# Patient Record
Sex: MALE | Race: WHITE | Employment: OTHER | URBAN - METROPOLITAN AREA
[De-identification: names, ages, dates, MRNs, and addresses within clinical notes are randomized per-mention and may not be internally consistent; named-entity substitution may affect disease eponyms.]

---

## 2017-01-09 DIAGNOSIS — Z98.890 S/P LUMBAR LAMINECTOMY: Primary | ICD-10-CM

## 2017-01-09 DIAGNOSIS — E78.5 HYPERLIPIDEMIA LDL GOAL <70: ICD-10-CM

## 2017-01-09 DIAGNOSIS — R13.10 DYSPHAGIA, UNSPECIFIED TYPE: ICD-10-CM

## 2017-01-09 DIAGNOSIS — E78.6 LOW HDL (UNDER 40): ICD-10-CM

## 2017-01-09 NOTE — TELEPHONE ENCOUNTER
Change in pharmacy - now mail order, new Rx's needed    Pending Prescriptions:                       Disp   Refills    tamsulosin (FLOMAX) 0.4 MG capsule        90 cap*3            Sig: TAKE 1 CAPSULE (0.4 MG) BY MOUTH DAILY AS NEEDED    pravastatin (PRAVACHOL) 20 MG tablet      90 tab*3            Sig: Take 1 tablet (20 mg) by mouth daily    omeprazole (PRILOSEC) 20 MG CR capsule    90 cap*3            Sig: TAKE 1 TABLET (20 MG) BY MOUTH AS NEEDED TAKE           30-60 MINUTES BEFORE A MEAL.    gemfibrozil (LOPID) 600 MG tablet         180 ta*3            Sig: Take 1 tablet (600 mg) by mouth 2 times daily         Last Written Prescription Date: 8/15/16, 12/5/16, 7/12/16  Last Fill Quantity: 90 day, # refills: 3  Last Office Visit with [facility], [facility] or [facility] prescribing provider: 12-5-16 Ayesha   Next 5 appointments (look out 90 days)     Feb 27, 2017 11:00 AM   [facility]    [address] [facility] [facility] [address]   [phone]                   CHOL      119   11/16/2016  CHOL      99@   4/9/2007  HDL       37   11/16/2016  LDL       61   11/16/2016  LDL      42@   1/22/2010  TRIG      106   11/16/2016  CHOLHDLRATIO      3.6   11/12/2015        BP Readings from Last 3 Encounters:   12/05/16 94/58   11/16/16 112/70   11/07/16 141/73     RT Parrish (R)

## 2017-01-11 RX ORDER — GEMFIBROZIL 600 MG/1
600 TABLET, FILM COATED ORAL 2 TIMES DAILY
Qty: 180 TABLET | Refills: 3 | Status: SHIPPED | OUTPATIENT
Start: 2017-01-11 | End: 2018-02-05

## 2017-01-11 RX ORDER — PRAVASTATIN SODIUM 20 MG
20 TABLET ORAL DAILY
Qty: 90 TABLET | Refills: 2 | Status: SHIPPED | OUTPATIENT
Start: 2017-01-11 | End: 2017-09-26

## 2017-01-11 RX ORDER — TAMSULOSIN HYDROCHLORIDE 0.4 MG/1
CAPSULE ORAL
Qty: 90 CAPSULE | Refills: 2 | Status: SHIPPED | OUTPATIENT
Start: 2017-01-11 | End: 2017-09-26

## 2017-01-11 NOTE — TELEPHONE ENCOUNTER
Routing refill request to provider for review/approval because:  Drug interaction warning - High  Kym Juarez RN

## 2017-02-17 ENCOUNTER — DOCUMENTATION ONLY (OUTPATIENT)
Dept: LAB | Facility: CLINIC | Age: 82
End: 2017-02-17

## 2017-02-17 DIAGNOSIS — I51.9 LEFT VENTRICULAR DIASTOLIC DYSFUNCTION: ICD-10-CM

## 2017-02-17 DIAGNOSIS — E66.9 NON MORBID OBESITY, UNSPECIFIED OBESITY TYPE: Primary | ICD-10-CM

## 2017-02-17 DIAGNOSIS — E11.59 TYPE 2 DIABETES MELLITUS WITH VASCULAR DISEASE (H): ICD-10-CM

## 2017-02-17 NOTE — PROGRESS NOTES
Patient will be coming to lab on 2/24/17. He is requesting for fasting lab orders. Please review and place future order if needed.     Thanks

## 2017-02-23 DIAGNOSIS — E66.9 NON MORBID OBESITY, UNSPECIFIED OBESITY TYPE: ICD-10-CM

## 2017-02-23 DIAGNOSIS — E11.59 TYPE 2 DIABETES MELLITUS WITH VASCULAR DISEASE (H): ICD-10-CM

## 2017-02-23 LAB
ANION GAP SERPL CALCULATED.3IONS-SCNC: 9 MMOL/L (ref 3–14)
BUN SERPL-MCNC: 17 MG/DL (ref 7–30)
CALCIUM SERPL-MCNC: 9 MG/DL (ref 8.5–10.1)
CHLORIDE SERPL-SCNC: 108 MMOL/L (ref 94–109)
CHOLEST SERPL-MCNC: 102 MG/DL
CO2 SERPL-SCNC: 26 MMOL/L (ref 20–32)
CREAT SERPL-MCNC: 0.71 MG/DL (ref 0.66–1.25)
CREAT UR-MCNC: 110 MG/DL
GFR SERPL CREATININE-BSD FRML MDRD: ABNORMAL ML/MIN/1.7M2
GLUCOSE SERPL-MCNC: 147 MG/DL (ref 70–99)
HBA1C MFR BLD: 6.8 % (ref 4.3–6)
HDLC SERPL-MCNC: 27 MG/DL
LDLC SERPL CALC-MCNC: 49 MG/DL
MICROALBUMIN UR-MCNC: 7 MG/L
MICROALBUMIN/CREAT UR: 6.01 MG/G CR (ref 0–17)
NONHDLC SERPL-MCNC: 75 MG/DL
POTASSIUM SERPL-SCNC: 3.9 MMOL/L (ref 3.4–5.3)
SODIUM SERPL-SCNC: 143 MMOL/L (ref 133–144)
TRIGL SERPL-MCNC: 128 MG/DL

## 2017-02-23 PROCEDURE — 83036 HEMOGLOBIN GLYCOSYLATED A1C: CPT | Performed by: INTERNAL MEDICINE

## 2017-02-23 PROCEDURE — 82043 UR ALBUMIN QUANTITATIVE: CPT | Performed by: INTERNAL MEDICINE

## 2017-02-23 PROCEDURE — 80061 LIPID PANEL: CPT | Performed by: INTERNAL MEDICINE

## 2017-02-23 PROCEDURE — 80048 BASIC METABOLIC PNL TOTAL CA: CPT | Performed by: INTERNAL MEDICINE

## 2017-02-23 PROCEDURE — 36415 COLL VENOUS BLD VENIPUNCTURE: CPT | Performed by: INTERNAL MEDICINE

## 2017-02-27 ENCOUNTER — OFFICE VISIT (OUTPATIENT)
Dept: FAMILY MEDICINE | Facility: CLINIC | Age: 82
End: 2017-02-27
Payer: COMMERCIAL

## 2017-02-27 VITALS
BODY MASS INDEX: 35.68 KG/M2 | WEIGHT: 287 LBS | OXYGEN SATURATION: 95 % | TEMPERATURE: 97.6 F | HEART RATE: 56 BPM | HEIGHT: 75 IN | DIASTOLIC BLOOD PRESSURE: 66 MMHG | SYSTOLIC BLOOD PRESSURE: 103 MMHG

## 2017-02-27 DIAGNOSIS — M54.2 CERVICALGIA: ICD-10-CM

## 2017-02-27 DIAGNOSIS — Z00.01 ENCOUNTER FOR GENERAL ADULT MEDICAL EXAMINATION WITH ABNORMAL FINDINGS: Primary | ICD-10-CM

## 2017-02-27 DIAGNOSIS — R39.198 SLOWING OF URINARY STREAM: ICD-10-CM

## 2017-02-27 DIAGNOSIS — E11.59 TYPE 2 DIABETES MELLITUS WITH VASCULAR DISEASE (H): ICD-10-CM

## 2017-02-27 DIAGNOSIS — E78.6 LOW HDL (UNDER 40): ICD-10-CM

## 2017-02-27 DIAGNOSIS — M54.41 CHRONIC LOW BACK PAIN WITH BILATERAL SCIATICA, UNSPECIFIED BACK PAIN LATERALITY: ICD-10-CM

## 2017-02-27 DIAGNOSIS — I77.810 AORTIC ROOT DILATATION (H): ICD-10-CM

## 2017-02-27 DIAGNOSIS — K63.5 COLON POLYPS: ICD-10-CM

## 2017-02-27 DIAGNOSIS — I10 ESSENTIAL HYPERTENSION, BENIGN: ICD-10-CM

## 2017-02-27 DIAGNOSIS — M54.42 CHRONIC LOW BACK PAIN WITH BILATERAL SCIATICA, UNSPECIFIED BACK PAIN LATERALITY: ICD-10-CM

## 2017-02-27 DIAGNOSIS — G47.33 OSA (OBSTRUCTIVE SLEEP APNEA): ICD-10-CM

## 2017-02-27 DIAGNOSIS — G89.29 CHRONIC LOW BACK PAIN WITH BILATERAL SCIATICA, UNSPECIFIED BACK PAIN LATERALITY: ICD-10-CM

## 2017-02-27 PROCEDURE — 99207 C FOOT EXAM  NO CHARGE: CPT | Mod: 25 | Performed by: INTERNAL MEDICINE

## 2017-02-27 PROCEDURE — 99397 PER PM REEVAL EST PAT 65+ YR: CPT | Performed by: INTERNAL MEDICINE

## 2017-02-27 RX ORDER — AMLODIPINE BESYLATE 2.5 MG/1
2.5 TABLET ORAL 2 TIMES DAILY
Qty: 180 TABLET | Refills: 3 | Status: SHIPPED | OUTPATIENT
Start: 2017-02-27 | End: 2017-09-26

## 2017-02-27 RX ORDER — LANCETS
EACH MISCELLANEOUS
Qty: 3 BOX | Refills: 3 | Status: CANCELLED | OUTPATIENT
Start: 2017-02-27

## 2017-02-27 RX ORDER — METFORMIN HCL 500 MG
2000 TABLET, EXTENDED RELEASE 24 HR ORAL
Qty: 120 TABLET | Refills: 3 | Status: SHIPPED | OUTPATIENT
Start: 2017-02-27 | End: 2017-08-04

## 2017-02-27 NOTE — NURSING NOTE
"Chief Complaint   Patient presents with     Wellness Visit     not fasting       Initial /66 (BP Location: Left arm, Patient Position: Chair, Cuff Size: Adult Large)  Pulse 56  Temp 97.6  F (36.4  C) (Oral)  Ht 6' 3\" (1.905 m)  Wt 287 lb (130.2 kg)  SpO2 95%  BMI 35.87 kg/m2 Estimated body mass index is 35.87 kg/(m^2) as calculated from the following:    Height as of this encounter: 6' 3\" (1.905 m).    Weight as of this encounter: 287 lb (130.2 kg).  Medication Reconciliation: complete.    Brenda Jaeger CMA      "

## 2017-02-27 NOTE — MR AVS SNAPSHOT
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   o   Discuss preventive care.    o   Review your medicines if your doctor has prescribed any.    Talk with your health care provider about whether you should have a test to screen for prostate cancer (PSA).    Every 3 years, have a diabetes test (fasting glucose). If you are at risk for diabetes, you should have this test more often.    Every 5 years, have a cholesterol test. Have this test more often if you are at risk for high cholesterol or heart disease.     Every 10 years, have a colonoscopy. Or, have a yearly FIT test (stool test). These exams will check for colon cancer.    Talk to with your health care provider about screening for Abdominal Aortic Aneurysm if you have a family history of AAA or have a history of smoking.  Shots:     Get a flu shot each year.     Get a tetanus shot every 10 years.     Talk to your doctor about your pneumonia vaccines. There are now two you should receive - Pneumovax (PPSV 23) and Prevnar (PCV 13).    Talk to your doctor about a shingles vaccine.     Talk to your  doctor about the hepatitis B vaccine.  Nutrition:     Eat at least 5 servings of fruits and vegetables each day.     Eat whole-grain bread, whole-wheat pasta and brown rice instead of white grains and rice.     Talk to your doctor about Calcium and Vitamin D.   Lifestyle    Exercise for at least 150 minutes a week (30 minutes a day, 5 days a week). This will help you control your weight and prevent disease.     Limit alcohol to one drink per day.     No smoking.     Wear sunscreen to prevent skin cancer.     See your dentist every six months for an exam and cleaning.     See your eye doctor every 1 to 2 years to screen for conditions such as glaucoma, macular degeneration and cataracts.        Follow-ups after your visit        Additional Services     GASTROENTEROLOGY ADULT REF PROCEDURE ONLY       Last Lab Result: Creatinine (mg/dL)       Date                     Value                 02/23/2017               0.71             ----------  Body mass index is 35.87 kg/(m^2).      Patient will be contacted to schedule procedure.     Please be aware that coverage of these services is subject to the terms and limitations of your health insurance plan.  Call member services at your health plan with any benefit or coverage questions.  Any procedures must be performed at a [facility] facility OR coordinated by your clinic's referral office.    Please bring the following with you to your appointment:    (1) Any X-Rays, CTs or MRIs which have been performed.  Contact the facility where they were done to arrange for  prior to your scheduled appointment.    (2) List of current medications   (3) This referral request   (4) Any documents/labs given to you for this referral            [facility] PT, HAND, AND CHIROPRACTIC REFERRAL       **This order will print in the [facility] Scheduling Office**    Physical Therapy, Hand Therapy and Chiropractic Care are available through:    *[facility]  *[facility]  *[facility]    Call one number to schedule at any of the above locations: [phone].    Your provider has referred you to: Physical Therapy at [facility] or [facility]    Indication/Reason for Referral: Neck Pain  Onset of Illness:   Therapy Orders: Evaluate and Treat  Special Programs: None  Special Request: None    Zach Serrano      Additional Comments for the Therapist or Chiropractor:     Please be aware that coverage of these services is subject to the terms and limitations of your health insurance plan.  Call member services at your health plan with any benefit or coverage questions.      Please bring the following to your appointment:    *Your personal calendar for scheduling future appointments  *Comfortable clothing                  Follow-up notes from your care team     Return in about 6 months (around 8/27/2017).      Who to contact     If you have questions or need follow up information about today's clinic visit or your schedule please contact [facility] directly at [phone].  Normal or non-critical lab and imaging results will be communicated to you by MyChart, letter or phone within 4 business days after the clinic has received the results. If you do not hear from us within 7 days, please contact the clinic through "[facility]hart or phone. If you have a critical or abnormal lab result, we will notify you by phone as soon as possible.  Submit refill requests through [employer] or call your pharmacy and they will forward the refill request to us. Please allow 3 business days for your refill to be completed.          Additional Information About Your Visit        [employer] Information     [employer] gives you secure access to your electronic health record. If you see a primary care provider, you can also send messages to your care team and make appointments. If you have questions, please call your primary care clinic.  If you do not have a primary care provider, please call  "[phone] and they will assist you.        Care EveryWhere ID     This is your Care EveryWhere ID. This could be used by other organizations to access your [place] medical records  [id number]        Your Vitals Were     Pulse Temperature Height Pulse Oximetry BMI (Body Mass Index)       56 97.6  F (36.4  C) (Oral) 6' 3\" (1.905 m) 95% 35.87 kg/m2        Blood Pressure from Last 3 Encounters:   02/27/17 103/66   12/05/16 94/58   11/16/16 112/70    Weight from Last 3 Encounters:   02/27/17 287 lb (130.2 kg)   12/05/16 288 lb (130.6 kg)   11/16/16 286 lb (129.7 kg)              We Performed the Following     FOOT EXAM     GASTROENTEROLOGY ADULT REF PROCEDURE ONLY     [facility] PT, HAND, AND CHIROPRACTIC REFERRAL          Today's Medication Changes          These changes are accurate as of: 2/27/17 11:21 AM.  If you have any questions, ask your nurse or doctor.               These medicines have changed or have updated prescriptions.        Dose/Directions    METAMUCIL 1.7 GM Wafr   This may have changed:  See the new instructions.   Generic drug:  Psyllium        TAKE AS DIRECTED   Refills:  0       triamcinolone 0.5 % cream   Commonly known as:  KENALOG   This may have changed:  additional instructions   Used for:  Rash and other nonspecific skin eruption        Apply  topically 2 times daily. to affected area as directed.   Quantity:  15 g   Refills:  0            Where to get your medicines      These medications were sent to [facility] - [address], [facility] [address]     Phone:  [phone]     amLODIPine 2.5 MG tablet    metFORMIN 500 MG 24 hr tablet                Primary Care Provider Office Phone # Fax #    Hamzah Hodge [phone]       [facility] [address]  [facility] [address]        Thank you!     Thank you for choosing [facility]  for your care. Our goal is always to provide you " with excellent care. Hearing back from our patients is one way we can continue to improve our services. Please take a few minutes to complete the written survey that you may receive in the mail after your visit with us. Thank you!             Your Updated Medication List - Protect others around you: Learn how to safely use, store and throw away your medicines at www.disposemymeds.org.          This list is accurate as of: 2/27/17 11:21 AM.  Always use your most recent med list.                   Brand Name Dispense Instructions for use    ACETAMIN 500 MG tablet   Generic drug:  acetaminophen      Take 2 tablets by mouth 3 times daily as needed.       amLODIPine 2.5 MG tablet    NORVASC    180 tablet    Take 1 tablet (2.5 mg) by mouth 2 times daily       aspirin 81 MG tablet     30 tablet    Take 1 tablet (81 mg) by mouth daily       blood glucose monitoring lancets     3 Box    Use to test blood sugar 2 times daily or as directed.       blood glucose monitoring test strip    ONE TOUCH ULTRA    200 each    Use to test blood sugars 2 times daily or as directed.       finasteride 5 MG tablet    PROSCAR    90 tablet    Take 1 tablet (5 mg) by mouth daily       gabapentin 300 MG capsule    NEURONTIN    810 capsule    Take 900 mg three times daily       gemfibrozil 600 MG tablet    LOPID    180 tablet    Take 1 tablet (600 mg) by mouth 2 times daily       lisinopril 20 MG tablet    PRINIVIL/ZESTRIL    90 tablet    Take 1 tablet (20 mg) by mouth daily       METAMUCIL 1.7 GM Wafr   Generic drug:  Psyllium      TAKE AS DIRECTED       metFORMIN 500 MG 24 hr tablet    GLUCOPHAGE-XR    120 tablet    Take 4 tablets (2,000 mg) by mouth daily (with breakfast)       metoprolol 50 MG 24 hr tablet    TOPROL-XL    180 tablet    Take 1 tablet (50 mg) by mouth 2 times daily       omeprazole 20 MG CR capsule    priLOSEC    90 capsule    TAKE 1 TABLET (20 MG) BY MOUTH AS NEEDED TAKE 30-60 MINUTES BEFORE A MEAL.       order for DME       Uses Cpap machine for sleep apnea       pravastatin 20 MG tablet    PRAVACHOL    90 tablet    Take 1 tablet (20 mg) by mouth daily       tamsulosin 0.4 MG capsule    FLOMAX    90 capsule    TAKE 1 CAPSULE (0.4 MG) BY MOUTH DAILY AS NEEDED       triamcinolone 0.5 % cream    KENALOG    15 g    Apply  topically 2 times daily. to affected area as directed.       vitamin D 2000 UNITS tablet     90 tablet    Take 2,000 Units by mouth daily.

## 2017-02-27 NOTE — PROGRESS NOTES
SUBJECTIVE:                                                            Austen Fowler is a 81 year old male who presents for Preventive Visit.      Are you in the first 12 months of your Medicare coverage?  No    Physical   Annual:     Getting at least 3 servings of Calcium per day::  Yes    Bi-annual eye exam::  Yes    Dental care twice a year::  Yes    Sleep apnea or symptoms of sleep apnea::  Sleep apnea    Diet::  Regular (no restrictions) and Low fat/cholesterol    Frequency of exercise::  2-3 days/week    Duration of exercise::  30-45 minutes    Taking medications regularly::  Yes    Medication side effects::  None    Additional concerns today::  YES      COGNITIVE SCREEN  1) Repeat 3 items (Banana, Sunrise, Chair)    2) Clock draw: NORMAL  3) 3 item recall: Recalls 2 objects   Results: NORMAL clock, 1-2 items recalled: COGNITIVE IMPAIRMENT LESS LIKELY    Mini-CogTM Copyright S [employer]. Licensed by the author for use in [facility] [employer]; reprinted with permission (jesus@[email or web address]). All rights reserved.        All Histories reviewed and updated in EPIC as appropriate.  Social History   Substance Use Topics     Smoking status: Former Smoker     Quit date: 3/1/1992     Smokeless tobacco: Never Used      Comment: 80 pack year history     Alcohol use 0.0 oz/week     0 Standard drinks or equivalent per week      Comment: once in a while alcohol usage        The patient does not drink >3 drinks per day nor >7 drinks per week.        Diabetes Follow-up    Patient is checking blood sugars: once daily.  Results are as follows:         am - 140    Diabetic concerns: None     Symptoms of hypoglycemia (low blood sugar): none     Paresthesias (numbness or burning in feet) or sores: No     Date of last diabetic eye exam: *4/2016     Hyperlipidemia Follow-Up      Rate your low fat/cholesterol diet?: good    Taking statin?  Yes, no muscle aches from statin    Other lipid medications/supplements?:  none     Hypertension  Follow-up      Outpatient blood pressures are not being checked.    Low Salt Diet: no added salt       Today's PHQ-2 Score:   PHQ-2 ( 1999 Pfizer) 2/24/2017   Little interest or pleasure in doing things Several days   Feeling down, depressed or hopeless Not at all   PHQ-2 Score 1       Do you feel safe in your environment - Yes    Do you have a Health Care Directive?: Yes: Patient states has Advance Directive and will bring in a copy to clinic.    Current providers sharing in care for this patient include:   Patient Care Team:  Hamzah Hodge MD as PCP - General      Hearing impairment: Yes, Difficulty following a conversation in a noisy restaurant or crowded room.    Feel that people are mumbling or not speaking clearly.    Need to ask people to speak up or repeat themselves.    Difficulty understanding speech on the telephone.    Ability to successfully perform activities of daily living: Yes, no assistance needed     Fall risk:  Fallen 2 or more times in the past year?: No  Any fall with injury in the past year?: No    Home safety:  lack of grab bars in the bathroom      The following health maintenance items are reviewed in Epic and correct as of today:  Health Maintenance   Topic Date Due     MEDICARE ANNUAL WELLNESS VISIT  03/12/2016     FOOT EXAM Q1 YEAR( NO INBASKET)  07/17/2016     EYE EXAM Q1 YEAR( NO INBASKET)  04/01/2017     TSH W/ FREE T4 REFLEX Q2 YEAR (NO INBASKET)  04/27/2017     ADVANCE DIRECTIVE PLANNING Q5 YRS (NO INBASKET)  05/22/2017     COLONOSCOPY Q5 YR INBASKET MESSAGE  07/19/2017     A1C Q6 MO( NO INBASKET)  08/23/2017     FALL RISK ASSESSMENT  12/05/2017     MARLA QUESTIONNAIRE 1 YEAR  12/05/2017     PHQ-9 Q1YR (NO INBASKET)  12/05/2017     CREATININE Q1 YEAR (NO INBASKET)  02/23/2018     LIPID MONITORING Q1 YEAR( NO INBASKET)  02/23/2018     MICROALBUMIN Q1 YEAR( NO INBASKET)  02/23/2018     PSA Q3 YR  03/12/2018     TETANUS Q10 YR  03/11/2023     PNEUMO 5YR BOOST DUE AFTER AGE 65 (NO IB  "MSG)  Addressed     INFLUENZA VACCINE (SYSTEM ASSIGNED)  Completed     PNEUMOCOCCAL  Completed              ROS:  Constitutional, HEENT, cardiovascular, pulmonary, gi and gu systems are negative, except as otherwise noted.    Problem list, Medication list, Allergies, and Medical/Social/Surgical histories reviewed in [facility] and updated as appropriate.  OBJECTIVE:                                                            /66 (BP Location: Left arm, Patient Position: Chair, Cuff Size: Adult Large)  Pulse 56  Temp 97.6  F (36.4  C) (Oral)  Ht 6' 3\" (1.905 m)  Wt 287 lb (130.2 kg)  SpO2 95%  BMI 35.87 kg/m2 Estimated body mass index is 35.87 kg/(m^2) as calculated from the following:    Height as of this encounter: 6' 3\" (1.905 m).    Weight as of this encounter: 287 lb (130.2 kg).  EXAM:   GENERAL: healthy, alert and no distress  EYES: Eyes grossly normal to inspection, PERRL and conjunctivae and sclerae normal  HENT: ear canals and TM's normal, nose and mouth without ulcers or lesions  NECK: no adenopathy, no asymmetry, masses, or scars and thyroid normal to palpation  RESP: lungs clear to auscultation - no rales, rhonchi or wheezes  CV: regular rate and rhythm, normal S1 S2, no S3 or S4, no murmur, click or rub, no peripheral edema and peripheral pulses strong  ABDOMEN: soft, nontender, no hepatosplenomegaly, no masses and bowel sounds normal   (male): testicles normal without atrophy or masses, no hernias and penis normal without urethral discharge  RECTAL: normal sphincter tone, no rectal masses, prostate normal size, smooth, nontender without nodules or masses  MS: no gross musculoskeletal defects noted, no edema  SKIN: no suspicious lesions or rashes  Multiple benign skin lesions     NEURO: Normal strength and tone, mentation intact and speech normal  PSYCH: mentation appears normal, affect normal/bright    ASSESSMENT / PLAN:                                                            Patient is status post " "CABG  He is on maximum medical management and is also on a statin drug with pravastatin  He takes baby aspirin daily  He is advised to try losing weight  His back surgical scar is healthy  And his back pain is manageable with the gabapentin and local injections  He's  Needs to be managed with physical therapy    He is due for colonoscopy    He is up-to-date on immunizations    He checks his blood sugars once a day and has enough supplies at home  He also is on metformin for that    He will see me 6 monthly    ICD-10-CM    1. Encounter for general adult medical examination with abnormal findings Z00.01    2. 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 Essential hypertension, benign I10 amLODIPine (NORVASC) 2.5 MG tablet   4. Aortic root dilatation (H) I77.810    5. Slowing of urinary stream R39.198    6. PENELOPE (obstructive sleep apnea) G47.33    7. Low HDL (under 40) E78.6    8. Colon polyps K63.5 GASTROENTEROLOGY ADULT REF PROCEDURE ONLY   9. Chronic low back pain with bilateral sciatica, unspecified back pain laterality M54.41     G89.29     M54.42    10. Cervicalgia M54.2 [facility] PT, HAND, AND CHIROPRACTIC REFERRAL       End of Life Planning:  Patient currently has an advanced directive: Yes.  Practitioner is supportive of decision.    COUNSELING:  Reviewed preventive health counseling, as reflected in patient instructions       Regular exercise       Healthy diet/nutrition        Estimated body mass index is 35.87 kg/(m^2) as calculated from the following:    Height as of this encounter: 6' 3\" (1.905 m).    Weight as of this encounter: 287 lb (130.2 kg).  Weight management plan: Discussed healthy diet and exercise guidelines and patient will follow up in 6 months in clinic to re-evaluate.   reports that he quit smoking about 25 years ago. He has never used smokeless tobacco.      Appropriate preventive services were discussed with this " patient, including applicable screening as appropriate for cardiovascular disease, diabetes, osteopenia/osteoporosis, and glaucoma.  As appropriate for age/gender, discussed screening for colorectal cancer, prostate cancer, Checklist reviewing preventive services available has been given to the patient.    Reviewed patients plan of care and provided an AVS. The Basic Care Plan (routine screening as documented in Health Maintenance) for Austen meets the Care Plan requirement. This Care Plan has been established and reviewed with the Patient.    Counseling Resources:  ATP IV Guidelines  Pooled Cohorts Equation Calculator  Breast Cancer Risk Calculator  FRAX Risk Assessment  ICSI Preventive Guidelines  Dietary Guidelines for Americans, 2010  USDA's MyPlate  ASA Prophylaxis  Lung CA Screening    Hamzah Hodge MD  [facility]  Answers for HPI/ROS submitted by the patient on 2/24/2017   PHQ-2 Depressed: Several days, Not at all  PHQ-2 Score: 1

## 2017-03-01 ENCOUNTER — THERAPY VISIT (OUTPATIENT)
Dept: PHYSICAL THERAPY | Facility: CLINIC | Age: 82
End: 2017-03-01
Payer: COMMERCIAL

## 2017-03-01 DIAGNOSIS — M54.2 NECK PAIN: Primary | ICD-10-CM

## 2017-03-01 PROCEDURE — 97110 THERAPEUTIC EXERCISES: CPT | Mod: GP | Performed by: PHYSICAL THERAPIST

## 2017-03-01 PROCEDURE — 97161 PT EVAL LOW COMPLEX 20 MIN: CPT | Mod: GP | Performed by: PHYSICAL THERAPIST

## 2017-03-01 PROCEDURE — G8982 BODY POS GOAL STATUS: HCPCS | Mod: GP | Performed by: PHYSICAL THERAPIST

## 2017-03-01 PROCEDURE — G8981 BODY POS CURRENT STATUS: HCPCS | Mod: GP | Performed by: PHYSICAL THERAPIST

## 2017-03-01 NOTE — PROGRESS NOTES
Subjective:                                       Pertinent medical history includes:  Cancer, diabetes, overweight, high blood pressure, heart problems and sleep disorder/apnea.    Other surgeries include:  Heart surgery and other.  Current medications:  Cardiac medication, anti-inflammatory, pain medication and high blood pressure medication.  Current occupation is Retired .  Patient is working in normal job without restrictions.      Barriers include:  None as reported by patient.    Red flags:  Pain at rest/night.                      Objective:    System    Physical Exam    General     ROS    Assessment/Plan:

## 2017-03-01 NOTE — PROGRESS NOTES
Physical Therapy Initial Evaluation  March 1, 2017     Precautions/Restrictions/MD instructions: PT eval and treat.     Subjective:   Date of Onset: September 1 2016, (MD orders dated 2/27/17)  Primary Symptoms/Location of Pain: Neck pain, actually starts in L shoulder blade, runs up to L side of neck and occasionally tingling into L occipital region. Quality of pain is dull and aching. Denies having related symptoms spreading to the upper extremities or R scapula. Pains are described as intermittent and variable in intensity in nature. Pain is worse: morning. Pain is rated 3/10 currently, 1/10 at best, 7/10 at worst.   History of symptoms: Pains began gradually as the result of insidious onset. Since onset, symptoms are worse than before.  Worsened by: Leaning on L shoulder/elbow, driving for longer periods of time (puts hand on handrest above window and decreases pains). Worse in AM.  Alleviated by: Hand on window handle of car door, ice/heat/topical cream (temporary relief), sometimes relieved by letting arm hang loosely by his side. Most comfortable position is in recliner.   General health as reported by patient: good  Pertinent medical/surgical history: hx bypass (25 years ago), lumbar surgery (2 years ago). Imaging: none. Current occupational status: Retired. Patient's goals are: decrease pain. Return to MD:  PRN.     Therapist Impression:   Austen Fowler is a 81 year old RHD male with chronic history of neck pain. He presents with signs and symptoms consistent with mechanical neck pain. These impairments limit his ability to drive, sit and lean on L shoulder. Skilled PT services are necessary in order to reduce impairments and improve independent function.    Objective:  CERVICAL EXAMINATION    Posture: Forward head on neck, Increased thoracic kyphosis and Rounded shoulders (all mild only)  Correction of Posture: NE    Movement Loss Félix Mod Min Nil Pain   Protrusion  X X  -   Flexion    X -   Retraction    X  -   Extension  X (29deg)   -   Left Rotation  X (50deg)   +   Right Rotation    X (78deg) -   Left Side Bending   X  +/-   Right Side bending   X  -     Repeated movement testing:   (During: produces, abolishes, increases, decreases, no effect, centralizing, peripheralizing; After: better, worse, no better, no worse, no effect, centralized, peripheralized)  Pre-test Symptoms Sitting: L upper trap pain 3/10   Symptoms During Symptoms After ROM increased ROM decreased No Effect   PRO NE NE      Rep PRO        RET Prod neck NW      Rep RET Prod neck, NE upper trap NW Incr L rotation to 53deg     RET EXT        Rep RET EXT        LF - R        Rep LF - R        LF - L        Rep LF - L        ROT - R        Rep ROT - R        ROT - L        Rep ROT - L        FLEX        Rep FLEX          Provisional Classification: derangement vs. other  Principle of Management: trial retraction    Neurological testing (myotomes, sensation, reflexes, nerve tension) not indicated at this time.    Other:  ULTT: na  Spurlings: na  DNF endurance: na  Cervical rotation/lateral flexion (CRLF test): na    Assessment/Plan:    The patient is a 81 year old male with chief complaint of neck pain.    The patient has the following significant findings with corresponding treatment plan.  Diagnosis 1:  Mechanical neck pain    Pain -  hot/cold therapy, US, electric stimulation, mechanical traction, manual therapy, splint/taping/bracing/orthotics, self management, education, directional preference exercise and home program  Decreased ROM/flexibility - manual therapy, therapeutic exercise and home program  Decreased joint mobility - manual therapy and therapeutic exercise  Impaired muscle performance - neuro re-education  Decreased function - therapeutic activities  Impaired posture - neuro re-education    Therapy Evaluation Codes:   1) History comprised of:   Personal factors that impact the plan of care:      Age and Time since onset of symptoms.     Comorbidity factors that impact the plan of care are:      None.     Medications impacting care: None.  2) Examination of Body Systems comprised of:   Body structures and functions that impact the plan of care:      Cervical spine.   Activity limitations that impact the plan of care are:      Driving and Sitting.   Clinical presentation characteristics are:    Stable/Uncomplicated.  3) Presentation comprised of:   Presentation scored as Low complexity with uncomplicated characteristics..  4) Decision-Making    Low complexity using standardized patient assessment instrument and/or measureable assessment of functional outcome.  Cumulative Therapy Evaluation is: Low complexity.    Previous and current functional limitations:  (See Goal Flow Sheet for this information)    Short term and Long term goals: (See Goal Flow Sheet for this information)     Communication ability:  Patient appears to be able to clearly communicate and understand verbal and written communication and follow directions correctly.  Treatment Explanation - The following has been discussed with the patient: RX ordered/plan of care, anticipated outcomes, and possible risks and side effects.  This patient would benefit from PT intervention to resume normal activities.   Rehab potential is good.    Frequency:  1 X week, once daily  Duration:  for 6 weeks  Discharge Plan: Achieve all LTGs, be independent in home treatment program, and reach maximal therapeutic benefit.    Please refer to the daily flowsheet for treatment today, total treatment time and time spent performing 1:1 timed codes.

## 2017-03-08 ENCOUNTER — THERAPY VISIT (OUTPATIENT)
Dept: PHYSICAL THERAPY | Facility: CLINIC | Age: 82
End: 2017-03-08
Payer: COMMERCIAL

## 2017-03-08 DIAGNOSIS — M54.2 NECK PAIN: ICD-10-CM

## 2017-03-08 PROCEDURE — 97110 THERAPEUTIC EXERCISES: CPT | Mod: GP | Performed by: PHYSICAL THERAPIST

## 2017-03-08 PROCEDURE — 97140 MANUAL THERAPY 1/> REGIONS: CPT | Mod: GP | Performed by: PHYSICAL THERAPIST

## 2017-03-15 ENCOUNTER — THERAPY VISIT (OUTPATIENT)
Dept: PHYSICAL THERAPY | Facility: CLINIC | Age: 82
End: 2017-03-15
Payer: COMMERCIAL

## 2017-03-15 DIAGNOSIS — M54.2 NECK PAIN: ICD-10-CM

## 2017-03-15 PROCEDURE — 97110 THERAPEUTIC EXERCISES: CPT | Mod: GP | Performed by: PHYSICAL THERAPIST

## 2017-03-15 PROCEDURE — 97140 MANUAL THERAPY 1/> REGIONS: CPT | Mod: GP | Performed by: PHYSICAL THERAPIST

## 2017-03-22 ENCOUNTER — THERAPY VISIT (OUTPATIENT)
Dept: PHYSICAL THERAPY | Facility: CLINIC | Age: 82
End: 2017-03-22
Payer: COMMERCIAL

## 2017-03-22 DIAGNOSIS — M54.2 NECK PAIN: ICD-10-CM

## 2017-03-22 PROCEDURE — 97140 MANUAL THERAPY 1/> REGIONS: CPT | Mod: GP | Performed by: PHYSICAL THERAPIST

## 2017-03-22 PROCEDURE — 97110 THERAPEUTIC EXERCISES: CPT | Mod: GP | Performed by: PHYSICAL THERAPIST

## 2017-03-29 ENCOUNTER — THERAPY VISIT (OUTPATIENT)
Dept: PHYSICAL THERAPY | Facility: CLINIC | Age: 82
End: 2017-03-29
Payer: COMMERCIAL

## 2017-03-29 DIAGNOSIS — M54.2 NECK PAIN: ICD-10-CM

## 2017-03-29 PROCEDURE — 97140 MANUAL THERAPY 1/> REGIONS: CPT | Mod: GP | Performed by: PHYSICAL THERAPIST

## 2017-03-29 PROCEDURE — 97110 THERAPEUTIC EXERCISES: CPT | Mod: GP | Performed by: PHYSICAL THERAPIST

## 2017-04-08 ENCOUNTER — MYC REFILL (OUTPATIENT)
Dept: FAMILY MEDICINE | Facility: CLINIC | Age: 82
End: 2017-04-08

## 2017-04-08 DIAGNOSIS — I10 ESSENTIAL HYPERTENSION, BENIGN: ICD-10-CM

## 2017-04-10 RX ORDER — LISINOPRIL 20 MG/1
20 TABLET ORAL DAILY
Qty: 90 TABLET | Refills: 3 | Status: SHIPPED | OUTPATIENT
Start: 2017-04-10 | End: 2018-03-18

## 2017-04-10 RX ORDER — LIDOCAINE 40 MG/G
CREAM TOPICAL
Status: CANCELLED | OUTPATIENT
Start: 2017-04-10

## 2017-04-10 RX ORDER — ONDANSETRON 2 MG/ML
4 INJECTION INTRAMUSCULAR; INTRAVENOUS
Status: CANCELLED | OUTPATIENT
Start: 2017-04-10

## 2017-04-10 NOTE — TELEPHONE ENCOUNTER
New pharmacy this year.  Refilled per [facility] protocol.  Dianne Elizabeth RN

## 2017-04-10 NOTE — TELEPHONE ENCOUNTER
Message from [employer]:  Original authorizing provider: MD Austen Andrade would like a refill of the following medications:  lisinopril (PRINIVIL/ZESTRIL) 20 MG tablet [Hamzah Hodge MD]    Preferred pharmacy: [facility] [address] [place] [address]    Comment:

## 2017-04-10 NOTE — TELEPHONE ENCOUNTER
lisinopril (PRINIVIL/ZESTRIL) 20 MG tablet        Last Written Prescription Date: 12/5/2016  Last Fill Quantity: 90, # refills: 3  Last Office Visit with [email or web address] or [facility] prescribing provider: 2/27/2017  Next 5 appointments (look out 90 days)     Jun 09, 2017  7:45 AM CDT   Return Visit with John Paul Moreno MD   [facility] ([email or web address])    [address] [place] [id number] [phone]                   Potassium   Date Value Ref Range Status   02/23/2017 3.9 3.4 - 5.3 mmol/L Final     Creatinine   Date Value Ref Range Status   02/23/2017 0.71 0.66 - 1.25 mg/dL Final     BP Readings from Last 3 Encounters:   02/27/17 103/66   12/05/16 94/58   11/16/16 112/70

## 2017-04-11 ENCOUNTER — HOSPITAL ENCOUNTER (OUTPATIENT)
Facility: CLINIC | Age: 82
Discharge: HOME | End: 2017-04-11
Attending: SPECIALIST | Admitting: SPECIALIST
Payer: COMMERCIAL

## 2017-04-11 ENCOUNTER — SURGERY (OUTPATIENT)
Age: 82
End: 2017-04-11

## 2017-04-11 VITALS
DIASTOLIC BLOOD PRESSURE: 77 MMHG | HEIGHT: 75 IN | SYSTOLIC BLOOD PRESSURE: 129 MMHG | BODY MASS INDEX: 34.82 KG/M2 | OXYGEN SATURATION: 94 % | RESPIRATION RATE: 16 BRPM | WEIGHT: 280 LBS

## 2017-04-11 LAB — COLONOSCOPY: NORMAL

## 2017-04-11 PROCEDURE — 25000125 ZZHC RX 250: Performed by: SPECIALIST

## 2017-04-11 PROCEDURE — 25000128 H RX IP 250 OP 636: Performed by: SPECIALIST

## 2017-04-11 PROCEDURE — 88305 TISSUE EXAM BY PATHOLOGIST: CPT | Mod: 26 | Performed by: SPECIALIST

## 2017-04-11 PROCEDURE — 45380 COLONOSCOPY AND BIOPSY: CPT | Performed by: SPECIALIST

## 2017-04-11 PROCEDURE — G0500 MOD SEDAT ENDO SERVICE >5YRS: HCPCS | Performed by: SPECIALIST

## 2017-04-11 PROCEDURE — 88305 TISSUE EXAM BY PATHOLOGIST: CPT | Performed by: SPECIALIST

## 2017-04-11 RX ORDER — FENTANYL CITRATE 50 UG/ML
INJECTION, SOLUTION INTRAMUSCULAR; INTRAVENOUS PRN
Status: DISCONTINUED | OUTPATIENT
Start: 2017-04-11 | End: 2017-04-11 | Stop reason: HOSPADM

## 2017-04-11 RX ADMIN — FENTANYL CITRATE 25 MCG: 50 INJECTION, SOLUTION INTRAMUSCULAR; INTRAVENOUS at 08:38

## 2017-04-11 RX ADMIN — MIDAZOLAM HYDROCHLORIDE 0.5 MG: 1 INJECTION, SOLUTION INTRAMUSCULAR; INTRAVENOUS at 08:58

## 2017-04-11 RX ADMIN — MIDAZOLAM HYDROCHLORIDE 0.5 MG: 1 INJECTION, SOLUTION INTRAMUSCULAR; INTRAVENOUS at 08:48

## 2017-04-11 RX ADMIN — FENTANYL CITRATE 25 MCG: 50 INJECTION, SOLUTION INTRAMUSCULAR; INTRAVENOUS at 08:58

## 2017-04-11 RX ADMIN — MIDAZOLAM HYDROCHLORIDE 0.5 MG: 1 INJECTION, SOLUTION INTRAMUSCULAR; INTRAVENOUS at 08:44

## 2017-04-11 RX ADMIN — FENTANYL CITRATE 25 MCG: 50 INJECTION, SOLUTION INTRAMUSCULAR; INTRAVENOUS at 08:48

## 2017-04-11 RX ADMIN — MIDAZOLAM HYDROCHLORIDE 0.5 MG: 1 INJECTION, SOLUTION INTRAMUSCULAR; INTRAVENOUS at 08:38

## 2017-04-11 RX ADMIN — FENTANYL CITRATE 25 MCG: 50 INJECTION, SOLUTION INTRAMUSCULAR; INTRAVENOUS at 08:44

## 2017-04-11 NOTE — H&P
Pre-Endoscopy History and Physical     Austen Fowler MRN# [id number]   YOB: 1935 Age: 82 year old     Date of Procedure: 4/11/2017  Primary care provider: Hamzah Hodge  Type of Endoscopy: Colonoscopy with possible biopsy, possible polypectomy  Reason for Procedure: surveillance  Type of Anesthesia Anticipated: Conscious Sedation    HPI:    Austen is a 82 year old male who will be undergoing the above procedure.      A history and physical has been performed. The patient's medications and allergies have been reviewed. The risks and benefits of the procedure and the sedation options and risks were discussed with the patient.  All questions were answered and informed consent was obtained.      He denies a personal or family history of anesthesia complications or bleeding disorders.     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)     S/P lumbar laminectomy     Health Care Home     Coronary artery disease involving native coronary artery of native heart without angina pectoris     Left ventricular diastolic dysfunction     Ascending aorta dilatation (H)     Aortic root dilatation (H)     Non morbid obesity, unspecified obesity type     Neck pain        Past Medical History:   Diagnosis Date     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     Bunion      CAD (coronary artery disease)     CABG 1992: LIMA to LAD, SANDI to RCA, SVG to OM1 and OM2     Contact dermatitis and other eczema, due to unspecified cause     eczema - has light treatments with Dr. Rivera     Disorders of bursae and tendons in shoulder region, unspecified      Esophageal reflux      Essential hypertension, benign      Gallbladder disease      GERD (gastroesophageal reflux disease)      Heart attack (H)      Hyperlipidemia LDL goal <70      Left ventricular diastolic dysfunction      Low HDL (under 40) 3/28/2014     Obesity      Osteoarthrosis, unspecified whether generalized or localized, shoulder region      Other diseases of nasal cavity and sinuses     s/p surgery in 1995     Other hammer toe (acquired)      Sleep apnea     USES CPAP     Spinal stenosis, unspecified region other than cervical     MRI done 2006     Type 2 diabetes, HbA1c goal < 7% (H) 3/12/2015     Urge incontinence         Past Surgical History:   Procedure Laterality Date     C NONSPECIFIC PROCEDURE  11-92    CABG - LIMA to left anterior descending and saphenous vein bypass graft to the first and second obtuse marginal branch of the circumflex and the right mammary to the right coronary artery     C NONSPECIFIC PROCEDURE  6-94    Gail lap     C NONSPECIFIC PROCEDURE  5-92, 6-92    Angioplasty     C NONSPECIFIC PROCEDURE  1995    sinus surgery     C NONSPECIFIC PROCEDURE      bilateral cataract surgery     ESOPHAGOSCOPY, GASTROSCOPY, DUODENOSCOPY (EGD), DILATATION, COMBINED  7/17/2014    Procedure: COMBINED ESOPHAGOSCOPY, GASTROSCOPY, DUODENOSCOPY (EGD), DILATATION;  Surgeon: Bladimir Garner MD;  Location:  GI     HC COLONOSCOPY THRU STOMA W BIOPSY/CAUTERY TUMOR/POLYP/LESION  5/2007     INJECT EPIDURAL LUMBAR       LAMINECTOMY LUMBAR TWO LEVELS N/A 4/29/2015    Procedure: LAMINECTOMY LUMBAR TWO  LEVELS;  Surgeon: Bladimir Keenan MD;  Location:  OR       Social History   Substance Use Topics     Smoking status: Former Smoker     Quit date: 3/1/1992     Smokeless tobacco: Never Used      Comment: 80 pack year history     Alcohol use 0.0 oz/week     0 Standard drinks or equivalent per week      Comment: once in a while alcohol usage        Family History   Problem Relation Age of Onset     CANCER Brother      MELANOMA     DIABETES Mother      AODM     DIABETES Father      AODM     Myocardial Infarction Father      CEREBROVASCULAR DISEASE Brother      Family History Negative Brother      Family History Negative Sister      Family History Negative Son      Family History Negative Son      Family History Negative Son      Family History Negative Daughter        Prior to Admission medications    Medication Sig Start Date End Date Taking? Authorizing Provider   lisinopril (PRINIVIL/ZESTRIL) 20 MG tablet Take 1 tablet (20 mg) by mouth daily 4/10/17  Yes Hamzah Hodge MD   metFORMIN (GLUCOPHAGE-XR) 500 MG 24 hr tablet Take 4 tablets (2,000 mg) by mouth daily (with breakfast) 2/27/17  Yes Hamzah Hodge MD   amLODIPine (NORVASC) 2.5 MG tablet Take 1 tablet (2.5 mg) by mouth 2 times daily 2/27/17  Yes Hamzah Hodge MD   tamsulosin (FLOMAX) 0.4 MG capsule TAKE 1 CAPSULE (0.4 MG) BY MOUTH DAILY AS NEEDED 1/11/17  Yes Hamzah Hodge MD   pravastatin (PRAVACHOL) 20 MG tablet Take 1 tablet (20 mg) by mouth daily 1/11/17  Yes Hamzah Hodge MD   omeprazole (PRILOSEC) 20 MG CR capsule TAKE 1 TABLET (20 MG) BY MOUTH AS NEEDED TAKE 30-60 MINUTES BEFORE A MEAL. 1/11/17  Yes Hamzah Hodge MD   gemfibrozil (LOPID) 600 MG tablet Take 1 tablet (600 mg) by mouth 2 times daily 1/11/17  Yes Hamzah Hodge MD   finasteride (PROSCAR) 5 MG tablet Take 1 tablet (5 mg) by mouth daily 12/27/16  Yes Hamzah Hodge MD   gabapentin (NEURONTIN) 300 MG capsule Take 900 mg three times daily 12/27/16  Yes Hamzah Hodge MD   metoprolol  "(TOPROL-XL) 50 MG 24 hr tablet Take 1 tablet (50 mg) by mouth 2 times daily 12/5/16  Yes Hamzah Hodge MD   aspirin 81 MG tablet Take 1 tablet (81 mg) by mouth daily 4/27/15  Yes Hamzah Hodge MD   acetaminophen (ACETAMIN) 500 MG tablet Take 2 tablets by mouth 3 times daily as needed.    Yes Reported, Patient   Cholecalciferol (VITAMIN D) 2000 UNIT tablet Take 2,000 Units by mouth daily. 3/12/12  Yes Hamzah Hodge MD   METAMUCIL 1.7 GM OR WAFR TAKE AS DIRECTED  Patient taking differently: TAKE AS Needed   Yes    blood glucose monitoring (ONE TOUCH ULTRASOFT) lancets Use to test blood sugar 2 times daily or as directed. 4/4/16   Hamzah Hodge MD   blood glucose monitoring (ONE TOUCH ULTRA) test strip Use to test blood sugars 2 times daily or as directed. 4/4/16   Hamzah Hodge MD   triamcinolone (KENALOG) 0.5 % cream Apply  topically 2 times daily. to affected area as directed.  Patient taking differently: Apply  topically 2 times daily. to affected area as directed PRN 4/20/15   Hamzah Hodge MD   ORDER FOR DME Uses Cpap machine for sleep apnea 5/22/12   Dominga Aguirre PA-C       Allergies   Allergen Reactions     No Known Drug Allergies         REVIEW OF SYSTEMS:   5 point ROS negative except as noted above in HPI, including Gen., Resp., CV, GI &  system review.    PHYSICAL EXAM:   /81  Resp 16  Ht 1.905 m (6' 3\")  Wt 127 kg (280 lb)  BMI 35 kg/m2 Estimated body mass index is 35 kg/(m^2) as calculated from the following:    Height as of this encounter: 1.905 m (6' 3\").    Weight as of this encounter: 127 kg (280 lb).   GENERAL APPEARANCE: alert, and oriented  MENTAL STATUS: alert  AIRWAY EXAM: Mallampatti Class II (visualization of the soft palate, fauces, and uvula)  RESP: lungs clear to auscultation - no rales, rhonchi or wheezes  CV: regular rates and rhythm  DIAGNOSTICS:    Not indicated    IMPRESSION   ASA Class 2 - Mild systemic disease    PLAN:   Plan for Colonoscopy with possible " biopsy, possible polypectomy. We discussed the risks, benefits and alternatives and the patient wished to proceed.    The above has been forwarded to the consulting provider.      Signed Electronically by: Bladimir Garner  April 11, 2017

## 2017-04-12 LAB — COPATH REPORT: NORMAL

## 2017-04-24 ENCOUNTER — MYC MEDICAL ADVICE (OUTPATIENT)
Dept: CARDIOLOGY | Facility: CLINIC | Age: 82
End: 2017-04-24

## 2017-04-24 ENCOUNTER — MYC MEDICAL ADVICE (OUTPATIENT)
Dept: FAMILY MEDICINE | Facility: CLINIC | Age: 82
End: 2017-04-24

## 2017-04-24 ENCOUNTER — MYC MEDICAL ADVICE (OUTPATIENT)
Dept: PALLIATIVE MEDICINE | Facility: CLINIC | Age: 82
End: 2017-04-24

## 2017-04-24 DIAGNOSIS — G89.29 CHRONIC LEFT SI JOINT PAIN: Primary | ICD-10-CM

## 2017-04-24 DIAGNOSIS — E78.2 MIXED HYPERLIPIDEMIA: Primary | ICD-10-CM

## 2017-04-24 DIAGNOSIS — M53.3 CHRONIC LEFT SI JOINT PAIN: Primary | ICD-10-CM

## 2017-04-24 DIAGNOSIS — I25.10 CORONARY ARTERY DISEASE INVOLVING NATIVE CORONARY ARTERY OF NATIVE HEART WITHOUT ANGINA PECTORIS: ICD-10-CM

## 2017-04-25 ENCOUNTER — TELEPHONE (OUTPATIENT)
Dept: PALLIATIVE MEDICINE | Facility: CLINIC | Age: 82
End: 2017-04-25

## 2017-04-25 NOTE — TELEPHONE ENCOUNTER
Pre-screening Questions for Radiology Injections:    Injection to be done at which interventional clinic site? [facility]    Procedure ordered by Pauline Saul    Procedure ordered?  SI Joint Injection    What insurance would patient like us to bill for this procedure? [facility]      Worker's comp-Any injection DO NOT SCHEDULE and route to Ayla Villaseñor.      [employer] insurance - For SI joint injections, DO NOT SCHEDULE and route Ayla Villaseñor.      [employer]- MBB's must be scheduled at LEAST two weeks apart      [employer] - Any injection besides hip/shoulder/knee joint DO NOT SCHEDULE and route to Ayla Villaseñor. She will obtain PA and call pt back to schedule procedure or notify pt of denial.     HP [employer]-PA REQUIRED FOR NON-ANSELMO OR Joint injections    Any chance of pregnancy? Not Applicable   If YES, do NOT schedule and route to RN pool    Is an  needed? No     Patient has a drive home? (mandatory) YES:     Is patient taking any blood thinners (plavix, coumadin, jantoven, warfarin, heparin, pradaxa or dabigatran )? No   If hold needed, do NOT schedule, route to RN pool     Is patient taking any aspirin products? Yes - Pt takes 81mg daily; instructed to hold 0 day(s) prior to procedure.      If more than 325mg/day do NOT schedule; route to RN pool     For CERVICAL procedures, hold all aspirin products for 6 days.      Does the patient have a bleeding or clotting disorder? No     If yes, okay to schedule AND route to RN nurse pool    **For any patients with platelet count <100, must be forwarded to provider**    Is patient diabetic?  Yes  If YES, have them bring their glucometer.    Does patient have an active infection or treated for one within the past week? No     Is patient currently taking any antibiotics?  No     For patients on chronic, preventative, or prophylactic antibiotics, procedures may be scheduled.     For patients on antibiotics for active or recent infection:    Romi Gallegos  Marlo Blanco [place], [place]-antibiotic course must have been completed for 4 days    Romi Omalley-antibiotic course must have been completed for 7 days    Is patient currently taking any steroid medications? (i.e. Prednisone, Medrol)  No     For patients on steroid medications:    Marlo Casarez [place], [place]-steroid course must have been completed for 4 days    Romi Omalley-steroid course must have been completed for 7 days    Reviewed with patient:  If you are started on any steroids or antibiotics between now and your appointment, you must contact us because it may affect our ability to perform your procedure.  Yes    Is patient actively being treated for cancer or immunocompromised, including the spleen having been removed? No    If YES, do NOT schedule and route to RN pool     **For Dr. Baugh patients without spleens should have the chart sent to her**    Are you able to get on and off an exam table with minimal or no assistance? Yes  If NO, do NOT schedule and route to RN pool    Are you able to roll over and lay on your stomach with minimal or no assistance? Yes  If NO, do NOT schedule and route to RN pool     Any allergies to contrast dye, iodine, shellfish, or numbing and steroid medications? No  If YES, route to RN pool AND add allergy information to appointment notes    Allergies: No known drug allergies        Has the patient had a flu shot or any other vaccinations within 7 days before or after the procedure.  No       Does patient have an MRI/CT?  Not Applicable  (SI joint, hip injections, lumbar sympathetic blocks, and stellate ganglion blocks do not require an MRI)    Was the MRI done w/in the last 3 years?  NA    Was MRI done at [place]? No      If not, where was it done? N/A       If MRI was not done at [place], [facility] or [facility] do NOT schedule and route to nursing.  If pt has an imaging disc, the injection may be scheduled but pt has to bring disc to appt. If  they show up w/out disc the injection cannot be done    Reminders (please tell patient if applicable):       Instructed pt to arrive 30 minutes early for IV start if this is for a cervical procedure, ALL sympathetic (stellate ganglion, hypogastric, or lumbar sympathetic block) and all sedation procedures (RFA, spinal cord stimulation trials).  Not Applicable    -IVs are not routinely placed for [place] and [place] cervical case       If NPO for sedation, informed patient that it is okay to take medications with sips of water (except if they are to hold blood thinners).  Not Applicable   *DO take blood pressure medication if it is prescribed*      If this is for a cervical ANSELMO, informed patient that aspirin needs to be held for 6 days.   Not Applicable      For all patients not having spinal cord stimulator (SCS) trials or radiofrequency ablations (RFAs), informed patient:  IV sedation is not provided for this procedure.  If you feel that an oral anti-anxiety medication is needed, you can discuss this further with your referring provider or primary care provider.  The Pain Clinic provider will discuss specifics of what the procedure includes at your appointment.  Most procedures last 10-20 minutes.  We use numbing medications to help with any discomfort during the procedure.  Not Applicable      Do not schedule procedures requiring IV placement in the first appointment of the day or first appointment after lunch.       For patients 85 or older we recommend having an adult stay w/ them for the remainder of the day.       Does the patient have any questions?  NO  Nikole Gutierrez  [facility]

## 2017-04-25 NOTE — TELEPHONE ENCOUNTER
I do not believe I have seen Austen for the shoulder. That should be worked up by PCP first. I've place an order for SI joint injections. I'd like to see him back two weeks after the injections to check in an dsee how he is feeling.

## 2017-04-25 NOTE — TELEPHONE ENCOUNTER
Briana from patient Created: 4/24/2017 8:03 PM    Pauline, I would like an appt to visit with you concerning pain in left shoulder area and to set up an appt at [facility] for a steroid injection in my left hip/sacro area like before. I put it off too long and get twinges in that area on occasion.  Thanks.  Austen

## 2017-04-25 NOTE — TELEPHONE ENCOUNTER
I spoke with Austen on the phone and asked that he schedule his appointment for the SI joint injection as well as a follow up visit with Pauline Saul 2 weeks after that.    HERLINDA SevillaN, RN  Care Coordinator  [facility] [place]

## 2017-04-25 NOTE — TELEPHONE ENCOUNTER
[employer] message to patient 4/25/17    Moni Boyce,    Please call our scheduling number to make an appointment with Pauline to discuss the pain and injections. The scheduling number is [phone].     Thank You,  HERLINDA SevillaN, RN  Care Coordinator  [facility] [place]

## 2017-05-01 ENCOUNTER — RADIANT APPOINTMENT (OUTPATIENT)
Dept: GENERAL RADIOLOGY | Facility: CLINIC | Age: 82
End: 2017-05-01
Attending: PAIN MEDICINE

## 2017-05-01 ENCOUNTER — RADIOLOGY INJECTION OFFICE VISIT (OUTPATIENT)
Dept: PALLIATIVE MEDICINE | Facility: CLINIC | Age: 82
End: 2017-05-01
Payer: COMMERCIAL

## 2017-05-01 VITALS — DIASTOLIC BLOOD PRESSURE: 67 MMHG | HEART RATE: 54 BPM | OXYGEN SATURATION: 97 % | SYSTOLIC BLOOD PRESSURE: 133 MMHG

## 2017-05-01 DIAGNOSIS — G89.29 CHRONIC LEFT SI JOINT PAIN: ICD-10-CM

## 2017-05-01 DIAGNOSIS — M53.3 CHRONIC LEFT SI JOINT PAIN: ICD-10-CM

## 2017-05-01 DIAGNOSIS — M47.818 ARTHROPATHY OF LEFT SACROILIAC JOINT: Primary | ICD-10-CM

## 2017-05-01 PROCEDURE — 27096 INJECT SACROILIAC JOINT: CPT | Mod: LT | Performed by: PAIN MEDICINE

## 2017-05-01 ASSESSMENT — PAIN SCALES - GENERAL: PAINLEVEL: MODERATE PAIN (4)

## 2017-05-01 NOTE — NURSING NOTE
Injection intake:     If this procedure is requiring IV sedation has patient been NPO for 6  Hours? NA    Is patient on coumadin, plavix or other prescribed blood thinner?   No    If patient is on coumadin was it held for 5 days?   NA    If patient is on plavix was it held for 7 days?    NA     Does patient take aspirin?  Yes -   ASA    If this is for a cervical procedure and patient is on aspirin has it been held for 6 days?   NA    Any allergies to contrast dye, iodine, steroid and/or numbing medications?  NO    Is patient currently taking antibiotics or have an active infection?  NO    Does patient have a ? Yes       Is patient pregnant or breastfeeding?  Not Applicable    Are the vital signs normal?  Yes      Erin Abad [facility] [place]

## 2017-05-01 NOTE — MR AVS SNAPSHOT
After Visit Summary   5/1/2017    Austen Fowler    MRN: [id number]           Patient Information     Date Of Birth          1935        Visit Information        Provider Department      5/1/2017 2:15 PM Maria Guadalupe Baugh MD [facility]        Care Instructions    [facility] Procedure Discharge Instructions    Today you saw:  Dr. Maria Guadalupe Baugh          Your procedure:  Left sacro-iliac joint injection       Medications used:  Lidocaine (anesthetic)  Bupivacaine (anesthetic)    Kenalog (steroid)              Be cautious when walking as numbness and/or weakness in the leg may occur up to 6-8 hours after the procedure due to effect of the local anesthetic    Do not drive for 6 hours. The effect of the local anesthetic could slow your reflexes.     Avoid strenuous activity for the first 24 hours. You may resume your regular activities after that.     You may shower, however avoid swimming, tub baths or hot tubs for 24 hours following your procedure    You may have a mild to moderate increase in pain for several days following the injection.      You may use ice packs for 10-15 minutes, 3 to 4 times a day at the injection site for comfort    Do not use heat to painful areas for 6 to 8 hours. This will give the local anesthetic time to wear off and prevent you from accidentally burning your skin.    You may use anti-inflammatory medications (such as Ibuprofen/Advil or Aleve) or Tylenol for pain control if necessary    With diabetes, check your blood sugar more frequently than usual as your blood sugar may be higher than normal for 10-14 days following a steroid injection. Contact your doctor who manages your diabetes if your blood sugar is higher than usual    It may take up to 14 days for the steroid medication to start working although you may feel the effect as early as a few days after the procedure.       If you experience any of the following, call the pain center line  during work hours at [phone] or on-call physician after hours at [phone]:  -Fever over 100 degree F  -Swelling, bleeding, redness, drainage, warmth at the injection site  -Progressive weakness or numbness in your leg  -Loss of bowel or bladder function  -Unusual new onset of pain that is not improving    Phone #s:  Nurse triage line for general questions: [phone]            Follow-ups after your visit        Your next 10 appointments already scheduled     May 01, 2017  2:15 PM CDT   Radiology Injections with Maria Guadalupe Baugh MD   [facility] ([facility])    [address]  [address]  [facility] [id number] [phone]            May 01, 2017  2:15 PM CDT   XR SACROILIAC THERAPEUTIC INJECTION LEFT with [email or web address]   [facility] Xray ([facility])    [address]  [address]  [facility] [address]   [phone]           Stop drinking 1 hour before the exam.  You may take your medicines as usual, except for blood thinners (Coumadin, Plavix, Ticlid, Persantine, Aggrenox, Pletal, Effient, Brilliant). Talk to your doctor if you take these.  Tell your doctor if:   You have ever had an allergic reaction to X-ray dye (contrast fluid).   There is a chance you may be pregnant.  Please bring a list of your current medicines to your exam. Include vitamins, minerals and over-the-counter medicines.  Please call the Imaging Department at your exam site with any questions.            Jun 06, 2017 10:00 AM CDT   LAB with [facility]   [facility]YS[facility] [place] ([facility])    [address] [place] [address] [address]  [facility] [address] [phone]           Patient must bring picture ID.  Patient should be prepared to give a urine specimen  Please do not eat 10-12 hours before your appointment if you are coming in fasting for labs on lipids, cholesterol, or glucose (sugar).  Pregnant women should  follow their Care Team instructions. Water with medications is okay. Do not drink coffee or other fluids.   If you have concerns about taking  your medications, please ask at office or if scheduling via ADVIZE, send a message by clicking on Secure Messaging, Message Your Care Team.            Jun 06, 2017 10:30 AM CDT   Ech Complete with SHCVECHR2   [facility] CV Echocardiography (Cardiovascular Imaging at [facility])    [address]  [facility] [address] [phone]           1.  Please bring or wear a comfortable two-piece outfit. 2.  You may eat, drink and take your normal medicines. 3.  For any questions that cannot be answered, please contact the ordering physician            Jun 09, 2017  7:45 AM CDT   Return Visit with John Paul Moreno MD   [facility] PHYSICIANS HEART AT [place] ([facility])    [address]  [facility] [address] [phone]              Who to contact     If you have questions or need follow up information about today's clinic visit or your schedule please contact [facility] directly at [phone].  Normal or non-critical lab and imaging results will be communicated to you by PrintLess Planshart, letter or phone within 4 business days after the clinic has received the results. If you do not hear from us within 7 days, please contact the clinic through Bsmarkt or phone. If you have a critical or abnormal lab result, we will notify you by phone as soon as possible.  Submit refill requests through ADVIZE or call your pharmacy and they will forward the refill request to us. Please allow 3 business days for your refill to be completed.          Additional Information About Your Visit        PrintLess Planshart Information     ADVIZE gives you secure access to your electronic health record. If you see a primary care provider, you can also send messages to your care team and make appointments. If you have  questions, please call your primary care clinic.  If you do not have a primary care provider, please call [phone] and they will assist you.        Care EveryWhere ID     This is your Care EveryWhere ID. This could be used by other organizations to access your [facility] medical records  [id number]        Your Vitals Were     Pulse Pulse Oximetry                54 98%           Blood Pressure from Last 3 Encounters:   05/01/17 130/61   04/11/17 129/77   02/27/17 103/66    Weight from Last 3 Encounters:   04/11/17 127 kg (280 lb)   02/27/17 130.2 kg (287 lb)   12/05/16 130.6 kg (288 lb)              Today, you had the following     No orders found for display         Today's Medication Changes          These changes are accurate as of: 5/1/17  2:14 PM.  If you have any questions, ask your nurse or doctor.               These medicines have changed or have updated prescriptions.        Dose/Directions    METAMUCIL 1.7 GM Wafr   This may have changed:  See the new instructions.   Generic drug:  Psyllium        TAKE AS DIRECTED   Refills:  0       triamcinolone 0.5 % cream   Commonly known as:  KENALOG   This may have changed:  additional instructions   Used for:  Rash and other nonspecific skin eruption        Apply  topically 2 times daily. to affected area as directed.   Quantity:  15 g   Refills:  0                Primary Care Provider Office Phone # Fax #    Bhavperfecto Hodge [phone]       [facility] [address] [place] [address] [facility] [address]  [facility] [address]        Thank you!     Thank you for choosing [facility]  for your care. Our goal is always to provide you with excellent care. Hearing back from our patients is one way we can continue to improve our services. Please take a few minutes to complete the written survey that you may receive in the mail after your visit with us. Thank you!             Your Updated Medication List - Protect others around you: Learn how to safely  use, store and throw away your medicines at www.disposemymeds.org.          This list is accurate as of: 5/1/17  2:14 PM.  Always use your most recent med list.                   Brand Name Dispense Instructions for use    ACETAMIN 500 MG tablet   Generic drug:  acetaminophen      Take 2 tablets by mouth 3 times daily as needed.       amLODIPine 2.5 MG tablet    NORVASC    180 tablet    Take 1 tablet (2.5 mg) by mouth 2 times daily       aspirin 81 MG tablet     30 tablet    Take 1 tablet (81 mg) by mouth daily       blood glucose monitoring lancets     3 Box    Use to test blood sugar 2 times daily or as directed.       blood glucose monitoring test strip    ONE TOUCH ULTRA    200 each    Use to test blood sugars 2 times daily or as directed.       finasteride 5 MG tablet    PROSCAR    90 tablet    Take 1 tablet (5 mg) by mouth daily       gabapentin 300 MG capsule    NEURONTIN    810 capsule    Take 900 mg three times daily       gemfibrozil 600 MG tablet    LOPID    180 tablet    Take 1 tablet (600 mg) by mouth 2 times daily       lisinopril 20 MG tablet    PRINIVIL/ZESTRIL    90 tablet    Take 1 tablet (20 mg) by mouth daily       METAMUCIL 1.7 GM Wafr   Generic drug:  Psyllium      TAKE AS DIRECTED       metFORMIN 500 MG 24 hr tablet    GLUCOPHAGE-XR    120 tablet    Take 4 tablets (2,000 mg) by mouth daily (with breakfast)       metoprolol 50 MG 24 hr tablet    TOPROL-XL    180 tablet    Take 1 tablet (50 mg) by mouth 2 times daily       omeprazole 20 MG CR capsule    priLOSEC    90 capsule    TAKE 1 TABLET (20 MG) BY MOUTH AS NEEDED TAKE 30-60 MINUTES BEFORE A MEAL.       order for DME      Uses Cpap machine for sleep apnea       pravastatin 20 MG tablet    PRAVACHOL    90 tablet    Take 1 tablet (20 mg) by mouth daily       tamsulosin 0.4 MG capsule    FLOMAX    90 capsule    TAKE 1 CAPSULE (0.4 MG) BY MOUTH DAILY AS NEEDED       triamcinolone 0.5 % cream    KENALOG    15 g    Apply  topically 2 times daily.  to affected area as directed.       vitamin D 2000 UNITS tablet     90 tablet    Take 2,000 Units by mouth daily.

## 2017-05-01 NOTE — NURSING NOTE
Discharge Information    IV Discontiued Time:  NA    Amount of Fluid Infused:  NA    Discharge Criteria = When patient returns to baseline or as per MD order    Consciousness:  Pt is fully awake    Circulation:  BP +/- 20% of pre-procedure level    Respiration:  Patient is able to breathe deeply    O2 Sat:  Patient is able to maintain O2 Sat >92% on room air    Activity:  Moves 4 extremities on command    Ambulation:  Patient is able to stand and walk     Dressing:  Clean/dry or No Dressing    Notes:   Discharge instructions and AVS given to patient    Patient meets criteria for discharge?  YES    Admitted to PCU?  No    Responsible adult present to accompany patient home?  Yes    Signature/Title:    Erin Keyes RN Care Coordinator  [facility]

## 2017-05-01 NOTE — PATIENT INSTRUCTIONS
[facility] Procedure Discharge Instructions    Today you saw:  Dr. Maria Guadalupe Baugh          Your procedure:  Left sacro-iliac joint injection       Medications used:  Lidocaine (anesthetic)  Bupivacaine (anesthetic)    Kenalog (steroid)              Be cautious when walking as numbness and/or weakness in the leg may occur up to 6-8 hours after the procedure due to effect of the local anesthetic    Do not drive for 6 hours. The effect of the local anesthetic could slow your reflexes.     Avoid strenuous activity for the first 24 hours. You may resume your regular activities after that.     You may shower, however avoid swimming, tub baths or hot tubs for 24 hours following your procedure    You may have a mild to moderate increase in pain for several days following the injection.      You may use ice packs for 10-15 minutes, 3 to 4 times a day at the injection site for comfort    Do not use heat to painful areas for 6 to 8 hours. This will give the local anesthetic time to wear off and prevent you from accidentally burning your skin.    You may use anti-inflammatory medications (such as Ibuprofen/Advil or Aleve) or Tylenol for pain control if necessary    With diabetes, check your blood sugar more frequently than usual as your blood sugar may be higher than normal for 10-14 days following a steroid injection. Contact your doctor who manages your diabetes if your blood sugar is higher than usual    It may take up to 14 days for the steroid medication to start working although you may feel the effect as early as a few days after the procedure.       If you experience any of the following, call the pain center line during work hours at [phone] or on-call physician after hours at [phone]:  -Fever over 100 degree F  -Swelling, bleeding, redness, drainage, warmth at the injection site  -Progressive weakness or numbness in your leg  -Loss of bowel or bladder function  -Unusual new onset of pain that is  not improving    Phone #s:  Nurse triage line for general questions: [phone]

## 2017-05-07 NOTE — PROGRESS NOTES
"[facility] - Procedure Note    Date of Visit: 2017    Indications: Austen Fowler is a pleasant, 82-year-old male with history of lumbar spine surgery (performed at the L4-5 level in 2015; he reports prior surgery at the L3-4 level), who is seen at the request of CRISTIANO Cespedes CNP for repeat left SI joint injection. He was last seen in clinic on 16 for left SI joint injection. He reports that this injection went \"amazingly well\", with pain relief lasting until just recently. He notes that he may experience a \"twinge\" of left gluteal pain \"every once and a while\". If he is on his feet walking, he may experience symptoms down his legs, typically to the level of the knee; he notes that the nerve \"tightens up\". He has experienced more bilateral symptoms over the last six weeks. He otherwise denies new weakness or changes in bowel/bladder function. Other pain complaints include discomfort of the left neck and shoulder.    Electronic Chart Review: Patient history, pertinent diagnostic studies, vitals, allergies, and medications were reviewed.    Review of Systems: He stopped taking his Aspirin 81 mg daily, 4-5 days prior. The patient denies recent fever, chills, illness, use of antibiotics or anticoagulants. No allergy to local anesthetic or steroid.     Physical Examination:  /67  Pulse 54  SpO2 97%   GEN: Alert. Well developed, well nourished. No acute distress.   CV/Resp: Symmetric chest wall excursion. Non-labored breathing. No audible wheezing.  Skin: No rashes/lesions of posterior torso.   Extremities: Distal extremities warm, well perfused. Dependent edema of distal lower extremities.   Neuro/MSK: Power 5/5 throughout bilateral lower extremities, although there is noted to be very slight give-way weakness at left hip flexors and left ADF.     Imagin/17/15 MRI lumbar spine: Multilevel degenerative disc disease and central stenosis. Mild to moderate central " "stenosis at L2-L3 has increased since the prior exam. Borderline mild central stenosis at L3-L4 and severe central stenosis at L4-L5 related to multiple factors have remain unchanged. Multilevel foraminal stenosis bilaterally without change, most prominent at L4-L5 on the left.    Procedure Description:    Pre-procedure Diagnosis: Sacroiliac joint arthropathy, left  Post-procedure Diagnosis: Sacroiliac joint arthropathy, left  Procedure performed: Left sacroiliac joint injection  : Maria Guadalupe Baugh MD    Local anesthesia: 3 mL of 1% lidocaine (preservative free)  Medication solution (injectate): 1 mL of 40 mg/mL triamcinolone + 1 mL of 1% lidocaine + 1 mL of 0.5% bupivacaine  Sterile prep/cleansing solution: ChloraPrep    The procedure and risks were explained, and informed written consent was obtained from the patient. Risks include but are not limited to: increased pain, infection, bleeding, damage to soft tissue, nerve, muscle, and vasculature structures. Risks of steroid include but are not limited to: medication reaction, mood/sleep disturbance, increased blood pressure, increased blood glucose, effects on eye conditions (glaucoma/cataracts), effects on bone/tissue structure/health. The procedure site was marked with a disposable pen. Immediately prior to the start of the procedure, a \"time out\" safety check was conducted to confirm correct patient, procedure, and laterality. Monitoring of pulse oximetry and heart rate was performed throughout the procedure.     The patient was placed in a prone position on the fluoroscopy table. The left sacroiliac joint was visualized with fluoroscopy in contralateral oblique view, with alignment of the inferior aspect of the posterior and anterior joint margins. The skin was prepped and draped in sterile fashion. With a 25-gauge, 1.5 inch needle, local anesthetic was injected subcutaneously over the entry site. A 22-gauge, 5 inch spinal needle was advanced into the " posteroinferior aspect of the sacroiliac joint under direct fluoroscopic visualization. There was no evidence of paresthesias throughout needle placement. After negative aspiration for heme and cerebrospinal fluid, 3 mL of the medication solution was injected in increments. The stylet was reinserted and the needle was withdrawn.    The patient tolerated the procedure well, and there was no evidence of procedural complications. The patient was stable post-procedure and was able to ambulate on discharge home. Post-procedure instructions were provided.     Pre-procedure pain score: 4/10 back, 2/10 leg/buttock  Post-procedure pain score: 4/10 back, 2/10 leg/buttock    Assessment/Plan: Austen Fowler is a pleasant, 82-year-old male s/p left sacroiliac joint injection today.    1. Following today's procedure, the patient was advised to contact the [facility] for any of the following:   Fever, chills, or night sweats   New onset of pain, numbness, or weakness   Any questions regarding the procedure      If unable to contact the Pain Center, the patient was instructed to go to a local Emergency Room for any complications.  2. If only partial relief, consideration may be given towards lumbar epidural steroid injection in the future.   3. He will follow up with CRISTIANO Cespedes CNP as directed.    Maria Guadalupe Baugh MD  [facility]

## 2017-05-08 ENCOUNTER — TELEPHONE (OUTPATIENT)
Dept: PALLIATIVE MEDICINE | Facility: CLINIC | Age: 82
End: 2017-05-08

## 2017-05-08 NOTE — TELEPHONE ENCOUNTER
Patient had a left SI joint injection on 05/01/17.  Called patient for an update.      Pt reported the following details:  Patient states that he is not feeling any pain in the area.   Told patient that the information will be forwarded to her provider.  Also explained that, if a steroid medication was used, it could take up to 14 days to feel the full effect and if pt has any further questions or concerns pt should call the nurse line at [phone].    Char LAZO)

## 2017-05-08 NOTE — TELEPHONE ENCOUNTER
Information noted. Thanks for the update.    Maria Guadalupe Baugh MD  [facility]

## 2017-05-15 ENCOUNTER — MYC REFILL (OUTPATIENT)
Dept: FAMILY MEDICINE | Facility: CLINIC | Age: 82
End: 2017-05-15

## 2017-05-15 DIAGNOSIS — I10 ESSENTIAL HYPERTENSION, BENIGN: ICD-10-CM

## 2017-05-15 RX ORDER — AMLODIPINE BESYLATE 2.5 MG/1
2.5 TABLET ORAL 2 TIMES DAILY
Qty: 180 TABLET | Refills: 3 | Status: CANCELLED | OUTPATIENT
Start: 2017-05-15

## 2017-05-16 ENCOUNTER — PRE VISIT (OUTPATIENT)
Dept: CARDIOLOGY | Facility: CLINIC | Age: 82
End: 2017-05-16

## 2017-05-16 RX ORDER — METOPROLOL SUCCINATE 50 MG/1
50 TABLET, EXTENDED RELEASE ORAL 2 TIMES DAILY
Qty: 180 TABLET | Refills: 1 | Status: SHIPPED | OUTPATIENT
Start: 2017-05-16 | End: 2017-12-02

## 2017-05-16 NOTE — TELEPHONE ENCOUNTER
Message from MyChart:  Original authorizing provider: MD Austen Andrade would like a refill of the following medications:  metoprolol (TOPROL-XL) 50 MG 24 hr tablet [Hamzah Hodge MD]  amLODIPine (NORVASC) 2.5 MG tablet [Hamzah Hodge MD]    Preferred pharmacy: [facility] - [address]    Comment:

## 2017-05-16 NOTE — TELEPHONE ENCOUNTER
amLODIPine (NORVASC) 2.5 MG tablet-REFILLS AT PHARMACY    metoprolol (TOPROL-XL) 50 MG 24 hr tablet   -PROFILE ONLY   Last Written Prescription Date: 12/5/2016  Last Fill Quantity: 180, # refills: 3    Last Office Visit with ADITI, CARMELO or [facility] prescribing provider:  2/27/2017   Future Office Visit:    Next 5 appointments (look out 90 days)     Jun 09, 2017  7:45 AM CDT   Return Visit with John Paul Moreno MD   [facility] AT [place] ([facility])    [address] [place] [id number] [phone]                    BP Readings from Last 3 Encounters:   05/01/17 133/67   04/11/17 129/77   02/27/17 103/66

## 2017-05-16 NOTE — TELEPHONE ENCOUNTER
Prescription approved per [facility] Refill Protocol.    Sandi Meyer RN

## 2017-05-17 ENCOUNTER — TELEPHONE (OUTPATIENT)
Dept: FAMILY MEDICINE | Facility: CLINIC | Age: 82
End: 2017-05-17

## 2017-05-17 NOTE — TELEPHONE ENCOUNTER
Returned call to pharmacist at [facility].   Message to pharmacy on the RX Metoprolol---for Norvasc refill--in error.     Pharmacist confirmed patient recently filled the RX Norvasc and has refills available.     No further action needed.     Sarah Hitchcock RN, BSN

## 2017-05-17 NOTE — TELEPHONE ENCOUNTER
"Reason for Call:  Other prescription      Detailed comments: Malia from [facility] calling.  They received an rx yesterday for metoprolol.  In the notes it says \"Patient is also requesting his Norvasc refilled. Thank you\" . Norvasc was refilled in Feb for 90 days with 3 refills so that should be okay, she is asking if there is a problem with that medication.     Phone Number Patient can be reached at: [phone]  option 1, then option 2 to reach pharmacist,  order # [id number]    Best Time: any    Can we leave a detailed message on this number? Not Applicable    Call taken on 5/17/2017 at 9:37 AM by Emilia Marcus      "

## 2017-05-24 ENCOUNTER — MYC MEDICAL ADVICE (OUTPATIENT)
Dept: FAMILY MEDICINE | Facility: CLINIC | Age: 82
End: 2017-05-24

## 2017-05-24 NOTE — TELEPHONE ENCOUNTER
Routing to MAs:  Patient wanting information updated in chart so that he is not being flagged on MyChart to have completed.     Thank you!    Eneida Camacho RN

## 2017-06-05 DIAGNOSIS — R21 RASH AND OTHER NONSPECIFIC SKIN ERUPTION: ICD-10-CM

## 2017-06-05 RX ORDER — TRIAMCINOLONE ACETONIDE 5 MG/G
CREAM TOPICAL
Qty: 15 G | Refills: 0 | Status: SHIPPED | OUTPATIENT
Start: 2017-06-05 | End: 2020-01-11

## 2017-06-05 NOTE — TELEPHONE ENCOUNTER
Patient last has the triamcinolone cream Rx'd in April 2015.  Uses PRN.  Needs OV or ok to fill?    Pending Prescriptions:                       Disp   Refills    triamcinolone (KENALOG) 0.5 % cream       15 g   0            Sig: Apply  topically 2 times daily. to affected area           as directed PRN          Last Written Prescription Date: 4-20-15  Last Fill Quantity: 15g,  # refills: 0   Last Office Visit with [facility], [facility] or [facility] prescribing provider: 2-27-17 Ayesha                                           Next 5 appointments (look out 90 days)     Jun 09, 2017  7:45 AM CDT   Return Visit with John Paul Moreno MD   [facility] AT [place] ([facility])    [address]  [facility] [id number] [phone]            Jun 19, 2017  8:00 AM CDT   Office Visit with Nadia Joel HERLINDA   [facility]    [address]  [address]  [facility] [id number] [phone]                    Grace Nicole RT (R)

## 2017-06-06 ENCOUNTER — HOSPITAL ENCOUNTER (OUTPATIENT)
Dept: CARDIOLOGY | Facility: CLINIC | Age: 82
Discharge: HOME | End: 2017-06-06
Attending: INTERNAL MEDICINE | Admitting: INTERNAL MEDICINE
Payer: COMMERCIAL

## 2017-06-06 DIAGNOSIS — I25.10 CORONARY ARTERY DISEASE INVOLVING NATIVE CORONARY ARTERY OF NATIVE HEART WITHOUT ANGINA PECTORIS: ICD-10-CM

## 2017-06-06 DIAGNOSIS — E78.6 LOW HDL (UNDER 40): ICD-10-CM

## 2017-06-06 DIAGNOSIS — E78.5 HYPERLIPIDEMIA LDL GOAL <70: ICD-10-CM

## 2017-06-06 DIAGNOSIS — E78.2 MIXED HYPERLIPIDEMIA: ICD-10-CM

## 2017-06-06 DIAGNOSIS — Z95.1 S/P CABG (CORONARY ARTERY BYPASS GRAFT): ICD-10-CM

## 2017-06-06 DIAGNOSIS — I10 ESSENTIAL HYPERTENSION, BENIGN: ICD-10-CM

## 2017-06-06 LAB
ALT SERPL W P-5'-P-CCNC: <5 U/L (ref 5–30)
ANION GAP SERPL CALCULATED.3IONS-SCNC: 12.1 MMOL/L (ref 6–17)
BUN SERPL-MCNC: 16 MG/DL (ref 7–30)
CALCIUM SERPL-MCNC: 9.6 MG/DL (ref 8.5–10.5)
CHLORIDE SERPL-SCNC: 104 MMOL/L (ref 98–107)
CHOLEST SERPL-MCNC: 108 MG/DL
CO2 SERPL-SCNC: 27 MMOL/L (ref 23–29)
CREAT SERPL-MCNC: 0.78 MG/DL (ref 0.7–1.3)
GFR SERPL CREATININE-BSD FRML MDRD: >90 ML/MIN/1.7M2
GLUCOSE SERPL-MCNC: 142 MG/DL (ref 70–105)
HDLC SERPL-MCNC: 31 MG/DL
LDLC SERPL CALC-MCNC: 54 MG/DL
NONHDLC SERPL-MCNC: 77 MG/DL
POTASSIUM SERPL-SCNC: 4.1 MMOL/L (ref 3.5–5.1)
SODIUM SERPL-SCNC: 139 MMOL/L (ref 136–145)
TRIGL SERPL-MCNC: 116 MG/DL

## 2017-06-06 PROCEDURE — 36415 COLL VENOUS BLD VENIPUNCTURE: CPT | Performed by: INTERNAL MEDICINE

## 2017-06-06 PROCEDURE — 25500064 ZZH RX 255 OP 636: Performed by: INTERNAL MEDICINE

## 2017-06-06 PROCEDURE — 80048 BASIC METABOLIC PNL TOTAL CA: CPT | Performed by: INTERNAL MEDICINE

## 2017-06-06 PROCEDURE — 80061 LIPID PANEL: CPT | Performed by: INTERNAL MEDICINE

## 2017-06-06 PROCEDURE — 93306 TTE W/DOPPLER COMPLETE: CPT | Mod: 26 | Performed by: INTERNAL MEDICINE

## 2017-06-06 PROCEDURE — 40000264 ECHO COMPLETE WITH LUMASON

## 2017-06-06 PROCEDURE — 84460 ALANINE AMINO (ALT) (SGPT): CPT | Performed by: INTERNAL MEDICINE

## 2017-06-06 RX ADMIN — SULFUR HEXAFLUORIDE 5 ML: KIT at 11:28

## 2017-06-09 ENCOUNTER — OFFICE VISIT (OUTPATIENT)
Dept: CARDIOLOGY | Facility: CLINIC | Age: 82
End: 2017-06-09
Attending: INTERNAL MEDICINE
Payer: COMMERCIAL

## 2017-06-09 VITALS
HEIGHT: 75 IN | BODY MASS INDEX: 35.06 KG/M2 | HEART RATE: 60 BPM | SYSTOLIC BLOOD PRESSURE: 122 MMHG | WEIGHT: 282 LBS | DIASTOLIC BLOOD PRESSURE: 70 MMHG

## 2017-06-09 DIAGNOSIS — I25.10 CORONARY ARTERY DISEASE INVOLVING NATIVE CORONARY ARTERY OF NATIVE HEART WITHOUT ANGINA PECTORIS: ICD-10-CM

## 2017-06-09 DIAGNOSIS — E78.5 HYPERLIPIDEMIA LDL GOAL <70: ICD-10-CM

## 2017-06-09 DIAGNOSIS — I10 ESSENTIAL HYPERTENSION, BENIGN: ICD-10-CM

## 2017-06-09 DIAGNOSIS — E78.6 LOW HDL (UNDER 40): ICD-10-CM

## 2017-06-09 DIAGNOSIS — Z95.1 S/P CABG (CORONARY ARTERY BYPASS GRAFT): ICD-10-CM

## 2017-06-09 PROCEDURE — 99214 OFFICE O/P EST MOD 30 MIN: CPT | Performed by: INTERNAL MEDICINE

## 2017-06-09 NOTE — LETTER
6/9/2017    Hamzah Hodge MD  [facility]   [address]  [facility] [address]    RE: Austen Fowler       Dear Colleague,    I had the pleasure of seeing Austen Fowler in the [facility].    The patient is an 82-year-old overweight white male with a history of ischemic heart disease dating back to 1991 when he underwent cardiac catheterization and coronary angiography which showed significant 3-vessel disease.  He had coronary bypass surgery with left internal mammary to the left anterior descending and saphenous vein bypass graft to the first and second obtuse marginal branch of the circumflex and a right mammary to the right coronary artery.        Since the last clinic visit, he has had no significant symptoms of chest discomfort, shortness of breath at rest, dizziness, palpitations, nausea, vomiting, diaphoresis or syncope.  He denies PND, orthopnea, fevers, chills or sweats.  He has some easy fatigability and general malaise and mild dyspnea on exertion with some symptoms of sciatica.  He is mostly limited by his osteoarthritis, degenerative joint disease and inability to lose weight.  He has had significant low back pain due to degenerative joint disease as well as hip pain previously.  He has had previous surgery for spinal stenosis with continued low back pain despite the surgery.        His nuclear stress test in 2015 showed no significant evidence of ischemia or infarct.  Echocardiography demonstrated slight dilatation of the left ventricle, mild concentric left ventricular hypertrophy, intact systolic function with ejection fraction 55%-60%.  The right ventricle was normal in size and function.  There was mild biatrial enlargement.  There was trace mitral and tricuspid insufficiency with mild aortic root dilatation and no significant change from 2016.      MEDICATIONS:   1.  Metoprolol XL 50 mg twice a day.   2.  Lisinopril 20 mg a day.   3.   Metformin 2000 mg daily with breakfast.   4.  Amlodipine 2.5 mg twice a day.   5.  Flomax 0.4 mg a day as needed.   6.  Pravastatin 20 mg a day.   7.  Omeprazole 20 mg a day.   8.  Gemfibrozil 600 mg twice a day.   9.  Proscar 5 mg a day.   10.  Gabapentin 900 mg 3 times a day.   11.  Aspirin 81 mg a day.   12.  Acetaminophen 1000 mg 2 times a day.   13.  Vitamin D 2000 units a day.   14.  Metamucil as needed.      LABORATORY DATA:  Demonstrated a cholesterol 108, HDL 31, LDL 54, triglyceride 116.  Sodium 139, potassium 4.1, BUN 16, creatinine 0.78.  Hemoglobin A1c earlier this year was 6.8.  ALT less than 5.      The patient presents to Cardiology Clinic for followup of ischemic heart disease.  He again denies brian chest discomfort, shortness of breath, dizziness, palpitations, nausea, vomiting, diaphoresis or syncope.  He denies PND, orthopnea, fevers, chills or sweats.  He appears to be doing well with limited activity only by his osteoarthritis.      PHYSICAL EXAMINATION:   VITAL SIGNS:  Blood pressure 122/70 with a heart rate of 60 and regular.  Weight was 282 pounds, which is down 5 pounds from previous clinic visit.   NECK:  Somewhat difficult to assess but without significant jugular venous distention, carotid bruit or palpable thyroid.   CHEST:  Essentially clear to percussion and auscultation, decreased breath sounds at the bases, prolonged expiratory phase.   CARDIAC:  Regular rhythm, distant heart tones, soft S4, 1-2/6 systolic murmur at the left sternal border radiating towards the apex.  No diastolic murmur, rub or S3.   EXTREMITIES:  Showed trace to 1+ edema without cyanosis or erythema.      CLINICAL IMPRESSION:   1.  Stable cardiac condition.   2.  History of ischemic heart disease, status post coronary bypass surgery times multiple vessels in 1991.   3.  Hypertension with good control.   4.  Hyperlipidemia with HDL deficiency, but at goal for LDL, suggests a metabolic syndrome.   5.  History of  osteoarthritis and degenerative joint disease with occasional sciatica.   6.  Obstructive sleep apnea on CPAP.   7.  Hyperglycemia with elevated hemoglobin A1c consistent with type 2 diabetes mellitus, non-insulin dependent.      DISCUSSION:  The patient has done well from a cardiac viewpoint.  He has had no significant symptoms of angina pectoris or congestive heart failure.  He has intact left ventricular function by echocardiography.  He will need close followup of his serum lipids, basic metabolic panel and blood pressure.  Otherwise, he appears to be doing well and is satisfied with his lifestyle.      RECOMMENDATIONS:   1.  Continue present medications.   2.  Diet and exercise program to attempt weight loss.   3.  Close followup of serum lipids, basic metabolic panel and blood pressure.   4.  Diabetic management.   5.  Routine medical followup.   6.  Cardiology followup in 6 months.   Again, thank you for allowing me to participate in the care of your patient.      Sincerely,    John Paul Moreno MD     [facility]

## 2017-06-09 NOTE — PROGRESS NOTES
HISTORY OF PRESENT ILLNESS:  The patient is an 82-year-old overweight white male with a history of ischemic heart disease dating back to 1991 when he underwent cardiac catheterization and coronary angiography which showed significant 3-vessel disease.  He had coronary bypass surgery with left internal mammary to the left anterior descending and saphenous vein bypass graft to the first and second obtuse marginal branch of the circumflex and a right mammary to the right coronary artery.        Since the last clinic visit, he has had no significant symptoms of chest discomfort, shortness of breath at rest, dizziness, palpitations, nausea, vomiting, diaphoresis or syncope.  He denies PND, orthopnea, fevers, chills or sweats.  He has some easy fatigability and general malaise and mild dyspnea on exertion with some symptoms of sciatica.  He is mostly limited by his osteoarthritis, degenerative joint disease and inability to lose weight.  He has had significant low back pain due to degenerative joint disease as well as hip pain previously.  He has had previous surgery for spinal stenosis with continued low back pain despite the surgery.        His nuclear stress test in 2015 showed no significant evidence of ischemia or infarct.  Echocardiography demonstrated slight dilatation of the left ventricle, mild concentric left ventricular hypertrophy, intact systolic function with ejection fraction 55%-60%.  The right ventricle was normal in size and function.  There was mild biatrial enlargement.  There was trace mitral and tricuspid insufficiency with mild aortic root dilatation and no significant change from 2016.      MEDICATIONS:   1.  Metoprolol XL 50 mg twice a day.   2.  Lisinopril 20 mg a day.   3.  Metformin 2000 mg daily with breakfast.   4.  Amlodipine 2.5 mg twice a day.   5.  Flomax 0.4 mg a day as needed.   6.  Pravastatin 20 mg a day.   7.  Omeprazole 20 mg a day.   8.  Gemfibrozil 600 mg twice a day.   9.   Proscar 5 mg a day.   10.  Gabapentin 900 mg 3 times a day.   11.  Aspirin 81 mg a day.   12.  Acetaminophen 1000 mg 2 times a day.   13.  Vitamin D 2000 units a day.   14.  Metamucil as needed.      LABORATORY DATA:  Demonstrated a cholesterol 108, HDL 31, LDL 54, triglyceride 116.  Sodium 139, potassium 4.1, BUN 16, creatinine 0.78.  Hemoglobin A1c earlier this year was 6.8.  ALT less than 5.      The patient presents to Cardiology Clinic for followup of ischemic heart disease.  He again denies brian chest discomfort, shortness of breath, dizziness, palpitations, nausea, vomiting, diaphoresis or syncope.  He denies PND, orthopnea, fevers, chills or sweats.  He appears to be doing well with limited activity only by his osteoarthritis.      PHYSICAL EXAMINATION:   VITAL SIGNS:  Blood pressure 122/70 with a heart rate of 60 and regular.  Weight was 282 pounds, which is down 5 pounds from previous clinic visit.   NECK:  Somewhat difficult to assess but without significant jugular venous distention, carotid bruit or palpable thyroid.   CHEST:  Essentially clear to percussion and auscultation, decreased breath sounds at the bases, prolonged expiratory phase.   CARDIAC:  Regular rhythm, distant heart tones, soft S4, 1-2/6 systolic murmur at the left sternal border radiating towards the apex.  No diastolic murmur, rub or S3.   EXTREMITIES:  Showed trace to 1+ edema without cyanosis or erythema.      CLINICAL IMPRESSION:   1.  Stable cardiac condition.   2.  History of ischemic heart disease, status post coronary bypass surgery times multiple vessels in 1991.   3.  Hypertension with good control.   4.  Hyperlipidemia with HDL deficiency, but at goal for LDL, suggests a metabolic syndrome.   5.  History of osteoarthritis and degenerative joint disease with occasional sciatica.   6.  Obstructive sleep apnea on CPAP.   7.  Hyperglycemia with elevated hemoglobin A1c consistent with type 2 diabetes mellitus, non-insulin  dependent.      DISCUSSION:  The patient has done well from a cardiac viewpoint.  He has had no significant symptoms of angina pectoris or congestive heart failure.  He has intact left ventricular function by echocardiography.  He will need close followup of his serum lipids, basic metabolic panel and blood pressure.  Otherwise, he appears to be doing well and is satisfied with his lifestyle.      RECOMMENDATIONS:   1.  Continue present medications.   2.  Diet and exercise program to attempt weight loss.   3.  Close followup of serum lipids, basic metabolic panel and blood pressure.   4.  Diabetic management.   5.  Routine medical followup.   6.  Cardiology followup in 6 months.      cc:      Hamzah Hodge MD    [facility]   [address]   [place] [address]         CLIFTON STAHL MD, [facility]             D: 2017 09:14   T: 2017 16:10   MT: NBA      Name:     NAYELI RAI   MRN:      [id number]        Account:      [id number]   :      1935           Service Date: 2017      Document: [id number]

## 2017-06-09 NOTE — MR AVS SNAPSHOT
After Visit Summary   6/9/2017    Austen Fowler    MRN: [id number]           Patient Information     Date Of Birth          1935        Visit Information        Provider Department      6/9/2017 7:45 AM John Paul Moreno MD [facility]        Today's Diagnoses     Coronary artery disease involving native coronary artery of native heart without angina pectoris        Hyperlipidemia LDL goal <70        Low HDL (under 40)        BENIGN HYPERTENSION        S/P CABG (coronary artery bypass graft)           Follow-ups after your visit        Additional Services     Follow-Up with Cardiologist                 Your next 10 appointments already scheduled     Jun 19, 2017  8:00 AM CDT   Office Visit with Nadia Joel [facility] ([facility])    [address] [place] [id number] [phone]           Bring a current list of meds and any records pertaining to this visit.  For Physicals, please bring immunization records and any forms needing to be filled out.  Please arrive 10 minutes early to complete paperwork.              Future tests that were ordered for you today     Open Future Orders        Priority Expected Expires Ordered    Basic metabolic panel Routine 12/6/2017 6/9/2018 6/9/2017    Lipid Profile Routine 12/6/2017 6/9/2018 6/9/2017    ALT Routine 12/6/2017 6/9/2018 6/9/2017    Follow-Up with Cardiologist Routine 12/6/2017 6/9/2018 6/9/2017            Who to contact     If you have questions or need follow up information about today's clinic visit or your schedule please contact [facility] directly at [phone].  Normal or non-critical lab and imaging results will be communicated to you by MyChart, letter or phone within 4 business days after the clinic has received the results. If you do not hear from us within 7 days, please contact  "the clinic through [employer] or phone. If you have a critical or abnormal lab result, we will notify you by phone as soon as possible.  Submit refill requests through [employer] or call your pharmacy and they will forward the refill request to us. Please allow 3 business days for your refill to be completed.          Additional Information About Your Visit        [employer] Information     [employer] gives you secure access to your electronic health record. If you see a primary care provider, you can also send messages to your care team and make appointments. If you have questions, please call your primary care clinic.  If you do not have a primary care provider, please call [phone] and they will assist you.        Care EveryWhere ID     This is your Care EveryWhere ID. This could be used by other organizations to access your [place] medical records  [id number]        Your Vitals Were     Pulse Height BMI (Body Mass Index)             60 1.905 m (6' 3\") 35.25 kg/m2          Blood Pressure from Last 3 Encounters:   06/09/17 122/70   05/01/17 133/67   04/11/17 129/77    Weight from Last 3 Encounters:   06/09/17 127.9 kg (282 lb)   04/11/17 127 kg (280 lb)   02/27/17 130.2 kg (287 lb)              We Performed the Following     Follow-Up with Cardiologist          Today's Medication Changes          These changes are accurate as of: 6/9/17  5:00 PM.  If you have any questions, ask your nurse or doctor.               These medicines have changed or have updated prescriptions.        Dose/Directions    METAMUCIL 1.7 GM Wafr   This may have changed:  See the new instructions.   Generic drug:  Psyllium        TAKE AS DIRECTED   Refills:  0                Primary Care Provider Office Phone # Fax #    Bhavperfecto Hodge [phone]       [facility] [address] [facility] [address]  [facility] [address]        Thank you!     Thank you for choosing [facility] [place]  for your care. " Our goal is always to provide you with excellent care. Hearing back from our patients is one way we can continue to improve our services. Please take a few minutes to complete the written survey that you may receive in the mail after your visit with us. Thank you!             Your Updated Medication List - Protect others around you: Learn how to safely use, store and throw away your medicines at www.disposemymeds.org.          This list is accurate as of: 6/9/17  5:00 PM.  Always use your most recent med list.                   Brand Name Dispense Instructions for use    ACETAMIN 500 MG tablet   Generic drug:  acetaminophen      Take 2 tablets by mouth 3 times daily as needed.       amLODIPine 2.5 MG tablet    NORVASC    180 tablet    Take 1 tablet (2.5 mg) by mouth 2 times daily       aspirin 81 MG tablet     30 tablet    Take 1 tablet (81 mg) by mouth daily       blood glucose monitoring lancets     3 Box    Use to test blood sugar 2 times daily or as directed.       blood glucose monitoring test strip    ONE TOUCH ULTRA    200 each    Use to test blood sugars 2 times daily or as directed.       finasteride 5 MG tablet    PROSCAR    90 tablet    Take 1 tablet (5 mg) by mouth daily       gabapentin 300 MG capsule    NEURONTIN    810 capsule    Take 900 mg three times daily       gemfibrozil 600 MG tablet    LOPID    180 tablet    Take 1 tablet (600 mg) by mouth 2 times daily       lisinopril 20 MG tablet    PRINIVIL/ZESTRIL    90 tablet    Take 1 tablet (20 mg) by mouth daily       METAMUCIL 1.7 GM Wafr   Generic drug:  Psyllium      TAKE AS DIRECTED       metFORMIN 500 MG 24 hr tablet    GLUCOPHAGE-XR    120 tablet    Take 4 tablets (2,000 mg) by mouth daily (with breakfast)       metoprolol 50 MG 24 hr tablet    TOPROL-XL    180 tablet    Take 1 tablet (50 mg) by mouth 2 times daily       omeprazole 20 MG CR capsule    priLOSEC    90 capsule    TAKE 1 TABLET (20 MG) BY MOUTH AS NEEDED TAKE 30-60 MINUTES BEFORE A  MEAL.       order for DME      Uses Cpap machine for sleep apnea       pravastatin 20 MG tablet    PRAVACHOL    90 tablet    Take 1 tablet (20 mg) by mouth daily       tamsulosin 0.4 MG capsule    FLOMAX    90 capsule    TAKE 1 CAPSULE (0.4 MG) BY MOUTH DAILY AS NEEDED       triamcinolone 0.5 % cream    KENALOG    15 g    Apply  topically 2 times daily. to affected area as directed PRN       vitamin D 2000 UNITS tablet     90 tablet    Take 2,000 Units by mouth daily.

## 2017-06-19 ENCOUNTER — OFFICE VISIT (OUTPATIENT)
Dept: PHARMACY | Facility: CLINIC | Age: 82
End: 2017-06-19

## 2017-06-19 VITALS
BODY MASS INDEX: 35.27 KG/M2 | SYSTOLIC BLOOD PRESSURE: 112 MMHG | HEART RATE: 53 BPM | DIASTOLIC BLOOD PRESSURE: 68 MMHG | WEIGHT: 282.2 LBS

## 2017-06-19 DIAGNOSIS — E63.9 NUTRITIONAL DISORDER: ICD-10-CM

## 2017-06-19 DIAGNOSIS — R39.198 SLOWING OF URINARY STREAM: ICD-10-CM

## 2017-06-19 DIAGNOSIS — I10 ESSENTIAL HYPERTENSION, BENIGN: ICD-10-CM

## 2017-06-19 DIAGNOSIS — K21.9 GASTROESOPHAGEAL REFLUX DISEASE, ESOPHAGITIS PRESENCE NOT SPECIFIED: ICD-10-CM

## 2017-06-19 DIAGNOSIS — E11.59 TYPE 2 DIABETES MELLITUS WITH VASCULAR DISEASE (H): Primary | ICD-10-CM

## 2017-06-19 DIAGNOSIS — M54.42 CHRONIC LOW BACK PAIN WITH BILATERAL SCIATICA, UNSPECIFIED BACK PAIN LATERALITY: ICD-10-CM

## 2017-06-19 DIAGNOSIS — E78.5 HYPERLIPIDEMIA LDL GOAL <70: ICD-10-CM

## 2017-06-19 DIAGNOSIS — M54.41 CHRONIC LOW BACK PAIN WITH BILATERAL SCIATICA, UNSPECIFIED BACK PAIN LATERALITY: ICD-10-CM

## 2017-06-19 DIAGNOSIS — I25.10 CORONARY ARTERY DISEASE INVOLVING NATIVE CORONARY ARTERY OF NATIVE HEART WITHOUT ANGINA PECTORIS: ICD-10-CM

## 2017-06-19 DIAGNOSIS — G89.29 CHRONIC LOW BACK PAIN WITH BILATERAL SCIATICA, UNSPECIFIED BACK PAIN LATERALITY: ICD-10-CM

## 2017-06-19 DIAGNOSIS — R21 RASH AND OTHER NONSPECIFIC SKIN ERUPTION: ICD-10-CM

## 2017-06-19 PROCEDURE — 99607 MTMS BY PHARM ADDL 15 MIN: CPT | Performed by: PHARMACIST

## 2017-06-19 PROCEDURE — 99605 MTMS BY PHARM NP 15 MIN: CPT | Performed by: PHARMACIST

## 2017-06-19 NOTE — MR AVS SNAPSHOT
After Visit Summary   6/19/2017    Austen Fowler    MRN: [id number]           Patient Information     Date Of Birth          1935        Visit Information        Provider Department      6/19/2017 8:00 AM Nadia Joel, [facility] MTM        Today's Diagnoses     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)    -  1    Chronic low back pain with bilateral sciatica, unspecified back pain laterality        Essential hypertension, benign        Coronary artery disease involving native coronary artery of native heart without angina pectoris        Hyperlipidemia LDL goal <70        Nutritional disorder        Gastroesophageal reflux disease, esophagitis presence not specified        Rash and other nonspecific skin eruption        Slowing of urinary stream          Care Instructions    Recommendations from today's MTM visit:                                                      1. Consider reducing the frequency of omeprazole (Prilosec) use to every third day or so.      2. Let me know how much acetaminophen you are using at home via MyChart.      3. Monitor your occasional light headedness at home to ensure it doesn't worsen.  Your heart rate and blood pressure are often quite low.      4. Continue monitoring your blood sugar at home in the morning.  If they start to creep up into the 170's in the morning, let us know.     Next MTM visit:  Next A1c is due at the end of August (after the 23rd).  Please make the lab appointment at your convenience.     To schedule another MTM appointment, please call the clinic directly or you may call the [facility] scheduling line at [phone] or toll-free at [phone].     My Clinical Pharmacist's contact information:                                                      It was a pleasure seeing you today!  Please feel free to contact me with any questions or concerns you have.      Nadia Joel, Pharm.D.  Medication Therapy  Management Pharmacist  Page/VM:  [phone]    You may receive a survey about the [facility] services you received.  I would appreciate your feedback to help me serve you better in the future. Please fill it out and return it when you can. Your comments will be anonymous.                    Follow-ups after your visit        Future tests that were ordered for you today     Open Future Orders        Priority Expected Expires Ordered    Hemoglobin A1c Routine  6/19/2018 6/19/2017            Who to contact     If you have questions or need follow up information about today's clinic visit or your schedule please contact [facility] directly at [phone].  Normal or non-critical lab and imaging results will be communicated to you by MyChart, letter or phone within 4 business days after the clinic has received the results. If you do not hear from us within 7 days, please contact the clinic through [employer] or phone. If you have a critical or abnormal lab result, we will notify you by phone as soon as possible.  Submit refill requests through [email or web address] or call your pharmacy and they will forward the refill request to us. Please allow 3 business days for your refill to be completed.          Additional Information About Your Visit        MyChart Information     [email or web address] gives you secure access to your electronic health record. If you see a primary care provider, you can also send messages to your care team and make appointments. If you have questions, please call your primary care clinic.  If you do not have a primary care provider, please call [phone] and they will assist you.        Care EveryWhere ID     This is your Care EveryWhere ID. This could be used by other organizations to access your [place] medical records  [id number]        Your Vitals Were     Pulse BMI (Body Mass Index)                53 35.27 kg/m2           Blood Pressure from Last 3 Encounters:   06/19/17 112/68   06/09/17 122/70    05/01/17 133/67    Weight from Last 3 Encounters:   06/19/17 282 lb 3.2 oz (128 kg)   06/09/17 282 lb (127.9 kg)   04/11/17 280 lb (127 kg)                 Today's Medication Changes          These changes are accurate as of: 6/19/17  2:54 PM.  If you have any questions, ask your nurse or doctor.               These medicines have changed or have updated prescriptions.        Dose/Directions    gabapentin 300 MG capsule   Commonly known as:  NEURONTIN   This may have changed:    - how much to take  - when to take this  - additional instructions   Used for:  Chronic pain syndrome, Sacroiliac joint pain        Take 900 mg three times daily   Quantity:  810 capsule   Refills:  2       omeprazole 20 MG CR capsule   Commonly known as:  priLOSEC   This may have changed:  additional instructions   Used for:  Dysphagia, unspecified type        TAKE 1 TABLET (20 MG) BY MOUTH AS NEEDED TAKE 30-60 MINUTES BEFORE A MEAL.   Quantity:  90 capsule   Refills:  3                Primary Care Provider Office Phone # Fax #    Bhandrés Hodge [phone]       [facility] [address]  [facility] [address]        Thank you!     Thank you for choosing [facility]  for your care. Our goal is always to provide you with excellent care. Hearing back from our patients is one way we can continue to improve our services. Please take a few minutes to complete the written survey that you may receive in the mail after your visit with us. Thank you!             Your Updated Medication List - Protect others around you: Learn how to safely use, store and throw away your medicines at www.disposemymeds.org.          This list is accurate as of: 6/19/17  2:54 PM.  Always use your most recent med list.                   Brand Name Dispense Instructions for use    ACETAMIN 500 MG tablet   Generic drug:  acetaminophen      Take 2 tablets by mouth 3 times daily as needed.       amLODIPine 2.5 MG tablet     NORVASC    180 tablet    Take 1 tablet (2.5 mg) by mouth 2 times daily       aspirin 81 MG tablet     30 tablet    Take 1 tablet (81 mg) by mouth daily       blood glucose monitoring lancets     3 Box    Use to test blood sugar 2 times daily or as directed.       blood glucose monitoring test strip    ONE TOUCH ULTRA    200 each    Use to test blood sugars 2 times daily or as directed.       finasteride 5 MG tablet    PROSCAR    90 tablet    Take 1 tablet (5 mg) by mouth daily       gabapentin 300 MG capsule    NEURONTIN    810 capsule    Take 900 mg three times daily       gemfibrozil 600 MG tablet    LOPID    180 tablet    Take 1 tablet (600 mg) by mouth 2 times daily       lisinopril 20 MG tablet    PRINIVIL/ZESTRIL    90 tablet    Take 1 tablet (20 mg) by mouth daily       METAMUCIL 1.7 GM Wafr   Generic drug:  Psyllium      TAKE AS DIRECTED       metFORMIN 500 MG 24 hr tablet    GLUCOPHAGE-XR    120 tablet    Take 4 tablets (2,000 mg) by mouth daily (with breakfast)       metoprolol 50 MG 24 hr tablet    TOPROL-XL    180 tablet    Take 1 tablet (50 mg) by mouth 2 times daily       omeprazole 20 MG CR capsule    priLOSEC    90 capsule    TAKE 1 TABLET (20 MG) BY MOUTH AS NEEDED TAKE 30-60 MINUTES BEFORE A MEAL.       order for DME      Uses Cpap machine for sleep apnea       pravastatin 20 MG tablet    PRAVACHOL    90 tablet    Take 1 tablet (20 mg) by mouth daily       tamsulosin 0.4 MG capsule    FLOMAX    90 capsule    TAKE 1 CAPSULE (0.4 MG) BY MOUTH DAILY AS NEEDED       triamcinolone 0.5 % cream    KENALOG    15 g    Apply  topically 2 times daily. to affected area as directed PRN       vitamin D 2000 UNITS tablet     90 tablet    Take 2,000 Units by mouth daily.

## 2017-06-19 NOTE — PATIENT INSTRUCTIONS
Recommendations from today's MTM visit:                                                      1. Consider reducing the frequency of omeprazole (Prilosec) use to every third day or so.      2. Let me know how much acetaminophen you are using at home via MyChart.      3. Monitor your occasional light headedness at home to ensure it doesn't worsen.  Your heart rate and blood pressure are often quite low.      4. Continue monitoring your blood sugar at home in the morning.  If they start to creep up into the 170's in the morning, let us know.     Next MTM visit:  Next A1c is due at the end of August (after the 23rd).  Please make the lab appointment at your convenience.     To schedule another MTM appointment, please call the clinic directly or you may call the MTM scheduling line at [phone] or toll-free at [phone].     My Clinical Pharmacist's contact information:                                                      It was a pleasure seeing you today!  Please feel free to contact me with any questions or concerns you have.      Nadia Joel, Pharm.D.  Medication Therapy Management Pharmacist  Page/VM:  [phone]    You may receive a survey about the MTM services you received.  I would appreciate your feedback to help me serve you better in the future. Please fill it out and return it when you can. Your comments will be anonymous.

## 2017-06-19 NOTE — PROGRESS NOTES
"SUBJECTIVE/OBJECTIVE:                                                    Austen Fowler is a 82 year old male coming in for a follow-up visit for Medication Therapy Management.  He was referred to me from Dr. Hodge. This will serve as our initial visit for 2017.    Chief Complaint: Follow up from our visit on 11/14/16.      Tobacco: History of tobacco dependence - quit 1992   Alcohol: Less than 1 beverages / week    Medication Adherence: no issues reported by patient    Diabetes:  Pt currently taking metformin XR 2000 mg daily for diabetes management.    SMBG: Once daily (primarily AM fasting).   Ranges (patient reported): 150-160's in the morning, sometimes drops down to around 130 in the morning - patient is generally feeling very well and is pleased with the consistency in his fasting blood sugars.      Symptoms of low blood sugar? none. Frequency of hypoglycemia? never.  Recent symptoms of high blood sugar? none  Eye exam: up to date  Foot exam: up to date  Microalbumin is < 30 mg/g. Pt is taking an ACEi/ARB.  Aspirin: Taking 81mg daily and denies side effects  Diet/Exercise:  Meals 2-3 times daily - english muffin and glass of milk for lunch, sandwich for lunch and largest meal in the evening.  Is going to the [employer] three days a week, as he always has, to continue working on similar exercises that he learned in cardiac rehab.  Does a combination of strength and cardiovascular exercises.     Chronic Pain/Neuropathy:  Current medications include gabapentin 300 mg 3 capsules three times daily - currently taking 3 capsules twice daily. He also receives steroid injections in his spine every 3 months.  Still gets \"twinges\" and his wife notices that he doesn't wince when he stands up from a chair.  Side effects denied.  Takes 2 APAP twice daily.      Hyperlipidemia/CAD: Current therapy includes pravastatin 20 mg once daily, Gemfibrozil 600 mg twice daily and metoprolol XL 50 mg twice daily.  Pt reports no significant " myalgias or other side effects.  Chest pain denied, last echo looked great (55-60%).       Hypertension: Current medications include lisinopril 20 mg daily, amlodipine 2.5 mg twice daily.  Patient does not self-monitor BP.  Patient reports the following medication side effects: some dizziness and light headedness occasionally at home.      BPH:   Current medications include tamsulosin 0.4 mg daily and finasteride 5 mg daily.  He reports that the tamsulosin helps and describes more regular and complete evacuation when urinating.  He has been on the finasteride for 8 years (Since 3/9/09).      GERD: Current medications include: Prilosec (omeprazole) 20 mg once daily. Patient feels that current regimen is effective but has not tried stopping therapy.  Has been taking it daily for entirety of therapy because he always forgets to take it if it is PRN.      Vitamins:  Current medications include cholecalciferol 2000 units daily and metamucil wafers.  He is not taking the Metamucil right now - his bowel movements are still inconsistent but occur every 2-3 days.     Dermatitis:  Current medications include triamcinoline cream.  He finds it is effective for periodic rashes and bug bites and denies side effects.     Current labs include:  BP Readings from Last 3 Encounters:   06/19/17 112/68   06/09/17 122/70   05/01/17 133/67     Lab Results   Component Value Date    A1C 6.8 02/23/2017    A1C 7.9 11/16/2016    A1C 7.2 08/05/2016    A1C 6.6 02/04/2016    A1C 6.4 07/17/2015     Recent Labs   Lab Test  06/06/17   0950  02/23/17   0838   11/12/15   0807  07/28/15   0855   CHOL  108  102   < >  102  108   HDL  31*  27*   < >  28*  31*   LDL  54  49   < >  45*  51*   TRIG  116  128   < >  144  132   CHOLHDLRATIO   --    --    --   3.6  3.5    < > = values in this interval not displayed.     Liver Function Studies -   Recent Labs   Lab Test  06/06/17   0950   09/20/16   1658   PROTTOTAL   --    --   7.4   ALBUMIN   --    --   3.7    BILITOTAL   --    --   0.4   ALKPHOS   --    --   82   AST   --    --   24   ALT  <5*   < >  22    < > = values in this interval not displayed.     Lab Results   Component Value Date    MICROL 7 02/23/2017     Last Basic Metabolic Panel:  Lab Results   Component Value Date     06/06/2017      Lab Results   Component Value Date    POTASSIUM 4.1 06/06/2017     Lab Results   Component Value Date    CHLORIDE 104 06/06/2017     Lab Results   Component Value Date    BRANDON 9.6 06/06/2017     Lab Results   Component Value Date    CO2 27 06/06/2017     Lab Results   Component Value Date    BUN 16 06/06/2017     Lab Results   Component Value Date    CR 0.78 06/06/2017     Lab Results   Component Value Date     06/06/2017     GFR Estimate   Date Value Ref Range Status   06/06/2017 >90 >60 mL/min/1.7m2 Final   02/23/2017 >90  Non  GFR Calc   >60 mL/min/1.7m2 Final   11/16/2016 83 >60 mL/min/1.7m2 Final     TSH   Date Value Ref Range Status   04/27/2015 0.83 0.40 - 4.00 mU/L Final     Most Recent Immunizations   Administered Date(s) Administered     Influenza (High Dose) 3 valent vaccine 10/14/2016     Influenza (IIV3) 10/02/2013     Influenza Vaccine IM 3yrs+ 4 Valent IIV4 10/11/2014     Pneumococcal (PCV 13) 11/25/2014     Pneumococcal 23 valent 02/05/2007     TD (ADULT, 7+) 11/21/2000     TDAP Vaccine (Adacel) 03/11/2013     Zoster vaccine, live 03/10/2008     ASSESSMENT:                                                    Current medications were reviewed with him today.      Medication Adherence: no issues identified    Diabetes: Stable. Patient is meeting A1c goal of < 8%. Self monitoring of blood glucose is at goal of fasting  mg/dL and post prandial < 180 mg/dL most of the time per his report.  Continue SMBG and regular monitoring of A1c. Next A1c is due in late August.     Pain/Neuropathy: Stable.     Hyperlipidemia/CAD: Stable. Pt is not on moderate intensity statin which is indicated  based on 2013 ACC/AHA guidelines for lipid management.  However, his LDL appears to have achieved a >50% reduction from baseline since his last cardiovascular event.  Cardiology manages this for him.  Pulse and BP are often quite low.  His reported dizzy spells are likely related to his potential bradycardia and low blood pressure.  Monitor blood pressure, pulse and dizziness closely.     Hypertension: Stable. Patient is meeting BP goal of < 140/90mmHg.     BPH:  Stable.     GERD: Stable.  Current treatment is effective. Pt would benefit from the following changes: taper off proton pump inhibitor.  Consider use only a few times a week to find lowest effective dose.  If daily treatment is required, consider H2 blocker.     Vitamins:  Stable.  Continue to monitor BM frequency.    Dermatitis:  Stable.     PLAN:                                                      1) Continue SMBG at home.    2) Monitor dizziness closely at home.  If it worsens or becomes longer in duration, please inform us.   3) Consider reducing use of omeprazole at home.  Try taking it every third day to find the lowest effective dose.     I spent 60 minutes with this patient today.  All changes were made via collaborative practice agreement with Hamzah Hodge. A copy of the visit note was provided to the patient's primary care provider.     Will follow-up in 2 months when A1c returns.     The patient was given a summary of these recommendations via [employer] as an after visit summary.    Nadia Joel, Pharm.D.  Medication Therapy Management Pharmacist  Page/VM:  [phone]

## 2017-07-19 DIAGNOSIS — R73.09 OTHER ABNORMAL GLUCOSE: ICD-10-CM

## 2017-07-20 RX ORDER — LANCETS
EACH MISCELLANEOUS
Qty: 200 EACH | Refills: 1 | Status: SHIPPED | OUTPATIENT
Start: 2017-07-20 | End: 2018-06-14

## 2017-07-20 NOTE — TELEPHONE ENCOUNTER
Pending Prescriptions:                       Disp   Refills    blood glucose monitoring (ONE TOUCH ULTRA*                    Sig: Use to test blood sugar two times daily or as           directed    ONE TOUCH ULTRA test strip [Pharmacy Med *[address]*             Sig: Use to test blood sugars    two times a day or as           directed    Lancets      Last Written Prescription Date: 4/4/16  Last Fill Quantity: 3box,  # refills: 3   Last Office Visit with [facility], [facility] or [facility] prescribing provider: 2/27/17 Ayesha                                             Test Strips      Last Written Prescription Date: 4/4/17  Last Fill Quantity: 200each,  # refills: 2   Last Office Visit with [facility], [facility] or [facility] prescribing provider: 2/27/17 Ayesha Edmonds, RT(R)

## 2017-08-04 DIAGNOSIS — E11.59 TYPE 2 DIABETES MELLITUS WITH VASCULAR DISEASE (H): ICD-10-CM

## 2017-08-04 RX ORDER — METFORMIN HCL 500 MG
TABLET, EXTENDED RELEASE 24 HR ORAL
Qty: 120 TABLET | Refills: 0 | Status: SHIPPED | OUTPATIENT
Start: 2017-08-04 | End: 2017-09-05

## 2017-08-04 NOTE — TELEPHONE ENCOUNTER
Pending Prescriptions:                       Disp   Refills    metFORMIN (GLUCOPHAGE-XR) 500 MG 24 hr ta*120 ta*             Sig: Take 4 tablets by mouth  daily with breakfast             Last Written Prescription Date: 2/27/17  Last Fill Quantity: 120, # refills: 3  Last Office Visit with [facility] or [facility] prescribing provider:  2/27/17        BP Readings from Last 3 Encounters:   06/19/17 112/68   06/09/17 122/70   05/01/17 133/67     Lab Results   Component Value Date    MICROL 7 02/23/2017     Lab Results   Component Value Date    UMALCR 6.01 02/23/2017     Creatinine   Date Value Ref Range Status   06/06/2017 0.78 0.70 - 1.30 mg/dL Final   ]  GFR Estimate   Date Value Ref Range Status   06/06/2017 >90 >60 mL/min/1.7m2 Final   02/23/2017 >90  Non  GFR Calc   >60 mL/min/1.7m2 Final   11/16/2016 83 >60 mL/min/1.7m2 Final     GFR Estimate If Black   Date Value Ref Range Status   06/06/2017 >90 >60 mL/min/1.7m2 Final   02/23/2017 >90   GFR Calc   >60 mL/min/1.7m2 Final   11/16/2016 >90 >60 mL/min/1.7m2 Final     Lab Results   Component Value Date    CHOL 108 06/06/2017     Lab Results   Component Value Date    HDL 31 06/06/2017     Lab Results   Component Value Date    LDL 54 06/06/2017     Lab Results   Component Value Date    TRIG 116 06/06/2017     Lab Results   Component Value Date    CHOLHDLRATIO 3.6 11/12/2015     Lab Results   Component Value Date    AST 24 09/20/2016     Lab Results   Component Value Date    ALT <5 06/06/2017     Lab Results   Component Value Date    A1C 6.8 02/23/2017    A1C 7.9 11/16/2016    A1C 7.2 08/05/2016    A1C 6.6 02/04/2016    A1C 6.4 07/17/2015     Potassium   Date Value Ref Range Status   06/06/2017 4.1 3.5 - 5.1 mmol/L Final

## 2017-08-17 ENCOUNTER — MYC MEDICAL ADVICE (OUTPATIENT)
Dept: PALLIATIVE MEDICINE | Facility: CLINIC | Age: 82
End: 2017-08-17

## 2017-08-17 NOTE — TELEPHONE ENCOUNTER
Briana from patient     I understand that Dr. Baugh is no longer with your clinic. Would you transition me to another provider at the [facility] so I can receive another injection in my left hip area? When I called for an appt it was indicated that I work with you to make the change. It has been about 4 months since the last one and I can tell that it is about time for another in the series.  Your help will be much appreciated.  Thank you.  Austen Fowler  [phone]

## 2017-08-23 ENCOUNTER — OFFICE VISIT (OUTPATIENT)
Dept: FAMILY MEDICINE | Facility: CLINIC | Age: 82
End: 2017-08-23
Payer: COMMERCIAL

## 2017-08-23 ENCOUNTER — TELEPHONE (OUTPATIENT)
Dept: PALLIATIVE MEDICINE | Facility: CLINIC | Age: 82
End: 2017-08-23

## 2017-08-23 VITALS
WEIGHT: 281 LBS | SYSTOLIC BLOOD PRESSURE: 134 MMHG | TEMPERATURE: 98 F | HEART RATE: 56 BPM | DIASTOLIC BLOOD PRESSURE: 66 MMHG | HEIGHT: 75 IN | OXYGEN SATURATION: 95 % | BODY MASS INDEX: 34.94 KG/M2

## 2017-08-23 DIAGNOSIS — E11.59 TYPE 2 DIABETES MELLITUS WITH VASCULAR DISEASE (H): Primary | ICD-10-CM

## 2017-08-23 DIAGNOSIS — E11.59 TYPE 2 DIABETES MELLITUS WITH VASCULAR DISEASE (H): ICD-10-CM

## 2017-08-23 DIAGNOSIS — M53.3 SI (SACROILIAC) JOINT DYSFUNCTION: ICD-10-CM

## 2017-08-23 LAB
CREAT UR-MCNC: 120 MG/DL
HBA1C MFR BLD: 6.2 % (ref 4.3–6)
MICROALBUMIN UR-MCNC: 6 MG/L
MICROALBUMIN/CREAT UR: 4.82 MG/G CR (ref 0–17)

## 2017-08-23 PROCEDURE — 36415 COLL VENOUS BLD VENIPUNCTURE: CPT | Performed by: PHARMACIST

## 2017-08-23 PROCEDURE — 99213 OFFICE O/P EST LOW 20 MIN: CPT | Performed by: NURSE PRACTITIONER

## 2017-08-23 PROCEDURE — 82043 UR ALBUMIN QUANTITATIVE: CPT | Performed by: INTERNAL MEDICINE

## 2017-08-23 PROCEDURE — 83036 HEMOGLOBIN GLYCOSYLATED A1C: CPT | Performed by: PHARMACIST

## 2017-08-23 ASSESSMENT — ANXIETY QUESTIONNAIRES
2. NOT BEING ABLE TO STOP OR CONTROL WORRYING: NOT AT ALL
6. BECOMING EASILY ANNOYED OR IRRITABLE: NOT AT ALL
GAD7 TOTAL SCORE: 0
IF YOU CHECKED OFF ANY PROBLEMS ON THIS QUESTIONNAIRE, HOW DIFFICULT HAVE THESE PROBLEMS MADE IT FOR YOU TO DO YOUR WORK, TAKE CARE OF THINGS AT HOME, OR GET ALONG WITH OTHER PEOPLE: NOT DIFFICULT AT ALL
5. BEING SO RESTLESS THAT IT IS HARD TO SIT STILL: NOT AT ALL
3. WORRYING TOO MUCH ABOUT DIFFERENT THINGS: NOT AT ALL
1. FEELING NERVOUS, ANXIOUS, OR ON EDGE: NOT AT ALL
7. FEELING AFRAID AS IF SOMETHING AWFUL MIGHT HAPPEN: NOT AT ALL

## 2017-08-23 ASSESSMENT — PATIENT HEALTH QUESTIONNAIRE - PHQ9: 5. POOR APPETITE OR OVEREATING: NOT AT ALL

## 2017-08-23 NOTE — NURSING NOTE
"Chief Complaint   Patient presents with     Diabetes     recheck A1C, microalbumin, and blood pressure       Initial /66 (BP Location: Right arm, Patient Position: Sitting, Cuff Size: Adult Large)  Pulse 56  Temp 98  F (36.7  C) (Oral)  Ht 6' 3\" (1.905 m)  Wt 281 lb (127.5 kg)  SpO2 95%  BMI 35.12 kg/m2 Estimated body mass index is 35.12 kg/(m^2) as calculated from the following:    Height as of this encounter: 6' 3\" (1.905 m).    Weight as of this encounter: 281 lb (127.5 kg).  Medication Reconciliation: complete     Kong Carroll, QUIN     "

## 2017-08-23 NOTE — TELEPHONE ENCOUNTER
Pre-screening Questions for Radiology Injections:    Injection to be done at which interventional clinic site? [facility]    Procedure ordered by Jesus Montgomery APRN CNP    Procedure ordered?  SI Joint Injection    What insurance would patient like us to bill for this procedure? [facility]      Worker's comp-Any injection DO NOT SCHEDULE and route to Nikole Gutierrez.      [employer] insurance - For SI joint injections, DO NOT SCHEDULE and route Ayla Villaseñor.      [employer]- MBB's must be scheduled at LEAST two weeks apart      [employer] - Any injection besides hip/shoulder/knee joint DO NOT SCHEDULE and route to Ayla Kasi. She will obtain PA and call pt back to schedule procedure or notify pt of denial.     HP [employer]-PA REQUIRED FOR NON-ANSELMO OR Joint injections    Any chance of pregnancy? Not Applicable   If YES, do NOT schedule and route to RN pool    Is an  needed? No     Patient has a drive home? (mandatory) YES:     Is patient taking any blood thinners (plavix, coumadin, jantoven, warfarin, heparin, pradaxa or dabigatran )? No   If hold needed, do NOT schedule, route to RN pool     Is patient taking any aspirin products? Yes, 81 MG    If more than 325mg/day do NOT schedule; route to RN pool     For CERVICAL procedures, hold all aspirin products for 6 days.      Does the patient have a bleeding or clotting disorder? No     If yes, okay to schedule AND route to RN nurse pool    **For any patients with platelet count <100, must be forwarded to provider**    Is patient diabetic?  Yes  If YES, have them bring their glucometer.    Does patient have an active infection or treated for one within the past week? No     Is patient currently taking any antibiotics?  No     For patients on chronic, preventative, or prophylactic antibiotics, procedures may be scheduled.     For patients on antibiotics for active or recent infection:    Marlo Casarez, Lex Hawkins-antibiotic course must  have been completed for 4 days    Romi Omalley-antibiotic course must have been completed for 7 days    Is patient currently taking any steroid medications? (i.e. Prednisone, Medrol)  No     For patients on steroid medications:    Marlo Casarez [place], [place]-steroid course must have been completed for 4 days    Romi Omalley-steroid course must have been completed for 7 days    Reviewed with patient:  If you are started on any steroids or antibiotics between now and your appointment, you must contact us because it may affect our ability to perform your procedure.  Yes    Is patient actively being treated for cancer or immunocompromised, including the spleen having been removed? No    If YES, do NOT schedule and route to RN pool     **For Dr. Baugh patients without spleens should have the chart sent to her**    Are you able to get on and off an exam table with minimal or no assistance? Yes  If NO, do NOT schedule and route to RN pool    Are you able to roll over and lay on your stomach with minimal or no assistance? Yes  If NO, do NOT schedule and route to RN pool     Any allergies to contrast dye, iodine, shellfish, or numbing and steroid medications? No  If YES, route to RN pool AND add allergy information to appointment notes    Allergies: No known drug allergies        Has the patient had a flu shot or any other vaccinations within 7 days before or after the procedure.  No       Does patient have an MRI/CT?  Not Applicable  (SI joint, hip injections, lumbar sympathetic blocks, and stellate ganglion blocks do not require an MRI)    Was the MRI done w/in the last 3 years?  NA    Was MRI done at [place]? No      If not, where was it done? N/A       If MRI was not done at [place], [facility] or [facility] do NOT schedule and route to nursing.  If pt has an imaging disc, the injection may be scheduled but pt has to bring disc to appt. If they show up w/out disc the injection cannot be  done    Reminders (please tell patient if applicable):       Instructed pt to arrive 30 minutes early for IV start if this is for a cervical procedure, ALL sympathetic (stellate ganglion, hypogastric, or lumbar sympathetic block) and all sedation procedures (RFA, spinal cord stimulation trials).  Not Applicable    -IVs are not routinely placed for Sellers and [place] cervical case       If NPO for sedation, informed patient that it is okay to take medications with sips of water (except if they are to hold blood thinners).  Not Applicable   *DO take blood pressure medication if it is prescribed*      If this is for a cervical ANSELMO, informed patient that aspirin needs to be held for 6 days.   Not Applicable      For all patients not having spinal cord stimulator (SCS) trials or radiofrequency ablations (RFAs), informed patient:  IV sedation is not provided for this procedure.  If you feel that an oral anti-anxiety medication is needed, you can discuss this further with your referring provider or primary care provider.  The Pain Clinic provider will discuss specifics of what the procedure includes at your appointment.  Most procedures last 10-20 minutes.  We use numbing medications to help with any discomfort during the procedure.  Not Applicable      Do not schedule procedures requiring IV placement in the first appointment of the day or first appointment after lunch.       For patients 85 or older we recommend having an adult stay w/ them for the remainder of the day.       Does the patient have any questions?    Ayla Villaseñor  [facility]

## 2017-08-23 NOTE — MR AVS SNAPSHOT
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  Normal or non-critical lab and imaging results will be communicated to you by MyChart, letter or phone within 4 business days after the clinic has received the results. If you do not hear from us within 7 days, please contact the clinic through [employer] or phone. If you have a critical or abnormal lab result, we will notify you by phone as soon as possible.  Submit refill requests through [employer] or call your pharmacy and they will forward the refill request to us. Please allow 3 business days for your refill to be completed.          Additional Information About Your Visit        MyChart Information     [employer] gives you secure access to your electronic health record. If you see a primary care provider, you can also send messages to your care team and make appointments. If you have questions, please call your primary care clinic.  If you do not have a primary care provider, please call [phone] and they will assist you.        Care EveryWhere ID     This is your Care EveryWhere ID. This could be used  "by other organizations to access your [facility] medical records  [id number]        Your Vitals Were     Pulse Temperature Height Pulse Oximetry BMI (Body Mass Index)       56 98  F (36.7  C) (Oral) 6' 3\" (1.905 m) 95% 35.12 kg/m2        Blood Pressure from Last 3 Encounters:   08/23/17 134/66   06/19/17 112/68   06/09/17 122/70    Weight from Last 3 Encounters:   08/23/17 281 lb (127.5 kg)   06/19/17 282 lb 3.2 oz (128 kg)   06/09/17 282 lb (127.9 kg)                 Today's Medication Changes          These changes are accurate as of: 8/23/17  8:53 AM.  If you have any questions, ask your nurse or doctor.               These medicines have changed or have updated prescriptions.        Dose/Directions    gabapentin 300 MG capsule   Commonly known as:  NEURONTIN   This may have changed:    - how much to take  - when to take this  - additional instructions   Used for:  Chronic pain syndrome, Sacroiliac joint pain        Take 900 mg three times daily   Quantity:  810 capsule   Refills:  2       omeprazole 20 MG CR capsule   Commonly known as:  priLOSEC   This may have changed:  additional instructions   Used for:  Dysphagia, unspecified type        TAKE 1 TABLET (20 MG) BY MOUTH AS NEEDED TAKE 30-60 MINUTES BEFORE A MEAL.   Quantity:  90 capsule   Refills:  3                Primary Care Provider Office Phone # Fax #    Bhavkylaht MD Ayesha [phone] [address]  [facility] [address]        Equal Access to Services     [facility] AH: Hadii evangelista jordano Sobaldo, waaxda luqadaha, qaybta kaalmada adeegyada, waxadina celi fields . So [facility] [phone].    ATENCIÓN: Si habla español, tiene a hagen disposición servicios gratuitos de asistencia lingüística. Llame al [phone].    We comply with applicable federal civil rights laws and [place] laws. We do not discriminate on the basis of race, color, national origin, age, disability sex, sexual orientation or gender " identity.            Thank you!     Thank you for choosing [facility]  for your care. Our goal is always to provide you with excellent care. Hearing back from our patients is one way we can continue to improve our services. Please take a few minutes to complete the written survey that you may receive in the mail after your visit with us. Thank you!             Your Updated Medication List - Protect others around you: Learn how to safely use, store and throw away your medicines at www.disposemymeds.org.          This list is accurate as of: 8/23/17  8:53 AM.  Always use your most recent med list.                   Brand Name Dispense Instructions for use Diagnosis    ACETAMIN 500 MG tablet   Generic drug:  acetaminophen      Take 2 tablets by mouth 3 times daily as needed.        amLODIPine 2.5 MG tablet    NORVASC    180 tablet    Take 1 tablet (2.5 mg) by mouth 2 times daily    Essential hypertension, benign       aspirin 81 MG tablet     30 tablet    Take 1 tablet (81 mg) by mouth daily    CAD (coronary artery disease)       blood glucose monitoring lancets     200 each    Use to test blood sugar two times daily or as directed    Other abnormal glucose       finasteride 5 MG tablet    PROSCAR    90 tablet    Take 1 tablet (5 mg) by mouth daily    Slowing of urinary stream       gabapentin 300 MG capsule    NEURONTIN    810 capsule    Take 900 mg three times daily    Chronic pain syndrome, Sacroiliac joint pain       gemfibrozil 600 MG tablet    LOPID    180 tablet    Take 1 tablet (600 mg) by mouth 2 times daily    Hyperlipidemia LDL goal <70, Low HDL (under 40)       lisinopril 20 MG tablet    PRINIVIL/ZESTRIL    90 tablet    Take 1 tablet (20 mg) by mouth daily    Essential hypertension, benign       METAMUCIL 1.7 GM Wafr   Generic drug:  Psyllium      TAKE AS DIRECTED        metFORMIN 500 MG 24 hr tablet    GLUCOPHAGE-XR    120 tablet    Take 4 tablets by mouth  daily with breakfast    Type 2  diabetes mellitus with vascular disease (H)       metoprolol 50 MG 24 hr tablet    TOPROL-XL    180 tablet    Take 1 tablet (50 mg) by mouth 2 times daily    Essential hypertension, benign       omeprazole 20 MG CR capsule    priLOSEC    90 capsule    TAKE 1 TABLET (20 MG) BY MOUTH AS NEEDED TAKE 30-60 MINUTES BEFORE A MEAL.    Dysphagia, unspecified type       ONE TOUCH ULTRA test strip   Generic drug:  blood glucose monitoring     200 strip    Use to test blood sugars    two times a day or as  directed    Other abnormal glucose       order for DME      Uses Cpap machine for sleep apnea        pravastatin 20 MG tablet    PRAVACHOL    90 tablet    Take 1 tablet (20 mg) by mouth daily    Hyperlipidemia LDL goal <70       tamsulosin 0.4 MG capsule    FLOMAX    90 capsule    TAKE 1 CAPSULE (0.4 MG) BY MOUTH DAILY AS NEEDED    S/P lumbar laminectomy       triamcinolone 0.5 % cream    KENALOG    15 g    Apply  topically 2 times daily. to affected area as directed PRN    Rash and other nonspecific skin eruption       vitamin D 2000 UNITS tablet     90 tablet    Take 2,000 Units by mouth daily.

## 2017-08-23 NOTE — PROGRESS NOTES
HPI      SUBJECTIVE:   Austen Fowler is a 82 year old male who presents to clinic today for the following health issues:      Diabetes Follow-up    Patient is checking blood sugars: once daily.  Results are as follows:       am - 156    Diabetic concerns: None     Symptoms of hypoglycemia (low blood sugar): none     Paresthesias (numbness or burning in feet) or sores: No    Hyperlipidemia Follow-Up      Rate your low fat/cholesterol diet?: fair    Taking statin?  Yes, no muscle aches from statin    Other lipid medications/supplements?:  Gemfibrozil, without side effects    Hypertension Follow-up      Outpatient blood pressures are not being checked.  Low Salt Diet: no added salt      Amount of exercise or physical activity: 2-3 days/week for an average of 45-60 minutes    Problems taking medications regularly: No    Medication side effects: none  Diet: low fat/cholesterol and diabetic      The last few weeks the morning glucose has been in the 150s where prior it was in the 130s   Has had any diet changes. Doesn't necessarily watch his diet.   He also has been trying to figure out how to get another SI joint injection. He has been working with the pain clinic and his doctor is no longer there. They will not place any orders for him and requested his PCP place an order for the injection.       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     Bunion      CAD (coronary artery disease)     CABG 1992: LIMA to LAD, SANDI to RCA, SVG to OM1 and OM2     Contact dermatitis and other eczema, due to unspecified cause     eczema - has light treatments with Dr. Rivera     Disorders of bursae and tendons in shoulder region, unspecified      Esophageal reflux      Essential hypertension, benign       Gallbladder disease      GERD (gastroesophageal reflux disease)      Heart attack (H)      Hyperlipidemia LDL goal <70      Left ventricular diastolic dysfunction      Low HDL (under 40) 3/28/2014     Nasal/sinus dis NEC     s/p surgery in 1995     Obesity      Osteoarthrosis, unspecified whether generalized or localized, shoulder region      Other hammer toe (acquired)      Sleep apnea     USES CPAP     Spinal stenosis, unspecified region other than cervical     MRI done 2006     Type 2 diabetes, HbA1c goal < 7% (H) 3/12/2015     Urge incontinence      Past Surgical History:   Procedure Laterality Date     C NONSPECIFIC PROCEDURE  11-92    CABG - LIMA to left anterior descending and saphenous vein bypass graft to the first and second obtuse marginal branch of the circumflex and the right mammary to the right coronary artery     C NONSPECIFIC PROCEDURE  6-94    Gail lap     C NONSPECIFIC PROCEDURE  5-92, 6-92    Angioplasty     C NONSPECIFIC PROCEDURE  1995    sinus surgery     C NONSPECIFIC PROCEDURE      bilateral cataract surgery     COLONOSCOPY N/A 4/11/2017    Procedure: COMBINED COLONOSCOPY, SINGLE OR MULTIPLE BIOPSY/POLYPECTOMY BY BIOPSY;  Surgeon: Bladimir Garner MD;  Location:  GI     ESOPHAGOSCOPY, GASTROSCOPY, DUODENOSCOPY (EGD), DILATATION, COMBINED  7/17/2014    Procedure: COMBINED ESOPHAGOSCOPY, GASTROSCOPY, DUODENOSCOPY (EGD), DILATATION;  Surgeon: Bladimir Garner MD;  Location:  GI     HC COLONOSCOPY THRU STOMA W BIOPSY/CAUTERY TUMOR/POLYP/LESION  5/2007     INJECT EPIDURAL LUMBAR       LAMINECTOMY LUMBAR TWO LEVELS N/A 4/29/2015    Procedure: LAMINECTOMY LUMBAR TWO LEVELS;  Surgeon: Bladimir Keenan MD;  Location:  OR     Social History   Substance Use Topics     Smoking status: Former Smoker     Quit date: 3/1/1992     Smokeless tobacco: Never Used      Comment: 80 pack year history     Alcohol use 0.0 oz/week     0 Standard drinks or equivalent per week      Comment: once in a  while alcohol usage      Current Outpatient Prescriptions   Medication Sig Dispense Refill     metFORMIN (GLUCOPHAGE-XR) 500 MG 24 hr tablet Take 4 tablets by mouth  daily with breakfast 120 tablet 0     blood glucose monitoring (ONE TOUCH ULTRASOFT) lancets Use to test blood sugar two times daily or as directed 200 each 1     ONE TOUCH ULTRA test strip Use to test blood sugars    two times a day or as  directed 200 strip 1     triamcinolone (KENALOG) 0.5 % cream Apply  topically 2 times daily. to affected area as directed PRN 15 g 0     metoprolol (TOPROL-XL) 50 MG 24 hr tablet Take 1 tablet (50 mg) by mouth 2 times daily 180 tablet 1     lisinopril (PRINIVIL/ZESTRIL) 20 MG tablet Take 1 tablet (20 mg) by mouth daily 90 tablet 3     amLODIPine (NORVASC) 2.5 MG tablet Take 1 tablet (2.5 mg) by mouth 2 times daily 180 tablet 3     tamsulosin (FLOMAX) 0.4 MG capsule TAKE 1 CAPSULE (0.4 MG) BY MOUTH DAILY AS NEEDED 90 capsule 2     pravastatin (PRAVACHOL) 20 MG tablet Take 1 tablet (20 mg) by mouth daily 90 tablet 2     omeprazole (PRILOSEC) 20 MG CR capsule TAKE 1 TABLET (20 MG) BY MOUTH AS NEEDED TAKE 30-60 MINUTES BEFORE A MEAL. (Patient taking differently: TAKE 1 TABLET (20 MG) BY MOUTH. TAKE 30-60 MINUTES BEFORE A MEAL.) 90 capsule 3     gemfibrozil (LOPID) 600 MG tablet Take 1 tablet (600 mg) by mouth 2 times daily 180 tablet 3     finasteride (PROSCAR) 5 MG tablet Take 1 tablet (5 mg) by mouth daily 90 tablet 2     gabapentin (NEURONTIN) 300 MG capsule Take 900 mg three times daily (Patient taking differently: 900 mg 2 times daily Take 900 mg three times daily) 810 capsule 2     aspirin 81 MG tablet Take 1 tablet (81 mg) by mouth daily 30 tablet 3     acetaminophen (ACETAMIN) 500 MG tablet Take 2 tablets by mouth 3 times daily as needed.        ORDER FOR DME Uses Cpap machine for sleep apnea       Cholecalciferol (VITAMIN D) 2000 UNIT tablet Take 2,000 Units by mouth daily. 90 tablet 3     METAMUCIL 1.7 GM OR  "WAFR TAKE AS DIRECTED       Allergies   Allergen Reactions     No Known Drug Allergies        Reviewed and updated as needed this visit by clinical staff and provider      ROS  Detailed as above       /66 (BP Location: Right arm, Patient Position: Sitting, Cuff Size: Adult Large)  Pulse 56  Temp 98  F (36.7  C) (Oral)  Ht 6' 3\" (1.905 m)  Wt 281 lb (127.5 kg)  SpO2 95%  BMI 35.12 kg/m2    Physical Exam   Constitutional: He is well-developed, well-nourished, and in no distress.   HENT:   Head: Normocephalic.   Eyes: Conjunctivae are normal.   Pulmonary/Chest: Effort normal.   Neurological: He is alert.   Psychiatric: Mood and affect normal.   Vitals reviewed.      Assessment and Plan:       ICD-10-CM    1. 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 SI (sacroiliac) joint dysfunction M53.3 XR Sacroiliac Therapeutic Injection Left       Improved A1C. Will continue current plan as he has been tolerating it well. Reminded of watching diet   Also requested order for SI joint injection. He had been working with pain clinic, but his provider is no longer there. Order placed. He can call his pain clinic to get future appointments set up   He will return in FEb for his annual       Jesus Montgomery, APRN, CNP  [facility]    "

## 2017-08-24 ASSESSMENT — ANXIETY QUESTIONNAIRES: GAD7 TOTAL SCORE: 0

## 2017-09-05 ENCOUNTER — MYC REFILL (OUTPATIENT)
Dept: FAMILY MEDICINE | Facility: CLINIC | Age: 82
End: 2017-09-05

## 2017-09-05 DIAGNOSIS — E11.59 TYPE 2 DIABETES MELLITUS WITH VASCULAR DISEASE (H): ICD-10-CM

## 2017-09-06 RX ORDER — METFORMIN HCL 500 MG
2000 TABLET, EXTENDED RELEASE 24 HR ORAL
Qty: 360 TABLET | Refills: 1 | Status: SHIPPED | OUTPATIENT
Start: 2017-09-06 | End: 2018-03-18

## 2017-09-06 NOTE — TELEPHONE ENCOUNTER
metFORMIN (GLUCOPHAGE-XR) 500 MG 24 hr tablet           Last Written Prescription Date: 8/4/2017  Last Fill Quantity: 120, # refills: 0  Last Office Visit with G, P or [facility] prescribing provider:  2/27/2017        BP Readings from Last 3 Encounters:   08/23/17 134/66   06/19/17 112/68   06/09/17 122/70     Lab Results   Component Value Date    MICROL 6 08/23/2017     Lab Results   Component Value Date    UMALCR 4.82 08/23/2017     Creatinine   Date Value Ref Range Status   06/06/2017 0.78 0.70 - 1.30 mg/dL Final   ]  GFR Estimate   Date Value Ref Range Status   06/06/2017 >90 >60 mL/min/1.7m2 Final   02/23/2017 >90  Non  GFR Calc   >60 mL/min/1.7m2 Final   11/16/2016 83 >60 mL/min/1.7m2 Final     GFR Estimate If Black   Date Value Ref Range Status   06/06/2017 >90 >60 mL/min/1.7m2 Final   02/23/2017 >90   GFR Calc   >60 mL/min/1.7m2 Final   11/16/2016 >90 >60 mL/min/1.7m2 Final     Lab Results   Component Value Date    CHOL 108 06/06/2017     Lab Results   Component Value Date    HDL 31 06/06/2017     Lab Results   Component Value Date    LDL 54 06/06/2017     Lab Results   Component Value Date    TRIG 116 06/06/2017     Lab Results   Component Value Date    CHOLHDLRATIO 3.6 11/12/2015     Lab Results   Component Value Date    AST 24 09/20/2016     Lab Results   Component Value Date    ALT <5 06/06/2017     Lab Results   Component Value Date    A1C 6.2 08/23/2017    A1C 6.8 02/23/2017    A1C 7.9 11/16/2016    A1C 7.2 08/05/2016    A1C 6.6 02/04/2016     Potassium   Date Value Ref Range Status   06/06/2017 4.1 3.5 - 5.1 mmol/L Final

## 2017-09-06 NOTE — TELEPHONE ENCOUNTER
Prescription approved per [facility] Refill Protocol. [employer] message sent to inform pt.   Sierra ARGUELLO RN

## 2017-09-19 NOTE — PROGRESS NOTES
[facility] - Procedure Note    Date of Service: 9/20/2017  Procedure performed: Left sacroiliac joint injection  Diagnosis: Sacroiliac joint pain  : Irene Burnett MD & Axel Pollock DO (pain fellow)   Anesthesia: none    Indications: Austen Fowler is a 82 year old male who is seen at the request of Pauline Saul CNP for a sacroiliac joint injection. The patient describes left sided low back pain. Exam shows full strength and sensation in both lower extremities, tenderness of the sacroiliac (SI) joint, and positive FABERE on the Left. The patient reports minimal improvement with conservative treatment, including PT and medications.    This is a repeat injection.  He has had many SI joint injections last of which was on 5/1/2017 by Dr. Baugh which provided good pain relief for 3 months.     Imaging:  MRI completed on 3/17/2015 showed:  FINDINGS: Plain films dated 3/18/2010 show probably six functional  lumbar vertebral segments. However, the prior exam report assumed five  lumbar segments. For purposes of comparison, five functional lumbar  vertebral segments are again assumed. The caudal thecal sac contents  appear intrinsically normal. Very subtle degenerative anterolisthesis  L4 on L5 without change in alignment in the sagittal plane is  otherwise normal. Vertebral bone marrow signal is unremarkable.     T12-L1: Normal.     L1-L2: Signal loss without disc space narrowing and very minimal disc  bulging. No disc protrusion or central or foraminal stenosis and no  change since prior exam. Posterior facets are normal.     L2-L3: Signal loss without disc space narrowing. Mild diffuse disc  bulging superimposed on a congenitally small bony canal, thickening of  the ligamentum flavum and dorsal epidural fat with mild posterior  facet degenerative changes. This combination currently yields mild to  moderate central stenosis, increased since the prior exam.     L3-L4: Signal loss without  disc space narrowing. Mild diffuse disc  bulging and no disc protrusion. Borderline mild central stenosis  related to multiple factors. Mild right foraminal narrowing and the  left foramen is normal. Minimal posterior facet degenerative changes.  Overall, no change since prior exam.     L4-L5: Signal loss without disc space narrowing. Diffuse disc bulging  superimposed on a congenitally small bony canal with marked thickening  of the ligamentum flavum, marked posterior facet degenerative changes  and minimal anterolisthesis. This combination again yields severe  central stenosis without change. Severe left and mild/moderate  foraminal stenosis also appears unchanged.     L5-S1: Signal loss without disc space narrowing. Mild posterior disc  bulging associated with annular fissuring. No acute disc protrusion or  central stenosis. Minimal bilateral foraminal narrowing. Mild  posterior facet degenerative changes. Overall, no change at this level  since the prior exam.     IMPRESSION:   1. 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 Mild to  moderate central stenosis at L2-L3 has increased since the prior exam.  Borderline mild central stenosis at L3-L4 and severe central stenosis  at L4-L5 related to multiple factors have remain unchanged.  2. Multilevel foraminal stenosis bilaterally without change, most  prominent at L4-L5 on the left.       Allergies:      Allergies   Allergen Reactions     No Known Drug Allergies         Vitals:  There were no vitals taken for this visit.    Options/alternatives, benefits and risks were discussed with the patient including bleeding, infection, tissue trauma, exposure to radiation, reaction to medications including seizure, nerve injury, weakness, and numbness.  Questions were answered to his satisfaction and he agrees to proceed. Voluntary informed consent was obtained and signed.     Procedure:  After getting informed consent, patient was brought into the procedure  suite and was placed in a prone position on the procedure table.   A Pause for the Cause was performed.  Patient was prepped and draped in sterile fashion.     After identifying the left SI joint, the C-arm was rotated to a obliquely to obtain the best view of the inferior angle of the joint.  A total of 2 ml of Lidocaine 1%  was used to anesthetize the skin at a skin entry site coaxial with the fluoroscopy beam at this location.  A 22 gauge 3.5 inch needle was advanced under intermittent fluoroscopy until it was felt to enter the SI joint.    A total of 1 ml of Omnipaque-300 was injected, confirming appropriate position, with spread into the intraarticular space, with no intravascular uptake noted.  9 ml of Omnipaque-300 was wasted. Location was verified in lateral view.    1 ml of 1% lidocaine, 1 ml of 0.5% bupivacaine with 40 mg of kenalog was injected.  The needle was removed. Hemostasis was achieved, the area was cleaned, and bandaids were placed when appropriate.  The patient tolerated the procedure well, and was taken to the recovery room.    Images were saved to PACS.    Pre-procedure pain score: 3/10  Post-procedure pain score: 0/10  Following today's procedure, the patient was advised to contact the [facility] for any of the following:   Fever, chills, or night sweats   New onset of pain, numbness, or weakness   Any questions/concerns regarding the procedure    Follow-up includes:   -f/u phone call in one week  -f/u with [facility]

## 2017-09-20 ENCOUNTER — RADIANT APPOINTMENT (OUTPATIENT)
Dept: GENERAL RADIOLOGY | Facility: CLINIC | Age: 82
End: 2017-09-20
Attending: ANESTHESIOLOGY

## 2017-09-20 ENCOUNTER — RADIOLOGY INJECTION OFFICE VISIT (OUTPATIENT)
Dept: PALLIATIVE MEDICINE | Facility: CLINIC | Age: 82
End: 2017-09-20
Payer: COMMERCIAL

## 2017-09-20 VITALS — DIASTOLIC BLOOD PRESSURE: 61 MMHG | HEART RATE: 60 BPM | SYSTOLIC BLOOD PRESSURE: 100 MMHG | OXYGEN SATURATION: 98 %

## 2017-09-20 DIAGNOSIS — M47.818 ARTHROPATHY OF LEFT SACROILIAC JOINT: ICD-10-CM

## 2017-09-20 DIAGNOSIS — M46.1 SACROILIITIS (H): Primary | ICD-10-CM

## 2017-09-20 PROCEDURE — 27096 INJECT SACROILIAC JOINT: CPT | Mod: LT | Performed by: ANESTHESIOLOGY

## 2017-09-20 ASSESSMENT — PAIN SCALES - GENERAL: PAINLEVEL: MODERATE PAIN (4)

## 2017-09-20 NOTE — PATIENT INSTRUCTIONS
[facility] Procedure Discharge Instructions    Today you saw:   Dr. Irene Burnett    Your procedure:  Sacro-iliac joint injection       Medications used:  Lidocaine (anesthetic)   Bupivacaine (anesthetic)   Kenalog (steroid)   Omnipaque (contrast)               Be cautious when walking as numbness and/or weakness in the legs may occur up to 6-8 hours after the procedure due to effect of the local anesthetic    Do not drive for 6 hours. The effect of the local anesthetic could slow your reflexes.     Avoid strenuous activity for the first 24 hours. You may resume your regular activities after that.     You may shower, however avoid swimming, tub baths or hot tubs for 24 hours following your procedure    You may have a mild to moderate increase in pain for several days following the injection.      You may use ice packs for 10-15 minutes, 3 to 4 times a day at the injection site for comfort    Do not use heat to painful areas for 6 to 8 hours. This will give the local anesthetic time to wear off and prevent you from accidentally burning your skin.    You may use anti-inflammatory medications (such as Ibuprofen/Advil or Aleve) or Tylenol for pain control if necessary    With diabetes, check your blood sugar more frequently than usual as your blood sugar may be higher than normal for 10-14 days following a steroid injection. Contact your doctor who manages your diabetes if your blood sugar is higher than usual    It may take up to 14 days for the steroid medication to start working although you may feel the effect as early as a few days after the procedure.       If you experience any of the following, call the pain center line during work hours at [phone] or on-call physician after hours at [phone]:  -Fever over 100 degree F  -Swelling, bleeding, redness, drainage, warmth at the injection site  -Progressive weakness or numbness in your legs   -Unusual new onset of pain that is not improving    Phone  #s:  Nurse triage line for general questions: [phone]

## 2017-09-20 NOTE — MR AVS SNAPSHOT
After Visit Summary   9/20/2017    Austen Fowler    MRN: [id number]           Patient Information     Date Of Birth          1935        Visit Information        Provider Department      9/20/2017 1:15 PM Irene Burnett MD [facility]        Care Instructions    [facility] Procedure Discharge Instructions    Today you saw:   Dr. Irene Burnett    Your procedure:  Sacro-iliac joint injection       Medications used:  Lidocaine (anesthetic)   Bupivacaine (anesthetic)   Kenalog (steroid)   Omnipaque (contrast)               Be cautious when walking as numbness and/or weakness in the legs may occur up to 6-8 hours after the procedure due to effect of the local anesthetic    Do not drive for 6 hours. The effect of the local anesthetic could slow your reflexes.     Avoid strenuous activity for the first 24 hours. You may resume your regular activities after that.     You may shower, however avoid swimming, tub baths or hot tubs for 24 hours following your procedure    You may have a mild to moderate increase in pain for several days following the injection.      You may use ice packs for 10-15 minutes, 3 to 4 times a day at the injection site for comfort    Do not use heat to painful areas for 6 to 8 hours. This will give the local anesthetic time to wear off and prevent you from accidentally burning your skin.    You may use anti-inflammatory medications (such as Ibuprofen/Advil or Aleve) or Tylenol for pain control if necessary    With diabetes, check your blood sugar more frequently than usual as your blood sugar may be higher than normal for 10-14 days following a steroid injection. Contact your doctor who manages your diabetes if your blood sugar is higher than usual    It may take up to 14 days for the steroid medication to start working although you may feel the effect as early as a few days after the procedure.       If you experience any of the following, call the  pain center line during work hours at [phone] or on-call physician after hours at [phone]:  -Fever over 100 degree F  -Swelling, bleeding, redness, drainage, warmth at the injection site  -Progressive weakness or numbness in your legs   -Unusual new onset of pain that is not improving    Phone #s:  Nurse triage line for general questions: [phone]              Follow-ups after your visit        Your next 10 appointments already scheduled     Sep 20, 2017  1:15 PM CDT   Radiology Injections with Irene Burnett MD   [facility] ([facility])    [address]  [address]  [facility] [address]   [phone]            Sep 20, 2017  1:15 PM CDT   XR SACROILIAC THERAPEUTIC INJECTION LEFT with BUPAINCARM1   [facility] ([facility])    [address]  [address]  [facility] [address]   [phone]           Stop drinking 1 hour before the exam.  You may take your medicines as usual, except for blood thinners (Coumadin, Plavix, Ticlid, Persantine, Aggrenox, Pletal, Effient, Brilliant). Talk to your doctor if you take these.  Tell your doctor if:   You have ever had an allergic reaction to X-ray dye (contrast fluid).   There is a chance you may be pregnant.  Please bring a list of your current medicines to your exam. Include vitamins, minerals and over-the-counter medicines.  Please call the Imaging Department at your exam site with any questions.              Who to contact     If you have questions or need follow up information about today's clinic visit or your schedule please contact [facility] directly at [phone].  Normal or non-critical lab and imaging results will be communicated to you by MyChart, letter or phone within 4 business days after the clinic has received the results. If you do not hear from us within 7 days, please contact the clinic through MyChart or phone. If you have a  critical or abnormal lab result, we will notify you by phone as soon as possible.  Submit refill requests through [employer] or call your pharmacy and they will forward the refill request to us. Please allow 3 business days for your refill to be completed.          Additional Information About Your Visit        [employer] Information     [employer] gives you secure access to your electronic health record. If you see a primary care provider, you can also send messages to your care team and make appointments. If you have questions, please call your primary care clinic.  If you do not have a primary care provider, please call [phone] and they will assist you.        Care EveryWhere ID     This is your Care EveryWhere ID. This could be used by other organizations to access your [place] medical records  [id number]         Blood Pressure from Last 3 Encounters:   08/23/17 134/66   06/19/17 112/68   06/09/17 122/70    Weight from Last 3 Encounters:   08/23/17 127.5 kg (281 lb)   06/19/17 128 kg (282 lb 3.2 oz)   06/09/17 127.9 kg (282 lb)              Today, you had the following     No orders found for display         Today's Medication Changes          These changes are accurate as of: 9/20/17  1:04 PM.  If you have any questions, ask your nurse or doctor.               These medicines have changed or have updated prescriptions.        Dose/Directions    gabapentin 300 MG capsule   Commonly known as:  NEURONTIN   This may have changed:    - how much to take  - when to take this  - additional instructions   Used for:  Chronic pain syndrome, Sacroiliac joint pain        Take 900 mg three times daily   Quantity:  810 capsule   Refills:  2       omeprazole 20 MG CR capsule   Commonly known as:  priLOSEC   This may have changed:  additional instructions   Used for:  Dysphagia, unspecified type        TAKE 1 TABLET (20 MG) BY MOUTH AS NEEDED TAKE 30-60 MINUTES BEFORE A MEAL.   Quantity:  90 capsule   Refills:  3                 Primary Care Provider Office Phone # Fax #    Bhavperfecto Hodge [phone] [address] [facility] [address]  [facility] [address]        Equal Access to Services     CELESTE FRANCIS : Hadamirah evangelista ku nikkio Sojuanaali, waaxda luqadaha, qaybta kaalmada adejacquelineda, janice augustrebecca davila. So [facility] [phone].    ATENCIÓN: Si habla español, tiene a hagen disposición servicios gratuitos de asistencia lingüística. Llame al [phone].    We comply with applicable federal civil rights laws and [place] laws. We do not discriminate on the basis of race, color, national origin, age, disability sex, sexual orientation or gender identity.            Thank you!     Thank you for choosing [facility]  for your care. Our goal is always to provide you with excellent care. Hearing back from our patients is one way we can continue to improve our services. Please take a few minutes to complete the written survey that you may receive in the mail after your visit with us. Thank you!             Your Updated Medication List - Protect others around you: Learn how to safely use, store and throw away your medicines at www.disposemymeds.org.          This list is accurate as of: 9/20/17  1:04 PM.  Always use your most recent med list.                   Brand Name Dispense Instructions for use Diagnosis    ACETAMIN 500 MG tablet   Generic drug:  acetaminophen      Take 2 tablets by mouth 3 times daily as needed.        amLODIPine 2.5 MG tablet    NORVASC    180 tablet    Take 1 tablet (2.5 mg) by mouth 2 times daily    Essential hypertension, benign       aspirin 81 MG tablet     30 tablet    Take 1 tablet (81 mg) by mouth daily    CAD (coronary artery disease)       blood glucose monitoring lancets     200 each    Use to test blood sugar two times daily or as directed    Other abnormal glucose       finasteride 5 MG tablet    PROSCAR    90 tablet    Take 1 tablet (5 mg) by mouth daily    Slowing of urinary  stream       gabapentin 300 MG capsule    NEURONTIN    810 capsule    Take 900 mg three times daily    Chronic pain syndrome, Sacroiliac joint pain       gemfibrozil 600 MG tablet    LOPID    180 tablet    Take 1 tablet (600 mg) by mouth 2 times daily    Hyperlipidemia LDL goal <70, Low HDL (under 40)       lisinopril 20 MG tablet    PRINIVIL/ZESTRIL    90 tablet    Take 1 tablet (20 mg) by mouth daily    Essential hypertension, benign       METAMUCIL 1.7 GM Wafr   Generic drug:  Psyllium      TAKE AS DIRECTED        metFORMIN 500 MG 24 hr tablet    GLUCOPHAGE-XR    360 tablet    Take 4 tablets (2,000 mg) by mouth daily (with breakfast)    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    Dysphagia, unspecified type       ONE TOUCH ULTRA test strip   Generic drug:  blood glucose monitoring     200 strip    Use to test blood sugars    two times a day or as  directed    Other abnormal glucose       order for DME      Uses Cpap machine for sleep apnea        pravastatin 20 MG tablet    PRAVACHOL    90 tablet    Take 1 tablet (20 mg) by mouth daily    Hyperlipidemia LDL goal <70       tamsulosin 0.4 MG capsule    FLOMAX    90 capsule    TAKE 1 CAPSULE (0.4 MG) BY MOUTH DAILY AS NEEDED    S/P lumbar laminectomy       triamcinolone 0.5 % cream    KENALOG    15 g    Apply  topically 2 times daily. to affected area as directed PRN    Rash and other nonspecific skin eruption       vitamin D 2000 UNITS tablet     90 tablet    Take 2,000 Units by mouth daily.

## 2017-09-20 NOTE — NURSING NOTE
Discharge Information    IV Discontiued Time:  NA    Amount of Fluid Infused:  NA    Discharge Criteria = When patient returns to baseline or as per MD order    Consciousness:  Pt is fully awake    Circulation:  BP +/- 20% of pre-procedure level    Respiration:  Patient is able to breathe deeply    O2 Sat:  Patient is able to maintain O2 Sat >92% on room air    Activity:  Moves 4 extremities on command    Ambulation:  Patient is able to stand and walk    Dressing:  Clean/dry or No Dressing    Notes:   Discharge instructions and AVS given to patient    Patient meets criteria for discharge?  YES    Admitted to PCU?  No    Responsible adult present to accompany patient home?  Yes    Signature/Title:    Erin Keyes RN Care Coordinator  [facility]

## 2017-09-20 NOTE — NURSING NOTE
Injection intake:    If this procedure is requiring IV sedation has patient been NPO for 6  Hours? NA    Is patient on coumadin, plavix or other prescribed blood thinner?   No    If patient is on coumadin was it held for 5 days?   NA    If patient is on plavix was it held for 7 days?    NA     Does patient take aspirin?  Yes -   ASA    If this is for a cervical procedure and patient is on aspirin has it been held for 6 days?   NA    Any allergies to contrast dye, iodine, steroid and/or numbing medications?  NO    Is patient currently taking antibiotics or have an active infection?  NO    Does patient have a ? Yes       Is patient pregnant or breastfeeding?  Not Applicable    Are the vital signs normal?  Yes

## 2017-09-26 ENCOUNTER — MYC REFILL (OUTPATIENT)
Dept: FAMILY MEDICINE | Facility: CLINIC | Age: 82
End: 2017-09-26

## 2017-09-26 DIAGNOSIS — Z98.890 S/P LUMBAR LAMINECTOMY: ICD-10-CM

## 2017-09-26 DIAGNOSIS — M53.3 SACROILIAC JOINT PAIN: ICD-10-CM

## 2017-09-26 DIAGNOSIS — R39.198 SLOWING OF URINARY STREAM: ICD-10-CM

## 2017-09-26 DIAGNOSIS — E78.5 HYPERLIPIDEMIA LDL GOAL <70: ICD-10-CM

## 2017-09-26 DIAGNOSIS — R13.10 DYSPHAGIA, UNSPECIFIED TYPE: ICD-10-CM

## 2017-09-26 DIAGNOSIS — G89.4 CHRONIC PAIN SYNDROME: ICD-10-CM

## 2017-09-26 DIAGNOSIS — I10 ESSENTIAL HYPERTENSION, BENIGN: ICD-10-CM

## 2017-09-26 RX ORDER — FINASTERIDE 5 MG/1
5 TABLET, FILM COATED ORAL DAILY
Qty: 90 TABLET | Refills: 2 | Status: SHIPPED | OUTPATIENT
Start: 2017-09-26 | End: 2018-07-21

## 2017-09-26 RX ORDER — GABAPENTIN 300 MG/1
CAPSULE ORAL
Qty: 810 CAPSULE | Refills: 2 | Status: SHIPPED | OUTPATIENT
Start: 2017-09-26 | End: 2018-11-15

## 2017-09-26 RX ORDER — AMLODIPINE BESYLATE 2.5 MG/1
2.5 TABLET ORAL 2 TIMES DAILY
Qty: 180 TABLET | Refills: 3 | Status: SHIPPED | OUTPATIENT
Start: 2017-09-26 | End: 2018-09-21

## 2017-09-26 RX ORDER — PRAVASTATIN SODIUM 20 MG
20 TABLET ORAL DAILY
Qty: 90 TABLET | Refills: 2 | Status: SHIPPED | OUTPATIENT
Start: 2017-09-26 | End: 2018-07-21

## 2017-09-26 RX ORDER — TAMSULOSIN HYDROCHLORIDE 0.4 MG/1
CAPSULE ORAL
Qty: 90 CAPSULE | Refills: 2 | Status: SHIPPED | OUTPATIENT
Start: 2017-09-26 | End: 2018-03-07

## 2017-09-26 NOTE — TELEPHONE ENCOUNTER
To PCP:  Please review refills.  LOV: 8/23/2017    Lab Results   Component Value Date    A1C 6.2 08/23/2017    A1C 6.8 02/23/2017    A1C 7.9 11/16/2016    A1C 7.2 08/05/2016    A1C 6.6 02/04/2016      BP Readings from Last 6 Encounters:   09/20/17 100/61   08/23/17 134/66   06/19/17 112/68   06/09/17 122/70   05/01/17 133/67   04/11/17 129/77       Deb Perez RN

## 2017-09-26 NOTE — TELEPHONE ENCOUNTER
Message from [facility]:  Original authorizing provider: MD Austen Andrade would like a refill of the following medications:  finasteride (PROSCAR) 5 MG tablet [Hamzah Hodge MD]  gabapentin (NEURONTIN) 300 MG capsule [Hamzah Hodeg MD]  tamsulosin (FLOMAX) 0.4 MG capsule [Hamazh Hodge MD]  pravastatin (PRAVACHOL) 20 MG tablet [Hamzah Hodge MD]  omeprazole (PRILOSEC) 20 MG CR capsule [Hamzah Hodge MD]  amLODIPine (NORVASC) 2.5 MG tablet [Hamzah Hodge MD]    Preferred pharmacy: [facility] [address]    Comment:  Please send a nasty gram to [facility] to refill these prescriptions. I can't get to the website to initiate them. Finisteride needs a new prescription as the current one has run out. Thank you for assistance. Griffin muniz

## 2017-10-02 ENCOUNTER — TELEPHONE (OUTPATIENT)
Dept: FAMILY MEDICINE | Facility: CLINIC | Age: 82
End: 2017-10-02

## 2017-10-02 DIAGNOSIS — R13.10 DYSPHAGIA, UNSPECIFIED TYPE: ICD-10-CM

## 2017-10-02 NOTE — TELEPHONE ENCOUNTER
Fax from [facility] needing clarification from 9/26/17 Rx on how often Omprazole should be dosed PRN?  Daily?  BID?    Assume from quantity of #90 with 3 refills that it is DAILY.  Updated SIG.    If it is BID, please update SIG and quantity.    Then please sign to send to pharmacy.    RT Parrish (R)

## 2017-10-04 ENCOUNTER — MYC MEDICAL ADVICE (OUTPATIENT)
Dept: FAMILY MEDICINE | Facility: CLINIC | Age: 82
End: 2017-10-04

## 2017-10-04 NOTE — TELEPHONE ENCOUNTER
Left message for patient to return a call to the clinic.  Need to clarify dosing for Omprazole.  How often is he taking?  1 tab daily or 1 tab BID?    Deb Perez RN

## 2017-10-04 NOTE — TELEPHONE ENCOUNTER
[facility]  Called to get the clarification on the SIG  Please call them back at # [phone]    Ref # [id number]    Thank You

## 2017-10-04 NOTE — TELEPHONE ENCOUNTER
Patient states he takes 1 cap daily.    New script sent to pharmacy per [facility] Protocol.  Deb Perez RN

## 2017-10-04 NOTE — TELEPHONE ENCOUNTER
Phones in clinic not working.  Will call patient to clarify his current Omeprazole dose---then follow up with [facility].   Will need to update med list sig.     Sarah ARAUJO RN,BSN

## 2017-10-05 NOTE — TELEPHONE ENCOUNTER
See My Chart message from patient.  Informed him that this Rx has been authorized.  Annmarie Andrade RN

## 2017-10-26 ENCOUNTER — MYC MEDICAL ADVICE (OUTPATIENT)
Dept: FAMILY MEDICINE | Facility: CLINIC | Age: 82
End: 2017-10-26

## 2017-11-05 ENCOUNTER — MYC MEDICAL ADVICE (OUTPATIENT)
Dept: FAMILY MEDICINE | Facility: CLINIC | Age: 82
End: 2017-11-05

## 2017-11-08 ENCOUNTER — OFFICE VISIT (OUTPATIENT)
Dept: FAMILY MEDICINE | Facility: CLINIC | Age: 82
End: 2017-11-08
Payer: COMMERCIAL

## 2017-11-08 VITALS
SYSTOLIC BLOOD PRESSURE: 113 MMHG | WEIGHT: 276 LBS | OXYGEN SATURATION: 96 % | HEIGHT: 75 IN | TEMPERATURE: 97.3 F | RESPIRATION RATE: 18 BRPM | HEART RATE: 58 BPM | DIASTOLIC BLOOD PRESSURE: 67 MMHG | BODY MASS INDEX: 34.32 KG/M2

## 2017-11-08 DIAGNOSIS — M25.512 LEFT SHOULDER PAIN, UNSPECIFIED CHRONICITY: ICD-10-CM

## 2017-11-08 DIAGNOSIS — M54.2 NECK PAIN ON LEFT SIDE: ICD-10-CM

## 2017-11-08 DIAGNOSIS — M54.9 UPPER BACK PAIN ON LEFT SIDE: Primary | ICD-10-CM

## 2017-11-08 PROCEDURE — 99213 OFFICE O/P EST LOW 20 MIN: CPT | Performed by: NURSE PRACTITIONER

## 2017-11-08 ASSESSMENT — PATIENT HEALTH QUESTIONNAIRE - PHQ9: SUM OF ALL RESPONSES TO PHQ QUESTIONS 1-9: 5

## 2017-11-08 NOTE — MR AVS SNAPSHOT
After Visit Summary   11/8/2017    Austen Fowler    MRN: [id number]           Patient Information     Date Of Birth          1935        Visit Information        Provider Department      11/8/2017 10:30 AM Jesus Montgomery APRN CNP [facility]        Today's Diagnoses     Upper back pain on left side    -  1    Neck pain on left side          Care Instructions    Continue taking tylenol   Please follow up with physical therapy             Follow-ups after your visit        Additional Services     [facility] PT, HAND, AND CHIROPRACTIC REFERRAL       **This order will print in the [facility] Scheduling Office**    Physical Therapy, Hand Therapy and Chiropractic Care are available through:    *[facility]  *[facility]  *[facility]    Call one number to schedule at any of the above locations: [phone].    Your provider has referred you to: Physical Therapy at [facility] or [facility]    Indication/Reason for Referral: Neck Pain, Shoulder Pain and Thoracic Pain  Onset of Illness: 1 month  Therapy Orders: Evaluate and Treat  Special Programs: None  Special Request: None    Zach Serrano      Additional Comments for the Therapist or Chiropractor: saw chiro about 6 times without improvement     Please be aware that coverage of these services is subject to the terms and limitations of your health insurance plan.  Call member services at your health plan with any benefit or coverage questions.      Please bring the following to your appointment:    *Your personal calendar for scheduling future appointments  *Comfortable clothing                  Your next 10 appointments already scheduled     Dec 07, 2017  9:30 AM CST   LAB with [facility]   [facility] ([facility])    [address] [place] [address]  [place]  [place] [address] [phone]           Please do not eat 10-12 hours before your appointment if you  "are coming in fasting for labs on lipids, cholesterol, or glucose (sugar). This does not apply to pregnant women. Water, hot tea and black coffee (with nothing added) are okay. Do not drink other fluids, diet soda or chew gum.            Dec 07, 2017 10:30 AM CST   Return Visit with CRISTIANO Sánchez CNP   [facility] ([facility])    [address] [place], [place] [id number]  [facility] [address] [phone]              Who to contact     If you have questions or need follow up information about today's clinic visit or your schedule please contact [facility] directly at [phone].  Normal or non-critical lab and imaging results will be communicated to you by "[employer]hart, letter or phone within 4 business days after the clinic has received the results. If you do not hear from us within 7 days, please contact the clinic through [employer] or phone. If you have a critical or abnormal lab result, we will notify you by phone as soon as possible.  Submit refill requests through [employer] or call your pharmacy and they will forward the refill request to us. Please allow 3 business days for your refill to be completed.          Additional Information About Your Visit        [employer] Information     [employer] gives you secure access to your electronic health record. If you see a primary care provider, you can also send messages to your care team and make appointments. If you have questions, please call your primary care clinic.  If you do not have a primary care provider, please call [phone] and they will assist you.        Care EveryWhere ID     This is your Care EveryWhere ID. This could be used by other organizations to access your [facility] medical records  [id number]        Your Vitals Were     Pulse Temperature Respirations Height Pulse Oximetry BMI (Body Mass Index)    58 97.3  F (36.3  C) (Oral) 18 6' 3\" (1.905 m) 96% 34.5 kg/m2       Blood Pressure from " Last 3 Encounters:   11/08/17 113/67   09/20/17 100/61   08/23/17 134/66    Weight from Last 3 Encounters:   11/08/17 276 lb (125.2 kg)   08/23/17 281 lb (127.5 kg)   06/19/17 282 lb 3.2 oz (128 kg)              We Performed the Following     [facility] PT, HAND, AND CHIROPRACTIC REFERRAL        Primary Care Provider Office Phone # Fax #    Bhandrés MD Ayesha [phone] [address] [facility] [address]  [facility] [address]        Equal Access to Services     [facility]: Hadii aad ku hadchris Sobaldo, waaxda michelle, qaybta kaalmada jakob, janice fields . [facility] [phone].    ATENCIÓN: Si habla español, tiene a hagen disposición servicios gratuitos de asistencia lingüística. [facility] [phone].    We comply with applicable federal civil rights laws and [place] laws. We do not discriminate on the basis of race, color, national origin, age, disability, sex, sexual orientation, or gender identity.            Thank you!     Thank you for choosing [facility]  for your care. Our goal is always to provide you with excellent care. Hearing back from our patients is one way we can continue to improve our services. Please take a few minutes to complete the written survey that you may receive in the mail after your visit with us. Thank you!             Your Updated Medication List - Protect others around you: Learn how to safely use, store and throw away your medicines at www.disposemymeds.org.          This list is accurate as of: 11/8/17 10:49 AM.  Always use your most recent med list.                   Brand Name Dispense Instructions for use Diagnosis    ACETAMIN 500 MG tablet   Generic drug:  acetaminophen      Take 2 tablets by mouth 3 times daily as needed.        amLODIPine 2.5 MG tablet    NORVASC    180 tablet    Take 1 tablet (2.5 mg) by mouth 2 times daily    Essential hypertension, benign       aspirin 81 MG tablet     30 tablet    Take 1 tablet (81 mg) by mouth  daily    CAD (coronary artery disease)       blood glucose monitoring lancets     200 each    Use to test blood sugar two times daily or as directed    Other abnormal glucose       finasteride 5 MG tablet    PROSCAR    90 tablet    Take 1 tablet (5 mg) by mouth daily    Slowing of urinary stream       gabapentin 300 MG capsule    NEURONTIN    810 capsule    Take 900 mg three times daily    Chronic pain syndrome, Sacroiliac joint pain       gemfibrozil 600 MG tablet    LOPID    180 tablet    Take 1 tablet (600 mg) by mouth 2 times daily    Hyperlipidemia LDL goal <70, Low HDL (under 40)       lisinopril 20 MG tablet    PRINIVIL/ZESTRIL    90 tablet    Take 1 tablet (20 mg) by mouth daily    Essential hypertension, benign       METAMUCIL 1.7 GM Wafr   Generic drug:  Psyllium      TAKE AS DIRECTED        metFORMIN 500 MG 24 hr tablet    GLUCOPHAGE-XR    360 tablet    Take 4 tablets (2,000 mg) by mouth daily (with breakfast)    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)       metoprolol 50 MG 24 hr tablet    TOPROL-XL    180 tablet    Take 1 tablet (50 mg) by mouth 2 times daily    Essential hypertension, benign       omeprazole 20 MG CR capsule    priLOSEC    180 capsule    Take 1 capsule (20 mg) by mouth daily    Dysphagia, unspecified type       ONETOUCH ULTRA test strip   Generic drug:  blood glucose monitoring     200 strip    Use to test blood sugars    two times a day or as  directed    Other abnormal glucose       order for DME      Uses Cpap machine for sleep apnea        pravastatin 20 MG tablet    PRAVACHOL    90 tablet    Take 1 tablet (20 mg) by mouth daily    Hyperlipidemia LDL goal <70       tamsulosin 0.4 MG capsule    FLOMAX    90 capsule    TAKE 1 CAPSULE (0.4 MG) BY MOUTH DAILY AS NEEDED    S/P lumbar laminectomy       triamcinolone 0.5 % cream    KENALOG    15 g    Apply  topically 2 times daily. to affected area as directed PRN    Rash and other nonspecific skin eruption       vitamin D 2000  UNITS tablet     90 tablet    Take 2,000 Units by mouth daily.

## 2017-11-08 NOTE — NURSING NOTE
"Chief Complaint   Patient presents with     Musculoskeletal Problem       Initial /67 (BP Location: Right arm, Patient Position: Chair, Cuff Size: Adult Large)  Pulse 58  Temp 97.3  F (36.3  C) (Oral)  Resp 18  Ht 6' 3\" (1.905 m)  Wt 276 lb (125.2 kg)  SpO2 96%  BMI 34.5 kg/m2 Estimated body mass index is 34.5 kg/(m^2) as calculated from the following:    Height as of this encounter: 6' 3\" (1.905 m).    Weight as of this encounter: 276 lb (125.2 kg).  Medication Reconciliation: complete   Isabel Yap CMA (AAMA)      "

## 2017-11-08 NOTE — PROGRESS NOTES
HPI      SUBJECTIVE:   Austen Fowler is a 82 year old male who presents to clinic today for the following health issues:      Musculoskeletal problem/pain      Duration: 1 month    Description  Location: Lt Shoulder blade; Lt Shoulder; Lt Neck    Intensity:  mild    Accompanying signs and symptoms: Dull Headache; No Chest pain; No SOB; No visual changes    History  Previous similar problem: no   Previous evaluation:  none    Precipitating or alleviating factors:  Trauma or overuse: no     Aggravating factors include: Turning head; No limited ROM    Therapies tried and outcome: nothing and Ibuprofen      About 1 month ago started with left soulder blade pain that radiates up the neck to behind the ear. With the pain he can't turn his head side to side because it is very painful   Had the same about 1 year ago and saw a chiro with improvement. 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     Bunion      CAD (coronary artery disease)     CABG 1992: LIMA to LAD, SANDI to RCA, SVG to OM1 and OM2     Contact dermatitis and other eczema, due to unspecified cause     eczema - has light treatments with Dr. Rivera     Disorders of bursae and tendons in shoulder region, unspecified      Esophageal reflux      Essential hypertension, benign      Gallbladder disease      GERD (gastroesophageal  reflux disease)      Heart attack      Hyperlipidemia LDL goal <70      Left ventricular diastolic dysfunction      Low HDL (under 40) 3/28/2014     Nasal/sinus dis NEC     s/p surgery in 1995     Obesity      Osteoarthrosis, unspecified whether generalized or localized, shoulder region      Other hammer toe (acquired)      Sleep apnea     USES CPAP     Spinal stenosis, unspecified region other than cervical     MRI done 2006     Type 2 diabetes, HbA1c goal < 7% (H) 3/12/2015     Urge incontinence      Past Surgical History:   Procedure Laterality Date     C NONSPECIFIC PROCEDURE  11-92    CABG - LIMA to left anterior descending and saphenous vein bypass graft to the first and second obtuse marginal branch of the circumflex and the right mammary to the right coronary artery     C NONSPECIFIC PROCEDURE  6-94    Gail lap     C NONSPECIFIC PROCEDURE  5-92, 6-92    Angioplasty     C NONSPECIFIC PROCEDURE  1995    sinus surgery     C NONSPECIFIC PROCEDURE      bilateral cataract surgery     COLONOSCOPY N/A 4/11/2017    Procedure: COMBINED COLONOSCOPY, SINGLE OR MULTIPLE BIOPSY/POLYPECTOMY BY BIOPSY;  Surgeon: Bladimir Garner MD;  Location:  GI     ESOPHAGOSCOPY, GASTROSCOPY, DUODENOSCOPY (EGD), DILATATION, COMBINED  7/17/2014    Procedure: COMBINED ESOPHAGOSCOPY, GASTROSCOPY, DUODENOSCOPY (EGD), DILATATION;  Surgeon: Bladimir Garner MD;  Location:  GI     HC COLONOSCOPY THRU STOMA W BIOPSY/CAUTERY TUMOR/POLYP/LESION  5/2007     INJECT EPIDURAL LUMBAR       LAMINECTOMY LUMBAR TWO LEVELS N/A 4/29/2015    Procedure: LAMINECTOMY LUMBAR TWO LEVELS;  Surgeon: Bladimir Keenan MD;  Location:  OR     Social History   Substance Use Topics     Smoking status: Former Smoker     Quit date: 3/1/1992     Smokeless tobacco: Never Used      Comment: 80 pack year history     Alcohol use 0.0 oz/week     0 Standard drinks or equivalent per week      Comment: once in a while alcohol usage      Current Outpatient Prescriptions    Medication Sig Dispense Refill     omeprazole (PRILOSEC) 20 MG CR capsule Take 1 capsule (20 mg) by mouth daily 180 capsule 3     finasteride (PROSCAR) 5 MG tablet Take 1 tablet (5 mg) by mouth daily 90 tablet 2     gabapentin (NEURONTIN) 300 MG capsule Take 900 mg three times daily 810 capsule 2     tamsulosin (FLOMAX) 0.4 MG capsule TAKE 1 CAPSULE (0.4 MG) BY MOUTH DAILY AS NEEDED 90 capsule 2     pravastatin (PRAVACHOL) 20 MG tablet Take 1 tablet (20 mg) by mouth daily 90 tablet 2     amLODIPine (NORVASC) 2.5 MG tablet Take 1 tablet (2.5 mg) by mouth 2 times daily 180 tablet 3     metFORMIN (GLUCOPHAGE-XR) 500 MG 24 hr tablet Take 4 tablets (2,000 mg) by mouth daily (with breakfast) 360 tablet 1     blood glucose monitoring (ONE TOUCH ULTRASOFT) lancets Use to test blood sugar two times daily or as directed 200 each 1     ONE TOUCH ULTRA test strip Use to test blood sugars    two times a day or as  directed 200 strip 1     triamcinolone (KENALOG) 0.5 % cream Apply  topically 2 times daily. to affected area as directed PRN 15 g 0     metoprolol (TOPROL-XL) 50 MG 24 hr tablet Take 1 tablet (50 mg) by mouth 2 times daily 180 tablet 1     lisinopril (PRINIVIL/ZESTRIL) 20 MG tablet Take 1 tablet (20 mg) by mouth daily 90 tablet 3     gemfibrozil (LOPID) 600 MG tablet Take 1 tablet (600 mg) by mouth 2 times daily 180 tablet 3     aspirin 81 MG tablet Take 1 tablet (81 mg) by mouth daily 30 tablet 3     acetaminophen (ACETAMIN) 500 MG tablet Take 2 tablets by mouth 3 times daily as needed.        ORDER FOR DME Uses Cpap machine for sleep apnea       Cholecalciferol (VITAMIN D) 2000 UNIT tablet Take 2,000 Units by mouth daily. 90 tablet 3     METAMUCIL 1.7 GM OR WAFR TAKE AS DIRECTED       No Known Allergies    Reviewed and updated as needed this visit by clinical staff and provider      ROS  Detailed as above       /67 (BP Location: Right arm, Patient Position: Chair, Cuff Size: Adult Large)  Pulse 58  Temp  "97.3  F (36.3  C) (Oral)  Resp 18  Ht 6' 3\" (1.905 m)  Wt 276 lb (125.2 kg)  SpO2 96%  BMI 34.5 kg/m2    Physical Exam   Constitutional: He is well-developed, well-nourished, and in no distress.   HENT:   Head: Normocephalic.   Eyes: Conjunctivae are normal.   Neck: Normal range of motion. Neck supple.   Fairly normal ROM of neck but with slight accessory muscle usage    Pulmonary/Chest: Effort normal.   Musculoskeletal: Normal range of motion.   Significant muscle spasm of left suprascapular region and slight tenderness   Neurological: He is alert.   Skin: Skin is warm and dry. No rash noted. No erythema.   Psychiatric: Mood and affect normal.   Vitals reviewed.      Assessment and Plan:       ICD-10-CM    1. Upper back pain on left side M54.9 [facility], HAND, AND CHIROPRACTIC REFERRAL   2. Neck pain on left side M54.2 [facility] PT, HAND, AND CHIROPRACTIC REFERRAL   3. Left shoulder pain, unspecified chronicity M25.512        Chronic left shoulder pain with new sharper pain of left upper back that radiates up the neck. This pain limits neck ROM at times. He has a significant muscle spasm of left suprascapular region so I suspect this is muscular.   Not c/w with cardiac etiology   Recommend f/u with PT   Continue tylenol prn.   Call or rtc prn       CRISTIANO Padilla, CNP  [facility]    "

## 2017-11-13 ENCOUNTER — THERAPY VISIT (OUTPATIENT)
Dept: PHYSICAL THERAPY | Facility: CLINIC | Age: 82
End: 2017-11-13
Payer: COMMERCIAL

## 2017-11-13 DIAGNOSIS — M54.2 NECK PAIN: Primary | ICD-10-CM

## 2017-11-13 PROCEDURE — 97140 MANUAL THERAPY 1/> REGIONS: CPT | Mod: GP | Performed by: PHYSICAL THERAPIST

## 2017-11-13 PROCEDURE — 97161 PT EVAL LOW COMPLEX 20 MIN: CPT | Mod: GP | Performed by: PHYSICAL THERAPIST

## 2017-11-13 PROCEDURE — G8981 BODY POS CURRENT STATUS: HCPCS | Mod: GP | Performed by: PHYSICAL THERAPIST

## 2017-11-13 PROCEDURE — G8982 BODY POS GOAL STATUS: HCPCS | Mod: GP | Performed by: PHYSICAL THERAPIST

## 2017-11-13 PROCEDURE — 97110 THERAPEUTIC EXERCISES: CPT | Mod: GP | Performed by: PHYSICAL THERAPIST

## 2017-11-13 NOTE — PROGRESS NOTES
Subjective:    Patient is a 82 year old male presenting with rehab left ankle/foot hpi.                                      Pertinent medical history includes:  Cancer, diabetes, high blood pressure, heart problems, depression and sleep disorder/apnea.  Medical allergies: no.  Other surgeries include:  Cancer surgery and heart surgery.  Current medications:  Cardiac medication, pain medication and anti-inflammatory.  Current occupation is Retired .        Barriers include:  None as reported by patient.    Red flags:  None as reported by patient.                        Objective:    System    Physical Exam    General     ROS    Assessment/Plan:

## 2017-11-13 NOTE — PROGRESS NOTES
Physical Therapy Initial Evaluation  November 13, 2017     Precautions/Restrictions/MD instructions: PT eval and treat.     Subjective:   Date of Onset: September 1, 2017  C/C: L shoulder blade pain radiating up into L-side of neck and behind L ear. Also has chronic L shoulder pain that may or may not be related. Denies radiation into UEs.  Quality of pain is dull and aching. Pains are described as intermittent in nature. Pain is worse: morning. Pain is rated 8/10 at worst, 2/10 currently, 0/10 at best.   History of symptoms: Pains began gradually as the result of insidious onset. Since onset, symptoms are same.  Worsened by: Trying to turn head to L, especially quickly. Sitting/reading for prolonged periods of time. .    Alleviated by: Rest, Heat and PT. Reports little to no improvement with chiro treatments. Reports that what we worked on back in March had him feeling good.   General health as reported by patient: good  Pertinent medical/surgical history: hx CA, DM, overweight, HBP, heart problems. Imaging: none. Current occupational status: Retired. Patient's goals are: decrease pain. Return to MD:  PRN.     Therapist Impression:   Austen Fowler is a 82 year old male with complaints of L shoulder blade/neck and shoulder pain. He presents with signs and symptoms consistent with mechanical neck pain. These impairments limit his ability to drive, sit and read for prolonged periods of time. Skilled PT services are necessary in order to reduce impairments and improve independent function.    Objective:  CERVICAL EXAMINATION    Posture: Forward head on neck, Increased thoracic kyphosis and Rounded shoulders    Active ROM Limitation   Flexion Min/nil loss, pulls on L, no pain   Extension Min loss, NE   Protraction Nil loss, NE   Retraction Min loss, NE    L R   Rotation Min/mod loss, pain on L Min loss, NE   Sidebend Mod loss, pain on L Mod loss, NE     Resisted Tests Pain?   Extension -    L R   Rotation - -    Sidebend - -     Neurological testing (myotomes, sensation, reflexes, nerve tension) not indicated at this time.    Other:  Spurlings: negative bilaterally.  DNF endurance: n/a  Cervical rotation/lateral flexion (CRLF test): n/a  ULTT: n/a  Chin tuck w/extension: n/a    Assessment/Plan:    The patient is a 82 year old male with chief complaint of L shoulder blade/neck/shoulder pain.    The patient has the following significant findings with corresponding treatment plan.  Diagnosis 1:  Mechanical neck pain    Pain -  hot/cold therapy, US, electric stimulation, mechanical traction, manual therapy, splint/taping/bracing/orthotics, self management, education, directional preference exercise and home program  Decreased ROM/flexibility - manual therapy, therapeutic exercise and home program  Decreased joint mobility - manual therapy and therapeutic exercise  Decreased strength - therapeutic exercise, therapeutic activities and home program  Impaired muscle performance - neuro re-education  Decreased function - therapeutic activities  Impaired posture - neuro re-education    Therapy Evaluation Codes:   1) History comprised of:   Personal factors that impact the plan of care:      Age and Time since onset of symptoms.    Comorbidity factors that impact the plan of care are:      Diabetes.     Medications impacting care: None.  2) Examination of Body Systems comprised of:   Body structures and functions that impact the plan of care:      Cervical spine.   Activity limitations that impact the plan of care are:      Driving and Sitting.   Clinical presentation characteristics are:    Stable/Uncomplicated.  3) Presentation comprised of:   Presentation scored as Low complexity with uncomplicated characteristics..  4) Decision-Making    Low complexity using standardized patient assessment instrument and/or measureable assessment of functional outcome.  Cumulative Therapy Evaluation is: Low complexity.    Previous and current  functional limitations:  (See Goal Flow Sheet for this information)    Short term and Long term goals: (See Goal Flow Sheet for this information)     Communication ability:  Patient appears to be able to clearly communicate and understand verbal and written communication and follow directions correctly.  Treatment Explanation - The following has been discussed with the patient: RX ordered/plan of care, anticipated outcomes, and possible risks and side effects.  This patient would benefit from PT intervention to resume normal activities.   Rehab potential is good.    Frequency:  1 X week, once daily  Duration:  for 5 weeks  Discharge Plan: Achieve all LTGs, be independent in home treatment program, and reach maximal therapeutic benefit.    Please refer to the daily flowsheet for treatment today, total treatment time and time spent performing 1:1 timed codes.

## 2017-11-15 ENCOUNTER — THERAPY VISIT (OUTPATIENT)
Dept: PHYSICAL THERAPY | Facility: CLINIC | Age: 82
End: 2017-11-15
Payer: COMMERCIAL

## 2017-11-15 DIAGNOSIS — M54.2 NECK PAIN: ICD-10-CM

## 2017-11-15 PROCEDURE — 97140 MANUAL THERAPY 1/> REGIONS: CPT | Mod: GP | Performed by: PHYSICAL THERAPIST

## 2017-11-15 PROCEDURE — 97110 THERAPEUTIC EXERCISES: CPT | Mod: GP | Performed by: PHYSICAL THERAPIST

## 2017-11-20 ENCOUNTER — THERAPY VISIT (OUTPATIENT)
Dept: PHYSICAL THERAPY | Facility: CLINIC | Age: 82
End: 2017-11-20
Payer: COMMERCIAL

## 2017-11-20 DIAGNOSIS — M54.2 NECK PAIN: ICD-10-CM

## 2017-11-20 PROCEDURE — 97110 THERAPEUTIC EXERCISES: CPT | Mod: GP | Performed by: PHYSICAL THERAPIST

## 2017-11-20 PROCEDURE — 97140 MANUAL THERAPY 1/> REGIONS: CPT | Mod: GP | Performed by: PHYSICAL THERAPIST

## 2017-11-20 PROCEDURE — 97112 NEUROMUSCULAR REEDUCATION: CPT | Mod: GP | Performed by: PHYSICAL THERAPIST

## 2017-11-22 ENCOUNTER — THERAPY VISIT (OUTPATIENT)
Dept: PHYSICAL THERAPY | Facility: CLINIC | Age: 82
End: 2017-11-22
Payer: COMMERCIAL

## 2017-11-22 DIAGNOSIS — M54.2 NECK PAIN: ICD-10-CM

## 2017-11-22 PROCEDURE — 97110 THERAPEUTIC EXERCISES: CPT | Mod: GP | Performed by: PHYSICAL THERAPIST

## 2017-11-22 PROCEDURE — 97140 MANUAL THERAPY 1/> REGIONS: CPT | Mod: GP | Performed by: PHYSICAL THERAPIST

## 2017-11-22 PROCEDURE — 97112 NEUROMUSCULAR REEDUCATION: CPT | Mod: GP | Performed by: PHYSICAL THERAPIST

## 2017-11-27 ENCOUNTER — THERAPY VISIT (OUTPATIENT)
Dept: PHYSICAL THERAPY | Facility: CLINIC | Age: 82
End: 2017-11-27
Payer: COMMERCIAL

## 2017-11-27 DIAGNOSIS — M54.2 NECK PAIN: ICD-10-CM

## 2017-11-27 PROCEDURE — 97140 MANUAL THERAPY 1/> REGIONS: CPT | Mod: GP | Performed by: PHYSICAL THERAPIST

## 2017-11-27 PROCEDURE — 97112 NEUROMUSCULAR REEDUCATION: CPT | Mod: GP | Performed by: PHYSICAL THERAPIST

## 2017-11-27 PROCEDURE — 97110 THERAPEUTIC EXERCISES: CPT | Mod: GP | Performed by: PHYSICAL THERAPIST

## 2017-11-29 ENCOUNTER — THERAPY VISIT (OUTPATIENT)
Dept: PHYSICAL THERAPY | Facility: CLINIC | Age: 82
End: 2017-11-29
Payer: COMMERCIAL

## 2017-11-29 DIAGNOSIS — M54.2 NECK PAIN: ICD-10-CM

## 2017-11-29 PROCEDURE — 97140 MANUAL THERAPY 1/> REGIONS: CPT | Mod: GP | Performed by: PHYSICAL THERAPIST

## 2017-11-29 PROCEDURE — 97110 THERAPEUTIC EXERCISES: CPT | Mod: GP | Performed by: PHYSICAL THERAPIST

## 2017-11-29 PROCEDURE — 97112 NEUROMUSCULAR REEDUCATION: CPT | Mod: GP | Performed by: PHYSICAL THERAPIST

## 2017-12-02 DIAGNOSIS — I10 ESSENTIAL HYPERTENSION, BENIGN: ICD-10-CM

## 2017-12-05 RX ORDER — METOPROLOL SUCCINATE 50 MG/1
TABLET, EXTENDED RELEASE ORAL
Qty: 180 TABLET | Refills: 1 | Status: SHIPPED | OUTPATIENT
Start: 2017-12-05 | End: 2018-04-13

## 2017-12-06 ENCOUNTER — THERAPY VISIT (OUTPATIENT)
Dept: PHYSICAL THERAPY | Facility: CLINIC | Age: 82
End: 2017-12-06
Payer: COMMERCIAL

## 2017-12-06 DIAGNOSIS — M54.2 NECK PAIN: ICD-10-CM

## 2017-12-06 PROCEDURE — 97140 MANUAL THERAPY 1/> REGIONS: CPT | Mod: GP | Performed by: PHYSICAL THERAPIST

## 2017-12-06 PROCEDURE — 97110 THERAPEUTIC EXERCISES: CPT | Mod: GP | Performed by: PHYSICAL THERAPIST

## 2017-12-07 ENCOUNTER — OFFICE VISIT (OUTPATIENT)
Dept: CARDIOLOGY | Facility: CLINIC | Age: 82
End: 2017-12-07
Attending: INTERNAL MEDICINE
Payer: COMMERCIAL

## 2017-12-07 VITALS
DIASTOLIC BLOOD PRESSURE: 64 MMHG | SYSTOLIC BLOOD PRESSURE: 128 MMHG | WEIGHT: 275 LBS | HEART RATE: 60 BPM | BODY MASS INDEX: 34.19 KG/M2 | HEIGHT: 75 IN

## 2017-12-07 DIAGNOSIS — I25.10 CORONARY ARTERY DISEASE INVOLVING NATIVE CORONARY ARTERY OF NATIVE HEART WITHOUT ANGINA PECTORIS: ICD-10-CM

## 2017-12-07 DIAGNOSIS — E78.5 HYPERLIPIDEMIA LDL GOAL <70: ICD-10-CM

## 2017-12-07 DIAGNOSIS — I10 ESSENTIAL HYPERTENSION, BENIGN: ICD-10-CM

## 2017-12-07 DIAGNOSIS — E78.6 LOW HDL (UNDER 40): ICD-10-CM

## 2017-12-07 DIAGNOSIS — Z95.1 S/P CABG (CORONARY ARTERY BYPASS GRAFT): ICD-10-CM

## 2017-12-07 DIAGNOSIS — I10 BENIGN ESSENTIAL HYPERTENSION: ICD-10-CM

## 2017-12-07 DIAGNOSIS — I25.10 CORONARY ARTERY DISEASE INVOLVING NATIVE CORONARY ARTERY OF NATIVE HEART WITHOUT ANGINA PECTORIS: Primary | ICD-10-CM

## 2017-12-07 LAB
ALT SERPL W P-5'-P-CCNC: <5 U/L (ref 5–30)
ANION GAP SERPL CALCULATED.3IONS-SCNC: 14.1 MMOL/L (ref 6–17)
BUN SERPL-MCNC: 19 MG/DL (ref 7–30)
CALCIUM SERPL-MCNC: 9.9 MG/DL (ref 8.5–10.5)
CHLORIDE SERPL-SCNC: 105 MMOL/L (ref 98–107)
CHOLEST SERPL-MCNC: 101 MG/DL
CO2 SERPL-SCNC: 27 MMOL/L (ref 23–29)
CREAT SERPL-MCNC: 0.74 MG/DL (ref 0.7–1.3)
GFR SERPL CREATININE-BSD FRML MDRD: >90 ML/MIN/1.7M2
GLUCOSE SERPL-MCNC: 144 MG/DL (ref 70–105)
HDLC SERPL-MCNC: 29 MG/DL
LDLC SERPL CALC-MCNC: 48 MG/DL
NONHDLC SERPL-MCNC: 72 MG/DL
POTASSIUM SERPL-SCNC: 4.1 MMOL/L (ref 3.5–5.1)
SODIUM SERPL-SCNC: 142 MMOL/L (ref 136–145)
TRIGL SERPL-MCNC: 118 MG/DL

## 2017-12-07 PROCEDURE — 99214 OFFICE O/P EST MOD 30 MIN: CPT | Performed by: NURSE PRACTITIONER

## 2017-12-07 PROCEDURE — 80061 LIPID PANEL: CPT | Performed by: INTERNAL MEDICINE

## 2017-12-07 PROCEDURE — 80048 BASIC METABOLIC PNL TOTAL CA: CPT | Performed by: INTERNAL MEDICINE

## 2017-12-07 PROCEDURE — 36415 COLL VENOUS BLD VENIPUNCTURE: CPT | Performed by: INTERNAL MEDICINE

## 2017-12-07 PROCEDURE — 84460 ALANINE AMINO (ALT) (SGPT): CPT | Performed by: INTERNAL MEDICINE

## 2017-12-07 NOTE — PROGRESS NOTES
HPI and Plan:   See dictation    Orders Placed This Encounter   Procedures     Basic metabolic panel     Lipid Profile     ALT     Follow-Up with Cardiologist     Echocardiogram Complete       No orders of the defined types were placed in this encounter.      There are no discontinued medications.      Encounter Diagnoses   Name Primary?     Coronary artery disease involving native coronary artery of native heart without angina pectoris Yes     Hyperlipidemia LDL goal <70      Benign essential hypertension        CURRENT MEDICATIONS:  Current Outpatient Prescriptions   Medication Sig Dispense Refill     metoprolol (TOPROL-XL) 50 MG 24 hr tablet TAKE 1 TABLET BY MOUTH TWO  TIMES DAILY 180 tablet 1     omeprazole (PRILOSEC) 20 MG CR capsule Take 1 capsule (20 mg) by mouth daily 180 capsule 3     finasteride (PROSCAR) 5 MG tablet Take 1 tablet (5 mg) by mouth daily 90 tablet 2     gabapentin (NEURONTIN) 300 MG capsule Take 900 mg three times daily 810 capsule 2     tamsulosin (FLOMAX) 0.4 MG capsule TAKE 1 CAPSULE (0.4 MG) BY MOUTH DAILY AS NEEDED 90 capsule 2     pravastatin (PRAVACHOL) 20 MG tablet Take 1 tablet (20 mg) by mouth daily 90 tablet 2     amLODIPine (NORVASC) 2.5 MG tablet Take 1 tablet (2.5 mg) by mouth 2 times daily 180 tablet 3     metFORMIN (GLUCOPHAGE-XR) 500 MG 24 hr tablet Take 4 tablets (2,000 mg) by mouth daily (with breakfast) 360 tablet 1     blood glucose monitoring (ONE TOUCH ULTRASOFT) lancets Use to test blood sugar two times daily or as directed 200 each 1     ONE TOUCH ULTRA test strip Use to test blood sugars    two times a day or as  directed 200 strip 1     triamcinolone (KENALOG) 0.5 % cream Apply  topically 2 times daily. to affected area as directed PRN 15 g 0     lisinopril (PRINIVIL/ZESTRIL) 20 MG tablet Take 1 tablet (20 mg) by mouth daily 90 tablet 3     gemfibrozil (LOPID) 600 MG tablet Take 1 tablet (600 mg) by mouth 2 times daily 180 tablet 3     aspirin 81 MG tablet Take 1  tablet (81 mg) by mouth daily 30 tablet 3     acetaminophen (ACETAMIN) 500 MG tablet Take 2 tablets by mouth 3 times daily as needed.        ORDER FOR DME Uses Cpap machine for sleep apnea       Cholecalciferol (VITAMIN D) 2000 UNIT tablet Take 2,000 Units by mouth daily. 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     Bunion      CAD (coronary artery disease)     CABG 1992: LIMA to LAD, SANDI to RCA, SVG to OM1 and OM2     Contact dermatitis and other eczema, due to unspecified cause     eczema - has light treatments with Dr. Rivera     Disorders of bursae and tendons in shoulder region, unspecified      Esophageal reflux      Essential hypertension, benign      Gallbladder disease      GERD (gastroesophageal reflux disease)      Heart attack      Hyperlipidemia LDL goal <70      Left ventricular diastolic dysfunction      Low HDL (under 40) 3/28/2014     Nasal/sinus dis NEC     s/p surgery in 1995     Obesity      Osteoarthrosis, unspecified whether generalized or localized, shoulder region      Other hammer toe (acquired)      Sleep apnea     USES CPAP     Spinal stenosis, unspecified region other than cervical     MRI done 2006     Type 2 diabetes, HbA1c goal < 7% (H) 3/12/2015     Urge incontinence        PAST SURGICAL HISTORY:  Past Surgical History:   Procedure Laterality Date     C NONSPECIFIC PROCEDURE  11-92    CABG - LIMA to left anterior descending and saphenous vein bypass graft to the first and second obtuse marginal branch of the circumflex and the right mammary to the right coronary artery     C NONSPECIFIC PROCEDURE  6-94    Gail GILL  NONSPECIFIC PROCEDURE  5-92, 6-92    Angioplasty     C NONSPECIFIC PROCEDURE  1995    sinus surgery     C NONSPECIFIC PROCEDURE      bilateral cataract surgery     COLONOSCOPY N/A 4/11/2017    Procedure: COMBINED COLONOSCOPY, SINGLE OR MULTIPLE BIOPSY/POLYPECTOMY BY BIOPSY;  Surgeon: Bladimir Garner MD;  Location:  GI     ESOPHAGOSCOPY, GASTROSCOPY, DUODENOSCOPY (EGD), DILATATION, COMBINED  7/17/2014    Procedure: COMBINED ESOPHAGOSCOPY, GASTROSCOPY, DUODENOSCOPY (EGD), DILATATION;  Surgeon: Bladimir Garner MD;  Location:  GI     HC COLONOSCOPY THRU STOMA W BIOPSY/CAUTERY TUMOR/POLYP/LESION  5/2007     INJECT EPIDURAL LUMBAR       LAMINECTOMY LUMBAR TWO LEVELS N/A 4/29/2015    Procedure: LAMINECTOMY LUMBAR TWO LEVELS;  Surgeon: Bladimir Keenan MD;  Location:  OR       FAMILY HISTORY:  Family History   Problem Relation Age of Onset     CANCER Brother      MELANOMA     DIABETES Mother      AODM     DIABETES Father      AODM     Myocardial Infarction Father      CEREBROVASCULAR DISEASE Brother      Family History Negative Brother      Family History Negative Sister      Family History Negative Son      Family History Negative Son      Family History Negative Son      Family History Negative Daughter        SOCIAL HISTORY:  Social History     Social History     Marital status:      Spouse name: Anastasia Caballero     Number of children: 4     Years of education: 14     Occupational History     retired           Social History Main Topics     Smoking status: Former Smoker     Quit date: 3/1/1992     Smokeless tobacco: Never Used      Comment: 80 pack year history     Alcohol use 0.0 oz/week     0 Standard drinks or equivalent per week      Comment: once in a while alcohol usage      Drug use: No     Sexual activity: Not Currently     Partners: Female     Other Topics Concern      Service Yes     [employer], served in [employer]     Blood Transfusions Yes     Unsure if he had one with heart  "surgery     Caffeine Concern Yes     occ cofee     Occupational Exposure No     Hobby Hazards No     Sleep Concern Yes     CPAP     Stress Concern No     Special Diet No     Exercise Yes     [employer] 3 times a week, biking walking.      Bike Helmet Yes     Seat Belt Yes     Self-Exams Yes     does [employer] about once a month.     Social History Narrative        4 kids       Review of Systems:  Skin:  Negative       Eyes:  Positive for glasses    ENT:  Negative      Respiratory:  Positive for CPAP;sleep apnea;dyspnea on exertion     Cardiovascular:  Negative      Gastroenterology:        Genitourinary:  Negative      Musculoskeletal:  Positive for back pain;joint pain    Neurologic:  Negative      Psychiatric:  Negative      Heme/Lymph/Imm:  Negative      Endocrine:  Negative        Physical Exam:  Vitals: /64  Pulse 60  Ht 1.905 m (6' 3\")  Wt 124.7 kg (275 lb)  BMI 34.37 kg/m2    Constitutional:  cooperative, alert and oriented, well developed, well nourished, in no acute distress overweight      Skin:  warm and dry to the touch          Head:  normocephalic        Eyes:  sclera white        Lymph:      ENT:  no pallor or cyanosis        Neck:  carotid pulses are full and equal bilaterally        Respiratory:  normal breath sounds, clear to auscultation, normal A-P diameter, normal symmetry, normal respiratory excursion, no use of accessory muscles         Cardiac: regular rhythm;normal S1 and S2   S4   systolic murmur;LUSB;grade 1        pulses full and equal                                        GI:           Extremities and Muscular Skeletal:  no edema              Neurological:  affect appropriate        Psych:  Alert and Oriented x 3        CC  John Paul Moreno MD  [address]  [place] [address]              "

## 2017-12-07 NOTE — PATIENT INSTRUCTIONS
Thanks for coming into [facility] today.    We discussed:   Call the clinic if your blood pressure is consistently greater than 140/90.     Medication changes:    NO changes    Follow up:   Dr. Moreno in 6 months with lab work and echocardiogram prior       Please call my nurse Hollie at [phone] with any questions or concerns.    Scheduling phone number: [phone]  Reminder: Please bring in all current medications, over the counter supplements and vitamin bottles to your next appointment.

## 2017-12-07 NOTE — LETTER
12/7/2017    Hamzah Hodge MD  [address]  [facility] [address]    RE: Austen Fowler       Dear Colleague,    I had the pleasure of seeing Austen Fowler in the [facility].    Cardiology Clinic Progress Note  Austen Fowler MRN# [id number]   YOB: 1935 Age: 82 year old     Reason For Visit:  six-month follow-up    Primary Cardiologist:   Dr. Moreno          History of Presenting Illness:    Austen Fowler is a pleasant 82 year old patient with a past cardiac history significant for CAD status post CABG 1991 (LIMA to LAD, SVG to OM1 and OM2, right mammary to RCA), hypertension, hyperlipidemia.  Past medical history significant for arthritis, diabetes, and obstructive sleep apnea on CPAP therapy.     Pt was last seen by Dr. Moerno 6/9/2017.  He had been doing well from a cardiac standpoint without any complaints.  Six-month follow-up was recommended.    Pt presents today for six month follow-up.  BMP today is within normal limits.  Lipid profile showing total cholesterol 101 HDL 29 LDL 38 and triglycerides 118.  These results were reviewed with him today.  His weight is down 7 pounds since he was last seen six months ago.  His blood pressure today is 128/64 and remains well-controlled.  He does not currently check blood pressures at home.  He has continued using his CPAP therapy.  He continues with chronic stable FINN which is unchanged.  Overall, he has been doing very well from a cardiac standpoint without any complaints. Patient reports no chest pain, PND, orthopnea, presyncope, syncope, edema, heart racing, or palpitations.      Current Cardiac Medications   Metoprolol XL 50 mg daily  Tamsulosin 0.4 mg p.r.n.  Pravastatin 20 mg daily  Amlodipine 2.5 mg b.i.d.  Lisinopril 20 mg daily  Gemfibrozil 600 mg b.i.d.  Aspirin 81 mg daily                     Assessment and Plan:     Plan  1.  Continue current medications   2.  Follow-up with Dr. Moreno in  six months with lab work and echocardiogram prior      1. CAD    Status post CABG (LIMA to LAD, SVG to OM one and OM two, right mammary to RCA)    No angina    Continue statin, aspirin, ACE inhibitor, beta blocker      2. Hypertension    128/64, controlled    Continue metoprolol, amlodipine, lisinopril    Consider increasing amlodipine, if needed      3. Hyperlipidemia    Last LDL 48 on 12/2017    HDL deficiency at 29    Continue pravastatin 20 mg daily and gemfibrozil 600 mg b.i.d.         Thank you for allowing me to participate in this delightful patient's care.      This note was completed in part using Dragon voice recognition software. Although reviewed after completion, some word and grammatical errors may occur.    Malou Swanson, APRN, CNP           Data:   All laboratory data reviewed      HPI and Plan:   See dictation    Orders Placed This Encounter   Procedures     Basic metabolic panel     Lipid Profile     ALT     Follow-Up with Cardiologist     Echocardiogram Complete       No orders of the defined types were placed in this encounter.      There are no discontinued medications.      Encounter Diagnoses   Name Primary?     Coronary artery disease involving native coronary artery of native heart without angina pectoris Yes     Hyperlipidemia LDL goal <70      Benign essential hypertension        CURRENT MEDICATIONS:  Current Outpatient Prescriptions   Medication Sig Dispense Refill     metoprolol (TOPROL-XL) 50 MG 24 hr tablet TAKE 1 TABLET BY MOUTH TWO  TIMES DAILY 180 tablet 1     omeprazole (PRILOSEC) 20 MG CR capsule Take 1 capsule (20 mg) by mouth daily 180 capsule 3     finasteride (PROSCAR) 5 MG tablet Take 1 tablet (5 mg) by mouth daily 90 tablet 2     gabapentin (NEURONTIN) 300 MG capsule Take 900 mg three times daily 810 capsule 2     tamsulosin (FLOMAX) 0.4 MG capsule TAKE 1 CAPSULE (0.4 MG) BY MOUTH DAILY AS NEEDED 90 capsule 2     pravastatin (PRAVACHOL) 20 MG tablet Take 1  tablet (20 mg) by mouth daily 90 tablet 2     amLODIPine (NORVASC) 2.5 MG tablet Take 1 tablet (2.5 mg) by mouth 2 times daily 180 tablet 3     metFORMIN (GLUCOPHAGE-XR) 500 MG 24 hr tablet Take 4 tablets (2,000 mg) by mouth daily (with breakfast) 360 tablet 1     blood glucose monitoring (ONE TOUCH ULTRASOFT) lancets Use to test blood sugar two times daily or as directed 200 each 1     ONE TOUCH ULTRA test strip Use to test blood sugars    two times a day or as  directed 200 strip 1     triamcinolone (KENALOG) 0.5 % cream Apply  topically 2 times daily. to affected area as directed PRN 15 g 0     lisinopril (PRINIVIL/ZESTRIL) 20 MG tablet Take 1 tablet (20 mg) by mouth daily 90 tablet 3     gemfibrozil (LOPID) 600 MG tablet Take 1 tablet (600 mg) by mouth 2 times daily 180 tablet 3     aspirin 81 MG tablet Take 1 tablet (81 mg) by mouth daily 30 tablet 3     acetaminophen (ACETAMIN) 500 MG tablet Take 2 tablets by mouth 3 times daily as needed.        ORDER FOR DME Uses Cpap machine for sleep apnea       Cholecalciferol (VITAMIN D) 2000 UNIT tablet Take 2,000 Units by mouth daily. 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     Bunion      CAD (coronary artery disease)     CABG 1992: LIMA to LAD, SANDI to RCA, SVG to OM1 and OM2     Contact dermatitis and other eczema, due to unspecified cause     eczema - has light treatments with Dr. Rivera     Disorders of bursae and tendons in shoulder region, unspecified      Esophageal reflux      Essential hypertension, benign      Gallbladder disease      GERD (gastroesophageal reflux disease)      Heart attack       Hyperlipidemia LDL goal <70      Left ventricular diastolic dysfunction      Low HDL (under 40) 3/28/2014     Nasal/sinus dis NEC     s/p surgery in 1995     Obesity      Osteoarthrosis, unspecified whether generalized or localized, shoulder region      Other hammer toe (acquired)      Sleep apnea     USES CPAP     Spinal stenosis, unspecified region other than cervical     MRI done 2006     Type 2 diabetes, HbA1c goal < 7% (H) 3/12/2015     Urge incontinence        PAST SURGICAL HISTORY:  Past Surgical History:   Procedure Laterality Date     C NONSPECIFIC PROCEDURE  11-92    CABG - LIMA to left anterior descending and saphenous vein bypass graft to the first and second obtuse marginal branch of the circumflex and the right mammary to the right coronary artery     C NONSPECIFIC PROCEDURE  6-94    Gail lap     C NONSPECIFIC PROCEDURE  5-92, 6-92    Angioplasty     C NONSPECIFIC PROCEDURE  1995    sinus surgery     C NONSPECIFIC PROCEDURE      bilateral cataract surgery     COLONOSCOPY N/A 4/11/2017    Procedure: COMBINED COLONOSCOPY, SINGLE OR MULTIPLE BIOPSY/POLYPECTOMY BY BIOPSY;  Surgeon: Bladimir Garner MD;  Location:  GI     ESOPHAGOSCOPY, GASTROSCOPY, DUODENOSCOPY (EGD), DILATATION, COMBINED  7/17/2014    Procedure: COMBINED ESOPHAGOSCOPY, GASTROSCOPY, DUODENOSCOPY (EGD), DILATATION;  Surgeon: Bladimir Garner MD;  Location:  GI     HC COLONOSCOPY THRU STOMA W BIOPSY/CAUTERY TUMOR/POLYP/LESION  5/2007     INJECT EPIDURAL LUMBAR       LAMINECTOMY LUMBAR TWO LEVELS N/A 4/29/2015    Procedure: LAMINECTOMY LUMBAR TWO LEVELS;  Surgeon: Bladimir Keenan MD;  Location:  OR       FAMILY HISTORY:  Family History   Problem Relation Age of Onset     CANCER Brother      MELANOMA     DIABETES Mother      AODM     DIABETES Father      AODM     Myocardial Infarction Father      CEREBROVASCULAR DISEASE Brother      Family History Negative Brother      Family History Negative Sister      Family History Negative Son  "     Family History Negative Son      Family History Negative Son      Family History Negative Daughter        SOCIAL HISTORY:  Social History     Social History     Marital status:      Spouse name: Anastasia Caballero     Number of children: 4     Years of education: 14     Occupational History     retired           Social History Main Topics     Smoking status: Former Smoker     Quit date: 3/1/1992     Smokeless tobacco: Never Used      Comment: 80 pack year history     Alcohol use 0.0 oz/week     0 Standard drinks or equivalent per week      Comment: once in a while alcohol usage      Drug use: No     Sexual activity: Not Currently     Partners: Female     Other Topics Concern      Service Yes     [employer], served in [employer]     Blood Transfusions Yes     Unsure if he had one with heart surgery     Caffeine Concern Yes     occ cofee     Occupational Exposure No     Hobby Hazards No     Sleep Concern Yes     CPAP     Stress Concern No     Special Diet No     Exercise Yes     [employer] 3 times a week, biking walking.      Bike Helmet Yes     Seat Belt Yes     Self-Exams Yes     does [employer] about once a month.     Social History Narrative        4 kids       Review of Systems:  Skin:  Negative       Eyes:  Positive for glasses    ENT:  Negative      Respiratory:  Positive for CPAP;sleep apnea;dyspnea on exertion     Cardiovascular:  Negative      Gastroenterology:        Genitourinary:  Negative      Musculoskeletal:  Positive for back pain;joint pain    Neurologic:  Negative      Psychiatric:  Negative      Heme/Lymph/Imm:  Negative      Endocrine:  Negative        Physical Exam:  Vitals: /64  Pulse 60  Ht 1.905 m (6' 3\")  Wt 124.7 kg (275 lb)  BMI 34.37 kg/m2    Constitutional:  cooperative, alert and oriented, well developed, well nourished, in no acute distress overweight      Skin:  warm and dry to the touch          Head:  normocephalic        Eyes:  sclera white        Lymph:  "     ENT:  no pallor or cyanosis        Neck:  carotid pulses are full and equal bilaterally        Respiratory:  normal breath sounds, clear to auscultation, normal A-P diameter, normal symmetry, normal respiratory excursion, no use of accessory muscles         Cardiac: regular rhythm;normal S1 and S2   S4   systolic murmur;LUSB;grade 1        pulses full and equal                                        GI:           Extremities and Muscular Skeletal:  no edema              Neurological:  affect appropriate        Psych:  Alert and Oriented x 3      Thank you for allowing me to participate in the care of your patient.    Sincerely,     CRISTIANO Garcia [facility]

## 2017-12-07 NOTE — MR AVS SNAPSHOT
After Visit Summary   12/7/2017    Austen Fowler    MRN: [id number]           Patient Information     Date Of Birth          1935        Visit Information        Provider Department      12/7/2017 10:30 AM Malou Justice APRN CNP [facility]        Today's Diagnoses     Coronary artery disease involving native coronary artery of native heart without angina pectoris    -  1    Hyperlipidemia LDL goal <70        Benign essential hypertension          Care Instructions    Thanks for coming into [facility] clinic today.    We discussed:   Call the clinic if your blood pressure is consistently greater than 140/90.     Medication changes:    NO changes    Follow up:   Dr. Moreno in 6 months with lab work and echocardiogram prior       Please call my nurse Hollie at [phone] with any questions or concerns.    Scheduling phone number: [phone]  Reminder: Please bring in all current medications, over the counter supplements and vitamin bottles to your next appointment.                  Follow-ups after your visit        Additional Services     Follow-Up with Cardiologist                 Your next 10 appointments already scheduled     Dec 19, 2017  9:40 AM CST   [facility] with Padilla Gerard PT   [facility] ([facility]  )    [address] [place] [address] [address] [place] [phone]              Future tests that were ordered for you today     Open Future Orders        Priority Expected Expires Ordered    Echocardiogram Complete Routine 6/7/2018 12/7/2018 12/7/2017    Follow-Up with Cardiologist Routine 6/7/2018 12/7/2019 12/7/2017    Basic metabolic panel Routine 6/7/2018 12/7/2018 12/7/2017    Lipid Profile Routine 6/7/2018 12/7/2018 12/7/2017    ALT Routine 6/7/2018 12/7/2018 12/7/2017            Who to contact     If you have questions or need follow up information about  "today's clinic visit or your schedule please contact [facility]   [facility] directly at [phone].  Normal or non-critical lab and imaging results will be communicated to you by MyChart, letter or phone within 4 business days after the clinic has received the results. If you do not hear from us within 7 days, please contact the clinic through Scribzhart or phone. If you have a critical or abnormal lab result, we will notify you by phone as soon as possible.  Submit refill requests through Go[employer] Zero or call your pharmacy and they will forward the refill request to us. Please allow 3 business days for your refill to be completed.          Additional Information About Your Visit        Goal Zero Information     Go[employer] Zero gives you secure access to your electronic health record. If you see a primary care provider, you can also send messages to your care team and make appointments. If you have questions, please call your primary care clinic.  If you do not have a primary care provider, please call [phone] and they will assist you.        Care EveryWhere ID     This is your Care EveryWhere ID. This could be used by other organizations to access your [place] medical records  [id number]        Your Vitals Were     Pulse Height BMI (Body Mass Index)             60 1.905 m (6' 3\") 34.37 kg/m2          Blood Pressure from Last 3 Encounters:   12/07/17 128/64   11/08/17 113/67   09/20/17 100/61    Weight from Last 3 Encounters:   12/07/17 124.7 kg (275 lb)   11/08/17 125.2 kg (276 lb)   08/23/17 127.5 kg (281 lb)              We Performed the Following     Follow-Up with Cardiologist        Primary Care Provider Office Phone # Fax #    Hamzah Hodge [phone] [address]  [facility] [address]        Equal Access to Services     CELESTE FRANCIS : Hadii evangelista jordano Sobaldo, waaxda luqadaha, qaybta kaalmada olamideyaannabel, janice davila. So wac " [phone].    ATENCIÓN: Si bere moran, tiene a hagen disposición servicios gratuitos de asistencia lingüística. Rebecca hollingsworth [phone].    We comply with applicable federal civil rights laws and [place] laws. We do not discriminate on the basis of race, color, national origin, age, disability, sex, sexual orientation, or gender identity.            Thank you!     Thank you for choosing [facility]  for your care. Our goal is always to provide you with excellent care. Hearing back from our patients is one way we can continue to improve our services. Please take a few minutes to complete the written survey that you may receive in the mail after your visit with us. Thank you!             Your Updated Medication List - Protect others around you: Learn how to safely use, store and throw away your medicines at www.disposemymeds.org.          This list is accurate as of: 12/7/17 10:41 AM.  Always use your most recent med list.                   Brand Name Dispense Instructions for use Diagnosis    ACETAMIN 500 MG tablet   Generic drug:  acetaminophen      Take 2 tablets by mouth 3 times daily as needed.        amLODIPine 2.5 MG tablet    NORVASC    180 tablet    Take 1 tablet (2.5 mg) by mouth 2 times daily    Essential hypertension, benign       aspirin 81 MG tablet     30 tablet    Take 1 tablet (81 mg) by mouth daily    CAD (coronary artery disease)       blood glucose monitoring lancets     200 each    Use to test blood sugar two times daily or as directed    Other abnormal glucose       finasteride 5 MG tablet    PROSCAR    90 tablet    Take 1 tablet (5 mg) by mouth daily    Slowing of urinary stream       gabapentin 300 MG capsule    NEURONTIN    810 capsule    Take 900 mg three times daily    Chronic pain syndrome, Sacroiliac joint pain       gemfibrozil 600 MG tablet    LOPID    180 tablet    Take 1 tablet (600 mg) by mouth 2 times daily    Hyperlipidemia LDL goal <70, Low  HDL (under 40)       lisinopril 20 MG tablet    PRINIVIL/ZESTRIL    90 tablet    Take 1 tablet (20 mg) by mouth daily    Essential hypertension, benign       METAMUCIL 1.7 GM Wafr   Generic drug:  Psyllium      TAKE AS DIRECTED        metFORMIN 500 MG 24 hr tablet    GLUCOPHAGE-XR    360 tablet    Take 4 tablets (2,000 mg) by mouth daily (with breakfast)    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)       metoprolol 50 MG 24 hr tablet    TOPROL-XL    180 tablet    TAKE 1 TABLET BY MOUTH TWO  TIMES DAILY    Essential hypertension, benign       omeprazole 20 MG CR capsule    priLOSEC    180 capsule    Take 1 capsule (20 mg) by mouth daily    Dysphagia, unspecified type       ONETOUCH ULTRA test strip   Generic drug:  blood glucose monitoring     200 strip    Use to test blood sugars    two times a day or as  directed    Other abnormal glucose       order for DME      Uses Cpap machine for sleep apnea        pravastatin 20 MG tablet    PRAVACHOL    90 tablet    Take 1 tablet (20 mg) by mouth daily    Hyperlipidemia LDL goal <70       tamsulosin 0.4 MG capsule    FLOMAX    90 capsule    TAKE 1 CAPSULE (0.4 MG) BY MOUTH DAILY AS NEEDED    S/P lumbar laminectomy       triamcinolone 0.5 % cream    KENALOG    15 g    Apply  topically 2 times daily. to affected area as directed PRN    Rash and other nonspecific skin eruption       vitamin D 2000 UNITS tablet     90 tablet    Take 2,000 Units by mouth daily.

## 2017-12-07 NOTE — PROGRESS NOTES
Cardiology Clinic Progress Note  Austen Fowler MRN# [id number]   YOB: 1935 Age: 82 year old     Reason For Visit:  six-month follow-up    Primary Cardiologist:   Dr. Moreno          History of Presenting Illness:    Austen Fowler is a pleasant 82 year old patient with a past cardiac history significant for CAD status post CABG 1991 (LIMA to LAD, SVG to OM1 and OM2, right mammary to RCA), hypertension, hyperlipidemia.  Past medical history significant for arthritis, diabetes, and obstructive sleep apnea on CPAP therapy.     Pt was last seen by Dr. Moreno 6/9/2017.  He had been doing well from a cardiac standpoint without any complaints.  Six-month follow-up was recommended.    Pt presents today for six month follow-up.  BMP today is within normal limits.  Lipid profile showing total cholesterol 101 HDL 29 LDL 38 and triglycerides 118.  These results were reviewed with him today.  His weight is down 7 pounds since he was last seen six months ago.  His blood pressure today is 128/64 and remains well-controlled.  He does not currently check blood pressures at home.  He has continued using his CPAP therapy.  He continues with chronic stable FINN which is unchanged.  Overall, he has been doing very well from a cardiac standpoint without any complaints. Patient reports no chest pain, PND, orthopnea, presyncope, syncope, edema, heart racing, or palpitations.      Current Cardiac Medications   Metoprolol XL 50 mg daily  Tamsulosin 0.4 mg p.r.n.  Pravastatin 20 mg daily  Amlodipine 2.5 mg b.i.d.  Lisinopril 20 mg daily  Gemfibrozil 600 mg b.i.d.  Aspirin 81 mg daily                     Assessment and Plan:     Plan  1.  Continue current medications   2.  Follow-up with Dr. Moreno in six months with lab work and echocardiogram prior      1. CAD    Status post CABG (LIMA to LAD, SVG to OM one and OM two, right mammary to RCA)    No angina    Continue statin, aspirin, ACE inhibitor, beta  blocker      2. Hypertension    128/64, controlled    Continue metoprolol, amlodipine, lisinopril    Consider increasing amlodipine, if needed      3. Hyperlipidemia    Last LDL 48 on 12/2017    HDL deficiency at 29    Continue pravastatin 20 mg daily and gemfibrozil 600 mg b.i.d.         Thank you for allowing me to participate in this delightful patient's care.      This note was completed in part using Dragon voice recognition software. Although reviewed after completion, some word and grammatical errors may occur.    CRISTIANO Garcia, CNP           Data:   All laboratory data reviewed

## 2017-12-19 ENCOUNTER — THERAPY VISIT (OUTPATIENT)
Dept: PHYSICAL THERAPY | Facility: CLINIC | Age: 82
End: 2017-12-19
Payer: COMMERCIAL

## 2017-12-19 DIAGNOSIS — M54.2 NECK PAIN: ICD-10-CM

## 2017-12-19 PROCEDURE — 97140 MANUAL THERAPY 1/> REGIONS: CPT | Mod: GP | Performed by: PHYSICAL THERAPIST

## 2017-12-19 PROCEDURE — 97112 NEUROMUSCULAR REEDUCATION: CPT | Mod: GP | Performed by: PHYSICAL THERAPIST

## 2017-12-19 PROCEDURE — 97110 THERAPEUTIC EXERCISES: CPT | Mod: GP | Performed by: PHYSICAL THERAPIST

## 2018-01-03 ENCOUNTER — THERAPY VISIT (OUTPATIENT)
Dept: PHYSICAL THERAPY | Facility: CLINIC | Age: 83
End: 2018-01-03
Payer: COMMERCIAL

## 2018-01-03 DIAGNOSIS — M54.2 NECK PAIN: ICD-10-CM

## 2018-01-03 PROCEDURE — 97112 NEUROMUSCULAR REEDUCATION: CPT | Mod: GP | Performed by: PHYSICAL THERAPIST

## 2018-01-03 PROCEDURE — 97140 MANUAL THERAPY 1/> REGIONS: CPT | Mod: GP | Performed by: PHYSICAL THERAPIST

## 2018-01-03 PROCEDURE — 97110 THERAPEUTIC EXERCISES: CPT | Mod: GP | Performed by: PHYSICAL THERAPIST

## 2018-01-09 ENCOUNTER — MYC MEDICAL ADVICE (OUTPATIENT)
Dept: FAMILY MEDICINE | Facility: CLINIC | Age: 83
End: 2018-01-09

## 2018-01-09 DIAGNOSIS — Z20.828 EXPOSURE TO THE FLU: Primary | ICD-10-CM

## 2018-01-10 ENCOUNTER — THERAPY VISIT (OUTPATIENT)
Dept: PHYSICAL THERAPY | Facility: CLINIC | Age: 83
End: 2018-01-10
Payer: COMMERCIAL

## 2018-01-10 DIAGNOSIS — M54.2 NECK PAIN: ICD-10-CM

## 2018-01-10 PROCEDURE — 97110 THERAPEUTIC EXERCISES: CPT | Mod: GP | Performed by: PHYSICAL THERAPIST

## 2018-01-10 PROCEDURE — G8981 BODY POS CURRENT STATUS: HCPCS | Mod: GP | Performed by: PHYSICAL THERAPIST

## 2018-01-10 PROCEDURE — G8982 BODY POS GOAL STATUS: HCPCS | Mod: GP | Performed by: PHYSICAL THERAPIST

## 2018-01-10 PROCEDURE — 97140 MANUAL THERAPY 1/> REGIONS: CPT | Mod: GP | Performed by: PHYSICAL THERAPIST

## 2018-01-10 RX ORDER — OSELTAMIVIR PHOSPHATE 75 MG/1
75 CAPSULE ORAL DAILY
Qty: 10 CAPSULE | Refills: 0 | Status: SHIPPED | OUTPATIENT
Start: 2018-01-10 | End: 2018-03-07

## 2018-01-10 NOTE — PROGRESS NOTES
"Progress Note    Progress reporting period is from initial eval to Mani 10, 2018.     Patient seen for Rxs Used: 10 visits.    SUBJECTIVE  Subjective changes noted by patient:  Subjective: Overall Austen reports he feels close to 100%. He has been able to rotate head either direction much more freely (in terms of ROM) as well as with decreased pain while doing so. He has been keeping [facility] Only occasional pain in \"nerve channel\" along L side of neck to superior scapula, and occasional tingling near occiput on L. .  Current pain level is Current Pain level: 1/10.     Previous pain level was  Initial Pain level: 8/10 (at worst).   Changes in function:  Yes (See Goal flowsheet attached for changes in current functional level)  Adverse reaction to treatment or activity: None    OBJECTIVE  Changes noted in objective findings: Objective: Flexion 50deg and pulls, no pain. Extension 45deg, no pain. L rotation 75, no pain, R rotation 75 and pulls on L. Progressing HEP for upper back/scapular strength. .    ASSESSMENT/PLAN  Diagnosis: Mechanical neck pain   DIAGP:  The encounter diagnosis was Neck pain.  Updated problem list and treatment plan:   Diagnosis 1:  Mechanical neck pain     Pain -  hot/cold therapy, US, electric stimulation, mechanical traction, manual therapy, splint/taping/bracing/orthotics, self management, education, directional preference exercise and home program  Decreased ROM/flexibility - manual therapy, therapeutic exercise and home program  Decreased joint mobility - manual therapy and therapeutic exercise  Decreased strength - therapeutic exercise, therapeutic activities and home program    STG/LTGs have been met or progress has been made towards goals:  Yes, please see goal flowsheet for most current information.    Assessment of Progress: The patient's condition is improving.  The patient's condition has potential to improve.  Self Management Plans:  Patient is " independent in a home treatment program.  I have re-evaluated this patient and find that the nature, scope, duration and intensity of the therapy is appropriate for the medical condition of the patient.  Austen continues to require the following intervention to meet STG and LTG's:  PT.    Recommendations:  This patient would benefit from continued therapy.     Frequency:  1 X week, once daily  Duration:  for 3 weeks      Padilla Gerard, PT, DPT, OCS      Please refer to the daily flowsheet for treatment today, total treatment time and time spent performing 1:1 timed codes.

## 2018-01-10 NOTE — TELEPHONE ENCOUNTER
Dr Hodge,  Please see below MyChart message and advise.  Thanks,  Christiane WATTS RN      Faxed tamiflu

## 2018-01-17 ENCOUNTER — THERAPY VISIT (OUTPATIENT)
Dept: PHYSICAL THERAPY | Facility: CLINIC | Age: 83
End: 2018-01-17
Payer: COMMERCIAL

## 2018-01-17 DIAGNOSIS — M54.2 NECK PAIN: ICD-10-CM

## 2018-01-17 PROCEDURE — 97140 MANUAL THERAPY 1/> REGIONS: CPT | Mod: GP | Performed by: PHYSICAL THERAPIST

## 2018-01-17 PROCEDURE — 97110 THERAPEUTIC EXERCISES: CPT | Mod: GP | Performed by: PHYSICAL THERAPIST

## 2018-01-17 PROCEDURE — 97112 NEUROMUSCULAR REEDUCATION: CPT | Mod: GP | Performed by: PHYSICAL THERAPIST

## 2018-02-05 ENCOUNTER — MYC REFILL (OUTPATIENT)
Dept: FAMILY MEDICINE | Facility: CLINIC | Age: 83
End: 2018-02-05

## 2018-02-05 DIAGNOSIS — E78.6 LOW HDL (UNDER 40): ICD-10-CM

## 2018-02-05 DIAGNOSIS — E78.5 HYPERLIPIDEMIA LDL GOAL <70: ICD-10-CM

## 2018-02-05 NOTE — TELEPHONE ENCOUNTER
Message from [employer]:  Original authorizing provider: MD Austen Andrade would like a refill of the following medications:  gemfibrozil (LOPID) 600 MG tablet [Hamzah Hodge MD]    Preferred pharmacy: [facility] - [address]    Comment:  Checked [facility] and I need to renew this prescription. Austen

## 2018-02-05 NOTE — TELEPHONE ENCOUNTER
"Last Written Prescription Date:  1/11/2017  Last Fill Quantity: 180,  # refills: 3   Last Office Visit with [facility] provider:  2/27/2017   Future Office Visit:       Requested Prescriptions   Pending Prescriptions Disp Refills     gemfibrozil (LOPID) 600 MG tablet 180 tablet 3     Sig: Take 1 tablet (600 mg) by mouth 2 times daily    Fibrates Failed    2/5/2018  4:34 PM       Failed - Lipid panel on file in past 12 months    Recent Labs   Lab Test  12/07/17   0922   11/12/15   0807   CHOL  101   < >  102   TRIG  118   < >  144   HDL  29*   < >  28*   LDL  48   < >  45*   VLDL   --    --   29   CHOLHDLRATIO   --    --   3.6    < > = values in this interval not displayed.              Passed - No abnormal creatine kinase in past 12 months    No lab results found.         Passed - Recent or future visit with authorizing provider    Patient had office visit in the last year or has a visit in the next 30 days with authorizing provider.  See \"Patient Info\" tab in inbasket, or \"Choose Columns\" in Meds & Orders section of the refill encounter.            Passed - Patient is age 18 or older          "

## 2018-02-06 RX ORDER — GEMFIBROZIL 600 MG/1
600 TABLET, FILM COATED ORAL 2 TIMES DAILY
Qty: 180 TABLET | Refills: 2 | Status: SHIPPED | OUTPATIENT
Start: 2018-02-06 | End: 2018-11-14

## 2018-03-07 ENCOUNTER — OFFICE VISIT (OUTPATIENT)
Dept: PHARMACY | Facility: CLINIC | Age: 83
End: 2018-03-07

## 2018-03-07 VITALS — DIASTOLIC BLOOD PRESSURE: 52 MMHG | BODY MASS INDEX: 34.66 KG/M2 | SYSTOLIC BLOOD PRESSURE: 125 MMHG | WEIGHT: 277.3 LBS

## 2018-03-07 DIAGNOSIS — R21 RASH AND OTHER NONSPECIFIC SKIN ERUPTION: ICD-10-CM

## 2018-03-07 DIAGNOSIS — I25.10 CORONARY ARTERY DISEASE INVOLVING NATIVE CORONARY ARTERY OF NATIVE HEART WITHOUT ANGINA PECTORIS: ICD-10-CM

## 2018-03-07 DIAGNOSIS — E63.9 NUTRITIONAL DISORDER: ICD-10-CM

## 2018-03-07 DIAGNOSIS — K21.9 GASTROESOPHAGEAL REFLUX DISEASE, ESOPHAGITIS PRESENCE NOT SPECIFIED: ICD-10-CM

## 2018-03-07 DIAGNOSIS — I10 ESSENTIAL HYPERTENSION, BENIGN: ICD-10-CM

## 2018-03-07 DIAGNOSIS — E78.5 HYPERLIPIDEMIA LDL GOAL <70: ICD-10-CM

## 2018-03-07 DIAGNOSIS — R39.198 SLOWING OF URINARY STREAM: ICD-10-CM

## 2018-03-07 DIAGNOSIS — G89.29 CHRONIC LOW BACK PAIN WITH BILATERAL SCIATICA, UNSPECIFIED BACK PAIN LATERALITY: ICD-10-CM

## 2018-03-07 DIAGNOSIS — K59.00 CONSTIPATION, UNSPECIFIED CONSTIPATION TYPE: ICD-10-CM

## 2018-03-07 DIAGNOSIS — M54.41 CHRONIC LOW BACK PAIN WITH BILATERAL SCIATICA, UNSPECIFIED BACK PAIN LATERALITY: ICD-10-CM

## 2018-03-07 DIAGNOSIS — E11.59 TYPE 2 DIABETES MELLITUS WITH VASCULAR DISEASE (H): Primary | ICD-10-CM

## 2018-03-07 DIAGNOSIS — M54.42 CHRONIC LOW BACK PAIN WITH BILATERAL SCIATICA, UNSPECIFIED BACK PAIN LATERALITY: ICD-10-CM

## 2018-03-07 PROCEDURE — 99607 MTMS BY PHARM ADDL 15 MIN: CPT | Performed by: PHARMACIST

## 2018-03-07 PROCEDURE — 99605 MTMS BY PHARM NP 15 MIN: CPT | Performed by: PHARMACIST

## 2018-03-07 RX ORDER — TAMSULOSIN HYDROCHLORIDE 0.4 MG/1
0.4 CAPSULE ORAL DAILY
COMMUNITY
End: 2018-08-28

## 2018-03-07 RX ORDER — CALCIUM CARBONATE 500 MG/1
1 TABLET, CHEWABLE ORAL 3 TIMES DAILY PRN
COMMUNITY
End: 2019-10-31

## 2018-03-07 NOTE — PROGRESS NOTES
SUBJECTIVE/OBJECTIVE:                Austen Fowler is a 82 year old male coming in for a follow-up visit for Medication Therapy Management.  He was referred to me from Dr. Hodge.     Chief Complaint: Follow up from visit on 6/19/17 with Kyle Joel, PharmD. No specific concerns today.    Tobacco: History of tobacco dependence - quit 1992  Alcohol: Less than 1 beverages / week    Medication Adherence/Access: no issues reported    Diabetes:  Pt currently taking metformin XR 2000 mg daily.    SMBG: Once daily (AM fasting).   Ranges (patient reported): pretty consistent lately, usually 135-145 range, 115 lowest and 160 highest.  Symptoms of low blood sugar? none. Frequency of hypoglycemia? never.  Recent symptoms of high blood sugar? none  Eye exam: up to date  Foot exam: up to date  Microalbumin is < 30 mg/g. Pt is taking an ACEi/ARB.  Aspirin: Taking 81mg daily and denies side effects, has some bruising but this is normal for him.   Diet/Exercise:  No changes since last visit. Tries to go to the [facility] 3 days/week but it's been hard to do this with the weather.     Bilateral low back pain with sciatica:  Current medications include gabapentin 300 mg 3 capsules three times daily - currently taking 3 capsules twice daily. He also receives steroid injections in his spine, although he has been putting this off recently. Also takes 2 APAP twice daily. He reports that pain has been quieter lately, sometimes flares depending on movement. Pt denies any side effects and feels current regimen is working well for him.    Hyperlipidemia: Current therapy includes pravastatin 20 mg once daily and gemfibrozil 600 mg twice daily.  Pt reports no significant myalgias or other side effects.      Hypertension/CAD: Current medications include lisinopril 20 mg daily, amlodipine 2.5 mg twice daily, and metoprolol XL 50 mg twice daily. Patient does not self-monitor BP.  Pt reports occasional dizziness or lightheadedness, although it is  not bothersome and he attributes it to the recent snow storms. He denies any chest pains. Last ECHO on 6/6/17 estimated EF to be 55-60%. Pt follows with cardiology.    BPH:   Current medications include tamsulosin 0.4 mg daily and finasteride 5 mg daily.  He feels current therapy is effective and reports no side effects.    GERD: Current medications include: Prilosec (omeprazole) 20 mg once daily. Patient feels that current regimen is effective. He tried stopping therapy once and got rebound symptoms. He also uses Tums occasionally which is helpful.     Supplements:  Current medications include Vitamin D 2000 IU daily. Pt reports no side effects.    Constipation: Pt continues taking Metamucil as needed, but hasn't used this recently. He reports no side effects and it is effective when he needs it. Pt reports he has had some occasional constipation lately, but it is not bothersome and he attributes it to lack of exercise.     Dermatitis:  Current medications include triamcinoline cream.  He finds it is effective for periodic rashes and bug bites and denies side effects.     Current labs include:  Today's Vitals: /52  Wt 277 lb 4.8 oz (125.8 kg)  BMI 34.66 kg/m2     BP Readings from Last 3 Encounters:   12/07/17 128/64   11/08/17 113/67   09/20/17 100/61     Lab Results   Component Value Date    A1C 6.2 08/23/2017   .  Lab Results   Component Value Date    CHOL 101 12/07/2017     Lab Results   Component Value Date    TRIG 118 12/07/2017     Lab Results   Component Value Date    HDL 29 12/07/2017     Lab Results   Component Value Date    LDL 48 12/07/2017       Liver Function Studies -   Recent Labs   Lab Test  12/07/17   0922   09/20/16   1658   PROTTOTAL   --    --   7.4   ALBUMIN   --    --   3.7   BILITOTAL   --    --   0.4   ALKPHOS   --    --   82   AST   --    --   24   ALT  <5*   < >  22    < > = values in this interval not displayed.       Lab Results   Component Value Date    UCRR 120 08/23/2017     MICROL 6 08/23/2017    UMALCR 4.82 08/23/2017       Last Basic Metabolic Panel:  Lab Results   Component Value Date     12/07/2017      Lab Results   Component Value Date    POTASSIUM 4.1 12/07/2017     Lab Results   Component Value Date    CHLORIDE 105 12/07/2017     Lab Results   Component Value Date    BUN 19 12/07/2017     Lab Results   Component Value Date    CR 0.74 12/07/2017     GFR Estimate   Date Value Ref Range Status   12/07/2017 >90 >60 mL/min/1.7m2 Final   06/06/2017 >90 >60 mL/min/1.7m2 Final   02/23/2017 >90  Non  GFR Calc   >60 mL/min/1.7m2 Final     TSH   Date Value Ref Range Status   04/27/2015 0.83 0.40 - 4.00 mU/L Final   ]    Most Recent Immunizations   Administered Date(s) Administered     Influenza (High Dose) 3 valent vaccine 10/25/2017     Influenza (IIV3) PF 10/02/2013     Influenza Vaccine IM 3yrs+ 4 Valent IIV4 10/11/2014     Pneumo Conj 13-V (2010&after) 11/25/2014     Pneumococcal 23 valent 02/05/2007     TD (ADULT, 7+) 11/21/2000     TDAP Vaccine (Adacel) 03/11/2013     Zoster vaccine, live 03/10/2008       ASSESSMENT:              Current medications were reviewed today as discussed above.      Medication Adherence: no issues identified    Diabetes: Stable. Patient is meeting A1c goal of < 8%. Self monitoring of blood glucose is at goal of fasting  mg/dL. Pt is due to recheck A1c and thyroid labs for health maintenance - he has appt with Dr. Hodge on 4/13/18 and would like to have his labs checked just prior to that.     Bilateral low back pain with sciatica:  Stable.     Hyperlipidemia: Stable. Pt is not on high intensity statin which is indicated based on 2013 ACC/AHA guidelines for lipid management. Further increase in statin dose not warranted as LDL is well controlled (close to or less than 40 mg/dL).     Hypertension/CAD: Stable. Patient is meeting BP goal of < 140/90mmHg.     BPH:  Stable.     GERD: Stable.     Supplements: Stable.    Constipation:  Stable.     Dermatitis: Stable.      PLAN:                  1. No medication changes today.  2. I will ask Dr. Hodge about placing future labs (A1c, thyroid).    I spent 30 minutes with this patient today. I offer these suggestions for consideration by the PCP. A copy of the visit note was provided to the patient's primary care provider.     Will follow up pending lab results.    The patient was given a summary of these recommendations as an after visit summary.    Kaylen Mckeon PharmD  Pharmaceutical Care Resident  Pager: [phone]    The patient was seen independently by Dr. Mckeon.  I have read the note and agree with the assessment and plan.  Marielos Morocho PharmD, [facility]  Medication Therapy Management Provider  Pager: [phone]

## 2018-03-07 NOTE — PATIENT INSTRUCTIONS
Recommendations from today's MTM visit:                                                    MTM (medication therapy management) is a service provided by a clinical pharmacist designed to help you get the most of out of your medicines.   Today we reviewed what your medicines are for, how to know if they are working, that your medicines are safe and how to make your medicine regimen as easy as possible.     1. No medication changes today.    Next MTM visit: 6 months    To schedule another MTM appointment, please call the clinic directly or you may call the MTM scheduling line at [phone] or toll-free at [phone].     My Clinical Pharmacist's contact information:                                                      It was a pleasure seeing you today!  Please feel free to contact me with any questions or concerns you have.      Kaylen Mckeon, PharmD  Pharmaceutical Care Resident  Pager: [phone]

## 2018-03-07 NOTE — MR AVS SNAPSHOT
After Visit Summary   3/7/2018    Austen Fowler    MRN: [id number]           Patient Information     Date Of Birth          1935        Visit Information        Provider Department      3/7/2018 11:30 AM Marielos Morocho, [facility] [place]        Care Instructions    Recommendations from today's MTM visit:                                                    MTM (medication therapy management) is a service provided by a clinical pharmacist designed to help you get the most of out of your medicines.   Today we reviewed what your medicines are for, how to know if they are working, that your medicines are safe and how to make your medicine regimen as easy as possible.     1. No medication changes today.    Next MTM visit: 6 months    To schedule another MTM appointment, please call the clinic directly or you may call the MTM scheduling line at [phone] or toll-free at [phone].     My Clinical Pharmacist's contact information:                                                      It was a pleasure seeing you today!  Please feel free to contact me with any questions or concerns you have.      Kaylen Mckeon, PharmD  Pharmaceutical Care Resident  Pager: [phone]          Follow-ups after your visit        Your next 10 appointments already scheduled     Apr 13, 2018  9:30 AM CDT   PHYSICAL with Hamzah Hodge MD   [facility]    [address] [address] [phone]              Who to contact     If you have questions or need follow up information about today's clinic visit or your schedule please contact [facility] directly at [phone].  Normal or non-critical lab and imaging results will be communicated to you by MyChart, letter or phone within 4 business days after the clinic has received the results. If you do not hear from us within 7 days, please contact the clinic through MyChart or phone.  If you have a critical or abnormal lab result, we will notify you by phone as soon as possible.  Submit refill requests through [employer] or call your pharmacy and they will forward the refill request to us. Please allow 3 business days for your refill to be completed.          Additional Information About Your Visit        [employer] Information     [employer] gives you secure access to your electronic health record. If you see a primary care provider, you can also send messages to your care team and make appointments. If you have questions, please call your primary care clinic.  If you do not have a primary care provider, please call [phone] and they will assist you.        Care EveryWhere ID     This is your Care EveryWhere ID. This could be used by other organizations to access your [place] medical records  [id number]         Blood Pressure from Last 3 Encounters:   12/07/17 128/64   11/08/17 113/67   09/20/17 100/61    Weight from Last 3 Encounters:   12/07/17 275 lb (124.7 kg)   11/08/17 276 lb (125.2 kg)   08/23/17 281 lb (127.5 kg)              Today, you had the following     No orders found for display       Primary Care Provider Office Phone # Fax #    Bhavperfecto Hodge [phone] [address] [address]        Equal Access to Services     [facility]: Hadii aad ku hadasho Soomaali, waaxda luqadaha, qaybta kaalmada adeegyada, janice alatorre hayrebecca fields . So [facility] [phone].    ATENCIÓN: Si habla español, tiene a hagen disposición servicios gratuitos de asistencia lingüística. Llame al [phone].    We comply with applicable federal civil rights laws and [place] laws. We do not discriminate on the basis of race, color, national origin, age, disability, sex, sexual orientation, or gender identity.            Thank you!     Thank you for choosing [facility]  for your care. Our goal is always to provide you with excellent care.  Hearing back from our patients is one way we can continue to improve our services. Please take a few minutes to complete the written survey that you may receive in the mail after your visit with us. Thank you!             Your Updated Medication List - Protect others around you: Learn how to safely use, store and throw away your medicines at www.disposemymeds.org.          This list is accurate as of 3/7/18 12:01 PM.  Always use your most recent med list.                   Brand Name Dispense Instructions for use Diagnosis    ACETAMIN 500 MG tablet   Generic drug:  acetaminophen      Take 2 tablets by mouth 3 times daily as needed.        amLODIPine 2.5 MG tablet    NORVASC    180 tablet    Take 1 tablet (2.5 mg) by mouth 2 times daily    Essential hypertension, benign       aspirin 81 MG tablet     30 tablet    Take 1 tablet (81 mg) by mouth daily    CAD (coronary artery disease)       blood glucose monitoring lancets     200 each    Use to test blood sugar two times daily or as directed    Other abnormal glucose       calcium carbonate 500 MG chewable tablet    TUMS     Take 1 chew tab by mouth as needed for heartburn        finasteride 5 MG tablet    PROSCAR    90 tablet    Take 1 tablet (5 mg) by mouth daily    Slowing of urinary stream       gabapentin 300 MG capsule    NEURONTIN    810 capsule    Take 900 mg three times daily    Chronic pain syndrome, Sacroiliac joint pain       gemfibrozil 600 MG tablet    LOPID    180 tablet    Take 1 tablet (600 mg) by mouth 2 times daily    Hyperlipidemia LDL goal <70, Low HDL (under 40)       lisinopril 20 MG tablet    PRINIVIL/ZESTRIL    90 tablet    Take 1 tablet (20 mg) by mouth daily    Essential hypertension, benign       METAMUCIL 1.7 GM Wafr   Generic drug:  Psyllium      TAKE AS DIRECTED        metFORMIN 500 MG 24 hr tablet    GLUCOPHAGE-XR    360 tablet    Take 4 tablets (2,000 mg) by mouth daily (with breakfast)    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  (H)       metoprolol succinate 50 MG 24 hr tablet    TOPROL-XL    180 tablet    TAKE 1 TABLET BY MOUTH TWO  TIMES DAILY    Essential hypertension, benign       omeprazole 20 MG CR capsule    priLOSEC    180 capsule    Take 1 capsule (20 mg) by mouth daily    Dysphagia, unspecified type       ONETOUCH ULTRA test strip   Generic drug:  blood glucose monitoring     200 strip    Use to test blood sugars    two times a day or as  directed    Other abnormal glucose       order for DME      Uses Cpap machine for sleep apnea        pravastatin 20 MG tablet    PRAVACHOL    90 tablet    Take 1 tablet (20 mg) by mouth daily    Hyperlipidemia LDL goal <70       tamsulosin 0.4 MG capsule    FLOMAX    90 capsule    TAKE 1 CAPSULE (0.4 MG) BY MOUTH DAILY AS NEEDED    S/P lumbar laminectomy       triamcinolone 0.5 % cream    KENALOG    15 g    Apply  topically 2 times daily. to affected area as directed PRN    Rash and other nonspecific skin eruption       vitamin D 2000 UNITS tablet     90 tablet    Take 2,000 Units by mouth daily.

## 2018-03-19 NOTE — PROGRESS NOTES
Addendum 3/19/18  Placed future lab orders for A1c and TSH reflex T4 per Dr. Hodge's approval.    Kaylen Mckeon, PharmD  Pharmaceutical Care Resident  Pager: [phone]

## 2018-04-09 DIAGNOSIS — E11.59 TYPE 2 DIABETES MELLITUS WITH VASCULAR DISEASE (H): ICD-10-CM

## 2018-04-09 LAB
HBA1C MFR BLD: 6 % (ref 0–6.4)
TSH SERPL DL<=0.005 MIU/L-ACNC: 1.14 MU/L (ref 0.4–4)

## 2018-04-09 PROCEDURE — 84443 ASSAY THYROID STIM HORMONE: CPT | Performed by: INTERNAL MEDICINE

## 2018-04-09 PROCEDURE — 83036 HEMOGLOBIN GLYCOSYLATED A1C: CPT | Performed by: INTERNAL MEDICINE

## 2018-04-09 PROCEDURE — 36415 COLL VENOUS BLD VENIPUNCTURE: CPT | Performed by: INTERNAL MEDICINE

## 2018-04-09 ASSESSMENT — ACTIVITIES OF DAILY LIVING (ADL)
CURRENT_FUNCTION: NO ASSISTANCE NEEDED
I_NEED_ASSISTANCE_FOR_THE_FOLLOWING_DAILY_ACTIVITIES:: NO ASSISTANCE IS NEEDED

## 2018-04-12 ASSESSMENT — ACTIVITIES OF DAILY LIVING (ADL): CURRENT_FUNCTION: NO ASSISTANCE NEEDED

## 2018-04-12 NOTE — PROGRESS NOTES
SUBJECTIVE:   Austen Fowler is a 83 year old male who presents for Preventive Visit.  Patient is doing well  Back pain is off and on flared up at SI joint  Blood glucose and blood pressure checked at home are in good range  No hypoglycemia  No edema  No chest pain   Neck pain at times  Prostate issues are stable'    Physical   Annual:     Getting at least 3 servings of Calcium per day::  Yes    Bi-annual eye exam::  Yes    Dental care twice a year::  Yes    Sleep apnea or symptoms of sleep apnea::  Sleep apnea    Diet::  Regular (no restrictions)    Frequency of exercise::  2-3 days/week    Duration of exercise::  15-30 minutes    Taking medications regularly::  Yes    Medication side effects::  None    Additional concerns today::  YES    Ability to successfully perform activities of daily living: no assistance needed  Home Safety:  Lack of grab bars in the bathroom  Hearing Impairment: difficulty following a conversation in a noisy restaurant or crowded room, feel that people are mumbling or not speaking clearly, find that men's voices are easier to understand than woman's and difficulty understanding speech on the telephone      Fall risk:  Fallen 2 or more times in the past year?: No  Any fall with injury in the past year?: No  click delete button to remove this line now  COGNITIVE SCREEN  1) Repeat 3 items (Banana, Sunrise, Chair)    2) Clock draw: NORMAL  3) 3 item recall: Recalls 3 objects  Results: 3 items recalled: COGNITIVE IMPAIRMENT LESS LIKELY    Mini-CogTM Copyright S Olayinka. Licensed by the author for use in [facility]; reprinted with permission (jesus@.[facility]). All rights reserved.        Reviewed and updated as needed this visit by clinical staff  Tobacco  Allergies         Reviewed and updated as needed this visit by Provider        Social History   Substance Use Topics     Smoking status: Former Smoker     Quit date: 3/1/1992     Smokeless tobacco: Never Used      Comment: 80 pack  year history     Alcohol use 0.0 oz/week     0 Standard drinks or equivalent per week      Comment: once in a while alcohol usage        Alcohol Use 4/9/2018   If you drink alcohol do you typically have greater than 3 drinks per day OR greater than 7 drinks per week? No   No flowsheet data found.        Orders Only on 04/09/2018   Component Date Value Ref Range Status     TSH 04/09/2018 1.14  0.40 - 4.00 mU/L Final     Hemoglobin A1C 04/09/2018 6.0  0 - 6.4 % Final    Comment: Normal <5.7% Prediabetes 5.7-6.4%  Diabetes 6.5% or higher - adopted from ADA   consensus guidelines.           Today's PHQ-2 Score:   PHQ-2 ( 1999 [employer]) 4/13/2018   Q1: Little interest or pleasure in doing things 0   Q2: Feeling down, depressed or hopeless 1   PHQ-2 Score 1   Q1: Little interest or pleasure in doing things -   Q2: Feeling down, depressed or hopeless -   PHQ-2 Score -       Do you feel safe in your environment - Yes    Do you have a Health Care Directive?: Yes: Advance Directive has been received and scanned.    Current providers sharing in care for this patient include:   Patient Care Team:  Hamzah Hodge MD as PCP - General    The following health maintenance items are reviewed in Epic and correct as of today:  Health Maintenance   Topic Date Due     ADVANCE DIRECTIVE PLANNING Q5 YRS  05/22/2017     FOOT EXAM Q1 YEAR  02/27/2018     MEDICARE ANNUAL WELLNESS VISIT  02/27/2018     PSA Q3 YR  03/12/2018     EYE EXAM Q1 YEAR  05/24/2018     FALL RISK ASSESSMENT  08/23/2018     MICROALBUMIN Q1 YEAR  08/23/2018     MARLA QUESTIONNAIRE 1 YEAR  08/23/2018     INFLUENZA VACCINE (SYSTEM ASSIGNED)  09/01/2018     A1C Q6 MO  10/09/2018     PHQ-9 Q1YR  11/08/2018     CREATININE Q1 YEAR  12/07/2018     LIPID MONITORING Q1 YEAR  12/07/2018     TSH W/ FREE T4 REFLEX Q2 YEAR  04/09/2020     COLONOSCOPY Q5 YR  04/11/2022     TETANUS Q10 YR  03/11/2023     PNEUMO 5YR BOOST DUE AFTER AGE 65 (NO IB MSG)  Addressed     PNEUMOCOCCAL  Completed      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)     S/P lumbar laminectomy     Health Care Home     Coronary artery disease involving native coronary artery of native heart without angina pectoris     Left ventricular diastolic dysfunction     Ascending aorta dilatation (H)     Aortic root dilatation (H)     Non morbid obesity, unspecified obesity type     Neck pain     Past Surgical History:   Procedure Laterality Date     C NONSPECIFIC PROCEDURE  11-92    CABG - LIMA to left anterior descending and saphenous vein bypass graft to the first and second obtuse marginal branch of the circumflex and the right mammary to the right coronary artery     C NONSPECIFIC PROCEDURE  6-94    Gail lap     C NONSPECIFIC PROCEDURE  5-92, 6-92    Angioplasty     C NONSPECIFIC PROCEDURE  1995    sinus surgery     C NONSPECIFIC PROCEDURE      bilateral cataract surgery     COLONOSCOPY N/A 4/11/2017    Procedure: COMBINED COLONOSCOPY, SINGLE OR MULTIPLE BIOPSY/POLYPECTOMY BY BIOPSY;  Surgeon: Bladimir Garner MD;  Location:  GI     ESOPHAGOSCOPY, GASTROSCOPY, DUODENOSCOPY (EGD), DILATATION, COMBINED  7/17/2014    Procedure: COMBINED ESOPHAGOSCOPY, GASTROSCOPY, DUODENOSCOPY (EGD), DILATATION;  Surgeon: Bladimir Garner MD;  Location:  GI     HC COLONOSCOPY THRU STOMA W BIOPSY/CAUTERY TUMOR/POLYP/LESION  5/2007     INJECT EPIDURAL LUMBAR        LAMINECTOMY LUMBAR TWO LEVELS N/A 4/29/2015    Procedure: LAMINECTOMY LUMBAR TWO LEVELS;  Surgeon: Bladimir Keenan MD;  Location:  OR       Social History   Substance Use Topics     Smoking status: Former Smoker     Quit date: 3/1/1992     Smokeless tobacco: Never Used      Comment: 80 pack year history     Alcohol use 0.0 oz/week     0 Standard drinks or equivalent per week      Comment: once in a while alcohol usage      Family History   Problem Relation Age of Onset     CANCER Brother      MELANOMA     DIABETES Mother      AODM     DIABETES Father      AODM     Myocardial Infarction Father      CEREBROVASCULAR DISEASE Brother      Family History Negative Brother      Family History Negative Sister      Family History Negative Son      Family History Negative Son      Family History Negative Son      Family History Negative Daughter          Current Outpatient Prescriptions   Medication Sig Dispense Refill     lisinopril (PRINIVIL/ZESTRIL) 20 MG tablet Take 1 tablet (20 mg) by mouth daily 90 tablet 3     metFORMIN (GLUCOPHAGE-XR) 500 MG 24 hr tablet Take 4 tablets (2,000 mg) by mouth daily (with breakfast) 360 tablet 1     calcium carbonate (TUMS) 500 MG chewable tablet Take 1 chew tab by mouth as needed        tamsulosin (FLOMAX) 0.4 MG capsule Take 0.4 mg by mouth daily       gemfibrozil (LOPID) 600 MG tablet Take 1 tablet (600 mg) by mouth 2 times daily 180 tablet 2     metoprolol (TOPROL-XL) 50 MG 24 hr tablet TAKE 1 TABLET BY MOUTH TWO  TIMES DAILY 180 tablet 1     omeprazole (PRILOSEC) 20 MG CR capsule Take 1 capsule (20 mg) by mouth daily 180 capsule 3     finasteride (PROSCAR) 5 MG tablet Take 1 tablet (5 mg) by mouth daily 90 tablet 2     gabapentin (NEURONTIN) 300 MG capsule Take 900 mg three times daily (Patient taking differently: 2 times daily Take 900 mg three times daily) 810 capsule 2     pravastatin (PRAVACHOL) 20 MG tablet Take 1 tablet (20 mg) by mouth daily 90 tablet 2     amLODIPine  "(NORVASC) 2.5 MG tablet Take 1 tablet (2.5 mg) by mouth 2 times daily 180 tablet 3     blood glucose monitoring (ONE TOUCH ULTRASOFT) lancets Use to test blood sugar two times daily or as directed 200 each 1     ONE TOUCH ULTRA test strip Use to test blood sugars    two times a day or as  directed 200 strip 1     triamcinolone (KENALOG) 0.5 % cream Apply  topically 2 times daily. to affected area as directed PRN 15 g 0     aspirin 81 MG tablet Take 1 tablet (81 mg) by mouth daily 30 tablet 3     acetaminophen (ACETAMIN) 500 MG tablet Take 2 tablets by mouth 2 times daily as needed        ORDER FOR DME Uses Cpap machine for sleep apnea       Cholecalciferol (VITAMIN D) 2000 UNIT tablet Take 2,000 Units by mouth daily. 90 tablet 3     METAMUCIL 1.7 GM OR WAFR TAKE AS DIRECTED             Review of Systems  Constitutional, HEENT, cardiovascular, pulmonary, GI, , musculoskeletal, neuro, skin, endocrine and psych systems are negative, except as otherwise noted.    OBJECTIVE:   /76 (BP Location: Left arm, Cuff Size: Adult Large)  Pulse 56  Temp 97.4  F (36.3  C) (Oral)  Ht 6' 3\" (1.905 m)  Wt 275 lb (124.7 kg)  SpO2 98%  BMI 34.37 kg/m2 Estimated body mass index is 34.37 kg/(m^2) as calculated from the following:    Height as of this encounter: 6' 3\" (1.905 m).    Weight as of this encounter: 275 lb (124.7 kg).  Physical Exam  GENERAL: healthy, alert and no distress  EYES: Eyes grossly normal to inspection, PERRL and conjunctivae and sclerae normal  HENT: ear canals and TM's normal, nose and mouth without ulcers or lesions  NECK: no adenopathy, no asymmetry, masses, or scars and thyroid normal to palpation  RESP: lungs clear to auscultation - no rales, rhonchi or wheezes  CV: CABG scar   regular rate and rhythm, normal S1 S2, no S3 or S4, no murmur, click or rub, no peripheral edema and peripheral pulses strong  ABDOMEN: soft, nontender, no hepatosplenomegaly, no masses and bowel sounds normal   (male): " normal male genitalia without lesions or urethral discharge, no hernia  RECTAL: normal sphincter tone, no rectal masses, prostate normal size, smooth, nontender without nodules or masses  MS: laminectomy  no gross musculoskeletal defects noted, no edema  SKIN: no suspicious lesions or rashes  NEURO: Normal strength and tone, mentation intact and speech normal  PSYCH: mentation appears normal, affect normal/bright  Foot exam shows no ulceration, no neuropathy, microfilament well felt. Skin on the feet not dry.  Pulses in the feet well felt.  Rt foot callus  Orders Only on 04/09/2018   Component Date Value Ref Range Status     TSH 04/09/2018 1.14  0.40 - 4.00 mU/L Final     Hemoglobin A1C 04/09/2018 6.0  0 - 6.4 % Final    Comment: Normal <5.7% Prediabetes 5.7-6.4%  Diabetes 6.5% or higher - adopted from ADA   consensus guidelines.       Lab Results   Component Value Date    CHOL 101 12/07/2017     Lab Results   Component Value Date    HDL 29 12/07/2017     Lab Results   Component Value Date    LDL 48 12/07/2017     Lab Results   Component Value Date    TRIG 118 12/07/2017     Lab Results   Component Value Date    CHOLHDLRATIO 3.6 11/12/2015       ASSESSMENT / PLAN:   Austen was seen today for wellness visit.    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)  -     FOOT EXAM  NO CHARGE [80938.114]  -     **A1C FUTURE 6mo; Future  -     Albumin Random Urine Quantitative with Creat Ratio; Future  On metformin  SI (sacroiliac) joint dysfunction  -     diclofenac (FLECTOR) 1.3 % Patch; Place 1 patch onto the skin 2 times daily  Occ injections are also needed  Pain clinic  "patient '  Continue physical therapy and chiropractor for shoulder and back  S/P CABG (coronary artery bypass graft)      Status post CABG (LIMA to LAD, SVG to OM one and OM two, right mammary to RCA)    No angina    Continue statin, aspirin, ACE inhibitor, beta blocker     Chronic low back pain with bilateral sciatica, unspecified back pain laterality  -     diclofenac (FLECTOR) 1.3 % Patch; Place 1 patch onto the skin 2 times daily  He is on neurontin    Anxiety  Off and on   He does not want medications     S/P lumbar laminectomy  Off and on pain  On neurontin  PENELOPE (obstructive sleep apnea)  cpap  Non morbid obesity, unspecified obesity type  Trying to lose weight , but not able to   neurontin might also cause weight gain  Hyperlipidemia LDL goal <100  -     Lipid panel reflex to direct LDL Fasting; Future  Continue statins, lopid both      End of Life Planning:  Patient currently has an advanced directive: No.  I have verified the patient's ablity to prepare an advanced directive/make health care decisions.  Literature was provided to assist patient in preparing an advanced directive.    COUNSELING:  Reviewed preventive health counseling, as reflected in patient instructions       Regular exercise       Healthy diet/nutrition        Estimated body mass index is 34.37 kg/(m^2) as calculated from the following:    Height as of this encounter: 6' 3\" (1.905 m).    Weight as of this encounter: 275 lb (124.7 kg).  Weight management plan: Discussed healthy diet and exercise guidelines and patient will follow up in 6 months in clinic to re-evaluate.   reports that he quit smoking about 26 years ago. He has never used smokeless tobacco.      Appropriate preventive services were discussed with this patient, including applicable screening as appropriate for cardiovascular disease, diabetes, osteopenia/osteoporosis, and glaucoma.  As appropriate for age/gender, discussed screening for colorectal cancer, prostate cancer, " Checklist reviewing preventive services available has been given to the patient.    Reviewed patients plan of care and provided an AVS. The Basic Care Plan (routine screening as documented in Health Maintenance) for Austen meets the Care Plan requirement. This Care Plan has been established and reviewed with the Patient.    Counseling Resources:  ATP IV Guidelines  Pooled Cohorts Equation Calculator  Breast Cancer Risk Calculator  FRAX Risk Assessment  ICSI Preventive Guidelines  Dietary Guidelines for Americans, 2010  USDA's MyPlate  ASA Prophylaxis  Lung CA Screening    Hamzah Hodge MD  [facility]  Answers for HPI/ROS submitted by the patient on 4/9/2018   PHQ-2 Score: 1

## 2018-04-13 ENCOUNTER — OFFICE VISIT (OUTPATIENT)
Dept: FAMILY MEDICINE | Facility: CLINIC | Age: 83
End: 2018-04-13
Payer: COMMERCIAL

## 2018-04-13 VITALS
TEMPERATURE: 97.4 F | HEIGHT: 75 IN | SYSTOLIC BLOOD PRESSURE: 138 MMHG | HEART RATE: 56 BPM | WEIGHT: 275 LBS | BODY MASS INDEX: 34.19 KG/M2 | OXYGEN SATURATION: 98 % | DIASTOLIC BLOOD PRESSURE: 76 MMHG

## 2018-04-13 DIAGNOSIS — M54.42 CHRONIC LOW BACK PAIN WITH BILATERAL SCIATICA, UNSPECIFIED BACK PAIN LATERALITY: ICD-10-CM

## 2018-04-13 DIAGNOSIS — E66.9 NON MORBID OBESITY, UNSPECIFIED OBESITY TYPE: ICD-10-CM

## 2018-04-13 DIAGNOSIS — Z00.00 ROUTINE GENERAL MEDICAL EXAMINATION AT A HEALTH CARE FACILITY: Primary | ICD-10-CM

## 2018-04-13 DIAGNOSIS — M53.3 SI (SACROILIAC) JOINT DYSFUNCTION: ICD-10-CM

## 2018-04-13 DIAGNOSIS — M54.41 CHRONIC LOW BACK PAIN WITH BILATERAL SCIATICA, UNSPECIFIED BACK PAIN LATERALITY: ICD-10-CM

## 2018-04-13 DIAGNOSIS — I10 ESSENTIAL HYPERTENSION, BENIGN: ICD-10-CM

## 2018-04-13 DIAGNOSIS — Z98.890 S/P LUMBAR LAMINECTOMY: ICD-10-CM

## 2018-04-13 DIAGNOSIS — E11.59 TYPE 2 DIABETES MELLITUS WITH VASCULAR DISEASE (H): ICD-10-CM

## 2018-04-13 DIAGNOSIS — Z95.1 S/P CABG (CORONARY ARTERY BYPASS GRAFT): ICD-10-CM

## 2018-04-13 DIAGNOSIS — G47.33 OSA (OBSTRUCTIVE SLEEP APNEA): ICD-10-CM

## 2018-04-13 DIAGNOSIS — F41.9 ANXIETY: ICD-10-CM

## 2018-04-13 DIAGNOSIS — L84 CALLUS OF FOOT: ICD-10-CM

## 2018-04-13 DIAGNOSIS — G89.29 CHRONIC LOW BACK PAIN WITH BILATERAL SCIATICA, UNSPECIFIED BACK PAIN LATERALITY: ICD-10-CM

## 2018-04-13 DIAGNOSIS — E78.5 HYPERLIPIDEMIA LDL GOAL <100: ICD-10-CM

## 2018-04-13 LAB
CREAT UR-MCNC: 132 MG/DL
MICROALBUMIN UR-MCNC: 9 MG/L
MICROALBUMIN/CREAT UR: 6.58 MG/G CR (ref 0–17)

## 2018-04-13 PROCEDURE — 99397 PER PM REEVAL EST PAT 65+ YR: CPT | Mod: 25 | Performed by: INTERNAL MEDICINE

## 2018-04-13 PROCEDURE — 82043 UR ALBUMIN QUANTITATIVE: CPT | Performed by: INTERNAL MEDICINE

## 2018-04-13 PROCEDURE — 99207 C FOOT EXAM  NO CHARGE: CPT | Mod: 25 | Performed by: INTERNAL MEDICINE

## 2018-04-13 RX ORDER — PROPRANOLOL HYDROCHLORIDE 60 MG/1
60 TABLET ORAL 2 TIMES DAILY
Qty: 180 TABLET | Refills: 3 | Status: SHIPPED | OUTPATIENT
Start: 2018-04-13 | End: 2018-09-18 | Stop reason: SINTOL

## 2018-04-13 NOTE — NURSING NOTE
"Chief Complaint   Patient presents with     Wellness Visit       Initial /76 (BP Location: Left arm, Cuff Size: Adult Large)  Pulse 56  Temp 97.4  F (36.3  C) (Oral)  Ht 6' 3\" (1.905 m)  Wt 275 lb (124.7 kg)  SpO2 98%  BMI 34.37 kg/m2 Estimated body mass index is 34.37 kg/(m^2) as calculated from the following:    Height as of this encounter: 6' 3\" (1.905 m).    Weight as of this encounter: 275 lb (124.7 kg).  Medication Reconciliation: complete   Steph Zhang MA      "

## 2018-04-13 NOTE — MR AVS SNAPSHOT
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)        SI (sacroiliac) joint dysfunction        S/P CABG (coronary artery bypass graft)        Chronic low back pain with bilateral sciatica, unspecified back pain laterality        Anxiety        S/P lumbar laminectomy        PENELOPE (obstructive sleep apnea)        Non morbid obesity, unspecified obesity type        Hyperlipidemia LDL goal <100           Follow-ups after your visit        Future tests that were ordered for you today     Open Future Orders        Priority Expected Expires Ordered    Lipid panel reflex to direct LDL Fasting Routine 10/10/2018 11/9/2018 4/13/2018    **A1C FUTURE 6mo Routine 10/10/2018 11/9/2018 4/13/2018    Albumin Random Urine Quantitative with Creat Ratio Routine 10/10/2018 11/9/2018 4/13/2018            Who to contact     If you have questions or need follow up information about today's clinic visit or your schedule please contact [facility] directly at [phone].  Normal or non-critical lab and imaging results will be communicated to you by MyChart, letter or phone within 4 business days after the clinic has received the results. If you do not hear from us within 7 days, please contact the clinic through MyChart or phone. If you have a critical or abnormal lab result, we will notify you by phone as soon as possible.  Submit refill requests through [employer] or call your pharmacy and they will forward the refill request to us. Please allow 3 business days for your refill to be completed.          Additional Information  "About Your Visit        [employer] Information     [employer] gives you secure access to your electronic health record. If you see a primary care provider, you can also send messages to your care team and make appointments. If you have questions, please call your primary care clinic.  If you do not have a primary care provider, please call [phone] and they will assist you.        Care EveryWhere ID     This is your Care EveryWhere ID. This could be used by other organizations to access your [facility] medical records  [id number]        Your Vitals Were     Pulse Temperature Height Pulse Oximetry BMI (Body Mass Index)       56 97.4  F (36.3  C) (Oral) 6' 3\" (1.905 m) 98% 34.37 kg/m2        Blood Pressure from Last 3 Encounters:   04/13/18 138/76   03/07/18 125/52   12/07/17 128/64    Weight from Last 3 Encounters:   04/13/18 275 lb (124.7 kg)   03/07/18 277 lb 4.8 oz (125.8 kg)   12/07/17 275 lb (124.7 kg)              We Performed the Following     FOOT EXAM  NO CHARGE [27063.114]          Today's Medication Changes          These changes are accurate as of 4/13/18 10:03 AM.  If you have any questions, ask your nurse or doctor.               Start taking these medicines.        Dose/Directions    diclofenac 1.3 % Patch   Commonly known as:  FLECTOR   Used for:  Chronic low back pain with bilateral sciatica, unspecified back pain laterality, SI (sacroiliac) joint dysfunction   Started by:  Hamzah Hodge MD        Dose:  1 patch   Place 1 patch onto the skin 2 times daily   Quantity:  30 patch   Refills:  1       propranolol HCl 60 MG Tabs   Used for:  S/P CABG (coronary artery bypass graft), Essential hypertension, benign   Started by:  Hamzah Hodge MD        Dose:  60 mg   Take 1 tablet (60 mg) by mouth 2 times daily   Quantity:  180 tablet   Refills:  3         These medicines have changed or have updated prescriptions.        Dose/Directions    gabapentin 300 MG capsule   Commonly known as:  NEURONTIN   This may " have changed:    - when to take this  - additional instructions   Used for:  Chronic pain syndrome, Sacroiliac joint pain        Take 900 mg three times daily   Quantity:  810 capsule   Refills:  2         Stop taking these medicines if you haven't already. Please contact your care team if you have questions.     metoprolol succinate 50 MG 24 hr tablet   Commonly known as:  TOPROL-XL   Stopped by:  Hamzah Hodge MD                Where to get your medicines      These medications were sent to [facility][id number] - [place], [place] - [address] [facility]  [address] [facility] [address]     Phone:  [phone]     diclofenac 1.3 % Patch         These medications were sent to [facility] - [address], [facility] [address]     Phone:  [phone]     propranolol HCl 60 MG Tabs                Primary Care Provider Office Phone # Fax #    Hamzah Hodge [phone] [address]  [facility] [address]        Equal Access to Services     [facility]: Hadii aad ku hadasho Soomaali, waaxda luqadaha, qaybta kaalmada adeegyada, waxay anhin hayrebecca fields . So [facility] [phone].    ATENCIÓN: Si habla español, tiene a hagen disposición servicios gratuitos de asistencia lingüística. Llame al [phone].    We comply with applicable federal civil rights laws and [place] laws. We do not discriminate on the basis of race, color, national origin, age, disability, sex, sexual orientation, or gender identity.            Thank you!     Thank you for choosing [facility]  for your care. Our goal is always to provide you with excellent care. Hearing back from our patients is one way we can continue to improve our services. Please take a few minutes to complete the written survey that you may receive in the mail after your visit with us. Thank you!             Your Updated Medication List - Protect others around you: Learn  how to safely use, store and throw away your medicines at www.disposemymeds.org.          This list is accurate as of 4/13/18 10:03 AM.  Always use your most recent med list.                   Brand Name Dispense Instructions for use Diagnosis    ACETAMIN 500 MG tablet   Generic drug:  acetaminophen      Take 2 tablets by mouth 2 times daily as needed        amLODIPine 2.5 MG tablet    NORVASC    180 tablet    Take 1 tablet (2.5 mg) by mouth 2 times daily    Essential hypertension, benign       aspirin 81 MG tablet     30 tablet    Take 1 tablet (81 mg) by mouth daily    CAD (coronary artery disease)       blood glucose monitoring lancets     200 each    Use to test blood sugar two times daily or as directed    Other abnormal glucose       calcium carbonate 500 MG chewable tablet    TUMS     Take 1 chew tab by mouth as needed        diclofenac 1.3 % Patch    FLECTOR    30 patch    Place 1 patch onto the skin 2 times daily    Chronic low back pain with bilateral sciatica, unspecified back pain laterality, SI (sacroiliac) joint dysfunction       finasteride 5 MG tablet    PROSCAR    90 tablet    Take 1 tablet (5 mg) by mouth daily    Slowing of urinary stream       gabapentin 300 MG capsule    NEURONTIN    810 capsule    Take 900 mg three times daily    Chronic pain syndrome, Sacroiliac joint pain       gemfibrozil 600 MG tablet    LOPID    180 tablet    Take 1 tablet (600 mg) by mouth 2 times daily    Hyperlipidemia LDL goal <70, Low HDL (under 40)       lisinopril 20 MG tablet    PRINIVIL/ZESTRIL    90 tablet    Take 1 tablet (20 mg) by mouth daily    Essential hypertension, benign       METAMUCIL 1.7 GM Wafr   Generic drug:  Psyllium      TAKE AS DIRECTED        metFORMIN 500 MG 24 hr tablet    GLUCOPHAGE-XR    360 tablet    Take 4 tablets (2,000 mg) by mouth daily (with breakfast)    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)       omeprazole 20 MG CR capsule    priLOSEC    180 capsule    Take 1 capsule  (20 mg) by mouth daily    Dysphagia, unspecified type       ONETOUCH ULTRA test strip   Generic drug:  blood glucose monitoring     200 strip    Use to test blood sugars    two times a day or as  directed    Other abnormal glucose       order for DME      Uses Cpap machine for sleep apnea        pravastatin 20 MG tablet    PRAVACHOL    90 tablet    Take 1 tablet (20 mg) by mouth daily    Hyperlipidemia LDL goal <70       propranolol HCl 60 MG Tabs     180 tablet    Take 1 tablet (60 mg) by mouth 2 times daily    S/P CABG (coronary artery bypass graft), Essential hypertension, benign       tamsulosin 0.4 MG capsule    FLOMAX     Take 0.4 mg by mouth daily        triamcinolone 0.5 % cream    KENALOG    15 g    Apply  topically 2 times daily. to affected area as directed PRN    Rash and other nonspecific skin eruption       vitamin D 2000 units tablet     90 tablet    Take 2,000 Units by mouth daily.

## 2018-04-16 ENCOUNTER — TELEPHONE (OUTPATIENT)
Dept: LAB | Facility: CLINIC | Age: 83
End: 2018-04-16

## 2018-04-16 NOTE — TELEPHONE ENCOUNTER
Prior Authorization Retail Medication Request    Medication/Dose: diclofenac (FLECTOR) 1.3 % Patch  ICD code (if different than what is on RX):    Previously Tried and Failed:    Rationale:      Insurance Name:  [employer]  Insurance ID:  [id number]      Pharmacy Information (if different than what is on RX)  Name:    Phone:

## 2018-04-16 NOTE — TELEPHONE ENCOUNTER
diclofenac (FLECTOR) 1.3 % Patch-NOT COVERED BY INSURANCE.    Please send formulary alternative, or advise for PA

## 2018-04-18 NOTE — TELEPHONE ENCOUNTER
PA Initiation    Medication: diclofenac (FLECTOR) 1.3 % Patch - INITIATED  Insurance Company: [employer] ([facility]) - Phone [phone] Fax [phone]  Pharmacy Filling the Rx: [id number] - [place] - [address] [facility]  Filling Pharmacy Phone: [phone]  Filling Pharmacy Fax:    Start Date: 4/18/2018

## 2018-04-19 NOTE — TELEPHONE ENCOUNTER
PRIOR AUTHORIZATION DENIED    Medication: diclofenac (FLECTOR) 1.3 % Patch - DENIED    Denial Date: 4/18/2018    Denial Rational: MEDICATION IS NOT COVERED FOR DIAGNOSIS - NOT A FDA APPROVED DIAGNOSIS FOR MEDICATION  IT IS ONLY COVERED FOR Pain, acute, Due to minor strains, sprains, and contusions - NOT FOR CHRONIC PAIN    Appeal Information:

## 2018-06-05 ENCOUNTER — PRE VISIT (OUTPATIENT)
Dept: CARDIOLOGY | Facility: CLINIC | Age: 83
End: 2018-06-05

## 2018-06-14 ENCOUNTER — MYC REFILL (OUTPATIENT)
Dept: FAMILY MEDICINE | Facility: CLINIC | Age: 83
End: 2018-06-14

## 2018-06-14 DIAGNOSIS — R73.09 OTHER ABNORMAL GLUCOSE: ICD-10-CM

## 2018-06-14 NOTE — TELEPHONE ENCOUNTER
"Requested Prescriptions   Pending Prescriptions Disp Refills     blood glucose monitoring (ONE TOUCH ULTRASOFT) lancets 200 each 1    Last Written Prescription Date:  7/20/17  Last Fill Quantity: 200,  # refills: 1   Last office visit: 4/13/2018 with prescribing provider:     Future Office Visit:   Next 5 appointments (look out 90 days)     Jun 20, 2018 11:15 AM CDT   Return Visit with John Paul Moreno MD   [facility] ([facility])    [address] [place] [id number] [phone] OPT 2                  Sig: Use to test blood sugar times daily or as directed.    Diabetic Supplies Protocol Passed    6/14/2018 11:57 AM       Passed - Patient is 18 years of age or older       Passed - Recent (6 mo) or future (30 days) visit within the authorizing provider's specialty    Patient had office visit in the last 6 months or has a visit in the next 30 days with authorizing provider.  See \"Patient Info\" tab in inbasket, or \"Choose Columns\" in Meds & Orders section of the refill encounter.              "

## 2018-06-14 NOTE — TELEPHONE ENCOUNTER
Message from MyChart:  Original authorizing provider: MD Austen Andrade would like a refill of the following medications:  blood glucose monitoring (ONE TOUCH ULTRASOFT) lancets [Hamzah Hodge MD]    Preferred pharmacy: [facility] - [address]    Comment:  Diclofenac patch Rx was denied as not approved for S/I Treatment. Need renewal of Tamsulosin cap 0.4mg (FLOMAX) #[id number], does not appear on this list. Austen

## 2018-06-15 RX ORDER — LANCETS
EACH MISCELLANEOUS
Qty: 200 EACH | Refills: 1 | Status: SHIPPED | OUTPATIENT
Start: 2018-06-15 | End: 2019-08-08

## 2018-06-20 ENCOUNTER — HOSPITAL ENCOUNTER (OUTPATIENT)
Dept: CARDIOLOGY | Facility: CLINIC | Age: 83
Discharge: HOME | End: 2018-06-20
Attending: NURSE PRACTITIONER | Admitting: NURSE PRACTITIONER
Payer: COMMERCIAL

## 2018-06-20 ENCOUNTER — OFFICE VISIT (OUTPATIENT)
Dept: CARDIOLOGY | Facility: CLINIC | Age: 83
End: 2018-06-20
Attending: NURSE PRACTITIONER
Payer: COMMERCIAL

## 2018-06-20 VITALS
HEART RATE: 64 BPM | WEIGHT: 271 LBS | SYSTOLIC BLOOD PRESSURE: 126 MMHG | HEIGHT: 75 IN | BODY MASS INDEX: 33.69 KG/M2 | DIASTOLIC BLOOD PRESSURE: 76 MMHG

## 2018-06-20 DIAGNOSIS — I10 BENIGN ESSENTIAL HYPERTENSION: ICD-10-CM

## 2018-06-20 DIAGNOSIS — E78.5 HYPERLIPIDEMIA LDL GOAL <70: ICD-10-CM

## 2018-06-20 DIAGNOSIS — I25.10 CORONARY ARTERY DISEASE INVOLVING NATIVE CORONARY ARTERY OF NATIVE HEART WITHOUT ANGINA PECTORIS: ICD-10-CM

## 2018-06-20 LAB
ALT SERPL W P-5'-P-CCNC: <5 U/L (ref 5–30)
ANION GAP SERPL CALCULATED.3IONS-SCNC: 14.1 MMOL/L (ref 6–17)
BUN SERPL-MCNC: 17 MG/DL (ref 7–30)
CALCIUM SERPL-MCNC: 9.8 MG/DL (ref 8.5–10.5)
CHLORIDE SERPL-SCNC: 106 MMOL/L (ref 98–107)
CHOLEST SERPL-MCNC: 93 MG/DL
CO2 SERPL-SCNC: 25 MMOL/L (ref 23–29)
CREAT SERPL-MCNC: 0.81 MG/DL (ref 0.7–1.3)
GFR SERPL CREATININE-BSD FRML MDRD: >90 ML/MIN/1.7M2
GLUCOSE SERPL-MCNC: 138 MG/DL (ref 70–105)
HDLC SERPL-MCNC: 28 MG/DL
LDLC SERPL CALC-MCNC: 40 MG/DL
NONHDLC SERPL-MCNC: 65 MG/DL
POTASSIUM SERPL-SCNC: 4.1 MMOL/L (ref 3.5–5.1)
SODIUM SERPL-SCNC: 141 MMOL/L (ref 136–145)
TRIGL SERPL-MCNC: 123 MG/DL

## 2018-06-20 PROCEDURE — 36415 COLL VENOUS BLD VENIPUNCTURE: CPT | Performed by: NURSE PRACTITIONER

## 2018-06-20 PROCEDURE — 80061 LIPID PANEL: CPT | Performed by: NURSE PRACTITIONER

## 2018-06-20 PROCEDURE — 80048 BASIC METABOLIC PNL TOTAL CA: CPT | Performed by: NURSE PRACTITIONER

## 2018-06-20 PROCEDURE — 99214 OFFICE O/P EST MOD 30 MIN: CPT | Performed by: INTERNAL MEDICINE

## 2018-06-20 PROCEDURE — 84460 ALANINE AMINO (ALT) (SGPT): CPT | Performed by: NURSE PRACTITIONER

## 2018-06-20 PROCEDURE — 93306 TTE W/DOPPLER COMPLETE: CPT | Mod: 26 | Performed by: INTERNAL MEDICINE

## 2018-06-20 PROCEDURE — 40000264 ECHO COMPLETE WITH OPTISON

## 2018-06-20 PROCEDURE — 25500064 ZZH RX 255 OP 636: Performed by: NURSE PRACTITIONER

## 2018-06-20 RX ORDER — METOPROLOL SUCCINATE 50 MG/1
TABLET, EXTENDED RELEASE ORAL DAILY
COMMUNITY
Start: 2018-02-23 | End: 2018-09-18

## 2018-06-20 RX ADMIN — HUMAN ALBUMIN MICROSPHERES AND PERFLUTREN 9 ML: 10; .22 INJECTION, SOLUTION INTRAVENOUS at 09:34

## 2018-06-20 NOTE — LETTER
6/20/2018    Hamzah Hodge MD  [address] [facility] [address]  [facility] [address]    RE: Austen Fowler       Dear Colleague,    I had the pleasure of seeing Austen Fowler in the [facility].    Service Date: 06/20/2018      HISTORY OF PRESENT ILLNESS:  The patient is an 83-year-old overweight white male with a history of ischemic heart disease dating back to 1991 when he underwent cardiac catheterization and coronary angiography which showed significant 3-vessel disease.  He had coronary artery bypass surgery with left internal mammary to the left anterior descending, a saphenous vein bypass graft to the first and second obtuse marginal branch of the circumflex and a right mammary to the right coronary artery.  Since the last clinic visit, he has done well with no significant symptoms of chest discomfort, shortness of breath, dizziness, palpitations, nausea, vomiting, diaphoresis or syncope.  He denies PND, orthopnea, fevers, chills or sweats.  He does have some easy fatigability and general malaise with mild dyspnea on exertion.  He has also had some issues of having tremors.  He is mostly limited by osteoarthritis, degenerative joint disease and his ability to lose weight.  He has also had previous surgery for spinal stenosis with continued low back pain despite the surgery.  Nuclear stress testing performed in 2015 showed no evidence of ischemia or infarct.  Echocardiography has also shown slight dilatation of the left ventricle, with mild concentric left ventricular hypertrophy, intact systolic function with ejection fraction 55%-60%.  Right ventricle was normal in size and function.  There was mild biatrial enlargement.  There was trace mitral and tricuspid insufficiency, with mild aortic root dilatation and no significant change from 2016, and repeat echocardiogram performed in 2018, although technically difficult, showed slight dilatation of the left ventricle, intact systolic  function with ejection fraction 55%-60% and no hemodynamically significant valve disease.      MEDICATIONS:  Medications at the present time include:   1.  Acetaminophen 1000 mg 2 times a day as needed.   2.  Amlodipine 2.5 mg twice a day.   3.  Aspirin 81 mg a day.   4.  Calcium carbonate 500 mg a day.   5.  Vitamin D 2000 units a day.   6.  Finasteride 5 mg a day.   7.  Gabapentin 900 mg 3 times a day.   8.  Gemfibrozil 600 mg twice a day.   9.  Lisinopril 20 mg a day.   10.  Metamucil 1.7 grams a day.   11.  Metformin 2000 mg daily with breakfast.   12.  Metoprolol succinate 50 mg a day.   13.  Pravastatin 20 mg a day.   14.  Flomax 0.4 mg a day.   15.  He has also been prescribed to take propranolol 60 mg b.i.d. for the tremor and to hold on his metoprolol to see if the tremor can be improved.      LABORATORY DATA:  Demonstrated cholesterol 93, HDL 28, LDL 40, triglyceride 123, sodium 141, potassium 4.1, BUN 17, creatinine 0.81.  Hemoglobin A1c was 6.0.  TSH 1.14.  ALT less than 5.      The patient presents to Cardiology Clinic for followup of his ischemic heart disease.      PHYSICAL EXAMINATION:   VITAL SIGNS:  Blood pressure 126/76, heart rate 60-70 and regular.  Weight was 271 pounds, which is down 11 pounds from previous visit.   NECK:  Somewhat difficult to assess but did not show significant jugular venous distention, carotid bruit or palpable thyroid.   CHEST:  Essentially clear to percussion and auscultation, with decreased breath sounds at the bases and prolonged expiratory phase.   CARDIAC:  Regular rhythm, distant heart tones, soft S4, 1 to 2/6 systolic murmur at the left sternal border radiating to the apex.  No diastolic murmur, rub or S3.   EXTREMITIES:  Showed 1 to 2+ edema, without cyanosis or erythema.      CLINICAL IMPRESSION:   1.  Stable cardiac condition.   2.  History of ischemic heart disease, status post coronary artery bypass surgery times multiple vessels using right and left mammaries  in .   3.  Hypertension with good control.   4.  Hyperlipidemia with HDL deficiency, but at goal for LDL, suggesting a metabolic syndrome.   5.  History of osteoarthritis and degenerative joint disease with occasional sciatica.   6.  Obstructive sleep apnea on CPAP.   7.  Hyperglycemia with elevated hemoglobin A1c consistent with type 2 diabetes mellitus, non-insulin-dependent.      DISCUSSION:  The patient has done well clinically from a cardiac viewpoint.  He has had no significant symptoms of angina pectoris or congestive heart failure.  He has intact left ventricular function with no significant change by echocardiography.  He will need continued close followup of serum lipids, basic metabolic panel and blood pressure as well as aggressive management of his diabetes.  Otherwise, he appears to be doing well and is satisfied with his lifestyle with the exception of his recent development of a tremor, which will be tried to be controlled with propranolol as a substitution drug for metoprolol.      RECOMMENDATIONS:   1.  Continue present medications, switching propranolol 60 mg b.i.d. for metoprolol -- might consider 40 mg t.i.d. for better coverage.   2.  Diet and exercise program to continue weight loss.   3.  Close followup of serum lipids, basic metabolic panel and blood pressure.   4.  Diabetic management.   5.  Routine medical followup.   6.  Cardiology followup in 4-6 months.      cc:   Hamzah Hodge MD    [address], [address] [place] [address]         CLIFTON OMRENO MD, [facility]             D: 2018   T: 2018   MT: DAMIAN      Name:     NAYELI RAI   MRN:      [id number]        Account:      [id number]   :      1935           Service Date: 2018      Document: [id number]       Thank you for allowing me to participate in the care of your patient.      Sincerely,     Clifton Moreno MD     [facility]  Care    cc:   Malou Justice, APRN CNP  [address]  [place] [address]

## 2018-06-20 NOTE — LETTER
6/20/2018      Hamzah Hodge MD  [address] [facility] [address]  [facility] [address]      RE: Austen Fowler       Dear Colleague,    I had the pleasure of seeing Austen Fowler in the [facility].    Service Date: 06/20/2018      HISTORY OF PRESENT ILLNESS:  The patient is an 83-year-old overweight white male with a history of ischemic heart disease dating back to 1991 when he underwent cardiac catheterization and coronary angiography which showed significant 3-vessel disease.  He had coronary artery bypass surgery with left internal mammary to the left anterior descending, a saphenous vein bypass graft to the first and second obtuse marginal branch of the circumflex and a right mammary to the right coronary artery.  Since the last clinic visit, he has done well with no significant symptoms of chest discomfort, shortness of breath, dizziness, palpitations, nausea, vomiting, diaphoresis or syncope.  He denies PND, orthopnea, fevers, chills or sweats.  He does have some easy fatigability and general malaise with mild dyspnea on exertion.  He has also had some issues of having tremors.  He is mostly limited by osteoarthritis, degenerative joint disease and his ability to lose weight.  He has also had previous surgery for spinal stenosis with continued low back pain despite the surgery.  Nuclear stress testing performed in 2015 showed no evidence of ischemia or infarct.  Echocardiography has also shown slight dilatation of the left ventricle, with mild concentric left ventricular hypertrophy, intact systolic function with ejection fraction 55%-60%.  Right ventricle was normal in size and function.  There was mild biatrial enlargement.  There was trace mitral and tricuspid insufficiency, with mild aortic root dilatation and no significant change from 2016, and repeat echocardiogram performed in 2018, although technically difficult, showed slight dilatation of the left ventricle, intact systolic  function with ejection fraction 55%-60% and no hemodynamically significant valve disease.      MEDICATIONS:  Medications at the present time include:   1.  Acetaminophen 1000 mg 2 times a day as needed.   2.  Amlodipine 2.5 mg twice a day.   3.  Aspirin 81 mg a day.   4.  Calcium carbonate 500 mg a day.   5.  Vitamin D 2000 units a day.   6.  Finasteride 5 mg a day.   7.  Gabapentin 900 mg 3 times a day.   8.  Gemfibrozil 600 mg twice a day.   9.  Lisinopril 20 mg a day.   10.  Metamucil 1.7 grams a day.   11.  Metformin 2000 mg daily with breakfast.   12.  Metoprolol succinate 50 mg a day.   13.  Pravastatin 20 mg a day.   14.  Flomax 0.4 mg a day.   15.  He has also been prescribed to take propranolol 60 mg b.i.d. for the tremor and to hold on his metoprolol to see if the tremor can be improved.      LABORATORY DATA:  Demonstrated cholesterol 93, HDL 28, LDL 40, triglyceride 123, sodium 141, potassium 4.1, BUN 17, creatinine 0.81.  Hemoglobin A1c was 6.0.  TSH 1.14.  ALT less than 5.      The patient presents to Cardiology Clinic for followup of his ischemic heart disease.      PHYSICAL EXAMINATION:   VITAL SIGNS:  Blood pressure 126/76, heart rate 60-70 and regular.  Weight was 271 pounds, which is down 11 pounds from previous visit.   NECK:  Somewhat difficult to assess but did not show significant jugular venous distention, carotid bruit or palpable thyroid.   CHEST:  Essentially clear to percussion and auscultation, with decreased breath sounds at the bases and prolonged expiratory phase.   CARDIAC:  Regular rhythm, distant heart tones, soft S4, 1 to 2/6 systolic murmur at the left sternal border radiating to the apex.  No diastolic murmur, rub or S3.   EXTREMITIES:  Showed 1 to 2+ edema, without cyanosis or erythema.      CLINICAL IMPRESSION:   1.  Stable cardiac condition.   2.  History of ischemic heart disease, status post coronary artery bypass surgery times multiple vessels using right and left mammaries  in .   3.  Hypertension with good control.   4.  Hyperlipidemia with HDL deficiency, but at goal for LDL, suggesting a metabolic syndrome.   5.  History of osteoarthritis and degenerative joint disease with occasional sciatica.   6.  Obstructive sleep apnea on CPAP.   7.  Hyperglycemia with elevated hemoglobin A1c consistent with type 2 diabetes mellitus, non-insulin-dependent.      DISCUSSION:  The patient has done well clinically from a cardiac viewpoint.  He has had no significant symptoms of angina pectoris or congestive heart failure.  He has intact left ventricular function with no significant change by echocardiography.  He will need continued close followup of serum lipids, basic metabolic panel and blood pressure as well as aggressive management of his diabetes.  Otherwise, he appears to be doing well and is satisfied with his lifestyle with the exception of his recent development of a tremor, which will be tried to be controlled with propranolol as a substitution drug for metoprolol.      RECOMMENDATIONS:   1.  Continue present medications, switching propranolol 60 mg b.i.d. for metoprolol -- might consider 40 mg t.i.d. for better coverage.   2.  Diet and exercise program to continue weight loss.   3.  Close followup of serum lipids, basic metabolic panel and blood pressure.   4.  Diabetic management.   5.  Routine medical followup.   6.  Cardiology followup in 4-6 months.      cc:   Hamzah Hodge MD    [address], [address]    [place] [address]         CLIFTON STAHL MD, [facility]             D: 2018   T: 2018   MT: DAMIAN      Name:     NAYELI RAI   MRN:      [id number]        Account:      [id number]   :      1935           Service Date: 2018      Document: [id number]       Outpatient Encounter Prescriptions as of 2018   Medication Sig Dispense Refill     acetaminophen (ACETAMIN) 500 MG tablet Take 2 tablets by mouth 2 times daily  as needed        amLODIPine (NORVASC) 2.5 MG tablet Take 1 tablet (2.5 mg) by mouth 2 times daily 180 tablet 3     aspirin 81 MG tablet Take 1 tablet (81 mg) by mouth daily 30 tablet 3     blood glucose monitoring (ONE TOUCH ULTRASOFT) lancets Use to test blood sugar 2 times daily or as directed. 200 each 1     calcium carbonate (TUMS) 500 MG chewable tablet Take 1 chew tab by mouth as needed        Cholecalciferol (VITAMIN D) 2000 UNIT tablet Take 2,000 Units by mouth daily. 90 tablet 3     diclofenac (FLECTOR) 1.3 % Patch Place 1 patch onto the skin 2 times daily 30 patch 1     finasteride (PROSCAR) 5 MG tablet Take 1 tablet (5 mg) by mouth daily 90 tablet 2     gabapentin (NEURONTIN) 300 MG capsule Take 900 mg three times daily (Patient taking differently: 2 times daily Take 900 mg three times daily) 810 capsule 2     gemfibrozil (LOPID) 600 MG tablet Take 1 tablet (600 mg) by mouth 2 times daily 180 tablet 2     lisinopril (PRINIVIL/ZESTRIL) 20 MG tablet Take 1 tablet (20 mg) by mouth daily 90 tablet 3     METAMUCIL 1.7 GM OR WAFR TAKE AS DIRECTED       metFORMIN (GLUCOPHAGE-XR) 500 MG 24 hr tablet Take 4 tablets (2,000 mg) by mouth daily (with breakfast) 360 tablet 1     metoprolol succinate (TOPROL-XL) 50 MG 24 hr tablet        omeprazole (PRILOSEC) 20 MG CR capsule Take 1 capsule (20 mg) by mouth daily 180 capsule 3     ONE TOUCH ULTRA test strip Use to test blood sugars    two times a day or as  directed 200 strip 1     ORDER FOR DME Uses Cpap machine for sleep apnea       pravastatin (PRAVACHOL) 20 MG tablet Take 1 tablet (20 mg) by mouth daily 90 tablet 2     propranolol HCl 60 MG TABS Take 1 tablet (60 mg) by mouth 2 times daily (Patient not taking: Reported on 6/20/2018) 180 tablet 3     salicylic acid (MEDIPLAST) 40 % MISC Apply 1 dose. topically At Bedtime Each night, Scrape or loofa the wart(s) and then soak foot for 10-15 min in warm water.  Cut plaster to fit exactly over 1 wart each.  Tape each in  place for overnight and remove the next morning. 1 each 11     tamsulosin (FLOMAX) 0.4 MG capsule Take 0.4 mg by mouth daily       triamcinolone (KENALOG) 0.5 % cream Apply  topically 2 times daily. to affected area as directed PRN 15 g 0     No facility-administered encounter medications on file as of 6/20/2018.        Again, thank you for allowing me to participate in the care of your patient.      Sincerely,    John Paul Moreno MD     [facility]

## 2018-06-20 NOTE — MR AVS SNAPSHOT
After Visit Summary   6/20/2018    Austen Fowler    MRN: [id number]           Patient Information     Date Of Birth          1935        Visit Information        Provider Department      6/20/2018 11:15 AM John Paul Moreno MD [facility]        Today's Diagnoses     Coronary artery disease involving native coronary artery of native heart without angina pectoris        Hyperlipidemia LDL goal <70        Benign essential hypertension           Follow-ups after your visit        Additional Services     Follow-Up with Cardiac Advanced Practice Provider                 Future tests that were ordered for you today     Open Future Orders        Priority Expected Expires Ordered    Basic metabolic panel Routine 9/18/2018 6/20/2019 6/20/2018    Follow-Up with Cardiac Advanced Practice Provider Routine 9/18/2018 6/20/2019 6/20/2018    ECHO COMPLETE WITH OPTISON Routine 6/20/2018 12/7/2018 12/7/2017            Who to contact     If you have questions or need follow up information about today's clinic visit or your schedule please contact [facility] directly at [phone].  Normal or non-critical lab and imaging results will be communicated to you by [employer]ra[employer], letter or phone within 4 business days after the clinic has received the results. If you do not hear from us within 7 days, please contact the clinic through [employer] or phone. If you have a critical or abnormal lab result, we will notify you by phone as soon as possible.  Submit refill requests through [employer] or call your pharmacy and they will forward the refill request to us. Please allow 3 business days for your refill to be completed.          Additional Information About Your Visit        MyChart Information     [employer] gives you secure access to your electronic health record. If you see a primary care provider, you can also send messages to your care team and  "make appointments. If you have questions, please call your primary care clinic.  If you do not have a primary care provider, please call [phone] and they will assist you.        Care EveryWhere ID     This is your Care EveryWhere ID. This could be used by other organizations to access your [place] medical records  [id number]        Your Vitals Were     Pulse Height BMI (Body Mass Index)             64 1.905 m (6' 3\") 33.87 kg/m2          Blood Pressure from Last 3 Encounters:   06/20/18 126/76   04/13/18 138/76   03/07/18 125/52    Weight from Last 3 Encounters:   06/20/18 122.9 kg (271 lb)   04/13/18 124.7 kg (275 lb)   03/07/18 125.8 kg (277 lb 4.8 oz)              We Performed the Following     Follow-Up with Cardiologist          Today's Medication Changes          These changes are accurate as of 6/20/18 12:05 PM.  If you have any questions, ask your nurse or doctor.               These medicines have changed or have updated prescriptions.        Dose/Directions    gabapentin 300 MG capsule   Commonly known as:  NEURONTIN   This may have changed:    - when to take this  - additional instructions   Used for:  Chronic pain syndrome, Sacroiliac joint pain        Take 900 mg three times daily   Quantity:  810 capsule   Refills:  2                Primary Care Provider Office Phone # Fax #    Bhavperfecto Hodge [phone] [address] [facility] [address]  [facility] [address]        Equal Access to Services     THOMAS FRANCIS AH: Hadii evangelista Meek, waaries martinez, qaybrosi solisaljanice fernandez. So [facility] [phone].    ATENCIÓN: Si habla español, tiene a hagen disposición servicios gratuitos de asistencia lingüística. Llame al [phone].    We comply with applicable federal civil rights laws and [place] laws. We do not discriminate on the basis of race, color, national origin, age, disability, sex, sexual orientation, or gender identity.            Thank " you!     Thank you for choosing [facility]  for your care. Our goal is always to provide you with excellent care. Hearing back from our patients is one way we can continue to improve our services. Please take a few minutes to complete the written survey that you may receive in the mail after your visit with us. Thank you!             Your Updated Medication List - Protect others around you: Learn how to safely use, store and throw away your medicines at www.disposemymeds.org.          This list is accurate as of 6/20/18 12:05 PM.  Always use your most recent med list.                   Brand Name Dispense Instructions for use Diagnosis    ACETAMIN 500 MG tablet   Generic drug:  acetaminophen      Take 2 tablets by mouth 2 times daily as needed        amLODIPine 2.5 MG tablet    NORVASC    180 tablet    Take 1 tablet (2.5 mg) by mouth 2 times daily    Essential hypertension, benign       aspirin 81 MG tablet     30 tablet    Take 1 tablet (81 mg) by mouth daily    CAD (coronary artery disease)       blood glucose monitoring lancets     200 each    Use to test blood sugar 2 times daily or as directed.    Other abnormal glucose       calcium carbonate 500 MG chewable tablet    TUMS     Take 1 chew tab by mouth as needed        diclofenac 1.3 % Patch    FLECTOR    30 patch    Place 1 patch onto the skin 2 times daily    Chronic low back pain with bilateral sciatica, unspecified back pain laterality, SI (sacroiliac) joint dysfunction       finasteride 5 MG tablet    PROSCAR    90 tablet    Take 1 tablet (5 mg) by mouth daily    Slowing of urinary stream       gabapentin 300 MG capsule    NEURONTIN    810 capsule    Take 900 mg three times daily    Chronic pain syndrome, Sacroiliac joint pain       gemfibrozil 600 MG tablet    LOPID    180 tablet    Take 1 tablet (600 mg) by mouth 2 times daily    Hyperlipidemia LDL goal <70, Low HDL (under 40)       lisinopril 20 MG tablet     PRINIVIL/ZESTRIL    90 tablet    Take 1 tablet (20 mg) by mouth daily    Essential hypertension, benign       METAMUCIL 1.7 GM Wafr   Generic drug:  Psyllium      TAKE AS DIRECTED        metFORMIN 500 MG 24 hr tablet    GLUCOPHAGE-XR    360 tablet    Take 4 tablets (2,000 mg) by mouth daily (with breakfast)    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)       metoprolol succinate 50 MG 24 hr tablet    TOPROL-XL          omeprazole 20 MG CR capsule    priLOSEC    180 capsule    Take 1 capsule (20 mg) by mouth daily    Dysphagia, unspecified type       ONETOUCH ULTRA test strip   Generic drug:  blood glucose monitoring     200 strip    Use to test blood sugars    two times a day or as  directed    Other abnormal glucose       order for DME      Uses Cpap machine for sleep apnea        pravastatin 20 MG tablet    PRAVACHOL    90 tablet    Take 1 tablet (20 mg) by mouth daily    Hyperlipidemia LDL goal <70       propranolol HCl 60 MG Tabs     180 tablet    Take 1 tablet (60 mg) by mouth 2 times daily    S/P CABG (coronary artery bypass graft), Essential hypertension, benign       salicylic acid 40 % Misc    MEDIPLAST    1 each    Apply 1 dose. topically At Bedtime Each night, Scrape or loofa the wart(s) and then soak foot for 10-15 min in warm water.  Cut plaster to fit exactly over 1 wart each.  Tape each in place for overnight and remove the next morning.    Callus of foot       tamsulosin 0.4 MG capsule    FLOMAX     Take 0.4 mg by mouth daily        triamcinolone 0.5 % cream    KENALOG    15 g    Apply  topically 2 times daily. to affected area as directed PRN    Rash and other nonspecific skin eruption       vitamin D 2000 units tablet     90 tablet    Take 2,000 Units by mouth daily.

## 2018-06-21 NOTE — PROGRESS NOTES
Service Date: 06/20/2018      HISTORY OF PRESENT ILLNESS:  The patient is an 83-year-old overweight white male with a history of ischemic heart disease dating back to 1991 when he underwent cardiac catheterization and coronary angiography which showed significant 3-vessel disease.  He had coronary artery bypass surgery with left internal mammary to the left anterior descending, a saphenous vein bypass graft to the first and second obtuse marginal branch of the circumflex and a right mammary to the right coronary artery.  Since the last clinic visit, he has done well with no significant symptoms of chest discomfort, shortness of breath, dizziness, palpitations, nausea, vomiting, diaphoresis or syncope.  He denies PND, orthopnea, fevers, chills or sweats.  He does have some easy fatigability and general malaise with mild dyspnea on exertion.  He has also had some issues of having tremors.  He is mostly limited by osteoarthritis, degenerative joint disease and his ability to lose weight.  He has also had previous surgery for spinal stenosis with continued low back pain despite the surgery.  Nuclear stress testing performed in 2015 showed no evidence of ischemia or infarct.  Echocardiography has also shown slight dilatation of the left ventricle, with mild concentric left ventricular hypertrophy, intact systolic function with ejection fraction 55%-60%.  Right ventricle was normal in size and function.  There was mild biatrial enlargement.  There was trace mitral and tricuspid insufficiency, with mild aortic root dilatation and no significant change from 2016, and repeat echocardiogram performed in 2018, although technically difficult, showed slight dilatation of the left ventricle, intact systolic function with ejection fraction 55%-60% and no hemodynamically significant valve disease.      MEDICATIONS:  Medications at the present time include:   1.  Acetaminophen 1000 mg 2 times a day as needed.   2.  Amlodipine 2.5 mg  twice a day.   3.  Aspirin 81 mg a day.   4.  Calcium carbonate 500 mg a day.   5.  Vitamin D 2000 units a day.   6.  Finasteride 5 mg a day.   7.  Gabapentin 900 mg 3 times a day.   8.  Gemfibrozil 600 mg twice a day.   9.  Lisinopril 20 mg a day.   10.  Metamucil 1.7 grams a day.   11.  Metformin 2000 mg daily with breakfast.   12.  Metoprolol succinate 50 mg a day.   13.  Pravastatin 20 mg a day.   14.  Flomax 0.4 mg a day.   15.  He has also been prescribed to take propranolol 60 mg b.i.d. for the tremor and to hold on his metoprolol to see if the tremor can be improved.      LABORATORY DATA:  Demonstrated cholesterol 93, HDL 28, LDL 40, triglyceride 123, sodium 141, potassium 4.1, BUN 17, creatinine 0.81.  Hemoglobin A1c was 6.0.  TSH 1.14.  ALT less than 5.      The patient presents to Cardiology Clinic for followup of his ischemic heart disease.      PHYSICAL EXAMINATION:   VITAL SIGNS:  Blood pressure 126/76, heart rate 60-70 and regular.  Weight was 271 pounds, which is down 11 pounds from previous visit.   NECK:  Somewhat difficult to assess but did not show significant jugular venous distention, carotid bruit or palpable thyroid.   CHEST:  Essentially clear to percussion and auscultation, with decreased breath sounds at the bases and prolonged expiratory phase.   CARDIAC:  Regular rhythm, distant heart tones, soft S4, 1 to 2/6 systolic murmur at the left sternal border radiating to the apex.  No diastolic murmur, rub or S3.   EXTREMITIES:  Showed 1 to 2+ edema, without cyanosis or erythema.      CLINICAL IMPRESSION:   1.  Stable cardiac condition.   2.  History of ischemic heart disease, status post coronary artery bypass surgery times multiple vessels using right and left mammaries in 1991.   3.  Hypertension with good control.   4.  Hyperlipidemia with HDL deficiency, but at goal for LDL, suggesting a metabolic syndrome.   5.  History of osteoarthritis and degenerative joint disease with occasional  sciatica.   6.  Obstructive sleep apnea on CPAP.   7.  Hyperglycemia with elevated hemoglobin A1c consistent with type 2 diabetes mellitus, non-insulin-dependent.      DISCUSSION:  The patient has done well clinically from a cardiac viewpoint.  He has had no significant symptoms of angina pectoris or congestive heart failure.  He has intact left ventricular function with no significant change by echocardiography.  He will need continued close followup of serum lipids, basic metabolic panel and blood pressure as well as aggressive management of his diabetes.  Otherwise, he appears to be doing well and is satisfied with his lifestyle with the exception of his recent development of a tremor, which will be tried to be controlled with propranolol as a substitution drug for metoprolol.      RECOMMENDATIONS:   1.  Continue present medications, switching propranolol 60 mg b.i.d. for metoprolol -- might consider 40 mg t.i.d. for better coverage.   2.  Diet and exercise program to continue weight loss.   3.  Close followup of serum lipids, basic metabolic panel and blood pressure.   4.  Diabetic management.   5.  Routine medical followup.   6.  Cardiology followup in 4-6 months.      cc:   Hamzah Hodge MD    [address], [address] [place] [address]         CLIFTON STAHL MD, [facility]             D: 2018   T: 2018   MT: DAMIAN      Name:     NAYELI RAI   MRN:      [id number]        Account:      [id number]   :      1935           Service Date: 2018      Document: [id number]

## 2018-06-28 DIAGNOSIS — Z98.890 S/P LUMBAR LAMINECTOMY: ICD-10-CM

## 2018-06-28 NOTE — TELEPHONE ENCOUNTER
"tamsulosin (FLOMAX) 0.4 MG capsule      Last Written Prescription Date:  ?  Last Fill Quantity: ?,   # refills: ?  Last Office Visit: 4/13/18 (Hodge)  Future Office visit:    Next 5 appointments (look out 90 days)     Sep 18, 2018  1:10 PM CDT   Return Visit with Lyndsey Cameron PA-C   [facility] ([facility])    [address] [place] [id number] [phone] OPT 2                   Routing refill request to provider for review/approval because:  Medication is historical    Requested Prescriptions   Pending Prescriptions Disp Refills     tamsulosin (FLOMAX) 0.4 MG capsule [Pharmacy Med Name: TAMSULOSIN  0.4MG  CAP] 90 capsule      Sig: TAKE 1 CAPSULE BY MOUTH  DAILY AS NEEDED    Alpha Blockers Passed    6/28/2018 10:15 AM       Passed - Blood pressure under 140/90 in past 12 months    BP Readings from Last 3 Encounters:   06/20/18 126/76   04/13/18 138/76   03/07/18 125/52                Passed - Recent (12 mo) or future (30 days) visit within the authorizing provider's specialty    Patient had office visit in the last 12 months or has a visit in the next 30 days with authorizing provider or within the authorizing provider's specialty.  See \"Patient Info\" tab in inbasket, or \"Choose Columns\" in Meds & Orders section of the refill encounter.           Passed - Patient does not have Tadalafil, Vardenafil, or Sildenafil on their medication list       Passed - Patient is 18 years of age or older          "

## 2018-06-29 RX ORDER — TAMSULOSIN HYDROCHLORIDE 0.4 MG/1
CAPSULE ORAL
Qty: 90 CAPSULE | Refills: 3 | Status: SHIPPED | OUTPATIENT
Start: 2018-06-29 | End: 2019-06-08

## 2018-06-29 NOTE — TELEPHONE ENCOUNTER
Routing refill request to provider for review/approval because:  Medication is reported/historical    Please review and authorize if appropriate,     Thank you,   Brice BHAGAT RN

## 2018-07-21 ENCOUNTER — MYC REFILL (OUTPATIENT)
Dept: FAMILY MEDICINE | Facility: CLINIC | Age: 83
End: 2018-07-21

## 2018-07-21 DIAGNOSIS — R39.198 SLOWING OF URINARY STREAM: ICD-10-CM

## 2018-07-21 DIAGNOSIS — E78.5 HYPERLIPIDEMIA LDL GOAL <70: ICD-10-CM

## 2018-07-23 NOTE — TELEPHONE ENCOUNTER
Message from MyChart:  Original authorizing provider: MD Austen Andrade would like a refill of the following medications:  finasteride (PROSCAR) 5 MG tablet [Hamzah Hodge MD]  pravastatin (PRAVACHOL) 20 MG tablet [Hamzah Hodge MD]    Preferred pharmacy: [facility] - [address]    Comment:

## 2018-07-24 RX ORDER — PRAVASTATIN SODIUM 20 MG
20 TABLET ORAL DAILY
Qty: 90 TABLET | Refills: 2 | Status: SHIPPED | OUTPATIENT
Start: 2018-07-24 | End: 2019-03-30

## 2018-07-24 RX ORDER — FINASTERIDE 5 MG/1
5 TABLET, FILM COATED ORAL DAILY
Qty: 90 TABLET | Refills: 2 | Status: SHIPPED | OUTPATIENT
Start: 2018-07-24 | End: 2019-03-30

## 2018-07-24 NOTE — TELEPHONE ENCOUNTER
"Requested Prescriptions   Pending Prescriptions Disp Refills     finasteride (PROSCAR) 5 MG tablet 90 tablet 2     Sig: Take 1 tablet (5 mg) by mouth daily    Alpha Blockers Passed    7/23/2018  4:03 PM       Passed - Blood pressure under 140/90 in past 12 months    BP Readings from Last 3 Encounters:   06/20/18 126/76   04/13/18 138/76   03/07/18 125/52                Passed - Recent (12 mo) or future (30 days) visit within the authorizing provider's specialty    Patient had office visit in the last 12 months or has a visit in the next 30 days with authorizing provider or within the authorizing provider's specialty.  See \"Patient Info\" tab in inbasket, or \"Choose Columns\" in Meds & Orders section of the refill encounter.           Passed - Patient does not have Tadalafil, Vardenafil, or Sildenafil on their medication list       Passed - Patient is 18 years of age or older     Last Written Prescription Date:  9/26/17  Last Fill Quantity: 90 tablet,  # refills: 2   Last office visit: 4/13/2018 with prescribing provider:  Ayesha   Future Office Visit:            pravastatin (PRAVACHOL) 20 MG tablet 90 tablet 2     Sig: Take 1 tablet (20 mg) by mouth daily    Statins Protocol Passed    7/23/2018  4:03 PM       Passed - LDL on file in past 12 months    Recent Labs   Lab Test  06/20/18   0946   LDL  40            Passed - No abnormal creatine kinase in past 12 months    No lab results found.            Passed - Recent (12 mo) or future (30 days) visit within the authorizing provider's specialty    Patient had office visit in the last 12 months or has a visit in the next 30 days with authorizing provider or within the authorizing provider's specialty.  See \"Patient Info\" tab in inbasket, or \"Choose Columns\" in Meds & Orders section of the refill encounter.           Passed - Patient is age 18 or older     Last Written Prescription Date:  9/26/17  Last Fill Quantity: 90 tablet,  # refills: 2   Last office visit: 4/13/2018 " with prescribing provider:  Ayesha   Future Office Visit:   Next 5 appointments (look out 90 days)     Sep 18, 2018  1:10 PM CDT   Return Visit with Lyndsey Cameron PA-C   [facility] ([facility])    [address] [place] [id number] [phone] OPT 2

## 2018-07-24 NOTE — TELEPHONE ENCOUNTER
Creatinine   Date Value Ref Range Status   06/20/2018 0.81 0.70 - 1.30 mg/dL Final     LDL Cholesterol Calculated   Date Value Ref Range Status   06/20/2018 40 <100 mg/dL Final     Comment:     Desirable:       <100 mg/dl     Finasteride approved per protocol. Will route pravastatin to PCP for review. There is a high interaction between pravastatin and gemfbrizil. Laura Yeboah RN

## 2018-07-25 ENCOUNTER — TELEPHONE (OUTPATIENT)
Dept: PALLIATIVE MEDICINE | Facility: CLINIC | Age: 83
End: 2018-07-25

## 2018-07-25 NOTE — TELEPHONE ENCOUNTER
[facility] Referral      1. Confirmed address with patient? Yes  2. Confirmed phone number with patient? Yes  3. Confirmed referring provider? Yes  4. Is the PCP the same as the referring provider? Yes  5. Has the patient been to any previous pain clinics? Yes - [facility]  (If yes, send MELCHOR with welcome letter)  6. Which insurance are we to bill for this appointment?  [facility]    7. Informed pt of cancellation (48 hour) policy? Yes    REGARDING OPIOID MEDICATIONS: We will always address appropriateness of opioid pain medications, but we generally will not automatically take on a prescribing role. When we do take on prescribing of opioids for chronic pain, it is in collaboration with the referring physician for an intermediate period of time (months), with an expectation that the primary physician or provider will assume the prescribing role if medications are effective at stable doses with demonstrated compliance. Therefore, please do not assume that your prescribing responsibilities end on the day of pain clinic consultation.  7. Informed pt of prescribing policy? Yes      8. Referring Provider: Hamzah Hodge    [facility]

## 2018-07-25 NOTE — LETTER
July 25, 2018    Austen Fowler  [address] [facility] [address]    Dear Austen                                                                 Welcome to the [facility].  We are located at [address] [place], [place] [address] [address] in the Primary Care Clinic. Your appointment at the [facility] has been scheduled on Wednesday, AUGUST 1ST at 11:00AM with Nicholas Nuñez MD    At your first visit, you will meet your team of caregivers who will help you to develop pain management strategies that will last a lifetime. You will meet with our support staff to review your insurance information, and collect your co-payment if required by your insurance company. You will also meet with a medical pain specialist and care coordinator who will assess your pain and develop a plan of care for your successful pain rehabilitation. You should expect to spend 1-2 hours at your first visit with us. Usually, patients work with us for a period of 6-12 months, and eventually return to their primary doctor once their pain management has stabilized.      To help us make your visit go as smoothly as possible, please bring the following items with you on your visit:     Completed Pain Questionnaire enclosed in this packet.  If you do not bring the completed questionnaire, we may have to reschedule your appointment.  List of any medicines that you are currently taking or have been prescribed  Important NON-[place] medical information such as medical records or tests results (X-rays, or laboratory tests)  Your health insurance card  Financial resources to cover your co-payment or balance due at the time of service (cash, personal check, Visa, and MasterCard are acceptable methods of payment)     Due to the demand for new patient evaluations, you must notify the scheduling department 48 hours in advance if you are not able to keep this appointment. Failure to do so could affect  your ability to reschedule with our clinic. Please be aware that we will not prescribe any medications at your first visit.     Please call [phone] with any questions regarding your appointment. We look forward to meeting you and working to address your health care needs.     Sincerely,        [facility]

## 2018-08-01 ENCOUNTER — RADIANT APPOINTMENT (OUTPATIENT)
Dept: GENERAL RADIOLOGY | Facility: CLINIC | Age: 83
End: 2018-08-01
Attending: PHYSICAL MEDICINE & REHABILITATION
Payer: COMMERCIAL

## 2018-08-01 ENCOUNTER — OFFICE VISIT (OUTPATIENT)
Dept: PODIATRY | Facility: CLINIC | Age: 83
End: 2018-08-01
Payer: COMMERCIAL

## 2018-08-01 VITALS
BODY MASS INDEX: 33.89 KG/M2 | WEIGHT: 271.1 LBS | HEART RATE: 63 BPM | DIASTOLIC BLOOD PRESSURE: 75 MMHG | OXYGEN SATURATION: 97 % | SYSTOLIC BLOOD PRESSURE: 149 MMHG

## 2018-08-01 DIAGNOSIS — M54.2 CERVICALGIA: ICD-10-CM

## 2018-08-01 DIAGNOSIS — M54.2 CERVICALGIA: Primary | ICD-10-CM

## 2018-08-01 PROCEDURE — 99205 OFFICE O/P NEW HI 60 MIN: CPT | Performed by: PHYSICAL MEDICINE & REHABILITATION

## 2018-08-01 PROCEDURE — 72050 X-RAY EXAM NECK SPINE 4/5VWS: CPT

## 2018-08-01 ASSESSMENT — PAIN SCALES - GENERAL: PAINLEVEL: MILD PAIN (2)

## 2018-08-01 NOTE — PATIENT INSTRUCTIONS
1. We discussed that you likely activated some arthritis in your neck result in your neck and referred shoulder pain.    2. I have ordered an x-ray of your neck, please have this done today before you leave clinic.    3. I have ordered cervical medial branch blocks which we discussed in clinic today.    ----------------------------------------------------------------  Nurse Triage line:  [phone]   Call this number with any questions or concerns. You may leave a detailed message anytime. Calls are typically returned Monday through Friday between 8 AM and 4:30 PM. We usually get back to you within 2 business days depending on the issue/request.       Medication refills:    For non-narcotic medications, call your pharmacy directly to request a refill. The pharmacy will contact the [facility] Center for authorization. Please allow 3-4 days for these refills to be processed.     For narcotic refills, call the nurse triage line or send a [employer] message. Please contact us 7-10 days before your refill is due. The message MUST include the name of the specific medication(s) requested and how you would like to receive the prescription(s). The options are as follows:    Pain Clinic staff can mail the prescription to your pharmacy. Please tell us the name of the pharmacy.    You may pick the prescription up at the Pain Clinic (tell us the location) or during a clinic visit with your pain provider    Pain Clinic staff can deliver the prescription to the [facility] pharmacy in the clinic building. Please tell us the location.      Scheduling number: [phone].  Call this number to schedule or change appointments.    We believe regular attendance is key to your success in our program.    Any time you are unable to keep your appointment we ask that you call us at least 24 hours in advance to let us know. This will allow us to offer the appointment time to another patient.

## 2018-08-01 NOTE — PROGRESS NOTES
[facility] Consultation    Date of visit: 7/31/2018    Reason for consultation:    Austen Fowler is a 83 year old male who is seen in consultation today at the request of his primary care physician, Hamzah Hodge.     Consultation and Evaluation for: Chronic neck and shoulder pain    Review of [place] Prescription Monitoring Program (): Today I have also reviewed the patient's history of controlled substance use, as provided by [place] licensed pharmacies and prescriber dispensers.     Review of Pain Questionnaire: Please see the [facility] health questionnaire, which the patient completed and reviewed with me in detail.    Review of Electronic Chart: Today I have also reviewed available medical information in the patient's medical record at [facility] ([facility] including relevant provider notes, laboratory work, and imaging.     Austen Fowler has not been seen at a pain clinic in the past for his neck pain.      Chief Complaint:    No chief complaint on file.      Pain history:  Austen Fowler is a 83 year old male who presents for initial evaluation of chief pain complaint of neck and shoulder pain.     Mr. Fowler started having pain on the left side of his neck and left shoulder region 2-3 days after a train trip from [place], [place] back to [place]. He denies any neck or shoulder pain prior to this. He has difficulty describing the pain, he denies pulsing or throbbing. The pain feels better with neck flexion, turning his head fast can result in pain that feels like binding. The pain started behind his shoulder blade and then up behind his ear and neck. He has some associated pins and needles sensations behind his ear as well, this happens once a week or so.     He started doing chiropractic adjustments for the past 3 months and this helps for about 40-50% and lasts for 2-3 days. He did PT for the neck at [facility] and this didn't help, he still does  the exercises that were given to him. He has used a TENS unit which helps for a little while. He has not tried any injections for the neck. Ice and heat have tended to help.    He denies any pain in his upper extremities, he denies weakness in his arms or hands, he denies difficulty with fine motor skills or his balance.     The patient denies bowel or bladder incontinence, parasthesias, weakness, saddle anesthesia, unintentional weight loss, or fever/chills/sweats.         Pain Treatments:  1. Medications:       Current pain medications:  Tylenol: helps about 20-30%       Previous pain medications:  No others  2. Physical Therapy: Tried,didn't help much     TENS unit: helps a little  3. Pain psychology: hasn't tried  4. Surgery: denies  5. Injections: None  6. Alternative Therapies:    Chiropractic: helpful    Acupuncture: none      Diagnostic tests:  MR LUMBAR SPINE WITHOUT CONTRAST   3/17/2015 2:20 PM     HISTORY: Bilateral back and leg pain with tingling. No specific  injury. History of melanoma.     TECHNIQUE: Sagittal T1 and T2 and STIR, axial proton density and T2  images.     COMPARISON: 3/10/2010.     FINDINGS: Plain films dated 3/18/2010 show probably six functional  lumbar vertebral segments. However, the prior exam report assumed five  lumbar segments. For purposes of comparison, five functional lumbar  vertebral segments are again assumed. The caudal thecal sac contents  appear intrinsically normal. Very subtle degenerative anterolisthesis  L4 on L5 without change in alignment in the sagittal plane is  otherwise normal. Vertebral bone marrow signal is unremarkable.     T12-L1: Normal.     L1-L2: Signal loss without disc space narrowing and very minimal disc  bulging. No disc protrusion or central or foraminal stenosis and no  change since prior exam. Posterior facets are normal.     L2-L3: Signal loss without disc space narrowing. Mild diffuse disc  bulging superimposed on a congenitally small bony  canal, thickening of  the ligamentum flavum and dorsal epidural fat with mild posterior  facet degenerative changes. This combination currently yields mild to  moderate central stenosis, increased since the prior exam.     L3-L4: Signal loss without disc space narrowing. Mild diffuse disc  bulging and no disc protrusion. Borderline mild central stenosis  related to multiple factors. Mild right foraminal narrowing and the  left foramen is normal. Minimal posterior facet degenerative changes.  Overall, no change since prior exam.     L4-L5: Signal loss without disc space narrowing. Diffuse disc bulging  superimposed on a congenitally small bony canal with marked thickening  of the ligamentum flavum, marked posterior facet degenerative changes  and minimal anterolisthesis. This combination again yields severe  central stenosis without change. Severe left and mild/moderate  foraminal stenosis also appears unchanged.     L5-S1: Signal loss without disc space narrowing. Mild posterior disc  bulging associated with annular fissuring. No acute disc protrusion or  central stenosis. Minimal bilateral foraminal narrowing. Mild  posterior facet degenerative changes. Overall, no change at this level  since the prior exam.     IMPRESSION  IMPRESSION:   1. 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 Mild to  moderate central stenosis at L2-L3 has increased since the prior exam.  Borderline mild central stenosis at L3-L4 and severe central stenosis  at L4-L5 related to multiple factors have remain unchanged.  2. Multilevel foraminal stenosis bilaterally without change, most  prominent at L4-L5 on the left.         Labs:   Last Comprehensive Metabolic Panel:  Sodium   Date Value Ref Range Status   06/20/2018 141 136 - 145 mmol/L Final     Potassium   Date Value Ref Range Status   06/20/2018 4.1 3.5 - 5.1 mmol/L Final     Chloride   Date Value Ref Range Status   06/20/2018 106 98 - 107 mmol/L Final     Carbon Dioxide    Date Value Ref Range Status   06/20/2018 25 23 - 29 mmol/L Final     Anion Gap   Date Value Ref Range Status   06/20/2018 14.1 6 - 17 mmol/L Final     Glucose   Date Value Ref Range Status   06/20/2018 138 (H) 70 - 105 mg/dL Final     Urea Nitrogen   Date Value Ref Range Status   06/20/2018 17 7 - 30 mg/dL Final     Creatinine   Date Value Ref Range Status   06/20/2018 0.81 0.70 - 1.30 mg/dL Final     GFR Estimate   Date Value Ref Range Status   06/20/2018 >90 >60 mL/min/1.7m2 Final     Calcium   Date Value Ref Range Status   06/20/2018 9.8 8.5 - 10.5 mg/dL Final     CBC RESULTS:   Recent Labs   Lab Test  09/20/16   1658   WBC  7.6   RBC  4.97   HGB  14.2   HCT  43.1   MCV  87   MCH  28.6   MCHC  32.9   RDW  14.6   PLT  203         Past Medical History:  Past Medical History:   Diagnosis Date     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     Bunion      CAD (coronary artery disease)     CABG 1992: LIMA to LAD, SANDI to RCA, SVG to OM1 and OM2     Contact dermatitis and other eczema, due to unspecified cause     eczema - has light treatments with Dr. Rivera     Disorders of bursae and tendons in shoulder region, unspecified      Esophageal reflux      Essential hypertension, benign      Gallbladder disease      GERD (gastroesophageal reflux disease)      Heart attack      Hyperlipidemia LDL goal <70      Left ventricular diastolic dysfunction      Low HDL (under 40) 3/28/2014     Nasal/sinus dis NEC     s/p surgery in 1995     Obesity      Osteoarthrosis, unspecified whether generalized or localized, shoulder region      Other hammer toe (acquired)      Sleep apnea     USES CPAP     Spinal stenosis, unspecified region other than cervical     MRI done 2006     Type 2 diabetes, HbA1c goal < 7% (H) 3/12/2015     Yayo  incontinence        Past Surgical History:  Past Surgical History:   Procedure Laterality Date     C NONSPECIFIC PROCEDURE  11-92    CABG - LIMA to left anterior descending and saphenous vein bypass graft to the first and second obtuse marginal branch of the circumflex and the right mammary to the right coronary artery     C NONSPECIFIC PROCEDURE  6-94    Gail lap     C NONSPECIFIC PROCEDURE  5-92, 6-92    Angioplasty     C NONSPECIFIC PROCEDURE  1995    sinus surgery     C NONSPECIFIC PROCEDURE      bilateral cataract surgery     COLONOSCOPY N/A 4/11/2017    Procedure: COMBINED COLONOSCOPY, SINGLE OR MULTIPLE BIOPSY/POLYPECTOMY BY BIOPSY;  Surgeon: Bladimir Garner MD;  Location:  GI     ESOPHAGOSCOPY, GASTROSCOPY, DUODENOSCOPY (EGD), DILATATION, COMBINED  7/17/2014    Procedure: COMBINED ESOPHAGOSCOPY, GASTROSCOPY, DUODENOSCOPY (EGD), DILATATION;  Surgeon: Bladimir Garner MD;  Location:  GI     HC COLONOSCOPY THRU STOMA W BIOPSY/CAUTERY TUMOR/POLYP/LESION  5/2007     INJECT EPIDURAL LUMBAR       LAMINECTOMY LUMBAR TWO LEVELS N/A 4/29/2015    Procedure: LAMINECTOMY LUMBAR TWO LEVELS;  Surgeon: Bladimir Keenan MD;  Location:  OR       Medications:  Current Outpatient Prescriptions   Medication Sig Dispense Refill     acetaminophen (ACETAMIN) 500 MG tablet Take 2 tablets by mouth 2 times daily as needed        amLODIPine (NORVASC) 2.5 MG tablet Take 1 tablet (2.5 mg) by mouth 2 times daily 180 tablet 3     aspirin 81 MG tablet Take 1 tablet (81 mg) by mouth daily 30 tablet 3     blood glucose monitoring (ONE TOUCH ULTRASOFT) lancets Use to test blood sugar 2 times daily or as directed. 200 each 1     calcium carbonate (TUMS) 500 MG chewable tablet Take 1 chew tab by mouth as needed        Cholecalciferol (VITAMIN D) 2000 UNIT tablet Take 2,000 Units by mouth daily. 90 tablet 3     diclofenac (FLECTOR) 1.3 % Patch Place 1 patch onto the skin 2 times daily 30 patch 1     finasteride (PROSCAR) 5 MG tablet  Take 1 tablet (5 mg) by mouth daily 90 tablet 2     gabapentin (NEURONTIN) 300 MG capsule Take 900 mg three times daily (Patient taking differently: 2 times daily Take 900 mg three times daily) 810 capsule 2     gemfibrozil (LOPID) 600 MG tablet Take 1 tablet (600 mg) by mouth 2 times daily 180 tablet 2     lisinopril (PRINIVIL/ZESTRIL) 20 MG tablet Take 1 tablet (20 mg) by mouth daily 90 tablet 3     METAMUCIL 1.7 GM OR WAFR TAKE AS DIRECTED       metFORMIN (GLUCOPHAGE-XR) 500 MG 24 hr tablet Take 4 tablets (2,000 mg) by mouth daily (with breakfast) 360 tablet 1     metoprolol succinate (TOPROL-XL) 50 MG 24 hr tablet        omeprazole (PRILOSEC) 20 MG CR capsule Take 1 capsule (20 mg) by mouth daily 180 capsule 3     ONE TOUCH ULTRA test strip Use to test blood sugars    two times a day or as  directed 200 strip 1     ORDER FOR DME Uses Cpap machine for sleep apnea       pravastatin (PRAVACHOL) 20 MG tablet Take 1 tablet (20 mg) by mouth daily 90 tablet 2     propranolol HCl 60 MG TABS Take 1 tablet (60 mg) by mouth 2 times daily (Patient not taking: Reported on 6/20/2018) 180 tablet 3     salicylic acid (MEDIPLAST) 40 % MISC Apply 1 dose. topically At Bedtime Each night, Scrape or loofa the wart(s) and then soak foot for 10-15 min in warm water.  Cut plaster to fit exactly over 1 wart each.  Tape each in place for overnight and remove the next morning. 1 each 11     tamsulosin (FLOMAX) 0.4 MG capsule TAKE 1 CAPSULE BY MOUTH  DAILY AS NEEDED 90 capsule 3     tamsulosin (FLOMAX) 0.4 MG capsule Take 0.4 mg by mouth daily       triamcinolone (KENALOG) 0.5 % cream Apply  topically 2 times daily. to affected area as directed PRN 15 g 0       Allergies:   No Known Allergies    Social History:  Home situation: Lives in [place] with his wife  Occupation/Schooling: computer data processing  Tobacco use: denies  Drug use: denies  Alcohol use: denies  History of chemical dependency treatment: denies  Mental health  admissions: denies    Family history:  Family History   Problem Relation Age of Onset     Cancer Brother      MELANOMA     Diabetes Mother      AODM     Diabetes Father      AODM     Myocardial Infarction Father      Cerebrovascular Disease Brother      Family History Negative Brother      Family History Negative Sister      Family History Negative Son      Family History Negative Son      Family History Negative Son      Family History Negative Daughter        Review of Systems:    POSTIVE IN BOLD  GENERAL: fever/chills, fatigue, general unwell feeling, weight gain/loss.  HEAD/EYES:  headache, dizziness, or vision changes.    EARS/NOSE/THROAT:  Nosebleeds, hearing loss, sinus infection, earache, tinnitus.  IMMUNE:  Allergies, cancer, immune deficiency, or infections.  SKIN:  Urticaria, rash, hives  HEME/Lymphatic:   anemia, easy bruising, easy bleeding.  RESPIRATORY:  cough, wheezing, or shortness of breath  CARDIOVASCULAR/Circulation:  Extremity edema, syncope, hypertension, tachycardia, or angina.  GASTROINTESTINAL:  abdominal pain, nausea/emesis, diarrhea, constipation,  hematochezia, or melena.  ENDOCRINE:  Diabetes, steroid use,  thyroid disease or osteoporosis.  MUSCULOSKELETAL: neck pain, back pain, arthralgia, arthritis, or gout.  GENITOURINARY:  frequency, hesitancy, urgency, dysuria, difficulty voiding, hematuria or incontinence.  NEUROLOGIC:  weakness, numbness, paresthesias, seizure, tremor, stroke or memory loss.  PSYCHIATRIC:  depression, anxiety, stress, suicidal thoughts or mood swings.     Physical Exam:  There were no vitals filed for this visit.  Exam:  Constitutional: Well developed, well nourished, appears stated age.  HEENT: Head atraumatic, normocephalic. Eyes without conjunctival injection or jaundice. Oropharynx clear. Neck supple. No obvious neck masses.  Cardiovascular: Regular rate/rhythm; no murmurs/rubs/gallops appreciated.  Respiratory: Lungs clear to auscultation bilaterally, no  wheezing/rales/rhonchi.   Gastrointestinal: Normoactive bowel sounds. Abdomen soft, non-tender, without guarding/rebound.  Skin: No rash, lesions, or petechiae of exposed skin.   Extremities: Peripheral pulses intact. No clubbing, cyanosis, or edema.  Psychiatric/mental status: Alert, without lethargy or stupor. Speech fluent. Appropriate affect. Mood normal. Able to follow commands without difficulty.     Musculoskeletal exam:  Gait/Station/Posture: narrow based, steady gait  Normal stance, arm swing, and stride; no antalgia or Trendelenburg  Normal bulk and tone. Unremarkable spinal curvature.     Cervical spine:  Range of motion within normal limits   Myofascial tenderness: Tender along the paraspinals overlying the C3-6 left sided paraspinals.  No focal spinous process tenderness.   Spurling's negative bilaterally.      Lumbar spine:  Range of motion within normal limits   Myofascial tenderness:  None  Focal tenderness: Mild PSIS/SIJ tenderness bilaterally. No gluteal, piriformis, GT, or IT tenderness    Shoulder exam:   No shoulder girdle wasting or scapular dyskinesis. No palmar muscle wasting. No tenderness of bicipital groove, AC, GH, or SC joints. Shoulder range of motion within normal limits. Israel', Neers, and empty-can are negative.   The trapezius, rhomboid and levator muscles are tender to palpation, no trigger points palpated in the periscapular muscles bilaterally.    Neurologic exam:  CN:  Cranial nerves 2-12 are grossly intact  Motor Strength:              Shoulder shrug (C4)      R: 5/5 L: 5/5  Shoulder abduction (deltoid C5,6)    R: 5/5 L: 5/5  Elbow flexion (bicepts C5,6)      R: 5/5 L: 5/5  Elbow extension (tricepts C7)     R: 5/5 L: 5/5  Finger abduction (C8)      R: 5/5 L: 5/5  Thumb flexion (C8)       R: 5/5 L: 5/5        Hip flexion (Iliosoas L1,2,3)     R: 5/5 L: 5/5  Knee extension (quadricepts L2,3,4)    R: 5/5 L: 5/5  Ankle dorsiflexion (tibialis anterior L4,5)   R: 5/5 L:  5/5    Ankle plantarflexion (gastrocnemius, soleus S1-2)  R: 5/5 L: 5/5  Big toe extension (ext. Berg lungus L5,S1)   R: 5/5 L: 5/5       Reflexes:     Biceps C5:     R:  1/4 L: 1/4   Brachioradialis  C6: R:  1/4 L: 1/4   Triceps C7:  R:  1/4 L: 1/4   Patella L4:  R:  1/4 L: 1/4   Achilles S1:  R:  1/4 L: 1/4    Other reflexes:  Toes downgoing, musa's is negative bilaterally    Sensory:  (upper and lower extremities):   Light touch and pin prick: normal    Allodynia: absent    Hyperalgesia: absent         Assessment:  1. Left sided neck and shoulder pain: Pain is localized to the parspinals overlying the C3-6 facet joints and radiates into his shoulder. Denies any pain radiating below his shoulders, denies weakness and red flag signs. Pain is likely mediated by cervical spondylosis and facet arthropathy. Based on history and exam radiculopathy, myelopathy or plexopathy are highly unlikely.  2. Chronic low back pain: Has had significant relief from SIJ injections in the past and will continue this therapy in addition to his usual exercises.        Plan:  The following recommendations were given to the patient. Diagnosis, treatment options, risks, benefits, and alternatives were discussed, and all questions were answered. The patient expressed understanding of the plan for management.     I am recommending a multidisciplinary treatment plan to help this patient better manage his pain.  This includes:     1. Physical Therapy: Has done PT at [facility] in the past for his neck, recommended that he continue to adhere to their exercise recommendations.  2. Clinical Health Pain Psychologist: Defer for now, however if there appears to be a significant emotional/psychological component in the future will consult pain psychology.   3. Self Care Recommendations: Michael progressive exercise that does not increase pain - gradually increase daily walking program.  Take mini breaks - 5 minutes of mindfullness a couple times a day.    4. Diagnostic Studies: C-Spine xrays were ordered.  5. Medication Management: Prescribed voltaren gel to be applied to his neck QID as needed. This will need prior authorization from his insurance, patient alerted to this.  6. Further procedures recommended: Ordered cervical MBB, likely for the 3,4,5 levels but will review cervical xrays prior to deciding. DIscussed MBB/RFA with the patient and he was agreeable with this plan.  7. Follow up: will evaluate response to MBB/RFA then schedule follow up in clinic as needed.      I spent 60 minutes of time face to face with the patient.  Greater than 50% of this time was spent in patient counseling and/or coordination of care regarding principles of multidisciplinary care, medication management, and treatment options as discussed above.         Nicholas Nuñez,   [facility]

## 2018-08-01 NOTE — LETTER
8/1/2018         RE: Austen Fowler  [address]  [facility] [id number]        Dear Colleague,    Thank you for referring your patient, Austen Fowler, to the [facility]. Please see a copy of my visit note below.                          [facility] Consultation    Date of visit: 7/31/2018    Reason for consultation:    Austen Fowler is a 83 year old male who is seen in consultation today at the request of his primary care physician, Hamzah Hodge.     Consultation and Evaluation for: Chronic neck and shoulder pain    Review of [place] Prescription Monitoring Program (): Today I have also reviewed the patient's history of controlled substance use, as provided by [place] licensed pharmacies and prescriber dispensers.     Review of Pain Questionnaire: Please see the [facility] health questionnaire, which the patient completed and reviewed with me in detail.    Review of Electronic Chart: Today I have also reviewed available medical information in the patient's medical record at [facility] ([facility] including relevant provider notes, laboratory work, and imaging.     Austen Fowler has not been seen at a pain clinic in the past for his neck pain.      Chief Complaint:    No chief complaint on file.      Pain history:  Austen Fowler is a 83 year old male who presents for initial evaluation of chief pain complaint of neck and shoulder pain.     Mr. Fowler started having pain on the left side of his neck and left shoulder region 2-3 days after a train trip from [place], [place] back to [place]. He denies any neck or shoulder pain prior to this. He has difficulty describing the pain, he denies pulsing or throbbing. The pain feels better with neck flexion, turning his head fast can result in pain that feels like binding. The pain started behind his shoulder blade and then up behind his ear and neck. He has some associated pins and needles  sensations behind his ear as well, this happens once a week or so.     He started doing chiropractic adjustments for the past 3 months and this helps for about 40-50% and lasts for 2-3 days. He did PT for the neck at [facility] and this didn't help, he still does the exercises that were given to him. He has used a TENS unit which helps for a little while. He has not tried any injections for the neck. Ice and heat have tended to help.    He denies any pain in his upper extremities, he denies weakness in his arms or hands, he denies difficulty with fine motor skills or his balance.     The patient denies bowel or bladder incontinence, parasthesias, weakness, saddle anesthesia, unintentional weight loss, or fever/chills/sweats.         Pain Treatments:  1. Medications:       Current pain medications:  Tylenol: helps about 20-30%       Previous pain medications:  No others  2. Physical Therapy: Tried,didn't help much     TENS unit: helps a little  3. Pain psychology: hasn't tried  4. Surgery: denies  5. Injections: None  6. Alternative Therapies:    Chiropractic: helpful    Acupuncture: none      Diagnostic tests:  MR LUMBAR SPINE WITHOUT CONTRAST   3/17/2015 2:20 PM     HISTORY: Bilateral back and leg pain with tingling. No specific  injury. History of melanoma.     TECHNIQUE: Sagittal T1 and T2 and STIR, axial proton density and T2  images.     COMPARISON: 3/10/2010.     FINDINGS: Plain films dated 3/18/2010 show probably six functional  lumbar vertebral segments. However, the prior exam report assumed five  lumbar segments. For purposes of comparison, five functional lumbar  vertebral segments are again assumed. The caudal thecal sac contents  appear intrinsically normal. Very subtle degenerative anterolisthesis  L4 on L5 without change in alignment in the sagittal plane is  otherwise normal. Vertebral bone marrow signal is unremarkable.     T12-L1: Normal.     L1-L2: Signal loss without disc space narrowing and very  minimal disc  bulging. No disc protrusion or central or foraminal stenosis and no  change since prior exam. Posterior facets are normal.     L2-L3: Signal loss without disc space narrowing. Mild diffuse disc  bulging superimposed on a congenitally small bony canal, thickening of  the ligamentum flavum and dorsal epidural fat with mild posterior  facet degenerative changes. This combination currently yields mild to  moderate central stenosis, increased since the prior exam.     L3-L4: Signal loss without disc space narrowing. Mild diffuse disc  bulging and no disc protrusion. Borderline mild central stenosis  related to multiple factors. Mild right foraminal narrowing and the  left foramen is normal. Minimal posterior facet degenerative changes.  Overall, no change since prior exam.     L4-L5: Signal loss without disc space narrowing. Diffuse disc bulging  superimposed on a congenitally small bony canal with marked thickening  of the ligamentum flavum, marked posterior facet degenerative changes  and minimal anterolisthesis. This combination again yields severe  central stenosis without change. Severe left and mild/moderate  foraminal stenosis also appears unchanged.     L5-S1: Signal loss without disc space narrowing. Mild posterior disc  bulging associated with annular fissuring. No acute disc protrusion or  central stenosis. Minimal bilateral foraminal narrowing. Mild  posterior facet degenerative changes. Overall, no change at this level  since the prior exam.     IMPRESSION  IMPRESSION:   1. 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 Mild to  moderate central stenosis at L2-L3 has increased since the prior exam.  Borderline mild central stenosis at L3-L4 and severe central stenosis  at L4-L5 related to multiple factors have remain unchanged.  2. Multilevel foraminal stenosis bilaterally without change, most  prominent at L4-L5 on the left.         Labs:   Last Comprehensive Metabolic  Panel:  Sodium   Date Value Ref Range Status   06/20/2018 141 136 - 145 mmol/L Final     Potassium   Date Value Ref Range Status   06/20/2018 4.1 3.5 - 5.1 mmol/L Final     Chloride   Date Value Ref Range Status   06/20/2018 106 98 - 107 mmol/L Final     Carbon Dioxide   Date Value Ref Range Status   06/20/2018 25 23 - 29 mmol/L Final     Anion Gap   Date Value Ref Range Status   06/20/2018 14.1 6 - 17 mmol/L Final     Glucose   Date Value Ref Range Status   06/20/2018 138 (H) 70 - 105 mg/dL Final     Urea Nitrogen   Date Value Ref Range Status   06/20/2018 17 7 - 30 mg/dL Final     Creatinine   Date Value Ref Range Status   06/20/2018 0.81 0.70 - 1.30 mg/dL Final     GFR Estimate   Date Value Ref Range Status   06/20/2018 >90 >60 mL/min/1.7m2 Final     Calcium   Date Value Ref Range Status   06/20/2018 9.8 8.5 - 10.5 mg/dL Final     CBC RESULTS:   Recent Labs   Lab Test  09/20/16   1658   WBC  7.6   RBC  4.97   HGB  14.2   HCT  43.1   MCV  87   MCH  28.6   MCHC  32.9   RDW  14.6   PLT  203         Past Medical History:  Past Medical History:   Diagnosis Date     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     Bunion      CAD (coronary artery disease)     CABG 1992: LIMA to LAD, SANDI to RCA, SVG to OM1 and OM2     Contact dermatitis and other eczema, due to unspecified cause     eczema - has light treatments with Dr. Rivera     Disorders of bursae and tendons in shoulder region, unspecified      Esophageal reflux      Essential hypertension, benign      Gallbladder disease      GERD (gastroesophageal reflux disease)      Heart attack      Hyperlipidemia LDL goal <70      Left ventricular diastolic dysfunction      Low HDL (under 40) 3/28/2014     Nasal/sinus dis NEC     s/p surgery in 1995     Obesity      Osteoarthrosis,  unspecified whether generalized or localized, shoulder region      Other hammer toe (acquired)      Sleep apnea     USES CPAP     Spinal stenosis, unspecified region other than cervical     MRI done 2006     Type 2 diabetes, HbA1c goal < 7% (H) 3/12/2015     Urge incontinence        Past Surgical History:  Past Surgical History:   Procedure Laterality Date     C NONSPECIFIC PROCEDURE  11-92    CABG - LIMA to left anterior descending and saphenous vein bypass graft to the first and second obtuse marginal branch of the circumflex and the right mammary to the right coronary artery     C NONSPECIFIC PROCEDURE  6-94    Gail lap     C NONSPECIFIC PROCEDURE  5-92, 6-92    Angioplasty     C NONSPECIFIC PROCEDURE  1995    sinus surgery     C NONSPECIFIC PROCEDURE      bilateral cataract surgery     COLONOSCOPY N/A 4/11/2017    Procedure: COMBINED COLONOSCOPY, SINGLE OR MULTIPLE BIOPSY/POLYPECTOMY BY BIOPSY;  Surgeon: Bladimir Garner MD;  Location:  GI     ESOPHAGOSCOPY, GASTROSCOPY, DUODENOSCOPY (EGD), DILATATION, COMBINED  7/17/2014    Procedure: COMBINED ESOPHAGOSCOPY, GASTROSCOPY, DUODENOSCOPY (EGD), DILATATION;  Surgeon: Bladimir Garner MD;  Location:  GI     HC COLONOSCOPY THRU STOMA W BIOPSY/CAUTERY TUMOR/POLYP/LESION  5/2007     INJECT EPIDURAL LUMBAR       LAMINECTOMY LUMBAR TWO LEVELS N/A 4/29/2015    Procedure: LAMINECTOMY LUMBAR TWO LEVELS;  Surgeon: Bladimir Keenan MD;  Location:  OR       Medications:  Current Outpatient Prescriptions   Medication Sig Dispense Refill     acetaminophen (ACETAMIN) 500 MG tablet Take 2 tablets by mouth 2 times daily as needed        amLODIPine (NORVASC) 2.5 MG tablet Take 1 tablet (2.5 mg) by mouth 2 times daily 180 tablet 3     aspirin 81 MG tablet Take 1 tablet (81 mg) by mouth daily 30 tablet 3     blood glucose monitoring (ONE TOUCH ULTRASOFT) lancets Use to test blood sugar 2 times daily or as directed. 200 each 1     calcium carbonate (TUMS) 500 MG chewable  tablet Take 1 chew tab by mouth as needed        Cholecalciferol (VITAMIN D) 2000 UNIT tablet Take 2,000 Units by mouth daily. 90 tablet 3     diclofenac (FLECTOR) 1.3 % Patch Place 1 patch onto the skin 2 times daily 30 patch 1     finasteride (PROSCAR) 5 MG tablet Take 1 tablet (5 mg) by mouth daily 90 tablet 2     gabapentin (NEURONTIN) 300 MG capsule Take 900 mg three times daily (Patient taking differently: 2 times daily Take 900 mg three times daily) 810 capsule 2     gemfibrozil (LOPID) 600 MG tablet Take 1 tablet (600 mg) by mouth 2 times daily 180 tablet 2     lisinopril (PRINIVIL/ZESTRIL) 20 MG tablet Take 1 tablet (20 mg) by mouth daily 90 tablet 3     METAMUCIL 1.7 GM OR WAFR TAKE AS DIRECTED       metFORMIN (GLUCOPHAGE-XR) 500 MG 24 hr tablet Take 4 tablets (2,000 mg) by mouth daily (with breakfast) 360 tablet 1     metoprolol succinate (TOPROL-XL) 50 MG 24 hr tablet        omeprazole (PRILOSEC) 20 MG CR capsule Take 1 capsule (20 mg) by mouth daily 180 capsule 3     ONE TOUCH ULTRA test strip Use to test blood sugars    two times a day or as  directed 200 strip 1     ORDER FOR DME Uses Cpap machine for sleep apnea       pravastatin (PRAVACHOL) 20 MG tablet Take 1 tablet (20 mg) by mouth daily 90 tablet 2     propranolol HCl 60 MG TABS Take 1 tablet (60 mg) by mouth 2 times daily (Patient not taking: Reported on 6/20/2018) 180 tablet 3     salicylic acid (MEDIPLAST) 40 % MISC Apply 1 dose. topically At Bedtime Each night, Scrape or loofa the wart(s) and then soak foot for 10-15 min in warm water.  Cut plaster to fit exactly over 1 wart each.  Tape each in place for overnight and remove the next morning. 1 each 11     tamsulosin (FLOMAX) 0.4 MG capsule TAKE 1 CAPSULE BY MOUTH  DAILY AS NEEDED 90 capsule 3     tamsulosin (FLOMAX) 0.4 MG capsule Take 0.4 mg by mouth daily       triamcinolone (KENALOG) 0.5 % cream Apply  topically 2 times daily. to affected area as directed PRN 15 g 0       Allergies:   No  Known Allergies    Social History:  Home situation: Lives in [facility] with his wife  Occupation/Schooling: computer data processing  Tobacco use: denies  Drug use: denies  Alcohol use: denies  History of chemical dependency treatment: denies  Mental health admissions: denies    Family history:  Family History   Problem Relation Age of Onset     Cancer Brother      MELANOMA     Diabetes Mother      AODM     Diabetes Father      AODM     Myocardial Infarction Father      Cerebrovascular Disease Brother      Family History Negative Brother      Family History Negative Sister      Family History Negative Son      Family History Negative Son      Family History Negative Son      Family History Negative Daughter        Review of Systems:    POSTIVE IN BOLD  GENERAL: fever/chills, fatigue, general unwell feeling, weight gain/loss.  HEAD/EYES:  headache, dizziness, or vision changes.    EARS/NOSE/THROAT:  Nosebleeds, hearing loss, sinus infection, earache, tinnitus.  IMMUNE:  Allergies, cancer, immune deficiency, or infections.  SKIN:  Urticaria, rash, hives  HEME/Lymphatic:   anemia, easy bruising, easy bleeding.  RESPIRATORY:  cough, wheezing, or shortness of breath  CARDIOVASCULAR/Circulation:  Extremity edema, syncope, hypertension, tachycardia, or angina.  GASTROINTESTINAL:  abdominal pain, nausea/emesis, diarrhea, constipation,  hematochezia, or melena.  ENDOCRINE:  Diabetes, steroid use,  thyroid disease or osteoporosis.  MUSCULOSKELETAL: neck pain, back pain, arthralgia, arthritis, or gout.  GENITOURINARY:  frequency, hesitancy, urgency, dysuria, difficulty voiding, hematuria or incontinence.  NEUROLOGIC:  weakness, numbness, paresthesias, seizure, tremor, stroke or memory loss.  PSYCHIATRIC:  depression, anxiety, stress, suicidal thoughts or mood swings.     Physical Exam:  There were no vitals filed for this visit.  Exam:  Constitutional: Well developed, well nourished, appears stated age.  HEENT: Head  atraumatic, normocephalic. Eyes without conjunctival injection or jaundice. Oropharynx clear. Neck supple. No obvious neck masses.  Cardiovascular: Regular rate/rhythm; no murmurs/rubs/gallops appreciated.  Respiratory: Lungs clear to auscultation bilaterally, no wheezing/rales/rhonchi.   Gastrointestinal: Normoactive bowel sounds. Abdomen soft, non-tender, without guarding/rebound.  Skin: No rash, lesions, or petechiae of exposed skin.   Extremities: Peripheral pulses intact. No clubbing, cyanosis, or edema.  Psychiatric/mental status: Alert, without lethargy or stupor. Speech fluent. Appropriate affect. Mood normal. Able to follow commands without difficulty.     Musculoskeletal exam:  Gait/Station/Posture: narrow based, steady gait  Normal stance, arm swing, and stride; no antalgia or Trendelenburg  Normal bulk and tone. Unremarkable spinal curvature.     Cervical spine:  Range of motion within normal limits   Myofascial tenderness: Tender along the paraspinals overlying the C3-6 left sided paraspinals.  No focal spinous process tenderness.   Spurling's negative bilaterally.      Lumbar spine:  Range of motion within normal limits   Myofascial tenderness:  None  Focal tenderness: Mild PSIS/SIJ tenderness bilaterally. No gluteal, piriformis, GT, or IT tenderness    Shoulder exam:   No shoulder girdle wasting or scapular dyskinesis. No palmar muscle wasting. No tenderness of bicipital groove, AC, GH, or SC joints. Shoulder range of motion within normal limits. Israel', Neers, and empty-can are negative.   The trapezius, rhomboid and levator muscles are tender to palpation, no trigger points palpated in the periscapular muscles bilaterally.    Neurologic exam:  CN:  Cranial nerves 2-12 are grossly intact  Motor Strength:              Shoulder shrug (C4)      R: 5/5 L: 5/5  Shoulder abduction (deltoid C5,6)    R: 5/5 L: 5/5  Elbow flexion (bicepts C5,6)      R: 5/5 L: 5/5  Elbow extension (tricepts C7)     R: 5/5 L:  5/5  Finger abduction (C8)      R: 5/5 L: 5/5  Thumb flexion (C8)       R: 5/5 L: 5/5        Hip flexion (Iliosoas L1,2,3)     R: 5/5 L: 5/5  Knee extension (quadricepts L2,3,4)    R: 5/5 L: 5/5  Ankle dorsiflexion (tibialis anterior L4,5)   R: 5/5 L: 5/5    Ankle plantarflexion (gastrocnemius, soleus S1-2)  R: 5/5 L: 5/5  Big toe extension (ext. Berg lungus L5,S1)   R: 5/5 L: 5/5       Reflexes:     Biceps C5:     R:  1/4 L: 1/4   Brachioradialis  C6: R:  1/4 L: 1/4   Triceps C7:  R:  1/4 L: 1/4   Patella L4:  R:  1/4 L: 1/4   Achilles S1:  R:  1/4 L: 1/4    Other reflexes:  Toes downgoing, musa's is negative bilaterally    Sensory:  (upper and lower extremities):   Light touch and pin prick: normal    Allodynia: absent    Hyperalgesia: absent         Assessment:  1. Left sided neck and shoulder pain: Pain is localized to the parspinals overlying the C3-6 facet joints and radiates into his shoulder. Denies any pain radiating below his shoulders, denies weakness and red flag signs. Pain is likely mediated by cervical spondylosis and facet arthropathy. Based on history and exam radiculopathy, myelopathy or plexopathy are highly unlikely.  2. Chronic low back pain: Has had significant relief from SIJ injections in the past and will continue this therapy in addition to his usual exercises.        Plan:  The following recommendations were given to the patient. Diagnosis, treatment options, risks, benefits, and alternatives were discussed, and all questions were answered. The patient expressed understanding of the plan for management.     I am recommending a multidisciplinary treatment plan to help this patient better manage his pain.  This includes:     1. Physical Therapy: Has done PT at [facility] in the past for his neck, recommended that he continue to adhere to their exercise recommendations.  2. Clinical Health Pain Psychologist: Defer for now, however if there appears to be a significant emotional/psychological  component in the future will consult pain psychology.   3. Self Care Recommendations: Michael progressive exercise that does not increase pain - gradually increase daily walking program.  Take mini breaks - 5 minutes of mindfullness a couple times a day.   4. Diagnostic Studies: C-Spine xrays were ordered.  5. Medication Management: Prescribed voltaren gel to be applied to his neck QID as needed. This will need prior authorization from his insurance, patient alerted to this.  6. Further procedures recommended: Ordered cervical MBB, likely for the 3,4,5 levels but will review cervical xrays prior to deciding. DIscussed MBB/RFA with the patient and he was agreeable with this plan.  7. Follow up: will evaluate response to MBB/RFA then schedule follow up in clinic as needed.      I spent 60 minutes of time face to face with the patient.  Greater than 50% of this time was spent in patient counseling and/or coordination of care regarding principles of multidisciplinary care, medication management, and treatment options as discussed above.         Nicholas Nuñez,   [facility]       Again, thank you for allowing me to participate in the care of your patient.        Sincerely,        Nicholas Nuñez MD

## 2018-08-01 NOTE — MR AVS SNAPSHOT
After Visit Summary   8/1/2018    Austen Fowler    MRN: [id number]           Patient Information     Date Of Birth          1935        Visit Information        Provider Department      8/1/2018 11:00 AM Nicholas Nuñez MD [facility]        Today's Diagnoses     Cervicalgia    -  1      Care Instructions    1. We discussed that you likely activated some arthritis in your neck result in your neck and referred shoulder pain.    2. I have ordered an x-ray of your neck, please have this done today before you leave clinic.    3. I have ordered cervical medial branch blocks which we discussed in clinic today.    ----------------------------------------------------------------  Nurse Triage line:  [phone]   Call this number with any questions or concerns. You may leave a detailed message anytime. Calls are typically returned Monday through Friday between 8 AM and 4:30 PM. We usually get back to you within 2 business days depending on the issue/request.       Medication refills:    For non-narcotic medications, call your pharmacy directly to request a refill. The pharmacy will contact the [facility] for authorization. Please allow 3-4 days for these refills to be processed.     For narcotic refills, call the nurse triage line or send a [employer] message. Please contact us 7-10 days before your refill is due. The message MUST include the name of the specific medication(s) requested and how you would like to receive the prescription(s). The options are as follows:    Pain Clinic staff can mail the prescription to your pharmacy. Please tell us the name of the pharmacy.    You may pick the prescription up at the Pain Clinic (tell us the location) or during a clinic visit with your pain provider    Pain Clinic staff can deliver the prescription to the [place] pharmacy in the clinic building. Please tell us the location.      Scheduling number: [phone].  Call  this number to schedule or change appointments.    We believe regular attendance is key to your success in our program.    Any time you are unable to keep your appointment we ask that you call us at least 24 hours in advance to let us know. This will allow us to offer the appointment time to another patient.               Follow-ups after your visit        Additional Services     PAIN INJECTION EVAL/TREAT/FOLLOW UP                 Your next 10 appointments already scheduled     Sep 18, 2018 12:10 PM CDT   LAB with [facility]   [facility] ([facility])    [address] [place] [id number]   [phone]           Please do not eat 10-12 hours before your appointment if you are coming in fasting for labs on lipids, cholesterol, or glucose (sugar). This does not apply to pregnant women. Water, hot tea and black coffee (with nothing added) are okay. Do not drink other fluids, diet soda or chew gum.            Sep 18, 2018  1:10 PM CDT   Return Visit with Lyndsey Cameron PA-C   [facility] ([facility])    [address] [place], [place] [id number]  [facility] [address]   [phone] OPT 2              Future tests that were ordered for you today     Open Future Orders        Priority Expected Expires Ordered    XR Cervical Spine G/E 4 Views Routine 8/1/2018 8/1/2019 8/1/2018            Who to contact     If you have questions or need follow up information about today's clinic visit or your schedule please contact [facility] directly at [phone].  Normal or non-critical lab and imaging results will be communicated to you by MyChart, letter or phone within 4 business days after the clinic has received the results. If you do not hear from us within 7 days, please contact the clinic through MyChart or phone. If you have a critical or abnormal lab result, we will notify you by phone as soon as  possible.  Submit refill requests through [employer] or call your pharmacy and they will forward the refill request to us. Please allow 3 business days for your refill to be completed.          Additional Information About Your Visit        [employer] Information     [employer] gives you secure access to your electronic health record. If you see a primary care provider, you can also send messages to your care team and make appointments. If you have questions, please call your primary care clinic.  If you do not have a primary care provider, please call [phone] and they will assist you.        Care EveryWhere ID     This is your Care EveryWhere ID. This could be used by other organizations to access your [place] medical records  [id number]        Your Vitals Were     Pulse Pulse Oximetry BMI (Body Mass Index)             63 97% 33.89 kg/m2          Blood Pressure from Last 3 Encounters:   08/01/18 149/75   06/20/18 126/76   04/13/18 138/76    Weight from Last 3 Encounters:   08/01/18 271 lb 1.6 oz (123 kg)   06/20/18 271 lb (122.9 kg)   04/13/18 275 lb (124.7 kg)              We Performed the Following     PAIN INJECTION EVAL/TREAT/FOLLOW UP          Today's Medication Changes          These changes are accurate as of 8/1/18 11:18 AM.  If you have any questions, ask your nurse or doctor.               These medicines have changed or have updated prescriptions.        Dose/Directions    gabapentin 300 MG capsule   Commonly known as:  NEURONTIN   This may have changed:    - when to take this  - additional instructions   Used for:  Chronic pain syndrome, Sacroiliac joint pain        Take 900 mg three times daily   Quantity:  810 capsule   Refills:  2                Primary Care Provider Office Phone # Fax #    Bhandrés Hodge [phone] [address] [facility] [address]  [facility] [address]        Equal Access to Services     CELESTE FRANCIS AH: rafy Meneses qaybta kaalmada  janice robertleni augustaan ah. [facility] [phone].    ATENCIÓN: Si bere moran, tiene a hagen disposición servicios gratuitos de asistencia lingüística. Rebecca al [phone].    We comply with applicable federal civil rights laws and [place] laws. We do not discriminate on the basis of race, color, national origin, age, disability, sex, sexual orientation, or gender identity.            Thank you!     Thank you for choosing [facility]  for your care. Our goal is always to provide you with excellent care. Hearing back from our patients is one way we can continue to improve our services. Please take a few minutes to complete the written survey that you may receive in the mail after your visit with us. Thank you!             Your Updated Medication List - Protect others around you: Learn how to safely use, store and throw away your medicines at www.disposemymeds.org.          This list is accurate as of 8/1/18 11:18 AM.  Always use your most recent med list.                   Brand Name Dispense Instructions for use Diagnosis    ACETAMIN 500 MG tablet   Generic drug:  acetaminophen      Take 2 tablets by mouth 2 times daily as needed        amLODIPine 2.5 MG tablet    NORVASC    180 tablet    Take 1 tablet (2.5 mg) by mouth 2 times daily    Essential hypertension, benign       aspirin 81 MG tablet     30 tablet    Take 1 tablet (81 mg) by mouth daily    CAD (coronary artery disease)       blood glucose monitoring lancets     200 each    Use to test blood sugar 2 times daily or as directed.    Other abnormal glucose       calcium carbonate 500 MG chewable tablet    TUMS     Take 1 chew tab by mouth as needed        diclofenac 1.3 % Patch    FLECTOR    30 patch    Place 1 patch onto the skin 2 times daily    Chronic low back pain with bilateral sciatica, unspecified back pain laterality, SI (sacroiliac) joint dysfunction       finasteride 5 MG tablet    PROSCAR    90 tablet    Take  1 tablet (5 mg) by mouth daily    Slowing of urinary stream       gabapentin 300 MG capsule    NEURONTIN    810 capsule    Take 900 mg three times daily    Chronic pain syndrome, Sacroiliac joint pain       gemfibrozil 600 MG tablet    LOPID    180 tablet    Take 1 tablet (600 mg) by mouth 2 times daily    Hyperlipidemia LDL goal <70, Low HDL (under 40)       lisinopril 20 MG tablet    PRINIVIL/ZESTRIL    90 tablet    Take 1 tablet (20 mg) by mouth daily    Essential hypertension, benign       METAMUCIL 1.7 GM Wafr   Generic drug:  Psyllium      TAKE AS DIRECTED        metFORMIN 500 MG 24 hr tablet    GLUCOPHAGE-XR    360 tablet    Take 4 tablets (2,000 mg) by mouth daily (with breakfast)    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)       metoprolol succinate 50 MG 24 hr tablet    TOPROL-XL          omeprazole 20 MG CR capsule    priLOSEC    180 capsule    Take 1 capsule (20 mg) by mouth daily    Dysphagia, unspecified type       ONETOUCH ULTRA test strip   Generic drug:  blood glucose monitoring     200 strip    Use to test blood sugars    two times a day or as  directed    Other abnormal glucose       order for DME      Uses Cpap machine for sleep apnea        pravastatin 20 MG tablet    PRAVACHOL    90 tablet    Take 1 tablet (20 mg) by mouth daily    Hyperlipidemia LDL goal <70       propranolol HCl 60 MG Tabs     180 tablet    Take 1 tablet (60 mg) by mouth 2 times daily    S/P CABG (coronary artery bypass graft), Essential hypertension, benign       salicylic acid 40 % Misc    MEDIPLAST    1 each    Apply 1 dose. topically At Bedtime Each night, Scrape or loofa the wart(s) and then soak foot for 10-15 min in warm water.  Cut plaster to fit exactly over 1 wart each.  Tape each in place for overnight and remove the next morning.    Callus of foot       * tamsulosin 0.4 MG capsule    FLOMAX     Take 0.4 mg by mouth daily        * tamsulosin 0.4 MG capsule    FLOMAX    90 capsule    TAKE 1 CAPSULE BY  MOUTH  DAILY AS NEEDED    S/P lumbar laminectomy       triamcinolone 0.5 % cream    KENALOG    15 g    Apply  topically 2 times daily. to affected area as directed PRN    Rash and other nonspecific skin eruption       vitamin D 2000 units tablet     90 tablet    Take 2,000 Units by mouth daily.        * Notice:  This list has 2 medication(s) that are the same as other medications prescribed for you. Read the directions carefully, and ask your doctor or other care provider to review them with you.

## 2018-08-19 NOTE — PROGRESS NOTES
[facility] - Procedure Note    Date of Service: 8/20/2018    Procedure performed: Cervical 3 medial branch blocks  Diagnosis: Cervical spondylosis; Cervical facet arthropathy  : Nicholas Nuñez DO    Indications: Austen Fowler is a 83 year old male who was see by myself previously for chronic neck pain consistent with cervical spondylosis and facet arthropathy. The patient describes pain with neck extension/rotation. The patient reports minimal improvement with conservative treatment, including oral medications.    CERVICAL SPINE FOUR OR MORE VIEWS    8/1/2018 11:46 AM      HISTORY: Cervicalgia     COMPARISON: 3/9/2009.      FINDINGS: There is no acute fracture or traumatic malalignment. No  prevertebral soft tissue swelling. There is multilevel degenerative  disc and facet disease. Disc disease is worst at C5-C6. No significant  bony foraminal stenosis.          IMPRESSION: No acute fracture or malalignment. Multilevel degenerative  disc and facet disease, significantly progressed since 2009.       Allergies:    No Known Allergies     Vitals:  There were no vitals taken for this visit.    Review of Systems: The patient denies recent fever, chills, illness, use of antibiotics or anticoagulants. All other 10-point review of systems negative.     Procedure:   Options/alternatives, benefits and risks were discussed with the patient including bleeding, infection, tissue trauma, exposure to radiation, reaction to medications, spinal cord injury, weakness, numbness and paralysis.  Questions were answered to his satisfaction and he agrees to proceed. Voluntary informed consent was obtained and signed.     After getting informed consent, patient was brought into the procedure suite and was placed in a side-lying position opposite the side of the procedure on the procedure table. A Pause for the Cause was performed. Patient was prepped and draped in sterile fashion.     Under AP  fluoroscopic guidance the left 3  articular pillars were identified. The C-arm was rotated to afford optimal visualization. Under intermittent fluoroscopy, a 25 gauge 3.5 inch needle was advanced slowly until it had contact on the mid portion of the articular pillar.  The needle position was verified and optimized from the AP and lateral views.      After negative aspiration, bupivacaine 0.5% 0.5 ml was injected.  The needle was removed. Hemostasis was achieved, the area was cleaned, and bandaids were placed when appropriate. The patient tolerated the procedure well, and was taken to the recovery room. Images were saved to PACS.    Assessment/Plan: Austen Fowler is a 83 year old male s/p left cervical 3 medial branch blocks today for cervical spondylosis, facet arthropathy. We attempted a left cervical 3,4,5 medial branch block but this was aborted due to anatomic and safety considerations.     The c4 and 5 articular pillars were very difficult to visualize under fluoroscopy. After discussion with the patient we decided to abort the procedure today and look further into other conservative treatments to help alleviate his symptoms.    Pre procecedure pain score: 5/10   Post procedure pain score: 3/10.         1. The patient was advised to contact the [facility] for any of the following:   Fever, chills, or night sweats   New onset of pain, numbness, or weakness   Any questions/concerns regarding the procedure  If unable to contact the Pain Center, the patient was instructed to go to a local Emergency Room for any complications.   2. The patient will follow up with me in clinic on 8/22/2018.      Nicholas Nuñez DO  [facility]

## 2018-08-20 ENCOUNTER — RADIOLOGY INJECTION OFFICE VISIT (OUTPATIENT)
Dept: PALLIATIVE MEDICINE | Facility: CLINIC | Age: 83
End: 2018-08-20
Payer: COMMERCIAL

## 2018-08-20 ENCOUNTER — RADIANT APPOINTMENT (OUTPATIENT)
Dept: GENERAL RADIOLOGY | Facility: CLINIC | Age: 83
End: 2018-08-20
Attending: PHYSICAL MEDICINE & REHABILITATION
Payer: COMMERCIAL

## 2018-08-20 VITALS — OXYGEN SATURATION: 97 % | SYSTOLIC BLOOD PRESSURE: 157 MMHG | HEART RATE: 69 BPM | DIASTOLIC BLOOD PRESSURE: 80 MMHG

## 2018-08-20 DIAGNOSIS — M54.2 CERVICALGIA: ICD-10-CM

## 2018-08-20 DIAGNOSIS — M54.2 CERVICALGIA: Primary | ICD-10-CM

## 2018-08-20 NOTE — NURSING NOTE
Pre-procedure Intake    Have you been fasting? No     If yes, for how long?     Are you taking a prescribed blood thinner such as coumadin, Plavix, Xarelto?    Yes -   ASA Baby     If yes, when did you take your last dose? 6 days ago    Do you take aspirin?  Yes -   ASA    If cervical procedure, have you held aspirin for 6 days?   Yes    Do you have any allergies to contrast dye, iodine, steroid and/or numbing medications?  NO    Are you currently taking antibiotics or have an active infection?  NO    Have you had a fever/elevated temperature within the past week? NO    Are you currently taking oral steroids? NO    Do you have a ? Yes       Are you pregnant or breastfeeding?  NO    Are the vital signs normal?  Yes

## 2018-08-20 NOTE — MR AVS SNAPSHOT
After Visit Summary   8/20/2018    Austen Fowler    MRN: [id number]           Patient Information     Date Of Birth          1935        Visit Information        Provider Department      8/20/2018 2:45 PM Nicholas Nuñez MD [facility]        Care Instructions    [facility]   Medial Branch Block Discharge Instructions      Your procedure was performed by:Dr. Nicholas Nuñez     Medications used:  bupivicaine     You will need to complete the Pain Scale Log form and return it to us as soon as possible.  Once we have received the form, we will review it and call you to determine the next steps.     The form can be faxed to [phone] or mailed to:   [facility] - [address], [facility] [address] [id number]      You may resume your regular activity after the injection.    Be cautious with walking since you may have numbness and/or weakness in the lower extremities for up to 6-8 hours after the procedure due to the effects of the local anesthetic.    Avoid driving for 6 hours. The local anesthetic could slow your reflexes    You may shower, however no swimming or tub baths or hot tubs for 24 hours following your procedure.    Your pain will return after the numbing medications have worn off.  You may use your current pain medications as needed.    You may use anti-inflammatory medications (such as ibuprofen, aleve, or advil) or Tylenol for pain control if necessary.  Some people find it helpful to alternate ibuprofen and Tylenol every 3 hours for a couple of days.    You may use ice packs 10-15 minutes three to four times a day at the injection site for comfort.     Do not use heat to painful areas for 6 to 8 hours. This will give the local anesthetic time to wear off and prevent you from accidentally burning your skin.     If you experience any of the following, call the Pain  Clinic during work hours at [phone] or the Provider Line after hours at [phone]:  -Fever over 100 degree F  -Swelling, bleeding, redness, drainage, warmth at the injection site  -Progressive weakness or numbness on your legs or arms  -If lumbar, call if you have a loss of bowel or bladder function  -If cervical, call if you have any unusual headache that is not relieved by Tylenol  -Unusual new onset of pain that is not improving            Follow-ups after your visit        Your next 10 appointments already scheduled     Aug 20, 2018  2:45 PM CDT   Radiology Injections with Nicholas Nuñez MD   [facility] ([facility])    [address]  [address]  [place] [address] [phone]            Aug 20, 2018  2:45 PM CDT   XR MEDIAL BRANCH BLOCK CERVICAL BILATERAL with [email or web address]   [facility] ([facility])    [address]  [address]  [place] [address] [phone]           For nerve root injection, please send or bring copies of any MRIs or other scans you have had.  Bring a list of your current medicines to your exam. (Include vitamins, minerals and over-the-counter medicines.) Leave your valuables at home.  Plan to have someone drive you home afterward.  Stop taking the following medicines (but talk to your doctor first):   If you take blood thinners, you may need to stop taking them a few days before treatment. Talk to your doctor before stopping these medicines.Stop taking Coumadin (warfarin) 3 days before treatment. Restart the day after treatment.   If you take Plavix, Ticlid, Pletal or Persantine, please ask your doctor if you should stop these medicines. You may need extra tests on the morning of your scan.   If you take Xarelto (Rivaroxaban), you may need to stop taking it 24 hours before treatment. Talk to your doctor before stopping this medicine. Restart the day after treatment.   You may take your other medicines as normal.  Stop all food and drink (including water) 3 hours before your test or treatment.  Please tell the doctor:   If you are allergic to X-ray dye (contrast fluid).   If you may be pregnant.  Injections take about 30 to 45 minutes. Most people spend up to 2 hours in the clinic or hospital.  Please call the Imaging Department at your exam site with any questions.            Sep 18, 2018 12:10 PM CDT   LAB with [facility]   [facility] ([facility])    [address] [place] [id number]   [phone]           Please do not eat 10-12 hours before your appointment if you are coming in fasting for labs on lipids, cholesterol, or glucose (sugar). This does not apply to pregnant women. Water, hot tea and black coffee (with nothing added) are okay. Do not drink other fluids, diet soda or chew gum.            Sep 18, 2018  1:10 PM CDT   Return Visit with Lyndsey Cameron PA-C   [facility] ([facility])    [address] [place] [address]   [phone] OPT 2              Who to contact     If you have questions or need follow up information about today's clinic visit or your schedule please contact [facility] directly at [phone].  Normal or non-critical lab and imaging results will be communicated to you by Cardpoolhart, letter or phone within 4 business days after the clinic has received the results. If you do not hear from us within 7 days, please contact the clinic through Cardpoolhart or phone. If you have a critical or abnormal lab result, we will notify you by phone as soon as possible.  Submit refill requests through [employer] or call your pharmacy and they will forward the refill request to us. Please allow 3 business days for your refill to be completed.          Additional Information About Your Visit        CardpoolharSprinkle Information      JustSpotted gives you secure access to your electronic health record. If you see a primary care provider, you can also send messages to your care team and make appointments. If you have questions, please call your primary care clinic.  If you do not have a primary care provider, please call [phone] and they will assist you.        Care EveryWhere ID     This is your Care EveryWhere ID. This could be used by other organizations to access your [facility] medical records  [id number]        Your Vitals Were     Pulse Pulse Oximetry                73 96%           Blood Pressure from Last 3 Encounters:   08/20/18 153/76   08/01/18 149/75   06/20/18 126/76    Weight from Last 3 Encounters:   08/01/18 123 kg (271 lb 1.6 oz)   06/20/18 122.9 kg (271 lb)   04/13/18 124.7 kg (275 lb)              Today, you had the following     No orders found for display         Today's Medication Changes          These changes are accurate as of 8/20/18  2:30 PM.  If you have any questions, ask your nurse or doctor.               These medicines have changed or have updated prescriptions.        Dose/Directions    gabapentin 300 MG capsule   Commonly known as:  NEURONTIN   This may have changed:    - when to take this  - additional instructions   Used for:  Chronic pain syndrome, Sacroiliac joint pain        Take 900 mg three times daily   Quantity:  810 capsule   Refills:  2                Primary Care Provider Office Phone # Fax #    Bhavkylaht MD Ayesha [phone] [address] [facility] [address]  [place] [address]        Equal Access to Services     CELESTE FRANCIS AH: Hadii evangelista jordano Sobaldo, waaxda luqadaha, qaybta kaalmada janice robert. So [facility] [phone].    ATENCIÓN: Si habla español, tiene a hagen disposición servicios gratuitos de asistencia lingüística. Llame al [phone].    We comply with applicable federal civil rights laws and [place] laws. We do not discriminate on  the basis of race, color, national origin, age, disability, sex, sexual orientation, or gender identity.            Thank you!     Thank you for choosing [facility]  for your care. Our goal is always to provide you with excellent care. Hearing back from our patients is one way we can continue to improve our services. Please take a few minutes to complete the written survey that you may receive in the mail after your visit with us. Thank you!             Your Updated Medication List - Protect others around you: Learn how to safely use, store and throw away your medicines at www.disposemymeds.org.          This list is accurate as of 8/20/18  2:30 PM.  Always use your most recent med list.                   Brand Name Dispense Instructions for use Diagnosis    ACETAMIN 500 MG tablet   Generic drug:  acetaminophen      Take 2 tablets by mouth 2 times daily as needed        amLODIPine 2.5 MG tablet    NORVASC    180 tablet    Take 1 tablet (2.5 mg) by mouth 2 times daily    Essential hypertension, benign       aspirin 81 MG tablet     30 tablet    Take 1 tablet (81 mg) by mouth daily    CAD (coronary artery disease)       blood glucose monitoring lancets     200 each    Use to test blood sugar 2 times daily or as directed.    Other abnormal glucose       calcium carbonate 500 MG chewable tablet    TUMS     Take 1 chew tab by mouth as needed        diclofenac 1 % Gel topical gel    VOLTAREN    200 g    Apply 4 grams to neck four times daily using enclosed dosing card.    Cervicalgia       diclofenac 1.3 % Patch    FLECTOR    30 patch    Place 1 patch onto the skin 2 times daily    Chronic low back pain with bilateral sciatica, unspecified back pain laterality, SI (sacroiliac) joint dysfunction       finasteride 5 MG tablet    PROSCAR    90 tablet    Take 1 tablet (5 mg) by mouth daily    Slowing of urinary stream       gabapentin 300 MG capsule    NEURONTIN    810 capsule    Take 900 mg three times daily     Chronic pain syndrome, Sacroiliac joint pain       gemfibrozil 600 MG tablet    LOPID    180 tablet    Take 1 tablet (600 mg) by mouth 2 times daily    Hyperlipidemia LDL goal <70, Low HDL (under 40)       lisinopril 20 MG tablet    PRINIVIL/ZESTRIL    90 tablet    Take 1 tablet (20 mg) by mouth daily    Essential hypertension, benign       METAMUCIL 1.7 GM Wafr   Generic drug:  Psyllium      TAKE AS DIRECTED        metFORMIN 500 MG 24 hr tablet    GLUCOPHAGE-XR    360 tablet    Take 4 tablets (2,000 mg) by mouth daily (with breakfast)    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)       metoprolol succinate 50 MG 24 hr tablet    TOPROL-XL          omeprazole 20 MG CR capsule    priLOSEC    180 capsule    Take 1 capsule (20 mg) by mouth daily    Dysphagia, unspecified type       ONETOUCH ULTRA test strip   Generic drug:  blood glucose monitoring     200 strip    Use to test blood sugars    two times a day or as  directed    Other abnormal glucose       order for DME      Uses Cpap machine for sleep apnea        pravastatin 20 MG tablet    PRAVACHOL    90 tablet    Take 1 tablet (20 mg) by mouth daily    Hyperlipidemia LDL goal <70       propranolol HCl 60 MG Tabs     180 tablet    Take 1 tablet (60 mg) by mouth 2 times daily    S/P CABG (coronary artery bypass graft), Essential hypertension, benign       salicylic acid 40 % Misc    MEDIPLAST    1 each    Apply 1 dose. topically At Bedtime Each night, Scrape or loofa the wart(s) and then soak foot for 10-15 min in warm water.  Cut plaster to fit exactly over 1 wart each.  Tape each in place for overnight and remove the next morning.    Callus of foot       * tamsulosin 0.4 MG capsule    FLOMAX     Take 0.4 mg by mouth daily        * tamsulosin 0.4 MG capsule    FLOMAX    90 capsule    TAKE 1 CAPSULE BY MOUTH  DAILY AS NEEDED    S/P lumbar laminectomy       triamcinolone 0.5 % cream    KENALOG    15 g    Apply  topically 2 times daily. to affected area as  directed PRN    Rash and other nonspecific skin eruption       vitamin D 2000 units tablet     90 tablet    Take 2,000 Units by mouth daily.        * Notice:  This list has 2 medication(s) that are the same as other medications prescribed for you. Read the directions carefully, and ask your doctor or other care provider to review them with you.

## 2018-08-20 NOTE — NURSING NOTE
Discharge Information    IV Discontiued Time:  NA    Amount of Fluid Infused:  NA    Discharge Criteria = When patient returns to baseline or as per MD order    Consciousness:  Pt is fully awake    Circulation:  BP +/- 20% of pre-procedure level    Respiration:  Patient is able to breathe deeply    O2 Sat:  Patient is able to maintain O2 Sat >92% on room air    Activity:  Moves 4 extremities on command    Ambulation:  Patient is able to stand and walk or stand and pivot into wheelchair    Dressing:  Clean/dry or No Dressing    Notes:   Discharge instructions and AVS given to patient    Patient meets criteria for discharge?  YES    Admitted to PCU?  No    Responsible adult present to accompany patient home?  Yes    Signature/Title:    Velma Herbert RN Care Coordinator  [facility]

## 2018-08-20 NOTE — PATIENT INSTRUCTIONS
[facility]   Medial Branch Block Discharge Instructions      Your procedure was performed by:Dr. Nicholas Nuñez     Medications used:  bupivicaine     You will need to complete the Pain Scale Log form and return it to us as soon as possible.  Once we have received the form, we will review it and call you to determine the next steps.     The form can be faxed to [phone] or mailed to:   [facility] - [facility]    [id number] [place] [address], [address] [id number]      You may resume your regular activity after the injection.    Be cautious with walking since you may have numbness and/or weakness in the lower extremities for up to 6-8 hours after the procedure due to the effects of the local anesthetic.    Avoid driving for 6 hours. The local anesthetic could slow your reflexes    You may shower, however no swimming or tub baths or hot tubs for 24 hours following your procedure.    Your pain will return after the numbing medications have worn off.  You may use your current pain medications as needed.    You may use anti-inflammatory medications (such as ibuprofen, aleve, or advil) or Tylenol for pain control if necessary.  Some people find it helpful to alternate ibuprofen and Tylenol every 3 hours for a couple of days.    You may use ice packs 10-15 minutes three to four times a day at the injection site for comfort.     Do not use heat to painful areas for 6 to 8 hours. This will give the local anesthetic time to wear off and prevent you from accidentally burning your skin.     If you experience any of the following, call the Pain Clinic during work hours at [phone] or the Provider Line after hours at [phone]:  -Fever over 100 degree F  -Swelling, bleeding, redness, drainage, warmth at the injection site  -Progressive weakness or numbness on your legs or arms  -If lumbar, call if you have a loss of bowel or bladder function  -If  cervical, call if you have any unusual headache that is not relieved by Tylenol  -Unusual new onset of pain that is not improving

## 2018-08-20 NOTE — NURSING NOTE
Discharge Information    IV Discontiued Time:  NA    Amount of Fluid Infused:  NA    Discharge Criteria = When patient returns to baseline or as per MD order    Consciousness:  Pt is fully awake    Circulation:  BP +/- 20% of pre-procedure level    Respiration:  Patient is able to breathe deeply    O2 Sat:  Patient is able to maintain O2 Sat >92% on room air    Activity:  Moves 4 extremities on command    Ambulation:  Patient is able to stand and walk or stand and pivot into wheelchair    Dressing:  Clean/dry or No Dressing    Notes:   Discharge instructions and AVS given to patient    Patient meets criteria for discharge?  YES    Admitted to PCU?  No    Responsible adult present to accompany patient home?  Yes    Signature/Title:    Angela Sanchez  RN Care Coordinator  [facility]

## 2018-08-21 NOTE — PROGRESS NOTES
[facility]    Date of visit: 8/22/2018    Chief complaint:   Chief Complaint   Patient presents with     Pain       Interval history:  Austen Fowler is a 83 year old male last seen by me on 8/1/2018.      Recommendations/plan at the last visit included:  1. Physical Therapy: Has done PT at [facility] in the past for his neck, recommended that he continue to adhere to their exercise recommendations.  2. Clinical Health Pain Psychologist: Defer for now, however if there appears to be a significant emotional/psychological component in the future will consult pain psychology.   3. Self Care Recommendations: Michael progressive exercise that does not increase pain - gradually increase daily walking program.  Take mini breaks - 5 minutes of mindfullness a couple times a day.   4. Diagnostic Studies: C-Spine xrays were ordered.  5. Medication Management: Prescribed voltaren gel to be applied to his neck QID as needed. This will need prior authorization from his insurance, patient alerted to this.  6. Further procedures recommended: Ordered cervical MBB, likely for the 3,4,5 levels but will review cervical xrays prior to deciding. DIscussed MBB/RFA with the patient and he was agreeable with this plan.  7. Follow up: will evaluate response to MBB/RFA then schedule follow up in clinic as needed.    Since his last visit, Austen Fowler reports:  -We were unable to complete his left cervical MBB on 8/20 due to technical/anatomical considerations keeping safety in mind.  -He has been using icy hot and is finding this very helpful for his neck pain.  -He didn't get his voltaren gel yet, insurance declined this but I let him know we will be following up on this.    Pain scores:  Pain intensity on average is 3 on a scale of 0-10.     Current pain treatments:   -Tylenol 1000mg BID PRN  -Voltaren gel  -Gabapentin 900mg      Past pain treatments:None  Side Effects: no side  effect    Medications:  Current Outpatient Prescriptions   Medication Sig Dispense Refill     acetaminophen (ACETAMIN) 500 MG tablet Take 2 tablets by mouth 2 times daily as needed        amLODIPine (NORVASC) 2.5 MG tablet Take 1 tablet (2.5 mg) by mouth 2 times daily 180 tablet 3     aspirin 81 MG tablet Take 1 tablet (81 mg) by mouth daily 30 tablet 3     blood glucose monitoring (ONE TOUCH ULTRASOFT) lancets Use to test blood sugar 2 times daily or as directed. 200 each 1     calcium carbonate (TUMS) 500 MG chewable tablet Take 1 chew tab by mouth as needed        Cholecalciferol (VITAMIN D) 2000 UNIT tablet Take 2,000 Units by mouth daily. 90 tablet 3     diclofenac (FLECTOR) 1.3 % Patch Place 1 patch onto the skin 2 times daily 30 patch 1     diclofenac (VOLTAREN) 1 % GEL topical gel Apply 4 grams to neck four times daily using enclosed dosing card. 200 g 1     finasteride (PROSCAR) 5 MG tablet Take 1 tablet (5 mg) by mouth daily 90 tablet 2     gabapentin (NEURONTIN) 300 MG capsule Take 900 mg three times daily (Patient taking differently: 2 times daily Take 900 mg three times daily) 810 capsule 2     gemfibrozil (LOPID) 600 MG tablet Take 1 tablet (600 mg) by mouth 2 times daily 180 tablet 2     lisinopril (PRINIVIL/ZESTRIL) 20 MG tablet Take 1 tablet (20 mg) by mouth daily 90 tablet 3     METAMUCIL 1.7 GM OR WAFR TAKE AS DIRECTED       metFORMIN (GLUCOPHAGE-XR) 500 MG 24 hr tablet Take 4 tablets (2,000 mg) by mouth daily (with breakfast) 360 tablet 1     metoprolol succinate (TOPROL-XL) 50 MG 24 hr tablet        omeprazole (PRILOSEC) 20 MG CR capsule Take 1 capsule (20 mg) by mouth daily 180 capsule 3     ONE TOUCH ULTRA test strip Use to test blood sugars    two times a day or as  directed 200 strip 1     ORDER FOR DME Uses Cpap machine for sleep apnea       pravastatin (PRAVACHOL) 20 MG tablet Take 1 tablet (20 mg) by mouth daily 90 tablet 2     propranolol HCl 60 MG TABS Take 1 tablet (60 mg) by mouth 2  times daily 180 tablet 3     salicylic acid (MEDIPLAST) 40 % MISC Apply 1 dose. topically At Bedtime Each night, Scrape or loofa the wart(s) and then soak foot for 10-15 min in warm water.  Cut plaster to fit exactly over 1 wart each.  Tape each in place for overnight and remove the next morning. 1 each 11     tamsulosin (FLOMAX) 0.4 MG capsule TAKE 1 CAPSULE BY MOUTH  DAILY AS NEEDED 90 capsule 3     tamsulosin (FLOMAX) 0.4 MG capsule Take 0.4 mg by mouth daily       triamcinolone (KENALOG) 0.5 % cream Apply  topically 2 times daily. to affected area as directed PRN 15 g 0       Medical History: any changes in medical history since they were last seen? No    Review of Systems:  The 14 system ROS was reviewed from the intake questionnaire, and is positive for: neck pain, fatigue, diabetes, back pain, hesitancy, tremor, depression  Any bowel or bladder problems: denies  Mood: good, anxious about wife's upcoming surgery    Physical Exam:  Blood pressure 125/75, pulse 61, weight 123.3 kg (271 lb 12.8 oz), SpO2 95 %.  General: NAD, pleasant and cooperative  Gait: Normal  MSK exam: Cervical spine ROM is mildly reduced with right rotation and side bending, otherwise within normal limits. Upper extremity strength is 5/5 and symmetric, sensation to light touch is symmetric and intact in the upper extremities. Trigger point palpated in the left tarpezius, left levator scapulae.    Assessment:   1. Left sided neck and shoulder pain: Pain is localized to the parspinals overlying the C3-6 facet joints and radiates into his shoulder. Denies any pain radiating below his shoulders, denies weakness and red flag signs. Pain is likely mediated by cervical spondylosis and facet arthropathy. Based on history and exam radiculopathy, myelopathy or plexopathy are highly unlikely. C3,4,5MBB on the left was attempted but not completed due to anatomical and safety considerations.  2. Myofascial pain: In addition to the above he has trigger  points palpable in the left levator scapulae and trapezius muscles.  3. Chronic low back pain: Has had significant relief from SIJ injections in the past and will continue this therapy in addition to his usual exercises.      Plan:  1. Physical Therapy:  Continue neck stretches/exercises previously given by PT  2. Diagnostic Studies:  None at this time  3. Medication Management:    1. Voltaren gel QID PRN, insurance authorization was submitted.  2. Continue icy hot as needed  3. Continue gabapentin 900mg BID  4. Further procedures recommended: None at this time.  1.  If neck pain is persistent, could try trigger point injections.  2. He will call to schedule SIJ injections if/when this pain worsens  5. Recommendations to PCP: none at this time  6. Follow up: as needed in clinic.      I spent 20 minutes of time face to face with the patient.  Greater than 50% of this time was spent in patient counseling  and/or coordination of care.      Nicholas Nuñez DO  [facility]  8/22/2018

## 2018-08-22 ENCOUNTER — OFFICE VISIT (OUTPATIENT)
Dept: PODIATRY | Facility: CLINIC | Age: 83
End: 2018-08-22
Payer: COMMERCIAL

## 2018-08-22 VITALS
HEART RATE: 61 BPM | WEIGHT: 271.8 LBS | DIASTOLIC BLOOD PRESSURE: 75 MMHG | BODY MASS INDEX: 33.97 KG/M2 | SYSTOLIC BLOOD PRESSURE: 125 MMHG | OXYGEN SATURATION: 95 %

## 2018-08-22 DIAGNOSIS — M54.2 CERVICALGIA: Primary | ICD-10-CM

## 2018-08-22 DIAGNOSIS — M79.18 MYOFASCIAL PAIN: ICD-10-CM

## 2018-08-22 DIAGNOSIS — M53.3 SACROILIAC DYSFUNCTION: ICD-10-CM

## 2018-08-22 PROCEDURE — 99213 OFFICE O/P EST LOW 20 MIN: CPT | Performed by: PHYSICAL MEDICINE & REHABILITATION

## 2018-08-22 ASSESSMENT — PAIN SCALES - GENERAL: PAINLEVEL: MODERATE PAIN (4)

## 2018-08-22 NOTE — PATIENT INSTRUCTIONS
1. We resubmitted paperwork to your insurance for the diclofenac gel, you should be getting this soon. Try alternating this with the icy hot and see whether it helps.    2. Continue doing the exercises given to you by your physical therapist daily.    3. Call the clinic to schedule your SI joint injection if you feel that this pain is starting to flare more often.    4. Follow up with me in clinic as needed, you can call the number below.    ----------------------------------------------------------------  Clinic Number:  [phone]   Call this number with any questions about your care and for scheduling assistance. Calls are returned Monday through Friday between 8 AM and 4:30 PM. We usually get back to you within 2 business days depending on the issue/request.       Medication refills:    For non-narcotic medications, call your pharmacy directly to request a refill. The pharmacy will contact the [facility] for authorization. Please allow 3-4 days for these refills to be processed.     For narcotic refills, call the nurse triage line or send a [email or web address] message. Please contact us 7-10 days before your refill is due. The message MUST include the name of the specific medication(s) requested and how you would like to receive the prescription(s). The options are as follows:    Pain Clinic staff can mail the prescription to your pharmacy. Please tell us the name of the pharmacy.    You may pick the prescription up at the Pain Clinic (tell us the location) or during a clinic visit with your pain provider    Pain Clinic staff can deliver the prescription to the [place] pharmacy in the clinic building. Please tell us the location.      We believe regular attendance is key to your success in our program.    Any time you are unable to keep your appointment we ask that you call us at least 24 hours in advance to let us know. This will allow us to offer the appointment time to another patient.

## 2018-08-22 NOTE — MR AVS SNAPSHOT
After Visit Summary   8/22/2018    Austen Fowler    MRN: [id number]           Patient Information     Date Of Birth          1935        Visit Information        Provider Department      8/22/2018 10:00 AM Nicholas Nuñez MD [facility]        Care Instructions    1. We resubmitted paperwork to your insurance for the diclofenac gel, you should be getting this soon. Try alternating this with the icy hot and see whether it helps.    2. Continue doing the exercises given to you by your physical therapist daily.    3. Call the clinic to schedule your SI joint injection if you feel that this pain is starting to flare more often.    4. Follow up with me in clinic as needed, you can call the number below.    ----------------------------------------------------------------  Clinic Number:  [phone]   Call this number with any questions about your care and for scheduling assistance. Calls are returned Monday through Friday between 8 AM and 4:30 PM. We usually get back to you within 2 business days depending on the issue/request.       Medication refills:    For non-narcotic medications, call your pharmacy directly to request a refill. The pharmacy will contact the [facility] for authorization. Please allow 3-4 days for these refills to be processed.     For narcotic refills, call the nurse triage line or send a [employer] message. Please contact us 7-10 days before your refill is due. The message MUST include the name of the specific medication(s) requested and how you would like to receive the prescription(s). The options are as follows:    Pain Clinic staff can mail the prescription to your pharmacy. Please tell us the name of the pharmacy.    You may pick the prescription up at the Pain Clinic (tell us the location) or during a clinic visit with your pain provider    Pain Clinic staff can deliver the prescription to the [place] pharmacy in the clinic building.  Please tell us the location.      We believe regular attendance is key to your success in our program.    Any time you are unable to keep your appointment we ask that you call us at least 24 hours in advance to let us know. This will allow us to offer the appointment time to another patient.               Follow-ups after your visit        Your next 10 appointments already scheduled     Aug 22, 2018 10:00 AM CDT   [facility]    [address] [id number] [phone]            Sep 18, 2018 12:10 PM CDT   LAB with [facility]   [facility] [place] ([facility])    [address] [place] [id number] [phone]           Please do not eat 10-12 hours before your appointment if you are coming in fasting for labs on lipids, cholesterol, or glucose (sugar). This does not apply to pregnant women. Water, hot tea and black coffee (with nothing added) are okay. Do not drink other fluids, diet soda or chew gum.            Sep 18, 2018  1:10 PM CDT   Return Visit with Lyndsey Cameron PA-C   [facility] ([facility])    [address] [place] [id number] [phone] OPT 2              Who to contact     If you have questions or need follow up information about today's clinic visit or your schedule please contact [facility] directly at [phone].  Normal or non-critical lab and imaging results will be communicated to you by MyChart, letter or phone within 4 business days after the clinic has received the results. If you do not hear from us within 7 days, please contact the clinic through MyChart or phone. If you have a critical or abnormal lab result, we will notify you by phone as soon as possible.  Submit refill requests through [employer] or call your pharmacy and they will  forward the refill request to us. Please allow 3 business days for your refill to be completed.          Additional Information About Your Visit        [employer] Information     [employer] gives you secure access to your electronic health record. If you see a primary care provider, you can also send messages to your care team and make appointments. If you have questions, please call your primary care clinic.  If you do not have a primary care provider, please call [phone] and they will assist you.        Care EveryWhere ID     This is your Care EveryWhere ID. This could be used by other organizations to access your [place] medical records  [id number]        Your Vitals Were     Pulse Pulse Oximetry BMI (Body Mass Index)             61 95% 33.97 kg/m2          Blood Pressure from Last 3 Encounters:   08/22/18 125/75   08/20/18 157/80   08/01/18 149/75    Weight from Last 3 Encounters:   08/22/18 123.3 kg (271 lb 12.8 oz)   08/01/18 123 kg (271 lb 1.6 oz)   06/20/18 122.9 kg (271 lb)              Today, you had the following     No orders found for display         Today's Medication Changes          These changes are accurate as of 8/22/18  9:56 AM.  If you have any questions, ask your nurse or doctor.               These medicines have changed or have updated prescriptions.        Dose/Directions    gabapentin 300 MG capsule   Commonly known as:  NEURONTIN   This may have changed:    - when to take this  - additional instructions   Used for:  Chronic pain syndrome, Sacroiliac joint pain        Take 900 mg three times daily   Quantity:  810 capsule   Refills:  2                Primary Care Provider Office Phone # Fax #    Bhavperfecto Hodge [phone] [address]  [place] [address]        Equal Access to Services     [facility] Center[facility] : Arabella Meek, rafy martinez, janice casey. So [facility] [phone].    ATENCIÓN: Si  bere moran, tiene a hagen disposición servicios gratuitos de asistencia lingüística. Rebecca holilngsworth [phone].    We comply with applicable federal civil rights laws and [place] laws. We do not discriminate on the basis of race, color, national origin, age, disability, sex, sexual orientation, or gender identity.            Thank you!     Thank you for choosing [facility]  for your care. Our goal is always to provide you with excellent care. Hearing back from our patients is one way we can continue to improve our services. Please take a few minutes to complete the written survey that you may receive in the mail after your visit with us. Thank you!             Your Updated Medication List - Protect others around you: Learn how to safely use, store and throw away your medicines at www.disposemymeds.org.          This list is accurate as of 8/22/18  9:56 AM.  Always use your most recent med list.                   Brand Name Dispense Instructions for use Diagnosis    ACETAMIN 500 MG tablet   Generic drug:  acetaminophen      Take 2 tablets by mouth 2 times daily as needed        amLODIPine 2.5 MG tablet    NORVASC    180 tablet    Take 1 tablet (2.5 mg) by mouth 2 times daily    Essential hypertension, benign       aspirin 81 MG tablet     30 tablet    Take 1 tablet (81 mg) by mouth daily    CAD (coronary artery disease)       blood glucose monitoring lancets     200 each    Use to test blood sugar 2 times daily or as directed.    Other abnormal glucose       calcium carbonate 500 MG chewable tablet    TUMS     Take 1 chew tab by mouth as needed        diclofenac 1 % Gel topical gel    VOLTAREN    200 g    Apply 4 grams to neck four times daily using enclosed dosing card.    Cervicalgia       diclofenac 1.3 % Patch    FLECTOR    30 patch    Place 1 patch onto the skin 2 times daily    Chronic low back pain with bilateral sciatica, unspecified back pain laterality, SI (sacroiliac) joint dysfunction        finasteride 5 MG tablet    PROSCAR    90 tablet    Take 1 tablet (5 mg) by mouth daily    Slowing of urinary stream       gabapentin 300 MG capsule    NEURONTIN    810 capsule    Take 900 mg three times daily    Chronic pain syndrome, Sacroiliac joint pain       gemfibrozil 600 MG tablet    LOPID    180 tablet    Take 1 tablet (600 mg) by mouth 2 times daily    Hyperlipidemia LDL goal <70, Low HDL (under 40)       lisinopril 20 MG tablet    PRINIVIL/ZESTRIL    90 tablet    Take 1 tablet (20 mg) by mouth daily    Essential hypertension, benign       METAMUCIL 1.7 GM Wafr   Generic drug:  Psyllium      TAKE AS DIRECTED        metFORMIN 500 MG 24 hr tablet    GLUCOPHAGE-XR    360 tablet    Take 4 tablets (2,000 mg) by mouth daily (with breakfast)    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)       metoprolol succinate 50 MG 24 hr tablet    TOPROL-XL          omeprazole 20 MG CR capsule    priLOSEC    180 capsule    Take 1 capsule (20 mg) by mouth daily    Dysphagia, unspecified type       ONETOUCH ULTRA test strip   Generic drug:  blood glucose monitoring     200 strip    Use to test blood sugars    two times a day or as  directed    Other abnormal glucose       order for DME      Uses Cpap machine for sleep apnea        pravastatin 20 MG tablet    PRAVACHOL    90 tablet    Take 1 tablet (20 mg) by mouth daily    Hyperlipidemia LDL goal <70       propranolol HCl 60 MG Tabs     180 tablet    Take 1 tablet (60 mg) by mouth 2 times daily    S/P CABG (coronary artery bypass graft), Essential hypertension, benign       salicylic acid 40 % Misc    MEDIPLAST    1 each    Apply 1 dose. topically At Bedtime Each night, Scrape or loofa the wart(s) and then soak foot for 10-15 min in warm water.  Cut plaster to fit exactly over 1 wart each.  Tape each in place for overnight and remove the next morning.    Callus of foot       * tamsulosin 0.4 MG capsule    FLOMAX     Take 0.4 mg by mouth daily        * tamsulosin 0.4 MG  capsule    FLOMAX    90 capsule    TAKE 1 CAPSULE BY MOUTH  DAILY AS NEEDED    S/P lumbar laminectomy       triamcinolone 0.5 % cream    KENALOG    15 g    Apply  topically 2 times daily. to affected area as directed PRN    Rash and other nonspecific skin eruption       vitamin D 2000 units tablet     90 tablet    Take 2,000 Units by mouth daily.        * Notice:  This list has 2 medication(s) that are the same as other medications prescribed for you. Read the directions carefully, and ask your doctor or other care provider to review them with you.

## 2018-08-22 NOTE — LETTER
8/22/2018         RE: Austen Fowler  [address]  [facility] [address]        Dear Colleague,    Thank you for referring your patient, Austen Fowler, to the [facility]. Please see a copy of my visit note below.                              [facility]    Date of visit: 8/22/2018    Chief complaint:   Chief Complaint   Patient presents with     Pain       Interval history:  Austen Fowler is a 83 year old male last seen by me on 8/1/2018.      Recommendations/plan at the last visit included:  1. Physical Therapy: Has done PT at [facility] in the past for his neck, recommended that he continue to adhere to their exercise recommendations.  2. Clinical Health Pain Psychologist: Defer for now, however if there appears to be a significant emotional/psychological component in the future will consult pain psychology.   3. Self Care Recommendations: Michael progressive exercise that does not increase pain - gradually increase daily walking program.  Take mini breaks - 5 minutes of mindfullness a couple times a day.   4. Diagnostic Studies: C-Spine xrays were ordered.  5. Medication Management: Prescribed voltaren gel to be applied to his neck QID as needed. This will need prior authorization from his insurance, patient alerted to this.  6. Further procedures recommended: Ordered cervical MBB, likely for the 3,4,5 levels but will review cervical xrays prior to deciding. DIscussed MBB/RFA with the patient and he was agreeable with this plan.  7. Follow up: will evaluate response to MBB/RFA then schedule follow up in clinic as needed.    Since his last visit, Austen Fowler reports:  -We were unable to complete his left cervical MBB on 8/20 due to technical/anatomical considerations keeping safety in mind.  -He has been using icy hot and is finding this very helpful for his neck pain.  -He didn't get his voltaren gel yet, insurance declined this but I let him know we will  be following up on this.    Pain scores:  Pain intensity on average is 3 on a scale of 0-10.     Current pain treatments:   -Tylenol 1000mg BID PRN  -Voltaren gel  -Gabapentin 900mg      Past pain treatments:None  Side Effects: no side effect    Medications:  Current Outpatient Prescriptions   Medication Sig Dispense Refill     acetaminophen (ACETAMIN) 500 MG tablet Take 2 tablets by mouth 2 times daily as needed        amLODIPine (NORVASC) 2.5 MG tablet Take 1 tablet (2.5 mg) by mouth 2 times daily 180 tablet 3     aspirin 81 MG tablet Take 1 tablet (81 mg) by mouth daily 30 tablet 3     blood glucose monitoring (ONE TOUCH ULTRASOFT) lancets Use to test blood sugar 2 times daily or as directed. 200 each 1     calcium carbonate (TUMS) 500 MG chewable tablet Take 1 chew tab by mouth as needed        Cholecalciferol (VITAMIN D) 2000 UNIT tablet Take 2,000 Units by mouth daily. 90 tablet 3     diclofenac (FLECTOR) 1.3 % Patch Place 1 patch onto the skin 2 times daily 30 patch 1     diclofenac (VOLTAREN) 1 % GEL topical gel Apply 4 grams to neck four times daily using enclosed dosing card. 200 g 1     finasteride (PROSCAR) 5 MG tablet Take 1 tablet (5 mg) by mouth daily 90 tablet 2     gabapentin (NEURONTIN) 300 MG capsule Take 900 mg three times daily (Patient taking differently: 2 times daily Take 900 mg three times daily) 810 capsule 2     gemfibrozil (LOPID) 600 MG tablet Take 1 tablet (600 mg) by mouth 2 times daily 180 tablet 2     lisinopril (PRINIVIL/ZESTRIL) 20 MG tablet Take 1 tablet (20 mg) by mouth daily 90 tablet 3     METAMUCIL 1.7 GM OR WAFR TAKE AS DIRECTED       metFORMIN (GLUCOPHAGE-XR) 500 MG 24 hr tablet Take 4 tablets (2,000 mg) by mouth daily (with breakfast) 360 tablet 1     metoprolol succinate (TOPROL-XL) 50 MG 24 hr tablet        omeprazole (PRILOSEC) 20 MG CR capsule Take 1 capsule (20 mg) by mouth daily 180 capsule 3     ONE TOUCH ULTRA test strip Use to test blood sugars    two times a day  or as  directed 200 strip 1     ORDER FOR DME Uses Cpap machine for sleep apnea       pravastatin (PRAVACHOL) 20 MG tablet Take 1 tablet (20 mg) by mouth daily 90 tablet 2     propranolol HCl 60 MG TABS Take 1 tablet (60 mg) by mouth 2 times daily 180 tablet 3     salicylic acid (MEDIPLAST) 40 % MISC Apply 1 dose. topically At Bedtime Each night, Scrape or loofa the wart(s) and then soak foot for 10-15 min in warm water.  Cut plaster to fit exactly over 1 wart each.  Tape each in place for overnight and remove the next morning. 1 each 11     tamsulosin (FLOMAX) 0.4 MG capsule TAKE 1 CAPSULE BY MOUTH  DAILY AS NEEDED 90 capsule 3     tamsulosin (FLOMAX) 0.4 MG capsule Take 0.4 mg by mouth daily       triamcinolone (KENALOG) 0.5 % cream Apply  topically 2 times daily. to affected area as directed PRN 15 g 0       Medical History: any changes in medical history since they were last seen? No    Review of Systems:  The 14 system ROS was reviewed from the intake questionnaire, and is positive for: neck pain, fatigue, diabetes, back pain, hesitancy, tremor, depression  Any bowel or bladder problems: denies  Mood: good, anxious about wife's upcoming surgery    Physical Exam:  Blood pressure 125/75, pulse 61, weight 123.3 kg (271 lb 12.8 oz), SpO2 95 %.  General: NAD, pleasant and cooperative  Gait: Normal  MSK exam: Cervical spine ROM is mildly reduced with right rotation and side bending, otherwise within normal limits. Upper extremity strength is 5/5 and symmetric, sensation to light touch is symmetric and intact in the upper extremities. Trigger point palpated in the left tarpezius, left levator scapulae.    Assessment:   1. Left sided neck and shoulder pain: Pain is localized to the parspinals overlying the C3-6 facet joints and radiates into his shoulder. Denies any pain radiating below his shoulders, denies weakness and red flag signs. Pain is likely mediated by cervical spondylosis and facet arthropathy. Based on  history and exam radiculopathy, myelopathy or plexopathy are highly unlikely. C3,4,5MBB on the left was attempted but not completed due to anatomical and safety considerations.  2. Myofascial pain: In addition to the above he has trigger points palpable in the left levator scapulae and trapezius muscles.  3. Chronic low back pain: Has had significant relief from SIJ injections in the past and will continue this therapy in addition to his usual exercises.      Plan:  1. Physical Therapy:  Continue neck stretches/exercises previously given by PT  2. Diagnostic Studies:  None at this time  3. Medication Management:    1. Voltaren gel QID PRN, insurance authorization was submitted.  2. Continue icy hot as needed  3. Continue gabapentin 900mg BID  4. Further procedures recommended: None at this time.  1.  If neck pain is persistent, could try trigger point injections.  2. He will call to schedule SIJ injections if/when this pain worsens  5. Recommendations to PCP: none at this time  6. Follow up: as needed in clinic.      I spent 20 minutes of time face to face with the patient.  Greater than 50% of this time was spent in patient counseling  and/or coordination of care.      Nicholas Nuñez DO  [facility]  8/22/2018       Again, thank you for allowing me to participate in the care of your patient.        Sincerely,        Nicholas Nuñez MD

## 2018-08-29 NOTE — TELEPHONE ENCOUNTER
----- Message from Mag Currie MD sent at 8/29/2018  3:49 PM CDT -----  Please schedule pt for reclast once reclast approved   Message from [employer]:  Original authorizing provider: MD Austen Andrade would like a refill of the following medications:  metFORMIN (GLUCOPHAGE-XR) 500 MG 24 hr tablet [Hamzah Hodge MD]    Preferred pharmacy: [facility] [address] [place] [address]    Comment:

## 2018-09-18 ENCOUNTER — OFFICE VISIT (OUTPATIENT)
Dept: CARDIOLOGY | Facility: CLINIC | Age: 83
End: 2018-09-18
Attending: INTERNAL MEDICINE
Payer: COMMERCIAL

## 2018-09-18 VITALS
BODY MASS INDEX: 32.97 KG/M2 | DIASTOLIC BLOOD PRESSURE: 80 MMHG | HEIGHT: 75 IN | SYSTOLIC BLOOD PRESSURE: 131 MMHG | HEART RATE: 60 BPM | WEIGHT: 265.2 LBS

## 2018-09-18 DIAGNOSIS — E78.5 HYPERLIPIDEMIA LDL GOAL <70: ICD-10-CM

## 2018-09-18 DIAGNOSIS — I10 BENIGN ESSENTIAL HYPERTENSION: ICD-10-CM

## 2018-09-18 DIAGNOSIS — I25.10 CORONARY ARTERY DISEASE INVOLVING NATIVE CORONARY ARTERY OF NATIVE HEART WITHOUT ANGINA PECTORIS: Primary | ICD-10-CM

## 2018-09-18 DIAGNOSIS — I25.10 CORONARY ARTERY DISEASE INVOLVING NATIVE CORONARY ARTERY OF NATIVE HEART WITHOUT ANGINA PECTORIS: ICD-10-CM

## 2018-09-18 DIAGNOSIS — E78.6 HDL DEFICIENCY: ICD-10-CM

## 2018-09-18 LAB
ANION GAP SERPL CALCULATED.3IONS-SCNC: 13 MMOL/L (ref 6–17)
BUN SERPL-MCNC: 19 MG/DL (ref 7–30)
CALCIUM SERPL-MCNC: 10.4 MG/DL (ref 8.5–10.5)
CHLORIDE SERPL-SCNC: 104 MMOL/L (ref 98–107)
CO2 SERPL-SCNC: 27 MMOL/L (ref 23–29)
CREAT SERPL-MCNC: 0.78 MG/DL (ref 0.7–1.3)
GFR SERPL CREATININE-BSD FRML MDRD: >90 ML/MIN/1.7M2
GLUCOSE SERPL-MCNC: 148 MG/DL (ref 70–105)
POTASSIUM SERPL-SCNC: 4 MMOL/L (ref 3.5–5.1)
SODIUM SERPL-SCNC: 140 MMOL/L (ref 136–145)

## 2018-09-18 PROCEDURE — 80048 BASIC METABOLIC PNL TOTAL CA: CPT | Performed by: INTERNAL MEDICINE

## 2018-09-18 PROCEDURE — 99214 OFFICE O/P EST MOD 30 MIN: CPT | Performed by: PHYSICIAN ASSISTANT

## 2018-09-18 PROCEDURE — 36415 COLL VENOUS BLD VENIPUNCTURE: CPT | Performed by: INTERNAL MEDICINE

## 2018-09-18 NOTE — PATIENT INSTRUCTIONS
Today's Plan:   1) Continue with current medications.   2) Your blood pressure and heart rate are normal.  3) Come back in 6 months to establish care with a new cardiologist.     If you have questions or concerns please call my nurse team at [phone].     Scheduling phone number: [phone]  Reminder: Please bring in all current medications, over the counter supplements and vitamin bottles to your next appointment.    It was a pleasure seeing you today!     Lyndsey Cameron  9/18/2018

## 2018-09-18 NOTE — PROGRESS NOTES
Primary Cardiologist: Formerly Dr. Moreno. He will need to establish care with a new cardiologist.     History of Present Illness:   This is a pleasant 83 year old male who presents for 3 month follow up. His past medical history is notable CAD (CABG in 1991 LIMA to LAD, SVG to OM1 and OM2, SANDI to RCA), hypertension, hyperlipidemia, HDL deficiency, PENELOPE (uses CPAP regularly), diabetes mellitus, and osteoarthritis.     He returns to clinic, stating that he is doing well. He does not report chestthout any assistance.   discomfort, shortness of breath, palpitations, PND, orthopnea, peripheral edema.     Assessment and plan:  This is a pleasant 83 year old male who presents for 3 month follow up. His past medical history is notable CAD (CABG in 1991 LIMA to LAD, SVG to OM1 and OM2, SANDI to RCA), hypertension, hyperlipidemia, HDL deficiency, PENELOPE (uses CPAP regularly), diabetes mellitus, and osteoarthritis.     He appears to be doing quite well from a cardiac standpoint.  His vital signs are stable.  BMP shows normal electrolytes and stable kidney function.  We will continue with current regimen without any changes.  He will need to establish cardiology care with a new cardiologist.  We will have him come back in 6 months for reevaluation.    Thank you for allowing me to participate in the care of this pleasant patient today.      This note was completed in part using Dragon voice recognition software. Although reviewed after completion, some word and grammatical errors may occur.    Orders this Visit:  No orders of the defined types were placed in this encounter.    No orders of the defined types were placed in this encounter.    Medications Discontinued During This Encounter   Medication Reason     metoprolol succinate (TOPROL-XL) 50 MG 24 hr tablet Stopped by Patient     propranolol HCl 60 MG TABS Side effects         Encounter Diagnoses   Name Primary?     Coronary artery disease involving native coronary artery of  native heart without angina pectoris      Hyperlipidemia LDL goal <70      Benign essential hypertension        CURRENT MEDICATIONS:  Current Outpatient Prescriptions   Medication Sig Dispense Refill     acetaminophen (ACETAMIN) 500 MG tablet Take 2 tablets by mouth 2 times daily as needed        amLODIPine (NORVASC) 2.5 MG tablet Take 1 tablet (2.5 mg) by mouth 2 times daily 180 tablet 3     aspirin 81 MG tablet Take 1 tablet (81 mg) by mouth daily 30 tablet 3     blood glucose monitoring (ONE TOUCH ULTRASOFT) lancets Use to test blood sugar 2 times daily or as directed. 200 each 1     calcium carbonate (TUMS) 500 MG chewable tablet Take 1 chew tab by mouth as needed        Cholecalciferol (VITAMIN D) 2000 UNIT tablet Take 2,000 Units by mouth daily. 90 tablet 3     diclofenac (VOLTAREN) 1 % GEL topical gel Apply 4 grams to neck four times daily using enclosed dosing card. 200 g 1     finasteride (PROSCAR) 5 MG tablet Take 1 tablet (5 mg) by mouth daily 90 tablet 2     gabapentin (NEURONTIN) 300 MG capsule Take 900 mg three times daily (Patient taking differently: 2 times daily Take 900 mg three times daily) 810 capsule 2     gemfibrozil (LOPID) 600 MG tablet Take 1 tablet (600 mg) by mouth 2 times daily 180 tablet 2     lisinopril (PRINIVIL/ZESTRIL) 20 MG tablet Take 1 tablet (20 mg) by mouth daily 90 tablet 3     METAMUCIL 1.7 GM OR WAFR TAKE AS DIRECTED       metFORMIN (GLUCOPHAGE-XR) 500 MG 24 hr tablet Take 4 tablets (2,000 mg) by mouth daily (with breakfast) 360 tablet 1     nadolol (CORGARD) 20 MG tablet Take 1 tablet (20 mg) by mouth 2 times daily (Patient taking differently: Take 20 mg by mouth daily ) 60 tablet 3     omeprazole (PRILOSEC) 20 MG CR capsule Take 1 capsule (20 mg) by mouth daily 180 capsule 3     ONE TOUCH ULTRA test strip Use to test blood sugars    two times a day or as  directed 200 strip 1     ORDER FOR DME Uses Cpap machine for sleep apnea       pravastatin (PRAVACHOL) 20 MG tablet Take  1 tablet (20 mg) by mouth daily 90 tablet 2     tamsulosin (FLOMAX) 0.4 MG capsule TAKE 1 CAPSULE BY MOUTH  DAILY AS NEEDED 90 capsule 3     triamcinolone (KENALOG) 0.5 % cream Apply  topically 2 times daily. 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     Bunion      CAD (coronary artery disease)     CABG 1992: LIMA to LAD, SANDI to RCA, SVG to OM1 and OM2     Contact dermatitis and other eczema, due to unspecified cause     eczema - has light treatments with Dr. Rivera     Disorders of bursae and tendons in shoulder region, unspecified      Esophageal reflux      Essential hypertension, benign      Gallbladder disease      GERD (gastroesophageal reflux disease)      Heart attack      Hyperlipidemia LDL goal <70      Left ventricular diastolic dysfunction      Low HDL (under 40) 3/28/2014     Nasal/sinus dis NEC     s/p surgery in 1995     Obesity      Osteoarthrosis, unspecified whether generalized or localized, shoulder region      Other hammer toe (acquired)      Sleep apnea     USES CPAP     Spinal stenosis, unspecified region other than cervical     MRI done 2006     Type 2 diabetes, HbA1c goal < 7% (H) 3/12/2015     Urge incontinence        PAST SURGICAL HISTORY:  Past Surgical History:   Procedure Laterality Date     C NONSPECIFIC PROCEDURE  11-92    CABG - LIMA to left anterior descending and saphenous vein bypass graft to the first and second obtuse marginal branch of the circumflex and the right mammary to the right coronary artery     C NONSPECIFIC PROCEDURE  6-94    Gail lap     C NONSPECIFIC PROCEDURE  5-92,  6-92    Angioplasty     C NONSPECIFIC PROCEDURE  1995    sinus surgery     C NONSPECIFIC PROCEDURE      bilateral cataract surgery     COLONOSCOPY N/A 4/11/2017    Procedure: COMBINED COLONOSCOPY, SINGLE OR MULTIPLE BIOPSY/POLYPECTOMY BY BIOPSY;  Surgeon: Bladimir Garner MD;  Location:  GI     ESOPHAGOSCOPY, GASTROSCOPY, DUODENOSCOPY (EGD), DILATATION, COMBINED  7/17/2014    Procedure: COMBINED ESOPHAGOSCOPY, GASTROSCOPY, DUODENOSCOPY (EGD), DILATATION;  Surgeon: Bladimir Garner MD;  Location:  GI     HC COLONOSCOPY THRU STOMA W BIOPSY/CAUTERY TUMOR/POLYP/LESION  5/2007     INJECT EPIDURAL LUMBAR       LAMINECTOMY LUMBAR TWO LEVELS N/A 4/29/2015    Procedure: LAMINECTOMY LUMBAR TWO LEVELS;  Surgeon: Bladimir Keenan MD;  Location:  OR       FAMILY HISTORY:  Family History   Problem Relation Age of Onset     Cancer Brother      MELANOMA     Diabetes Mother      AODM     Diabetes Father      AODM     Myocardial Infarction Father      Cerebrovascular Disease Brother      Family History Negative Brother      Family History Negative Sister      Family History Negative Son      Family History Negative Son      Family History Negative Son      Family History Negative Daughter        SOCIAL HISTORY:  Social History     Social History     Marital status:      Spouse name: Anastasia Caballero     Number of children: 4     Years of education: 14     Occupational History     retired           Social History Main Topics     Smoking status: Former Smoker     Packs/day: 2.00     Years: 41.00     Start date: 1951     Quit date: 3/1/1992     Smokeless tobacco: Never Used      Comment: 80 pack year history     Alcohol use 0.0 oz/week     0 Standard drinks or equivalent per week      Comment: 1 drink month     Drug use: No     Sexual activity: Not Currently     Partners: Female     Other Topics Concern      Service Yes     [employer], served in [place]     Blood Transfusions Yes     Unsure if he had one  "with heart surgery     Caffeine Concern Yes     occ cofee     Occupational Exposure No     Hobby Hazards No     Sleep Concern Yes     CPAP     Stress Concern No     Special Diet No     Exercise Yes     [employer] 3 times a week, biking walking.      Bike Helmet Yes     Seat Belt Yes     Self-Exams Yes     does HIRAL about once a month.     Social History Narrative        4 kids       Review of Systems:  Skin:  Positive for bruising   Eyes:  Positive for glasses  ENT:  Negative    Respiratory:  Positive for sleep apnea;dyspnea on exertion  Cardiovascular:  Negative;palpitations;chest pain;lightheadedness;dizziness;syncope or near-syncope;cyanosis;fatigue;exercise intolerance Positive for;edema  Gastroenterology: Positive for    Genitourinary:  Negative prostate problem  Musculoskeletal:  Positive for back pain;arthritis  Neurologic:  Negative    Psychiatric:  Positive for excessive stress  Heme/Lymph/Imm:  Positive for weight loss;easy bruising  Endocrine:  Positive for diabetes    Physical Exam:  Vitals: /80 (BP Location: Right arm, Cuff Size: Adult Large)  Pulse 60  Ht 1.905 m (6' 3\")  Wt 120.3 kg (265 lb 3.2 oz)  BMI 33.15 kg/m2     GEN:  NAD  NECK: No JVD  C/V:  Regular rate and rhythm, no murmur, rub or gallop.  RESP: Clear to auscultation bilaterally without wheezing, rales, or rhonchi.  GI: Abdomen soft, nontender, nondistended.   EXTREM: No LE edema.   NEURO: Alert and oriented, cooperative. No obvious focal deficits.   PSYCH: Normal affect.  SKIN: Warm and dry.       Recent Lab Results:  LIPID RESULTS:  Lab Results   Component Value Date    CHOL 93 06/20/2018    HDL 28 (L) 06/20/2018    LDL 40 06/20/2018    TRIG 123 06/20/2018    CHOLHDLRATIO 3.6 11/12/2015       LIVER ENZYME RESULTS:  Lab Results   Component Value Date    AST 24 09/20/2016    ALT <5 (L) 06/20/2018       CBC RESULTS:  Lab Results   Component Value Date    WBC 7.6 09/20/2016    RBC 4.97 09/20/2016    HGB 14.2 09/20/2016    HCT 43.1 " 09/20/2016    MCV 87 09/20/2016    MCH 28.6 09/20/2016    MCHC 32.9 09/20/2016    RDW 14.6 09/20/2016     09/20/2016       BMP RESULTS:  Lab Results   Component Value Date     06/20/2018    POTASSIUM 4.1 06/20/2018    CHLORIDE 106 06/20/2018    CO2 25 06/20/2018    ANIONGAP 14.1 06/20/2018     (H) 06/20/2018    BUN 17 06/20/2018    CR 0.81 06/20/2018    GFRESTIMATED >90 06/20/2018    GFRESTBLACK >90 06/20/2018    BRANDON 9.8 06/20/2018        A1C RESULTS:  Lab Results   Component Value Date    A1C 6.0 04/09/2018       INR RESULTS:  Lab Results   Component Value Date    INR 1.10 08/19/2014           Lyndsey Cameron PA-C   September 18, 2018

## 2018-09-18 NOTE — MR AVS SNAPSHOT
After Visit Summary   9/18/2018    Austen Fowler    MRN: [id number]           Patient Information     Date Of Birth          1935        Visit Information        Provider Department      9/18/2018 1:10 PM Lyndsey Cameron PA-C [facility]        Today's Diagnoses     Coronary artery disease involving native coronary artery of native heart without angina pectoris        Hyperlipidemia LDL goal <70        Benign essential hypertension          Care Instructions    Today's Plan:   1) Continue with current medications.   2) Your blood pressure and heart rate are normal.  3) Come back in 6 months to establish care with a new cardiologist.     If you have questions or concerns please call my nurse team at [phone].     Scheduling phone number: [phone]  Reminder: Please bring in all current medications, over the counter supplements and vitamin bottles to your next appointment.    It was a pleasure seeing you today!     Lyndsey Cameron  9/18/2018                      Follow-ups after your visit        Your next 10 appointments already scheduled     Sep 18, 2018  1:10 PM CDT   Return Visit with Lyndsey Cameron PA-C   [facility] ([facility])    [address]  [place] [id number] [phone] OPT 2            Sep 24, 2018  9:00 AM CDT   SHORT with Marielos Morocho [facility] ([facility])    [address]  [place] [id number] [phone]              Who to contact     If you have questions or need follow up information about today's clinic visit or your schedule please contact [facility] directly at [phone].  Normal or non-critical lab and imaging results will be communicated to you by MyChart, letter or phone within 4 business days after the clinic has received the  "results. If you do not hear from us within 7 days, please contact the clinic through [employer] or phone. If you have a critical or abnormal lab result, we will notify you by phone as soon as possible.  Submit refill requests through [employer] or call your pharmacy and they will forward the refill request to us. Please allow 3 business days for your refill to be completed.          Additional Information About Your Visit        [employer] Information     [employer] gives you secure access to your electronic health record. If you see a primary care provider, you can also send messages to your care team and make appointments. If you have questions, please call your primary care clinic.  If you do not have a primary care provider, please call [phone] and they will assist you.        Care EveryWhere ID     This is your Care EveryWhere ID. This could be used by other organizations to access your [facility] medical records  [id number]        Your Vitals Were     Pulse Height BMI (Body Mass Index)             60 1.905 m (6' 3\") 33.15 kg/m2          Blood Pressure from Last 3 Encounters:   09/18/18 131/80   08/22/18 125/75   08/20/18 157/80    Weight from Last 3 Encounters:   09/18/18 120.3 kg (265 lb 3.2 oz)   08/22/18 123.3 kg (271 lb 12.8 oz)   08/01/18 123 kg (271 lb 1.6 oz)              We Performed the Following     Follow-Up with Cardiac Advanced Practice Provider          Today's Medication Changes          These changes are accurate as of 9/18/18  1:07 PM.  If you have any questions, ask your nurse or doctor.               These medicines have changed or have updated prescriptions.        Dose/Directions    gabapentin 300 MG capsule   Commonly known as:  NEURONTIN   This may have changed:    - when to take this  - additional instructions   Used for:  Chronic pain syndrome, Sacroiliac joint pain        Take 900 mg three times daily   Quantity:  810 capsule   Refills:  2       nadolol 20 MG tablet   Commonly known as:  " EBONI   This may have changed:  when to take this   Used for:  Tremor        Dose:  20 mg   Take 1 tablet (20 mg) by mouth 2 times daily   Quantity:  60 tablet   Refills:  3         Stop taking these medicines if you haven't already. Please contact your care team if you have questions.     propranolol HCl 60 MG Tabs   Stopped by:  Lyndsey Cameron PA-C                    Primary Care Provider Office Phone # Fax #    Bhandrés MD Ayesha [phone] [address] [address]        Equal Access to Services     [facility]: Hadii aad ku hadasho Soomaali, waaxda luqadaha, qaybta kaalmada jakob, janice fields . So [facility] [phone].    ATENCIÓN: Si habla español, tiene a hagen disposición servicios gratuitos de asistencia lingüística. [facility] [phone].    We comply with applicable federal civil rights laws and [place] laws. We do not discriminate on the basis of race, color, national origin, age, disability, sex, sexual orientation, or gender identity.            Thank you!     Thank you for choosing [facility]  for your care. Our goal is always to provide you with excellent care. Hearing back from our patients is one way we can continue to improve our services. Please take a few minutes to complete the written survey that you may receive in the mail after your visit with us. Thank you!             Your Updated Medication List - Protect others around you: Learn how to safely use, store and throw away your medicines at www.disposemymeds.org.          This list is accurate as of 9/18/18  1:07 PM.  Always use your most recent med list.                   Brand Name Dispense Instructions for use Diagnosis    ACETAMIN 500 MG tablet   Generic drug:  acetaminophen      Take 2 tablets by mouth 2 times daily as needed        amLODIPine 2.5 MG tablet    NORVASC    180 tablet    Take 1 tablet (2.5 mg) by mouth 2 times  daily    Essential hypertension, benign       aspirin 81 MG tablet     30 tablet    Take 1 tablet (81 mg) by mouth daily    CAD (coronary artery disease)       blood glucose monitoring lancets     200 each    Use to test blood sugar 2 times daily or as directed.    Other abnormal glucose       calcium carbonate 500 MG chewable tablet    TUMS     Take 1 chew tab by mouth as needed        diclofenac 1 % Gel topical gel    VOLTAREN    200 g    Apply 4 grams to neck four times daily using enclosed dosing card.    Cervicalgia       finasteride 5 MG tablet    PROSCAR    90 tablet    Take 1 tablet (5 mg) by mouth daily    Slowing of urinary stream       gabapentin 300 MG capsule    NEURONTIN    810 capsule    Take 900 mg three times daily    Chronic pain syndrome, Sacroiliac joint pain       gemfibrozil 600 MG tablet    LOPID    180 tablet    Take 1 tablet (600 mg) by mouth 2 times daily    Hyperlipidemia LDL goal <70, Low HDL (under 40)       lisinopril 20 MG tablet    PRINIVIL/ZESTRIL    90 tablet    Take 1 tablet (20 mg) by mouth daily    Essential hypertension, benign       METAMUCIL 1.7 GM Wafr   Generic drug:  Psyllium      TAKE AS DIRECTED        metFORMIN 500 MG 24 hr tablet    GLUCOPHAGE-XR    360 tablet    Take 4 tablets (2,000 mg) by mouth daily (with breakfast)    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)       nadolol 20 MG tablet    CORGARD    60 tablet    Take 1 tablet (20 mg) by mouth 2 times daily    Tremor       omeprazole 20 MG CR capsule    priLOSEC    180 capsule    Take 1 capsule (20 mg) by mouth daily    Dysphagia, unspecified type       ONETOUCH ULTRA test strip   Generic drug:  blood glucose monitoring     200 strip    Use to test blood sugars    two times a day or as  directed    Other abnormal glucose       order for DME      Uses Cpap machine for sleep apnea        pravastatin 20 MG tablet    PRAVACHOL    90 tablet    Take 1 tablet (20 mg) by mouth daily    Hyperlipidemia LDL goal <70        tamsulosin 0.4 MG capsule    FLOMAX    90 capsule    TAKE 1 CAPSULE BY MOUTH  DAILY AS NEEDED    S/P lumbar laminectomy       triamcinolone 0.5 % cream    KENALOG    15 g    Apply  topically 2 times daily. to affected area as directed PRN    Rash and other nonspecific skin eruption       vitamin D 2000 units tablet     90 tablet    Take 2,000 Units by mouth daily.

## 2018-09-18 NOTE — LETTER
9/18/2018    Hamzah Hodge MD  [address]  [facility] [address]    RE: Austen Fowler       Dear Colleague,    I had the pleasure of seeing Austen Fowler in the [facility].    Primary Cardiologist: Formerly Dr. Moreno. He will need to establish care with a new cardiologist.     History of Present Illness:   This is a pleasant 83 year old male who presents for 3 month follow up. His past medical history is notable CAD (CABG in 1991 LIMA to LAD, SVG to OM1 and OM2, SANDI to RCA), hypertension, hyperlipidemia, HDL deficiency, PENELOPE (uses CPAP regularly), diabetes mellitus, and osteoarthritis.     He returns to clinic, stating that he is doing well. He does not report chestthout any assistance.   discomfort, shortness of breath, palpitations, PND, orthopnea, peripheral edema.     Assessment and plan:  This is a pleasant 83 year old male who presents for 3 month follow up. His past medical history is notable CAD (CABG in 1991 LIMA to LAD, SVG to OM1 and OM2, SANDI to RCA), hypertension, hyperlipidemia, HDL deficiency, PENELOPE (uses CPAP regularly), diabetes mellitus, and osteoarthritis.     He appears to be doing quite well from a cardiac standpoint.  His vital signs are stable.  BMP shows normal electrolytes and stable kidney function.  We will continue with current regimen without any changes.  He will need to establish cardiology care with a new cardiologist.  We will have him come back in 6 months for reevaluation.    Thank you for allowing me to participate in the care of this pleasant patient today.      This note was completed in part using Dragon voice recognition software. Although reviewed after completion, some word and grammatical errors may occur.    Orders this Visit:  No orders of the defined types were placed in this encounter.    No orders of the defined types were placed in this encounter.    Medications Discontinued During This Encounter   Medication Reason      metoprolol succinate (TOPROL-XL) 50 MG 24 hr tablet Stopped by Patient     propranolol HCl 60 MG TABS Side effects         Encounter Diagnoses   Name Primary?     Coronary artery disease involving native coronary artery of native heart without angina pectoris      Hyperlipidemia LDL goal <70      Benign essential hypertension        CURRENT MEDICATIONS:  Current Outpatient Prescriptions   Medication Sig Dispense Refill     acetaminophen (ACETAMIN) 500 MG tablet Take 2 tablets by mouth 2 times daily as needed        amLODIPine (NORVASC) 2.5 MG tablet Take 1 tablet (2.5 mg) by mouth 2 times daily 180 tablet 3     aspirin 81 MG tablet Take 1 tablet (81 mg) by mouth daily 30 tablet 3     blood glucose monitoring (ONE TOUCH ULTRASOFT) lancets Use to test blood sugar 2 times daily or as directed. 200 each 1     calcium carbonate (TUMS) 500 MG chewable tablet Take 1 chew tab by mouth as needed        Cholecalciferol (VITAMIN D) 2000 UNIT tablet Take 2,000 Units by mouth daily. 90 tablet 3     diclofenac (VOLTAREN) 1 % GEL topical gel Apply 4 grams to neck four times daily using enclosed dosing card. 200 g 1     finasteride (PROSCAR) 5 MG tablet Take 1 tablet (5 mg) by mouth daily 90 tablet 2     gabapentin (NEURONTIN) 300 MG capsule Take 900 mg three times daily (Patient taking differently: 2 times daily Take 900 mg three times daily) 810 capsule 2     gemfibrozil (LOPID) 600 MG tablet Take 1 tablet (600 mg) by mouth 2 times daily 180 tablet 2     lisinopril (PRINIVIL/ZESTRIL) 20 MG tablet Take 1 tablet (20 mg) by mouth daily 90 tablet 3     METAMUCIL 1.7 GM OR WAFR TAKE AS DIRECTED       metFORMIN (GLUCOPHAGE-XR) 500 MG 24 hr tablet Take 4 tablets (2,000 mg) by mouth daily (with breakfast) 360 tablet 1     nadolol (CORGARD) 20 MG tablet Take 1 tablet (20 mg) by mouth 2 times daily (Patient taking differently: Take 20 mg by mouth daily ) 60 tablet 3     omeprazole (PRILOSEC) 20 MG CR capsule Take 1 capsule (20 mg) by mouth  daily 180 capsule 3     ONE TOUCH ULTRA test strip Use to test blood sugars    two times a day or as  directed 200 strip 1     ORDER FOR DME Uses Cpap machine for sleep apnea       pravastatin (PRAVACHOL) 20 MG tablet Take 1 tablet (20 mg) by mouth daily 90 tablet 2     tamsulosin (FLOMAX) 0.4 MG capsule TAKE 1 CAPSULE BY MOUTH  DAILY AS NEEDED 90 capsule 3     triamcinolone (KENALOG) 0.5 % cream Apply  topically 2 times daily. 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     Bunion      CAD (coronary artery disease)     CABG 1992: LIMA to LAD, SANDI to RCA, SVG to OM1 and OM2     Contact dermatitis and other eczema, due to unspecified cause     eczema - has light treatments with Dr. Rivera     Disorders of bursae and tendons in shoulder region, unspecified      Esophageal reflux      Essential hypertension, benign      Gallbladder disease      GERD (gastroesophageal reflux disease)      Heart attack      Hyperlipidemia LDL goal <70      Left ventricular diastolic dysfunction      Low HDL (under 40) 3/28/2014     Nasal/sinus dis NEC     s/p surgery in 1995     Obesity      Osteoarthrosis, unspecified whether generalized or localized, shoulder region      Other hammer toe (acquired)      Sleep apnea     USES CPAP     Spinal stenosis, unspecified region other than cervical     MRI done 2006     Type 2 diabetes, HbA1c goal < 7% (H) 3/12/2015     Urge incontinence        PAST SURGICAL HISTORY:  Past Surgical History:   Procedure Laterality Date     C NONSPECIFIC PROCEDURE  11-92    CABG - LIMA to left anterior descending and  saphenous vein bypass graft to the first and second obtuse marginal branch of the circumflex and the right mammary to the right coronary artery     C NONSPECIFIC PROCEDURE  6-94    Gail lap     C NONSPECIFIC PROCEDURE  5-92, 6-92    Angioplasty     C NONSPECIFIC PROCEDURE  1995    sinus surgery     C NONSPECIFIC PROCEDURE      bilateral cataract surgery     COLONOSCOPY N/A 4/11/2017    Procedure: COMBINED COLONOSCOPY, SINGLE OR MULTIPLE BIOPSY/POLYPECTOMY BY BIOPSY;  Surgeon: Bladimir Garner MD;  Location:  GI     ESOPHAGOSCOPY, GASTROSCOPY, DUODENOSCOPY (EGD), DILATATION, COMBINED  7/17/2014    Procedure: COMBINED ESOPHAGOSCOPY, GASTROSCOPY, DUODENOSCOPY (EGD), DILATATION;  Surgeon: Bladimir Garner MD;  Location:  GI     HC COLONOSCOPY THRU STOMA W BIOPSY/CAUTERY TUMOR/POLYP/LESION  5/2007     INJECT EPIDURAL LUMBAR       LAMINECTOMY LUMBAR TWO LEVELS N/A 4/29/2015    Procedure: LAMINECTOMY LUMBAR TWO LEVELS;  Surgeon: Bladimir Keenan MD;  Location:  OR       FAMILY HISTORY:  Family History   Problem Relation Age of Onset     Cancer Brother      MELANOMA     Diabetes Mother      AODM     Diabetes Father      AODM     Myocardial Infarction Father      Cerebrovascular Disease Brother      Family History Negative Brother      Family History Negative Sister      Family History Negative Son      Family History Negative Son      Family History Negative Son      Family History Negative Daughter        SOCIAL HISTORY:  Social History     Social History     Marital status:      Spouse name: Anastasia Caballero     Number of children: 4     Years of education: 14     Occupational History     retired           Social History Main Topics     Smoking status: Former Smoker     Packs/day: 2.00     Years: 41.00     Start date: 1951     Quit date: 3/1/1992     Smokeless tobacco: Never Used      Comment: 80 pack year history     Alcohol use 0.0 oz/week     0 Standard drinks or equivalent per week       "Comment: 1 drink month     Drug use: No     Sexual activity: Not Currently     Partners: Female     Other Topics Concern      Service Yes     [employer], served in [place]     Blood Transfusions Yes     Unsure if he had one with heart surgery     Caffeine Concern Yes     occ cofee     Occupational Exposure No     Hobby Hazards No     Sleep Concern Yes     CPAP     Stress Concern No     Special Diet No     Exercise Yes     [employer] 3 times a week, biking walking.      Bike Helmet Yes     Seat Belt Yes     Self-Exams Yes     does HIRAL about once a month.     Social History Narrative        4 kids       Review of Systems:  Skin:  Positive for bruising   Eyes:  Positive for glasses  ENT:  Negative    Respiratory:  Positive for sleep apnea;dyspnea on exertion  Cardiovascular:  Negative;palpitations;chest pain;lightheadedness;dizziness;syncope or near-syncope;cyanosis;fatigue;exercise intolerance Positive for;edema  Gastroenterology: Positive for    Genitourinary:  Negative prostate problem  Musculoskeletal:  Positive for back pain;arthritis  Neurologic:  Negative    Psychiatric:  Positive for excessive stress  Heme/Lymph/Imm:  Positive for weight loss;easy bruising  Endocrine:  Positive for diabetes    Physical Exam:  Vitals: /80 (BP Location: Right arm, Cuff Size: Adult Large)  Pulse 60  Ht 1.905 m (6' 3\")  Wt 120.3 kg (265 lb 3.2 oz)  BMI 33.15 kg/m2     GEN:  NAD  NECK: No JVD  C/V:  Regular rate and rhythm, no murmur, rub or gallop.  RESP: Clear to auscultation bilaterally without wheezing, rales, or rhonchi.  GI: Abdomen soft, nontender, nondistended.   EXTREM: No LE edema.   NEURO: Alert and oriented, cooperative. No obvious focal deficits.   PSYCH: Normal affect.  SKIN: Warm and dry.       Recent Lab Results:  LIPID RESULTS:  Lab Results   Component Value Date    CHOL 93 06/20/2018    HDL 28 (L) 06/20/2018    LDL 40 06/20/2018    TRIG 123 06/20/2018    CHOLHDLRATIO 3.6 11/12/2015       LIVER " ENZYME RESULTS:  Lab Results   Component Value Date    AST 24 09/20/2016    ALT <5 (L) 06/20/2018       CBC RESULTS:  Lab Results   Component Value Date    WBC 7.6 09/20/2016    RBC 4.97 09/20/2016    HGB 14.2 09/20/2016    HCT 43.1 09/20/2016    MCV 87 09/20/2016    MCH 28.6 09/20/2016    MCHC 32.9 09/20/2016    RDW 14.6 09/20/2016     09/20/2016       BMP RESULTS:  Lab Results   Component Value Date     06/20/2018    POTASSIUM 4.1 06/20/2018    CHLORIDE 106 06/20/2018    CO2 25 06/20/2018    ANIONGAP 14.1 06/20/2018     (H) 06/20/2018    BUN 17 06/20/2018    CR 0.81 06/20/2018    GFRESTIMATED >90 06/20/2018    GFRESTBLACK >90 06/20/2018    BRANDON 9.8 06/20/2018        A1C RESULTS:  Lab Results   Component Value Date    A1C 6.0 04/09/2018       INR RESULTS:  Lab Results   Component Value Date    INR 1.10 08/19/2014       Thank you for allowing me to participate in the care of your patient.    Sincerely,     Lyndsey Cameron PA-C     [facility]

## 2018-09-21 ENCOUNTER — MYC REFILL (OUTPATIENT)
Dept: FAMILY MEDICINE | Facility: CLINIC | Age: 83
End: 2018-09-21

## 2018-09-21 DIAGNOSIS — E11.59 TYPE 2 DIABETES MELLITUS WITH VASCULAR DISEASE (H): ICD-10-CM

## 2018-09-21 DIAGNOSIS — I10 ESSENTIAL HYPERTENSION, BENIGN: ICD-10-CM

## 2018-09-21 RX ORDER — AMLODIPINE BESYLATE 2.5 MG/1
2.5 TABLET ORAL 2 TIMES DAILY
Qty: 180 TABLET | Refills: 1 | Status: SHIPPED | OUTPATIENT
Start: 2018-09-21 | End: 2019-02-14

## 2018-09-21 RX ORDER — METFORMIN HCL 500 MG
2000 TABLET, EXTENDED RELEASE 24 HR ORAL
Qty: 360 TABLET | Refills: 0 | Status: SHIPPED | OUTPATIENT
Start: 2018-09-21 | End: 2018-12-09

## 2018-09-21 NOTE — TELEPHONE ENCOUNTER
Message from MyChart:  Original authorizing provider: MD Austen Andrade would like a refill of the following medications:  amLODIPine (NORVASC) 2.5 MG tablet [Hamzah Hodge MD]  metFORMIN (GLUCOPHAGE-XR) 500 MG 24 hr tablet [Hamzah Hodge MD]    Preferred pharmacy: [facility] - [address]    Comment:  90 day refills needed. Austen

## 2018-09-21 NOTE — TELEPHONE ENCOUNTER
"Requested Prescriptions   Pending Prescriptions Disp Refills     amLODIPine (NORVASC) 2.5 MG tablet 180 tablet 3     Sig: Take 1 tablet (2.5 mg) by mouth 2 times daily    Calcium Channel Blockers Protocol  Passed    9/21/2018 11:47 AM       Passed - Blood pressure under 140/90 in past 12 months    BP Readings from Last 3 Encounters:   09/18/18 131/80   08/22/18 125/75   08/20/18 157/80                Passed - Recent (12 mo) or future (30 days) visit within the authorizing provider's specialty    Patient had office visit in the last 12 months or has a visit in the next 30 days with authorizing provider or within the authorizing provider's specialty.  See \"Patient Info\" tab in inbasket, or \"Choose Columns\" in Meds & Orders section of the refill encounter.           Passed - Patient is age 18 or older       Passed - Normal serum creatinine on file in past 12 months    Recent Labs   Lab Test  09/18/18   1206   09/21/16   0943   CR  0.78   < >   --    CREAT   --    --   0.8    < > = values in this interval not displayed.             metFORMIN (GLUCOPHAGE-XR) 500 MG 24 hr tablet 360 tablet 1     Sig: Take 4 tablets (2,000 mg) by mouth daily (with breakfast)    Biguanide Agents Passed    9/21/2018 11:47 AM       Passed - Blood pressure less than 140/90 in past 6 months    BP Readings from Last 3 Encounters:   09/18/18 131/80   08/22/18 125/75   08/20/18 157/80                Passed - Patient has documented LDL within the past 12 mos.    Recent Labs   Lab Test  06/20/18   0946   LDL  40            Passed - Patient has had a Microalbumin in the past 15 mos.    Recent Labs   Lab Test  04/13/18   1023   MICROL  9   UMALCR  6.58            Passed - Patient is age 10 or older       Passed - Patient has documented A1c within the specified period of time.    If HgbA1C is 8 or greater, it needs to be on file within the past 3 months.  If less than 8, must be on file within the past 6 months.     Recent Labs   Lab Test  04/09/18   " "0853   A1C  6.0            Passed - Patient's CR is NOT>1.4 OR Patient's EGFR is NOT<45 within past 12 mos.    Recent Labs   Lab Test  09/18/18   1206   GFRESTIMATED  >90   GFRESTBLACK  >90       Recent Labs   Lab Test  09/18/18   1206   CR  0.78            Passed - Patient does NOT have a diagnosis of CHF.       Passed - Recent (6 mo) or future (30 days) visit within the authorizing provider's specialty    Patient had office visit in the last 6 months or has a visit in the next 30 days with authorizing provider or within the authorizing provider's specialty.  See \"Patient Info\" tab in inbasket, or \"Choose Columns\" in Meds & Orders section of the refill encounter.            Medication Detail         Disp Refills Start End WADE     amLODIPine (NORVASC) 2.5 MG tablet 180 tablet 3 9/26/2017  No     Sig - Route: Take 1 tablet (2.5 mg) by mouth 2 times daily - Oral     Class: E-Prescribe     Order: [id number]     E-Prescribing Status: Receipt confirmed by pharmacy (9/26/2017 12:59 PM CDT)     Medication Detail         Disp Refills Start End WADE     metFORMIN (GLUCOPHAGE-XR) 500 MG 24 hr tablet 360 tablet 1 3/19/2018  No     Sig - Route: Take 4 tablets (2,000 mg) by mouth daily (with breakfast) - Oral     Class: E-Prescribe     Order: [id number]     E-Prescribing Status: Receipt confirmed by pharmacy (3/19/2018       Last OV 4/13/18 with PCP     Prescriptions approved per [facility] Refill Protocol.    [email or web address] message sent to patient     Sierra ARGUELLO RN      "

## 2018-09-24 ENCOUNTER — OFFICE VISIT (OUTPATIENT)
Dept: PHARMACY | Facility: CLINIC | Age: 83
End: 2018-09-24

## 2018-09-24 VITALS
WEIGHT: 269 LBS | DIASTOLIC BLOOD PRESSURE: 79 MMHG | SYSTOLIC BLOOD PRESSURE: 150 MMHG | HEART RATE: 52 BPM | BODY MASS INDEX: 33.62 KG/M2

## 2018-09-24 DIAGNOSIS — Z23 NEED FOR VACCINATION: ICD-10-CM

## 2018-09-24 DIAGNOSIS — G25.0 ESSENTIAL TREMOR: Primary | ICD-10-CM

## 2018-09-24 DIAGNOSIS — E11.59 TYPE 2 DIABETES MELLITUS WITH VASCULAR DISEASE (H): ICD-10-CM

## 2018-09-24 DIAGNOSIS — I10 ESSENTIAL HYPERTENSION, BENIGN: ICD-10-CM

## 2018-09-24 DIAGNOSIS — I25.10 CORONARY ARTERY DISEASE INVOLVING NATIVE CORONARY ARTERY OF NATIVE HEART WITHOUT ANGINA PECTORIS: ICD-10-CM

## 2018-09-24 DIAGNOSIS — M54.2 CERVICALGIA: ICD-10-CM

## 2018-09-24 LAB — HBA1C MFR BLD: 6 % (ref 0–5.6)

## 2018-09-24 PROCEDURE — 99606 MTMS BY PHARM EST 15 MIN: CPT | Performed by: PHARMACIST

## 2018-09-24 PROCEDURE — 99607 MTMS BY PHARM ADDL 15 MIN: CPT | Performed by: PHARMACIST

## 2018-09-24 PROCEDURE — 83036 HEMOGLOBIN GLYCOSYLATED A1C: CPT | Performed by: INTERNAL MEDICINE

## 2018-09-24 PROCEDURE — 36415 COLL VENOUS BLD VENIPUNCTURE: CPT | Performed by: INTERNAL MEDICINE

## 2018-09-24 NOTE — PATIENT INSTRUCTIONS
Recommendations from today's MTM visit:                                                    MTM (medication therapy management) is a service provided by a clinical pharmacist designed to help you get the most of out of your medicines.   Today we reviewed what your medicines are for, how to know if they are working, that your medicines are safe and how to make your medicine regimen as easy as possible.     1.  Labs today - A1c    Next MTM visit:  Monday, October 8th at 9am    To schedule another MTM appointment, please call the clinic directly or you may call the MTM scheduling line at [phone] or toll-free at [phone].     My Clinical Pharmacist's contact information:                                                      It was a pleasure seeing you today!  Please feel free to contact me with any questions or concerns you have.      Marielos Morocho PharmD, [facility]  Medication Therapy Management Provider  Pager: [phone]     You may receive a survey about the MTM services you received.  I would appreciate your feedback to help me serve you better in the future. Please fill it out and return it when you can. Your comments will be anonymous.

## 2018-09-24 NOTE — PROGRESS NOTES
"SUBJECTIVE/OBJECTIVE:                Austen Fowler is a 83 year old male coming in for a follow-up visit for Medication Therapy Management.  He was referred to me from Dr. Hodge.     Chief Complaint: Follow up from his visit on 3/7/18 with Kaylen Mckeon, PharmD.  He has no specific questions or concerns today.    Tobacco: History of tobacco dependence - quit 1992  Alcohol: Less than 1 beverages / week    Medication Adherence/Access:  no issues reported    Tremor:  He was changed from metoprolol XL to propranolol to aid with tremor - this caused GI effects so it was changed to nadolol, which seems to be going ok.  He's currently just taking 20mg once daily (prescribed for BID), he's taking in the evening after dinner and plans to add in the morning dose now that he's tolerating this ok.  He reports tremor is \"virtually gone\" so he's happy about this.    Cervicalgia:  He's been working with pain management on this.  He reports they tried to do an injection, but had difficulty getting into the space due to arthritis.  He's been prescribed Voltaren gel, hasn't had much of an opportunity to use this yet as his wife arron had hip surgery and is in rehab.  He has found IcyHot gel to be helpful.  He continues taking APAP 1000mg BID and gabapentin 900mg BID (although prescribed for TID).  He reports this regimen is working well for him and he denies side effects.    Hypertension/CAD: Current medications include lisinopril 20 mg daily, amlodipine 2.5 mg twice daily, and nadolol as noted above. Patient does not self-monitor BP.  Pt reports occasional dizziness or lightheadedness, although it is not bothersome. He denies any chest pains. Last ECHO on 6/20/18 estimated EF to be 55-60%, per review of EPIC history EF has never been <40%.  Pt follows with cardiology.    Diabetes:  Pt currently taking metformin XR 2000 mg daily.    SMBG: Once daily (AM fasting).   Ranges (patient reported): Pretty consistently around " 140mg/dL  Symptoms of low blood sugar? none. Frequency of hypoglycemia? never.  Recent symptoms of high blood sugar? none  Eye exam: up to date  Foot exam: up to date  Microalbumin is < 30 mg/g. Pt is taking an ACEi/ARB.  Aspirin: He's reduced ASA to take every other day as he was having excessive bruising  Lab Results   Component Value Date    A1C 6.0 04/09/2018    A1C 6.2 08/23/2017    A1C 6.8 02/23/2017    A1C 7.9 11/16/2016    A1C 7.2 08/05/2016     Immunizations:  He has not yet received his flu shot, but plans to get in the next few weeks at his pharmacy (his preference).  He did receive both Shingrix doses.    Other medications/conditions were not discussed in detail today, but were reviewed for medication reconciliation purposes.    Today's Vitals: /79  Pulse 52  Wt 269 lb (122 kg)  BMI 33.62 kg/m2    ASSESSMENT:              Current medications were reviewed today as discussed above.      Medication Adherence: good, no issues identified    Tremor:  Improved.    Cervicalgia: Improved.    Hypertension/CAD: Needs Improvement. Patient is not meeting BP goal of < 140/90mmHg, will benefit from increasing nadolol to BID dosing as prescribed.      Diabetes: Needs Improvement. Patient is meeting A1c goal of < 8%. Self monitoring of blood glucose is at goal of fasting  mg/dL, due for A1c re-check.    Immunizations:  Plan in place.      PLAN:                  1.  Order placed for A1c to be checked today.  2.  Devinold will increase nadolol to BID dosing as prescribed.    I spent 30 minutes with this patient today. A copy of the visit note was provided to the patient's primary care provider.     Will follow up in 2 weeks, appt scheduled.    The patient was given a summary of these recommendations as an after visit summary.    Marielos Morocho, PharmD, [facility]  Medication Therapy Management Provider  Pager: [phone]

## 2018-09-24 NOTE — MR AVS SNAPSHOT
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   Today we reviewed what your medicines are for, how to know if they are working, that your medicines are safe and how to make your medicine regimen as easy as possible.     1.  Labs today - A1c    Next MTM visit:  Monday, October 8th at 9am    To schedule another MTM appointment, please call the clinic directly or you may call the MTM scheduling line at [phone] or toll-free at [phone].     My Clinical Pharmacist's contact information:                                                      It was a pleasure seeing you today!  Please feel free to contact me with any questions or concerns you have.      Marielos Morocho, PharmD, [facility]  Medication Therapy Management Provider  Pager: [phone]     You may receive a survey about the MTM services you received.  I would appreciate your feedback to help me serve you better in the future. Please fill it out and return it when you can. Your comments will be anonymous.                     Follow-ups after your visit        Follow-up notes from your care team     Return in about 2 weeks (around 10/8/2018) for BP Recheck.      Your next 10 appointments already scheduled     Sep 24, 2018  9:30 AM CDT   LAB with [facility]   [facility]    [address] [address] [phone]           Please do  not eat 10-12 hours before your appointment if you are coming in fasting for labs on lipids, cholesterol, or glucose (sugar). This does not apply to pregnant women. Water, hot tea and black coffee (with nothing added) are okay. Do not drink other fluids, diet soda or chew gum.            Oct 08, 2018  9:00 AM CDT   SHORT with Marileos Morocho RPH   [facility] ([facility])    [address] [place] [address] [phone]              Future tests that were ordered for you today     Open Future Orders        Priority Expected Expires Ordered    Hemoglobin A1c Routine  9/24/2019 9/24/2018            Who to contact     If you have questions or need follow up information about today's clinic visit or your schedule please contact [facility] directly at [phone].  Normal or non-critical lab and imaging results will be communicated to you by MyChart, letter or phone within 4 business days after the clinic has received the results. If you do not hear from us within 7 days, please contact the clinic through [employer] or phone. If you have a critical or abnormal lab result, we will notify you by phone as soon as possible.  Submit refill requests through [employer] or call your pharmacy and they will forward the refill request to us. Please allow 3 business days for your refill to be completed.          Additional Information About Your Visit        MyChart Information     [employer] gives you secure access to your electronic health record. If you see a primary care provider, you can also send messages to your care team and make appointments. If you have questions, please call your primary care clinic.  If you do not have a primary care provider, please call [phone] and they will assist you.        Care EveryWhere ID     This is your Care EveryWhere ID. This could be used by other organizations to access your [place] medical records  [id number]         Your Vitals Were     Pulse BMI (Body Mass Index)                52 33.62 kg/m2           Blood Pressure from Last 3 Encounters:   09/24/18 150/79   09/18/18 131/80   08/22/18 125/75    Weight from Last 3 Encounters:   09/24/18 269 lb (122 kg)   09/18/18 265 lb 3.2 oz (120.3 kg)   08/22/18 271 lb 12.8 oz (123.3 kg)                 Today's Medication Changes          These changes are accurate as of 9/24/18  9:26 AM.  If you have any questions, ask your nurse or doctor.               These medicines have changed or have updated prescriptions.        Dose/Directions    aspirin 81 MG tablet   This may have changed:  when to take this   Used for:  CAD (coronary artery disease)        Dose:  81 mg   Take 1 tablet (81 mg) by mouth daily   Quantity:  30 tablet   Refills:  3       gabapentin 300 MG capsule   Commonly known as:  NEURONTIN   This may have changed:    - when to take this  - additional instructions   Used for:  Chronic pain syndrome, Sacroiliac joint pain        Take 900 mg three times daily   Quantity:  810 capsule   Refills:  2       nadolol 20 MG tablet   Commonly known as:  CORGARD   This may have changed:  when to take this   Used for:  Tremor        Dose:  20 mg   Take 1 tablet (20 mg) by mouth 2 times daily   Quantity:  60 tablet   Refills:  3                Primary Care Provider Office Phone # Fax #    Bhmoniqueperfecto Hodge [phone] [address] [facility] [address]  [place] [address]        Equal Access to Services     [facility]: Hadii evangelista jordano Sobaldo, waaxda luqadaha, qaybta kaalmada olamideyada, janice davila. So [facility] [phone].    ATENCIÓN: Si habla español, tiene a hagen disposición servicios gratuitos de asistencia lingüística. Llame al [phone].    We comply with applicable federal civil rights laws and [place] laws. We do not discriminate on the basis of race, color, national origin, age, disability, sex, sexual orientation, or gender  identity.            Thank you!     Thank you for choosing [facility]  for your care. Our goal is always to provide you with excellent care. Hearing back from our patients is one way we can continue to improve our services. Please take a few minutes to complete the written survey that you may receive in the mail after your visit with us. Thank you!             Your Updated Medication List - Protect others around you: Learn how to safely use, store and throw away your medicines at www.disposemymeds.org.          This list is accurate as of 9/24/18  9:26 AM.  Always use your most recent med list.                   Brand Name Dispense Instructions for use Diagnosis    ACETAMIN 500 MG tablet   Generic drug:  acetaminophen      Take 2 tablets by mouth 2 times daily as needed        amLODIPine 2.5 MG tablet    NORVASC    180 tablet    Take 1 tablet (2.5 mg) by mouth 2 times daily    Essential hypertension, benign       aspirin 81 MG tablet     30 tablet    Take 1 tablet (81 mg) by mouth daily    CAD (coronary artery disease)       blood glucose monitoring lancets     200 each    Use to test blood sugar 2 times daily or as directed.    Other abnormal glucose       calcium carbonate 500 MG chewable tablet    TUMS     Take 1 chew tab by mouth as needed        diclofenac 1 % Gel topical gel    VOLTAREN    200 g    Apply 4 grams to neck four times daily using enclosed dosing card.    Cervicalgia       finasteride 5 MG tablet    PROSCAR    90 tablet    Take 1 tablet (5 mg) by mouth daily    Slowing of urinary stream       gabapentin 300 MG capsule    NEURONTIN    810 capsule    Take 900 mg three times daily    Chronic pain syndrome, Sacroiliac joint pain       gemfibrozil 600 MG tablet    LOPID    180 tablet    Take 1 tablet (600 mg) by mouth 2 times daily    Hyperlipidemia LDL goal <70, Low HDL (under 40)       ICY HOT BACK EX      Externally apply topically daily as needed        lisinopril 20 MG tablet     PRINIVIL/ZESTRIL    90 tablet    Take 1 tablet (20 mg) by mouth daily    Essential hypertension, benign       METAMUCIL 1.7 GM Wafr   Generic drug:  Psyllium      TAKE AS DIRECTED        metFORMIN 500 MG 24 hr tablet    GLUCOPHAGE-XR    360 tablet    Take 4 tablets (2,000 mg) by mouth daily (with breakfast)    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)       nadolol 20 MG tablet    CORGARD    60 tablet    Take 1 tablet (20 mg) by mouth 2 times daily    Tremor       omeprazole 20 MG CR capsule    priLOSEC    180 capsule    Take 1 capsule (20 mg) by mouth daily    Dysphagia, unspecified type       ONETOUCH ULTRA test strip   Generic drug:  blood glucose monitoring     200 strip    Use to test blood sugars    two times a day or as  directed    Other abnormal glucose       order for DME      Uses Cpap machine for sleep apnea        pravastatin 20 MG tablet    PRAVACHOL    90 tablet    Take 1 tablet (20 mg) by mouth daily    Hyperlipidemia LDL goal <70       tamsulosin 0.4 MG capsule    FLOMAX    90 capsule    TAKE 1 CAPSULE BY MOUTH  DAILY AS NEEDED    S/P lumbar laminectomy       triamcinolone 0.5 % cream    KENALOG    15 g    Apply  topically 2 times daily. to affected area as directed PRN    Rash and other nonspecific skin eruption       vitamin D 2000 units tablet     90 tablet    Take 2,000 Units by mouth daily.

## 2018-10-08 ENCOUNTER — OFFICE VISIT (OUTPATIENT)
Dept: PHARMACY | Facility: CLINIC | Age: 83
End: 2018-10-08

## 2018-10-08 VITALS — DIASTOLIC BLOOD PRESSURE: 60 MMHG | SYSTOLIC BLOOD PRESSURE: 126 MMHG

## 2018-10-08 DIAGNOSIS — I10 ESSENTIAL HYPERTENSION, BENIGN: Primary | ICD-10-CM

## 2018-10-08 DIAGNOSIS — I25.10 CORONARY ARTERY DISEASE INVOLVING NATIVE CORONARY ARTERY OF NATIVE HEART WITHOUT ANGINA PECTORIS: ICD-10-CM

## 2018-10-08 PROCEDURE — 99606 MTMS BY PHARM EST 15 MIN: CPT | Performed by: PHARMACIST

## 2018-10-08 NOTE — PROGRESS NOTES
SUBJECTIVE/OBJECTIVE:                Austen Fowler is a 83 year old male coming in for a follow-up visit for Medication Therapy Management.  He was referred to me from Dr. Hodge.     Chief Complaint: Follow up from our visit on 9/24/18.  Here for BP re-check.    Tobacco: History of tobacco dependence - quit 1992  Alcohol: Less than 1 beverages / week    Medication Adherence/Access: no issues reported    Hypertension/CAD: Current medications include lisinopril 20 mg daily and amlodipine 2.5 mg twice daily.  He has put nadolol on hold - shortly after our last visit he started experiencing nausea/diarrhea and thought this might be from the nadolol.  He's planning to resume once daily dosing to see if the diarrhea/nausea returns again.  Patient does self-monitor BP - systolic readings have been the 130mmHg range and diastolic in the 80mmHg range.  He denies any chest pains. Last ECHO on 6/20/18 estimated EF to be 55-60%, per review of EPIC history EF has never been <40%.  Pt follows with cardiology.  BP Readings from Last 3 Encounters:   09/24/18 150/79   09/18/18 131/80   08/22/18 125/75      Other medications/conditions were not discussed today.    Today's Vitals: /60    ASSESSMENT:              Current medications were reviewed today as discussed above.      Medication Adherence: good, no issues identified    Hypertension/CAD: Stable. Patient is meeting BP goal of < 140/90mmHg.  Given pt's diagnosis of CAD, would prefer that he's on beta-blocker of some sort (although data is limited after 3 years so not 100% required), may benefit from trying nadolol again for tremor and could go back to metoprolol if not tolerated.      PLAN:                  1.  With your heart history - we do want you on a beta-blocker.  Give the nadolol a try again; if you don't tolerate it let us know and we can put you back on metoprolol.    I spent 15 minutes with this patient today. A copy of the visit note was provided to the  patient's primary care provider.     Will follow up in 6 months, sooner if needed.    The patient was given a summary of these recommendations as an after visit summary.    Marielos Morocho PharmD, [facility]  Medication Therapy Management Provider  Pager: [phone]

## 2018-10-08 NOTE — MR AVS SNAPSHOT
After Visit Summary   10/8/2018    Austen Fowler    MRN: [id number]           Patient Information     Date Of Birth          1935        Visit Information        Provider Department      10/8/2018 9:00 AM Marielos Morocho, [facility] MTM        Care Instructions    Recommendations from today's MTM visit:                                                    MTM (medication therapy management) is a service provided by a clinical pharmacist designed to help you get the most of out of your medicines.   Today we reviewed what your medicines are for, how to know if they are working, that your medicines are safe and how to make your medicine regimen as easy as possible.     1.  With your heart history - we do want you on a beta-blocker.  Give the nadolol a try again; if you don't tolerate it let us know and we can put you back on metoprolol.    Next MTM visit:  6 months, sooner if needed    To schedule another MTM appointment, please call the clinic directly or you may call the MTM scheduling line at [phone] or toll-free at [phone].     My Clinical Pharmacist's contact information:                                                      It was a pleasure seeing you today!  Please feel free to contact me with any questions or concerns you have.      Marielos Morocho, PharmD, [facility]  Medication Therapy Management Provider  Pager: [phone]     You may receive a survey about the MTM services you received.  I would appreciate your feedback to help me serve you better in the future. Please fill it out and return it when you can. Your comments will be anonymous.                     Follow-ups after your visit        Follow-up notes from your care team     Return in about 6 months (around 4/8/2019) for Routine Visit.      Who to contact     If you have questions or need follow up information about today's clinic visit or your schedule please contact [facility]  direct[facility] at [phone].  Normal or non-critical lab and imaging results will be communicated to you by MyChart, letter or phone within 4 business days after the clinic has received the results. If you do not hear from us within 7 days, please contact the clinic through [employer] or phone. If you have a critical or abnormal lab result, we will notify you by phone as soon as possible.  Submit refill requests through [employer] or call your pharmacy and they will forward the refill request to us. Please allow 3 business days for your refill to be completed.          Additional Information About Your Visit        [employer] Information     [employer] gives you secure access to your electronic health record. If you see a primary care provider, you can also send messages to your care team and make appointments. If you have questions, please call your primary care clinic.  If you do not have a primary care provider, please call [phone] and they will assist you.        Care EveryWhere ID     This is your Care EveryWhere ID. This could be used by other organizations to access your [place] medical records  [id number]         Blood Pressure from Last 3 Encounters:   10/08/18 126/60   09/24/18 150/79   09/18/18 131/80    Weight from Last 3 Encounters:   09/24/18 269 lb (122 kg)   09/18/18 265 lb 3.2 oz (120.3 kg)   08/22/18 271 lb 12.8 oz (123.3 kg)              Today, you had the following     No orders found for display         Today's Medication Changes          These changes are accurate as of 10/8/18  9:08 AM.  If you have any questions, ask your nurse or doctor.               These medicines have changed or have updated prescriptions.        Dose/Directions    aspirin 81 MG tablet   This may have changed:  when to take this   Used for:  CAD (coronary artery disease)        Dose:  81 mg   Take 1 tablet (81 mg) by mouth daily   Quantity:  30 tablet   Refills:  3       gabapentin 300 MG capsule   Commonly known as:  NEURONTIN    This may have changed:    - when to take this  - additional instructions   Used for:  Chronic pain syndrome, Sacroiliac joint pain        Take 900 mg three times daily   Quantity:  810 capsule   Refills:  2       nadolol 20 MG tablet   Commonly known as:  CORGARD   This may have changed:  when to take this   Used for:  Tremor        Dose:  20 mg   Take 1 tablet (20 mg) by mouth 2 times daily   Quantity:  60 tablet   Refills:  3                Primary Care Provider Office Phone # Fax #    Hamzah MD Ayesha [phone] [address]  [facility] [address]        Equal Access to Services     [facility]: Hadii evangelista chen hadchris Sobaldo, waaxannabel martinez, qajeferson solisalmaannabel robert, janice fields . So [facility] [phone].    ATENCIÓN: Si habla español, tiene a hagen disposición servicios gratuitos de asistencia lingüística. [facility] [phone].    We comply with applicable federal civil rights laws and [place] laws. We do not discriminate on the basis of race, color, national origin, age, disability, sex, sexual orientation, or gender identity.            Thank you!     Thank you for choosing [facility]  for your care. Our goal is always to provide you with excellent care. Hearing back from our patients is one way we can continue to improve our services. Please take a few minutes to complete the written survey that you may receive in the mail after your visit with us. Thank you!             Your Updated Medication List - Protect others around you: Learn how to safely use, store and throw away your medicines at www.disposemymeds.org.          This list is accurate as of 10/8/18  9:08 AM.  Always use your most recent med list.                   Brand Name Dispense Instructions for use Diagnosis    ACETAMIN 500 MG tablet   Generic drug:  acetaminophen      Take 2 tablets by mouth 2 times daily as needed        amLODIPine 2.5 MG tablet    NORVASC    180  tablet    Take 1 tablet (2.5 mg) by mouth 2 times daily    Essential hypertension, benign       aspirin 81 MG tablet     30 tablet    Take 1 tablet (81 mg) by mouth daily    CAD (coronary artery disease)       blood glucose monitoring lancets     200 each    Use to test blood sugar 2 times daily or as directed.    Other abnormal glucose       calcium carbonate 500 MG chewable tablet    TUMS     Take 1 chew tab by mouth as needed        diclofenac 1 % Gel topical gel    VOLTAREN    200 g    Apply 4 grams to neck four times daily using enclosed dosing card.    Cervicalgia       finasteride 5 MG tablet    PROSCAR    90 tablet    Take 1 tablet (5 mg) by mouth daily    Slowing of urinary stream       gabapentin 300 MG capsule    NEURONTIN    810 capsule    Take 900 mg three times daily    Chronic pain syndrome, Sacroiliac joint pain       gemfibrozil 600 MG tablet    LOPID    180 tablet    Take 1 tablet (600 mg) by mouth 2 times daily    Hyperlipidemia LDL goal <70, Low HDL (under 40)       ICY HOT BACK EX      Externally apply topically daily as needed        lisinopril 20 MG tablet    PRINIVIL/ZESTRIL    90 tablet    Take 1 tablet (20 mg) by mouth daily    Essential hypertension, benign       METAMUCIL 1.7 GM Wafr   Generic drug:  Psyllium      TAKE AS DIRECTED        metFORMIN 500 MG 24 hr tablet    GLUCOPHAGE-XR    360 tablet    Take 4 tablets (2,000 mg) by mouth daily (with breakfast)    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)       nadolol 20 MG tablet    CORGARD    60 tablet    Take 1 tablet (20 mg) by mouth 2 times daily    Tremor       omeprazole 20 MG CR capsule    priLOSEC    180 capsule    Take 1 capsule (20 mg) by mouth daily    Dysphagia, unspecified type       ONETOUCH ULTRA test strip   Generic drug:  blood glucose monitoring     200 strip    Use to test blood sugars    two times a day or as  directed    Other abnormal glucose       order for DME      Uses Cpap machine for sleep apnea         pravastatin 20 MG tablet    PRAVACHOL    90 tablet    Take 1 tablet (20 mg) by mouth daily    Hyperlipidemia LDL goal <70       tamsulosin 0.4 MG capsule    FLOMAX    90 capsule    TAKE 1 CAPSULE BY MOUTH  DAILY AS NEEDED    S/P lumbar laminectomy       triamcinolone 0.5 % cream    KENALOG    15 g    Apply  topically 2 times daily. to affected area as directed PRN    Rash and other nonspecific skin eruption       vitamin D 2000 units tablet     90 tablet    Take 2,000 Units by mouth daily.

## 2018-10-08 NOTE — PATIENT INSTRUCTIONS
Recommendations from today's MTM visit:                                                    MTM (medication therapy management) is a service provided by a clinical pharmacist designed to help you get the most of out of your medicines.   Today we reviewed what your medicines are for, how to know if they are working, that your medicines are safe and how to make your medicine regimen as easy as possible.     1.  With your heart history - we do want you on a beta-blocker.  Give the nadolol a try again; if you don't tolerate it let us know and we can put you back on metoprolol.    Next MTM visit:  6 months, sooner if needed    To schedule another MTM appointment, please call the clinic directly or you may call the MTM scheduling line at [phone] or toll-free at [phone].     My Clinical Pharmacist's contact information:                                                      It was a pleasure seeing you today!  Please feel free to contact me with any questions or concerns you have.      Marielos Morocho, Liam, [facility]  Medication Therapy Management Provider  Pager: [phone]     You may receive a survey about the MTM services you received.  I would appreciate your feedback to help me serve you better in the future. Please fill it out and return it when you can. Your comments will be anonymous.

## 2018-11-14 ENCOUNTER — MYC REFILL (OUTPATIENT)
Dept: FAMILY MEDICINE | Facility: CLINIC | Age: 83
End: 2018-11-14

## 2018-11-14 DIAGNOSIS — I10 ESSENTIAL HYPERTENSION, BENIGN: ICD-10-CM

## 2018-11-14 DIAGNOSIS — E78.6 LOW HDL (UNDER 40): ICD-10-CM

## 2018-11-14 DIAGNOSIS — Z98.890 S/P LUMBAR LAMINECTOMY: ICD-10-CM

## 2018-11-14 DIAGNOSIS — R13.10 DYSPHAGIA, UNSPECIFIED TYPE: ICD-10-CM

## 2018-11-14 DIAGNOSIS — R39.198 SLOWING OF URINARY STREAM: ICD-10-CM

## 2018-11-14 DIAGNOSIS — M53.3 SACROILIAC JOINT PAIN: ICD-10-CM

## 2018-11-14 DIAGNOSIS — E78.5 HYPERLIPIDEMIA LDL GOAL <70: ICD-10-CM

## 2018-11-14 DIAGNOSIS — G89.4 CHRONIC PAIN SYNDROME: ICD-10-CM

## 2018-11-14 RX ORDER — LISINOPRIL 20 MG/1
20 TABLET ORAL DAILY
Qty: 90 TABLET | Refills: 3 | Status: CANCELLED | OUTPATIENT
Start: 2018-11-14

## 2018-11-14 RX ORDER — TAMSULOSIN HYDROCHLORIDE 0.4 MG/1
CAPSULE ORAL
Qty: 90 CAPSULE | Refills: 3 | Status: CANCELLED | OUTPATIENT
Start: 2018-11-14

## 2018-11-14 RX ORDER — GABAPENTIN 300 MG/1
CAPSULE ORAL
Qty: 810 CAPSULE | Refills: 2 | Status: CANCELLED | OUTPATIENT
Start: 2018-11-14

## 2018-11-14 RX ORDER — FINASTERIDE 5 MG/1
5 TABLET, FILM COATED ORAL DAILY
Qty: 90 TABLET | Refills: 2 | Status: CANCELLED | OUTPATIENT
Start: 2018-11-14

## 2018-11-14 NOTE — TELEPHONE ENCOUNTER
Message from MyChart:  Original authorizing provider: MD Austen Andrade would like a refill of the following medications:  gabapentin (NEURONTIN) 300 MG capsule [Hamzah Hodge MD]  omeprazole (PRILOSEC) 20 MG CR capsule [Hamzah Hodge MD]  gemfibrozil (LOPID) 600 MG tablet [Hamzah Hodge MD]  lisinopril (PRINIVIL/ZESTRIL) 20 MG tablet [Hamzah Hodge MD]  tamsulosin (FLOMAX) 0.4 MG capsule [Hamzah Hodge MD]  finasteride (PROSCAR) 5 MG tablet [Hamzah Hodge MD]    [facility] [address]    Comment:  90 day supply with 3 refills on each of these. Thank you Austen

## 2018-11-15 RX ORDER — GEMFIBROZIL 600 MG/1
600 TABLET, FILM COATED ORAL 2 TIMES DAILY
Qty: 180 TABLET | Refills: 3 | Status: SHIPPED | OUTPATIENT
Start: 2018-11-15 | End: 2019-02-14

## 2018-11-15 RX ORDER — GABAPENTIN 300 MG/1
CAPSULE ORAL
Qty: 810 CAPSULE | Refills: 2 | Status: SHIPPED | OUTPATIENT
Start: 2018-11-15 | End: 2019-04-19

## 2018-11-15 NOTE — TELEPHONE ENCOUNTER
"Requested Prescriptions   Pending Prescriptions Disp Refills     gabapentin (NEURONTIN) 300 MG capsule 810 capsule 2     Sig: Take 900 mg three times daily    There is no refill protocol information for this order        omeprazole (PRILOSEC) 20 MG CR capsule 180 capsule 3     Sig: Take 1 capsule (20 mg) by mouth daily    PPI Protocol Passed    11/14/2018  3:30 PM       Passed - Not on Clopidogrel (unless Pantoprazole ordered)       Passed - No diagnosis of osteoporosis on record       Passed - Recent (12 mo) or future (30 days) visit within the authorizing provider's specialty    Patient had office visit in the last 12 months or has a visit in the next 30 days with authorizing provider or within the authorizing provider's specialty.  See \"Patient Info\" tab in inbasket, or \"Choose Columns\" in Meds & Orders section of the refill encounter.             Passed - Patient is age 18 or older        gemfibrozil (LOPID) 600 MG tablet 180 tablet 2     Sig: Take 1 tablet (600 mg) by mouth 2 times daily    Fibrates Passed    11/14/2018  3:30 PM       Passed - Lipid panel on file in past 12 months    Recent Labs   Lab Test  06/20/18   0946   11/12/15   0807   CHOL  93   < >  102   TRIG  123   < >  144   HDL  28*   < >  28*   LDL  40   < >  45*   NHDL  65   < >   --    VLDL   --    --   29   CHOLHDLRATIO   --    --   3.6    < > = values in this interval not displayed.              Passed - No abnormal creatine kinase in past 12 months    No lab results found.            Passed - Recent (12 mo) or future (30 days) visit within the authorizing provider's specialty    Patient had office visit in the last 12 months or has a visit in the next 30 days with authorizing provider or within the authorizing provider's specialty.  See \"Patient Info\" tab in inbasket, or \"Choose Columns\" in Meds & Orders section of the refill encounter.             Passed - Patient is age 18 or older        lisinopril (PRINIVIL/ZESTRIL) 20 MG tablet 90 tablet " "3     Sig: Take 1 tablet (20 mg) by mouth daily    ACE Inhibitors (Including Combos) Protocol Passed    11/14/2018  3:30 PM       Passed - Blood pressure under 140/90 in past 12 months    BP Readings from Last 3 Encounters:   10/08/18 126/60   09/24/18 150/79   09/18/18 131/80                Passed - Recent (12 mo) or future (30 days) visit within the authorizing provider's specialty    Patient had office visit in the last 12 months or has a visit in the next 30 days with authorizing provider or within the authorizing provider's specialty.  See \"Patient Info\" tab in inbasket, or \"Choose Columns\" in Meds & Orders section of the refill encounter.             Passed - Patient is age 18 or older       Passed - Normal serum creatinine on file in past 12 months    Recent Labs   Lab Test  09/18/18   1206   09/21/16   0943   CR  0.78   < >   --    CREAT   --    --   0.8    < > = values in this interval not displayed.            Passed - Normal serum potassium on file in past 12 months    Recent Labs   Lab Test  09/18/18   1206   POTASSIUM  4.0             tamsulosin (FLOMAX) 0.4 MG capsule 90 capsule 3    Alpha Blockers Passed    11/14/2018  3:30 PM       Passed - Blood pressure under 140/90 in past 12 months    BP Readings from Last 3 Encounters:   10/08/18 126/60   09/24/18 150/79   09/18/18 131/80                Passed - Recent (12 mo) or future (30 days) visit within the authorizing provider's specialty    Patient had office visit in the last 12 months or has a visit in the next 30 days with authorizing provider or within the authorizing provider's specialty.  See \"Patient Info\" tab in inbasket, or \"Choose Columns\" in Meds & Orders section of the refill encounter.             Passed - Patient does not have Tadalafil, Vardenafil, or Sildenafil on their medication list       Passed - Patient is 18 years of age or older        finasteride (PROSCAR) 5 MG tablet 90 tablet 2     Sig: Take 1 tablet (5 mg) by mouth daily    " "Alpha Blockers Passed    11/14/2018  3:30 PM       Passed - Blood pressure under 140/90 in past 12 months    BP Readings from Last 3 Encounters:   10/08/18 126/60   09/24/18 150/79   09/18/18 131/80                Passed - Recent (12 mo) or future (30 days) visit within the authorizing provider's specialty    Patient had office visit in the last 12 months or has a visit in the next 30 days with authorizing provider or within the authorizing provider's specialty.  See \"Patient Info\" tab in inbasket, or \"Choose Columns\" in Meds & Orders section of the refill encounter.             Passed - Patient does not have Tadalafil, Vardenafil, or Sildenafil on their medication list       Passed - Patient is 18 years of age or older          "

## 2018-11-15 NOTE — TELEPHONE ENCOUNTER
Rx refills sent for Omeprazole and Gemfibrozil.  (refills remaining on each of the others that were requested.)      Gabapentin pended for  review and sign.    Thank you,  Annmarie Andrade RN

## 2018-11-15 NOTE — TELEPHONE ENCOUNTER
gabapentin (NEURONTIN) 300 MG capsule  Last Written Prescription Date:  9/26/17  Last Fill Quantity: 810 capsule,   # refills: 2  Last Office Visit: 4/13/2018 ([place]  Future Office visit:       Routing refill request to provider for review/approval because:  Drug not on the [facility], [facility] or [facility] refill protocol or controlled substance    Sig: Take 900 mg three times daily   Patient taking differently: 2 times daily Take 900 mg three times daily            omeprazole (PRILOSEC) 20 MG CR capsule  Last Written Prescription Date:  10/04/17  Last Fill Quantity: 180 capsule,  # refills: 3   Last office visit: 4/13/2018 with prescribing provider:  Ayesha Horton Office Visit:      gemfibrozil (LOPID) 600 MG tablet  Last Written Prescription Date:  2/06/18  Last Fill Quantity: 180 tablet,  # refills: 2   Last office visit: 4/13/2018 with prescribing provider:  Ayesha Horton Office Visit:      lisinopril (PRINIVIL/ZESTRIL) 20 MG tablet  Last Written Prescription Date:  3/19/18  Last Fill Quantity: 90 tablet,  # refills: 3   Last office visit: 4/13/2018 with prescribing provider:  Ayesha Horton Office Visit:      tamsulosin (FLOMAX) 0.4 MG capsule  Last Written Prescription Date:  6/29/18  Last Fill Quantity: 90 capsule,  # refills: 3   Last office visit: 4/13/2018 with prescribing provider:  Ayesha Horton Office Visit:      finasteride (PROSCAR) 5 MG tablet  Last Written Prescription Date:  7/24/18  Last Fill Quantity: 90 tablet,  # refills: 2   Last office visit: 4/13/2018 with prescribing provider:  Ayesha Horton Office Visit:

## 2019-02-14 ENCOUNTER — MYC REFILL (OUTPATIENT)
Dept: FAMILY MEDICINE | Facility: CLINIC | Age: 84
End: 2019-02-14

## 2019-02-14 DIAGNOSIS — E78.6 LOW HDL (UNDER 40): ICD-10-CM

## 2019-02-14 DIAGNOSIS — E78.5 HYPERLIPIDEMIA LDL GOAL <70: ICD-10-CM

## 2019-02-14 DIAGNOSIS — R13.10 DYSPHAGIA, UNSPECIFIED TYPE: ICD-10-CM

## 2019-02-14 DIAGNOSIS — I10 ESSENTIAL HYPERTENSION, BENIGN: ICD-10-CM

## 2019-02-14 RX ORDER — AMLODIPINE BESYLATE 2.5 MG/1
2.5 TABLET ORAL 2 TIMES DAILY
Qty: 180 TABLET | Refills: 0 | Status: ON HOLD | OUTPATIENT
Start: 2019-02-14 | End: 2019-06-20

## 2019-02-14 RX ORDER — GEMFIBROZIL 600 MG/1
600 TABLET, FILM COATED ORAL 2 TIMES DAILY
Qty: 180 TABLET | Refills: 0 | Status: SHIPPED | OUTPATIENT
Start: 2019-02-14 | End: 2019-03-30

## 2019-02-14 NOTE — TELEPHONE ENCOUNTER
"Routing refill request to provider for review/approval because:  Drug interaction warning: gemfibrozil and pravastatin      Requested Prescriptions   Pending Prescriptions Disp Refills     amLODIPine (NORVASC) 2.5 MG tablet 180 tablet 1    Last Written Prescription Date:  9/21/2018  Last Fill Quantity: 180,  # refills: 1   Last office visit: 4/13/2018 with prescribing provider:  Ayesha   Future Office Visit:   Next 5 appointments (look out 90 days)    Mar 05, 2019  8:15 AM CST  Return Visit with Lino Hernandez MD  [facility] ([facility]) [address]  [facility] [address]  [phone] OPT 2   May 03, 2019  9:00 AM CDT  PHYSICAL with Hamzah Hodge MD  [facility] ([facility]) [address] [facility] [address]  [phone]          Sig: Take 1 tablet (2.5 mg) by mouth 2 times daily    Calcium Channel Blockers Protocol  Passed - 2/14/2019 10:04 AM       Passed - Blood pressure under 140/90 in past 12 months    BP Readings from Last 3 Encounters:   10/08/18 126/60   09/24/18 150/79   09/18/18 131/80                Passed - Recent (12 mo) or future (30 days) visit within the authorizing provider's specialty    Patient had office visit in the last 12 months or has a visit in the next 30 days with authorizing provider or within the authorizing provider's specialty.  See \"Patient Info\" tab in inbasket, or \"Choose Columns\" in Meds & Orders section of the refill encounter.             Passed - Medication is active on med list       Passed - Patient is age 18 or older       Passed - Normal serum creatinine on file in past 12 months    Recent Labs   Lab Test 09/18/18  1206  09/21/16  0943   CR 0.78   < >  --    CREAT  --   --  0.8    < > = values in this interval not displayed.             omeprazole (PRILOSEC) 20 MG DR capsule 90 capsule 3    Last Written Prescription Date:  11/15/2018  Last Fill Quantity: 90,  # " "refills: 3   Last office visit: 4/13/2018 with prescribing provider:  Ayesha Horton Office Visit:   Next 5 appointments (look out 90 days)    Mar 05, 2019  8:15 AM CST  Return Visit with Lino Hernandez MD  [facility] ([facility]) [address] [address]  [phone] OPT 2   May 03, 2019  9:00 AM CDT  PHYSICAL with Hamzah Hodge MD  [facility] ([facility]) [address] [facility] [address] [phone]          Sig: Take 1 capsule (20 mg) by mouth daily    PPI Protocol Passed - 2/14/2019 10:04 AM       Passed - Not on Clopidogrel (unless Pantoprazole ordered)       Passed - No diagnosis of osteoporosis on record       Passed - Recent (12 mo) or future (30 days) visit within the authorizing provider's specialty    Patient had office visit in the last 12 months or has a visit in the next 30 days with authorizing provider or within the authorizing provider's specialty.  See \"Patient Info\" tab in inbasket, or \"Choose Columns\" in Meds & Orders section of the refill encounter.             Passed - Medication is active on med list       Passed - Patient is age 18 or older        gemfibrozil (LOPID) 600 MG tablet 180 tablet 3    Last Written Prescription Date:  11/15/2018  Last Fill Quantity: 180,  # refills: 3   Last office visit: 4/13/2018 with prescribing provider:  Ayesha Horton Office Visit:   Next 5 appointments (look out 90 days)    Mar 05, 2019  8:15 AM CST  Return Visit with Lino Hernandez MD  [facility] ([facility]) [address] [address] [phone] OPT 2   May 03, 2019  9:00 AM CDT  PHYSICAL with Hamzah Hodge MD  [facility] ([facility]) [address] [facility] [address] [phone]          Sig: Take 1 tablet (600 mg) by mouth 2 times daily    Fibrates Passed - " "2/14/2019 10:04 AM       Passed - Lipid panel on file in past 12 months    Recent Labs   Lab Test 06/20/18  0946  11/12/15  0807   CHOL 93   < > 102   TRIG 123   < > 144   HDL 28*   < > 28*   LDL 40   < > 45*   NHDL 65   < >  --    VLDL  --   --  29   CHOLHDLRATIO  --   --  3.6    < > = values in this interval not displayed.              Passed - No abnormal creatine kinase in past 12 months    No lab results found.            Passed - Recent (12 mo) or future (30 days) visit within the authorizing provider's specialty    Patient had office visit in the last 12 months or has a visit in the next 30 days with authorizing provider or within the authorizing provider's specialty.  See \"Patient Info\" tab in inbasket, or \"Choose Columns\" in Meds & Orders section of the refill encounter.             Passed - Medication is active on med list       Passed - Patient is age 18 or older        Patient has refills remaining at pharmacy-omeprazole and gemfibrozil.  Refill sent for amlodipine.  Sent patient my chart message to contact pharmacy as a 1 year prescription was sent in for the medications listed above.      HERLINDA FitchN, RN  [employer] [facility]    "

## 2019-02-25 ENCOUNTER — MYC REFILL (OUTPATIENT)
Dept: FAMILY MEDICINE | Facility: CLINIC | Age: 84
End: 2019-02-25

## 2019-02-25 DIAGNOSIS — R13.10 DYSPHAGIA, UNSPECIFIED TYPE: ICD-10-CM

## 2019-02-27 NOTE — TELEPHONE ENCOUNTER
"omeprazole (PRILOSEC) 20 MG CR capsule  90 capsule    3 11/15/2018  No   Sig - Route: Take 1 capsule (20 mg) by mouth daily      Last Written Prescription Date:  11/15/2018  Last Fill Quantity: 90,  # refills: 3   Last office visit: 4/13/2018 with prescribing provider:     Future Office Visit:   Next 5 appointments (look out 90 days)    Mar 05, 2019  8:15 AM CST  Return Visit with Lino Hernandez MD  [facility] ([facility]) [address]  [facility] [address]  [phone] OPT 2   May 03, 2019  9:00 AM CDT  PHYSICAL with Hamzah Hodge MD  [facility]) [address] [facility] [address] [phone]         Requested Prescriptions   Pending Prescriptions Disp Refills     omeprazole (PRILOSEC) 20 MG DR capsule 90 capsule 3     Sig: Take 1 capsule (20 mg) by mouth daily    PPI Protocol Passed - 2/25/2019  4:43 PM       Passed - Not on Clopidogrel (unless Pantoprazole ordered)       Passed - No diagnosis of osteoporosis on record       Passed - Recent (12 mo) or future (30 days) visit within the authorizing provider's specialty    Patient had office visit in the last 12 months or has a visit in the next 30 days with authorizing provider or within the authorizing provider's specialty.  See \"Patient Info\" tab in inbasket, or \"Choose Columns\" in Meds & Orders section of the refill encounter.             Passed - Medication is active on med list       Passed - Patient is age 18 or older            "

## 2019-03-05 ENCOUNTER — OFFICE VISIT (OUTPATIENT)
Dept: CARDIOLOGY | Facility: CLINIC | Age: 84
End: 2019-03-05
Payer: COMMERCIAL

## 2019-03-05 VITALS
WEIGHT: 262 LBS | HEART RATE: 64 BPM | HEIGHT: 75 IN | BODY MASS INDEX: 32.58 KG/M2 | DIASTOLIC BLOOD PRESSURE: 80 MMHG | SYSTOLIC BLOOD PRESSURE: 130 MMHG

## 2019-03-05 DIAGNOSIS — I10 BENIGN ESSENTIAL HYPERTENSION: ICD-10-CM

## 2019-03-05 DIAGNOSIS — I25.10 CORONARY ARTERY DISEASE INVOLVING NATIVE CORONARY ARTERY OF NATIVE HEART WITHOUT ANGINA PECTORIS: ICD-10-CM

## 2019-03-05 DIAGNOSIS — I77.810 AORTIC ROOT DILATATION (H): ICD-10-CM

## 2019-03-05 DIAGNOSIS — Z95.1 S/P CABG (CORONARY ARTERY BYPASS GRAFT): ICD-10-CM

## 2019-03-05 DIAGNOSIS — E78.6 HDL DEFICIENCY: Primary | ICD-10-CM

## 2019-03-05 DIAGNOSIS — E78.5 HYPERLIPIDEMIA LDL GOAL <70: ICD-10-CM

## 2019-03-05 LAB
ALT SERPL W P-5'-P-CCNC: <5 U/L (ref 5–30)
ANION GAP SERPL CALCULATED.3IONS-SCNC: 14 MMOL/L (ref 6–17)
BUN SERPL-MCNC: 20 MG/DL (ref 7–30)
CALCIUM SERPL-MCNC: 9.6 MG/DL (ref 8.5–10.5)
CHLORIDE SERPL-SCNC: 107 MMOL/L (ref 98–107)
CHOLEST SERPL-MCNC: 96 MG/DL
CO2 SERPL-SCNC: 22 MMOL/L (ref 23–29)
CREAT SERPL-MCNC: 0.66 MG/DL (ref 0.7–1.3)
GFR SERPL CREATININE-BSD FRML MDRD: >90 ML/MIN/{1.73_M2}
GLUCOSE SERPL-MCNC: 142 MG/DL (ref 70–105)
HDLC SERPL-MCNC: 32 MG/DL
LDLC SERPL CALC-MCNC: 45 MG/DL
NONHDLC SERPL-MCNC: 64 MG/DL
POTASSIUM SERPL-SCNC: 4 MMOL/L (ref 3.5–5.1)
SODIUM SERPL-SCNC: 139 MMOL/L (ref 136–145)
TRIGL SERPL-MCNC: 96 MG/DL

## 2019-03-05 PROCEDURE — 80048 BASIC METABOLIC PNL TOTAL CA: CPT | Performed by: PHYSICIAN ASSISTANT

## 2019-03-05 PROCEDURE — 36415 COLL VENOUS BLD VENIPUNCTURE: CPT | Performed by: PHYSICIAN ASSISTANT

## 2019-03-05 PROCEDURE — 80061 LIPID PANEL: CPT | Performed by: PHYSICIAN ASSISTANT

## 2019-03-05 PROCEDURE — 99213 OFFICE O/P EST LOW 20 MIN: CPT | Performed by: INTERNAL MEDICINE

## 2019-03-05 PROCEDURE — 84460 ALANINE AMINO (ALT) (SGPT): CPT | Performed by: PHYSICIAN ASSISTANT

## 2019-03-05 ASSESSMENT — MIFFLIN-ST. JEOR: SCORE: 1974.05

## 2019-03-05 NOTE — PROGRESS NOTES
Service Date: 03/05/2019      HISTORY OF PRESENT ILLNESS:  It was my pleasure to see your patient, Austen Fowler, who is a very pleasant 83-year-old gentleman with a past history of coronary artery disease.  If you remember, this patient underwent coronary artery bypass grafting in 1991 when he had a LIMA to the LAD, a saphenous vein graft to both the first and second obtuse marginal arteries and a right internal mammary artery graft to the right coronary artery.  He has done well since that time.  He has had no chest pain, no chest pressure and no shortness of breath.  Last nuclear stress test was performed 4 years ago which showed no evidence of ischemia.  Echocardiography in the past has shown slight left ventricular dilatation with a normal ejection fraction of 55%-60%.  He has had mild aortic root dilatation and mild biatrial enlargement.      With that background in mind, Mr. Fowler is doing well.  He has no chest pains, chest pressure or unusual shortness of breath.  He has lost about 9 pounds since 08/2018.  His blood pressure is excellent at 130/88.      His lipid profile today shows improvement from previously.  His LDL has risen from 28 to 32.  His LDL is close to being 45, was previously 40 and his triglycerides have dropped from 123 to 96.  His ALT is undetectable, indicating no hepatotoxicity from pravastatin.      IMPRESSION:   1.  Coronary artery disease and status post coronary artery bypass grafting.  The patient is asymptomatic with respect to coronary artery disease.   2.  Essential hypertension.  His blood pressure is well controlled, as described above.   3.  Excellent lipid profile with the exception of mild HDL deficiency which has improved from 2018.   4.  Mild left ventricular enlargement.   5.  Mildly dilated aortic root.      PLAN:  I will see the patient back again in 1 year's time, but in 6 months' time we will repeat his echocardiogram to look at left ventricular dimensions and aortic  root dimensions.        It is my pleasure to be involved in the care of this very nice patient and as always, he has been told to contact me if he has any questions or any concerns.      cc:      Hamzah Hodge MD    [facility]   [address]   [place] [address]         TUYET GARCIA MD, [facility]             D: 2019   T: 2019   MT: NBA      Name:     NAYELI RAI   MRN:      [id number]        Account:      [id number]   :      1935           Service Date: 2019      Document: [id number]

## 2019-03-05 NOTE — PROGRESS NOTES
HPI and Plan:   See dictation    Orders Placed This Encounter   Procedures     Basic metabolic panel     Lipid Profile     ALT     Follow-Up with Cardiologist       No orders of the defined types were placed in this encounter.      There are no discontinued medications.      Encounter Diagnoses   Name Primary?     Coronary artery disease involving native coronary artery of native heart without angina pectoris      Hyperlipidemia LDL goal <70      Benign essential hypertension      HDL deficiency Yes     S/P CABG (coronary artery bypass graft)        CURRENT MEDICATIONS:  Current Outpatient Medications   Medication Sig Dispense Refill     acetaminophen (ACETAMIN) 500 MG tablet Take 2 tablets by mouth 2 times daily as needed        amLODIPine (NORVASC) 2.5 MG tablet Take 1 tablet (2.5 mg) by mouth 2 times daily 180 tablet 0     aspirin 81 MG tablet Take 1 tablet (81 mg) by mouth daily (Patient taking differently: Take 81 mg by mouth every other day ) 30 tablet 3     calcium carbonate (TUMS) 500 MG chewable tablet Take 1 chew tab by mouth as needed        Cholecalciferol (VITAMIN D) 2000 UNIT tablet Take 2,000 Units by mouth daily. 90 tablet 3     diclofenac (VOLTAREN) 1 % GEL topical gel Apply 4 grams to neck four times daily using enclosed dosing card. 200 g 1     finasteride (PROSCAR) 5 MG tablet Take 1 tablet (5 mg) by mouth daily 90 tablet 2     gabapentin (NEURONTIN) 300 MG capsule Take 900 mg three times daily (Patient taking differently: 900 mg 2 times daily ) 810 capsule 2     gemfibrozil (LOPID) 600 MG tablet Take 1 tablet (600 mg) by mouth 2 times daily 180 tablet 0     lisinopril (PRINIVIL/ZESTRIL) 20 MG tablet Take 1 tablet (20 mg) by mouth daily 90 tablet 3     Menthol, Topical Analgesic, (ICY HOT BACK EX) Externally apply topically daily as needed       METAMUCIL 1.7 GM OR WAFR TAKE AS DIRECTED       metFORMIN (GLUCOPHAGE-XR) 500 MG 24 hr tablet Take 4 tablets (2,000 mg) by mouth daily (with breakfast)  360 tablet 0     nadolol (CORGARD) 20 MG tablet Take 1 tablet (20 mg) by mouth 2 times daily (Patient taking differently: Take 20 mg by mouth every other day ) 60 tablet 3     omeprazole (PRILOSEC) 20 MG DR capsule Take 1 capsule (20 mg) by mouth daily 90 capsule 0     ORDER FOR DME Uses Cpap machine for sleep apnea       pravastatin (PRAVACHOL) 20 MG tablet Take 1 tablet (20 mg) by mouth daily 90 tablet 2     tamsulosin (FLOMAX) 0.4 MG capsule TAKE 1 CAPSULE BY MOUTH  DAILY AS NEEDED 90 capsule 3     triamcinolone (KENALOG) 0.5 % cream Apply  topically 2 times daily. to affected area as directed PRN 15 g 0     blood glucose (ONETOUCH ULTRA) test strip Use to test blood sugar 2 times daily or as directed. 200 strip 0     blood glucose monitoring (ONE TOUCH ULTRASOFT) lancets Use to test blood sugar 2 times daily or as directed. 200 each 1       ALLERGIES     Allergies   Allergen Reactions     Propranolol      Gastritis, diarrhea       PAST MEDICAL HISTORY:  Past Medical History:   Diagnosis Date     Anxiety state, unspecified      Aortic root dilatation (H)      Arthritis      Ascending aorta dilatation (H)      Basal cell cancer     s/p Mohs nose and bridge of nose on R.     Bunion      CAD (coronary artery disease)     CABG 1992: LIMA to LAD, SANDI to RCA, SVG to OM1 and OM2     Contact dermatitis and other eczema, due to unspecified cause     eczema - has light treatments with Dr. Rivera     Disorders of bursae and tendons in shoulder region, unspecified      Esophageal reflux      Essential hypertension, benign      Gallbladder disease      GERD (gastroesophageal reflux disease)      Heart attack (H)      Hyperlipidemia LDL goal <70      Left ventricular diastolic dysfunction      Low HDL (under 40) 3/28/2014     Nasal/sinus dis NEC     s/p surgery in 1995     Obesity      Osteoarthrosis, unspecified whether generalized or localized, shoulder region      Other hammer toe (acquired)      Sleep apnea     USES  CPAP     Spinal stenosis, unspecified region other than cervical     MRI done 2006     Type 2 diabetes, HbA1c goal < 7% (H) 3/12/2015     Urge incontinence        PAST SURGICAL HISTORY:  Past Surgical History:   Procedure Laterality Date     C CABG, ARTERY-VEIN, FOUR  11/1992    CABG - LIMA to left anterior descending and saphenous vein bypass graft to the first and second obtuse marginal branch of the circumflex and the right mammary to the right coronary artery     C NONSPECIFIC PROCEDURE  6-94    Gail lap     C NONSPECIFIC PROCEDURE  1995    sinus surgery     C NONSPECIFIC PROCEDURE      bilateral cataract surgery     COLONOSCOPY N/A 4/11/2017    Procedure: COMBINED COLONOSCOPY, SINGLE OR MULTIPLE BIOPSY/POLYPECTOMY BY BIOPSY;  Surgeon: Bladimir Garner MD;  Location:  GI     CV LEFT HEART CATH  5-92, 6-92    Angioplasty     ESOPHAGOSCOPY, GASTROSCOPY, DUODENOSCOPY (EGD), DILATATION, COMBINED  7/17/2014    Procedure: COMBINED ESOPHAGOSCOPY, GASTROSCOPY, DUODENOSCOPY (EGD), DILATATION;  Surgeon: Bladimir Garenr MD;  Location:  GI     HC COLONOSCOPY THRU STOMA W BIOPSY/CAUTERY TUMOR/POLYP/LESION  5/2007     INJECT EPIDURAL LUMBAR       LAMINECTOMY LUMBAR TWO LEVELS N/A 4/29/2015    Procedure: LAMINECTOMY LUMBAR TWO LEVELS;  Surgeon: Bladimir Keenan MD;  Location:  OR       FAMILY HISTORY:  Family History   Problem Relation Age of Onset     Cancer Brother         MELANOMA     Diabetes Mother         AODM     Diabetes Father         AODM     Myocardial Infarction Father      Cerebrovascular Disease Brother      Family History Negative Brother      Family History Negative Sister      Family History Negative Son      Family History Negative Son      Family History Negative Son      Family History Negative Daughter        SOCIAL HISTORY:  Social History     Socioeconomic History     Marital status:      Spouse name: Anastasia Caballero     Number of children: 4     Years of education: 14     Highest education  level: None   Occupational History     Occupation: retired     Comment:    Social Needs     Financial resource strain: None     Food insecurity:     Worry: None     Inability: None     Transportation needs:     Medical: None     Non-medical: None   Tobacco Use     Smoking status: Former Smoker     Packs/day: 2.00     Years: 41.00     Pack years: 82.00     Start date:      Last attempt to quit: 3/1/1992     Years since quittin.0     Smokeless tobacco: Never Used     Tobacco comment: 80 pack year history   Substance and Sexual Activity     Alcohol use: Yes     Alcohol/week: 0.0 oz     Comment: 1 drink month     Drug use: No     Sexual activity: Not Currently     Partners: Female   Lifestyle     Physical activity:     Days per week: None     Minutes per session: None     Stress: None   Relationships     Social connections:     Talks on phone: None     Gets together: None     Attends Sikh service: None     Active member of club or organization: None     Attends meetings of clubs or organizations: None     Relationship status: None     Intimate partner violence:     Fear of current or ex partner: None     Emotionally abused: None     Physically abused: None     Forced sexual activity: None   Other Topics Concern      Service Yes     Comment: [employer], served in [place]     Blood Transfusions Yes     Comment: Unsure if he had one with heart surgery     Caffeine Concern Yes     Comment: occ cofee     Occupational Exposure No     Hobby Hazards No     Sleep Concern Yes     Comment: CPAP     Stress Concern No     Weight Concern Not Asked     Special Diet No     Back Care Not Asked     Exercise Yes     Comment: [employer] 3 times a week, biking walking.      Bike Helmet Yes     Seat Belt Yes     Self-Exams Yes     Comment: does HIRAL about once a month.     Parent/sibling w/ CABG, MI or angioplasty before 65F 55M? Not Asked   Social History Narrative        4 kids       Review of  "Systems:  Skin:  Positive for bruising     Eyes:  Positive for glasses    ENT:  Positive for hearing loss    Respiratory:  Positive for CPAP;sleep apnea     Cardiovascular:  Negative      Gastroenterology: Positive for heartburn(occastional)    Genitourinary:  not assessed      Musculoskeletal:  Positive for back pain;arthritis    Neurologic:  Positive for headaches(related to neck and shoulders)    Psychiatric:  Positive for excessive stress    Heme/Lymph/Imm:  Negative      Endocrine:  Positive for diabetes      Physical Exam:  Vitals: /80   Pulse 64   Ht 1.905 m (6' 3\")   Wt 118.8 kg (262 lb)   BMI 32.75 kg/m      Constitutional:  cooperative, alert and oriented, well developed, well nourished, in no acute distress overweight      Skin:  warm and dry to the touch, no apparent skin lesions or masses noted          Head:  normocephalic        Eyes:  pupils equal and round, conjunctivae and lids unremarkable, sclera white, no xanthalasma, EOMS intact, no nystagmus        Lymph:      ENT:  no pallor or cyanosis, dentition good        Neck:  carotid pulses are full and equal bilaterally        Respiratory:  normal breath sounds, clear to auscultation, normal A-P diameter, normal symmetry, normal respiratory excursion, no use of accessory muscles prolonged expiration       Cardiac: regular rhythm;normal S1 and S2   S4   systolic murmur;LUSB;grade 1        pulses full and equal                                        GI:           Extremities and Muscular Skeletal:  no deformities, clubbing, cyanosis, erythema observed;no edema              Neurological:  no gross motor deficits        Psych:  Alert and Oriented x 3;affect appropriate, oriented to time, person and place        CAMERON Cameron PA-C  [address] [place] [address]              "

## 2019-03-05 NOTE — LETTER
3/5/2019      Hamzah Hodge MD  [address]  [facility] [address]      RE: Devinsascha Michaelnancy Fowler       Dear Colleague,    I had the pleasure of seeing Austen Fowler in the [facility].    Service Date: 03/05/2019      HISTORY OF PRESENT ILLNESS:  It was my pleasure to see your patient, Austen Fowler, who is a very pleasant 83-year-old gentleman with a past history of coronary artery disease.  If you remember, this patient underwent coronary artery bypass grafting in 1991 when he had a LIMA to the LAD, a saphenous vein graft to both the first and second obtuse marginal arteries and a right internal mammary artery graft to the right coronary artery.  He has done well since that time.  He has had no chest pain, no chest pressure and no shortness of breath.  Last nuclear stress test was performed 4 years ago which showed no evidence of ischemia.  Echocardiography in the past has shown slight left ventricular dilatation with a normal ejection fraction of 55%-60%.  He has had mild aortic root dilatation and mild biatrial enlargement.      With that background in mind, Mr. Fowler is doing well.  He has no chest pains, chest pressure or unusual shortness of breath.  He has lost about 9 pounds since 08/2018.  His blood pressure is excellent at 130/88.      His lipid profile today shows improvement from previously.  His LDL has risen from 28 to 32.  His LDL is close to being 45, was previously 40 and his triglycerides have dropped from 123 to 96.  His ALT is undetectable, indicating no hepatotoxicity from pravastatin.      IMPRESSION:   1.  Coronary artery disease and status post coronary artery bypass grafting.  The patient is asymptomatic with respect to coronary artery disease.   2.  Essential hypertension.  His blood pressure is well controlled, as described above.   3.  Excellent lipid profile with the exception of mild HDL deficiency which has improved from 2018.   4.  Mild left  ventricular enlargement.   5.  Mildly dilated aortic root.      PLAN:  I will see the patient back again in 1 year's time, but in 6 months' time we will repeat his echocardiogram to look at left ventricular dimensions and aortic root dimensions.        It is my pleasure to be involved in the care of this very nice patient and as always, he has been told to contact me if he has any questions or any concerns.      cc:      Hamzah Hodge MD    [facility]   [address]   [place] [address]         TUYET GARCIA MD, [facility]             D: 2019   T: 2019   MT: NBA      Name:     NAYELI RAI   MRN:      [id number]        Account:      [id number]   :      1935           Service Date: 2019      Document: [id number]         Outpatient Encounter Medications as of 3/5/2019   Medication Sig Dispense Refill     acetaminophen (ACETAMIN) 500 MG tablet Take 2 tablets by mouth 2 times daily as needed        amLODIPine (NORVASC) 2.5 MG tablet Take 1 tablet (2.5 mg) by mouth 2 times daily 180 tablet 0     aspirin 81 MG tablet Take 1 tablet (81 mg) by mouth daily (Patient taking differently: Take 81 mg by mouth every other day ) 30 tablet 3     calcium carbonate (TUMS) 500 MG chewable tablet Take 1 chew tab by mouth as needed        Cholecalciferol (VITAMIN D) 2000 UNIT tablet Take 2,000 Units by mouth daily. 90 tablet 3     diclofenac (VOLTAREN) 1 % GEL topical gel Apply 4 grams to neck four times daily using enclosed dosing card. 200 g 1     finasteride (PROSCAR) 5 MG tablet Take 1 tablet (5 mg) by mouth daily 90 tablet 2     gabapentin (NEURONTIN) 300 MG capsule Take 900 mg three times daily (Patient taking differently: 900 mg 2 times daily ) 810 capsule 2     gemfibrozil (LOPID) 600 MG tablet Take 1 tablet (600 mg) by mouth 2 times daily 180 tablet 0     lisinopril (PRINIVIL/ZESTRIL) 20 MG tablet Take 1 tablet (20 mg) by mouth daily 90 tablet 3     Menthol, Topical  Analgesic, (ICY HOT BACK EX) Externally apply topically daily as needed       METAMUCIL 1.7 GM OR WAFR TAKE AS DIRECTED       metFORMIN (GLUCOPHAGE-XR) 500 MG 24 hr tablet Take 4 tablets (2,000 mg) by mouth daily (with breakfast) 360 tablet 0     nadolol (CORGARD) 20 MG tablet Take 1 tablet (20 mg) by mouth 2 times daily (Patient taking differently: Take 20 mg by mouth every other day ) 60 tablet 3     omeprazole (PRILOSEC) 20 MG DR capsule Take 1 capsule (20 mg) by mouth daily 90 capsule 0     ORDER FOR DME Uses Cpap machine for sleep apnea       pravastatin (PRAVACHOL) 20 MG tablet Take 1 tablet (20 mg) by mouth daily 90 tablet 2     tamsulosin (FLOMAX) 0.4 MG capsule TAKE 1 CAPSULE BY MOUTH  DAILY AS NEEDED 90 capsule 3     triamcinolone (KENALOG) 0.5 % cream Apply  topically 2 times daily. to affected area as directed PRN 15 g 0     blood glucose (ONETOUCH ULTRA) test strip Use to test blood sugar 2 times daily or as directed. 200 strip 0     blood glucose monitoring (ONE TOUCH ULTRASOFT) lancets Use to test blood sugar 2 times daily or as directed. 200 each 1     No facility-administered encounter medications on file as of 3/5/2019.        Again, thank you for allowing me to participate in the care of your patient.      Sincerely,    Lino Hernandez MD, MD     [facility]

## 2019-03-05 NOTE — LETTER
3/5/2019    Hamzah Hodge MD  [address]  [place] [address]    RE: Austen Fowler       Dear Colleague,    I had the pleasure of seeing Austen Fowler in the [facility].    HPI and Plan:   See dictation    Orders Placed This Encounter   Procedures     Basic metabolic panel     Lipid Profile     ALT     Follow-Up with Cardiologist       No orders of the defined types were placed in this encounter.      There are no discontinued medications.      Encounter Diagnoses   Name Primary?     Coronary artery disease involving native coronary artery of native heart without angina pectoris      Hyperlipidemia LDL goal <70      Benign essential hypertension      HDL deficiency Yes     S/P CABG (coronary artery bypass graft)        CURRENT MEDICATIONS:  Current Outpatient Medications   Medication Sig Dispense Refill     acetaminophen (ACETAMIN) 500 MG tablet Take 2 tablets by mouth 2 times daily as needed        amLODIPine (NORVASC) 2.5 MG tablet Take 1 tablet (2.5 mg) by mouth 2 times daily 180 tablet 0     aspirin 81 MG tablet Take 1 tablet (81 mg) by mouth daily (Patient taking differently: Take 81 mg by mouth every other day ) 30 tablet 3     calcium carbonate (TUMS) 500 MG chewable tablet Take 1 chew tab by mouth as needed        Cholecalciferol (VITAMIN D) 2000 UNIT tablet Take 2,000 Units by mouth daily. 90 tablet 3     diclofenac (VOLTAREN) 1 % GEL topical gel Apply 4 grams to neck four times daily using enclosed dosing card. 200 g 1     finasteride (PROSCAR) 5 MG tablet Take 1 tablet (5 mg) by mouth daily 90 tablet 2     gabapentin (NEURONTIN) 300 MG capsule Take 900 mg three times daily (Patient taking differently: 900 mg 2 times daily ) 810 capsule 2     gemfibrozil (LOPID) 600 MG tablet Take 1 tablet (600 mg) by mouth 2 times daily 180 tablet 0     lisinopril (PRINIVIL/ZESTRIL) 20 MG tablet Take 1 tablet (20 mg) by mouth daily 90 tablet 3     Menthol, Topical  Analgesic, (ICY HOT BACK EX) Externally apply topically daily as needed       METAMUCIL 1.7 GM OR WAFR TAKE AS DIRECTED       metFORMIN (GLUCOPHAGE-XR) 500 MG 24 hr tablet Take 4 tablets (2,000 mg) by mouth daily (with breakfast) 360 tablet 0     nadolol (CORGARD) 20 MG tablet Take 1 tablet (20 mg) by mouth 2 times daily (Patient taking differently: Take 20 mg by mouth every other day ) 60 tablet 3     omeprazole (PRILOSEC) 20 MG DR capsule Take 1 capsule (20 mg) by mouth daily 90 capsule 0     ORDER FOR DME Uses Cpap machine for sleep apnea       pravastatin (PRAVACHOL) 20 MG tablet Take 1 tablet (20 mg) by mouth daily 90 tablet 2     tamsulosin (FLOMAX) 0.4 MG capsule TAKE 1 CAPSULE BY MOUTH  DAILY AS NEEDED 90 capsule 3     triamcinolone (KENALOG) 0.5 % cream Apply  topically 2 times daily. to affected area as directed PRN 15 g 0     blood glucose (ONETOUCH ULTRA) test strip Use to test blood sugar 2 times daily or as directed. 200 strip 0     blood glucose monitoring (ONE TOUCH ULTRASOFT) lancets Use to test blood sugar 2 times daily or as directed. 200 each 1       ALLERGIES     Allergies   Allergen Reactions     Propranolol      Gastritis, diarrhea       PAST MEDICAL HISTORY:  Past Medical History:   Diagnosis Date     Anxiety state, unspecified      Aortic root dilatation (H)      Arthritis      Ascending aorta dilatation (H)      Basal cell cancer     s/p Mohs nose and bridge of nose on R.     Bunion      CAD (coronary artery disease)     CABG 1992: LIMA to LAD, SANDI to RCA, SVG to OM1 and OM2     Contact dermatitis and other eczema, due to unspecified cause     eczema - has light treatments with Dr. Rivera     Disorders of bursae and tendons in shoulder region, unspecified      Esophageal reflux      Essential hypertension, benign      Gallbladder disease      GERD (gastroesophageal reflux disease)      Heart attack (H)      Hyperlipidemia LDL goal <70      Left ventricular diastolic dysfunction      Low  HDL (under 40) 3/28/2014     Nasal/sinus dis NEC     s/p surgery in 1995     Obesity      Osteoarthrosis, unspecified whether generalized or localized, shoulder region      Other hammer toe (acquired)      Sleep apnea     USES CPAP     Spinal stenosis, unspecified region other than cervical     MRI done 2006     Type 2 diabetes, HbA1c goal < 7% (H) 3/12/2015     Urge incontinence        PAST SURGICAL HISTORY:  Past Surgical History:   Procedure Laterality Date     C CABG, ARTERY-VEIN, FOUR  11/1992    CABG - LIMA to left anterior descending and saphenous vein bypass graft to the first and second obtuse marginal branch of the circumflex and the right mammary to the right coronary artery     C NONSPECIFIC PROCEDURE  6-94    Gail lap     C NONSPECIFIC PROCEDURE  1995    sinus surgery     C NONSPECIFIC PROCEDURE      bilateral cataract surgery     COLONOSCOPY N/A 4/11/2017    Procedure: COMBINED COLONOSCOPY, SINGLE OR MULTIPLE BIOPSY/POLYPECTOMY BY BIOPSY;  Surgeon: Bladimir Garner MD;  Location:  GI     CV LEFT HEART CATH  5-92, 6-92    Angioplasty     ESOPHAGOSCOPY, GASTROSCOPY, DUODENOSCOPY (EGD), DILATATION, COMBINED  7/17/2014    Procedure: COMBINED ESOPHAGOSCOPY, GASTROSCOPY, DUODENOSCOPY (EGD), DILATATION;  Surgeon: Bladimir Garner MD;  Location:  GI     HC COLONOSCOPY THRU STOMA W BIOPSY/CAUTERY TUMOR/POLYP/LESION  5/2007     INJECT EPIDURAL LUMBAR       LAMINECTOMY LUMBAR TWO LEVELS N/A 4/29/2015    Procedure: LAMINECTOMY LUMBAR TWO LEVELS;  Surgeon: Bladimir Keenan MD;  Location:  OR       FAMILY HISTORY:  Family History   Problem Relation Age of Onset     Cancer Brother         MELANOMA     Diabetes Mother         AODM     Diabetes Father         AODM     Myocardial Infarction Father      Cerebrovascular Disease Brother      Family History Negative Brother      Family History Negative Sister      Family History Negative Son      Family History Negative Son      Family History Negative Son       Family History Negative Daughter        SOCIAL HISTORY:  Social History     Socioeconomic History     Marital status:      Spouse name: Anastasia Caballero     Number of children: 4     Years of education: 14     Highest education level: None   Occupational History     Occupation: retired     Comment:    Social Needs     Financial resource strain: None     Food insecurity:     Worry: None     Inability: None     Transportation needs:     Medical: None     Non-medical: None   Tobacco Use     Smoking status: Former Smoker     Packs/day: 2.00     Years: 41.00     Pack years: 82.00     Start date:      Last attempt to quit: 3/1/1992     Years since quittin.0     Smokeless tobacco: Never Used     Tobacco comment: 80 pack year history   Substance and Sexual Activity     Alcohol use: Yes     Alcohol/week: 0.0 oz     Comment: 1 drink month     Drug use: No     Sexual activity: Not Currently     Partners: Female   Lifestyle     Physical activity:     Days per week: None     Minutes per session: None     Stress: None   Relationships     Social connections:     Talks on phone: None     Gets together: None     Attends Islam service: None     Active member of club or organization: None     Attends meetings of clubs or organizations: None     Relationship status: None     Intimate partner violence:     Fear of current or ex partner: None     Emotionally abused: None     Physically abused: None     Forced sexual activity: None   Other Topics Concern      Service Yes     Comment: [employer], served in [place]     Blood Transfusions Yes     Comment: Unsure if he had one with heart surgery     Caffeine Concern Yes     Comment: occ cofee     Occupational Exposure No     Hobby Hazards No     Sleep Concern Yes     Comment: CPAP     Stress Concern No     Weight Concern Not Asked     Special Diet No     Back Care Not Asked     Exercise Yes     Comment: [employer] 3 times a week, biking walking.      Bike Helmet  "Yes     Seat Belt Yes     Self-Exams Yes     Comment: does HIRAL about once a month.     Parent/sibling w/ CABG, MI or angioplasty before 65F 55M? Not Asked   Social History Narrative        4 kids       Review of Systems:  Skin:  Positive for bruising     Eyes:  Positive for glasses    ENT:  Positive for hearing loss    Respiratory:  Positive for CPAP;sleep apnea     Cardiovascular:  Negative      Gastroenterology: Positive for heartburn(occastional)    Genitourinary:  not assessed      Musculoskeletal:  Positive for back pain;arthritis    Neurologic:  Positive for headaches(related to neck and shoulders)    Psychiatric:  Positive for excessive stress    Heme/Lymph/Imm:  Negative      Endocrine:  Positive for diabetes      Physical Exam:  Vitals: /80   Pulse 64   Ht 1.905 m (6' 3\")   Wt 118.8 kg (262 lb)   BMI 32.75 kg/m       Constitutional:  cooperative, alert and oriented, well developed, well nourished, in no acute distress overweight      Skin:  warm and dry to the touch, no apparent skin lesions or masses noted          Head:  normocephalic        Eyes:  pupils equal and round, conjunctivae and lids unremarkable, sclera white, no xanthalasma, EOMS intact, no nystagmus        Lymph:      ENT:  no pallor or cyanosis, dentition good        Neck:  carotid pulses are full and equal bilaterally        Respiratory:  normal breath sounds, clear to auscultation, normal A-P diameter, normal symmetry, normal respiratory excursion, no use of accessory muscles prolonged expiration       Cardiac: regular rhythm;normal S1 and S2   S4   systolic murmur;LUSB;grade 1        pulses full and equal                                        GI:           Extremities and Muscular Skeletal:  no deformities, clubbing, cyanosis, erythema observed;no edema              Neurological:  no gross motor deficits        Psych:  Alert and Oriented x 3;affect appropriate, oriented to time, person and place        CC  [employer] " DORITA Cameron  [address] [place] [address]                Thank you for allowing me to participate in the care of your patient.      Sincerely,     Lino Hernandez MD, MD     [facility]    cc:   Lyndsey Cameron PA-C  [address] [place] [address]

## 2019-03-29 ENCOUNTER — MYC MEDICAL ADVICE (OUTPATIENT)
Dept: FAMILY MEDICINE | Facility: CLINIC | Age: 84
End: 2019-03-29

## 2019-03-30 DIAGNOSIS — R13.10 DYSPHAGIA, UNSPECIFIED TYPE: ICD-10-CM

## 2019-03-30 DIAGNOSIS — E78.5 HYPERLIPIDEMIA LDL GOAL <70: ICD-10-CM

## 2019-03-30 DIAGNOSIS — I10 ESSENTIAL HYPERTENSION, BENIGN: ICD-10-CM

## 2019-03-30 DIAGNOSIS — R39.198 SLOWING OF URINARY STREAM: ICD-10-CM

## 2019-03-30 DIAGNOSIS — E11.59 TYPE 2 DIABETES MELLITUS WITH VASCULAR DISEASE (H): ICD-10-CM

## 2019-03-30 DIAGNOSIS — E78.6 LOW HDL (UNDER 40): ICD-10-CM

## 2019-03-30 NOTE — TELEPHONE ENCOUNTER
"omeprazole (PRILOSEC) 20 MG DR capsule  Last Written Prescription Date:  2/27/19  Last Fill Quantity: 90 capsule,  # refills: 0   Last office visit: 4/13/2018 with prescribing provider:  Ayesha Horton Office Visit:     lisinopril (PRINIVIL/ZESTRIL) 20 MG tablet  Last Written Prescription Date:  3/19/18  Last Fill Quantity: 90 tablet,  # refills: 3   Last office visit: 4/13/2018 with prescribing provider:  Ayesha Horton Office Visit:     pravastatin (PRAVACHOL) 20 MG tablet  Last Written Prescription Date:  7/24/18  Last Fill Quantity: 90 tablet,  # refills: 2   Last office visit: 4/13/2018 with prescribing provider:  Ayesha Horton Office Visit:     finasteride (PROSCAR) 5 MG tablet  Last Written Prescription Date:  7/24/18  Last Fill Quantity: 90 tablet,  # refills: 2   Last office visit: 4/13/2018 with prescribing provider:  Ayesha Horton Office Visit:       gemfibrozil (LOPID) 600 MG tablet  Last Written Prescription Date:  2/14/19  Last Fill Quantity: 180 tablet,  # refills: 0   Last office visit: 4/13/2018 with prescribing provider:  Ayesha Horton Office Visit:   Next 5 appointments (look out 90 days)    May 03, 2019  9:00 AM CDT  PHYSICAL with Hamzah Hodge MD  [facility] ([facility] [address] [place] [id number]  [phone]             Requested Prescriptions   Pending Prescriptions Disp Refills     omeprazole (PRILOSEC) 20 MG DR capsule [Pharmacy Med Name: OMEPRAZOLE  20MG  CAP] 90 capsule 0     Sig: TAKE 1 CAPSULE BY MOUTH  DAILY    PPI Protocol Passed - 3/30/2019  8:36 AM       Passed - Not on Clopidogrel (unless Pantoprazole ordered)       Passed - No diagnosis of osteoporosis on record       Passed - Recent (12 mo) or future (30 days) visit within the authorizing provider's specialty    Patient had office visit in the last 12 months or has a visit in the next 30 days with authorizing provider or within the authorizing provider's specialty.  See \"Patient Info\" tab in " "inbasket, or \"Choose Columns\" in Meds & Orders section of the refill encounter.             Passed - Medication is active on med list       Passed - Patient is age 18 or older        lisinopril (PRINIVIL/ZESTRIL) 20 MG tablet [Pharmacy Med Name: LISINOPRIL  20MG  TAB] 90 tablet 3     Sig: TAKE 1 TABLET BY MOUTH  DAILY    ACE Inhibitors (Including Combos) Protocol Failed - 3/30/2019  8:36 AM       Failed - Normal serum creatinine on file in past 12 months    Recent Labs   Lab Test 03/05/19  0728  09/21/16  0943   CR 0.66*   < >  --    CREAT  --   --  0.8    < > = values in this interval not displayed.            Passed - Blood pressure under 140/90 in past 12 months    BP Readings from Last 3 Encounters:   03/05/19 130/80   10/08/18 126/60   09/24/18 150/79                Passed - Recent (12 mo) or future (30 days) visit within the authorizing provider's specialty    Patient had office visit in the last 12 months or has a visit in the next 30 days with authorizing provider or within the authorizing provider's specialty.  See \"Patient Info\" tab in inbasket, or \"Choose Columns\" in Meds & Orders section of the refill encounter.             Passed - Medication is active on med list       Passed - Patient is age 18 or older       Passed - Normal serum potassium on file in past 12 months    Recent Labs   Lab Test 03/05/19  0728   POTASSIUM 4.0             pravastatin (PRAVACHOL) 20 MG tablet [Pharmacy Med Name: PRAVASTATIN SOD 20MG TABLET] 90 tablet 2     Sig: TAKE 1 TABLET BY MOUTH  DAILY    Statins Protocol Passed - 3/30/2019  8:36 AM       Passed - LDL on file in past 12 months    Recent Labs   Lab Test 03/05/19  0728   LDL 45            Passed - No abnormal creatine kinase in past 12 months    No lab results found.            Passed - Recent (12 mo) or future (30 days) visit within the authorizing provider's specialty    Patient had office visit in the last 12 months or has a visit in the next 30 days with authorizing " "provider or within the authorizing provider's specialty.  See \"Patient Info\" tab in inbasket, or \"Choose Columns\" in Meds & Orders section of the refill encounter.             Passed - Medication is active on med list       Passed - Patient is age 18 or older        finasteride (PROSCAR) 5 MG tablet [Pharmacy Med Name: FINASTERIDE 5MG TABLET] 90 tablet 2     Sig: TAKE 1 TABLET BY MOUTH  DAILY    Alpha Blockers Passed - 3/30/2019  8:36 AM       Passed - Blood pressure under 140/90 in past 12 months    BP Readings from Last 3 Encounters:   03/05/19 130/80   10/08/18 126/60   09/24/18 150/79                Passed - Recent (12 mo) or future (30 days) visit within the authorizing provider's specialty    Patient had office visit in the last 12 months or has a visit in the next 30 days with authorizing provider or within the authorizing provider's specialty.  See \"Patient Info\" tab in inCloudnexaet, or \"Choose Columns\" in Meds & Orders section of the refill encounter.             Passed - Patient does not have Tadalafil, Vardenafil, or Sildenafil on their medication list       Passed - Medication is active on med list       Passed - Patient is 18 years of age or older        gemfibrozil (LOPID) 600 MG tablet [Pharmacy Med Name: GEMFIBROZIL  600MG  TAB] 180 tablet 0     Sig: TAKE 1 TABLET BY MOUTH TWO  TIMES DAILY    Fibrates Passed - 3/30/2019  8:36 AM       Passed - Lipid panel on file in past 12 months    Recent Labs   Lab Test 03/05/19  0728  11/12/15  0807   CHOL 96   < > 102   TRIG 96   < > 144   HDL 32*   < > 28*   LDL 45   < > 45*   NHDL 64   < >  --    VLDL  --   --  29   CHOLHDLRATIO  --   --  3.6    < > = values in this interval not displayed.              Passed - No abnormal creatine kinase in past 12 months    No lab results found.            Passed - Recent (12 mo) or future (30 days) visit within the authorizing provider's specialty    Patient had office visit in the last 12 months or has a visit in the next 30 " "days with authorizing provider or within the authorizing provider's specialty.  See \"Patient Info\" tab in inbasket, or \"Choose Columns\" in Meds & Orders section of the refill encounter.             Passed - Medication is active on med list       Passed - Patient is age 18 or older          "

## 2019-03-30 NOTE — TELEPHONE ENCOUNTER
"Last Written Prescription Date:  12/11/18  Last Fill Quantity: 360 tablet,  # refills: 0   Last office visit: 4/13/2018 with prescribing provider:  Ayesha   Future Office Visit:   Next 5 appointments (look out 90 days)    May 03, 2019  9:00 AM CDT  PHYSICAL with Hamzah Hodge MD  [facility] [address] [facility] [id number] [phone]         Requested Prescriptions   Pending Prescriptions Disp Refills     metFORMIN (GLUCOPHAGE-XR) 500 MG 24 hr tablet [Pharmacy Med Name: METFORMIN ER 500MG TABLET] 360 tablet 0     Sig: TAKE 4 TABLETS BY MOUTH  DAILY    Biguanide Agents Failed - 3/30/2019  8:36 AM       Failed - Patient has documented A1c within the specified period of time.    If HgbA1C is 8 or greater, it needs to be on file within the past 3 months.  If less than 8, must be on file within the past 6 months.     Recent Labs   Lab Test 09/24/18  0931   A1C 6.0*            Failed - Recent (6 mo) or future (30 days) visit within the authorizing provider's specialty    Patient had office visit in the last 6 months or has a visit in the next 30 days with authorizing provider or within the authorizing provider's specialty.  See \"Patient Info\" tab in inbasket, or \"Choose Columns\" in Meds & Orders section of the refill encounter.           Passed - Blood pressure less than 140/90 in past 6 months    BP Readings from Last 3 Encounters:   03/05/19 130/80   10/08/18 126/60   09/24/18 150/79                Passed - Patient has documented LDL within the past 12 mos.    Recent Labs   Lab Test 03/05/19  0728   LDL 45            Passed - Patient has had a Microalbumin in the past 15 mos.    Recent Labs   Lab Test 04/13/18  1023   MICROL 9   UMALCR 6.58            Passed - Patient is age 10 or older       Passed - Patient's CR is NOT>1.4 OR Patient's EGFR is NOT<45 within past 12 mos.    Recent Labs   Lab Test 03/05/19  0728   GFRESTIMATED >90   GFRESTBLACK >90       Recent Labs   Lab " Test 03/05/19  0728   CR 0.66*            Passed - Patient does NOT have a diagnosis of CHF.       Passed - Medication is active on med list

## 2019-04-01 RX ORDER — METFORMIN HCL 500 MG
TABLET, EXTENDED RELEASE 24 HR ORAL
Qty: 120 TABLET | Refills: 0 | Status: SHIPPED | OUTPATIENT
Start: 2019-04-01 | End: 2019-04-27

## 2019-04-01 NOTE — TELEPHONE ENCOUNTER
Dr Hodge,     Please see [employer] message and provide advise.    Patient's Handicap permit has .  Patient schedule appointment with you, first available, 5-3-19.     Does patient need to schedule separate OV with TEAM this week to address the permit renewal?    Thank you,  Sarah ARAUJO RN,BSN

## 2019-04-01 NOTE — TELEPHONE ENCOUNTER
Permit filled out by Dr Hodge. Left message for patient that form is ready to be picked up at the  and that he needs to fill out the top section.    Emory Castillo, CMA on 4/1/2019 at 11:47 AM

## 2019-04-01 NOTE — TELEPHONE ENCOUNTER
A 30 day supply is given, patient is due for an office visit.  Patient has appointment scheduled for 5/3/2019.  BERNADETTE Maradiaga, RN  [employer] [facility]

## 2019-04-02 RX ORDER — PRAVASTATIN SODIUM 20 MG
TABLET ORAL
Qty: 90 TABLET | Refills: 0 | Status: SHIPPED | OUTPATIENT
Start: 2019-04-02 | End: 2019-06-18

## 2019-04-02 RX ORDER — FINASTERIDE 5 MG/1
TABLET, FILM COATED ORAL
Qty: 90 TABLET | Refills: 0 | Status: SHIPPED | OUTPATIENT
Start: 2019-04-02 | End: 2019-06-21

## 2019-04-02 RX ORDER — GEMFIBROZIL 600 MG/1
TABLET, FILM COATED ORAL
Qty: 180 TABLET | Refills: 0 | Status: SHIPPED | OUTPATIENT
Start: 2019-04-02 | End: 2019-07-08

## 2019-04-02 RX ORDER — LISINOPRIL 20 MG/1
TABLET ORAL
Qty: 90 TABLET | Refills: 0 | Status: SHIPPED | OUTPATIENT
Start: 2019-04-02 | End: 2019-06-21

## 2019-04-02 NOTE — TELEPHONE ENCOUNTER
Routing refill request to provider for review/approval because:  Labs out of range:  Creat- lisinopril  Pt due for annual visit and scheduled next month.  Using mail order so 3 month pended.  Please authorize if appropriate.  Thanks,  Meron Richmond RN

## 2019-04-17 ENCOUNTER — OFFICE VISIT (OUTPATIENT)
Dept: PHARMACY | Facility: CLINIC | Age: 84
End: 2019-04-17

## 2019-04-17 VITALS — WEIGHT: 266 LBS | BODY MASS INDEX: 33.25 KG/M2 | DIASTOLIC BLOOD PRESSURE: 74 MMHG | SYSTOLIC BLOOD PRESSURE: 139 MMHG

## 2019-04-17 DIAGNOSIS — E11.59 TYPE 2 DIABETES MELLITUS WITH VASCULAR DISEASE (H): ICD-10-CM

## 2019-04-17 DIAGNOSIS — I10 ESSENTIAL HYPERTENSION, BENIGN: Primary | ICD-10-CM

## 2019-04-17 DIAGNOSIS — M54.2 CERVICALGIA: ICD-10-CM

## 2019-04-17 DIAGNOSIS — E78.5 HYPERLIPIDEMIA LDL GOAL <70: ICD-10-CM

## 2019-04-17 DIAGNOSIS — E63.9 NUTRITIONAL DISORDER: ICD-10-CM

## 2019-04-17 DIAGNOSIS — R39.198 SLOWING OF URINARY STREAM: ICD-10-CM

## 2019-04-17 DIAGNOSIS — I25.10 CORONARY ARTERY DISEASE INVOLVING NATIVE CORONARY ARTERY OF NATIVE HEART WITHOUT ANGINA PECTORIS: ICD-10-CM

## 2019-04-17 DIAGNOSIS — K21.9 GASTROESOPHAGEAL REFLUX DISEASE, ESOPHAGITIS PRESENCE NOT SPECIFIED: ICD-10-CM

## 2019-04-17 DIAGNOSIS — R21 RASH AND OTHER NONSPECIFIC SKIN ERUPTION: ICD-10-CM

## 2019-04-17 LAB
CREAT UR-MCNC: 91 MG/DL
HBA1C MFR BLD: 6 % (ref 0–5.6)
MICROALBUMIN UR-MCNC: 7 MG/L
MICROALBUMIN/CREAT UR: 7.33 MG/G CR (ref 0–17)

## 2019-04-17 PROCEDURE — 99606 MTMS BY PHARM EST 15 MIN: CPT | Performed by: PHARMACIST

## 2019-04-17 PROCEDURE — 36415 COLL VENOUS BLD VENIPUNCTURE: CPT | Performed by: INTERNAL MEDICINE

## 2019-04-17 PROCEDURE — 99607 MTMS BY PHARM ADDL 15 MIN: CPT | Performed by: PHARMACIST

## 2019-04-17 PROCEDURE — 83036 HEMOGLOBIN GLYCOSYLATED A1C: CPT | Performed by: INTERNAL MEDICINE

## 2019-04-17 PROCEDURE — 82043 UR ALBUMIN QUANTITATIVE: CPT | Performed by: INTERNAL MEDICINE

## 2019-04-17 NOTE — PROGRESS NOTES
SUBJECTIVE/OBJECTIVE:                Austen Fowler is a 84 year old adult coming in for a follow-up visit for Medication Therapy Management.  He was referred to me from Dr. Hodge.     Chief Complaint: Follow up from our visit on 10/8/18.  He has no specific questions or concerns today.    Tobacco: History of tobacco dependence - quit 1992  Alcohol: Less than 1 beverages / week    Medication Adherence/Access: no issues reported    Hypertension/CAD: Current medications include amlodipine 2.5mg BID, lisinopril 20mg daily and nadolol 20mg every other day or so.   He does monitor his BP at home and it's been 130s systolic over 80s diastolic.  He denies any chest pains or side effects of therapy.  Last ECHO on 6/20/18 estimated EF to be 55-60%, per review of EPIC history EF has never been <40%.  Pt follows with cardiology.  Griffin was previously taking metoprolol, which was then changed to propranolol for tremors but he developed side effects so it was changed to nadolol.  He feels he's doing ok with this regimen and doesn't wish to change back to metoprolol.  BP Readings from Last 3 Encounters:   03/05/19 130/80   10/08/18 126/60   09/24/18 150/79      Cervicalgia:  He's been working with pain management on this.  He's been prescribed Voltaren gel, which he's using regularly.  He has also found IcyHot gel to be helpful.  He continues taking APAP 1000mg BID and gabapentin 900mg BID (although prescribed for TID).  He reports this regimen is working well for him and he denies side effects.    Diabetes:  Pt currently taking metformin XR 2000 mg daily.    SMBG: Once daily (AM fasting).   Ranges (patient reported): 7 day avg 145mg/dL (n7); 14 day avg 142mg/dL (n14), 30 day avg 142mg/dL (n29)  Symptoms of low blood sugar? none. Frequency of hypoglycemia? never.  Recent symptoms of high blood sugar? none  Eye exam: due  Foot exam: due  Microalbumin is < 30 mg/g. Pt is taking an ACEi/ARB.  Aspirin: He's resumed daily  administration of ASA 81mg daily with no changes in bruising/bleeding  Lab Results   Component Value Date    A1C 6.0 09/24/2018    A1C 6.0 04/09/2018    A1C 6.2 08/23/2017    A1C 6.8 02/23/2017    A1C 7.9 11/16/2016     GFR Estimate   Date Value Ref Range Status   03/05/2019 >90 >60 mL/min/[1.73_m2] Final   09/18/2018 >90 >60 mL/min/1.7m2 Final   06/20/2018 >90 >60 mL/min/1.7m2 Final      Hyperlipidemia: Current therapy includes pravastatin 20 mg once daily and gemfibrozil 600 mg twice daily.  Pt reports no significant myalgias or other side effects.  Cardiology has requested for him to remain on gemfibrozil.  LDL Cholesterol Calculated   Date Value Ref Range Status   03/05/2019 45 <100 mg/dL Final     Comment:     Desirable:       <100 mg/dl      GERD: Current medications include: omeprazole 20mg once daily and Tums as needed (very rarely). Pt c/o no current symptoms.  Patient feels that current regimen is effective.     BPH:   Current medications include tamsulosin 0.4 mg daily and finasteride 5 mg daily.  He reports that the tamsulosin helps and describes more regular and complete evacuation when urinating.  He has been on the finasteride for 8 years (Since 3/9/09).      Rash:  He uses triamcinolone cream as needed, which he finds effective.  He denies side effects.    Vitamins:  Current medications include cholecalciferol 2000 units daily and metamucil wafers as needed.  He finds this regimen effective and he denies side effects.    Today's Vitals: /74   Wt 266 lb (120.7 kg)   BMI 33.25 kg/m      ASSESSMENT:              Current medications were reviewed today as discussed above.      Medication Adherence: good, no issues identified    Hypertension/CAD: Stable. Patient is meeting BP goal of < 140/90mmHg.  He's >3 years post CABG, so indication for beta-blocker isn't as compelling.  Ok to stay on current regimen.    Cervicalgia: Stable.    Diabetes: Needs Improvement. Patient is meeting A1c goal of < 8%,  due for re-check and also due for microalbumin. Self monitoring of blood glucose is at goal of fasting  mg/dL.     Hyperlipidemia: Stable. Pt is not on moderate-high intensity statin which is indicated based on 2013 ACC/AHA guidelines for lipid management.  Further increase in statin dose not warranted as LDL is well controlled (close to or less than 40mg/dL).     GERD: Stable.  Current treatment is effective.     BPH:  Stable.    Rash:  Stable.    Vitamins:  Stable.     PLAN:                  1.  Labs today - A1c, protein in the urine    I spent 30 minutes with this patient today (out of pocket cost for today's MTM visit was reviewed with the patient). All changes were made via collaborative practice agreement with Dr. Hodge. A copy of the visit note was provided to the patient's primary care provider.     Will follow up pending lab results.    The patient was given a summary of these recommendations as an after visit summary.    Marielos Morocho, PharmD, BCACP  Medication Therapy Management Provider  Pager: [phone]

## 2019-04-17 NOTE — PATIENT INSTRUCTIONS
Recommendations from today's MTM visit:                                                    MTM (medication therapy management) is a service provided by a clinical pharmacist designed to help you get the most of out of your medicines.   Today we reviewed what your medicines are for, how to know if they are working, that your medicines are safe and how to make your medicine regimen as easy as possible.     1.  Labs today - A1c, protein in the urine    Next MTM visit:  Pending lab results    To schedule another MTM appointment, please call the clinic directly or you may call the MTM scheduling line at [phone] or toll-free at [phone].     My Clinical Pharmacist's contact information:                                                      It was a pleasure talking with you today!  Please feel free to contact me with any questions or concerns you have.      Steph Lucas, PharmD  Medication Therapy Management (MTM) Resident  Pager: [phone]    Marielos Morocho PharmD, [facility]  Medication Therapy Management Provider  Pager: [phone]      You may receive a survey about the MTM services you received by email and/or US Mail.  I would appreciate your feedback to help me serve you better in the future. Your comments will be anonymous.

## 2019-04-19 ENCOUNTER — MYC REFILL (OUTPATIENT)
Dept: FAMILY MEDICINE | Facility: CLINIC | Age: 84
End: 2019-04-19

## 2019-04-19 DIAGNOSIS — M53.3 SACROILIAC JOINT PAIN: ICD-10-CM

## 2019-04-19 DIAGNOSIS — G89.4 CHRONIC PAIN SYNDROME: ICD-10-CM

## 2019-04-19 NOTE — TELEPHONE ENCOUNTER
Last script was on 11/15/18 for 810 capsules and 2 refills    It looks like pt takes gabapentin 300 mg capsule-Take 900 mg twice daily instead or three times daily    Last OV with  on 4/13/18    Will route to  to review

## 2019-04-22 RX ORDER — GABAPENTIN 300 MG/1
CAPSULE ORAL
Qty: 810 CAPSULE | Refills: 2 | Status: SHIPPED | OUTPATIENT
Start: 2019-04-22 | End: 2020-03-04

## 2019-06-08 DIAGNOSIS — Z98.890 S/P LUMBAR LAMINECTOMY: ICD-10-CM

## 2019-06-10 RX ORDER — TAMSULOSIN HYDROCHLORIDE 0.4 MG/1
CAPSULE ORAL
Qty: 90 CAPSULE | Refills: 0 | Status: SHIPPED | OUTPATIENT
Start: 2019-06-10 | End: 2019-06-18

## 2019-06-10 NOTE — TELEPHONE ENCOUNTER
"tamsulosin (FLOMAX) 0.4 MG capsule 90 capsule 3 6/29/2018     Last Written Prescription Date:  6/29/2018  Last Fill Quantity: 90,  # refills: 3   Last office visit: 4/13/2018 with prescribing provider:  PATRICK Hodge   Future Office Visit:   Next 5 appointments (look out 90 days)    Jun 20, 2019 10:30 AM CDT  PHYSICAL with Hamzah Hodge MD  [facility] [address] [facility] [id number] [phone]         Requested Prescriptions   Pending Prescriptions Disp Refills     tamsulosin (FLOMAX) 0.4 MG capsule [Pharmacy Med Name: TAMSULOSIN  0.4MG  CAP] 90 capsule 3     Sig: TAKE 1 CAPSULE BY MOUTH  DAILY AS NEEDED       Alpha Blockers Passed - 6/8/2019  7:30 PM        Passed - Blood pressure under 140/90 in past 12 months     BP Readings from Last 3 Encounters:   04/17/19 139/74   03/05/19 130/80   10/08/18 126/60                 Passed - Recent (12 mo) or future (30 days) visit within the authorizing provider's specialty     Patient had office visit in the last 12 months or has a visit in the next 30 days with authorizing provider or within the authorizing provider's specialty.  See \"Patient Info\" tab in inbasket, or \"Choose Columns\" in Meds & Orders section of the refill encounter.              Passed - Patient does not have Tadalafil, Vardenafil, or Sildenafil on their medication list        Passed - Medication is active on med list        Passed - Patient is 18 years of age or older          "

## 2019-06-10 NOTE — TELEPHONE ENCOUNTER
Prescription approved per [facility] Refill Protocol.  Christiane WATTS RN

## 2019-06-14 ENCOUNTER — OFFICE VISIT (OUTPATIENT)
Dept: URGENT CARE | Facility: URGENT CARE | Age: 84
End: 2019-06-14
Payer: COMMERCIAL

## 2019-06-14 VITALS
HEART RATE: 51 BPM | BODY MASS INDEX: 32.08 KG/M2 | TEMPERATURE: 98.4 F | RESPIRATION RATE: 16 BRPM | DIASTOLIC BLOOD PRESSURE: 60 MMHG | WEIGHT: 258 LBS | SYSTOLIC BLOOD PRESSURE: 106 MMHG | OXYGEN SATURATION: 97 % | HEIGHT: 75 IN

## 2019-06-14 DIAGNOSIS — M79.604 PAIN OF RIGHT LOWER EXTREMITY: Primary | ICD-10-CM

## 2019-06-14 DIAGNOSIS — L03.115 CELLULITIS OF RIGHT LOWER EXTREMITY: ICD-10-CM

## 2019-06-14 LAB
BASOPHILS # BLD AUTO: 0 10E9/L (ref 0–0.2)
BASOPHILS NFR BLD AUTO: 0.2 %
DIFFERENTIAL METHOD BLD: ABNORMAL
EOSINOPHIL # BLD AUTO: 0.4 10E9/L (ref 0–0.7)
EOSINOPHIL NFR BLD AUTO: 3.6 %
ERYTHROCYTE [DISTWIDTH] IN BLOOD BY AUTOMATED COUNT: 14 % (ref 10–15)
HCT VFR BLD AUTO: 38.9 % (ref 40–53)
HGB BLD-MCNC: 12.6 G/DL (ref 13.3–17.7)
LYMPHOCYTES # BLD AUTO: 2.4 10E9/L (ref 0.8–5.3)
LYMPHOCYTES NFR BLD AUTO: 23.7 %
MCH RBC QN AUTO: 28.8 PG (ref 26.5–33)
MCHC RBC AUTO-ENTMCNC: 32.4 G/DL (ref 31.5–36.5)
MCV RBC AUTO: 89 FL (ref 78–100)
MONOCYTES # BLD AUTO: 1.1 10E9/L (ref 0–1.3)
MONOCYTES NFR BLD AUTO: 10.7 %
NEUTROPHILS # BLD AUTO: 6.1 10E9/L (ref 1.6–8.3)
NEUTROPHILS NFR BLD AUTO: 61.8 %
PLATELET # BLD AUTO: 236 10E9/L (ref 150–450)
RBC # BLD AUTO: 4.37 10E12/L (ref 4.4–5.9)
URATE SERPL-MCNC: 3.7 MG/DL (ref 3.5–7.2)
WBC # BLD AUTO: 9.9 10E9/L (ref 4–11)

## 2019-06-14 PROCEDURE — 99214 OFFICE O/P EST MOD 30 MIN: CPT | Mod: 25 | Performed by: PHYSICIAN ASSISTANT

## 2019-06-14 PROCEDURE — 36415 COLL VENOUS BLD VENIPUNCTURE: CPT | Performed by: PHYSICIAN ASSISTANT

## 2019-06-14 PROCEDURE — 85025 COMPLETE CBC W/AUTO DIFF WBC: CPT | Performed by: PHYSICIAN ASSISTANT

## 2019-06-14 PROCEDURE — 84550 ASSAY OF BLOOD/URIC ACID: CPT | Performed by: PHYSICIAN ASSISTANT

## 2019-06-14 PROCEDURE — 96372 THER/PROPH/DIAG INJ SC/IM: CPT | Performed by: PHYSICIAN ASSISTANT

## 2019-06-14 RX ORDER — CEPHALEXIN 500 MG/1
500 CAPSULE ORAL 3 TIMES DAILY
Qty: 30 CAPSULE | Refills: 0 | Status: ON HOLD | OUTPATIENT
Start: 2019-06-14 | End: 2019-06-20

## 2019-06-14 RX ORDER — CEFTRIAXONE SODIUM 1 G
1 VIAL (EA) INJECTION ONCE
Status: COMPLETED | OUTPATIENT
Start: 2019-06-14 | End: 2019-06-14

## 2019-06-14 RX ADMIN — Medication 1 G: at 14:32

## 2019-06-14 ASSESSMENT — MIFFLIN-ST. JEOR: SCORE: 1950.91

## 2019-06-14 NOTE — PATIENT INSTRUCTIONS
(M79.604) Pain of right lower extremity  (primary encounter diagnosis)  Comment:   Plan: CBC with platelets and differential, Uric acid,        acetaminophen-codeine (TYLENOL #3) 300-30 MG         tablet            (L03.115) Cellulitis of right lower extremity  Comment:   Plan: cefTRIAXone (ROCEPHIN) injection 1 g,         cephALEXin (KEFLEX) 500 MG capsule            Follow up with primary clinic for re-check in one week, sooner should symptoms persist or worsen.

## 2019-06-14 NOTE — NURSING NOTE
Prior to injection, verified patient identity using patient's name and date of birth.  Due to injection administration, patient instructed to remain in clinic for 15 minutes  afterwards, and to report any adverse reaction to me immediately.    Rocephin 1 gram    Drug Amount Wasted:  NO  Vial/Syringe: Single dose vial  Expiration Date:  8/2021    Eleazar Felix, CMA

## 2019-06-14 NOTE — PROGRESS NOTES
Patient presents with:  Musculoskeletal Problem: rt leg pain since Monday, swelling and warmness in the leg, redness         SUBJECTIVE:  Austen Fowler is a 84 year old adult who presents to the clinic today for redness, swelling and tenderness on anterior ankle for the past 4 days.      No fevers.      Onset of pain and redness was while mowing lawn 4 days ago.  Area of redness persists.  No fevers.  Painful.  No open sores.  No blisters.      Quality/symptoms of rash: burning and painful   Symptoms are moderate and rash seems to be worsening.  Previous history of a similar rash? No  Recent exposure history: none known  Recent new medications: None  Associated symptoms include: nothing.    Past Medical History:   Diagnosis Date     Anxiety state, unspecified      Aortic root dilatation (H)      Arthritis      Ascending aorta dilatation (H)      Basal cell cancer     s/p Mohs nose and bridge of nose on R.     Bunion      CAD (coronary artery disease)     CABG 1992: LIMA to LAD, SANDI to RCA, SVG to OM1 and OM2     Contact dermatitis and other eczema, due to unspecified cause     eczema - has light treatments with Dr. Rivera     Disorders of bursae and tendons in shoulder region, unspecified      Esophageal reflux      Essential hypertension, benign      Gallbladder disease      GERD (gastroesophageal reflux disease)      Heart attack (H)      Hyperlipidemia LDL goal <70      Left ventricular diastolic dysfunction      Low HDL (under 40) 3/28/2014     Nasal/sinus dis NEC     s/p surgery in 1995     Obesity      Osteoarthrosis, unspecified whether generalized or localized, shoulder region      Other hammer toe (acquired)      Sleep apnea     USES CPAP     Spinal stenosis, unspecified region other than cervical     MRI done 2006     Type 2 diabetes, HbA1c goal < 7% (H) 3/12/2015     Urge incontinence        FH:   Non contributory     Allergies   Allergen Reactions     Propranolol      Gastritis, diarrhea  "    Social History     Tobacco Use     Smoking status: Former Smoker     Packs/day: 2.00     Years: 41.00     Pack years: 82.00     Start date:      Last attempt to quit: 3/1/1992     Years since quittin.3     Smokeless tobacco: Never Used     Tobacco comment: 80 pack year history   Substance Use Topics     Alcohol use: Yes     Alcohol/week: 0.0 oz     Comment: 1 drink month       ROS:  CONSTITUTIONAL:NEGATIVE for fever, chills, change in weight  INTEGUMENTARY/SKIN: NEGATIVE for worrisome rashes, moles or lesions  RESP:NEGATIVE for significant cough or SOB  CV: NEGATIVE for chest pain, palpitations or peripheral edema  MUSCULOSKELETAL: as per HPI  NEURO: NEGATIVE for weakness, dizziness or paresthesias  ENDOCRINE: NEGATIVE for temperature intolerance, skin/hair changes  HEME/ALLERGY/IMMUNE: NEGATIVE for bleeding problems  Review of systems negative except as stated above.    EXAM:   /60   Pulse 51   Temp 98.4  F (36.9  C)   Resp 16   Ht 1.905 m (6' 3\")   Wt 117 kg (258 lb)   SpO2 97%   BMI 32.25 kg/m    GENERAL: alert, no acute distress.  LUNGS: CTA bilaterally  HEART: RRR wihtou murmur  SKIN: erythema and warmth at right ankle ankle approximately 5 x 6 cm.  No open sores or vesicles.    Extremities: right lower extremity: no bony tenderness.  Subtle swelling anteriorly.    NEURO: Normal strength and tone, sensory exam grossly normal,  normal speech and mentation  VASC: 2+ pulses LE bilaterally        M79.604) Pain of right lower extremity  (primary encounter diagnosis)  Comment:   Plan: CBC with platelets and differential, Uric acid,        acetaminophen-codeine (TYLENOL #3) 300-30 MG         tablet            (L03.115) Cellulitis of right lower extremity  Comment:   Plan: cefTRIAXone (ROCEPHIN) injection 1 g,         cephALEXin (KEFLEX) 500 MG capsule            Follow up with primary clinic for re-check in one week, sooner should symptoms persist or worsen.        "

## 2019-06-18 ENCOUNTER — HOSPITAL ENCOUNTER (INPATIENT)
Facility: CLINIC | Age: 84
LOS: 2 days | Discharge: HOME | DRG: 603 | End: 2019-06-20
Attending: EMERGENCY MEDICINE | Admitting: INTERNAL MEDICINE
Payer: COMMERCIAL

## 2019-06-18 DIAGNOSIS — L03.115 CELLULITIS OF RIGHT LOWER EXTREMITY: ICD-10-CM

## 2019-06-18 DIAGNOSIS — I10 ESSENTIAL HYPERTENSION, BENIGN: ICD-10-CM

## 2019-06-18 LAB
ALBUMIN SERPL-MCNC: 3.2 G/DL (ref 3.4–5)
ALP SERPL-CCNC: 68 U/L (ref 40–150)
ALT SERPL W P-5'-P-CCNC: 13 U/L (ref 0–70)
ANION GAP SERPL CALCULATED.3IONS-SCNC: 5 MMOL/L (ref 3–14)
AST SERPL W P-5'-P-CCNC: 19 U/L (ref 0–45)
BASOPHILS # BLD AUTO: 0 10E9/L (ref 0–0.2)
BASOPHILS NFR BLD AUTO: 0.1 %
BILIRUB SERPL-MCNC: 0.4 MG/DL (ref 0.2–1.3)
BUN SERPL-MCNC: 16 MG/DL (ref 7–30)
CALCIUM SERPL-MCNC: 8.9 MG/DL (ref 8.5–10.1)
CHLORIDE SERPL-SCNC: 107 MMOL/L (ref 94–109)
CO2 SERPL-SCNC: 28 MMOL/L (ref 20–32)
CREAT SERPL-MCNC: 0.59 MG/DL (ref 0.66–1.25)
DIFFERENTIAL METHOD BLD: ABNORMAL
EOSINOPHIL # BLD AUTO: 0.4 10E9/L (ref 0–0.7)
EOSINOPHIL NFR BLD AUTO: 5.9 %
ERYTHROCYTE [DISTWIDTH] IN BLOOD BY AUTOMATED COUNT: 13.9 % (ref 10–15)
GFR SERPL CREATININE-BSD FRML MDRD: >90 ML/MIN/{1.73_M2}
GLUCOSE BLDC GLUCOMTR-MCNC: 102 MG/DL (ref 70–99)
GLUCOSE BLDC GLUCOMTR-MCNC: 126 MG/DL (ref 70–99)
GLUCOSE BLDC GLUCOMTR-MCNC: 74 MG/DL (ref 70–99)
GLUCOSE SERPL-MCNC: 222 MG/DL (ref 70–99)
HCT VFR BLD AUTO: 37.6 % (ref 40–53)
HGB BLD-MCNC: 12.1 G/DL (ref 13.3–17.7)
IMM GRANULOCYTES # BLD: 0.1 10E9/L (ref 0–0.4)
IMM GRANULOCYTES NFR BLD: 0.8 %
LACTATE BLD-SCNC: 1.6 MMOL/L (ref 0.7–2)
LACTATE BLD-SCNC: 2.2 MMOL/L (ref 0.7–2)
LYMPHOCYTES # BLD AUTO: 1.3 10E9/L (ref 0.8–5.3)
LYMPHOCYTES NFR BLD AUTO: 17.4 %
MCH RBC QN AUTO: 28.4 PG (ref 26.5–33)
MCHC RBC AUTO-ENTMCNC: 32.2 G/DL (ref 31.5–36.5)
MCV RBC AUTO: 88 FL (ref 78–100)
MONOCYTES # BLD AUTO: 0.6 10E9/L (ref 0–1.3)
MONOCYTES NFR BLD AUTO: 7.9 %
NEUTROPHILS # BLD AUTO: 5 10E9/L (ref 1.6–8.3)
NEUTROPHILS NFR BLD AUTO: 67.9 %
NRBC # BLD AUTO: 0 10*3/UL
NRBC BLD AUTO-RTO: 0 /100
PLATELET # BLD AUTO: 263 10E9/L (ref 150–450)
POTASSIUM SERPL-SCNC: 3.8 MMOL/L (ref 3.4–5.3)
PROT SERPL-MCNC: 7.1 G/DL (ref 6.8–8.8)
RBC # BLD AUTO: 4.26 10E12/L (ref 4.4–5.9)
SODIUM SERPL-SCNC: 140 MMOL/L (ref 133–144)
WBC # BLD AUTO: 7.3 10E9/L (ref 4–11)

## 2019-06-18 PROCEDURE — 83605 ASSAY OF LACTIC ACID: CPT | Performed by: PHYSICIAN ASSISTANT

## 2019-06-18 PROCEDURE — 83605 ASSAY OF LACTIC ACID: CPT | Performed by: EMERGENCY MEDICINE

## 2019-06-18 PROCEDURE — 96374 THER/PROPH/DIAG INJ IV PUSH: CPT

## 2019-06-18 PROCEDURE — 87040 BLOOD CULTURE FOR BACTERIA: CPT | Performed by: EMERGENCY MEDICINE

## 2019-06-18 PROCEDURE — 99207 ZZC APP CREDIT; MD BILLING SHARED VISIT: CPT | Performed by: PHYSICIAN ASSISTANT

## 2019-06-18 PROCEDURE — 96361 HYDRATE IV INFUSION ADD-ON: CPT

## 2019-06-18 PROCEDURE — 25000128 H RX IP 250 OP 636: Performed by: EMERGENCY MEDICINE

## 2019-06-18 PROCEDURE — 99223 1ST HOSP IP/OBS HIGH 75: CPT | Mod: AI | Performed by: INTERNAL MEDICINE

## 2019-06-18 PROCEDURE — 80053 COMPREHEN METABOLIC PANEL: CPT | Performed by: EMERGENCY MEDICINE

## 2019-06-18 PROCEDURE — G0378 HOSPITAL OBSERVATION PER HR: HCPCS

## 2019-06-18 PROCEDURE — 85025 COMPLETE CBC W/AUTO DIFF WBC: CPT | Performed by: EMERGENCY MEDICINE

## 2019-06-18 PROCEDURE — 99285 EMERGENCY DEPT VISIT HI MDM: CPT | Mod: 25

## 2019-06-18 PROCEDURE — 25000132 ZZH RX MED GY IP 250 OP 250 PS 637: Performed by: PHYSICIAN ASSISTANT

## 2019-06-18 PROCEDURE — 12000000 ZZH R&B MED SURG/OB

## 2019-06-18 PROCEDURE — 25800030 ZZH RX IP 258 OP 636: Performed by: EMERGENCY MEDICINE

## 2019-06-18 PROCEDURE — 00000146 ZZHCL STATISTIC GLUCOSE BY METER IP

## 2019-06-18 PROCEDURE — 25800030 ZZH RX IP 258 OP 636: Performed by: INTERNAL MEDICINE

## 2019-06-18 PROCEDURE — 25000128 H RX IP 250 OP 636: Performed by: INTERNAL MEDICINE

## 2019-06-18 RX ORDER — IBUPROFEN 600 MG/1
600 TABLET, FILM COATED ORAL EVERY 6 HOURS PRN
Status: DISCONTINUED | OUTPATIENT
Start: 2019-06-18 | End: 2019-06-20 | Stop reason: HOSPADM

## 2019-06-18 RX ORDER — ACETAMINOPHEN 650 MG/1
650 SUPPOSITORY RECTAL EVERY 4 HOURS PRN
Status: DISCONTINUED | OUTPATIENT
Start: 2019-06-18 | End: 2019-06-20 | Stop reason: HOSPADM

## 2019-06-18 RX ORDER — ONDANSETRON 2 MG/ML
4 INJECTION INTRAMUSCULAR; INTRAVENOUS EVERY 6 HOURS PRN
Status: DISCONTINUED | OUTPATIENT
Start: 2019-06-18 | End: 2019-06-20 | Stop reason: HOSPADM

## 2019-06-18 RX ORDER — PRAVASTATIN SODIUM 20 MG
20 TABLET ORAL AT BEDTIME
COMMUNITY
End: 2019-06-28

## 2019-06-18 RX ORDER — NADOLOL 20 MG/1
20 TABLET ORAL DAILY
Status: DISCONTINUED | OUTPATIENT
Start: 2019-06-19 | End: 2019-06-20 | Stop reason: HOSPADM

## 2019-06-18 RX ORDER — PRAVASTATIN SODIUM 20 MG
20 TABLET ORAL AT BEDTIME
Status: DISCONTINUED | OUTPATIENT
Start: 2019-06-18 | End: 2019-06-20 | Stop reason: HOSPADM

## 2019-06-18 RX ORDER — ASPIRIN 81 MG/1
81 TABLET ORAL DAILY
Status: ON HOLD | COMMUNITY
End: 2023-06-17

## 2019-06-18 RX ORDER — DEXTROSE MONOHYDRATE 25 G/50ML
25-50 INJECTION, SOLUTION INTRAVENOUS
Status: DISCONTINUED | OUTPATIENT
Start: 2019-06-18 | End: 2019-06-20 | Stop reason: HOSPADM

## 2019-06-18 RX ORDER — AMLODIPINE BESYLATE 2.5 MG/1
2.5 TABLET ORAL 2 TIMES DAILY
Status: DISCONTINUED | OUTPATIENT
Start: 2019-06-18 | End: 2019-06-19

## 2019-06-18 RX ORDER — TAMSULOSIN HYDROCHLORIDE 0.4 MG/1
0.4 CAPSULE ORAL DAILY
COMMUNITY
End: 2019-09-18

## 2019-06-18 RX ORDER — ONDANSETRON 4 MG/1
4 TABLET, ORALLY DISINTEGRATING ORAL EVERY 6 HOURS PRN
Status: DISCONTINUED | OUTPATIENT
Start: 2019-06-18 | End: 2019-06-20 | Stop reason: HOSPADM

## 2019-06-18 RX ORDER — GABAPENTIN 300 MG/1
900 CAPSULE ORAL 3 TIMES DAILY
Status: DISCONTINUED | OUTPATIENT
Start: 2019-06-18 | End: 2019-06-20 | Stop reason: HOSPADM

## 2019-06-18 RX ORDER — NALOXONE HYDROCHLORIDE 0.4 MG/ML
.1-.4 INJECTION, SOLUTION INTRAMUSCULAR; INTRAVENOUS; SUBCUTANEOUS
Status: DISCONTINUED | OUTPATIENT
Start: 2019-06-18 | End: 2019-06-20 | Stop reason: HOSPADM

## 2019-06-18 RX ORDER — ASPIRIN 81 MG/1
81 TABLET ORAL EVERY EVENING
Status: DISCONTINUED | OUTPATIENT
Start: 2019-06-18 | End: 2019-06-20 | Stop reason: HOSPADM

## 2019-06-18 RX ORDER — NADOLOL 20 MG/1
20 TABLET ORAL DAILY
COMMUNITY
End: 2019-08-19

## 2019-06-18 RX ORDER — LIDOCAINE 40 MG/G
CREAM TOPICAL
Status: DISCONTINUED | OUTPATIENT
Start: 2019-06-18 | End: 2019-06-20 | Stop reason: HOSPADM

## 2019-06-18 RX ORDER — PROCHLORPERAZINE 25 MG
12.5 SUPPOSITORY, RECTAL RECTAL EVERY 12 HOURS PRN
Status: DISCONTINUED | OUTPATIENT
Start: 2019-06-18 | End: 2019-06-20 | Stop reason: HOSPADM

## 2019-06-18 RX ORDER — PROCHLORPERAZINE MALEATE 5 MG
5 TABLET ORAL EVERY 6 HOURS PRN
Status: DISCONTINUED | OUTPATIENT
Start: 2019-06-18 | End: 2019-06-20 | Stop reason: HOSPADM

## 2019-06-18 RX ORDER — METFORMIN HCL 500 MG
2000 TABLET, EXTENDED RELEASE 24 HR ORAL EVERY MORNING
COMMUNITY
End: 2019-09-20

## 2019-06-18 RX ORDER — FINASTERIDE 5 MG/1
5 TABLET, FILM COATED ORAL DAILY
Status: DISCONTINUED | OUTPATIENT
Start: 2019-06-19 | End: 2019-06-20 | Stop reason: HOSPADM

## 2019-06-18 RX ORDER — NICOTINE POLACRILEX 4 MG
15-30 LOZENGE BUCCAL
Status: DISCONTINUED | OUTPATIENT
Start: 2019-06-18 | End: 2019-06-20 | Stop reason: HOSPADM

## 2019-06-18 RX ORDER — GEMFIBROZIL 600 MG/1
600 TABLET, FILM COATED ORAL
Status: DISCONTINUED | OUTPATIENT
Start: 2019-06-18 | End: 2019-06-20 | Stop reason: HOSPADM

## 2019-06-18 RX ORDER — ACETAMINOPHEN 325 MG/1
650 TABLET ORAL EVERY 4 HOURS PRN
Status: DISCONTINUED | OUTPATIENT
Start: 2019-06-18 | End: 2019-06-20 | Stop reason: HOSPADM

## 2019-06-18 RX ORDER — LISINOPRIL 20 MG/1
20 TABLET ORAL DAILY
Status: DISCONTINUED | OUTPATIENT
Start: 2019-06-19 | End: 2019-06-20 | Stop reason: HOSPADM

## 2019-06-18 RX ORDER — TAMSULOSIN HYDROCHLORIDE 0.4 MG/1
0.4 CAPSULE ORAL DAILY
Status: DISCONTINUED | OUTPATIENT
Start: 2019-06-19 | End: 2019-06-20 | Stop reason: HOSPADM

## 2019-06-18 RX ADMIN — GABAPENTIN 900 MG: 300 CAPSULE ORAL at 16:04

## 2019-06-18 RX ADMIN — GEMFIBROZIL 600 MG: 600 TABLET ORAL at 16:04

## 2019-06-18 RX ADMIN — AMLODIPINE BESYLATE 2.5 MG: 2.5 TABLET ORAL at 21:16

## 2019-06-18 RX ADMIN — ASPIRIN 81 MG: 81 TABLET, COATED ORAL at 21:16

## 2019-06-18 RX ADMIN — VANCOMYCIN HYDROCHLORIDE 2500 MG: 5 INJECTION, POWDER, LYOPHILIZED, FOR SOLUTION INTRAVENOUS at 10:33

## 2019-06-18 RX ADMIN — SODIUM CHLORIDE 1000 ML: 9 INJECTION, SOLUTION INTRAVENOUS at 08:55

## 2019-06-18 RX ADMIN — PRAVASTATIN SODIUM 20 MG: 20 TABLET ORAL at 21:16

## 2019-06-18 RX ADMIN — GABAPENTIN 900 MG: 300 CAPSULE ORAL at 21:16

## 2019-06-18 RX ADMIN — VANCOMYCIN HYDROCHLORIDE 2000 MG: 5 INJECTION, POWDER, LYOPHILIZED, FOR SOLUTION INTRAVENOUS at 21:23

## 2019-06-18 ASSESSMENT — ACTIVITIES OF DAILY LIVING (ADL)
DRESS: 0-->INDEPENDENT
ADLS_ACUITY_SCORE: 10
COGNITION: 0 - NO COGNITION ISSUES REPORTED
TRANSFERRING: 0-->INDEPENDENT
RETIRED_EATING: 0-->INDEPENDENT
BATHING: 0-->INDEPENDENT
TOILETING: 0-->INDEPENDENT
FALL_HISTORY_WITHIN_LAST_SIX_MONTHS: NO
SWALLOWING: 0-->SWALLOWS FOODS/LIQUIDS WITHOUT DIFFICULTY
AMBULATION: 0-->INDEPENDENT
ADLS_ACUITY_SCORE: 10
RETIRED_COMMUNICATION: 0-->UNDERSTANDS/COMMUNICATES WITHOUT DIFFICULTY
ADLS_ACUITY_SCORE: 10

## 2019-06-18 ASSESSMENT — ENCOUNTER SYMPTOMS
MYALGIAS: 1
COLOR CHANGE: 1
FEVER: 0

## 2019-06-18 NOTE — PROGRESS NOTES
Physician Attestation   I, Finn Chowdhury, saw and evaluated Austen Fowler as part of a shared visit.  I have reviewed and discussed with the advanced practice provider their history, physical and plan.    I personally reviewed the vital signs, medications, labs and imaging.    My key history or physical exam findings: 85 yo M with recent diagnosis of cellulitis, started on cephalexin. Warmth slightly improved, though erythema began to expand around leg and developed pustules over the anterior aspect of his shin which led to his presentation. Denies any fevers or chills. Denies any known history of MRSA, recent antibiotic use or recent hospitalization. On exam appears comfortable at rest. Right lower extremity with erythema and warmth with small cluster of pustules anterior aspect of right lower shin. No pain or tenderness with full range of motion of right ankle.       Key management decisions made by me:   Right lower extremity cellulitis, purulent   - Pustules lanced in Emergency Department  - Cellulitis outlined   - Continue vancomycin IV  - Anticipate 24-48 hours of IV antibiotics, will admit to inpatient status   - If improving consider transition to TMP-SMX or doxycycline   - Monitor fever curve  - Admit to inpatient status given failure of outpatient management and anticipation of IV antibiotics.     Finn Chowdhury  Date of Service (when I saw the patient): 06/18/19

## 2019-06-18 NOTE — ED NOTES
Patient reports was seen Friday at Urgent care. Diagnosed with cellulitis. States redness to right ankle and now has pustules. Complains of pain to the area. States redness moving around the ankle.

## 2019-06-18 NOTE — PROGRESS NOTES
I have contacted IT to get this patients FYI Flag for transgender removed.  This patient is not Transgender and as people log into the chart it is logging the staff persons name as entering this patient at Transgender.  IT is working on this at this time.

## 2019-06-18 NOTE — PLAN OF CARE
RECEIVING UNIT ED HANDOFF REVIEW    ED Nurse Handoff Report was reviewed by: Aguilar Bar on June 18, 2019 at 10:48 AM

## 2019-06-18 NOTE — PHARMACY-VANCOMYCIN DOSING SERVICE
Pharmacy Vancomycin Initial Note  Date of Service 2019  Patient's  1935  84 year old, adult    Indication: Skin and Soft Tissue Infection    Current estimated CrCl = Estimated Creatinine Clearance (based on SCr of 0.59 mg/dL (L))  Female: 108.1 mL/min (A)  Male: 130.8 mL/min (A)    Creatinine for last 3 days  2019:  8:56 AM Creatinine 0.59 mg/dL    Recent Vancomycin Level(s) for last 3 days  No results found for requested labs within last 72 hours.      Vancomycin IV Administrations (past 72 hours)                   vancomycin (VANCOCIN) 2,500 mg in sodium chloride 0.9 % 500 mL intermittent infusion (mg) 2,500 mg New Bag 19 1033                Nephrotoxins and other renal medications (From now, onward)    Start     Dose/Rate Route Frequency Ordered Stop    19 0900  lisinopril (PRINIVIL/ZESTRIL) tablet 20 mg      20 mg Oral DAILY 19 1143      19 2200  vancomycin (VANCOCIN) 2,000 mg in sodium chloride 0.9 % 500 mL intermittent infusion      2,000 mg  over 2 Hours Intravenous EVERY 12 HOURS 19 1237      19 1143  ibuprofen (ADVIL/MOTRIN) tablet 600 mg      600 mg Oral EVERY 6 HOURS PRN 19 1143            Contrast Orders - past 72 hours (72h ago, onward)    None                Plan:  1.  Start vancomycin  2000 mg IV q12h.   2.  Goal Trough Level: 15-20 mg/L   3.  Pharmacy will check trough levels as appropriate in 1-3 Days.    4. Serum creatinine levels will be ordered daily for the first week of therapy and at least twice weekly for subsequent weeks.    5. Keene method utilized to dose vancomycin therapy: Method 1    Zach Yap

## 2019-06-18 NOTE — PHARMACY-ADMISSION MEDICATION HISTORY
Admission medication history interview status for the 6/18/2019  admission is complete. See EPIC admission navigator for prior to admission medications     Medication history source reliability:Good    Actions taken by pharmacist (provider contacted, etc):SureScripts     Additional medication history information not noted on PTA med list :patient is prescribed metformin  mg tablets - take 1000 mg PO twice daily. However, he takes 2000 mg in the morning only, instead.  He has been taking Keflex three times daily since Friday evening. He had a dose this morning before coming to the hospital. Patient reports still taking nadolol and taking it every day with last dose this morning.      Medication reconciliation/reorder completed by provider prior to medication history? Yes    Time spent in this activity: 15    Prior to Admission medications    Medication Sig Last Dose Taking? Auth Provider   acetaminophen (ACETAMIN) 500 MG tablet Take 1,000 mg by mouth 2 times daily as needed for pain   at PRN Yes Reported, Patient   amLODIPine (NORVASC) 2.5 MG tablet Take 1 tablet (2.5 mg) by mouth 2 times daily 6/18/2019 at AM Yes Hamzah Hodge MD   aspirin 81 MG EC tablet Take 81 mg by mouth every evening 6/17/2019 at PM Yes Unknown, Entered By History   calcium carbonate (TUMS) 500 MG chewable tablet Take 1 chew tab by mouth 3 times daily as needed for heartburn   at PRN Yes Reported, Patient   cephALEXin (KEFLEX) 500 MG capsule Take 1 capsule (500 mg) by mouth 3 times daily for 10 days 6/18/2019 at AM Yes Danae Velez, PAJose MiguelC   Cholecalciferol (VITAMIN D) 2000 UNIT tablet Take 2,000 Units by mouth daily. 6/18/2019 at AM Yes Hamzah Hodge MD   diclofenac (VOLTAREN) 1 % GEL topical gel Apply 4 grams to neck four times daily using enclosed dosing card. 6/17/2019 at Unknown time Yes Nicholas Nuñez MD   finasteride (PROSCAR) 5 MG tablet TAKE 1 TABLET BY MOUTH  DAILY 6/18/2019 at AM Yes Hamzah Hodge MD    gabapentin (NEURONTIN) 300 MG capsule Take 900 mg three times daily 6/18/2019 at AM Yes Hamzah Hodge MD   gemfibrozil (LOPID) 600 MG tablet TAKE 1 TABLET BY MOUTH TWO  TIMES DAILY 6/18/2019 at AM Yes Hamzah Hodge MD   lisinopril (PRINIVIL/ZESTRIL) 20 MG tablet TAKE 1 TABLET BY MOUTH  DAILY 6/18/2019 at AM Yes Hamzah Hodge MD   Menthol, Topical Analgesic, (ICY HOT BACK EX) Externally apply topically daily as needed (neck pain)   at PRN Yes Reported, Patient   METAMUCIL 1.7 GM OR WAFR TAKE AS DIRECTED  at PRN Yes    metFORMIN (GLUCOPHAGE-XR) 500 MG 24 hr tablet Take 2,000 mg by mouth every morning 6/18/2019 at AM Yes Unknown, Entered By History   nadolol (CORGARD) 20 MG tablet Take 20 mg by mouth daily 6/18/2019 at AM Yes Unknown, Entered By History   omeprazole (PRILOSEC) 20 MG DR capsule TAKE 1 CAPSULE BY MOUTH  DAILY 6/18/2019 at AM Yes Hamzah Hodge MD   pravastatin (PRAVACHOL) 20 MG tablet Take 20 mg by mouth At Bedtime 6/17/2019 at PM Yes Unknown, Entered By History   tamsulosin (FLOMAX) 0.4 MG capsule Take 0.4 mg by mouth daily 6/18/2019 at AM Yes Unknown, Entered By History   triamcinolone (KENALOG) 0.5 % cream Apply  topically 2 times daily. to affected area as directed PRN  at PRN Yes Hamzah Hodge MD   blood glucose (ONETOUCH ULTRA) test strip Use to test blood sugar 2 times daily or as directed.   Hamzah Hodge MD   blood glucose monitoring (ONE TOUCH ULTRASOFT) lancets Use to test blood sugar 2 times daily or as directed.   Hamzah Hodge MD   ORDER FOR DME Uses Cpap machine for sleep apnea   Dominga Aguirre PA-C

## 2019-06-18 NOTE — H&P
Admitted:     06/18/2019      PRIMARY CARE PROVIDER:  Dr. Ayesha MD      CHIEF COMPLAINT:  Right lower extremity pain and redness.      HISTORY OF PRESENT ILLNESS:  Austen Fowler is an 84-year-old gentleman with a past medical history of hypertension, coronary artery disease, diabetes mellitus type 2, hyperlipidemia, GERD, BPH, as well as a chronic cervical spine pain, who presented to the Emergency Department for evaluation of right lower extremity redness, swelling and pain.  The patient initially noticed this discomfort and symptoms approximately 1 week prior.  He reports that he was working out in the yard 7 days prior to evaluation and the following morning woke up to find that the anterior aspect of his right lower leg was erythematous with a mild amount of swelling and discomfort.  The patient monitored the site throughout the week; however, noticed that it was worsening and thus presented to urgent care 3 days prior to evaluation, on 06/14/2019, where he was diagnosed with a superficial cellulitis.  During that clinic visit, he was noted to be afebrile without systemic signs of illness.  He was given 1 gram of IV Rocephin and prescribed Keflex 500 mg, 3 times daily, with which he has been compliant.  Patient and wife report that he had ongoing swelling and discomfort to the right lower extremity; however, the overall warmth from the leg appeared to be improving.  Last evening, the patient's wife noted a small amount of development of a pustule to the anterior aspect of the right lower leg and upon awakening this morning, a second area developed just adjacent; thus, they elected to present to the Emergency Department for a repeat evaluation.      On arrival in the Emergency Department, the patient was seen by Dr. Wills, and he was afebrile without abnormal vital signs.  On further examination, patient was identified to have an erythematous right lower extremity with a slight amount of warmth, swelling, as  well as 2 small areas of pustules which were lanced with purulent drainage appreciated.  As patient had been on oral Keflex for 3 days, with development of purulent drainage, IV vancomycin was ordered and patient was recommended to be admitted to inpatient status, due to failure of outpatient management.      The patient presently is evaluated in Emergency Department with wife at bedside, who greatly assists with history.  He reports that he has been able to ambulate on the leg; however, walking up or down stairs is increasingly difficult.  He does also report that the amount of swelling does wax and wane throughout the day, depending on his activity level.  Wife does feel that the redness has increased in terms of size, although overall warmth has decreased.  The patient denies any subjective fevers or chills.  Also, denies any increasing pain to the area.      PAST MEDICAL HISTORY:   1.  Coronary artery disease, status post CABG in 1992.  The patient is followed by Dr. Hernandez as an outpatient.  Last nuclear stress was performed 4 years ago, which was negative for evidence of ischemia.  Echo in the past has indicated a normal ejection fraction of 55% to 60%.   2.  Hypertension.   3.  Hyperlipidemia.   4.  Diabetes mellitus type 2.  Most recent hemoglobin A1c was 6.0%, on 04/17/2019.   5.  BPH.   6.  GERD.   7.  Cervicalgia.      PAST SURGICAL HISTORY:   1.  CABG, as noted above.   2.  Bilateral cataract surgery.   3.  Sinus surgery.   4.  Lap cholecystectomy.   5.  Lumbar laminectomy.   6.  Coronary angiogram.      PRIOR TO ADMISSION MEDICATIONS:    Prior to Admission medications    Medication Sig Last Dose Taking? Auth Provider   acetaminophen (ACETAMIN) 500 MG tablet Take 1,000 mg by mouth 2 times daily as needed for pain   at PRN Yes Reported, Patient   amLODIPine (NORVASC) 5 MG tablet Take 1 tablet (5 mg) by mouth 2 times daily  Yes Finn Chowdhury MD   aspirin 81 MG EC tablet Take 81 mg by mouth every  evening 6/17/2019 at PM Yes Unknown, Entered By History   calcium carbonate (TUMS) 500 MG chewable tablet Take 1 chew tab by mouth 3 times daily as needed for heartburn   at PRN Yes Reported, Patient   Cholecalciferol (VITAMIN D) 2000 UNIT tablet Take 2,000 Units by mouth daily. 6/18/2019 at AM Yes Hamzah Hodge MD   diclofenac (VOLTAREN) 1 % GEL topical gel Apply 4 grams to neck four times daily using enclosed dosing card. 6/17/2019 at Unknown time Yes Nicholas Nuñez MD   finasteride (PROSCAR) 5 MG tablet TAKE 1 TABLET BY MOUTH  DAILY 6/18/2019 at AM Yes Hamzah Hodge MD   gabapentin (NEURONTIN) 300 MG capsule Take 900 mg three times daily 6/18/2019 at AM Yes Hamzah Hodge MD   gemfibrozil (LOPID) 600 MG tablet TAKE 1 TABLET BY MOUTH TWO  TIMES DAILY 6/18/2019 at AM Yes Hamzah Hodge MD   lisinopril (PRINIVIL/ZESTRIL) 20 MG tablet TAKE 1 TABLET BY MOUTH  DAILY 6/18/2019 at AM Yes Hamzah Hodge MD   Menthol, Topical Analgesic, (ICY HOT BACK EX) Externally apply topically daily as needed (neck pain)   at PRN Yes Reported, Patient   METAMUCIL 1.7 GM OR WAFR TAKE AS DIRECTED  at PRN Yes    metFORMIN (GLUCOPHAGE-XR) 500 MG 24 hr tablet Take 2,000 mg by mouth every morning 6/18/2019 at AM Yes Unknown, Entered By History   nadolol (CORGARD) 20 MG tablet Take 20 mg by mouth daily 6/18/2019 at AM Yes Unknown, Entered By History   omeprazole (PRILOSEC) 20 MG DR capsule TAKE 1 CAPSULE BY MOUTH  DAILY 6/18/2019 at AM Yes Hamzah Hodge MD   pravastatin (PRAVACHOL) 20 MG tablet Take 20 mg by mouth At Bedtime 6/17/2019 at PM Yes Unknown, Entered By History   sulfamethoxazole-trimethoprim (BACTRIM DS/SEPTRA DS) 800-160 MG tablet Take 1 tablet by mouth 2 times daily . First dose 6/20 PM.  Yes Finn Chowdhury MD   tamsulosin (FLOMAX) 0.4 MG capsule Take 0.4 mg by mouth daily 6/18/2019 at AM Yes Unknown, Entered By History   triamcinolone (KENALOG) 0.5 % cream Apply  topically 2 times daily. to affected area as  directed PRN  at PRN Yes Hamzah Hodge MD   blood glucose (ONETOUCH ULTRA) test strip Use to test blood sugar 2 times daily or as directed.   Hamzah Hodge MD   blood glucose monitoring (ONE TOUCH ULTRASOFT) lancets Use to test blood sugar 2 times daily or as directed.   Hamzah Hodge MD   ORDER FOR DME Uses Cpap machine for sleep apnea   Dominga Aguirre PA-C       ALLERGIES:  PROPRANOLOL, REACTION IS UNKNOWN.      FAMILY HISTORY:  Mother with a history of diabetes.  Father with history of diabetes, as well as coronary artery disease.      SOCIAL HISTORY:  The patient currently lives in a single family home with his wife, in [facility].  He is a nonsmoker, presently quit in 1992; prior to that smoked 2 packs a day for 41 years, giving him an 80-pack-year history.  He consumes alcohol on a rare basis.      REVIEW OF SYSTEMS:  A 10-point review of systems was performed, and is otherwise negative.  Please refer to the HPI.      PHYSICAL EXAMINATION:   VITAL SIGNS:  Temperature 98.4, heart rate 56, respiration rate of 18, blood pressure of 147/67. SpO2 97% on room air.   GENERAL:  This is a well-developed, well-nourished male, who appears comfortable.   HEENT:  Head is normocephalic.  Pupils are equal, round, reactive bilaterally.  EOMs are intact bilaterally.  Nose, mouth are patent.  Mucous membranes are moist.   LUNGS:  Clear to auscultation bilaterally without wheeze or crackles.   HEART:  Regular rate and rhythm, normal S1, S2.  There is a faint systolic murmur present.   ABDOMEN:  Soft, nontender, nondistended, positive bowel sounds, located diffusely.   EXTREMITIES:  The patient is spontaneously moving bilateral upper and lower extremities.  The patient's right lower leg is erythematous to the anterior aspect, extending medially along the distal tibia and into the medial aspect of the calcaneus.  The patient does have limited range of motion of the ankle joint and plantar in dorsiflexion.  Pedal  pulses palpable and 2+.  There are 2 small areas of pustules along the anterior aspect of the lower leg with purulent drainage appreciated.  There is no lymphangitic streaking.  Area is warm to touch in comparison to contralateral leg.   SKIN:  Warm and dry, no rash.      LABORATORY DATA AND IMAGING:  As reviewed in Epic, and as in the HPI.      ASSESSMENT AND PLAN:  Austen Fowler is an 84-year-old gentleman with past medical history of coronary artery disease, hypertension, hyperlipidemia, diabetes mellitus type 2, who presented to the Emergency Department for evaluation of ongoing right lower extremity cellulitis despite being on oral antibiotic treatment with development of purulent drainage.  The patient will be admitted under inpatient status for further treatment.   1.  Cellulitis of the right lower leg, purulent.  The patient has received 1 dose of IV Rocephin, as well as has been on oral Keflex for 3 days with interval development of pustules to anterior lower leg and associated purulent drainage.  The patient has been initiated on IV vancomycin in the Emergency Department, which will be continued.  He is presently afebrile without leukocytosis or tachycardia, which we will monitor closely.  Anticipate patient will require additional dosing of IV antibiotic to ensure clinical improvement prior to discharge.  Area has been outlined in the Emergency Department and will be monitored for acute worsening.   2.  Diabetes mellitus type 2, last hemoglobin A1c is 6% in April 2019.  The patient is maintained prior to admission on metformin, which has been continued.  He has been placed on a moderate carbohydrate diet with additional high insulin sliding scale.   3.  Coronary artery disease with hypertension and hyperlipidemia, chronic and stable.  The patient will continue on prior to admission amlodipine, aspirin, gemfibrozil, lisinopril, as well as nadolol and pravastatin.   4.  BPH.  Continue tamsulosin, as well as  finasteride.   5.  GERD.  Continue prior to admission Prilosec.   6.  Cervicalgia.  Continue prior to admission gabapentin.   7.  DVT prophylaxis:  We will encourage ambulation, as well as PCDs to the unaffected leg.      The patient is a FULL CODE, which was confirmed with the patient as well as wife.      This patient was staffed with Dr. Finn Chowdhury, who independently interviewed and examined the patient, is in agreement with the above-mentioned plan.         FINN CHOWDHURY MD       As dictated by CAROLINA FRAUSTO PA-C            D: 2019   T: 2019   MT: ERIC      Name:     NAYELI RAI   MRN:      [id number]        Account:      [id number]   :      1935        Admitted:     2019                   Document: [id number]

## 2019-06-18 NOTE — PLAN OF CARE
Plan of care unchanged. Pt will maintain course of IV Abx. Keep RLE elevated. Encourage ambulation and PCD on LLE. A&Ox4. Up ad jayna. BG stable per shift.

## 2019-06-18 NOTE — PLAN OF CARE
A&O x4. Up ad jayna. VSS ex slightly elev BP. Lungs clear. Tolerating diet. BG 74. Voiding without difficulty. Plan is to Elev RLE. Ice PRN. IV vanco infusing per shift. 2+ edema in RLE.

## 2019-06-18 NOTE — ED PROVIDER NOTES
History     Chief Complaint:  Right Ankle Pain     The history is provided by the patient.      Austen Fowler is a 84 year old adult with past medical history significant for type II diabetes, hypertension, and coronary artery disease who presents complaining of eight days of right ankle pain, swelling, and erythema. Per records, patient was seen at urgent care four days ago where he received Rocephin 1g and started on Keflex (6/14 to 6/24 TID; currently on day 5) concerning for possible cellulitis. Patient adds his pain is tolerable with ambulation but affirms worsening of his pain with flexion while going up and down stairs. He annotates he has started seeing pustules at his anterior aspect of his ankle, prompting further evaluation today. He denies any fever.     Allergies:  Propranolol    Medications:   Keflex (6/14 to 6/24 TID)   Norvasc  Lisinopril  Lopid  Metformin  Neurontin  Toprol-XL  Corgard  Pravachol  Proscar    Past Medical History:    Anxiety  Aortic root dilatation  Arthritis  AAA  Basal cell cancer  Bunion  CAD  Contact dermatitis  Esophogeal reflux  Gallbladder disease  Hypertension  PENELOPE  GERD  Heart attack  Hyperlipidemia  Left ventricular diastolic dysfunction  Obesity  Osteoarthrosis  Type II DM  Urge incontinence   Spinal stenosis    Past Surgical History:    CABG  Cholecystectomy  CV left heart cath angioplasty  Laminectomy  Bilateral cataract extraction    Family History:    DM  MI  Cerebrovascular disease    Social History:  The patient was accompanied to the ED by his wife.  Smoking Status: Former Smoker  Smokeless Tobacco: Never Used  Alcohol Use: Positive  Marital Status:      Review of Systems   Constitutional: Negative for fever.   Musculoskeletal: Positive for gait problem and myalgias.   Skin: Positive for color change.   All other systems reviewed and are negative.    Physical Exam   Patient Vitals for the past 24 hrs:   BP Temp Temp src Pulse Resp SpO2 Height   06/18/19  "0835 147/67 -- -- 56 -- -- --   06/18/19 0829 (!) 147/123 98.4  F (36.9  C) Oral 57 18 97 % 1.905 m (6' 3\")     Physical Exam  Vitals: reviewed by me  General: Pt seen on [facility], pleasant, cooperative, and alert to conversation  Eyes: Tracking well, clear conjunctiva BL  ENT: MMM, midline trachea.   Lungs:   No tachypnea, no accessory muscle use. No respiratory distress.   CV: Rate as above, regular rhythm.    Abd: Soft, non tender, no guarding, no rebound. Non distended  MSK: Right lower extremity with erythema surrounding the distal half of his shin, nearly circumferential.  Does have central pustules x3.  Warm, tender, and erythematous.  No bullae, no other skin breaks.  Distal extremities warm and well-perfused.  Skin: No rash, normal turgor and temperature  Neuro: Clear speech and no facial droop.  Psych: Not RIS, no e/o AH/VH      Emergency Department Course     Laboratory:  CBC: WBC 7.3, HGB 12.1 (L),   CMP: glc 222 (H) creatinine 0.59 (H) albumin 3.2 (L) o/w WNL   Lactic Acid (Resulted: 0912): 2.2 (H)  Blood culture: Pending    Interventions:  0855: NS 1L IV Bolus   1033: Vancocin 2500 mg in NaCl 0.9% 500 mL IV Infusion    Emergency Department Course:  0829 Nursing notes and vitals reviewed.  0841  I performed an exam of the patient as documented above.   0856 IV was inserted and blood was drawn for laboratory testing, results above.  0955 Patient rechecked and updated.   1022 I spoke with Dr. Chowdhury of the hospitalist service regarding patient's presentation, findings, and plan of care.  I personally reviewed the laboratory results with the patient and answered all related questions prior to admission in stable condition.     Impression & Plan      Medical Decision Making:  A very pleasant 84 year old male who presents to the emergency room for what it appears to be cellulitis. He's already received nearly a full dose of Keflex with no significant improvement and given his elevated lactate " and diabetes, I do think he should be admitted to the hospital for IV antibiotics. I have elected to cover for MRSA given his pustule on exam, and the fact he is not getting back. Spoke with Dr. Chowdhury of the hospitalist team who generously agreed to accept the care of the patient to an inpatient setting. No evidence of necrotizing fascitis, deep space infection, or trauma.     Diagnosis:    ICD-10-CM   1. Cellulitis of right lower extremity L03.115     Disposition: Admit to Inpatient.     Scribe Disclosure:  I, Mario Cm, am serving as a scribe at 8:36 AM on 6/18/2019 to document services personally performed by Lazaro Wills MD based on my observations and the provider's statements to me.     EMERGENCY DEPARTMENT       Lazaro Wills MD  06/18/19 7399

## 2019-06-18 NOTE — ED NOTES
"[facility]  ED Nurse Handoff Report    ED Chief complaint: Wound Check      ED Diagnosis:   Final diagnoses:   Cellulitis of right lower extremity       Code Status: Not addressed by ED MD.    Allergies:   Allergies   Allergen Reactions     Propranolol      Gastritis, diarrhea       Activity level - Baseline/Home:  Independent  Activity Level - Current:   Independent    Patient's Preferred language: English   Needed?: No    Isolation: No  Infection: Not Applicable  Bariatric?: No    Vital Signs:   Vitals:    06/18/19 0829 06/18/19 0835   BP: (!) 147/123 147/67   Pulse: 57 56   Resp: 18    Temp: 98.4  F (36.9  C)    TempSrc: Oral    SpO2: 97%    Height: 1.905 m (6' 3\")        Cardiac Rhythm: ,        Pain level: 0-10 Pain Scale: 7    Is this patient confused?: No   Does this patient have a guardian?  No         If yes, is there guardianship documents in the Epic \"Code/ACP\" activity?  N/A         Guardian Notified?  N/A  [place] - Suicide Severity Rating Scale Completed?  Yes  If yes, what color did the patient score?  White    Patient Report: Initial Complaint: Wound check  Focused Assessment: Patient comes in with complaints of redness, swelling and pain to right ankle. Patient was seen in urgent care this past Friday and diagnosed with cellulitis. Since then patinet states redness has increased and now he has some pustules in the area. Patient is alert and oriented and independent with ambulation. Vanco administered in ED.  Tests Performed: Labs  Abnormal Results:   Results for orders placed or performed during the hospital encounter of 06/18/19   CBC with platelets differential   Result Value Ref Range    WBC 7.3 4.0 - 11.0 10e9/L    RBC Count 4.26 (L) 4.4 - 5.9 10e12/L    Hemoglobin 12.1 (L) 13.3 - 17.7 g/dL    Hematocrit 37.6 (L) 40.0 - 53.0 %    MCV 88 78 - 100 fl    MCH 28.4 26.5 - 33.0 pg    MCHC 32.2 31.5 - 36.5 g/dL    RDW 13.9 10.0 - 15.0 %    Platelet Count 263 150 - 450 " 10e9/L    Diff Method Automated Method     % Neutrophils 67.9 %    % Lymphocytes 17.4 %    % Monocytes 7.9 %    % Eosinophils 5.9 %    % Basophils 0.1 %    % Immature Granulocytes 0.8 %    Nucleated RBCs 0 0 /100    Absolute Neutrophil 5.0 1.6 - 8.3 10e9/L    Absolute Lymphocytes 1.3 0.8 - 5.3 10e9/L    Absolute Monocytes 0.6 0.0 - 1.3 10e9/L    Absolute Eosinophils 0.4 0.0 - 0.7 10e9/L    Absolute Basophils 0.0 0.0 - 0.2 10e9/L    Abs Immature Granulocytes 0.1 0 - 0.4 10e9/L    Absolute Nucleated RBC 0.0    Comprehensive metabolic panel   Result Value Ref Range    Sodium 140 133 - 144 mmol/L    Potassium 3.8 3.4 - 5.3 mmol/L    Chloride 107 94 - 109 mmol/L    Carbon Dioxide 28 20 - 32 mmol/L    Anion Gap 5 3 - 14 mmol/L    Glucose 222 (H) 70 - 99 mg/dL    Urea Nitrogen 16 7 - 30 mg/dL    Creatinine 0.59 (L) 0.66 - 1.25 mg/dL    GFR Estimate >90 >60 mL/min/[1.73_m2]    GFR Estimate If Black >90 >60 mL/min/[1.73_m2]    Calcium 8.9 8.5 - 10.1 mg/dL    Bilirubin Total 0.4 0.2 - 1.3 mg/dL    Albumin 3.2 (L) 3.4 - 5.0 g/dL    Protein Total 7.1 6.8 - 8.8 g/dL    Alkaline Phosphatase 68 40 - 150 U/L    ALT 13 0 - 70 U/L    AST 19 0 - 45 U/L   Lactic acid whole blood   Result Value Ref Range    Lactic Acid 2.2 (H) 0.7 - 2.0 mmol/L       Treatments provided: IV fluids, IV Vanco    Family Comments: Wife at bedside.    OBS brochure/video discussed/provided to patient/family: Yes              Name of person given brochure if not patient: Patient              Relationship to patient: Self    ED Medications:   Medications   0.9% sodium chloride BOLUS (0 mLs Intravenous Stopped 6/18/19 2640)       Drips infusing?:  No    For the majority of the shift this patient was Green.   Interventions performed were N/A.    Severe Sepsis OR Septic Shock Diagnosis Present: No    To be done/followed up on inpatient unit:  N/A    ED NURSE PHONE NUMBER: [phone]

## 2019-06-19 LAB
ANION GAP SERPL CALCULATED.3IONS-SCNC: 5 MMOL/L (ref 3–14)
BUN SERPL-MCNC: 10 MG/DL (ref 7–30)
CALCIUM SERPL-MCNC: 8.9 MG/DL (ref 8.5–10.1)
CHLORIDE SERPL-SCNC: 107 MMOL/L (ref 94–109)
CO2 SERPL-SCNC: 28 MMOL/L (ref 20–32)
CREAT SERPL-MCNC: 0.62 MG/DL (ref 0.66–1.25)
GFR SERPL CREATININE-BSD FRML MDRD: >90 ML/MIN/{1.73_M2}
GLUCOSE BLDC GLUCOMTR-MCNC: 103 MG/DL (ref 70–99)
GLUCOSE BLDC GLUCOMTR-MCNC: 121 MG/DL (ref 70–99)
GLUCOSE BLDC GLUCOMTR-MCNC: 127 MG/DL (ref 70–99)
GLUCOSE BLDC GLUCOMTR-MCNC: 134 MG/DL (ref 70–99)
GLUCOSE BLDC GLUCOMTR-MCNC: 98 MG/DL (ref 70–99)
GLUCOSE SERPL-MCNC: 203 MG/DL (ref 70–99)
HBA1C MFR BLD: 6.1 % (ref 0–5.6)
POTASSIUM SERPL-SCNC: 3.7 MMOL/L (ref 3.4–5.3)
SODIUM SERPL-SCNC: 140 MMOL/L (ref 133–144)

## 2019-06-19 PROCEDURE — 80048 BASIC METABOLIC PNL TOTAL CA: CPT | Performed by: PHYSICIAN ASSISTANT

## 2019-06-19 PROCEDURE — 25000128 H RX IP 250 OP 636: Performed by: INTERNAL MEDICINE

## 2019-06-19 PROCEDURE — 12000000 ZZH R&B MED SURG/OB

## 2019-06-19 PROCEDURE — 00000146 ZZHCL STATISTIC GLUCOSE BY METER IP

## 2019-06-19 PROCEDURE — 83036 HEMOGLOBIN GLYCOSYLATED A1C: CPT | Performed by: PHYSICIAN ASSISTANT

## 2019-06-19 PROCEDURE — 36415 COLL VENOUS BLD VENIPUNCTURE: CPT | Performed by: PHYSICIAN ASSISTANT

## 2019-06-19 PROCEDURE — G0378 HOSPITAL OBSERVATION PER HR: HCPCS

## 2019-06-19 PROCEDURE — 25000132 ZZH RX MED GY IP 250 OP 250 PS 637: Performed by: INTERNAL MEDICINE

## 2019-06-19 PROCEDURE — 25000132 ZZH RX MED GY IP 250 OP 250 PS 637: Performed by: PHYSICIAN ASSISTANT

## 2019-06-19 PROCEDURE — 99232 SBSQ HOSP IP/OBS MODERATE 35: CPT | Performed by: INTERNAL MEDICINE

## 2019-06-19 PROCEDURE — 25800030 ZZH RX IP 258 OP 636: Performed by: INTERNAL MEDICINE

## 2019-06-19 RX ORDER — AMLODIPINE BESYLATE 2.5 MG/1
2.5 TABLET ORAL ONCE
Status: COMPLETED | OUTPATIENT
Start: 2019-06-19 | End: 2019-06-19

## 2019-06-19 RX ORDER — AMLODIPINE BESYLATE 2.5 MG/1
5 TABLET ORAL 2 TIMES DAILY
Status: DISCONTINUED | OUTPATIENT
Start: 2019-06-19 | End: 2019-06-20 | Stop reason: HOSPADM

## 2019-06-19 RX ORDER — SULFAMETHOXAZOLE/TRIMETHOPRIM 800-160 MG
1 TABLET ORAL 2 TIMES DAILY
Status: DISCONTINUED | OUTPATIENT
Start: 2019-06-19 | End: 2019-06-20 | Stop reason: HOSPADM

## 2019-06-19 RX ADMIN — METFORMIN HYDROCHLORIDE 2000 MG: 750 TABLET, EXTENDED RELEASE ORAL at 08:12

## 2019-06-19 RX ADMIN — LISINOPRIL 20 MG: 20 TABLET ORAL at 08:12

## 2019-06-19 RX ADMIN — PRAVASTATIN SODIUM 20 MG: 20 TABLET ORAL at 21:32

## 2019-06-19 RX ADMIN — VANCOMYCIN HYDROCHLORIDE 2000 MG: 5 INJECTION, POWDER, LYOPHILIZED, FOR SOLUTION INTRAVENOUS at 10:21

## 2019-06-19 RX ADMIN — TAMSULOSIN HYDROCHLORIDE 0.4 MG: 0.4 CAPSULE ORAL at 08:12

## 2019-06-19 RX ADMIN — SULFAMETHOXAZOLE AND TRIMETHOPRIM 1 TABLET: 800; 160 TABLET ORAL at 21:32

## 2019-06-19 RX ADMIN — FINASTERIDE 5 MG: 5 TABLET, FILM COATED ORAL at 08:12

## 2019-06-19 RX ADMIN — NADOLOL 20 MG: 20 TABLET ORAL at 08:12

## 2019-06-19 RX ADMIN — GEMFIBROZIL 600 MG: 600 TABLET ORAL at 06:59

## 2019-06-19 RX ADMIN — GABAPENTIN 900 MG: 300 CAPSULE ORAL at 17:29

## 2019-06-19 RX ADMIN — OMEPRAZOLE 20 MG: 20 CAPSULE, DELAYED RELEASE ORAL at 08:12

## 2019-06-19 RX ADMIN — ASPIRIN 81 MG: 81 TABLET, COATED ORAL at 21:32

## 2019-06-19 RX ADMIN — GABAPENTIN 900 MG: 300 CAPSULE ORAL at 08:12

## 2019-06-19 RX ADMIN — VANCOMYCIN HYDROCHLORIDE 2000 MG: 5 INJECTION, POWDER, LYOPHILIZED, FOR SOLUTION INTRAVENOUS at 21:32

## 2019-06-19 RX ADMIN — GEMFIBROZIL 600 MG: 600 TABLET ORAL at 17:30

## 2019-06-19 RX ADMIN — AMLODIPINE BESYLATE 2.5 MG: 2.5 TABLET ORAL at 08:12

## 2019-06-19 RX ADMIN — GABAPENTIN 900 MG: 300 CAPSULE ORAL at 21:32

## 2019-06-19 RX ADMIN — AMLODIPINE BESYLATE 5 MG: 2.5 TABLET ORAL at 21:32

## 2019-06-19 RX ADMIN — AMLODIPINE BESYLATE 2.5 MG: 2.5 TABLET ORAL at 11:01

## 2019-06-19 ASSESSMENT — ACTIVITIES OF DAILY LIVING (ADL)
ADLS_ACUITY_SCORE: 10

## 2019-06-19 NOTE — PLAN OF CARE
Cognitive Concerns/ Orientation : A&Ox4  BEHAVIOR & AGGRESSION TOOL COLOR: Green  CIWA SCORE: n/a  ABNL VS/O2: VSS on RA  MOBILITY: independent  PAIN MANAGMENT: mild occasional pain in RLE at cellulitis site- declines intervention  DIET: mod carb  BOWEL/BLADDER: continent; BM today  ABNL LAB/BG: /98  DRAIN/DEVICES: PIV SL  TELEMETRY RHYTHM: n/a  SKIN: RLE +2 edema and redness, borders marked (not exceeding lines). Pustule without drainage.  TESTS/PROCEDURES: n/a  D/C DAY/GOALS/PLACE: Transition to PO bactrim tonight and possible discharge 6/20  OTHER IMPORTANT INFO: Amlodipine dose increased.

## 2019-06-19 NOTE — PROGRESS NOTES
[facility]    Medicine Progress Note - Hospitalist Service       Date of Admission:  6/18/2019    Assessment & Plan   Austen Fowler is an 84 year-old M with past medical history of coronary artery disease, hypertension, hyperlipidemia, diabetes mellitus type 2, who presented to the Emergency Department for evaluation of ongoing right lower extremity cellulitis despite being on oral antibiotic treatment with development of purulent drainage. Admitted 6/18/2019.     Right lower extremity cellulitis, purulent   Symptoms initially developed about a week prior to admission, was seen a few days after symptom onset and given 1 dose of ceftriaxone followed by oral cephalexin.  Despite antibiotic therapy, his erythema was not improving with slight extension and eventual development of pustules which led to his presentation to the emergency department.  Admitted to inpatient for IV antibiotics given failure of outpatient management.  - Erythema appears to be improving, pustules now with overlying blister, no ongoing drainage or underlying fluctuance  - Will give additional vancomycin IV x1, transition to TMP-SMX 6/19 evening  - Monitor for ongoing improvement on oral therapy  - Afebrile, monitor fever curve    Diabetes mellitus type 2 on oral antidiabetic therapy  Hemoglobin A1c is 6% in April 2019.    - Continue prior to admission metformin  - Moderate consistent carbohydrate diet  - Continue sliding scale insulin     Coronary artery disease  Hyperlipidemia  - Stable. Denies chest pain.   - Continue prior to admission aspirin, gemfibrozil, nadolol, pravastatin.     Hypertension   - Blood pressure consistently elevated. Will increase amlodipine to 5 mg twice daily.  Continue prior to admission lisinopril, nadolol.    BPH  Continue prior to admission tamsulosin and finasteride.     GERD  Continue prior to admission omeprazole     Cervicalgia  Continue prior to admission gabapentin.     Diet: Moderate Consistent  CHO Diet    DVT Prophylaxis: Pneumatic Compression Devices  Cosme Catheter: not present  Code Status: Full Code      Disposition Plan   Expected discharge: Likely discharge tomorrow if continues to improve with oral antibiotics   Entered: Finn Chowdhury MD 06/19/2019, 9:56 AM       The patient's care was discussed with the Patient.    Finn Chowdhury MD  Hospitalist Service  [facility]    ______________________________________________________________________    Interval History   No acute events overnight. No new symptoms in leg, no further drainage.  Denies chest pain, shortness of breath, abdominal pain.  Eating drinking well.    Data reviewed today: I reviewed all medications, new labs and imaging results over the last 24 hours. I personally reviewed no images or EKG's today.    Physical Exam   Vital Signs: Temp: 98.8  F (37.1  C) Temp src: Oral BP: 169/87 Pulse: 59 Heart Rate: 62 Resp: 16 SpO2: 93 % O2 Device: None (Room air)    Weight: 0 lbs 0 oz    Constitutional: Well-appearing, NAD  Respiratory: Clear to auscultation bilaterally, good air movement bilaterally  Cardiovascular: RRR, no m/r/g.   GI: Soft, non-tender, non-distended.   Skin/Integumen: Warm, dry. Right lower leg with improving erythema, pustules now with overlying blister, no ongoing drainage or underlying fluctuance. Mild warmth and underlying edema.   Other:      Data   Recent Labs   Lab 06/19/19  0855 06/18/19  0856 06/14/19  1339   WBC  --  7.3 9.9   HGB  --  12.1* 12.6*   MCV  --  88 89   PLT  --  263 236    140  --    POTASSIUM 3.7 3.8  --    CHLORIDE 107 107  --    CO2 28 28  --    BUN 10 16  --    CR 0.62* 0.59*  --    ANIONGAP 5 5  --    BRANDON 8.9 8.9  --    * 222*  --    ALBUMIN  --  3.2*  --    PROTTOTAL  --  7.1  --    BILITOTAL  --  0.4  --    ALKPHOS  --  68  --    ALT  --  13  --    AST  --  19  --        No results found for this or any previous visit (from the past 24 hour(s)).  Medications        amLODIPine  2.5 mg Oral BID     aspirin  81 mg Oral QPM     finasteride  5 mg Oral Daily     gabapentin  900 mg Oral TID     gemfibrozil  600 mg Oral BID AC     insulin aspart  1-10 Units Subcutaneous TID AC     insulin aspart  1-7 Units Subcutaneous At Bedtime     lisinopril  20 mg Oral Daily     metFORMIN  2,000 mg Oral QAM     nadolol  20 mg Oral Daily     omeprazole  20 mg Oral Daily     pravastatin  20 mg Oral At Bedtime     sodium chloride (PF)  3 mL Intracatheter Q8H     tamsulosin  0.4 mg Oral Daily     vancomycin (VANCOCIN) IV  2,000 mg Intravenous Q12H

## 2019-06-19 NOTE — PLAN OF CARE
DATE & TIME:0502 6/19/19   Cognitive Concerns/ Orientation : A&Ox4  BEHAVIOR & AGGRESSION TOOL COLOR: Green  CIWA SCORE: n/a  ABNL VS/O2: VSS on RA  MOBILITY: ind  PAIN MANAGMENT: mild occasional pain in RLE at cellulitis site  DIET: mod carb  BOWEL/BLADDER: continent  ABNL LAB/BG:   DRAIN/DEVICES: PIV SL  TELEMETRY RHYTHM: n/a  SKIN: RLE with redness, borders marked. Incised pustule with some drainage.  TESTS/PROCEDURES: n/a  D/C DAY/GOALS/PLACE: plan to discharge after 2 days of IV abx and then transition to PO abx  OTHER IMPORTANT INFO:

## 2019-06-20 VITALS
DIASTOLIC BLOOD PRESSURE: 74 MMHG | SYSTOLIC BLOOD PRESSURE: 139 MMHG | HEIGHT: 75 IN | OXYGEN SATURATION: 95 % | RESPIRATION RATE: 16 BRPM | HEART RATE: 57 BPM | TEMPERATURE: 98.6 F | BODY MASS INDEX: 32.25 KG/M2

## 2019-06-20 LAB
CREAT SERPL-MCNC: 0.65 MG/DL (ref 0.66–1.25)
GFR SERPL CREATININE-BSD FRML MDRD: 90 ML/MIN/{1.73_M2}
GLUCOSE BLDC GLUCOMTR-MCNC: 110 MG/DL (ref 70–99)
GLUCOSE BLDC GLUCOMTR-MCNC: 113 MG/DL (ref 70–99)
GLUCOSE BLDC GLUCOMTR-MCNC: 128 MG/DL (ref 70–99)

## 2019-06-20 PROCEDURE — 00000146 ZZHCL STATISTIC GLUCOSE BY METER IP

## 2019-06-20 PROCEDURE — 36415 COLL VENOUS BLD VENIPUNCTURE: CPT | Performed by: INTERNAL MEDICINE

## 2019-06-20 PROCEDURE — 99239 HOSP IP/OBS DSCHRG MGMT >30: CPT | Performed by: INTERNAL MEDICINE

## 2019-06-20 PROCEDURE — 25000132 ZZH RX MED GY IP 250 OP 250 PS 637: Performed by: PHYSICIAN ASSISTANT

## 2019-06-20 PROCEDURE — 25000132 ZZH RX MED GY IP 250 OP 250 PS 637: Performed by: INTERNAL MEDICINE

## 2019-06-20 PROCEDURE — 82565 ASSAY OF CREATININE: CPT | Performed by: INTERNAL MEDICINE

## 2019-06-20 PROCEDURE — G0378 HOSPITAL OBSERVATION PER HR: HCPCS

## 2019-06-20 RX ORDER — AMLODIPINE BESYLATE 5 MG/1
5 TABLET ORAL 2 TIMES DAILY
Qty: 60 TABLET | Refills: 0 | Status: SHIPPED | OUTPATIENT
Start: 2019-06-20 | End: 2019-08-08

## 2019-06-20 RX ORDER — SULFAMETHOXAZOLE/TRIMETHOPRIM 800-160 MG
1 TABLET ORAL 2 TIMES DAILY
Qty: 9 TABLET | Refills: 0 | Status: SHIPPED | OUTPATIENT
Start: 2019-06-20 | End: 2019-06-28

## 2019-06-20 RX ADMIN — OMEPRAZOLE 20 MG: 20 CAPSULE, DELAYED RELEASE ORAL at 08:00

## 2019-06-20 RX ADMIN — LISINOPRIL 20 MG: 20 TABLET ORAL at 08:00

## 2019-06-20 RX ADMIN — GEMFIBROZIL 600 MG: 600 TABLET ORAL at 06:35

## 2019-06-20 RX ADMIN — SULFAMETHOXAZOLE AND TRIMETHOPRIM 1 TABLET: 800; 160 TABLET ORAL at 08:00

## 2019-06-20 RX ADMIN — GABAPENTIN 900 MG: 300 CAPSULE ORAL at 08:00

## 2019-06-20 RX ADMIN — TAMSULOSIN HYDROCHLORIDE 0.4 MG: 0.4 CAPSULE ORAL at 08:00

## 2019-06-20 RX ADMIN — FINASTERIDE 5 MG: 5 TABLET, FILM COATED ORAL at 08:00

## 2019-06-20 RX ADMIN — METFORMIN HYDROCHLORIDE 2000 MG: 750 TABLET, EXTENDED RELEASE ORAL at 08:00

## 2019-06-20 RX ADMIN — NADOLOL 20 MG: 20 TABLET ORAL at 08:00

## 2019-06-20 RX ADMIN — AMLODIPINE BESYLATE 5 MG: 2.5 TABLET ORAL at 08:00

## 2019-06-20 ASSESSMENT — ACTIVITIES OF DAILY LIVING (ADL)
ADLS_ACUITY_SCORE: 10

## 2019-06-20 NOTE — PLAN OF CARE
Discharge    Patient discharged to home via wife  Care plan note: Patient was A&O x4. VSS on RA. Up independently. Pain minimal in RLE, declined intervention. RLE red/warm, pustule with no drainage, redness receeding borders. + 2 edema RLE. Tolerating diet, /110. Discharge AVS reviewed, patient educated on cellulitis, med changes and when to seek medical attention. Patient verbalized understanding and had no further questions. Discharge meds sent with patient. IV removed. Follow up appt made with PCP and patient is aware. All patient belongings accounted for and sent home with pt.     Listed belongings gathered and returned to patient. Yes  Care Plan and Patient education resolved: Yes  Prescriptions if needed, hard copies sent with patient  Yes  Home and hospital acquired medications returned to patient: NA  Medication Bin checked and emptied on discharge Yes  Follow up appointment made for patient: Yes

## 2019-06-20 NOTE — PLAN OF CARE
DATE & TIME: 6/20/19 0443  Cognitive Concerns/ Orientation : A&Ox4  BEHAVIOR & AGGRESSION TOOL COLOR: Green  CIWA SCORE: n/a  ABNL VS/O2: VSS on RA  MOBILITY: independent  PAIN MANAGMENT: mild occasional pain in RLE at cellulitis site- declines intervention  DIET: mod carb  BOWEL/BLADDER: continent; BM today  ABNL LAB/BG: /128  DRAIN/DEVICES: PIV SL  TELEMETRY RHYTHM: n/a  SKIN: RLE +2 edema and redness, borders marked (not exceeding lines). Pustule with scant drainage.  TESTS/PROCEDURES: n/a  D/C DAY/GOALS/PLACE: Transitioned to PO bactrim. Possible discharge 6/20  OTHER IMPORTANT INFO:

## 2019-06-20 NOTE — DISCHARGE SUMMARY
[facility]  Hospitalist Discharge Summary       Date of Admission:  6/18/2019  Date of Discharge:  6/20/2019  Discharging Provider: Finn Chowdhury MD      Discharge Diagnoses   Right lower extremity cellulitis, purulent   Diabetes mellitus type 2 on oral antidiabetic therapy  Coronary artery disease  Hyperlipidemia  Hypertension   BPH  GERD  Cervicalgia    Follow-ups Needed After Discharge   Follow-up Appointments     Follow-up and recommended labs and tests       Follow up with primary care clinic within 7 days for hospital follow- up.    No follow up labs or test are needed.             Unresulted Labs Ordered in the Past 30 Days of this Admission     Date and Time Order Name Status Description    6/18/2019 0959 Blood culture ONE site Preliminary       These results will be followed up by [facility]     Hospital Course   Austen Fowler is an 84 year-old M with past medical history of coronary artery disease, hypertension, hyperlipidemia, diabetes mellitus type 2, who presented to the Emergency Department for evaluation of ongoing right lower extremity cellulitis despite being on oral antibiotic treatment with development of purulent drainage. Admitted 6/18/2019.     Right lower extremity cellulitis, purulent   Symptoms initially developed about a week prior to admission, was seen a few days after symptom onset and given 1 dose of ceftriaxone followed by oral cephalexin.  Despite antibiotic therapy, his erythema was not improving with slight extension and eventual development of pustules which led to his presentation to the emergency department.  Admitted to inpatient for IV antibiotics given failure of outpatient management.  - His residual erythema is dependent and almost completely resolves with elevation. Discussed with patient he may continue to have dependent erythema and this is not an indication antibiotics are not working. Discussed reasons to be concerned about antibiotic failure and  when to seek care as detailed in discharge instructions below.   - Continue TMP-SMX to complete 7 day course of antibiotics    Diabetes mellitus type 2 on oral antidiabetic therapy  Hemoglobin A1c is 6% in April 2019.    - Continue prior to admission metformin    Coronary artery disease  Hyperlipidemia  - Stable. Denies chest pain.   - Continue prior to admission aspirin, gemfibrozil, nadolol, pravastatin.     Hypertension   - Blood pressure consistently elevated, increased amlodipine to 5 mg twice daily and prescribed this on discharge.  Continue prior to admission lisinopril, nadolol.    BPH  Continue prior to admission tamsulosin and finasteride.     GERD  Continue prior to admission omeprazole     Cervicalgia  Continue prior to admission gabapentin.     Consultations This Hospital Stay   PHARMACY TO DOSE VANCO  PHARMACY TO DOSE VANCO    Code Status   Full Code    Time Spent on this Encounter   I, Finn Chowdhury, personally saw the patient today and spent greater than 30 minutes discharging this patient.       Finn Chowdhury MD  [facility]  ______________________________________________________________________    Physical Exam   Vital Signs: Temp: 98.6  F (37  C) Temp src: Oral BP: 139/74 Pulse: 57 Heart Rate: 58 Resp: 16 SpO2: 95 % O2 Device: None (Room air)    Weight: 0 lbs 0 oz    Constitutional: Well-appearing, NAD  Respiratory: Clear to auscultation bilaterally, good air movement bilaterally  Cardiovascular: RRR, no m/r/g.   GI: Soft, non-tender, non-distended.   Skin/Integumen: Warm, dry. Right lower leg with dependent erythema that almost completely resolves when elevated. Bullae present with no new pustules or purulent drainage.   Other:         Primary Care Physician   Hamzah Hodge    Discharge Disposition   Discharged to home  Condition at discharge: Stable      Discharge Orders      Reason for your hospital stay    You were hospitalized for an infection of the right leg.      Activity    Your activity upon discharge: activity as tolerated     Follow-up and recommended labs and tests     Follow up with primary care clinic within 7 days for hospital follow- up.  No follow up labs or test are needed.     Discharge Instructions    Your leg may continue to appear red, especially when standing or sitting with feet on the floor. As long as the redness improves when you lift your leg, this is NOT an indication that the antibiotics are not working. If the redness of your leg begins to spread significantly, if you develop new wounds that drain pus, develop fevers or chills or feel generally unwell, this is more concerning that the infection is worsening and you should seek care.     Full Code    Per previous documentation.     Diet    Follow this diet upon discharge: Moderate Consistent CHO Diet     Discharge Medications   Current Discharge Medication List      START taking these medications    Details   sulfamethoxazole-trimethoprim (BACTRIM DS/SEPTRA DS) 800-160 MG tablet Take 1 tablet by mouth 2 times daily . First dose 6/20 PM.  Qty: 9 tablet, Refills: 0    Associated Diagnoses: Cellulitis of right lower extremity         CONTINUE these medications which have CHANGED    Details   amLODIPine (NORVASC) 5 MG tablet Take 1 tablet (5 mg) by mouth 2 times daily  Qty: 60 tablet, Refills: 0    Comments: Future refills by PCP Dr. Hamzah Hodge with phone number [phone].  Associated Diagnoses: Essential hypertension, benign         CONTINUE these medications which have NOT CHANGED    Details   acetaminophen (ACETAMIN) 500 MG tablet Take 1,000 mg by mouth 2 times daily as needed for pain       aspirin 81 MG EC tablet Take 81 mg by mouth every evening      calcium carbonate (TUMS) 500 MG chewable tablet Take 1 chew tab by mouth 3 times daily as needed for heartburn       Cholecalciferol (VITAMIN D) 2000 UNIT tablet Take 2,000 Units by mouth daily.  Qty: 90 tablet, Refills: 3    Comments: OTC       diclofenac (VOLTAREN) 1 % GEL topical gel Apply 4 grams to neck four times daily using enclosed dosing card.  Qty: 200 g, Refills: 1    Associated Diagnoses: Cervicalgia      finasteride (PROSCAR) 5 MG tablet TAKE 1 TABLET BY MOUTH  DAILY  Qty: 90 tablet, Refills: 0    Associated Diagnoses: Slowing of urinary stream      gabapentin (NEURONTIN) 300 MG capsule Take 900 mg three times daily  Qty: 810 capsule, Refills: 2    Associated Diagnoses: Chronic pain syndrome; Sacroiliac joint pain      gemfibrozil (LOPID) 600 MG tablet TAKE 1 TABLET BY MOUTH TWO  TIMES DAILY  Qty: 180 tablet, Refills: 0    Associated Diagnoses: Hyperlipidemia LDL goal <70; Low HDL (under 40)      lisinopril (PRINIVIL/ZESTRIL) 20 MG tablet TAKE 1 TABLET BY MOUTH  DAILY  Qty: 90 tablet, Refills: 0    Associated Diagnoses: Essential hypertension, benign      Menthol, Topical Analgesic, (ICY HOT BACK EX) Externally apply topically daily as needed (neck pain)       METAMUCIL 1.7 GM OR WAFR TAKE AS DIRECTED      metFORMIN (GLUCOPHAGE-XR) 500 MG 24 hr tablet Take 2,000 mg by mouth every morning      nadolol (CORGARD) 20 MG tablet Take 20 mg by mouth daily      omeprazole (PRILOSEC) 20 MG DR capsule TAKE 1 CAPSULE BY MOUTH  DAILY  Qty: 90 capsule, Refills: 0    Associated Diagnoses: Dysphagia, unspecified type      pravastatin (PRAVACHOL) 20 MG tablet Take 20 mg by mouth At Bedtime      tamsulosin (FLOMAX) 0.4 MG capsule Take 0.4 mg by mouth daily      triamcinolone (KENALOG) 0.5 % cream Apply  topically 2 times daily. to affected area as directed PRN  Qty: 15 g, Refills: 0    Associated Diagnoses: Rash and other nonspecific skin eruption      blood glucose (ONETOUCH ULTRA) test strip Use to test blood sugar 2 times daily or as directed.  Qty: 200 strip, Refills: 0    Associated Diagnoses: Other abnormal glucose      blood glucose monitoring (ONE TOUCH ULTRASOFT) lancets Use to test blood sugar 2 times daily or as directed.  Qty: 200 each, Refills: 1     Associated Diagnoses: Other abnormal glucose      ORDER FOR DME Uses Cpap machine for sleep apnea         STOP taking these medications       cephALEXin (KEFLEX) 500 MG capsule Comments:   Reason for Stopping:               Significant Results and Procedures   Most Recent 3 CBC's:  Recent Labs   Lab Test 06/18/19  0856 06/14/19  1339 09/20/16  1658   WBC 7.3 9.9 7.6   HGB 12.1* 12.6* 14.2   MCV 88 89 87    236 203     Most Recent 3 BMP's:  Recent Labs   Lab Test 06/20/19  0909 06/19/19  0855 06/18/19  0856 03/05/19  0728   NA  --  140 140 139   POTASSIUM  --  3.7 3.8 4.0   CHLORIDE  --  107 107 107   CO2  --  28 28 22*   BUN  --  10 16 20   CR 0.65* 0.62* 0.59* 0.66*   ANIONGAP  --  5 5 14   BRANDON  --  8.9 8.9 9.6   GLC  --  203* 222* 142*     Most Recent 2 LFT's:  Recent Labs   Lab Test 06/18/19  0856 03/05/19  0728  09/20/16  1658   AST 19  --   --  24   ALT 13 <5*   < > 22   ALKPHOS 68  --   --  82   BILITOTAL 0.4  --   --  0.4    < > = values in this interval not displayed.     Most Recent 3 INR's:  Recent Labs   Lab Test 08/19/14  1435   INR 1.10     Most Recent 3 Troponin's:No lab results found.  Most Recent 3 BNP's:No lab results found.  Most Recent Cholesterol Panel:  Recent Labs   Lab Test 03/05/19  0728   CHOL 96   LDL 45   HDL 32*   TRIG 96     Most Recent 6 Bacteria Isolates From Any Culture (See EPIC Reports for Culture Details):  Recent Labs   Lab Test 06/18/19  1017   CULT No growth after 2 days     Most Recent TSH and T4:  Recent Labs   Lab Test 04/09/18  0853   TSH 1.14     Most Recent Hemoglobin A1c:  Recent Labs   Lab Test 06/19/19  0855   A1C 6.1*     Most Recent Urinalysis:  Recent Labs   Lab Test 09/20/16  1658 06/20/12  1504   COLOR Yellow Yellow   APPEARANCE Clear Clear   URINEGLC Negative Negative   URINEBILI Negative Negative   URINEKETONE Negative Negative   SG 1.020 1.025   UBLD Negative Trace*   URINEPH 7.5* 6.0   PROTEIN Negative Negative   UROBILINOGEN 1.0 0.2   NITRITE  Negative Negative   LEUKEST Negative Negative   RBCU  --  O - 2   WBCU  --  O - 2     Most Recent ABG:No lab results found.  Most Recent ESR & CRP:No lab results found.,   Results for orders placed or performed in visit on 08/20/18   XR Medial Branch Block Cervical Left    Narrative    This exam was marked as non-reportable because it will not be read by a   radiologist or a [facility] non-radiologist provider.                 Allergies   Allergies   Allergen Reactions     Propranolol      Gastritis, diarrhea

## 2019-06-20 NOTE — CONSULTS
Care Transition Initial Assessment - RN  Met with: Patient.  DATA   Active Problems:    Cellulitis of right lower extremity       Cognitive Status: alert and oriented.        Contact information and PCP information verified: Yes  Lives With: spouse                     Insurance concerns: No Insurance issues identified  ASSESSMENT  Patient currently receives the following services:  None      Identified issues/concerns regarding health management:  Met with patient to discuss discharge plans. Patient stating he lives with spouse who is able to assist if needed. Noted R leg red and warm tender to touch,states increase pan when stepping on it however much improved since admission. Anxious to be discharged.

## 2019-06-21 ENCOUNTER — MYC REFILL (OUTPATIENT)
Dept: FAMILY MEDICINE | Facility: CLINIC | Age: 84
End: 2019-06-21

## 2019-06-21 ENCOUNTER — TELEPHONE (OUTPATIENT)
Dept: FAMILY MEDICINE | Facility: CLINIC | Age: 84
End: 2019-06-21

## 2019-06-21 DIAGNOSIS — I10 ESSENTIAL HYPERTENSION, BENIGN: ICD-10-CM

## 2019-06-21 DIAGNOSIS — R39.198 SLOWING OF URINARY STREAM: ICD-10-CM

## 2019-06-21 DIAGNOSIS — E78.5 HYPERLIPIDEMIA LDL GOAL <70: ICD-10-CM

## 2019-06-21 NOTE — TELEPHONE ENCOUNTER
Chief Complaint: Cellulitis Of Right Lower Extremity, THU 20-JUN-2019 0/1    [phone] (home)

## 2019-06-21 NOTE — TELEPHONE ENCOUNTER
"lisinopril (PRINIVIL/ZESTRIL) 20 MG tablet  Last Written Prescription Date:  4/02/19  Last Fill Quantity: 90 tablet,  # refills: 0   Last office visit: 4/13/2018 with prescribing provider:  Ayesha Horton Office Visit:     finasteride (PROSCAR) 5 MG tablet  Last Written Prescription Date:  4/02/19  Last Fill Quantity: 90 tablet,  # refills: 0   Last office visit: 4/13/2018 with prescribing provider:  Ayesha Horton Office Visit:   Next 5 appointments (look out 90 days)    Jun 28, 2019 11:00 AM CDT  Office Visit with Hamzah Hodge MD  [facility] [address] [facility] [address]  [phone]   Aug 08, 2019  1:30 PM CDT  PHYSICAL with Hamzah Hodge MD  [facility] ([facility]) [address] [facility] [address]  [phone]             Requested Prescriptions   Pending Prescriptions Disp Refills     lisinopril (PRINIVIL/ZESTRIL) 20 MG tablet 90 tablet 0     Sig: Take 1 tablet (20 mg) by mouth daily       ACE Inhibitors (Including Combos) Protocol Failed - 6/21/2019 12:15 PM        Failed - Normal serum creatinine on file in past 12 months     Recent Labs   Lab Test 06/20/19  0909  09/21/16  0943   CR 0.65*   < >  --    CREAT  --   --  0.8    < > = values in this interval not displayed.             Passed - Blood pressure under 140/90 in past 12 months     BP Readings from Last 3 Encounters:   06/20/19 139/74   06/14/19 106/60   04/17/19 139/74                 Passed - Recent (12 mo) or future (30 days) visit within the authorizing provider's specialty     Patient had office visit in the last 12 months or has a visit in the next 30 days with authorizing provider or within the authorizing provider's specialty.  See \"Patient Info\" tab in inbasket, or \"Choose Columns\" in Meds & Orders section of the refill encounter.              Passed - Medication is active on med list        Passed - Patient is age 18 or older        Passed - Normal serum " "potassium on file in past 12 months     Recent Labs   Lab Test 06/19/19  0855   POTASSIUM 3.7             finasteride (PROSCAR) 5 MG tablet 90 tablet 0     Sig: Take 1 tablet (5 mg) by mouth daily       Alpha Blockers Passed - 6/21/2019 12:15 PM        Passed - Blood pressure under 140/90 in past 12 months     BP Readings from Last 3 Encounters:   06/20/19 139/74   06/14/19 106/60   04/17/19 139/74                 Passed - Recent (12 mo) or future (30 days) visit within the authorizing provider's specialty     Patient had office visit in the last 12 months or has a visit in the next 30 days with authorizing provider or within the authorizing provider's specialty.  See \"Patient Info\" tab in inbasket, or \"Choose Columns\" in Meds & Orders section of the refill encounter.              Passed - Patient does not have Tadalafil, Vardenafil, or Sildenafil on their medication list        Passed - Medication is active on med list        Passed - Patient is 18 years of age or older          "

## 2019-06-21 NOTE — TELEPHONE ENCOUNTER
"Next 5 appointments (look out 90 days)    Jun 28, 2019 11:00 AM CDT  [facility]  [facility] [address] [facility] [address]  [phone]   Aug 08, 2019  1:30 PM CDT  PHYSICAL with Hamzah Hodge MD  [facility] [address] [facility] [address]  [phone]        ED / Discharge Outreach Protocol    Patient Contact    Attempt # 1    Was call answered?  Yes.  \"May I please speak with <Griffin>\"  Is patient available?   Yes    ED/Discharge Protocol    \"Hi, my name is Sierra Sharp, a registered nurse, and I am calling on behalf of Dr. Dr. Hodge's office at [place].  I am calling to follow up and see how things are going for you after your recent visit.\"    \"I see that you were in the (ER/UC/IP) on 6/18/19 .    How are you doing now that you are home?\" doing well, leg still red but slowly improving, on abx  Discussed keeping a close on on let and call if any worsening, UC over the weekend if needed    Is patient experiencing symptoms that may require a hospital visit?  No     Discharge Instructions    \"Let's review your discharge instructions.  What is/are the follow-up recommendations?  Pt. Response: follow up with PCP     \"Were you instructed to make a follow-up appointment?\"  Pt. Response: Yes.  Has appointment been made?   Yes      \"When you see the provider, I would recommend that you bring your discharge instructions with you.    Medications    \"How many new medications are you on since your hospitalization/ED visit?\"    0-1  \"How many of your current medicines changed (dose, timing, name, etc.) while you were in the hospital/ED visit?\"   0-1  \"Do you have questions about your medications?\"   No  \"Were you newly diagnosed with heart failure, COPD, diabetes or did you have a heart attack?\"   No  For patients on insulin: \"Did you start on insulin in the hospital or did you have your insulin dose " "changed?\"   No    Medication reconciliation completed? Yes    Was MTM referral placed (*Make sure to put transitions as reason for referral)?   No    Call Summary    \"Do you have any questions or concerns about your condition or care plan at the moment?\"    No  Triage nurse advice given     Patient was in ER 1x in the past year (assess appropriateness of ER visits.)      \"If you have questions or things don't continue to improve, we encourage you contact us through the main clinic number,  ([phone]).  Even if the clinic is not open, triage nurses are available 24/7 to help you.     We would like you to know that our clinic has extended hours (provide information).  We also have urgent care (provide details on closest location and hours/contact info)\"      \"Thank you for your time and take care!\"    Sierra ARGUELLO RN      "

## 2019-06-22 NOTE — TELEPHONE ENCOUNTER
"Last Written Prescription Date:  -  Last Fill Quantity: -,  # refills: -   Last office visit: 4/13/2018 with prescribing provider:  Ayesha   Future Office Visit:   Next 5 appointments (look out 90 days)    Jun 28, 2019 11:00 AM CDT  Office Visit with Hamzah Hodge MD  [facility] [address] [facility] [address]  [phone]   Aug 08, 2019  1:30 PM CDT  PHYSICAL with Hamzah Hodge MD  [facility] [address] [facility] [address]  [phone]        Routing refill request to provider for review/approval because:  Medication is reported/historical    Ordered Status Priority Ordering User Department   06/18/19 (none) (none) Sofie Sotelo, [facility] MED SPEC UNIT       Requested Prescriptions   Pending Prescriptions Disp Refills     pravastatin (PRAVACHOL) 20 MG tablet [Pharmacy Med Name: PRAVASTATIN SOD 20MG TABLET] 90 tablet 0     Sig: TAKE 1 TABLET BY MOUTH  DAILY       Statins Protocol Passed - 6/21/2019 12:18 PM        Passed - LDL on file in past 12 months     Recent Labs   Lab Test 03/05/19  0728   LDL 45             Passed - No abnormal creatine kinase in past 12 months     No lab results found.             Passed - Recent (12 mo) or future (30 days) visit within the authorizing provider's specialty     Patient had office visit in the last 12 months or has a visit in the next 30 days with authorizing provider or within the authorizing provider's specialty.  See \"Patient Info\" tab in inbasket, or \"Choose Columns\" in Meds & Orders section of the refill encounter.              Passed - Medication is active on med list        Passed - Patient is age 18 or older          "

## 2019-06-24 LAB
BACTERIA SPEC CULT: NO GROWTH
Lab: NORMAL
SPECIMEN SOURCE: NORMAL

## 2019-06-24 RX ORDER — FINASTERIDE 5 MG/1
1 TABLET, FILM COATED ORAL DAILY
Qty: 90 TABLET | Refills: 0 | Status: SHIPPED | OUTPATIENT
Start: 2019-06-24 | End: 2019-11-17

## 2019-06-24 RX ORDER — LISINOPRIL 20 MG/1
20 TABLET ORAL DAILY
Qty: 90 TABLET | Refills: 0 | Status: SHIPPED | OUTPATIENT
Start: 2019-06-24 | End: 2019-08-08

## 2019-06-24 RX ORDER — PRAVASTATIN SODIUM 20 MG
TABLET ORAL
Qty: 90 TABLET | Refills: 1 | Status: SHIPPED | OUTPATIENT
Start: 2019-06-24 | End: 2019-11-08

## 2019-06-28 ENCOUNTER — OFFICE VISIT (OUTPATIENT)
Dept: FAMILY MEDICINE | Facility: CLINIC | Age: 84
End: 2019-06-28
Payer: COMMERCIAL

## 2019-06-28 VITALS
WEIGHT: 265 LBS | OXYGEN SATURATION: 97 % | SYSTOLIC BLOOD PRESSURE: 134 MMHG | BODY MASS INDEX: 32.95 KG/M2 | HEART RATE: 49 BPM | HEIGHT: 75 IN | TEMPERATURE: 98 F | DIASTOLIC BLOOD PRESSURE: 79 MMHG

## 2019-06-28 DIAGNOSIS — E11.59 TYPE 2 DIABETES MELLITUS WITH VASCULAR DISEASE (H): ICD-10-CM

## 2019-06-28 DIAGNOSIS — B35.1 ONYCHOMYCOSIS: ICD-10-CM

## 2019-06-28 DIAGNOSIS — L03.115 CELLULITIS OF RIGHT LOWER EXTREMITY: Primary | ICD-10-CM

## 2019-06-28 DIAGNOSIS — I10 ESSENTIAL HYPERTENSION, BENIGN: ICD-10-CM

## 2019-06-28 DIAGNOSIS — M46.1 SACROILIITIS (H): ICD-10-CM

## 2019-06-28 DIAGNOSIS — G47.33 OSA (OBSTRUCTIVE SLEEP APNEA): ICD-10-CM

## 2019-06-28 LAB
ANION GAP SERPL CALCULATED.3IONS-SCNC: 8 MMOL/L (ref 3–14)
BASOPHILS # BLD AUTO: 0 10E9/L (ref 0–0.2)
BASOPHILS NFR BLD AUTO: 0.4 %
BUN SERPL-MCNC: 17 MG/DL (ref 7–30)
CALCIUM SERPL-MCNC: 9.3 MG/DL (ref 8.5–10.1)
CHLORIDE SERPL-SCNC: 107 MMOL/L (ref 94–109)
CO2 SERPL-SCNC: 27 MMOL/L (ref 20–32)
CREAT SERPL-MCNC: 0.67 MG/DL (ref 0.66–1.25)
DIFFERENTIAL METHOD BLD: ABNORMAL
EOSINOPHIL # BLD AUTO: 0.5 10E9/L (ref 0–0.7)
EOSINOPHIL NFR BLD AUTO: 6.7 %
ERYTHROCYTE [DISTWIDTH] IN BLOOD BY AUTOMATED COUNT: 14.6 % (ref 10–15)
ERYTHROCYTE [SEDIMENTATION RATE] IN BLOOD BY WESTERGREN METHOD: 23 MM/H (ref 0–20)
GFR SERPL CREATININE-BSD FRML MDRD: 89 ML/MIN/{1.73_M2}
GLUCOSE SERPL-MCNC: 120 MG/DL (ref 70–99)
HCT VFR BLD AUTO: 38.5 % (ref 40–53)
HGB BLD-MCNC: 12.4 G/DL (ref 13.3–17.7)
LYMPHOCYTES # BLD AUTO: 2.4 10E9/L (ref 0.8–5.3)
LYMPHOCYTES NFR BLD AUTO: 33.8 %
MCH RBC QN AUTO: 29.4 PG (ref 26.5–33)
MCHC RBC AUTO-ENTMCNC: 32.2 G/DL (ref 31.5–36.5)
MCV RBC AUTO: 91 FL (ref 78–100)
MONOCYTES # BLD AUTO: 0.6 10E9/L (ref 0–1.3)
MONOCYTES NFR BLD AUTO: 9.1 %
NEUTROPHILS # BLD AUTO: 3.5 10E9/L (ref 1.6–8.3)
NEUTROPHILS NFR BLD AUTO: 50 %
PLATELET # BLD AUTO: 252 10E9/L (ref 150–450)
POTASSIUM SERPL-SCNC: 4.5 MMOL/L (ref 3.4–5.3)
RBC # BLD AUTO: 4.22 10E12/L (ref 4.4–5.9)
SODIUM SERPL-SCNC: 142 MMOL/L (ref 133–144)
WBC # BLD AUTO: 7 10E9/L (ref 4–11)

## 2019-06-28 PROCEDURE — 80048 BASIC METABOLIC PNL TOTAL CA: CPT | Performed by: INTERNAL MEDICINE

## 2019-06-28 PROCEDURE — 99495 TRANSJ CARE MGMT MOD F2F 14D: CPT | Performed by: INTERNAL MEDICINE

## 2019-06-28 PROCEDURE — 85652 RBC SED RATE AUTOMATED: CPT | Performed by: INTERNAL MEDICINE

## 2019-06-28 PROCEDURE — 36415 COLL VENOUS BLD VENIPUNCTURE: CPT | Performed by: INTERNAL MEDICINE

## 2019-06-28 PROCEDURE — 85025 COMPLETE CBC W/AUTO DIFF WBC: CPT | Performed by: INTERNAL MEDICINE

## 2019-06-28 RX ORDER — PRENATAL VIT 91/IRON/FOLIC/DHA 28-975-200
COMBINATION PACKAGE (EA) ORAL 2 TIMES DAILY
Qty: 42 G | Refills: 3 | Status: SHIPPED | OUTPATIENT
Start: 2019-06-28 | End: 2020-01-11

## 2019-06-28 ASSESSMENT — ANXIETY QUESTIONNAIRES
7. FEELING AFRAID AS IF SOMETHING AWFUL MIGHT HAPPEN: NOT AT ALL
5. BEING SO RESTLESS THAT IT IS HARD TO SIT STILL: NOT AT ALL
GAD7 TOTAL SCORE: 2
2. NOT BEING ABLE TO STOP OR CONTROL WORRYING: NOT AT ALL
6. BECOMING EASILY ANNOYED OR IRRITABLE: NOT AT ALL
3. WORRYING TOO MUCH ABOUT DIFFERENT THINGS: NOT AT ALL
IF YOU CHECKED OFF ANY PROBLEMS ON THIS QUESTIONNAIRE, HOW DIFFICULT HAVE THESE PROBLEMS MADE IT FOR YOU TO DO YOUR WORK, TAKE CARE OF THINGS AT HOME, OR GET ALONG WITH OTHER PEOPLE: NOT DIFFICULT AT ALL
1. FEELING NERVOUS, ANXIOUS, OR ON EDGE: SEVERAL DAYS

## 2019-06-28 ASSESSMENT — MIFFLIN-ST. JEOR: SCORE: 1982.66

## 2019-06-28 ASSESSMENT — PATIENT HEALTH QUESTIONNAIRE - PHQ9: 5. POOR APPETITE OR OVEREATING: SEVERAL DAYS

## 2019-06-28 NOTE — PROGRESS NOTES
Subjective     Austen Fowler is a 84 year old male who presents to clinic today for the following health issues:    HPI     Patient was seen in urgent care for cellulitis and cephalexin was given  That did not help and he ended up in the hospital and was given IV ceftriaxone and subsequently Bactrim  He feels improved now  His pain has resolved  Back pain is still present  His blood sugars were noticed to be normal in the hospital  He denies any fever or chills or change in appetite    Hospital Follow-up Visit:    Hospital/Nursing Home/IP Rehab Facility: [facility]  Date of Admission: 6/18/2019  Date of Discharge: 6/20/2019  Reason(s) for Admission:     Right lower extremity cellulitis, purulent   Diabetes mellitus type 2 on oral antidiabetic therapy  Coronary artery disease  Hyperlipidemia  Hypertension   BPH  GERD  Cervicalgia            Problems taking medications regularly:  None       Medication changes since discharge: completed course of antibiotic - Bactrim       Problems adhering to non-medication therapy:  None    Summary of hospitalization:  [facility] discharge summary reviewed  Diagnostic Tests/Treatments reviewed.  Follow up needed: none  Other Healthcare Providers Involved in Patient s Care:         None  Update since discharge: improved.     Post Discharge Medication Reconciliation: discharge medications reconciled and changed, per note/orders (see AVS).  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     Cervicalgia     Benign neoplasm of skin of other and unspecified parts of face     Impacted cerumen     Infected sebaceous cyst     Anxiety     Chronic low back pain     Sacroiliitis (H)     Advanced directives, counseling/discussion     Lumbosacral  radiculitis     PENELOPE (obstructive sleep apnea)     Low HDL (under 40)     Mixed hyperlipidemia     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)     S/P lumbar laminectomy     Health Care Home     Coronary artery disease involving native coronary artery of native heart without angina pectoris     Left ventricular diastolic dysfunction     Ascending aorta dilatation (H)     Aortic root dilatation (H)     Non morbid obesity, unspecified obesity type     Neck pain     Cellulitis of right lower extremity     Past Surgical History:   Procedure Laterality Date     C CABG, ARTERY-VEIN, FOUR  1992    CABG - LIMA to left anterior descending and saphenous vein bypass graft to the first and second obtuse marginal branch of the circumflex and the right mammary to the right coronary artery     C NONSPECIFIC PROCEDURE      Gail lap     C NONSPECIFIC PROCEDURE      sinus surgery     C NONSPECIFIC PROCEDURE      bilateral cataract surgery     COLONOSCOPY N/A 2017    Procedure: COMBINED COLONOSCOPY, SINGLE OR MULTIPLE BIOPSY/POLYPECTOMY BY BIOPSY;  Surgeon: Bladimir Garner MD;  Location:  GI     CV LEFT HEART CATH  ,     Angioplasty     ESOPHAGOSCOPY, GASTROSCOPY, DUODENOSCOPY (EGD), DILATATION, COMBINED  2014    Procedure: COMBINED ESOPHAGOSCOPY, GASTROSCOPY, DUODENOSCOPY (EGD), DILATATION;  Surgeon: Bladimir Garner MD;  Location:  GI     HC COLONOSCOPY THRU STOMA W BIOPSY/CAUTERY TUMOR/POLYP/LESION  2007     INJECT EPIDURAL LUMBAR       LAMINECTOMY LUMBAR TWO LEVELS N/A 2015    Procedure: LAMINECTOMY LUMBAR TWO LEVELS;  Surgeon: Bladimir Keenan MD;  Location:  OR       Social History     Tobacco Use     Smoking status: Former Smoker     Packs/day: 2.00     Years: 41.00     Pack years: 82.00     Start date:      Last attempt to quit: 3/1/1992     Years since quittin.3     Smokeless tobacco: Never Used     Tobacco comment: 80 pack year history   Substance Use Topics      Alcohol use: Yes     Alcohol/week: 0.0 oz     Comment: 1 drink month     Family History   Problem Relation Age of Onset     Cancer Brother         MELANOMA     Diabetes Mother         AODM     Diabetes Father         AODM     Myocardial Infarction Father      Cerebrovascular Disease Brother      Family History Negative Brother      Family History Negative Sister      Family History Negative Son      Family History Negative Son      Family History Negative Son      Family History Negative Daughter          Current Outpatient Medications   Medication Sig Dispense Refill     acetaminophen (ACETAMIN) 500 MG tablet Take 1,000 mg by mouth 2 times daily as needed for pain        amLODIPine (NORVASC) 5 MG tablet Take 1 tablet (5 mg) by mouth 2 times daily 60 tablet 0     aspirin 81 MG EC tablet Take 81 mg by mouth four times a week        blood glucose (ONETOUCH ULTRA) test strip Use to test blood sugar 2 times daily or as directed. 200 strip 0     blood glucose monitoring (ONE TOUCH ULTRASOFT) lancets Use to test blood sugar 2 times daily or as directed. 200 each 1     calcium carbonate (TUMS) 500 MG chewable tablet Take 1 chew tab by mouth 3 times daily as needed for heartburn        Cholecalciferol (VITAMIN D) 2000 UNIT tablet Take 2,000 Units by mouth daily. 90 tablet 3     diclofenac (VOLTAREN) 1 % GEL topical gel Apply 4 grams to neck four times daily using enclosed dosing card. 200 g 1     finasteride (PROSCAR) 5 MG tablet Take 1 tablet (5 mg) by mouth daily 90 tablet 0     gabapentin (NEURONTIN) 300 MG capsule Take 900 mg three times daily 810 capsule 2     gemfibrozil (LOPID) 600 MG tablet TAKE 1 TABLET BY MOUTH TWO  TIMES DAILY 180 tablet 0     lisinopril (PRINIVIL/ZESTRIL) 20 MG tablet Take 1 tablet (20 mg) by mouth daily 90 tablet 0     Menthol, Topical Analgesic, (ICY HOT BACK EX) Externally apply topically daily as needed (neck pain)        METAMUCIL 1.7 GM OR WAFR TAKE AS DIRECTED       metFORMIN  "(GLUCOPHAGE-XR) 500 MG 24 hr tablet Take 2,000 mg by mouth every morning       nadolol (CORGARD) 20 MG tablet Take 20 mg by mouth daily       omeprazole (PRILOSEC) 20 MG DR capsule TAKE 1 CAPSULE BY MOUTH  DAILY 90 capsule 0     ORDER FOR DME Uses Cpap machine for sleep apnea       pravastatin (PRAVACHOL) 20 MG tablet TAKE 1 TABLET BY MOUTH  DAILY 90 tablet 1     tamsulosin (FLOMAX) 0.4 MG capsule Take 0.4 mg by mouth daily       terbinafine (LAMISIL) 1 % external cream Apply topically 2 times daily 42 g 3     triamcinolone (KENALOG) 0.5 % cream Apply  topically 2 times daily. to affected area as directed PRN 15 g 0       Reviewed and updated as needed this visit by Provider  Tobacco  Allergies  Meds  Problems  Med Hx  Surg Hx  Fam Hx         Review of Systems   10 point ROS of systems including Constitutional, Eyes, Respiratory, Cardiovascular, Gastroenterology, Genitourinary, Integumentary, Muscularskeletal, Psychiatric were all negative except for pertinent positives noted in my HPI.2      Objective    /79 (BP Location: Left arm, Cuff Size: Adult Large)   Pulse (!) 49   Temp 98  F (36.7  C) (Oral)   Ht 1.905 m (6' 3\")   Wt 120.2 kg (265 lb)   SpO2 97%   BMI 33.12 kg/m    Body mass index is 33.12 kg/m .  Physical Exam   GENERAL: healthy, alert and no distress  NECK: no adenopathy, no asymmetry, masses, or scars and thyroid normal to palpation  RESP: lungs clear to auscultation - no rales, rhonchi or wheezes  CV: regular rate and rhythm, normal S1 S2, no S3 or S4, no murmur, click or rub, no peripheral edema and peripheral pulses strong  ABDOMEN: soft, nontender, no hepatosplenomegaly, no masses and bowel sounds normal  MS: no gross musculoskeletal defects noted, no edema  Right leg reveals a central area of cellulitis  There is a fluctuant area in the center of statins  The area which was marked for initial cellulitis shows improvement  There is onychomycosis of both feet nails  Admission on " 06/18/2019, Discharged on 06/20/2019   Component Date Value Ref Range Status     WBC 06/18/2019 7.3  4.0 - 11.0 10e9/L Final     RBC Count 06/18/2019 4.26* 4.4 - 5.9 10e12/L Final     Hemoglobin 06/18/2019 12.1* 13.3 - 17.7 g/dL Final     Hematocrit 06/18/2019 37.6* 40.0 - 53.0 % Final     MCV 06/18/2019 88  78 - 100 fl Final     MCH 06/18/2019 28.4  26.5 - 33.0 pg Final     MCHC 06/18/2019 32.2  31.5 - 36.5 g/dL Final     RDW 06/18/2019 13.9  10.0 - 15.0 % Final     Platelet Count 06/18/2019 263  150 - 450 10e9/L Final     Diff Method 06/18/2019 Automated Method   Final     % Neutrophils 06/18/2019 67.9  % Final     % Lymphocytes 06/18/2019 17.4  % Final     % Monocytes 06/18/2019 7.9  % Final     % Eosinophils 06/18/2019 5.9  % Final     % Basophils 06/18/2019 0.1  % Final     % Immature Granulocytes 06/18/2019 0.8  % Final     Nucleated RBCs 06/18/2019 0  0 /100 Final     Absolute Neutrophil 06/18/2019 5.0  1.6 - 8.3 10e9/L Final     Absolute Lymphocytes 06/18/2019 1.3  0.8 - 5.3 10e9/L Final     Absolute Monocytes 06/18/2019 0.6  0.0 - 1.3 10e9/L Final     Absolute Eosinophils 06/18/2019 0.4  0.0 - 0.7 10e9/L Final     Absolute Basophils 06/18/2019 0.0  0.0 - 0.2 10e9/L Final     Abs Immature Granulocytes 06/18/2019 0.1  0 - 0.4 10e9/L Final     Absolute Nucleated RBC 06/18/2019 0.0   Final     Sodium 06/18/2019 140  133 - 144 mmol/L Final     Potassium 06/18/2019 3.8  3.4 - 5.3 mmol/L Final     Chloride 06/18/2019 107  94 - 109 mmol/L Final     Carbon Dioxide 06/18/2019 28  20 - 32 mmol/L Final     Anion Gap 06/18/2019 5  3 - 14 mmol/L Final     Glucose 06/18/2019 222* 70 - 99 mg/dL Final     Urea Nitrogen 06/18/2019 16  7 - 30 mg/dL Final     Creatinine 06/18/2019 0.59* 0.66 - 1.25 mg/dL Final     GFR Estimate 06/18/2019 >90  >60 mL/min/[1.73_m2] Final    Comment: Non  GFR Calc  Starting 12/18/2018, serum creatinine based estimated GFR (eGFR) will be   calculated using the Chronic Kidney  Disease Epidemiology Collaboration   (CKD-EPI) equation.       GFR Estimate If Black 06/18/2019 >90  >60 mL/min/[1.73_m2] Final    Comment:  GFR Calc  Starting 12/18/2018, serum creatinine based estimated GFR (eGFR) will be   calculated using the Chronic Kidney Disease Epidemiology Collaboration   (CKD-EPI) equation.       Calcium 06/18/2019 8.9  8.5 - 10.1 mg/dL Final     Bilirubin Total 06/18/2019 0.4  0.2 - 1.3 mg/dL Final     Albumin 06/18/2019 3.2* 3.4 - 5.0 g/dL Final     Protein Total 06/18/2019 7.1  6.8 - 8.8 g/dL Final     Alkaline Phosphatase 06/18/2019 68  40 - 150 U/L Final     ALT 06/18/2019 13  0 - 70 U/L Final     AST 06/18/2019 19  0 - 45 U/L Final     Lactic Acid 06/18/2019 2.2* 0.7 - 2.0 mmol/L Final     Specimen Description 06/18/2019 Blood Right Hand   Final     Special Requests 06/18/2019 Aerobic and anaerobic bottles received   Final     Culture Micro 06/18/2019 No growth   Final     Lactic Acid 06/18/2019 1.6  0.7 - 2.0 mmol/L Final     Glucose 06/18/2019 74  70 - 99 mg/dL Final    Dr/RN Notified     Glucose 06/18/2019 126* 70 - 99 mg/dL Final     Glucose 06/18/2019 102* 70 - 99 mg/dL Final     Hemoglobin A1C 06/19/2019 6.1* 0 - 5.6 % Final    Comment: Normal <5.7% Prediabetes 5.7-6.4%  Diabetes 6.5% or higher - adopted from ADA   consensus guidelines.       Sodium 06/19/2019 140  133 - 144 mmol/L Final     Potassium 06/19/2019 3.7  3.4 - 5.3 mmol/L Final     Chloride 06/19/2019 107  94 - 109 mmol/L Final     Carbon Dioxide 06/19/2019 28  20 - 32 mmol/L Final     Anion Gap 06/19/2019 5  3 - 14 mmol/L Final     Glucose 06/19/2019 203* 70 - 99 mg/dL Final     Urea Nitrogen 06/19/2019 10  7 - 30 mg/dL Final     Creatinine 06/19/2019 0.62* 0.66 - 1.25 mg/dL Final     GFR Estimate 06/19/2019 >90  >60 mL/min/[1.73_m2] Final    Comment: Non  GFR Calc  Starting 12/18/2018, serum creatinine based estimated GFR (eGFR) will be   calculated using the Chronic Kidney Disease  Epidemiology Collaboration   (CKD-EPI) equation.       GFR Estimate If Black 06/19/2019 >90  >60 mL/min/[1.73_m2] Final    Comment:  GFR Calc  Starting 12/18/2018, serum creatinine based estimated GFR (eGFR) will be   calculated using the Chronic Kidney Disease Epidemiology Collaboration   (CKD-EPI) equation.       Calcium 06/19/2019 8.9  8.5 - 10.1 mg/dL Final     Glucose 06/19/2019 103* 70 - 99 mg/dL Final     Glucose 06/19/2019 127* 70 - 99 mg/dL Final     Glucose 06/19/2019 98  70 - 99 mg/dL Final     Glucose 06/19/2019 121* 70 - 99 mg/dL Final     Glucose 06/19/2019 134* 70 - 99 mg/dL Final     Creatinine 06/20/2019 0.65* 0.66 - 1.25 mg/dL Final     GFR Estimate 06/20/2019 90  >60 mL/min/[1.73_m2] Final    Comment: Non  GFR Calc  Starting 12/18/2018, serum creatinine based estimated GFR (eGFR) will be   calculated using the Chronic Kidney Disease Epidemiology Collaboration   (CKD-EPI) equation.       GFR Estimate If Black 06/20/2019 >90  >60 mL/min/[1.73_m2] Final    Comment:  GFR Calc  Starting 12/18/2018, serum creatinine based estimated GFR (eGFR) will be   calculated using the Chronic Kidney Disease Epidemiology Collaboration   (CKD-EPI) equation.       Glucose 06/20/2019 128* 70 - 99 mg/dL Final     Glucose 06/20/2019 113* 70 - 99 mg/dL Final     Glucose 06/20/2019 110* 70 - 99 mg/dL Final           Lab Results   Component Value Date    A1C 6.1 06/19/2019    A1C 6.0 04/17/2019    A1C 6.0 09/24/2018    A1C 6.0 04/09/2018    A1C 6.2 08/23/2017     Lab Results   Component Value Date    WBC 7.3 06/18/2019     Lab Results   Component Value Date    RBC 4.26 06/18/2019     Lab Results   Component Value Date    HGB 12.1 06/18/2019     Lab Results   Component Value Date    HCT 37.6 06/18/2019     No components found for: MCT  Lab Results   Component Value Date    MCV 88 06/18/2019     Lab Results   Component Value Date    MCH 28.4 06/18/2019     Lab Results  "  Component Value Date    MCHC 32.2 06/18/2019     Lab Results   Component Value Date    RDW 13.9 06/18/2019     Lab Results   Component Value Date     06/18/2019         Assessment & Plan     Austen was seen today for hospital f/u.    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)  -     **A1C FUTURE 6mo; Future    BENIGN HYPERTENSION    Sacroiliitis (H)    Onychomycosis  -     terbinafine (LAMISIL) 1 % external cream; Apply topically 2 times daily    PENELOPE (obstructive sleep apnea)  -     SLEEP EVALUATION & MANAGEMENT REFERRAL - ADULT -[facility] [phone]  (Age 18 and up); Future         I am not so sure if infection has fully resolved and we will check a CBC with differential and ESR today  He might need longer course of Bactrim  He is much improved though  We need to take care of the feet to prevent relapse of his cellulitis  He also mentions about propranolol causing some GI upset initially when it was switched from nadolol  His diabetes is in good control  The sacroiliac pain is stable at present  He will use Lamisil cream for toenails and he might need Lamisil tablets  He needs a new CPAP machine and will see sleep medicine for that  He is up-to-date on immunizations  BMI:   Estimated body mass index is 33.12 kg/m  as calculated from the following:    Height as of this encounter: 1.905 m (6' 3\").    Weight as of this encounter: 120.2 kg (265 lb).   Weight management plan: Discussed healthy diet and exercise guidelines            Return in about 6 months (around 12/28/2019) for diabetes, chronic pain.    Hamzah Hodge MD  [facility]    "

## 2019-06-28 NOTE — PATIENT INSTRUCTIONS
lamisil twice daily on feet toe nails   Patient Education     Nail Fungal Infection  A nail fungal infection changes the way fingernails and toenails look. They may thicken, discolor, change shape, or split. This condition is hard to treat because nails grow slowly and have limited blood supply. The infection often comes back after treatment.  There are 2 types of medicines used to treat this condition:    Topical anti-fungal medicines. These are applied to the surface of the skin and nail area. These medicines are not very effective because they can t get deep into the nail.    Oral antifungal medicines. These medicines work better because they go into the nail from the inside out. But the infection may still come back. It may take 9 to 12 months for your nail to look normal again. This means you are cured. You can repeat treatment if needed. Most people take these medicines without any problems. It is rare to stop therapy because of side effects. But your healthcare provider may give you some monitoring tests. Talk about possible side effects with your provider before starting treatment.  If medicines fail, the nail can be removed surgically or chemically. These methods physically remove the fungus from the body. This helps medical treatment be more effective.  Home care    Use medicines exactly as directed for as long as directed. Treating a fungal infection can take longer than other kinds of infections.    Smoking is a risk factor for fungal infection. This is one more reason to quit.    Wear absorbent socks, and shoes that let your feet breathe. Sweaty feet increase your risk of fungal infection. They also make an existing infection harder to treat.    Use footwear when in damp public places like swimming pools, gyms, and shower rooms. This will help you avoid the fungus that grows there.    Don't share nail clippers or scissors with others.  Follow-up care  Follow up with your healthcare provider, or as  advised.  When to seek medical advice  Call your healthcare provider right away if any of these occur:    Skin by the nail becomes red, swollen, painful, or drains pus (a creamy yellow or white liquid)    Side effects from oral anti-fungal medicines  Date Last Reviewed: 8/1/2016 2000-2018 [employer]. [address] [place], [place] [address]. All rights reserved. This information is not intended as a substitute for professional medical care. Always follow your healthcare professional's instructions.

## 2019-06-29 ASSESSMENT — ANXIETY QUESTIONNAIRES: GAD7 TOTAL SCORE: 2

## 2019-07-08 DIAGNOSIS — E78.5 HYPERLIPIDEMIA LDL GOAL <70: ICD-10-CM

## 2019-07-08 DIAGNOSIS — E78.6 LOW HDL (UNDER 40): ICD-10-CM

## 2019-07-09 NOTE — TELEPHONE ENCOUNTER
"Last Written Prescription Date:  4/02/19  Last Fill Quantity: 180 tablet,  # refills: 0   Last office visit: 6/28/2019 with prescribing provider:  Ayesha   Future Office Visit:   Next 5 appointments (look out 90 days)    Aug 08, 2019  1:30 PM CDT  PHYSICAL with Hamzah Hodge MD  [facility] [address] [facility] [id number] [phone]         Requested Prescriptions   Pending Prescriptions Disp Refills     gemfibrozil (LOPID) 600 MG tablet [Pharmacy Med Name: GEMFIBROZIL  600MG  TAB] 180 tablet 0     Sig: TAKE 1 TABLET BY MOUTH TWO  TIMES DAILY       Fibrates Passed - 7/8/2019  3:47 PM        Passed - Lipid panel on file in past 12 months     Recent Labs   Lab Test 03/05/19  0728  11/12/15  0807   CHOL 96   < > 102   TRIG 96   < > 144   HDL 32*   < > 28*   LDL 45   < > 45*   NHDL 64   < >  --    VLDL  --   --  29   CHOLHDLRATIO  --   --  3.6    < > = values in this interval not displayed.               Passed - No abnormal creatine kinase in past 12 months     No lab results found.             Passed - Recent (12 mo) or future (30 days) visit within the authorizing provider's specialty     Patient had office visit in the last 12 months or has a visit in the next 30 days with authorizing provider or within the authorizing provider's specialty.  See \"Patient Info\" tab in inbasket, or \"Choose Columns\" in Meds & Orders section of the refill encounter.              Passed - Medication is active on med list        Passed - Patient is age 18 or older          "

## 2019-07-10 RX ORDER — GEMFIBROZIL 600 MG/1
TABLET, FILM COATED ORAL
Qty: 180 TABLET | Refills: 0 | Status: SHIPPED | OUTPATIENT
Start: 2019-07-10 | End: 2019-08-08

## 2019-07-10 NOTE — TELEPHONE ENCOUNTER
Routing refill request to provider for review/approval because:  Drug interaction warning  Christiane WATTS RN

## 2019-07-26 ENCOUNTER — MYC REFILL (OUTPATIENT)
Dept: FAMILY MEDICINE | Facility: CLINIC | Age: 84
End: 2019-07-26

## 2019-07-26 ENCOUNTER — MYC MEDICAL ADVICE (OUTPATIENT)
Dept: FAMILY MEDICINE | Facility: CLINIC | Age: 84
End: 2019-07-26

## 2019-07-26 DIAGNOSIS — R73.09 OTHER ABNORMAL GLUCOSE: ICD-10-CM

## 2019-07-26 DIAGNOSIS — I10 ESSENTIAL HYPERTENSION, BENIGN: ICD-10-CM

## 2019-07-29 NOTE — TELEPHONE ENCOUNTER
"Sending [employer] message to clarify if pt is taking Propranolol instead of Nadolol- Propranolol is not active on Med List, Nadolol is listed as \"taking\"- waiting response.       Propranolol      Last Written Prescription Date:  9/18/18a  Last Fill Quantity: 180,   # refills: 3  Last Office Visit: 6/28/19  Future Office visit:    Next 5 appointments (look out 90 days)    Aug 08, 2019  1:30 PM CDT  PHYSICAL with Hamzah Hodge MD  [facility] [address] [facility] [address]  [phone]         Routing refill request to provider for review/approval because:  Drug not active on patient's medication list    Amlodipine 5mg   Last Written Prescription Date:  6/20/19  Last Fill Quantity: 60,   # refills: 0  Last Office Visit: 6/28/19  Future Office visit:    Next 5 appointments (look out 90 days)    Aug 08, 2019  1:30 PM CDT  PHYSICAL with Hamzah Hodge MD  [facility] [address] [facility] [address]  [phone]           Routing refill request to provider for review/approval because:  Dose increased during hospital stay/at discharge. Pt inquires if he should stay on this dose     BP Readings from Last 3 Encounters:   06/28/19 134/79   06/20/19 139/74   06/14/19 106/60           "

## 2019-08-02 RX ORDER — AMLODIPINE BESYLATE 5 MG/1
5 TABLET ORAL 2 TIMES DAILY
Qty: 180 TABLET | Refills: 3 | Status: CANCELLED | OUTPATIENT
Start: 2019-08-02

## 2019-08-05 ASSESSMENT — ACTIVITIES OF DAILY LIVING (ADL): CURRENT_FUNCTION: TELEPHONE REQUIRES ASSISTANCE

## 2019-08-08 ENCOUNTER — OFFICE VISIT (OUTPATIENT)
Dept: FAMILY MEDICINE | Facility: CLINIC | Age: 84
End: 2019-08-08
Payer: COMMERCIAL

## 2019-08-08 ENCOUNTER — HOSPITAL ENCOUNTER (OUTPATIENT)
Dept: ULTRASOUND IMAGING | Facility: CLINIC | Age: 84
Discharge: HOME | End: 2019-08-08
Attending: INTERNAL MEDICINE | Admitting: INTERNAL MEDICINE
Payer: COMMERCIAL

## 2019-08-08 VITALS
OXYGEN SATURATION: 96 % | WEIGHT: 261 LBS | SYSTOLIC BLOOD PRESSURE: 121 MMHG | DIASTOLIC BLOOD PRESSURE: 72 MMHG | HEART RATE: 53 BPM | TEMPERATURE: 98.1 F | HEIGHT: 75 IN | BODY MASS INDEX: 32.45 KG/M2

## 2019-08-08 DIAGNOSIS — M79.89 LEG SWELLING: ICD-10-CM

## 2019-08-08 DIAGNOSIS — G47.33 OSA (OBSTRUCTIVE SLEEP APNEA): ICD-10-CM

## 2019-08-08 DIAGNOSIS — R39.198 SLOWING OF URINARY STREAM: ICD-10-CM

## 2019-08-08 DIAGNOSIS — R13.10 DYSPHAGIA, UNSPECIFIED TYPE: ICD-10-CM

## 2019-08-08 DIAGNOSIS — Z00.00 ROUTINE GENERAL MEDICAL EXAMINATION AT A HEALTH CARE FACILITY: Primary | ICD-10-CM

## 2019-08-08 DIAGNOSIS — R60.9 EDEMA, UNSPECIFIED TYPE: ICD-10-CM

## 2019-08-08 DIAGNOSIS — I10 ESSENTIAL HYPERTENSION, BENIGN: ICD-10-CM

## 2019-08-08 DIAGNOSIS — E11.59 TYPE 2 DIABETES MELLITUS WITH VASCULAR DISEASE (H): ICD-10-CM

## 2019-08-08 DIAGNOSIS — M46.1 SACROILIITIS (H): ICD-10-CM

## 2019-08-08 DIAGNOSIS — E78.6 LOW HDL (UNDER 40): ICD-10-CM

## 2019-08-08 DIAGNOSIS — E78.5 HYPERLIPIDEMIA LDL GOAL <70: ICD-10-CM

## 2019-08-08 PROCEDURE — 99213 OFFICE O/P EST LOW 20 MIN: CPT | Mod: 25 | Performed by: INTERNAL MEDICINE

## 2019-08-08 PROCEDURE — 99397 PER PM REEVAL EST PAT 65+ YR: CPT | Performed by: INTERNAL MEDICINE

## 2019-08-08 PROCEDURE — 93971 EXTREMITY STUDY: CPT | Mod: RT

## 2019-08-08 RX ORDER — LISINOPRIL 20 MG/1
20 TABLET ORAL DAILY
Qty: 90 TABLET | Refills: 0 | Status: SHIPPED | OUTPATIENT
Start: 2019-08-08 | End: 2019-12-02

## 2019-08-08 RX ORDER — LANCETS
EACH MISCELLANEOUS
Qty: 200 EACH | Refills: 1 | Status: SHIPPED | OUTPATIENT
Start: 2019-08-08 | End: 2020-04-20

## 2019-08-08 RX ORDER — AMLODIPINE BESYLATE 5 MG/1
5 TABLET ORAL 2 TIMES DAILY
Qty: 60 TABLET | Refills: 0 | Status: SHIPPED | OUTPATIENT
Start: 2019-08-08 | End: 2019-09-04

## 2019-08-08 ASSESSMENT — MIFFLIN-ST. JEOR: SCORE: 1964.52

## 2019-08-08 ASSESSMENT — ACTIVITIES OF DAILY LIVING (ADL): CURRENT_FUNCTION: TELEPHONE REQUIRES ASSISTANCE

## 2019-08-08 NOTE — PROGRESS NOTES
"SUBJECTIVE:   Austen Fowler is a 84 year old male who presents for Preventive Visit.  Patient is accompanied by wife  He has rt leg pain and swelling   He drove for 5 hours and since then, has more pain  Leg  Is swollen  But no infection now  He has known SI jt issues  He is trying to lose weight   He notes leg swelling  They also mention about sleep apnea issues  He uses cpap use twice weekly only  Are you in the first 12 months of your Medicare coverage?  No    Healthy Habits:     In general, how would you rate your overall health?  Good    Frequency of exercise:  4-5 days/week    Duration of exercise:  30-45 minutes    Do you usually eat at least 4 servings of fruit and vegetables a day, include whole grains    & fiber and avoid regularly eating high fat or \"junk\" foods?  Yes    Taking medications regularly:  No    Barriers to taking medications:  Side effects    Medication side effects:  Other    Ability to successfully perform activities of daily living:  Telephone requires assistance    Home Safety:  No safety concerns identified    Hearing Impairment:  Difficult to understand a speaker at a public meeting or Rastafarian service, find that men's voices are easier to understand than woman's and difficulty understanding speech on the telephone    In the past 6 months, have you been bothered by leaking of urine?  No    In general, how would you rate your overall mental or emotional health?  Good      PHQ-2 Total Score: 2    Additional concerns today:  Yes    Do you feel safe in your environment? Yes    Do you have a Health Care Directive? No: Advance care planning reviewed with patient; information given to patient to review.      Fall risk  Fallen 2 or more times in the past year?: No  Any fall with injury in the past year?: No    Cognitive Screening   1) Repeat 3 items (Leader, Season, Table)    2) Clock draw: NORMAL  3) 3 item recall: Recalls 3 objects  Results: 3 items recalled: COGNITIVE IMPAIRMENT LESS " LIKELY    Mini-CogTM Copyright MACHO Bhagat. Licensed by the author for use in [facility]; reprinted with permission (jesus@.[facility]). All rights reserved.      Do you have sleep apnea, excessive snoring or daytime drowsiness?: yes, sleep apnea    Reviewed and updated as needed this visit by clinical staff  Tobacco  Allergies  Meds  Problems  Med Hx  Surg Hx  Fam Hx         Reviewed and updated as needed this visit by Provider  Tobacco  Allergies  Meds  Problems  Med Hx  Surg Hx  Fam Hx        Social History     Tobacco Use     Smoking status: Former Smoker     Packs/day: 2.00     Years: 30.00     Pack years: 60.00     Types: Cigarettes, Cigars, Pipe     Start date:      Last attempt to quit: 3/1/1992     Years since quittin.4     Smokeless tobacco: Never Used     Tobacco comment: quit in    Substance Use Topics     Alcohol use: Yes     Alcohol/week: 0.0 oz     Comment: minimal less than 1/week     If you drink alcohol do you typically have >3 drinks per day or >7 drinks per week? No        Current providers sharing in care for this patient include:   Patient Care Team:  Hamzah Hodge MD as PCP - General  Hamzah Hodge MD as Assigned PCP  Marielos Morocho [facility] as Pharmacist (Pharmacist)    The following health maintenance items are reviewed in Epic and correct as of today:  Health Maintenance   Topic Date Due     URINE DRUG SCREEN  1935     ADVANCE CARE PLANNING  2017     EYE EXAM  2018     MEDICARE ANNUAL WELLNESS VISIT  2019     DIABETIC FOOT EXAM  2019     INFLUENZA VACCINE (1) 2019     A1C  2019     LIPID  2020     TSH W/FREE T4 REFLEX  2020     MICROALBUMIN  2020     PHQ-9  2020     BMP  2020     MARLA ASSESSMENT  2020     FALL RISK ASSESSMENT  2020     COLONOSCOPY  2022     DTAP/TDAP/TD IMMUNIZATION (3 - Td) 2023     ZOSTER IMMUNIZATION  Completed     IPV IMMUNIZATION  Aged Out      MENINGITIS IMMUNIZATION  Aged Out     BP Readings from Last 3 Encounters:   08/08/19 121/72   06/28/19 134/79   06/20/19 139/74    Wt Readings from Last 3 Encounters:   08/08/19 118.4 kg (261 lb)   06/28/19 120.2 kg (265 lb)   06/14/19 117 kg (258 lb)                  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)     S/P lumbar laminectomy     Health Care Home     Coronary artery disease involving native coronary artery of native heart without angina pectoris     Left ventricular diastolic dysfunction     Ascending aorta dilatation (H)     Aortic root dilatation (H)     Non morbid obesity, unspecified obesity type     Neck pain     Cellulitis of right lower extremity     Past Surgical History:   Procedure Laterality Date     ABDOMEN SURGERY  1994    gall bladder     BIOPSY      arms     C CABG, ARTERY-VEIN, FOUR  11/1992    CABG - LIMA to left anterior descending and saphenous vein bypass graft to the first and second obtuse marginal branch of the circumflex and the right mammary to the right coronary artery     C NONSPECIFIC PROCEDURE  6-94    Gail lap     C NONSPECIFIC PROCEDURE  1995    sinus surgery     C NONSPECIFIC PROCEDURE      bilateral cataract surgery     CHOLECYSTECTOMY  6/1994     COLONOSCOPY N/A 4/11/2017    Procedure: COMBINED COLONOSCOPY, SINGLE OR MULTIPLE BIOPSY/POLYPECTOMY BY BIOPSY;  Surgeon: Bladimir Garner MD;  Location:  GI     CV  LEFT HEART CATH  ,     Angioplasty     ENT SURGERY  1995    sinus surgery     ESOPHAGOSCOPY, GASTROSCOPY, DUODENOSCOPY (EGD), DILATATION, COMBINED  2014    Procedure: COMBINED ESOPHAGOSCOPY, GASTROSCOPY, DUODENOSCOPY (EGD), DILATATION;  Surgeon: Bladimir Garner MD;  Location:  GI     EYE SURGERY      cataracts removed both eyes     HC COLONOSCOPY THRU STOMA W BIOPSY/CAUTERY TUMOR/POLYP/LESION  2007     INJECT EPIDURAL LUMBAR       LAMINECTOMY LUMBAR TWO LEVELS N/A 2015    Procedure: LAMINECTOMY LUMBAR TWO LEVELS;  Surgeon: Bladimir Keenan MD;  Location:  OR     THORACIC SURGERY  1992    bypass       Social History     Tobacco Use     Smoking status: Former Smoker     Packs/day: 2.00     Years: 30.00     Pack years: 60.00     Types: Cigarettes, Cigars, Pipe     Start date:      Last attempt to quit: 3/1/1992     Years since quittin.4     Smokeless tobacco: Never Used     Tobacco comment: quit in    Substance Use Topics     Alcohol use: Yes     Alcohol/week: 0.0 oz     Comment: minimal less than 1/week     Family History   Problem Relation Age of Onset     Cancer Brother         MELANOMA     Diabetes Mother         AODM     Thyroid Disease Mother      Diabetes Father         AODM     Myocardial Infarction Father      Cerebrovascular Disease Brother      Family History Negative Brother      Family History Negative Sister      Family History Negative Son      Family History Negative Son      Family History Negative Son      Family History Negative Daughter      Coronary Artery Disease Brother         dod      Cerebrovascular Disease Brother         dod 2019     Other Cancer Brother         dod 2000/melanoma     Breast Cancer Other         2017     Thyroid Disease Other          Current Outpatient Medications   Medication Sig Dispense Refill     acetaminophen (ACETAMIN) 500 MG tablet Take 1,000 mg by mouth 2 times daily as needed for pain        amLODIPine (NORVASC) 5  MG tablet Take 1 tablet (5 mg) by mouth 2 times daily 60 tablet 0     aspirin 81 MG EC tablet Take 81 mg by mouth four times a week        blood glucose (ONETOUCH ULTRA) test strip Use to test blood sugar 2 times daily or as directed. 200 strip 1     blood glucose monitoring (ONE TOUCH ULTRASOFT) lancets Use to test blood sugar 2 times daily or as directed. 200 each 1     calcium carbonate (TUMS) 500 MG chewable tablet Take 1 chew tab by mouth 3 times daily as needed for heartburn        Cholecalciferol (VITAMIN D) 2000 UNIT tablet Take 2,000 Units by mouth daily. 90 tablet 3     diclofenac (VOLTAREN) 1 % GEL topical gel Apply 4 grams to neck four times daily using enclosed dosing card. 200 g 1     finasteride (PROSCAR) 5 MG tablet Take 1 tablet (5 mg) by mouth daily 90 tablet 0     gabapentin (NEURONTIN) 300 MG capsule Take 900 mg three times daily 810 capsule 2     lisinopril (PRINIVIL/ZESTRIL) 20 MG tablet Take 1 tablet (20 mg) by mouth daily 90 tablet 0     Menthol, Topical Analgesic, (ICY HOT BACK EX) Externally apply topically daily as needed (neck pain)        METAMUCIL 1.7 GM OR WAFR TAKE AS DIRECTED       metFORMIN (GLUCOPHAGE-XR) 500 MG 24 hr tablet Take 2,000 mg by mouth every morning       nadolol (CORGARD) 20 MG tablet Take 20 mg by mouth daily       omeprazole (PRILOSEC) 20 MG DR capsule Take 1 capsule (20 mg) by mouth daily 90 capsule 2     order for DME Equipment being ordered: Medium compression stockings   20-30 mm 2 each 3     ORDER FOR DME Uses Cpap machine for sleep apnea       pravastatin (PRAVACHOL) 20 MG tablet TAKE 1 TABLET BY MOUTH  DAILY 90 tablet 1     tamsulosin (FLOMAX) 0.4 MG capsule Take 0.4 mg by mouth daily       terbinafine (LAMISIL) 1 % external cream Apply topically 2 times daily 42 g 3     triamcinolone (KENALOG) 0.5 % cream Apply  topically 2 times daily. to affected area as directed PRN 15 g 0         Review of Systems  10 point ROS of systems including Constitutional, Eyes,  "Respiratory, Cardiovascular, Gastroenterology, Genitourinary, Integumentary, Muscularskeletal, Psychiatric were all negative except for pertinent positives noted in my HPI.    OBJECTIVE:   /72 (BP Location: Left arm, Patient Position: Sitting, Cuff Size: Adult Large)   Pulse 53   Temp 98.1  F (36.7  C) (Oral)   Ht 1.905 m (6' 3\")   Wt 118.4 kg (261 lb)   SpO2 96%   BMI 32.62 kg/m   Estimated body mass index is 32.62 kg/m  as calculated from the following:    Height as of this encounter: 1.905 m (6' 3\").    Weight as of this encounter: 118.4 kg (261 lb).  Physical Exam  GENERAL: healthy, alert and no distress  EYES: Eyes grossly normal to inspection, PERRL and conjunctivae and sclerae normal  HENT: ear canals and TM's normal, nose and mouth without ulcers or lesions  NECK: no adenopathy, no asymmetry, masses, or scars and thyroid normal to palpation  RESP: lungs clear to auscultation - no rales, rhonchi or wheezes  CV: regular rate and rhythm, normal S1 S2, no S3 or S4, no murmur, click or rub, no peripheral edema and peripheral pulses strong  ABDOMEN: soft, nontender, no hepatosplenomegaly, no masses and bowel sounds normal   (male): normal male genitalia without lesions or urethral discharge, no hernia  RECTAL: normal sphincter tone, no rectal masses, prostate normal size, smooth, nontender without nodules or masses  MS: no gross musculoskeletal defects noted, right leg is quite swollen  SKIN: no suspicious lesions or rashes  NEURO: Normal strength and tone, mentation intact and speech normal  PSYCH: mentation appears normal, affect normal/bright    Foot exam shows no ulceration, no neuropathy, microfilament well felt. Skin on the feet not dry.  Pulses in the feet well felt.      ASSESSMENT / PLAN:   Austen was seen today for physical.    Diagnoses and all orders for this visit:    Routine general medical examination at a health care facility  Is up-to-date on immunizations and lost 10 pounds  Type 2 " "diabetes mellitus with vascular disease (H)  -     blood glucose monitoring (ONE TOUCH ULTRASOFT) lancets; Use to test blood sugar 2 times daily or as directed.  -     **A1C FUTURE anytime; Future  -     NUTRITION REFERRAL  Is well controlled  Slowing of urinary stream  Is much improved in this regard  Sacroiliitis (H)  -     PAIN MANAGEMENT REFERRAL  I suspect radicular pain and he will increase gabapentin to 3 times daily  Dysphagia, unspecified type  This has improved  BENIGN HYPERTENSION  -     amLODIPine (NORVASC) 5 MG tablet; Take 1 tablet (5 mg) by mouth 2 times daily  -     lisinopril (PRINIVIL/ZESTRIL) 20 MG tablet; Take 1 tablet (20 mg) by mouth daily  Amlodipine may be adding to some edema hyperlipidemia LDL goal <70  -     Lipid panel reflex to direct LDL Fasting; Future  -     NUTRITION REFERRAL  Continue statins and stop Lopid  Low HDL (under 40)  -     Lipid panel reflex to direct LDL Fasting; Future  We can stop Lopid and repeat lipid profile in 2 months  Edema, unspecified type  -     order for DME; Equipment being ordered: Medium compression stockings   20-30 mm  Compression stockings will be used and we might have to change to Lasix from Maxide  PENELOPE (obstructive sleep apnea)  -     SLEEP EVALUATION & MANAGEMENT REFERRAL - ADULT -[facility] [phone]  (Age 18 and up); Future  He could probably use oral appliance  Leg swelling  -     US Lower Extremity Venous Duplex Right; Future  Patient will need a DVT study and that will be arranged    End of Life Planning:  Patient currently has an advanced directive: Yes.  Practitioner is supportive of decision.    COUNSELING:  Reviewed preventive health counseling, as reflected in patient instructions       Regular exercise       Healthy diet/nutrition    Estimated body mass index is 32.62 kg/m  as calculated from the following:    Height as of this encounter: 1.905 m (6' 3\").    Weight as of this encounter: 118.4 kg (261 " lb).    Weight management plan: Discussed healthy diet and exercise guidelines     reports that he quit smoking about 27 years ago. His smoking use included cigarettes, cigars, and pipe. He started smoking about 65 years ago. He has a 60.00 pack-year smoking history. He has never used smokeless tobacco.      Appropriate preventive services were discussed with this patient, including applicable screening as appropriate for cardiovascular disease, diabetes, osteopenia/osteoporosis, and glaucoma.  As appropriate for age/gender, discussed screening for colorectal cancer, prostate cancer, . Checklist reviewing preventive services available has been given to the patient.    Reviewed patients plan of care and provided an AVS. The Basic Care Plan (routine screening as documented in Health Maintenance) for Austen meets the Care Plan requirement. This Care Plan has been established and reviewed with the Patient.    Counseling Resources:  ATP IV Guidelines  Pooled Cohorts Equation Calculator  Breast Cancer Risk Calculator  FRAX Risk Assessment  ICSI Preventive Guidelines  Dietary Guidelines for Americans, 2010  USDA's MyPlate  ASA Prophylaxis  Lung CA Screening    Hamzah Hodge MD  [facility]    Identified Health Risks:

## 2019-08-09 ENCOUNTER — TELEPHONE (OUTPATIENT)
Dept: PALLIATIVE MEDICINE | Facility: CLINIC | Age: 84
End: 2019-08-09

## 2019-08-09 NOTE — TELEPHONE ENCOUNTER
Pain Management Center Referral      1. Confirmed address with patient? Yes  2. Confirmed phone number with patient? Yes  3. Confirmed referring provider? Yes  4. Is the PCP the same as the referring provider? Yes  5. Has the patient been to any previous pain clinics? Yes - [facility]- 2 YRS  (If yes, send MELCHOR with welcome letter)  6. Which insurance are we to bill for this appointment?  [facility]    7. Informed pt of cancellation (48 hour) policy? Yes    REGARDING OPIOID MEDICATIONS: We will always address appropriateness of opioid pain medications, but we generally will not automatically take on a prescribing role. When we do take on prescribing of opioids for chronic pain, it is in collaboration with the referring physician for an intermediate period of time (months), with an expectation that the primary physician or provider will assume the prescribing role if medications are effective at stable doses with demonstrated compliance. Therefore, please do not assume that your prescribing responsibilities end on the day of pain clinic consultation.  8. Informed pt of prescribing policy? Yes    9.Please be aware that once you are established with a pain provider and location, you will need to continue have all future visits with that provider and location. It is best to determine what location is the most convenient for you and schedule with that one.    ** PATIENT INFORMED OF THIS POLICY Yes      9. Referring Provider: Hamzah Hodge     10. Criteria for Triage Eval:   -Missed/Failed 1st DUAL appointment? N/A   -Medication Focused? N/A   -Mental Health Concerns? (e.g. Recent psych hospitalization/snap shot)? N/A   -Active substance abuse? N/A   -Patient behaviors (e.g. Offensive language/raised voice)? N/A

## 2019-08-09 NOTE — LETTER
August 9, 2019    Austen Fowler  [address] [facility] [address]    Dear Austen,                                                                 Welcome to the [facility].  We are located at [address] [place], [place] [address] [address]. Your appointment at the [facility] has been scheduled on Tuesday September 17, 2019 at 10:00 AM with Nicholas Nuñez MD .    At your first visit, you will meet your team of caregivers who will help you to develop pain management strategies that will last a lifetime. You will meet with our support staff to review your insurance information, and collect your co-payment if required by your insurance company. You will also meet with a medical pain specialist and care coordinator who will assess your pain and develop a plan of care for your successful pain rehabilitation. You should expect to spend 1-2 hours at your first visit with us. Usually, patients work with us for a period of 6-12 months, and eventually return to their primary doctor once their pain management has stabilized.      To help us make your visit go as smoothly as possible, please bring the following items with you on your visit:     Completed Pain Questionnaire enclosed in this packet.  If you do not bring the completed questionnaire, we may have to reschedule your appointment.  List of any medicines that you are currently taking or have been prescribed  Important NON-[place] medical information such as medical records or tests results (X-rays, or laboratory tests)  Your health insurance card  Financial resources to cover your co-payment or balance due at the time of service (cash, personal check, Visa, and MasterCard are acceptable methods of payment)     Due to the demand for new patient evaluations, you must notify the scheduling department 48 hours in advance if you are not able to keep this appointment. Failure to do so could affect your ability to reschedule  with our clinic. Please be aware that we will not prescribe any medications at your first visit.     Please call [phone] with any questions regarding your appointment. We look forward to meeting you and working to address your health care needs.     Sincerely,    [facility]

## 2019-08-12 NOTE — TELEPHONE ENCOUNTER
Pt had physical 8/8/2019  Meds [facility] at [facility]  Christiane WATTS RN

## 2019-08-17 DIAGNOSIS — I10 ESSENTIAL HYPERTENSION, BENIGN: ICD-10-CM

## 2019-08-17 DIAGNOSIS — Z95.1 S/P CABG (CORONARY ARTERY BYPASS GRAFT): ICD-10-CM

## 2019-08-19 RX ORDER — PROPRANOLOL HYDROCHLORIDE 60 MG/1
TABLET ORAL
Qty: 180 TABLET | Refills: 3 | Status: SHIPPED | OUTPATIENT
Start: 2019-08-19 | End: 2020-01-11

## 2019-08-19 NOTE — TELEPHONE ENCOUNTER
Pt returned call/ he is taking it/ side effects gone.    Please refill    Ph. [phone]  Ok to leave voicemail

## 2019-08-19 NOTE — TELEPHONE ENCOUNTER
"propranolol (INDERAL) 60 MG tablet    Last Written Prescription Date:  Unknown  Last Fill Quantity: unknown,  # refills: unknown   Last office visit: 8/8/2019 with prescribing provider:  Ayesha   Future Office Visit:  10/08/2019    Requested Prescriptions   Pending Prescriptions Disp Refills     propranolol (INDERAL) 60 MG tablet [Pharmacy Med Name: PROPRANOLOL  60MG  TAB] 180 tablet 3     Sig: TAKE 1 TABLET BY MOUTH TWO  TIMES DAILY       Beta-Blockers Protocol Failed - 8/17/2019  6:24 PM        Failed - Medication is active on med list        Passed - Blood pressure under 140/90 in past 12 months     BP Readings from Last 3 Encounters:   08/08/19 121/72   06/28/19 134/79   06/20/19 139/74                 Passed - Patient is age 6 or older        Passed - Recent (12 mo) or future (30 days) visit within the authorizing provider's specialty     Patient had office visit in the last 12 months or has a visit in the next 30 days with authorizing provider or within the authorizing provider's specialty.  See \"Patient Info\" tab in inbasket, or \"Choose Columns\" in Meds & Orders section of the refill encounter.                "

## 2019-08-19 NOTE — TELEPHONE ENCOUNTER
"Routing to Dr Hodge.      Patient is requestingRx Propranolol 60mg BID.      This med was DC'd 1 yr ago due to \"diarrhea and upset stomach.\"     Was switched to Nadolol 20mg daily.     Patient states when he ran out of the Nadolol spring of 2019----he resumed the Propranolol (\"since I had it on hand at home\")    Patient reports taking Propranolol 60mg BID x several months with no side effects.      Requesting Rx refill.      Sarah ARAUJO RN,BSN    "

## 2019-08-19 NOTE — TELEPHONE ENCOUNTER
"Rx last sent 4/13/18 #180 with 3 refills, end date 9/18/18 joycelyn stopped due to \"side effects\" at cardiology visit     Left message asking patient to call back - is he taking/requesting this?    Sierra ARGUELLO RN          "

## 2019-08-21 ENCOUNTER — TRANSFERRED RECORDS (OUTPATIENT)
Dept: HEALTH INFORMATION MANAGEMENT | Facility: CLINIC | Age: 84
End: 2019-08-21

## 2019-09-04 ENCOUNTER — MYC REFILL (OUTPATIENT)
Dept: PALLIATIVE MEDICINE | Facility: CLINIC | Age: 84
End: 2019-09-04

## 2019-09-04 ENCOUNTER — MYC REFILL (OUTPATIENT)
Dept: FAMILY MEDICINE | Facility: CLINIC | Age: 84
End: 2019-09-04

## 2019-09-04 DIAGNOSIS — I10 ESSENTIAL HYPERTENSION, BENIGN: ICD-10-CM

## 2019-09-04 DIAGNOSIS — M54.2 CERVICALGIA: ICD-10-CM

## 2019-09-04 NOTE — TELEPHONE ENCOUNTER
Received fax request from [facility] requesting refill(s) for   diclofenac (VOLTAREN) 1 % GEL topical gel    Last refilled on 09/04/2019    Pt last seen on 04/26/2016  Next appt scheduled for 09/17/2019    Will facilitate refill.

## 2019-09-05 ENCOUNTER — HOSPITAL ENCOUNTER (OUTPATIENT)
Dept: CARDIOLOGY | Facility: CLINIC | Age: 84
Discharge: HOME | End: 2019-09-05
Attending: INTERNAL MEDICINE | Admitting: INTERNAL MEDICINE
Payer: COMMERCIAL

## 2019-09-05 ENCOUNTER — TELEPHONE (OUTPATIENT)
Dept: CARDIOLOGY | Facility: CLINIC | Age: 84
End: 2019-09-05

## 2019-09-05 DIAGNOSIS — I77.810 AORTIC ROOT DILATATION (H): ICD-10-CM

## 2019-09-05 PROCEDURE — 25500064 ZZH RX 255 OP 636: Performed by: INTERNAL MEDICINE

## 2019-09-05 PROCEDURE — 93306 TTE W/DOPPLER COMPLETE: CPT | Mod: 26 | Performed by: INTERNAL MEDICINE

## 2019-09-05 PROCEDURE — 40000264 ECHOCARDIOGRAM COMPLETE

## 2019-09-05 RX ORDER — AMLODIPINE BESYLATE 5 MG/1
5 TABLET ORAL 2 TIMES DAILY
Qty: 60 TABLET | Refills: 4 | Status: SHIPPED | OUTPATIENT
Start: 2019-09-05 | End: 2019-12-30

## 2019-09-05 RX ADMIN — HUMAN ALBUMIN MICROSPHERES AND PERFLUTREN 9 ML: 10; .22 INJECTION, SOLUTION INTRAVENOUS at 10:12

## 2019-09-05 NOTE — TELEPHONE ENCOUNTER
"Reviewed echo showing  Normal left ventricular function, LVEF is estimated at 55-60%.  The right ventricle is normal in structure, function and size.  No significant valvular abnormalities.  Borderline dilated ascending aorta, max diameter visualized 3.6 cm.  Compared to prior study, there is no significant change    Per office visit dated 3/5/19, Dr. Hernandez recommended, \" I will see the patient back again in 1 year's time, but in 6 months' time we will repeat his echocardiogram to look at left ventricular dimensions and aortic root dimensions.\"     Called pt with results, advised him that echo showed no change in  left ventricular dimensions and aortic root dimensions. Will message Dr. Hernandez to review.  Salas GALE   "

## 2019-09-05 NOTE — TELEPHONE ENCOUNTER
"Amlodipine 5 mg    Last Written Prescription Date:  08/08/19  Last Fill Quantity: 60 tablets,  # refills: 0   Last office visit: 8/8/2019 with prescribing provider:  Ayesha   Future Office Visit:      Requested Prescriptions   Pending Prescriptions Disp Refills     amLODIPine (NORVASC) 5 MG tablet 60 tablet 0     Sig: Take 1 tablet (5 mg) by mouth 2 times daily       Calcium Channel Blockers Protocol  Passed - 9/4/2019  9:46 AM        Passed - Blood pressure under 140/90 in past 12 months     BP Readings from Last 3 Encounters:   08/08/19 121/72   06/28/19 134/79   06/20/19 139/74                 Passed - Recent (12 mo) or future (30 days) visit within the authorizing provider's specialty     Patient had office visit in the last 12 months or has a visit in the next 30 days with authorizing provider or within the authorizing provider's specialty.  See \"Patient Info\" tab in inbasket, or \"Choose Columns\" in Meds & Orders section of the refill encounter.              Passed - Medication is active on med list        Passed - Patient is age 18 or older        Passed - Normal serum creatinine on file in past 12 months     Recent Labs   Lab Test 06/28/19  1127  09/21/16  0943   CR 0.67   < >  --    CREAT  --   --  0.8    < > = values in this interval not displayed.               " No pertinent past medical history

## 2019-09-05 NOTE — TELEPHONE ENCOUNTER
Prescription approved per [facility].  Meron Richmond RN

## 2019-09-16 NOTE — PROGRESS NOTES
[facility] Consultation    Date of visit: 9/17/2019    Reason for consultation:    Austen Fowler is a 84 year old male who is seen in consultation today at the request of his primary care physician, Hamzah Hodge .     Consultation and Evaluation for: low back pain    Review of [place] Prescription Monitoring Program (): Today I have also reviewed the patient's history of controlled substance use, as provided by [place] licensed pharmacies and prescriber dispensers.     Review of Pain Questionnaire: Please see the [facility] health questionnaire, which the patient completed and reviewed with me in detail.    Review of Electronic Chart: Today I have also reviewed available medical information in the patient's medical record at [facility] ([facility]), including relevant provider notes, laboratory work, and imaging.     Austen Fowler has been seen at a pain clinic in the past.  Seen by myself last year, Dr. Cagle in the past. Dr. Arzate in the past for back and leg pain.      Chief Complaint:    Chief Complaint   Patient presents with     Pain       Pain history:  Austen Fowler is a 84 year old male who presents for initial evaluation of chief pain complaint of back and right leg pain.    He has a 10-15 year history of chronic low back pain and underwent a L4-5 laminectomy in the past to relieve this. This helped for several years but he developed pain in other areas of his spine. He has been getting left sacroiliac joint injection for years now which typically helps for 9-12 months.     In the past 2-3 months he has had an increase in right low back and right buttock and thigh pain. This started after a trip to [place] where he was sitting for long periods of time. He describes a constant baseline aching type pain with sharp shooting pain intermittently. The pain radiates down his thigh to the knee.  He denies any paresthesias, numbness or  "weakness in his legs. He denies any bowel or bladder changes.         Onset: 1994 after heart surgeries  Location/Radiation: low back and bilateral buttocks. Sometimes right thigh.  Quality: \"Aching, often sharp with movements\" \"intermittent\"  Severity/Intensity: 9/10 at worst, 1/10 at best, 3/10 on average  Aggravating factors include: \"walking, standing\"  Relieving factors include: \"Stretching, lying down\"  Red Flags: The patient denies bowel or bladder incontinence, parasthesias, weakness, saddle anesthesia, unintentional weight loss, or fever/chills/sweats.         Pain Treatments:  1. Medications:       Current pain medications:  Tylenol 1000mg BID - helpful  Voltaren gel for neck helps   Gabapentin 900mg TID -has been helpful       Previous pain medications:  -lidocaine patches - helpful  2. Physical Therapy: Somewhat helpful               TENS unit: helps a little  3. Pain psychology: hasn't tried  4. Surgery: denies  5. Injections: -Multiple left sacroiliac joint injections - very helpful   -multiple left L3-4 tfesi - helpful   -attempted Cervical mbb - unable to complete due to anatomy  6. Alternative Therapies:               Chiropractic: helpful               Acupuncture: none         Diagnostic tests:  MRI of L-spine was completed on 3/17/15 was reviewed with the patient and shows:  FINDINGS: Plain films dated 3/18/2010 show probably six functional  lumbar vertebral segments. However, the prior exam report assumed five  lumbar segments. For purposes of comparison, five functional lumbar  vertebral segments are again assumed. The caudal thecal sac contents  appear intrinsically normal. Very subtle degenerative anterolisthesis  L4 on L5 without change in alignment in the sagittal plane is  otherwise normal. Vertebral bone marrow signal is unremarkable.     T12-L1: Normal.     L1-L2: Signal loss without disc space narrowing and very minimal disc  bulging. No disc protrusion or central or foraminal stenosis " and no  change since prior exam. Posterior facets are normal.     L2-L3: Signal loss without disc space narrowing. Mild diffuse disc  bulging superimposed on a congenitally small bony canal, thickening of  the ligamentum flavum and dorsal epidural fat with mild posterior  facet degenerative changes. This combination currently yields mild to  moderate central stenosis, increased since the prior exam.     L3-L4: Signal loss without disc space narrowing. Mild diffuse disc  bulging and no disc protrusion. Borderline mild central stenosis  related to multiple factors. Mild right foraminal narrowing and the  left foramen is normal. Minimal posterior facet degenerative changes.  Overall, no change since prior exam.     L4-L5: Signal loss without disc space narrowing. Diffuse disc bulging  superimposed on a congenitally small bony canal with marked thickening  of the ligamentum flavum, marked posterior facet degenerative changes  and minimal anterolisthesis. This combination again yields severe  central stenosis without change. Severe left and mild/moderate  foraminal stenosis also appears unchanged.     L5-S1: Signal loss without disc space narrowing. Mild posterior disc  bulging associated with annular fissuring. No acute disc protrusion or  central stenosis. Minimal bilateral foraminal narrowing. Mild  posterior facet degenerative changes. Overall, no change at this level  since the prior exam.     IMPRESSION  IMPRESSION:   1. 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 Mild to  moderate central stenosis at L2-L3 has increased since the prior exam.  Borderline mild central stenosis at L3-L4 and severe central stenosis  at L4-L5 related to multiple factors have remain unchanged.  2. Multilevel foraminal stenosis bilaterally without change, most  prominent at L4-L5 on the left.     EDGAR ARAGON MD      Labs:   Lab Results   Component Value Date    WBC 7.0 06/28/2019     Lab Results   Component Value Date     RBC 4.22 06/28/2019     Lab Results   Component Value Date    HGB 12.4 06/28/2019     Lab Results   Component Value Date    HCT 38.5 06/28/2019     Lab Results   Component Value Date    MCV 91 06/28/2019     Lab Results   Component Value Date    MCH 29.4 06/28/2019     Lab Results   Component Value Date    MCHC 32.2 06/28/2019     Lab Results   Component Value Date    RDW 14.6 06/28/2019     Lab Results   Component Value Date     06/28/2019     Last Comprehensive Metabolic Panel:  Sodium   Date Value Ref Range Status   06/28/2019 142 133 - 144 mmol/L Final     Potassium   Date Value Ref Range Status   06/28/2019 4.5 3.4 - 5.3 mmol/L Final     Chloride   Date Value Ref Range Status   06/28/2019 107 94 - 109 mmol/L Final     Carbon Dioxide   Date Value Ref Range Status   06/28/2019 27 20 - 32 mmol/L Final     Anion Gap   Date Value Ref Range Status   06/28/2019 8 3 - 14 mmol/L Final     Glucose   Date Value Ref Range Status   06/28/2019 120 (H) 70 - 99 mg/dL Final     Urea Nitrogen   Date Value Ref Range Status   06/28/2019 17 7 - 30 mg/dL Final     Creatinine   Date Value Ref Range Status   06/28/2019 0.67 0.66 - 1.25 mg/dL Final     GFR Estimate   Date Value Ref Range Status   06/28/2019 89 >60 mL/min/[1.73_m2] Final     Comment:     Non  GFR Calc  Starting 12/18/2018, serum creatinine based estimated GFR (eGFR) will be   calculated using the Chronic Kidney Disease Epidemiology Collaboration   (CKD-EPI) equation.       Calcium   Date Value Ref Range Status   06/28/2019 9.3 8.5 - 10.1 mg/dL Final     Bilirubin Total   Date Value Ref Range Status   06/18/2019 0.4 0.2 - 1.3 mg/dL Final     Alkaline Phosphatase   Date Value Ref Range Status   06/18/2019 68 40 - 150 U/L Final     ALT   Date Value Ref Range Status   06/18/2019 13 0 - 70 U/L Final     AST   Date Value Ref Range Status   06/18/2019 19 0 - 45 U/L Final                 Past Medical History:  Past Medical History:   Diagnosis Date      Anxiety state, unspecified      Aortic root dilatation (H)      Arthritis      Ascending aorta dilatation (H)      Basal cell cancer     s/p Mohs nose and bridge of nose on R.     Bunion      CAD (coronary artery disease)     CABG 1992: LIMA to LAD, SANDI to RCA, SVG to OM1 and OM2     Contact dermatitis and other eczema, due to unspecified cause     eczema - has light treatments with Dr. Rivera     Disorders of bursae and tendons in shoulder region, unspecified      Esophageal reflux      Essential hypertension, benign      Gallbladder disease      GERD (gastroesophageal reflux disease)      Heart attack (H)      Hyperlipidemia LDL goal <70      Left ventricular diastolic dysfunction      Low HDL (under 40) 3/28/2014     Nasal/sinus dis NEC     s/p surgery in 1995     Obesity      Osteoarthrosis, unspecified whether generalized or localized, shoulder region      Other hammer toe (acquired)      Sleep apnea     USES CPAP     Spinal stenosis, unspecified region other than cervical     MRI done 2006     Type 2 diabetes, HbA1c goal < 7% (H) 3/12/2015     Urge incontinence        Past Surgical History:  Past Surgical History:   Procedure Laterality Date     ABDOMEN SURGERY  1994    gall bladder     BIOPSY      arms     C CABG, ARTERY-VEIN, FOUR  11/1992    CABG - LIMA to left anterior descending and saphenous vein bypass graft to the first and second obtuse marginal branch of the circumflex and the right mammary to the right coronary artery     C NONSPECIFIC PROCEDURE  6-94    Gail lap     C NONSPECIFIC PROCEDURE  1995    sinus surgery     C NONSPECIFIC PROCEDURE      bilateral cataract surgery     CHOLECYSTECTOMY  6/1994     COLONOSCOPY N/A 4/11/2017    Procedure: COMBINED COLONOSCOPY, SINGLE OR MULTIPLE BIOPSY/POLYPECTOMY BY BIOPSY;  Surgeon: Bladimir Garner MD;  Location:  GI     CV LEFT HEART CATH  5-92, 6-92    Angioplasty     ENT SURGERY  5/1995    sinus surgery     ESOPHAGOSCOPY, GASTROSCOPY, DUODENOSCOPY  (EGD), DILATATION, COMBINED  7/17/2014    Procedure: COMBINED ESOPHAGOSCOPY, GASTROSCOPY, DUODENOSCOPY (EGD), DILATATION;  Surgeon: Bladimir Garner MD;  Location:  GI     EYE SURGERY      cataracts removed both eyes     HC COLONOSCOPY THRU STOMA W BIOPSY/CAUTERY TUMOR/POLYP/LESION  5/2007     INJECT EPIDURAL LUMBAR       LAMINECTOMY LUMBAR TWO LEVELS N/A 4/29/2015    Procedure: LAMINECTOMY LUMBAR TWO LEVELS;  Surgeon: Bladimir Keenan MD;  Location:  OR     THORACIC SURGERY  11/1992    bypass       Medications:  Current Outpatient Medications   Medication Sig Dispense Refill     acetaminophen (ACETAMIN) 500 MG tablet Take 1,000 mg by mouth 2 times daily as needed for pain        amLODIPine (NORVASC) 5 MG tablet Take 1 tablet (5 mg) by mouth 2 times daily 60 tablet 4     aspirin 81 MG EC tablet Take 81 mg by mouth four times a week        blood glucose (ONETOUCH ULTRA) test strip Use to test blood sugar 2 times daily or as directed. 200 strip 1     blood glucose monitoring (ONE TOUCH ULTRASOFT) lancets Use to test blood sugar 2 times daily or as directed. 200 each 1     calcium carbonate (TUMS) 500 MG chewable tablet Take 1 chew tab by mouth 3 times daily as needed for heartburn        Cholecalciferol (VITAMIN D) 2000 UNIT tablet Take 2,000 Units by mouth daily. 90 tablet 3     diclofenac (VOLTAREN) 1 % topical gel Apply 4 grams to neck four times daily using enclosed dosing card. 200 g 1     finasteride (PROSCAR) 5 MG tablet Take 1 tablet (5 mg) by mouth daily 90 tablet 0     gabapentin (NEURONTIN) 300 MG capsule Take 900 mg three times daily 810 capsule 2     lisinopril (PRINIVIL/ZESTRIL) 20 MG tablet Take 1 tablet (20 mg) by mouth daily 90 tablet 0     Menthol, Topical Analgesic, (ICY HOT BACK EX) Externally apply topically daily as needed (neck pain)        METAMUCIL 1.7 GM OR WAFR TAKE AS DIRECTED       metFORMIN (GLUCOPHAGE-XR) 500 MG 24 hr tablet Take 2,000 mg by mouth every morning       omeprazole  (PRILOSEC) 20 MG DR capsule Take 1 capsule (20 mg) by mouth daily 90 capsule 2     order for DME Equipment being ordered: Medium compression stockings   20-30 mm 2 each 3     ORDER FOR DME Uses Cpap machine for sleep apnea       pravastatin (PRAVACHOL) 20 MG tablet TAKE 1 TABLET BY MOUTH  DAILY 90 tablet 1     propranolol (INDERAL) 60 MG tablet TAKE 1 TABLET BY MOUTH TWO  TIMES DAILY 180 tablet 3     tamsulosin (FLOMAX) 0.4 MG capsule Take 0.4 mg by mouth daily       terbinafine (LAMISIL) 1 % external cream Apply topically 2 times daily 42 g 3     triamcinolone (KENALOG) 0.5 % cream Apply  topically 2 times daily. to affected area as directed PRN 15 g 0       Allergies:     Allergies   Allergen Reactions     No Known Allergies        Social History:  Home situation: Lives in [facility] with his wife  Occupation/Schooling: computer data processing  Tobacco use: denies  Drug use: denies  Alcohol use: denies  History of chemical dependency treatment: denies  Mental health admissions: denies    Family history:  Family History   Problem Relation Age of Onset     Cancer Brother         MELANOMA     Diabetes Mother         AODM     Thyroid Disease Mother      Diabetes Father         AODM     Myocardial Infarction Father      Cerebrovascular Disease Brother      Family History Negative Brother      Family History Negative Sister      Family History Negative Son      Family History Negative Son      Family History Negative Son      Family History Negative Daughter      Coronary Artery Disease Brother         dod 2019     Cerebrovascular Disease Brother         dod 2019     Other Cancer Brother         dod 2000/melanoma     Breast Cancer Other         2017     Thyroid Disease Other        Review of Systems:    POSTIVE IN BOLD  GENERAL: fever/chills, fatigue, general unwell feeling, weight gain/loss.  HEAD/EYES:  headache, dizziness, or vision changes.    EARS/NOSE/THROAT:  Nosebleeds, hearing loss, sinus infection,  earache, tinnitus.  IMMUNE:  Allergies, cancer, immune deficiency, or infections.  SKIN:  Urticaria, rash, hives  HEME/Lymphatic:   anemia, easy bruising, easy bleeding.  RESPIRATORY:  cough, wheezing, or shortness of breath  CARDIOVASCULAR/Circulation:  Extremity edema, syncope, hypertension, tachycardia, or angina.  GASTROINTESTINAL:  abdominal pain, nausea/emesis, diarrhea, constipation,  hematochezia, or melena.  ENDOCRINE:  Diabetes, steroid use,  thyroid disease or osteoporosis.  MUSCULOSKELETAL: neck pain, back pain, arthralgia, arthritis, or gout.  GENITOURINARY:  frequency, urgency, dysuria, difficulty voiding, hematuria or incontinence.  NEUROLOGIC:  weakness, numbness, paresthesias, seizure, tremor, stroke or memory loss.  PSYCHIATRIC:  depression, anxiety, stress, suicidal thoughts or mood swings.     Physical Exam:  Vitals:    09/17/19 0947   BP: 137/79   Pulse: 62   SpO2: 97%   Weight: 121.2 kg (267 lb 1.6 oz)     Exam:  Constitutional: Well developed, well nourished, appears stated age.  HEENT: Head atraumatic, normocephalic. Eyes without conjunctival injection or jaundice. Oropharynx clear. Neck supple. No obvious neck masses.  Cardiovascular: Regular rate/rhythm; no murmurs/rubs/gallops appreciated.  Respiratory: Lungs clear to auscultation bilaterally, no wheezing/rales/rhonchi.   Gastrointestinal: Normoactive bowel sounds. Abdomen soft, obese, non-tender, without guarding/rebound.  Skin: No rash, lesions, or petechiae of exposed skin.   Extremities: Peripheral pulses intact. 2+ bilateral lower extremity edema  Psychiatric/mental status: Alert, without lethargy or stupor. Speech fluent. Appropriate affect. Mood normal. Able to follow commands without difficulty.     Musculoskeletal exam:  Gait/Station/Posture: Normal stance, arm swing, and stride; no antalgia or Trendelenburg  Normal bulk and tone. Unremarkable spinal curvature.      Lumbar spine:  Range of motion is mildly reduced in all  planes   Rotation/ext to right: pain free   Rotation/ext to left: pain free  Myofascial tenderness:  none  Focal tenderness: bilateral psis/sij tenderness. No gluteal, piriformis, GT, or IT tenderness  SLR: neg  Robert's maneuver: positive for back pain. Gaenslen's, yeoman's were positive on the bilaterally. Sacral compression positive bilaterally. Tanmay finger sign positive bilaterally.    Hip exam:   normal internal and external range of motion bilaterally.  Scour negative.       Neurologic exam:  CN:  Cranial nerves 2-12 are grossly intact  Motor Strength:  5/5 symmetric UE and LE strength    Reflexes:     Biceps C5:     R:  2/4 L: 2/4   Brachioradialis  C6: R:  2/4 L: 2/4   Triceps C7:  R:  2/4 L: 2/4   Patella L4:  R:  2/4 L: 2/4   Achilles S1:  R:  1/4 L: 1/4    Other reflexes:  Toes downgoing, No ankle clonus    Sensory:  (upper and lower extremities):   Light touch: normal       Assessment:  Mr. Fowler is an 84 year old with past medical history including: HTN, DM II, HLD, PENELOPE, Obesity, CAD, who presents for evaluation and treatment of the followin. Lumbar spondylosis, spinal stenosis and radiculopathy: He has had a laminectomy in the past presumably for stenosis/radicular symptoms although he doesn't recall the details. Notes improvement immediately after but onset of gradual back pain over the years.    2. Sacroiliac joint dysfunction: Left buttock and thigh pain treated over the years with left sacroiliac joint injection every 9-12 months with significant pain relief.     3. Subacute right thigh pain: Mr. Fowler notes onset of right buttock, thigh pain over the past 2-3 months. He is neurologically intact on examination today with findings suggestive of lumbar radiculopathy and sacroiliac joint dysfunction. We will obtain a lumbar MRI to help clear this.    4. Chronic left sided neck pain: Likely due to facet arthritis and overlying myofascial pain. Improvement noted with use of voltaren gel,  continue this for the time being.    Plan:  The following recommendations were given to the patient. Diagnosis, treatment options, risks, benefits, and alternatives were discussed, and all questions were answered. The patient expressed understanding of the plan for management.     I am recommending a multidisciplinary treatment plan to help this patient better manage his pain.  This includes:     1. Physical Therapy: will refer based on results of MRI  2. Self Care Recommendations: Gentle progressive exercise that does not increase pain - gradually increase daily walking program.  Take mini breaks - 5 minutes of mindfullness a couple times a day.   3. Diagnostic Studies: lumbar MRI ordered  4. Medication Management:   1. Continue voltaren gel as needed for the neck  2. Continue gabapentin 900mg TID  3. Continue tylenol 1000mg 3-4 times daily  2. Consider muscle relaxants as next step  5. Further procedures recommended: epidural vs right sacroiliac joint injection based on MRI result.  6. Follow up: 1 month post procedure in clinic      I spent 60 minutes of time face to face with the patient.  Greater than 50% of this time was spent in patient counseling and/or coordination of care regarding principles of multidisciplinary care, medication management, and treatment options as discussed above.         Nicholas Nuñez MD, [facility]

## 2019-09-17 ENCOUNTER — MYC MEDICAL ADVICE (OUTPATIENT)
Dept: FAMILY MEDICINE | Facility: CLINIC | Age: 84
End: 2019-09-17

## 2019-09-17 ENCOUNTER — OFFICE VISIT (OUTPATIENT)
Dept: PALLIATIVE MEDICINE | Facility: CLINIC | Age: 84
End: 2019-09-17
Attending: INTERNAL MEDICINE
Payer: COMMERCIAL

## 2019-09-17 VITALS
WEIGHT: 267.1 LBS | SYSTOLIC BLOOD PRESSURE: 137 MMHG | DIASTOLIC BLOOD PRESSURE: 79 MMHG | HEART RATE: 62 BPM | BODY MASS INDEX: 33.39 KG/M2 | OXYGEN SATURATION: 97 %

## 2019-09-17 DIAGNOSIS — M47.12 CERVICAL SPONDYLOSIS WITH MYELOPATHY: ICD-10-CM

## 2019-09-17 DIAGNOSIS — M47.816 SPONDYLOSIS OF LUMBAR REGION WITHOUT MYELOPATHY OR RADICULOPATHY: ICD-10-CM

## 2019-09-17 DIAGNOSIS — M53.3 SACROILIAC JOINT DYSFUNCTION: ICD-10-CM

## 2019-09-17 DIAGNOSIS — N40.0 BENIGN PROSTATIC HYPERPLASIA, UNSPECIFIED WHETHER LOWER URINARY TRACT SYMPTOMS PRESENT: Primary | ICD-10-CM

## 2019-09-17 DIAGNOSIS — M79.661 PAIN OF RIGHT LOWER LEG: Primary | ICD-10-CM

## 2019-09-17 DIAGNOSIS — Z98.890 HISTORY OF LUMBAR SURGERY: ICD-10-CM

## 2019-09-17 PROCEDURE — 99204 OFFICE O/P NEW MOD 45 MIN: CPT | Performed by: PHYSICAL MEDICINE & REHABILITATION

## 2019-09-17 ASSESSMENT — PAIN SCALES - GENERAL: PAINLEVEL: MILD PAIN (2)

## 2019-09-17 NOTE — Clinical Note
Thanks for the referral. Will obtain MRI and plan procedure accordingly.[facility]

## 2019-09-17 NOTE — PATIENT INSTRUCTIONS
1. Call [phone] to schedule an MRI.    2. Based on these results, I'll order an injection and send you to the proper type of physical therapy.    3. Continue your current pain medication regimen.  ----------------------------------------------------------------  Clinic Number:  [phone]     Call with any questions about your care and for scheduling assistance.     Calls are returned Monday through Friday between 8 AM and 4:30 PM. We usually get back to you within 2 business days depending on the issue/request.    If we are prescribing your medications:    For opioid medication refills, call the clinic or send a XGIMI message 7 days in advance.  Please include:    Name of requested medication    Name of the pharmacy.    For non-opioid medications, call your pharmacy directly to request a refill. Please allow 3-4 days to be processed.     Per [place] State Law:    All controlled substance prescriptions must be filled within 30 days of being written.      For those controlled substances allowing refills, pickup must occur within 30 days of last fill.      We believe regular attendance is key to your success in our program!      Any time you are unable to keep your appointment we ask that you call us at least 24 hours in advance to cancel.This will allow us to offer the appointment time to another patient.     Multiple missed appointments may lead to dismissal from the clinic.

## 2019-09-18 ENCOUNTER — HOSPITAL ENCOUNTER (OUTPATIENT)
Dept: MRI IMAGING | Facility: CLINIC | Age: 84
Discharge: HOME | End: 2019-09-18
Attending: PHYSICAL MEDICINE & REHABILITATION | Admitting: PHYSICAL MEDICINE & REHABILITATION
Payer: COMMERCIAL

## 2019-09-18 DIAGNOSIS — M79.661 PAIN OF RIGHT LOWER LEG: ICD-10-CM

## 2019-09-18 PROCEDURE — 72148 MRI LUMBAR SPINE W/O DYE: CPT

## 2019-09-18 RX ORDER — TAMSULOSIN HYDROCHLORIDE 0.4 MG/1
0.4 CAPSULE ORAL DAILY
Qty: 90 CAPSULE | Refills: 1 | Status: SHIPPED | OUTPATIENT
Start: 2019-09-18 | End: 2020-02-28

## 2019-09-18 NOTE — TELEPHONE ENCOUNTER
Flomax      Last Written Prescription Date:  unknown  Last Fill Quantity: unknown,   # refills: unknown  Last Office Visit: 8/8/19  Future Office visit:       Routing refill request to provider for review/approval because:  Medication is reported/historical    Please review and authorize if appropriate.    Thank you,   Brice BHAGAT RN

## 2019-09-19 ENCOUNTER — TELEPHONE (OUTPATIENT)
Dept: PALLIATIVE MEDICINE | Facility: CLINIC | Age: 84
End: 2019-09-19

## 2019-09-19 DIAGNOSIS — M54.16 LUMBAR RADICULOPATHY: Primary | ICD-10-CM

## 2019-09-19 RX ORDER — TAMSULOSIN HYDROCHLORIDE 0.4 MG/1
0.4 CAPSULE ORAL DAILY
Qty: 15 CAPSULE | Refills: 0 | Status: SHIPPED | OUTPATIENT
Start: 2019-09-19 | End: 2019-10-31

## 2019-09-19 NOTE — TELEPHONE ENCOUNTER
Pre-screening Questions for Radiology Injections:    Injection to be done at which interventional clinic site? [facility] [place]    Instruct patient to arrive as directed prior to the scheduled appointment time:    [place] minutes before      [place]: 30 minutes before; if IV needed 1 hour before     Procedure ordered by Dr. Nuñez    Procedure ordered? Lumbar ANSELMO        Transforaminal Cervical ANSELMO - Dr. Rosy Blanco ONLY    What insurance would patient like us to bill for this procedure? [facility]      Worker's comp or MVA (motor vehicle accident) -Any injection DO NOT SCHEDULE and route to Ayla Kasi.      [employer] insurance - For SI joint injections, DO NOT SCHEDULE and route Ayla Villaseñor.       [employer] - Any injection besides hip/shoulder/knee joint DO NOT SCHEDULE and route to Ayla Kasi. She will obtain PA and call pt back to schedule procedure or notify pt of denial.        [employer]-Route to Ayla for review      IF SCHEDULING IN [place] AND NEEDS A PA, IT IS OKAY TO SCHEDULE. [place] HANDLES THEIR OWN PA'S AFTER THE PATIENT IS SCHEDULED. PLEASE SCHEDULE AT LEAST 1 WEEK OUT SO A PA CAN BE OBTAINED.      Any chance of pregnancy? NO   If YES, do NOT schedule and route to RN pool    Is an  needed? No     Patient has a drive home? (mandatory) YES: INFORMED    Is patient taking any blood thinners (plavix, coumadin, jantoven, warfarin, heparin, pradaxa or dabigatran )? No   If hold needed, do NOT schedule, route to RN pool     Is patient taking any aspirin products (includes Excedrin and Fiorinal)? Yes - Pt takes 81mg daily; instructed to hold 0 day(s) prior to procedure.      If more than 325mg/day do NOT schedule; route to RN pool     For CERVICAL procedures, hold all aspirin products for 6 days.     Tell pt that if aspirin product is not held for 6 days, the procedure WILL BE cancelled.      Does the patient have a bleeding or clotting disorder? No     If YES, okay to schedule AND route to RN  nurse pool    For any patients with platelet count <100, must be forwarded to provider    Is patient diabetic?  Yes  If YES, have them bring their glucometer.    Does patient have an active infection or treated for one within the past week? No     Is patient currently taking any antibiotics?  No     For patients on chronic, preventative, or prophylactic antibiotics, procedures may be scheduled.     For patients on antibiotics for active or recent infection:antibiotic course must have been completed for 4 days    Is patient currently taking any steroid medications? (i.e. Prednisone, Medrol)  No     For patients on steroid medications, course must have been completed for 4 days    Reviewed with patient:  If you are started on any steroids or antibiotics between now and your appointment, you must contact us because the procedure may need to be cancelled.  Yes    Is patient actively being treated for cancer or immunocompromised? No  If YES, do NOT schedule and route to RN pool     Are you able to get on and off an exam table with minimal or no assistance? Yes  If NO, do NOT schedule and route to RN pool    Are you able to roll over and lay on your stomach with minimal or no assistance? Yes  If NO, do NOT schedule and route to RN pool     Any allergies to contrast dye, iodine, shellfish, or numbing and steroid medications? No  If YES, route to RN pool AND add allergy information to appointment notes    Allergies: No known allergies      Has the patient had a flu shot or any other vaccinations within 7 days before or after the procedure.  No     Does patient have an MRI/CT?  YES: MRI  (SI joint, hip injections, lumbar sympathetic blocks, and stellate ganglion blocks do not require an MRI)    Was the MRI done w/in the last 3 years?  Yes    Was MRI done at [place]? Yes      If not, where was it done? N/A       If MRI was not done at [place], [facility] do NOT schedule and route to nursing.  If pt has an  imaging disc, the injection may be scheduled but pt has to bring disc to appt. If they show up w/out disc the injection cannot be done    Reminders (please tell patient if applicable):       Instructed pt to arrive 30 minutes early for IV start if this is for a cervical procedure, ALL sympathetic (stellate ganglion, hypogastric, or lumbar sympathetic block) and all sedation procedures (RFA, spinal cord stimulation trials).  Not Applicable   -IVs are not routinely placed for Dr. Burnett cervical cases   -Dr. Rowland: IVs for cervical ESIs and cervical TBDs (not CMBBs/facet inj)      If NPO for sedation, informed patient that it is okay to take medications with sips of water (except if they are to hold blood thinners).  Not Applicable   *DO take blood pressure medication if it is prescribed*      If this is for a cervical ANSELMO, informed patient that aspirin needs to be held for 6 days.   Not Applicable      For all patients not having spinal cord stimulator (SCS) trials or radiofrequency ablations (RFAs), informed patient:    IV sedation is not provided for this procedure.  If you feel that an oral anti-anxiety medication is needed, you can discuss this further with your referring provider or primary care provider.  The Pain Clinic provider will discuss specifics of what the procedure includes at your appointment.  Most procedures last 10-20 minutes.  We use numbing medications to help with any discomfort during the procedure.  Not Applicable      Do not schedule procedures requiring IV placement in the first appointment of the day or first appointment after lunch. Do NOT schedule at 0745, 0815 or 1245. N/A      For patients 85 or older we recommend having an adult stay w/ them for the remainder of the day.   N/A    Does the patient have any questions?  NO  Teetee Singh  [facility]

## 2019-09-20 ENCOUNTER — MYC MEDICAL ADVICE (OUTPATIENT)
Dept: FAMILY MEDICINE | Facility: CLINIC | Age: 84
End: 2019-09-20

## 2019-09-20 DIAGNOSIS — E11.59 TYPE 2 DIABETES MELLITUS WITH VASCULAR DISEASE (H): Primary | ICD-10-CM

## 2019-09-20 DIAGNOSIS — N40.0 BENIGN PROSTATIC HYPERPLASIA, UNSPECIFIED WHETHER LOWER URINARY TRACT SYMPTOMS PRESENT: ICD-10-CM

## 2019-09-20 RX ORDER — TAMSULOSIN HYDROCHLORIDE 0.4 MG/1
0.4 CAPSULE ORAL DAILY
Qty: 15 CAPSULE | Refills: 0 | Status: CANCELLED | OUTPATIENT
Start: 2019-09-20

## 2019-09-20 NOTE — TELEPHONE ENCOUNTER
We sent tamsulosin  in yesterday 90 day to [facility] and short supply to local [facility] until mail order arrives.      Also requesting metformin which is historical, send to provider after tamsulosin sorted out.       tamsulosin (FLOMAX) 0.4 MG capsule 90 capsule 1 9/18/2019  No   Sig - Route: Take 1 capsule (0.4 mg) by mouth daily        tamsulosin (FLOMAX) 0.4 MG capsule 15 capsule 0 9/19/2019  --   Sig - Route: Take 1 capsule (0.4 mg) by mouth daily - Oral   Sent to pharmacy as: tamsulosin (FLOMAX) 0.4 MG capsule   Class: E-Prescribe   Notes to Pharmacy: Short supply to last until Rx arrives from mail order       metFORMIN (GLUCOPHAGE-XR) 500 MG 24 hr tablet     --   Sig - Route: Take 2,000 mg by mouth every morning - Oral   Class: Historical     Last Written Prescription Date:  ----- HISTORICAL  Last Fill Quantity: ---,  # refills: ---   Last office visit: 8/8/2019 with prescribing provider:     Future Office Visit:      Requested Prescriptions   Pending Prescriptions Disp Refills     metFORMIN (GLUCOPHAGE-XR) 500 MG 24 hr tablet       Sig: Take 4 tablets (2,000 mg) by mouth every morning       Biguanide Agents Passed - 9/20/2019  3:50 PM        Passed - Blood pressure less than 140/90 in past 6 months     BP Readings from Last 3 Encounters:   09/17/19 137/79   08/08/19 121/72   06/28/19 134/79                 Passed - Patient has documented LDL within the past 12 mos.     Recent Labs   Lab Test 03/05/19  0728   LDL 45             Passed - Patient has had a Microalbumin in the past 15 mos.     Recent Labs   Lab Test 04/17/19  1105   MICROL 7   UMALCR 7.33             Passed - Patient is age 10 or older        Passed - Patient has documented A1c within the specified period of time.     If HgbA1C is 8 or greater, it needs to be on file within the past 3 months.  If less than 8, must be on file within the past 6 months.     Recent Labs   Lab Test 06/19/19  0855   A1C 6.1*             Passed - Patient's CR is NOT>1.4 OR  "Patient's EGFR is NOT<45 within past 12 mos.     Recent Labs   Lab Test 06/28/19  1127   GFRESTIMATED 89   GFRESTBLACK >90       Recent Labs   Lab Test 06/28/19  1127   CR 0.67             Passed - Patient does NOT have a diagnosis of CHF.        Passed - Medication is active on med list        Passed - Recent (6 mo) or future (30 days) visit within the authorizing provider's specialty     Patient had office visit in the last 6 months or has a visit in the next 30 days with authorizing provider or within the authorizing provider's specialty.  See \"Patient Info\" tab in inbasket, or \"Choose Columns\" in Meds & Orders section of the refill encounter.            tamsulosin (FLOMAX) 0.4 MG capsule 15 capsule 0     Sig: Take 1 capsule (0.4 mg) by mouth daily       Alpha Blockers Passed - 9/20/2019  3:50 PM        Passed - Blood pressure under 140/90 in past 12 months     BP Readings from Last 3 Encounters:   09/17/19 137/79   08/08/19 121/72   06/28/19 134/79                 Passed - Recent (12 mo) or future (30 days) visit within the authorizing provider's specialty     Patient had office visit in the last 12 months or has a visit in the next 30 days with authorizing provider or within the authorizing provider's specialty.  See \"Patient Info\" tab in inbasket, or \"Choose Columns\" in Meds & Orders section of the refill encounter.              Passed - Patient does not have Tadalafil, Vardenafil, or Sildenafil on their medication list        Passed - Medication is active on med list        Passed - Patient is 18 years of age or older            "

## 2019-09-23 RX ORDER — METFORMIN HCL 500 MG
1000 TABLET, EXTENDED RELEASE 24 HR ORAL 2 TIMES DAILY WITH MEALS
Qty: 360 TABLET | Refills: 1 | Status: SHIPPED | OUTPATIENT
Start: 2019-09-23 | End: 2020-02-25

## 2019-09-28 ENCOUNTER — HEALTH MAINTENANCE LETTER (OUTPATIENT)
Age: 84
End: 2019-09-28

## 2019-10-02 ENCOUNTER — HOSPITAL ENCOUNTER (OUTPATIENT)
Facility: CLINIC | Age: 84
Discharge: HOME | End: 2019-10-02
Attending: PHYSICAL MEDICINE & REHABILITATION | Admitting: PHYSICAL MEDICINE & REHABILITATION
Payer: COMMERCIAL

## 2019-10-02 ENCOUNTER — HOSPITAL ENCOUNTER (OUTPATIENT)
Dept: GENERAL RADIOLOGY | Facility: CLINIC | Age: 84
End: 2019-10-02
Attending: PHYSICAL MEDICINE & REHABILITATION | Admitting: PHYSICAL MEDICINE & REHABILITATION
Payer: COMMERCIAL

## 2019-10-02 VITALS — HEART RATE: 54 BPM | SYSTOLIC BLOOD PRESSURE: 148 MMHG | DIASTOLIC BLOOD PRESSURE: 70 MMHG | RESPIRATION RATE: 16 BRPM

## 2019-10-02 VITALS — DIASTOLIC BLOOD PRESSURE: 70 MMHG | HEART RATE: 55 BPM | OXYGEN SATURATION: 98 % | SYSTOLIC BLOOD PRESSURE: 143 MMHG

## 2019-10-02 DIAGNOSIS — M54.16 LUMBAR RADICULOPATHY: ICD-10-CM

## 2019-10-02 PROCEDURE — 25500064 ZZH RX 255 OP 636: Performed by: PHYSICAL MEDICINE & REHABILITATION

## 2019-10-02 PROCEDURE — 25000128 H RX IP 250 OP 636: Performed by: PHYSICAL MEDICINE & REHABILITATION

## 2019-10-02 PROCEDURE — 25000125 ZZHC RX 250: Performed by: PHYSICAL MEDICINE & REHABILITATION

## 2019-10-02 PROCEDURE — 62323 NJX INTERLAMINAR LMBR/SAC: CPT

## 2019-10-02 PROCEDURE — 40000863 ZZH STATISTIC RADIOLOGY XRAY, US, CT, MAR, NM

## 2019-10-02 RX ORDER — IOPAMIDOL 408 MG/ML
10 INJECTION, SOLUTION INTRATHECAL ONCE
Status: COMPLETED | OUTPATIENT
Start: 2019-10-02 | End: 2019-10-02

## 2019-10-02 RX ORDER — TRIAMCINOLONE ACETONIDE 40 MG/ML
40 INJECTION, SUSPENSION INTRA-ARTICULAR; INTRAMUSCULAR ONCE
Status: COMPLETED | OUTPATIENT
Start: 2019-10-02 | End: 2019-10-02

## 2019-10-02 RX ADMIN — LIDOCAINE HYDROCHLORIDE 4 ML: 10 INJECTION, SOLUTION EPIDURAL; INFILTRATION; INTRACAUDAL; PERINEURAL at 13:55

## 2019-10-02 RX ADMIN — TRIAMCINOLONE ACETONIDE 40 MG: 40 INJECTION, SUSPENSION INTRA-ARTICULAR; INTRAMUSCULAR at 13:58

## 2019-10-02 RX ADMIN — IOPAMIDOL 1 ML: 408 INJECTION, SOLUTION INTRATHECAL at 13:57

## 2019-10-02 NOTE — DISCHARGE INSTRUCTIONS
[facility] Procedure Discharge Instructions    Today you saw:  Dr Nicholas Nuñez    Procedure:  Lumbar Epidural    Medications used:  Lidocaine    Kenalog -  Isovue M200   Normal saline    After you go home:      You may resume your normal diet    It is recommended that a responsible adult stay with you for 6 hours if you received sedation       If you received sedation before, during or after your procedure:      For 24 hours -     Relax and take it easy    Do NOT make any important or legal decisions    Do NOT drive or operate machines at home or at work    Do NOT drink alcohol    Care of Puncture Site:      If you have a bandaid on your puncture site, you may remove it the next morning    You may shower     No bath tubs, whirlpools or swimming for 24 hours after your procedure     Activity:      You may go back to normal activity in 24 hours    Avoid strenuous activity for the first 24 hours.    Be cautious when walking. Numbness and/or weakness in the lower extremities may occur for up to 6-8 hours after the procedure due to effect of the numbing medication used.    Do not drive for 6 hours.  The effect of the numbing medication could slow your reflexes.    Medicines:      You may resume all medications    Resume your Warfarin/Coumadin at your regular dose tonight. Follow up with your provider to have your INR rechecked    Resume your Plavix/Clopidogrel and Aspirin in 12 hours    If you are taking a different blood thinner, follow the directions provided by the Pain Clinic RN for restarting the medication    For minor pain, you may use anti-inflammatory medications - (such as Ibuprofen, Aleve, Advil) or Acetaminophen (Tylenol) for pain control if necessary.    Pain:       You may have a mild to moderate increase in pain for several days following the injection.    It may take up to 14 days for the steroid medication to start working although you may feel the effect as early as a few days  after the procedure.    You may use ice packs for 10-15 minutes, 3 to 4 times a day at the injection site for comfort.    Do not use heat to painful areas for 6 to 8 hours.  This will give the numbing medication time to wear off and prevent you from accidentally burning your skin.    Call the Pain Clinic if you experience any of the following:      You have chills or a fever greater than 100 F     Swelling, bleeding, redness, drainage, warmth at the injection site    Progressive weakness or numbness in your legs or arms    If lumbar, call if you have a loss of bowel or bladder function    If cervical, call if you have any unusual headache that is not relieved by Tylenol or other pain medication    Unusual new onset of pain that is not improving    Other Instructions:      If you are diabetic, check your blood sugar more frequently than usual as your blood sugar may be higher than normal for 10-14 days following a steroid injection.  Contact the provider who manages your diabetes to help you control your blood sugar if needed.      If you have questions call:        Pain Clinic @ [phone] during business hours  Monday-Thursday 8 AM-5:30 PM and Friday 8 AM-4:30 PM    Provider Line @ [phone] after hours

## 2019-10-02 NOTE — PROGRESS NOTES
[facility] Discharge Nursing Note    Education/questions answered: Yes.  Discharge instructions reviewed and questions answered.    Patient DC location: Home  Accompanied by: Spouse  CS discharge time: ~1430

## 2019-10-02 NOTE — BRIEF OP NOTE
[facility] Brief Operative Note    RADIOLOGY POST PROCEDURE NOTE    Patient name: Austen Fowler  MRN: [id number]  : 1935    Pre-procedure diagnosis: lumbar spinal stenosis  Post-procedure diagnosis: Same    Procedure Date/Time: 2019  2:05 PM  Procedure: lumbar epidural steroid injection   Estimated blood loss: None  Specimen(s) collected with description: none    The patient tolerated the procedure well with no immediate complications.  Significant findings:none    See imaging dictation for procedural details.    Provider name: Nicholas Nuñez MD  Assistant(s):None

## 2019-10-07 ENCOUNTER — OFFICE VISIT (OUTPATIENT)
Dept: SLEEP MEDICINE | Facility: CLINIC | Age: 84
End: 2019-10-07
Attending: INTERNAL MEDICINE
Payer: COMMERCIAL

## 2019-10-07 VITALS
OXYGEN SATURATION: 95 % | SYSTOLIC BLOOD PRESSURE: 114 MMHG | HEIGHT: 75 IN | BODY MASS INDEX: 33.2 KG/M2 | DIASTOLIC BLOOD PRESSURE: 62 MMHG | HEART RATE: 59 BPM | RESPIRATION RATE: 20 BRPM | WEIGHT: 267 LBS

## 2019-10-07 DIAGNOSIS — G47.33 OSA (OBSTRUCTIVE SLEEP APNEA): ICD-10-CM

## 2019-10-07 PROCEDURE — 99215 OFFICE O/P EST HI 40 MIN: CPT | Performed by: PHYSICIAN ASSISTANT

## 2019-10-07 ASSESSMENT — MIFFLIN-ST. JEOR: SCORE: 1991.73

## 2019-10-07 NOTE — NURSING NOTE
"/62   Pulse 59   Resp 20   Ht 1.905 m (6' 3\")   Wt 121.1 kg (267 lb)   SpO2 95%   BMI 33.37 kg/m      ESS-13  Neck size 18\"/46cm  DME-None  Med rec-complete.  Marianela Pizarro, Medical Assistant 10/7/2019 8:59 AM    "

## 2019-10-07 NOTE — PROGRESS NOTES
Sleep Consultation:    Date on this visit: 10/7/2019    Austen Fowler is sent by Hamzah Hodge for a sleep consultation regarding PENELOPE.    Primary Physician: Hamzah Hodge Michael Fowler presents for further management of previously diagnosed PENELOPE. His medical history is significant for neck and back pain, DM 2, HTN, CAD (s/p double bypass).     He was initially diagnosed at [facility] in 1/3/2007. His AHI was 53/hr, RDI 66/hr, O2 daniel 86%. That was performed at the request of his cardiologist. Weight was 291.    He is on auto CPAP 5-15 cm. He has an old ResMed S8 machine. He presents at the request of his primary provider and wife to get his apnea treatment. He was getting supplies through [employer]. He does not remember the last time he got new supplies. He uses a full face mask, mirage quattro large. He has never replaced his machine. He says the pressures are not uncomfortable, but the mask does not fit very well anymore because it is very old. He has tried a nasal pillows mask, but it did not stay in place. The air pressure sometimes feels it may be too low. He occasionally gets a dry mouth/throat. He does use the humidifier. He notices some benefit to his sleep quality when the mask fits well. Snoring is not reported with the CPAP. He usually does not put the mask back on after getting up for the restroom.    The compliance data shows that he has used the CPAP for 179/387 nights, 6% of nights for >4 hours.  The 95th% pressure is not reported.  The 95th% leak is not reported.  The average nightly usage is 1:12, median is 2:29.  The average AHI is not reported.      Austen goes to sleep at 11:00 PM during the week. He wakes up at 7:00-8:00 AM without an alarm. He falls asleep in 15 minutes.  Austen denies difficulty falling asleep.  He wakes up 2-4 times a night for 10 minutes before falling back to sleep.  Austen wakes up to go to the bathroom.  His schedule on weekends is the same.  Patient gets an average of  7-7.5 hours of sleep per night.     Patient does not use electronics in bed, watch TV in bed, worry in bed about anything (for the most part, depends on what is going on) and read in bed.     Austen is retired. 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  He lives with his wife.    Austen does snore without CPAP. His wife occasionally prompts him to get the mask on. Patient does have a regular bed partner. His wife has not recently commented on observing pauses in breathing lately. They never sleep separately.  Patient sleeps on his back and side. He has occasional morning dry mouth, denies snort arousals, morning headaches, morning confusion and restless legs. He is on 900 mg gabapentin 3 times daily, for back pain. 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    Austen has anxiety, denies difficulty breathing through his nose, claustrophobia, reflux at night, heartburn and depression.  He had a sinus surgery in 1995.     Austen has lost 20-25 pounds in 12 years.  Patient describes themself as a morning person.  He would prefer to go to sleep at 11:00 PM and wake up at 7:00-8:00 AM.  Patient's [place] Sleepiness score 10/24 consistent with mild daytime sleepiness.      Austen naps 2 times per week for 30 minutes, feels refreshed after naps. He takes some inadvertant naps, primarily only if he did not sleep well the night before.  He admits drowsiness while driving, primarily when much younger and driving more. Patient was counseled on the importance of driving while alert, to pull over if drowsy, or nap before getting into the vehicle if sleepy.  He uses 0-1 cups/day of coffee/soda. Last caffeine intake is usually before noon.    Allergies:    Allergies   Allergen Reactions     No Known Allergies        Medications:    Current Outpatient Medications    Medication Sig Dispense Refill     acetaminophen (ACETAMIN) 500 MG tablet Take 1,000 mg by mouth 2 times daily as needed for pain        amLODIPine (NORVASC) 5 MG tablet Take 1 tablet (5 mg) by mouth 2 times daily 60 tablet 4     aspirin 81 MG EC tablet Take 81 mg by mouth four times a week        blood glucose (ONETOUCH ULTRA) test strip Use to test blood sugar 2 times daily or as directed. 200 strip 1     blood glucose monitoring (ONE TOUCH ULTRASOFT) lancets Use to test blood sugar 2 times daily or as directed. 200 each 1     calcium carbonate (TUMS) 500 MG chewable tablet Take 1 chew tab by mouth 3 times daily as needed for heartburn        Cholecalciferol (VITAMIN D) 2000 UNIT tablet Take 2,000 Units by mouth daily. 90 tablet 3     diclofenac (VOLTAREN) 1 % topical gel Apply 4 grams to neck four times daily using enclosed dosing card. 200 g 1     finasteride (PROSCAR) 5 MG tablet Take 1 tablet (5 mg) by mouth daily 90 tablet 0     gabapentin (NEURONTIN) 300 MG capsule Take 900 mg three times daily 810 capsule 2     lisinopril (PRINIVIL/ZESTRIL) 20 MG tablet Take 1 tablet (20 mg) by mouth daily 90 tablet 0     Menthol, Topical Analgesic, (ICY HOT BACK EX) Externally apply topically daily as needed (neck pain)        METAMUCIL 1.7 GM OR WAFR TAKE AS DIRECTED       metFORMIN (GLUCOPHAGE-XR) 500 MG 24 hr tablet Take 2 tablets (1,000 mg) by mouth 2 times daily (with meals) 360 tablet 1     omeprazole (PRILOSEC) 20 MG DR capsule Take 1 capsule (20 mg) by mouth daily 90 capsule 2     order for DME Equipment being ordered: Medium compression stockings   20-30 mm 2 each 3     ORDER FOR DME Uses Cpap machine for sleep apnea       pravastatin (PRAVACHOL) 20 MG tablet TAKE 1 TABLET BY MOUTH  DAILY 90 tablet 1     propranolol (INDERAL) 60 MG tablet TAKE 1 TABLET BY MOUTH TWO  TIMES DAILY 180 tablet 3     tamsulosin (FLOMAX) 0.4 MG capsule Take 1 capsule (0.4 mg) by mouth daily 15 capsule 0     tamsulosin (FLOMAX) 0.4 MG  capsule Take 1 capsule (0.4 mg) by mouth daily 90 capsule 1     terbinafine (LAMISIL) 1 % external cream Apply topically 2 times daily 42 g 3     triamcinolone (KENALOG) 0.5 % cream Apply  topically 2 times daily. to affected area as directed PRN 15 g 0       Problem List:  Patient Active Problem List    Diagnosis Date Noted     Cellulitis of right lower extremity 06/18/2019     Priority: Medium     Neck pain 11/13/2017     Priority: Medium     Non morbid obesity, unspecified obesity type 08/05/2016     Priority: Medium     Left ventricular diastolic dysfunction      Priority: Medium     Ascending aorta dilatation (H)      Priority: Medium     Aortic root dilatation (H)      Priority: Medium     Coronary artery disease involving native coronary artery of native heart without angina pectoris 02/04/2016     Priority: Medium     CABG 1992: LIMA to LAD, SANDI to RCA, SVG to OM1 and OM2       Health Care Home 05/05/2015     Priority: Medium     State Tier Level:  unknown  Status:  in [facility] start of care 5/3/15; Deb Vora  [phone]  Care Coordinator:  Cathy Brand    EMERGENCY CARE PLAN  Presenting Problem Signs and Symptoms Treatment Plan    Questions or conerns during clinic hours    I will call the clinic directly     Questions or conerns outside clinic hours    I will call the 24 hour nurse line at [phone]    Patient needs to schedule an appointment    I will call the 24 hour scheduling team at [phone] or clinic directly    Same day treatment     I will call the clinic first, nurse line if after hours, urgent care and express care if needed                          Date:  May 5, 2015         S/P lumbar laminectomy 04/29/2015     Priority: Medium     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) 03/12/2015     Priority: Medium     Mixed hyperlipidemia      Priority: Medium     Low HDL (under 40) 03/28/2014     Priority: Medium     PENELOPE (obstructive sleep apnea)  03/11/2013     Priority: Medium     Lumbosacral radiculitis 09/06/2012     Priority: Medium     Advanced directives, counseling/discussion 05/22/2012     Priority: Medium     Discussed advance care planning with patient; however, patient declined at this time. 5/22/2012          Anxiety 03/29/2011     Priority: Medium     Chronic low back pain 03/29/2011     Priority: Medium     Sacroiliitis (H) 03/29/2011     Priority: Medium     Infected sebaceous cyst 07/17/2009     Priority: Medium     Impacted cerumen 03/09/2009     Priority: Medium     Cervicalgia 08/03/2007     Priority: Medium     Benign neoplasm of skin of other and unspecified parts of face 08/03/2007     Priority: Medium     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      Priority: Medium     sleep apnea  Problem list name updated by automated process. Provider to review       Slowing of urinary stream 01/19/2004     Priority: Medium     Essential hypertension, benign 08/01/2003     Priority: Medium     Rash and other nonspecific skin eruption 08/01/2003     Priority: Medium        Past Medical/Surgical History:  Past Medical History:   Diagnosis Date     Anxiety state, unspecified      Aortic root dilatation (H)      Arthritis      Ascending aorta dilatation (H)      Basal cell cancer     s/p Mohs nose and bridge of nose on R.     Bunion      CAD (coronary artery disease)     CABG 1992: LIMA to LAD, SANDI to RCA, SVG to OM1 and OM2     Contact dermatitis and other eczema, due to unspecified cause     eczema - has light treatments with Dr. Rivera     Disorders of bursae and tendons in shoulder region, unspecified      Esophageal reflux      Essential hypertension, benign      Gallbladder disease      GERD (gastroesophageal reflux disease)      Heart attack (H)      Hyperlipidemia LDL goal <70      Left ventricular diastolic dysfunction      Low HDL (under 40) 3/28/2014     Nasal/sinus dis NEC     s/p surgery in 1995     Obesity       Osteoarthrosis, unspecified whether generalized or localized, shoulder region      Other hammer toe (acquired)      Sleep apnea     USES CPAP     Spinal stenosis, unspecified region other than cervical     MRI done 2006     Type 2 diabetes, HbA1c goal < 7% (H) 3/12/2015     Urge incontinence      Past Surgical History:   Procedure Laterality Date     ABDOMEN SURGERY  1994    gall bladder     BIOPSY      arms     C CABG, ARTERY-VEIN, FOUR  11/1992    CABG - LIMA to left anterior descending and saphenous vein bypass graft to the first and second obtuse marginal branch of the circumflex and the right mammary to the right coronary artery     C NONSPECIFIC PROCEDURE  6-94    Gail lap     C NONSPECIFIC PROCEDURE  1995    sinus surgery     C NONSPECIFIC PROCEDURE      bilateral cataract surgery     CHOLECYSTECTOMY  6/1994     COLONOSCOPY N/A 4/11/2017    Procedure: COMBINED COLONOSCOPY, SINGLE OR MULTIPLE BIOPSY/POLYPECTOMY BY BIOPSY;  Surgeon: Bladimir Garner MD;  Location:  GI     CV LEFT HEART CATH  5-92, 6-92    Angioplasty     ENT SURGERY  5/1995    sinus surgery     ESOPHAGOSCOPY, GASTROSCOPY, DUODENOSCOPY (EGD), DILATATION, COMBINED  7/17/2014    Procedure: COMBINED ESOPHAGOSCOPY, GASTROSCOPY, DUODENOSCOPY (EGD), DILATATION;  Surgeon: Bladimir Garner MD;  Location:  GI     EYE SURGERY      cataracts removed both eyes     HC COLONOSCOPY THRU STOMA W BIOPSY/CAUTERY TUMOR/POLYP/LESION  5/2007     INJECT EPIDURAL LUMBAR       LAMINECTOMY LUMBAR TWO LEVELS N/A 4/29/2015    Procedure: LAMINECTOMY LUMBAR TWO LEVELS;  Surgeon: Bladimir Keenan MD;  Location:  OR     THORACIC SURGERY  11/1992    bypass       Social History:  Social History     Socioeconomic History     Marital status:      Spouse name: Anastasia Caballero     Number of children: 4     Years of education: 14     Highest education level: Not on file   Occupational History     Occupation: retired     Comment:    Social Needs      Financial resource strain: Not on file     Food insecurity:     Worry: Not on file     Inability: Not on file     Transportation needs:     Medical: Not on file     Non-medical: Not on file   Tobacco Use     Smoking status: Former Smoker     Packs/day: 2.00     Years: 30.00     Pack years: 60.00     Types: Cigarettes, Cigars, Pipe     Start date:      Last attempt to quit: 3/1/1992     Years since quittin.6     Smokeless tobacco: Never Used     Tobacco comment: quit in    Substance and Sexual Activity     Alcohol use: Yes     Alcohol/week: 0.0 standard drinks     Comment: minimal less than 1/week     Drug use: No     Sexual activity: Not Currently     Partners: Female     Birth control/protection: Post-menopausal   Lifestyle     Physical activity:     Days per week: Not on file     Minutes per session: Not on file     Stress: Not on file   Relationships     Social connections:     Talks on phone: Not on file     Gets together: Not on file     Attends Christian service: Not on file     Active member of club or organization: Not on file     Attends meetings of clubs or organizations: Not on file     Relationship status: Not on file     Intimate partner violence:     Fear of current or ex partner: Not on file     Emotionally abused: Not on file     Physically abused: Not on file     Forced sexual activity: Not on file   Other Topics Concern      Service Yes     Comment: [employer], served in [employer]     Blood Transfusions Yes     Comment: Unsure if he had one with heart surgery     Caffeine Concern Yes     Comment: occ cofee     Occupational Exposure No     Hobby Hazards No     Sleep Concern Yes     Comment: CPAP     Stress Concern No     Weight Concern Not Asked     Special Diet No     Back Care Not Asked     Exercise Yes     Comment: [employer] 3 times a week, biking walking.      Bike Helmet Yes     Seat Belt Yes     Self-Exams Yes     Comment: does HIRAL about once a month.     Parent/sibling w/ CABG,  MI or angioplasty before 65F 55M? No   Social History Narrative        4 kids       Family History:  Family History   Problem Relation Age of Onset     Cancer Brother         MELANOMA     Diabetes Mother         AODM     Thyroid Disease Mother      Diabetes Father         AODM     Myocardial Infarction Father      Cerebrovascular Disease Brother      Family History Negative Brother      Family History Negative Sister      Family History Negative Son      Family History Negative Son      Family History Negative Son      Family History Negative Daughter      Coronary Artery Disease Brother         dod 2019     Cerebrovascular Disease Brother         dod 2019     Other Cancer Brother         dod 2000/melanoma     Breast Cancer Other         2017     Thyroid Disease Other        Review of Systems:  A complete review of systems reviewed by me is negative with the exeption of what has been mentioned in the history of present illness.  CONSTITUTIONAL: NEGATIVE for weight gain/loss, fever, chills, sweats or night sweats, drug allergies.  EYES: NEGATIVE for blind spots, double vision.  EYES:  POSITIVE for changes in vision  ENT: NEGATIVE for ear pain, sore throat, sinus pain, runny nose, bloody nose  ENT:  POSITIVE for  post-nasal drip  CARDIAC: NEGATIVE for fast heartbeats or fluttering in chest, chest pain or pressure, breathlessness when lying flat.  CARDIAC:  POSITIVE for  swollen legs, swollen feet and heart disease  NEUROLOGIC: NEGATIVE weakness or numbness in the arms or legs.  NEUROLOGIC:  POSITIVE for  headaches  DERMATOLOGIC: NEGATIVE for rashes, new moles or change in mole(s)  PULMONARY: NEGATIVE SOB at rest, dry cough, coughing up blood, wheezing or whistling when breathing.    PULMONARY:  POSITIVE for  SOB with activity and productive cough  GASTROINTESTINAL: NEGATIVE for nausea or vomitting, loose or watery stools, fat or grease in stools, abdominal pain, bowel movements black in color or blood  "noted.  GASTROINTESTINAL:  POSITIVE for  constipation  GENITOURINARY: NEGATIVE for pain during urination, blood in urine, irregular menstrual periods.  GENITOURINARY:  POSITIVE for  urinating more frequently than usual  MUSCULOSKELETAL: NEGATIVE for muscle pain, swollen joints.  MUSCULOSKELETAL:  POSITIVE for  bone or joint pain-hip, and low back pain  ENDOCRINE: NEGATIVE for increased thirst or urination.  ENDOCRINE:  POSITIVE for  diabetes  LYMPHATIC: NEGATIVE for swollen lymph nodes, lumps or bumps in the breasts or nipple discharge.  MENTAL HEALTH: POSITIVE for anxiety    Physical Examination:  Vitals: /62   Pulse 59   Resp 20   Ht 1.905 m (6' 3\")   Wt 121.1 kg (267 lb)   SpO2 95%   BMI 33.37 kg/m      Neck Cir (cm): 46 cm    [place] Total Score 10/7/2019   Total score - [place] 13       JESSICA Total Score: 6 (10/07/19 0848)    GENERAL APPEARANCE: healthy, alert, no distress and cooperative  EYES: Eyes grossly normal to inspection, PERRL, conjunctivae and sclerae normal and lids and lashes normal  HENT: nose and mouth without ulcers or lesions, oral mucous membranes moist and oropharynx clear  NECK: no adenopathy, no asymmetry, masses, or scars, thyroid normal to palpation and trachea midline and normal to palpation  RESP: lungs clear to auscultation - no rales, rhonchi or wheezes  CV: regular rates and rhythm, normal S1 S2, no S3 or S4, no murmur, click or rub and no irregular beats  LYMPHATICS: no cervical adenopathy  MS: extremities normal- no gross deformities noted and pitting 1+ lower extremity edema bilaterally  NEURO: Normal strength and tone, mentation intact, speech normal and cranial nerves 2-12 intact  Mallampati Class: II.  Tonsillar Stage: 1  hidden by pillars.    Impression/Plan:    (G47.33) PENELOPE (obstructive sleep apnea)  Comment: Griffin was diagnosed with severe PENELOPE in 2007 at [facility]. AHI was 53/hr, RDI 66/hr, O2 daniel 86%. He has not seen a sleep provider or gotten new supplies in many " years. His CPAP use is sporadic and he only gets 1-3 hours of use when he does use it. Mask leak is the main issue preventing better compliance. His CPAP download does not report AHI or leak. His weight is down 24 pounds since his sleep study. He does snore without CPAP, but his wife does not often comment on his breathing. She just nudges him to put the CPAP on occasionally. His problem list in his chart lists sleep related hypoxemia/hypoventilation, but I do not see any documentation supporting that diagnosis. His PSG did not show hypoxemia. He has not had oximetry. He denies having been placed on oxygen after surgery. His daytime SpO2 is 95% today and CO2 has been no higher than 28 on metabolic panel.   Plan: Order placed for replacement auto CPAP 5-15 cm. We reviewed the usage goals. Will perform oximetry if use is good at follow up.      Literature provided regarding CPAP.      He will follow up with me in approximately two months.       Polysomnography reviewed.  Obstructive sleep apnea reviewed.  Complications of untreated sleep apnea were reviewed.  45 minutes was spent during this visit, over 50% in counseling and coordination of care.   Bennett Goltz, PA-C    CC: Hamzah Hodge

## 2019-10-07 NOTE — PATIENT INSTRUCTIONS
You will be provided with an auto-titrating CPAP with a pressure range of 5-15 cm with heated humidity to limit nasal congestion. Adjust the heat level on humidifier to find a setting that prevents dry nose but does not cause condensation in the hose or mask. Use distilled water in the humidifier.  The CPAP has a ramp function that starts the pressure lower than your prescribed pressure and gradually increases it over a number of minutes.  This may make it easier to fall asleep.    Try to use the CPAP every-night, all night (minimum of 4 hours). Many insurances require that we prove you are using the CPAP at least 4 hours on at least 70% of nights over a 30 day period. We have 90 days to meet those criteria.            Discussed weight management and the impact of weight gain on sleep apnea.  Let me know if you snore or feel the pressure is too high.    You can get new supplies (mask, hose and filter) for your CPAP every 3-6 months, covered by insurance. You do not need to get supplies that often, but they are available if you would like them.  You may exchange the mask once within the first month if you feel the initial mask does not fit well.  Contact your medical equipment provider for equipment issues.  Please let me know if you have any return of snoring, daytime sleepiness or poor sleep quality. We will want to make sure your CPAP is adequately treating your apnea.  There is a website called CPAP.com that has accessories that may make CPAP use easier. Please visit it at your convenience.  Our phone number is [phone].    Follow-up 2 month.  Bring your CPAP machine with you to the follow up appointment.      Here are the ranges based off your height and current weight.    Body mass index is 33.37 kg/m .        Underweight = <18.5  Normal weight = 18.5-24.9   Overweight = 25-29.9   Obesity = BMI of 30 or greater       Your BMI is Body mass index is 33.37 kg/m .  Weight management is a personal decision.  If  you are interested in exploring weight loss strategies, the following discussion covers the approaches that may be successful. Body mass index (BMI) is one way to tell whether you are at a healthy weight, overweight, or obese. It measures your weight in relation to your height.  A BMI of 18.5 to 24.9 is in the healthy range. A person with a BMI of 25 to 29.9 is considered overweight, and someone with a BMI of 30 or greater is considered obese. More than two-thirds of American adults are considered overweight or obese.  Being overweight or obese increases the risk for further weight gain. Excess weight may lead to heart disease and diabetes.  Creating and following plans for healthy eating and physical activity may help you improve your health.  Weight control is part of healthy lifestyle and includes exercise, emotional health, and healthy eating habits. Careful eating habits lifelong are the mainstay of weight control. Though there are significant health benefits from weight loss, long-term weight loss with diet alone may be very difficult to achieve- studies show long-term success with dietary management in less than 10% of people. Attaining a healthy weight may be especially difficult to achieve in those with severe obesity. In some cases, medications, devices and surgical management might be considered.  What can you do?  If you are overweight or obese and are interested in methods for weight loss, you should discuss this with your provider.     Consider reducing daily calorie intake by 500 calories.     Keep a food journal.     Avoiding skipping meals, consider cutting portions instead.    Diet combined with exercise helps maintain muscle while optimizing fat loss. Strength training is particularly important for building and maintaining muscle mass. Exercise helps reduce stress, increase energy, and improves fitness. Increasing exercise without diet control, however, may not burn enough calories to loose  weight.       Start walking three days a week 10-20 minutes at a time    Work towards walking thirty minutes five days a week     Eventually, increase the speed of your walking for 1-2 minutes at time    In addition, we recommend that you review healthy lifestyles and methods for weight loss available through the National Institutes of Health patient information sites:  http://win.niddk.nih.gov/publications/index.htm    And look into health and wellness programs that may be available through your health insurance provider, employer, local community center, or donta club.    Weight management plan: Patient was referred to their PCP to discuss a diet and exercise plan.

## 2019-10-08 DIAGNOSIS — E78.5 HYPERLIPIDEMIA LDL GOAL <70: ICD-10-CM

## 2019-10-08 DIAGNOSIS — E78.6 LOW HDL (UNDER 40): ICD-10-CM

## 2019-10-08 DIAGNOSIS — E11.59 TYPE 2 DIABETES MELLITUS WITH VASCULAR DISEASE (H): ICD-10-CM

## 2019-10-08 LAB
CHOLEST SERPL-MCNC: 111 MG/DL
HBA1C MFR BLD: 6.5 % (ref 0–5.6)
HDLC SERPL-MCNC: 38 MG/DL
LDLC SERPL CALC-MCNC: 57 MG/DL
NONHDLC SERPL-MCNC: 73 MG/DL
TRIGL SERPL-MCNC: 82 MG/DL

## 2019-10-08 PROCEDURE — 36415 COLL VENOUS BLD VENIPUNCTURE: CPT | Performed by: INTERNAL MEDICINE

## 2019-10-08 PROCEDURE — 83036 HEMOGLOBIN GLYCOSYLATED A1C: CPT | Performed by: INTERNAL MEDICINE

## 2019-10-08 PROCEDURE — 80061 LIPID PANEL: CPT | Performed by: INTERNAL MEDICINE

## 2019-10-22 ENCOUNTER — DOCUMENTATION ONLY (OUTPATIENT)
Dept: SLEEP MEDICINE | Facility: CLINIC | Age: 84
End: 2019-10-22

## 2019-10-22 NOTE — PROGRESS NOTES
Patient was offered choice of vendor and chose On license of [facility].  Patient Austen Fowler was set up at [place] on October 22, 2019. Patient received a [employer] DreamStation Auto. Pressures were set at 5-15 cm H2O.   Patient s ramp is 5 cm H2O for 20 min and FLEX/EPR is A Flex.  Patient received a Resmed Mask name: AirFit [id number]  Full Face mask size Large, heated tubing and heated humidifier.  Patient is not enrolled in the STM Program and does need to meet compliance. Patient has a follow up on 12/16/2019 with Bennett Goltz, PA-C. Joshua Dahlke

## 2019-10-25 ENCOUNTER — DOCUMENTATION ONLY (OUTPATIENT)
Dept: SLEEP MEDICINE | Facility: CLINIC | Age: 84
End: 2019-10-25

## 2019-10-25 NOTE — PROGRESS NOTES
3 DAY STM VISIT    Diagnostic AHI: 53 PSG    Patient contacted for 3 day STM visit  Subjective measures:  Pt states that things are going pretty well but he does have a cold right now but he does like his new machine.     Replacement device: Yes  STM ordered by provider: No     Device type: Auto-CPAP  PAP settings from order::  CPAP min 5 cm  H20       CPAP max 15 cm  H20  Mask type:    Full Face Mask     Device settings from machine      Min CPAP 5            Max CPAP 15      Assessment: Nightly usage, most nights under four hours.  Action plan: Patient removed from STM, STM not required or ordered. . Patient has a follow up visit scheduled:   yes within 31-90 days of set up.    Total time spent on remote patient monitoring data analysis and patient contact today:   13 minutes

## 2019-10-31 ENCOUNTER — OFFICE VISIT (OUTPATIENT)
Dept: PHARMACY | Facility: CLINIC | Age: 84
End: 2019-10-31

## 2019-10-31 VITALS
BODY MASS INDEX: 33.12 KG/M2 | WEIGHT: 265 LBS | HEART RATE: 67 BPM | SYSTOLIC BLOOD PRESSURE: 130 MMHG | DIASTOLIC BLOOD PRESSURE: 85 MMHG

## 2019-10-31 DIAGNOSIS — I25.10 CORONARY ARTERY DISEASE INVOLVING NATIVE CORONARY ARTERY OF NATIVE HEART WITHOUT ANGINA PECTORIS: ICD-10-CM

## 2019-10-31 DIAGNOSIS — Z23 NEED FOR VACCINATION: ICD-10-CM

## 2019-10-31 DIAGNOSIS — E11.59 TYPE 2 DIABETES MELLITUS WITH VASCULAR DISEASE (H): ICD-10-CM

## 2019-10-31 DIAGNOSIS — E78.5 HYPERLIPIDEMIA LDL GOAL <70: ICD-10-CM

## 2019-10-31 DIAGNOSIS — M54.2 CERVICALGIA: ICD-10-CM

## 2019-10-31 DIAGNOSIS — K21.9 GASTROESOPHAGEAL REFLUX DISEASE, ESOPHAGITIS PRESENCE NOT SPECIFIED: ICD-10-CM

## 2019-10-31 DIAGNOSIS — I10 ESSENTIAL HYPERTENSION, BENIGN: Primary | ICD-10-CM

## 2019-10-31 DIAGNOSIS — R21 RASH AND OTHER NONSPECIFIC SKIN ERUPTION: ICD-10-CM

## 2019-10-31 DIAGNOSIS — R39.198 SLOWING OF URINARY STREAM: ICD-10-CM

## 2019-10-31 DIAGNOSIS — E63.9 NUTRITIONAL DISORDER: ICD-10-CM

## 2019-10-31 PROCEDURE — 99606 MTMS BY PHARM EST 15 MIN: CPT | Performed by: PHARMACIST

## 2019-10-31 PROCEDURE — 99607 MTMS BY PHARM ADDL 15 MIN: CPT | Performed by: PHARMACIST

## 2019-10-31 NOTE — PATIENT INSTRUCTIONS
Recommendations from today's MTM visit:                                                    MTM (medication therapy management) is a service provided by a clinical pharmacist designed to help you get the most of out of your medicines.   Today we reviewed what your medicines are for, how to know if they are working, that your medicines are safe and how to make your medicine regimen as easy as possible.      1.  We will request records to get a copy of your last eye exam.    2.  Stop gemfibrozil completely and we'll re-check your labs in about 2 months.  We can always resume this if needed.  Come in fasting on Monday, December 30th at 9:45am to have the lab checked.    It was great to speak with you today.  I value your experience and would be very thankful for your time with providing feedback on our clinic survey. You may receive a survey via email or text message in the next few days.     Next MTM visit: Pending labs in December    To schedule another MTM appointment, please call the clinic directly or you may call the MTM scheduling line at [phone] or toll-free at [phone].     My Clinical Pharmacist's contact information:                                                      It was a pleasure talking with you today!  Please feel free to contact me with any questions or concerns you have.      Teetee Cornell PharmD  Medication Therapy Management Resident  Pager: [phone]    Marielos Morocho PharmD, [facility]  Medication Therapy Management Provider  Pager: [phone]

## 2019-10-31 NOTE — PROGRESS NOTES
SUBJECTIVE/OBJECTIVE:                Austen Fowler is a 84 year old male coming in for a follow-up visit for Medication Therapy Management.  He was referred to me from Dr. Hodge.     Chief Complaint: Follow up from MTM visit on 4/17/19.  He has no specific questions or concerns today.    Tobacco:  reports that he quit smoking about 27 years ago. His smoking use included cigarettes, cigars, and pipe. He started smoking about 65 years ago. He has a 60.00 pack-year smoking history. He has never used smokeless tobacco.  Alcohol: Less than 1 beverages / week    Medication Adherence/Access: no issues reported    Hypertension/CAD: Current medications include amlodipine 5mg BID (dose increased since our last visit), lisinopril 20mg daily and propranolol 60mg BID (changed back to propranolol from nadolol for essential tremor).   He does monitor his BP at home and it's been 130s systolic over 80s diastolic.  He denies any chest pains or side effects of therapy.  Last ECHO on 9/5/19 estimated EF to be 55-60%, per review of EPIC history EF has never been <40%.  Pt follows with cardiology.    BP Readings from Last 3 Encounters:   10/07/19 114/62   10/02/19 (!) 143/70   10/02/19 (!) 148/70       Cervicalgia:  He's been working with pain management on this.  He had a lumbar injection with Dr. Nuñez which has virtually eliminated the pain.  He's been prescribed Voltaren gel, which he's using regularly on his neck.  He has also found IcyHot gel to be helpful (he alternates between Voltaren gel and IcyHot).  He continues taking APAP 1000mg BID (might take a 3rd dose during the day if needed) and gabapentin 900mg BID (although prescribed for TID).  He reports this regimen is working well for him and he denies side effects.      Diabetes:  Pt currently taking metformin XR 2000 mg daily.  He denies side effects of therapy.   SMBG: Once daily (AM fasting).   Ranges (patient reported): Around 130-140mg/dL  Symptoms of low blood sugar?  none. Frequency of hypoglycemia? never.  Recent symptoms of high blood sugar? none  Eye exam: he reports having done over the summer  Foot exam: due  Microalbumin is < 30 mg/g. Pt is taking an ACEi/ARB.  Aspirin: He continues taking ASA 81mg daily and denies side effects  Lab Results   Component Value Date    A1C 6.5 10/08/2019    A1C 6.1 06/19/2019    A1C 6.0 04/17/2019    A1C 6.0 09/24/2018    A1C 6.0 04/09/2018     GFR Estimate   Date Value Ref Range Status   06/28/2019 89 >60 mL/min/[1.73_m2] Final     Comment:     Non  GFR Calc  Starting 12/18/2018, serum creatinine based estimated GFR (eGFR) will be   calculated using the Chronic Kidney Disease Epidemiology Collaboration   (CKD-EPI) equation.        Hyperlipidemia: Current therapy includes pravastatin 20 mg once daily.  Dr. Hodge stopped gemfibrozil in August - Griffin still had a full bottle left so he's been taking this intermittently.  He and I had stopped gemfibrozil in the past and cardiology had resumed it due to rising TG.  Pt reports no significant myalgias or other side effects.    LDL Cholesterol Calculated   Date Value Ref Range Status   10/08/2019 57 <100 mg/dL Final     Comment:     Desirable:       <100 mg/dl       GERD: Current medications include: omeprazole 20mg once daily.  He's no longer needing to use Tums. Pt c/o no current symptoms.  Patient feels that current regimen is effective.     BPH:   Current medications include tamsulosin 0.4 mg daily and finasteride 5 mg daily.  He reports that the tamsulosin helps and describes more regular and complete evacuation when urinating.      Rash:  He uses triamcinolone cream as needed (no use), which he finds effective.  He's also using terbinafine cream on his feet a few times a week, which has been effective.  He denies side effects.    Vitamins:  Current medications include cholecalciferol 2000 units daily and metamucil wafers as needed (uses rarely).  He finds this regimen effective  and he denies side effects.    Immunizations: He hasn't received influenza vaccine yet; he's been sick with a cold for the last few weeks so wants to wait until he's feeling a little better before receiving.  He plans to do so next week.    Today's Vitals: /85   Pulse 67   Wt 265 lb (120.2 kg)   BMI 33.12 kg/m      ASSESSMENT:                Medication Adherence: good, no issues identified    Hypertension/CAD: Stable. Patient is meeting BP goal of < 140/90mmHg.      Cervicalgia: Stable.    Diabetes: Stable. Patient is meeting A1c goal of < 8%.   Need to request records for DM eye exam.    Hyperlipidemia: Stable. Pt is not on moderate-high intensity statin which is indicated based on 2013 ACC/AHA guidelines for lipid management.  Further increase in statin dose not warranted as LDL is well controlled (close to or less than 40mg/dL).  Will benefit from stopping gemfibrozil completely vs taking it intermittently, and re-checking lipids in 2 months.    GERD: Stable.  Current treatment is effective.     BPH:  Stable.    Rash:  Stable.    Vitamins:  Stable.    Immunizations:  Plan in place.    PLAN:                  1.  We will request records to get a copy of your last eye exam.  2.  Stop gemfibrozil completely and we'll re-check your labs in about 2 months.  We can always resume this if needed.  Come in fasting on Monday, December 30th at 9:45am to have the lab checked.    I spent 30 minutes with this patient today. All changes were made via collaborative practice agreement with Dr. Hodge. A copy of the visit note was provided to the patient's primary care provider.     Will follow up pending labs in December.    The patient was given a summary of these recommendations as an after visit summary.    Marielos Morocho, PharmD, BCACP  Medication Therapy Management Provider  Pager: [phone]

## 2019-11-06 ENCOUNTER — DOCUMENTATION ONLY (OUTPATIENT)
Dept: SLEEP MEDICINE | Facility: CLINIC | Age: 84
End: 2019-11-06

## 2019-11-06 NOTE — PROGRESS NOTES
14 DAY STM VISIT    Diagnostic AHI: 53 PSG      Message left for patient to return call     Assessment: Pt not meeting objective benchmarks for AHI      Action plan: waiting for patient to return call.  and pt to have 30 day STM visit.    Device type: Auto-CPAP    PAP settings: CPAP min 5 cm  H20       CPAP max 15 cm  H20       90th % pressure 9.4 cm  H20    Mask type:  Full Face Mask    Objective measures: 14 day rolling measures         Compliance  85 %     % of night spent in large leak  7 % last  upload      AHI 13.83   last  Upload OH index is high but pressure isn't increasing, possibly due to leak)      Average number of minutes 384          Objective measure goal  Compliance   Goal >70%  Leak   Goal < 10%  AHI  Goal < 5  Usage  Goal >240      Total time spent on remote patient monitoring data analysis and patient contact today:   10 minutes

## 2019-11-07 NOTE — PROGRESS NOTES
Patient returned call.    Subjective measures:   Pt states that things are going pretty well so far.  He doesn't feel like his mask is leaking.  Will continue to watch AHI as pressure isn't going up for obstructive events.  Pt is feeling benefit from therapy.    Assessment: Pt not meeting objective benchmarks for AHI Patient meeting subjective benchmarks.     Action plan:pt to have 30 day STM visit.    Total time spent on remote patient monitoring data analysis and patient contact today:   3 minutes

## 2019-11-08 DIAGNOSIS — E78.5 HYPERLIPIDEMIA LDL GOAL <70: ICD-10-CM

## 2019-11-08 RX ORDER — PRAVASTATIN SODIUM 20 MG
TABLET ORAL
Qty: 90 TABLET | Refills: 3 | Status: SHIPPED | OUTPATIENT
Start: 2019-11-08 | End: 2020-10-10

## 2019-11-08 NOTE — TELEPHONE ENCOUNTER
"Requested Prescriptions   Pending Prescriptions Disp Refills     pravastatin (PRAVACHOL) 20 MG tablet [Pharmacy Med Name: PRAVASTATIN SOD 20MG TABLET] 90 tablet 1     Sig: TAKE 1 TABLET BY MOUTH  DAILY   Last Written Prescription Date:  6/24/2019  Last Fill Quantity: 90,  # refills: 1   Last office visit: 8/8/2019 with prescribing provider:  Ayesha Horton Office Visit:        Statins Protocol Passed - 11/8/2019  4:27 AM        Passed - LDL on file in past 12 months     Recent Labs   Lab Test 10/08/19  0900   LDL 57             Passed - No abnormal creatine kinase in past 12 months     No lab results found.             Passed - Recent (12 mo) or future (30 days) visit within the authorizing provider's specialty     Patient has had an office visit with the authorizing provider or a provider within the authorizing providers department within the previous 12 mos or has a future within next 30 days. See \"Patient Info\" tab in inbasket, or \"Choose Columns\" in Meds & Orders section of the refill encounter.              Passed - Medication is active on med list        Passed - Patient is age 18 or older        Filled per [facility] [place] [place] protocol.     HERLINDA FitchN, RN  [employer] Triage    "

## 2019-11-17 ENCOUNTER — MYC REFILL (OUTPATIENT)
Dept: FAMILY MEDICINE | Facility: CLINIC | Age: 84
End: 2019-11-17

## 2019-11-17 DIAGNOSIS — R39.198 SLOWING OF URINARY STREAM: ICD-10-CM

## 2019-11-18 NOTE — TELEPHONE ENCOUNTER
"finasteride (PROSCAR) 5 MG tablet    Last Written Prescription Date:  06/24/2019  Last Fill Quantity: 90,  # refills: 0   Last office visit: 8/8/2019 with prescribing provider:  Ayesha   Future Office Visit:  Unknown     Requested Prescriptions   Pending Prescriptions Disp Refills     finasteride (PROSCAR) 5 MG tablet 90 tablet 0     Sig: Take 1 tablet (5 mg) by mouth daily       Alpha Blockers Passed - 11/17/2019 10:17 AM        Passed - Blood pressure under 140/90 in past 12 months     BP Readings from Last 3 Encounters:   10/31/19 130/85   10/07/19 114/62   10/02/19 (!) 143/70                 Passed - Recent (12 mo) or future (30 days) visit within the authorizing provider's specialty     Patient has had an office visit with the authorizing provider or a provider within the authorizing providers department within the previous 12 mos or has a future within next 30 days. See \"Patient Info\" tab in inbasket, or \"Choose Columns\" in Meds & Orders section of the refill encounter.              Passed - Patient does not have Tadalafil, Vardenafil, or Sildenafil on their medication list        Passed - Medication is active on med list        Passed - Patient is 18 years of age or older          "

## 2019-11-19 RX ORDER — FINASTERIDE 5 MG/1
1 TABLET, FILM COATED ORAL DAILY
Qty: 90 TABLET | Refills: 1 | Status: SHIPPED | OUTPATIENT
Start: 2019-11-19 | End: 2020-04-07

## 2019-11-22 ENCOUNTER — DOCUMENTATION ONLY (OUTPATIENT)
Dept: SLEEP MEDICINE | Facility: CLINIC | Age: 84
End: 2019-11-22

## 2019-11-22 NOTE — PROGRESS NOTES
30 DAY STM VISIT    Diagnostic AHI: 53    PSG      Subjective measures:   Pt reports that he does not feel mask leak.  He is using all night long.  He is feeling benefit from therapy.     Assessment: Pt not meeting objective benchmarks for AHI and leak. Elevated AHI maybe due to elevated leak.  Few central apnea events are also being recorded.  Patient meeting subjective benchmarks.   Action plan: pt to have 6 month [facility] visit  Patient has scheduled a follow up visit with Bennett Goltz, PA-C on 12/16/2019  Device type: Auto-CPAP  PAP settings: CPAP min 5 cm  H20     CPAP max 15 cm  H20     90th % pressure8.2 cm  H20    Mask type:  Full Face Mask    Objective measures: 14 day rolling measures         Compliance  85 %     % of night spent in large leak  11 % last  upload      AHI 13.7   last  upload      Average number of minutes 371          Objective measure goal  Compliance   Goal >70%  Leak   Goal < 10%  AHI  Goal < 5  Usage  Goal >240      Total time spent on remote patient monitoring data analysis and patient contact today:   13 minutes      Total contact/remote patient monitoring for last 30 days:  40 min

## 2019-12-02 DIAGNOSIS — I10 ESSENTIAL HYPERTENSION, BENIGN: ICD-10-CM

## 2019-12-03 RX ORDER — LISINOPRIL 20 MG/1
20 TABLET ORAL DAILY
Qty: 90 TABLET | Refills: 0 | Status: SHIPPED | OUTPATIENT
Start: 2019-12-03 | End: 2020-04-01

## 2019-12-03 NOTE — TELEPHONE ENCOUNTER
"Last Written Prescription Date:  8/8/2019  Last Fill Quantity: 90,  # refills: 0   Last office visit: 8/8/2019 with prescribing provider:  Ayesha   Future Office Visit:      Requested Prescriptions   Pending Prescriptions Disp Refills     lisinopril (PRINIVIL/ZESTRIL) 20 MG tablet [Pharmacy Med Name: LISINOPRIL  20MG  TAB] 90 tablet 0     Sig: TAKE 1 TABLET BY MOUTH  DAILY       ACE Inhibitors (Including Combos) Protocol Passed - 12/2/2019  2:36 PM        Passed - Blood pressure under 140/90 in past 12 months     BP Readings from Last 3 Encounters:   10/31/19 130/85   10/07/19 114/62   10/02/19 (!) 143/70                 Passed - Recent (12 mo) or future (30 days) visit within the authorizing provider's specialty     Patient has had an office visit with the authorizing provider or a provider within the authorizing providers department within the previous 12 mos or has a future within next 30 days. See \"Patient Info\" tab in inbasket, or \"Choose Columns\" in Meds & Orders section of the refill encounter.              Passed - Medication is active on med list        Passed - Patient is age 18 or older        Passed - Normal serum creatinine on file in past 12 months     Recent Labs   Lab Test 06/28/19  1127  09/21/16  0943   CR 0.67   < >  --    CREAT  --   --  0.8    < > = values in this interval not displayed.             Passed - Normal serum potassium on file in past 12 months     Recent Labs   Lab Test 06/28/19  1127   POTASSIUM 4.5               "

## 2019-12-08 ENCOUNTER — MYC MEDICAL ADVICE (OUTPATIENT)
Dept: PALLIATIVE MEDICINE | Facility: CLINIC | Age: 84
End: 2019-12-08

## 2019-12-09 DIAGNOSIS — M47.816 SPONDYLOSIS OF LUMBAR REGION WITHOUT MYELOPATHY OR RADICULOPATHY: Primary | ICD-10-CM

## 2019-12-09 NOTE — TELEPHONE ENCOUNTER
Will leave encounter open for patient response/chart review by nursing.     Francesco below sent to patient.     Elhamlinette Moya,     Dr Nuñez was able to review your message. He went ahead and ordered physical therapy for you. This is through [facility]. They should call you to schedule but you may also call them at [phone] to make your appointment.  Dr. Nñuez would like you to work with them at least 5-6 times and then make a follow up with him in clinic to discuss next steps based on your progress. Possibly he would recommend moving forward with what is called Medial Branch Blocks (MBB)/Radiofrequency Ablation (RFA) procedures. The MBB procedures are a series of 2 separate injections where he would injection a numbing medication into the painful area. You would go home and track you pain for several hours afterwards using a pain diary we would provide you. If you had pain relief with both of the MBB injections we would submit this information to your insurance for approval for the RFA part of the series. This is the treatment part. This would entail ablation of the nerve that is causing the pain. Dr. Nuñez will go over this in greater detail if this is the route determined best for you. To schedule the follow up after your physical therapy, please call [phone]. Thank you and take care!!    Angela FLEMING, RN Care Coordinator  [facility]  Pain Management  _______________  [place] below from patient    October 2 I had a lumbar epidural injection for pain in lower back. Is there a time element before I could have  another injection as my low back is experiencing a certain amount of pain at times?  It seems to affect my ability to walk very far.   Was there physical therapy involved after this event or was my exercises at the  3 times a week (approx) enough to  satisfy that element.  Thank You  Austen Fowler (Arnie)

## 2019-12-12 NOTE — TELEPHONE ENCOUNTER
Francesco read and initial PT appt made. Closing    Angela FLEMING, RN Care Coordinator  [facility]  Pain [facility]

## 2019-12-13 ENCOUNTER — THERAPY VISIT (OUTPATIENT)
Dept: PHYSICAL THERAPY | Facility: CLINIC | Age: 84
End: 2019-12-13
Attending: PHYSICAL MEDICINE & REHABILITATION
Payer: COMMERCIAL

## 2019-12-13 DIAGNOSIS — M54.50 CHRONIC BILATERAL LOW BACK PAIN WITHOUT SCIATICA: ICD-10-CM

## 2019-12-13 DIAGNOSIS — G89.29 CHRONIC BILATERAL LOW BACK PAIN WITHOUT SCIATICA: ICD-10-CM

## 2019-12-13 DIAGNOSIS — M47.816 SPONDYLOSIS OF LUMBAR REGION WITHOUT MYELOPATHY OR RADICULOPATHY: ICD-10-CM

## 2019-12-13 PROCEDURE — 97161 PT EVAL LOW COMPLEX 20 MIN: CPT | Mod: GP | Performed by: PHYSICAL THERAPIST

## 2019-12-13 PROCEDURE — 97530 THERAPEUTIC ACTIVITIES: CPT | Mod: GP | Performed by: PHYSICAL THERAPIST

## 2019-12-13 PROCEDURE — 97110 THERAPEUTIC EXERCISES: CPT | Mod: GP | Performed by: PHYSICAL THERAPIST

## 2019-12-13 NOTE — PROGRESS NOTES
[facility] Initial Evaluation    Physical Therapy Initial Examination/Evaluation  December 13, 2019    Austen Fowler  is a 84 year old  male referred to physical therapy by Dr. Nuñez for treatment of chronic LBP.      DOI/onset worse since 9-13-19  Mechanism of injury patient has a long hx of LBP without a specific incident. The pain has worsened in frequency and intensity the past 3 months.  DOS n/a  Prior treatment injection in Sept. Effect of prior treatment mod improvement    Chief Complaint:   LBP.   Pain location: B LS area L > R  Quality: aching  Constant/Intermittent: intermittent  Time of day: no time pattern  Symptoms have worsened slightly since onset.    Current pain 2/10.  Pain at best 2/10.  Pain at worst 4/10.    Symptoms aggravated by transfers and walking.    Symptoms improved with rest.     Social history:  Lives with wife in their own home Remains very active.    Occupation: rtired.     Patient having difficulty with ADLs: prolonged standing activities.    Patient's goals are to reduce LBP.    Patient reports general health as good.  Related medical history long hx of LBP.    Surgical History:  L4-5 laminectomy in 2015.    Imaging: MRI.    Medications:  none.       Return to MD:  Mid January.        Subjective:    Austen Fowler being seen for physical therapy for lumbar pain.   Where condition occurred: for unknown reasons.  and reported as 0/10 and 7/10 on pain scale. General health as reported by patient is good. Pertinent medical history includes:  Diabetes, heart problems, overweight, rheumatoid arthritis and sleep disorder/apnea.    Surgeries include:  Heart surgery and cancer surgery.  Current medications:  Anti-inflammatory, cardiac medication, high blood pressure medication and pain medication.   Primary job tasks include:  Computer work, driving, lifting/carrying and pushing/pulling.  Pain is described as aching and shooting and is intermittent. Pain is worse  during the day. Since onset symptoms are unchanged. Special tests:  MRI. Previous treatment includes chiropractic and other. There was mild improvement following previous treatment.   Patient is retired.   Barriers include:  None as reported by patient.  Red flags:  None as reported by patient.                      Objective:  Standing Alignment:        Lumbar:  Lordosis decr            Gait:    Gait Type:  Normal         Flexibility/Screens:   Positive screens:  Lumbar    Lower Extremity:  Decreased left lower extremity flexibility:Hamstrings    Decreased right lower extremity flexibility:  Hamstrings  Spine:  Decreased left spine flexibility:  Quadratus Lumborum    Decreased right spine flexibility:  Quadratus Lumborum             Lumbar/SI Evaluation  ROM:    AROM Lumbar:   Flexion:            Normal  Ext:                    75%   Side Bend:        Left:  Normal    Right:  Normal  Rotation:           Left:  Normal    Right:  Normal  Side Glide:        Left:     Right:         Strength: lower abdominals 4/5; extensors 4/5  Lumbar Myotomes:  normal                Lumbar Dermtomes:  normal                Neural Tension/Mobility:      Left side:SLR or Slump  negative.     Right side:   Slump or SLR  negative.   Lumbar Palpation:    Tenderness present at Left:    Quadratus Lumborum and Erector Spinae  Tenderness present at Right: Quadratus Lumborum and Erector Spinae      Spinal Segmental Conclusions: Slight pain with P/A glide L5    Level: Hypo noted at L4 and L5      SI joint/Sacrum:    SI joint provocation tests (-)                                                       General     ROS    Assessment/Plan:    Patient is a 84 year old male with lumbar complaints.    Patient has the following significant findings with corresponding treatment plan.                Diagnosis 1:  LBP  Pain -  self management and education  Decreased ROM/flexibility - manual therapy and therapeutic exercise  Decreased strength - therapeutic  exercise and therapeutic activities  Impaired muscle performance - neuro re-education  Decreased function - therapeutic activities    Therapy Evaluation Codes:   1) History comprised of:   Personal factors that impact the plan of care:      Time since onset of symptoms.    Comorbidity factors that impact the plan of care are:      Osteoarthritis.     Medications impacting care: None.  2) Examination of Body Systems comprised of:   Body structures and functions that impact the plan of care:      Lumbar spine.   Activity limitations that impact the plan of care are:      Walking and transfers.  3) Clinical presentation characteristics are:   Stable/Uncomplicated.  4) Decision-Making    Low complexity using standardized patient assessment instrument and/or measureable assessment of functional outcome.  Cumulative Therapy Evaluation is: Low complexity.    Previous and current functional limitations:  (See Goal Flow Sheet for this information)    Short term and Long term goals: (See Goal Flow Sheet for this information)     Communication ability:  Patient appears to be able to clearly communicate and understand verbal and written communication and follow directions correctly.  Treatment Explanation - The following has been discussed with the patient:   RX ordered/plan of care  Anticipated outcomes  Possible risks and side effects  This patient would benefit from PT intervention to resume normal activities.   Rehab potential is good.    Frequency:  1 X week, once daily  Duration:  for 6 weeks  Discharge Plan:  Achieve all LTG.  Independent in home treatment program.  Reach maximal therapeutic benefit.    Please refer to the daily flowsheet for treatment today, total treatment time and time spent performing 1:1 timed codes.

## 2019-12-16 ENCOUNTER — OFFICE VISIT (OUTPATIENT)
Dept: SLEEP MEDICINE | Facility: CLINIC | Age: 84
End: 2019-12-16
Payer: COMMERCIAL

## 2019-12-16 VITALS
BODY MASS INDEX: 33.69 KG/M2 | SYSTOLIC BLOOD PRESSURE: 125 MMHG | OXYGEN SATURATION: 98 % | HEART RATE: 71 BPM | HEIGHT: 75 IN | WEIGHT: 271 LBS | RESPIRATION RATE: 16 BRPM | DIASTOLIC BLOOD PRESSURE: 73 MMHG

## 2019-12-16 DIAGNOSIS — G47.33 OSA (OBSTRUCTIVE SLEEP APNEA): Primary | ICD-10-CM

## 2019-12-16 PROCEDURE — 99214 OFFICE O/P EST MOD 30 MIN: CPT | Performed by: PHYSICIAN ASSISTANT

## 2019-12-16 ASSESSMENT — MIFFLIN-ST. JEOR: SCORE: 2009.88

## 2019-12-16 NOTE — PROGRESS NOTES
Sleep Follow-Up Visit:    Date on this visit: 12/16/2019    Austen Fowler comes in today for follow-up of his CPAP use for previously diagnosed PENELOPE. His medical history is significant for neck and back pain, DM 2, HTN, CAD (s/p double bypass).      He was initially diagnosed at [facility] in 1/3/2007. His AHI was 53/hr, RDI 66/hr, O2 daniel 86%. That was performed at the request of his cardiologist. Weight was 291.     He is on auto CPAP 5-15 cm. At the last visit, he was prescribed a replacement CPAP. He uses an AirFit F20. He does not usually notice mask leak. He might get some if he sleeps on his sides. He feels it is better than his old mask. He has not noticed much difference in his sleep since getting the new machine. He uses the SoClean about once a week. He is getting a dry throat. He has the humidifier set at 3 and it is almost running empty through the night. He is comfortable with the pressures. He does not even notice them. He is not told that he snores with it.     The compliance data shows that the patient used the CPAP for 29/30 nights, 90% of nights for >4 hours.  The 90th% pressure is 11.9 cm.  The average time in large leak is 20 min.  The average nightly usage is 6:31.  The average AHI is 15.9/hr (3.3/hr are centrals, 6.3/hr obstructive apneas, 6.3/hr hypops). He has periodic breathing 4.7% of the night. His snore index is 12.8.       Past medical/surgical history, family history, social history, medications and allergies were reviewed.      Problem List:  Patient Active Problem List    Diagnosis Date Noted     Chronic bilateral low back pain without sciatica 12/13/2019     Priority: Medium     Cellulitis of right lower extremity 06/18/2019     Priority: Medium     Neck pain 11/13/2017     Priority: Medium     Non morbid obesity, unspecified obesity type 08/05/2016     Priority: Medium     Left ventricular diastolic dysfunction      Priority: Medium     Ascending aorta dilatation (H)      Priority:  Medium     Aortic root dilatation (H)      Priority: Medium     Coronary artery disease involving native coronary artery of native heart without angina pectoris 02/04/2016     Priority: Medium     CABG 1992: LIMA to LAD, SANDI to RCA, SVG to OM1 and OM2       Health Care Home 05/05/2015     Priority: Medium     State Tier Level:  unknown  Status:  in [facility] care start of care 5/3/15; Deb Vora  [phone]  Care Coordinator:  Cathy Brand    EMERGENCY CARE PLAN  Presenting Problem Signs and Symptoms Treatment Plan    Questions or conerns during clinic hours    I will call the clinic directly     Questions or conerns outside clinic hours    I will call the 24 hour nurse line at [phone]    Patient needs to schedule an appointment    I will call the 24 hour scheduling team at [phone] or clinic directly    Same day treatment     I will call the clinic first, nurse line if after hours, urgent care and express care if needed                          Date:  May 5, 2015         S/P lumbar laminectomy 04/29/2015     Priority: Medium     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 5/22/2012          Anxiety 03/29/2011     Priority: Medium     Chronic low back pain 03/29/2011     Priority: Medium     Sacroiliitis (H) 03/29/2011     Priority: Medium     Infected sebaceous cyst 07/17/2009     Priority: Medium     Impacted cerumen 03/09/2009     Priority: Medium     Cervicalgia 08/03/2007     Priority: Medium     Benign neoplasm of skin of other and unspecified parts  of face 08/03/2007     Priority: Medium     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      Priority: Medium     sleep apnea  Problem list name updated by automated process. Provider to review       Slowing of urinary stream 01/19/2004     Priority: Medium     Essential hypertension, benign 08/01/2003     Priority: Medium     Rash and other nonspecific skin eruption 08/01/2003     Priority: Medium        Impression/Plan:    (G47.33) PENELOPE (obstructive sleep apnea)  (primary encounter diagnosis)  Comment: AHI high (mostly obstructive), clusters of events sometimes occur after awakenings. Dry throat, likely due in part to mask leak.  Plan: Comprehensive DME        Changed pressures to 9-15 cm. Mask seemed to fit well in clinic. He was shown CPAP pillows. Try to keep a consistent sleep schedule of 7.5 hours on all nights. Go to bed at the same time nightly and wake at the same time (to an alarm) every day. Try not to sleep outside of that schedule.    He will follow up with me in about 3 month(s).     Twenty-five minutes spent with patient, all of which were spent face-to-face counseling, consulting, coordinating plan of care.      Bennett Goltz, PA-C    CC: No ref. provider found

## 2019-12-16 NOTE — PATIENT INSTRUCTIONS
Try to keep a consistent sleep schedule of 7.5 hours on all nights. Go to bed at the same time nightly and wake at the same time (to an alarm) every day. Try not to sleep outside of that schedule.  Consider a CPAP pillow if you notice mask leak when sleeping on your sides.  Your BMI is Body mass index is 33.87 kg/m .  Weight management is a personal decision.  If you are interested in exploring weight loss strategies, the following discussion covers the approaches that may be successful. Body mass index (BMI) is one way to tell whether you are at a healthy weight, overweight, or obese. It measures your weight in relation to your height.  A BMI of 18.5 to 24.9 is in the healthy range. A person with a BMI of 25 to 29.9 is considered overweight, and someone with a BMI of 30 or greater is considered obese. More than two-thirds of American adults are considered overweight or obese.  Being overweight or obese increases the risk for further weight gain. Excess weight may lead to heart disease and diabetes.  Creating and following plans for healthy eating and physical activity may help you improve your health.  Weight control is part of healthy lifestyle and includes exercise, emotional health, and healthy eating habits. Careful eating habits lifelong are the mainstay of weight control. Though there are significant health benefits from weight loss, long-term weight loss with diet alone may be very difficult to achieve- studies show long-term success with dietary management in less than 10% of people. Attaining a healthy weight may be especially difficult to achieve in those with severe obesity. In some cases, medications, devices and surgical management might be considered.  What can you do?  If you are overweight or obese and are interested in methods for weight loss, you should discuss this with your provider.     Consider reducing daily calorie intake by 500 calories.     Keep a food journal.     Avoiding skipping meals,  consider cutting portions instead.    Diet combined with exercise helps maintain muscle while optimizing fat loss. Strength training is particularly important for building and maintaining muscle mass. Exercise helps reduce stress, increase energy, and improves fitness. Increasing exercise without diet control, however, may not burn enough calories to loose weight.       Start walking three days a week 10-20 minutes at a time    Work towards walking thirty minutes five days a week     Eventually, increase the speed of your walking for 1-2 minutes at time    In addition, we recommend that you review healthy lifestyles and methods for weight loss available through the National Institutes of Health patient information sites:  http://win.niddk.nih.gov/publications/index.htm    And look into health and wellness programs that may be available through your health insurance provider, employer, local community center, or donta club.    Weight management plan: Patient was referred to their PCP to discuss a diet and exercise plan.

## 2019-12-20 ENCOUNTER — THERAPY VISIT (OUTPATIENT)
Dept: PHYSICAL THERAPY | Facility: CLINIC | Age: 84
End: 2019-12-20
Payer: COMMERCIAL

## 2019-12-20 DIAGNOSIS — G89.29 CHRONIC BILATERAL LOW BACK PAIN WITHOUT SCIATICA: ICD-10-CM

## 2019-12-20 DIAGNOSIS — M54.50 CHRONIC BILATERAL LOW BACK PAIN WITHOUT SCIATICA: ICD-10-CM

## 2019-12-20 PROCEDURE — 97140 MANUAL THERAPY 1/> REGIONS: CPT | Mod: GP | Performed by: PHYSICAL THERAPIST

## 2019-12-20 PROCEDURE — 97110 THERAPEUTIC EXERCISES: CPT | Mod: GP | Performed by: PHYSICAL THERAPIST

## 2019-12-20 PROCEDURE — 97530 THERAPEUTIC ACTIVITIES: CPT | Mod: GP | Performed by: PHYSICAL THERAPIST

## 2019-12-27 ENCOUNTER — THERAPY VISIT (OUTPATIENT)
Dept: PHYSICAL THERAPY | Facility: CLINIC | Age: 84
End: 2019-12-27
Payer: COMMERCIAL

## 2019-12-27 DIAGNOSIS — M54.50 CHRONIC BILATERAL LOW BACK PAIN WITHOUT SCIATICA: ICD-10-CM

## 2019-12-27 DIAGNOSIS — G89.29 CHRONIC BILATERAL LOW BACK PAIN WITHOUT SCIATICA: ICD-10-CM

## 2019-12-27 PROCEDURE — 97140 MANUAL THERAPY 1/> REGIONS: CPT | Mod: GP | Performed by: PHYSICAL THERAPIST

## 2019-12-27 PROCEDURE — 97110 THERAPEUTIC EXERCISES: CPT | Mod: GP | Performed by: PHYSICAL THERAPIST

## 2019-12-27 PROCEDURE — 97530 THERAPEUTIC ACTIVITIES: CPT | Mod: GP | Performed by: PHYSICAL THERAPIST

## 2019-12-27 NOTE — PROGRESS NOTES
Subjective:  HPI                    Objective:  System    Physical Exam    General     ROS    Assessment/Plan:    SUBJECTIVE  Subjective changes as noted by pt:   Pt. reports slow but steady progress with decreasing LBP and increased ambulation tolerance. He has been using a back brace at times which also helps.   Current pain level:  3/10   Changes in function:  Yes (See Goal flowsheet attached for changes in current functional level)     Adverse reaction to treatment or activity:  None    OBJECTIVE  Changes in objective findings:  ROM lumbar ext 90%. Strength - lower abdominals 4/5; extensors 4+/5     ASSESSMENT  Austen continues to require intervention to meet STG and LTG's: PT  Patient is progressing as expected.  Response to therapy has shown an improvement in  pain level, ROM  and strength  Progress made towards STG/LTG?  Yes (See Goal flowsheet attached for updates on achievement of STG and LTG)    PLAN  Current treatment program is being advanced to more complex exercises.    PTA/ATC plan:  N/A    Please refer to the daily flowsheet for treatment today, total treatment time and time spent performing 1:1 timed codes.

## 2019-12-28 ENCOUNTER — MYC REFILL (OUTPATIENT)
Dept: FAMILY MEDICINE | Facility: CLINIC | Age: 84
End: 2019-12-28

## 2019-12-28 DIAGNOSIS — I10 ESSENTIAL HYPERTENSION, BENIGN: ICD-10-CM

## 2019-12-28 DIAGNOSIS — R13.10 DYSPHAGIA, UNSPECIFIED TYPE: ICD-10-CM

## 2019-12-30 DIAGNOSIS — E78.5 HYPERLIPIDEMIA LDL GOAL <70: ICD-10-CM

## 2019-12-30 LAB
CHOLEST SERPL-MCNC: 104 MG/DL
HDLC SERPL-MCNC: 34 MG/DL
LDLC SERPL CALC-MCNC: 48 MG/DL
NONHDLC SERPL-MCNC: 70 MG/DL
TRIGL SERPL-MCNC: 108 MG/DL

## 2019-12-30 PROCEDURE — 80061 LIPID PANEL: CPT | Performed by: INTERNAL MEDICINE

## 2019-12-30 PROCEDURE — 36415 COLL VENOUS BLD VENIPUNCTURE: CPT | Performed by: INTERNAL MEDICINE

## 2019-12-30 RX ORDER — AMLODIPINE BESYLATE 5 MG/1
TABLET ORAL
Qty: 180 TABLET | Refills: 2 | Status: SHIPPED | OUTPATIENT
Start: 2019-12-30 | End: 2020-08-07

## 2019-12-30 NOTE — TELEPHONE ENCOUNTER
"omeprazole (PRILOSEC) 20 MG DR capsule    Last Written Prescription Date:  07/22/2019  Last Fill Quantity: 90,  # refills: 2   Last office visit: 8/8/2019 with prescribing provider:  Ayesha   Future Office Visit:  Unknown     Requested Prescriptions   Pending Prescriptions Disp Refills     omeprazole (PRILOSEC) 20 MG DR capsule 90 capsule 2     Sig: Take 1 capsule (20 mg) by mouth daily       PPI Protocol Passed - 12/28/2019 11:42 AM        Passed - Not on Clopidogrel (unless Pantoprazole ordered)        Passed - No diagnosis of osteoporosis on record        Passed - Recent (12 mo) or future (30 days) visit within the authorizing provider's specialty     Patient has had an office visit with the authorizing provider or a provider within the authorizing providers department within the previous 12 mos or has a future within next 30 days. See \"Patient Info\" tab in inbasket, or \"Choose Columns\" in Meds & Orders section of the refill encounter.              Passed - Medication is active on med list        Passed - Patient is age 18 or older          "

## 2019-12-30 NOTE — TELEPHONE ENCOUNTER
"Last Written Prescription Date:  9/5/19  Last Fill Quantity: 60,  # refills: 4   Last office visit: 8/8/2019 with prescribing provider:     Future Office Visit:    Requested Prescriptions   Pending Prescriptions Disp Refills     amLODIPine (NORVASC) 5 MG tablet [Pharmacy Med Name: AMLODIPINE  5MG  TAB] 120 tablet      Sig: TAKE 1 TABLET BY MOUTH TWO  TIMES DAILY       Calcium Channel Blockers Protocol  Passed - 12/28/2019 11:36 AM        Passed - Blood pressure under 140/90 in past 12 months     BP Readings from Last 3 Encounters:   12/16/19 125/73   10/31/19 130/85   10/07/19 114/62                 Passed - Recent (12 mo) or future (30 days) visit within the authorizing provider's specialty     Patient has had an office visit with the authorizing provider or a provider within the authorizing providers department within the previous 12 mos or has a future within next 30 days. See \"Patient Info\" tab in inbasket, or \"Choose Columns\" in Meds & Orders section of the refill encounter.              Passed - Medication is active on med list        Passed - Patient is age 18 or older        Passed - Normal serum creatinine on file in past 12 months     Recent Labs   Lab Test 06/28/19  1127  09/21/16  0943   CR 0.67   < >  --    CREAT  --   --  0.8    < > = values in this interval not displayed.               "

## 2019-12-31 NOTE — TELEPHONE ENCOUNTER
Pres[place] [facility] [place].  Sierra ARUGELLO RN

## 2020-01-03 ENCOUNTER — THERAPY VISIT (OUTPATIENT)
Dept: PHYSICAL THERAPY | Facility: CLINIC | Age: 85
End: 2020-01-03
Payer: COMMERCIAL

## 2020-01-03 DIAGNOSIS — M54.50 CHRONIC BILATERAL LOW BACK PAIN WITHOUT SCIATICA: ICD-10-CM

## 2020-01-03 DIAGNOSIS — G89.29 CHRONIC BILATERAL LOW BACK PAIN WITHOUT SCIATICA: ICD-10-CM

## 2020-01-03 PROCEDURE — 97035 APP MDLTY 1+ULTRASOUND EA 15: CPT | Mod: GP | Performed by: PHYSICAL THERAPIST

## 2020-01-03 PROCEDURE — 97140 MANUAL THERAPY 1/> REGIONS: CPT | Mod: GP | Performed by: PHYSICAL THERAPIST

## 2020-01-03 PROCEDURE — 97110 THERAPEUTIC EXERCISES: CPT | Mod: GP | Performed by: PHYSICAL THERAPIST

## 2020-01-11 ENCOUNTER — APPOINTMENT (OUTPATIENT)
Dept: CT IMAGING | Facility: CLINIC | Age: 85
End: 2020-01-11
Attending: EMERGENCY MEDICINE
Payer: COMMERCIAL

## 2020-01-11 ENCOUNTER — APPOINTMENT (OUTPATIENT)
Dept: ULTRASOUND IMAGING | Facility: CLINIC | Age: 85
End: 2020-01-11
Attending: HOSPITALIST
Payer: COMMERCIAL

## 2020-01-11 ENCOUNTER — HOSPITAL ENCOUNTER (OUTPATIENT)
Facility: CLINIC | Age: 85
Setting detail: OBSERVATION
Discharge: HOME | End: 2020-01-12
Attending: EMERGENCY MEDICINE | Admitting: INTERNAL MEDICINE
Payer: COMMERCIAL

## 2020-01-11 DIAGNOSIS — R06.02 SHORTNESS OF BREATH: ICD-10-CM

## 2020-01-11 DIAGNOSIS — J18.9 PNEUMONIA OF RIGHT UPPER LOBE DUE TO INFECTIOUS ORGANISM: Primary | ICD-10-CM

## 2020-01-11 DIAGNOSIS — R09.02 HYPOXIA: ICD-10-CM

## 2020-01-11 DIAGNOSIS — R91.8 PULMONARY NODULES: ICD-10-CM

## 2020-01-11 DIAGNOSIS — R05.9 COUGH: ICD-10-CM

## 2020-01-11 LAB
ANION GAP SERPL CALCULATED.3IONS-SCNC: 9 MMOL/L (ref 3–14)
BASOPHILS # BLD AUTO: 0 10E9/L (ref 0–0.2)
BASOPHILS NFR BLD AUTO: 0.2 %
BUN SERPL-MCNC: 17 MG/DL (ref 7–30)
CALCIUM SERPL-MCNC: 9.4 MG/DL (ref 8.5–10.1)
CHLORIDE SERPL-SCNC: 107 MMOL/L (ref 94–109)
CK SERPL-CCNC: 61 U/L (ref 30–300)
CO2 BLDCOV-SCNC: 20 MMOL/L (ref 21–28)
CO2 SERPL-SCNC: 24 MMOL/L (ref 20–32)
CREAT SERPL-MCNC: 0.7 MG/DL (ref 0.66–1.25)
CRP SERPL-MCNC: <2.9 MG/L (ref 0–8)
D DIMER PPP FEU-MCNC: 3.4 UG/ML FEU (ref 0–0.5)
DIFFERENTIAL METHOD BLD: NORMAL
EOSINOPHIL # BLD AUTO: 0.2 10E9/L (ref 0–0.7)
EOSINOPHIL NFR BLD AUTO: 1.7 %
ERYTHROCYTE [DISTWIDTH] IN BLOOD BY AUTOMATED COUNT: 14.4 % (ref 10–15)
ERYTHROCYTE [SEDIMENTATION RATE] IN BLOOD BY WESTERGREN METHOD: 30 MM/H (ref 0–20)
GFR SERPL CREATININE-BSD FRML MDRD: 87 ML/MIN/{1.73_M2}
GLUCOSE BLDC GLUCOMTR-MCNC: 100 MG/DL (ref 70–99)
GLUCOSE SERPL-MCNC: 97 MG/DL (ref 70–99)
HCT VFR BLD AUTO: 42.1 % (ref 40–53)
HGB BLD-MCNC: 13.5 G/DL (ref 13.3–17.7)
IMM GRANULOCYTES # BLD: 0 10E9/L (ref 0–0.4)
IMM GRANULOCYTES NFR BLD: 0.4 %
INTERPRETATION ECG - MUSE: NORMAL
LACTATE BLD-SCNC: 2.8 MMOL/L (ref 0.7–2.1)
LYMPHOCYTES # BLD AUTO: 2.6 10E9/L (ref 0.8–5.3)
LYMPHOCYTES NFR BLD AUTO: 24 %
MCH RBC QN AUTO: 27.4 PG (ref 26.5–33)
MCHC RBC AUTO-ENTMCNC: 32.1 G/DL (ref 31.5–36.5)
MCV RBC AUTO: 85 FL (ref 78–100)
MONOCYTES # BLD AUTO: 0.8 10E9/L (ref 0–1.3)
MONOCYTES NFR BLD AUTO: 7.3 %
NEUTROPHILS # BLD AUTO: 7.1 10E9/L (ref 1.6–8.3)
NEUTROPHILS NFR BLD AUTO: 66.4 %
NT-PROBNP SERPL-MCNC: 92 PG/ML (ref 0–1800)
PCO2 BLDV: 28 MM HG (ref 40–50)
PH BLDV: 7.47 PH (ref 7.32–7.43)
PLATELET # BLD AUTO: 287 10E9/L (ref 150–450)
PO2 BLDV: 26 MM HG (ref 25–47)
POTASSIUM SERPL-SCNC: 3.6 MMOL/L (ref 3.4–5.3)
PROCALCITONIN SERPL-MCNC: <0.05 NG/ML
RBC # BLD AUTO: 4.93 10E12/L (ref 4.4–5.9)
SAO2 % BLDV FROM PO2: 53 %
SODIUM SERPL-SCNC: 140 MMOL/L (ref 133–144)
TROPONIN I SERPL-MCNC: <0.015 UG/L (ref 0–0.04)
WBC # BLD AUTO: 10.7 10E9/L (ref 4–11)

## 2020-01-11 PROCEDURE — 25000128 H RX IP 250 OP 636: Performed by: EMERGENCY MEDICINE

## 2020-01-11 PROCEDURE — 83880 ASSAY OF NATRIURETIC PEPTIDE: CPT | Performed by: HOSPITALIST

## 2020-01-11 PROCEDURE — 93970 EXTREMITY STUDY: CPT

## 2020-01-11 PROCEDURE — 12000000 ZZH R&B MED SURG/OB

## 2020-01-11 PROCEDURE — 99285 EMERGENCY DEPT VISIT HI MDM: CPT | Mod: 25

## 2020-01-11 PROCEDURE — 25000132 ZZH RX MED GY IP 250 OP 250 PS 637: Performed by: HOSPITALIST

## 2020-01-11 PROCEDURE — 99223 1ST HOSP IP/OBS HIGH 75: CPT | Mod: AI | Performed by: HOSPITALIST

## 2020-01-11 PROCEDURE — 25000125 ZZHC RX 250: Performed by: EMERGENCY MEDICINE

## 2020-01-11 PROCEDURE — 25800030 ZZH RX IP 258 OP 636: Performed by: HOSPITALIST

## 2020-01-11 PROCEDURE — 83605 ASSAY OF LACTIC ACID: CPT

## 2020-01-11 PROCEDURE — 85652 RBC SED RATE AUTOMATED: CPT | Performed by: EMERGENCY MEDICINE

## 2020-01-11 PROCEDURE — 85379 FIBRIN DEGRADATION QUANT: CPT | Performed by: EMERGENCY MEDICINE

## 2020-01-11 PROCEDURE — 86140 C-REACTIVE PROTEIN: CPT | Performed by: EMERGENCY MEDICINE

## 2020-01-11 PROCEDURE — 82550 ASSAY OF CK (CPK): CPT | Performed by: EMERGENCY MEDICINE

## 2020-01-11 PROCEDURE — 85025 COMPLETE CBC W/AUTO DIFF WBC: CPT | Performed by: EMERGENCY MEDICINE

## 2020-01-11 PROCEDURE — 96365 THER/PROPH/DIAG IV INF INIT: CPT | Mod: 59

## 2020-01-11 PROCEDURE — 80048 BASIC METABOLIC PNL TOTAL CA: CPT | Performed by: EMERGENCY MEDICINE

## 2020-01-11 PROCEDURE — 71275 CT ANGIOGRAPHY CHEST: CPT

## 2020-01-11 PROCEDURE — 87040 BLOOD CULTURE FOR BACTERIA: CPT | Performed by: EMERGENCY MEDICINE

## 2020-01-11 PROCEDURE — 82803 BLOOD GASES ANY COMBINATION: CPT

## 2020-01-11 PROCEDURE — 83880 ASSAY OF NATRIURETIC PEPTIDE: CPT | Performed by: EMERGENCY MEDICINE

## 2020-01-11 PROCEDURE — 94640 AIRWAY INHALATION TREATMENT: CPT

## 2020-01-11 PROCEDURE — 25000128 H RX IP 250 OP 636: Performed by: HOSPITALIST

## 2020-01-11 PROCEDURE — 25800030 ZZH RX IP 258 OP 636: Performed by: EMERGENCY MEDICINE

## 2020-01-11 PROCEDURE — 84484 ASSAY OF TROPONIN QUANT: CPT | Performed by: EMERGENCY MEDICINE

## 2020-01-11 PROCEDURE — 96375 TX/PRO/DX INJ NEW DRUG ADDON: CPT

## 2020-01-11 PROCEDURE — 00000146 ZZHCL STATISTIC GLUCOSE BY METER IP

## 2020-01-11 PROCEDURE — 93005 ELECTROCARDIOGRAM TRACING: CPT

## 2020-01-11 PROCEDURE — 84145 PROCALCITONIN (PCT): CPT | Performed by: EMERGENCY MEDICINE

## 2020-01-11 RX ORDER — HEPARIN SODIUM 5000 [USP'U]/.5ML
5000 INJECTION, SOLUTION INTRAVENOUS; SUBCUTANEOUS EVERY 12 HOURS
Status: DISCONTINUED | OUTPATIENT
Start: 2020-01-11 | End: 2020-01-12 | Stop reason: HOSPADM

## 2020-01-11 RX ORDER — DOXYCYCLINE 100 MG/10ML
100 INJECTION, POWDER, LYOPHILIZED, FOR SOLUTION INTRAVENOUS EVERY 12 HOURS
Status: DISCONTINUED | OUTPATIENT
Start: 2020-01-12 | End: 2020-01-12

## 2020-01-11 RX ORDER — IOPAMIDOL 755 MG/ML
80 INJECTION, SOLUTION INTRAVASCULAR ONCE
Status: COMPLETED | OUTPATIENT
Start: 2020-01-11 | End: 2020-01-11

## 2020-01-11 RX ORDER — SODIUM CHLORIDE 9 MG/ML
INJECTION, SOLUTION INTRAVENOUS CONTINUOUS
Status: ACTIVE | OUTPATIENT
Start: 2020-01-11 | End: 2020-01-12

## 2020-01-11 RX ORDER — LISINOPRIL 20 MG/1
20 TABLET ORAL DAILY
Status: DISCONTINUED | OUTPATIENT
Start: 2020-01-12 | End: 2020-01-12 | Stop reason: HOSPADM

## 2020-01-11 RX ORDER — AMLODIPINE BESYLATE 5 MG/1
5 TABLET ORAL 2 TIMES DAILY
Status: DISCONTINUED | OUTPATIENT
Start: 2020-01-11 | End: 2020-01-12 | Stop reason: HOSPADM

## 2020-01-11 RX ORDER — GABAPENTIN 300 MG/1
900 CAPSULE ORAL 3 TIMES DAILY
Status: DISCONTINUED | OUTPATIENT
Start: 2020-01-11 | End: 2020-01-12 | Stop reason: HOSPADM

## 2020-01-11 RX ORDER — DEXTROSE MONOHYDRATE 25 G/50ML
25-50 INJECTION, SOLUTION INTRAVENOUS
Status: DISCONTINUED | OUTPATIENT
Start: 2020-01-11 | End: 2020-01-12 | Stop reason: HOSPADM

## 2020-01-11 RX ORDER — ONDANSETRON 2 MG/ML
4 INJECTION INTRAMUSCULAR; INTRAVENOUS EVERY 6 HOURS PRN
Status: DISCONTINUED | OUTPATIENT
Start: 2020-01-11 | End: 2020-01-12 | Stop reason: HOSPADM

## 2020-01-11 RX ORDER — PROPRANOLOL HYDROCHLORIDE 60 MG/1
60 TABLET ORAL 2 TIMES DAILY
COMMUNITY
End: 2020-02-12

## 2020-01-11 RX ORDER — ASPIRIN 81 MG/1
81 TABLET ORAL DAILY
Status: DISCONTINUED | OUTPATIENT
Start: 2020-01-12 | End: 2020-01-12 | Stop reason: HOSPADM

## 2020-01-11 RX ORDER — CEFTRIAXONE 2 G/1
2 INJECTION, POWDER, FOR SOLUTION INTRAMUSCULAR; INTRAVENOUS ONCE
Status: COMPLETED | OUTPATIENT
Start: 2020-01-11 | End: 2020-01-11

## 2020-01-11 RX ORDER — TAMSULOSIN HYDROCHLORIDE 0.4 MG/1
0.4 CAPSULE ORAL DAILY
Status: DISCONTINUED | OUTPATIENT
Start: 2020-01-12 | End: 2020-01-12 | Stop reason: HOSPADM

## 2020-01-11 RX ORDER — METFORMIN HCL 500 MG
1000 TABLET, EXTENDED RELEASE 24 HR ORAL 2 TIMES DAILY WITH MEALS
Status: DISCONTINUED | OUTPATIENT
Start: 2020-01-12 | End: 2020-01-12 | Stop reason: HOSPADM

## 2020-01-11 RX ORDER — CEFTRIAXONE 1 G/1
1 INJECTION, POWDER, FOR SOLUTION INTRAMUSCULAR; INTRAVENOUS EVERY 24 HOURS
Status: DISCONTINUED | OUTPATIENT
Start: 2020-01-12 | End: 2020-01-12

## 2020-01-11 RX ORDER — NICOTINE POLACRILEX 4 MG
15-30 LOZENGE BUCCAL
Status: DISCONTINUED | OUTPATIENT
Start: 2020-01-11 | End: 2020-01-12 | Stop reason: HOSPADM

## 2020-01-11 RX ORDER — NALOXONE HYDROCHLORIDE 0.4 MG/ML
.1-.4 INJECTION, SOLUTION INTRAMUSCULAR; INTRAVENOUS; SUBCUTANEOUS
Status: DISCONTINUED | OUTPATIENT
Start: 2020-01-11 | End: 2020-01-12 | Stop reason: HOSPADM

## 2020-01-11 RX ORDER — BISACODYL 10 MG
10 SUPPOSITORY, RECTAL RECTAL DAILY PRN
Status: DISCONTINUED | OUTPATIENT
Start: 2020-01-11 | End: 2020-01-12 | Stop reason: HOSPADM

## 2020-01-11 RX ORDER — DOXYCYCLINE 100 MG/10ML
100 INJECTION, POWDER, LYOPHILIZED, FOR SOLUTION INTRAVENOUS ONCE
Status: COMPLETED | OUTPATIENT
Start: 2020-01-11 | End: 2020-01-11

## 2020-01-11 RX ORDER — ACETAMINOPHEN 650 MG/1
650 SUPPOSITORY RECTAL EVERY 4 HOURS PRN
Status: DISCONTINUED | OUTPATIENT
Start: 2020-01-11 | End: 2020-01-12 | Stop reason: HOSPADM

## 2020-01-11 RX ORDER — ACETAMINOPHEN 325 MG/1
650 TABLET ORAL EVERY 4 HOURS PRN
Status: DISCONTINUED | OUTPATIENT
Start: 2020-01-11 | End: 2020-01-12 | Stop reason: HOSPADM

## 2020-01-11 RX ORDER — TRIAMCINOLONE ACETONIDE 5 MG/G
CREAM TOPICAL 2 TIMES DAILY PRN
COMMUNITY
End: 2020-01-20

## 2020-01-11 RX ORDER — CALCIUM CARBONATE 500 MG/1
1000 TABLET, CHEWABLE ORAL 4 TIMES DAILY PRN
Status: DISCONTINUED | OUTPATIENT
Start: 2020-01-11 | End: 2020-01-12 | Stop reason: HOSPADM

## 2020-01-11 RX ORDER — SODIUM CHLORIDE 9 MG/ML
1000 INJECTION, SOLUTION INTRAVENOUS CONTINUOUS
Status: DISCONTINUED | OUTPATIENT
Start: 2020-01-11 | End: 2020-01-11

## 2020-01-11 RX ORDER — FINASTERIDE 5 MG/1
5 TABLET, FILM COATED ORAL DAILY
Status: DISCONTINUED | OUTPATIENT
Start: 2020-01-12 | End: 2020-01-12 | Stop reason: HOSPADM

## 2020-01-11 RX ORDER — ONDANSETRON 4 MG/1
4 TABLET, ORALLY DISINTEGRATING ORAL EVERY 6 HOURS PRN
Status: DISCONTINUED | OUTPATIENT
Start: 2020-01-11 | End: 2020-01-12 | Stop reason: HOSPADM

## 2020-01-11 RX ORDER — ALBUTEROL SULFATE 0.83 MG/ML
2.5 SOLUTION RESPIRATORY (INHALATION)
Status: DISCONTINUED | OUTPATIENT
Start: 2020-01-11 | End: 2020-01-11

## 2020-01-11 RX ORDER — PRAVASTATIN SODIUM 20 MG
20 TABLET ORAL DAILY
Status: DISCONTINUED | OUTPATIENT
Start: 2020-01-12 | End: 2020-01-12 | Stop reason: HOSPADM

## 2020-01-11 RX ORDER — LIDOCAINE 40 MG/G
CREAM TOPICAL
Status: DISCONTINUED | OUTPATIENT
Start: 2020-01-11 | End: 2020-01-12 | Stop reason: HOSPADM

## 2020-01-11 RX ADMIN — ALBUTEROL SULFATE 2.5 MG: 2.5 SOLUTION RESPIRATORY (INHALATION) at 16:30

## 2020-01-11 RX ADMIN — SODIUM CHLORIDE 1000 ML: 9 INJECTION, SOLUTION INTRAVENOUS at 20:44

## 2020-01-11 RX ADMIN — AMLODIPINE BESYLATE 5 MG: 5 TABLET ORAL at 23:22

## 2020-01-11 RX ADMIN — DOXYCYCLINE 100 MG: 100 INJECTION, POWDER, LYOPHILIZED, FOR SOLUTION INTRAVENOUS at 21:22

## 2020-01-11 RX ADMIN — IOPAMIDOL 80 ML: 755 INJECTION, SOLUTION INTRAVENOUS at 17:53

## 2020-01-11 RX ADMIN — CEFTRIAXONE SODIUM 2 G: 2 INJECTION, POWDER, FOR SOLUTION INTRAMUSCULAR; INTRAVENOUS at 20:31

## 2020-01-11 RX ADMIN — SODIUM CHLORIDE: 9 INJECTION, SOLUTION INTRAVENOUS at 23:33

## 2020-01-11 RX ADMIN — HEPARIN SODIUM 5000 UNITS: 5000 INJECTION, SOLUTION INTRAVENOUS; SUBCUTANEOUS at 23:21

## 2020-01-11 RX ADMIN — SODIUM CHLORIDE 100 ML: 9 INJECTION, SOLUTION INTRAVENOUS at 17:53

## 2020-01-11 RX ADMIN — GABAPENTIN 900 MG: 300 CAPSULE ORAL at 23:22

## 2020-01-11 RX ADMIN — ALBUTEROL SULFATE 2.5 MG: 2.5 SOLUTION RESPIRATORY (INHALATION) at 20:44

## 2020-01-11 ASSESSMENT — MIFFLIN-ST. JEOR: SCORE: 1959.98

## 2020-01-11 NOTE — ED PROVIDER NOTES
History   Chief Complaint:  Cough and Shortness of Breath    HPI   Austen Fowler is a 84 year old male, with a history of MI and DM II, who presents to the ED for evaluation of cough and shortness of breath. The patient reports a cough and feeling short of breath for the past two months. He states the cough has slightly improved over this timeframe, but has been feeling increasingly weak and has been having a productive cough with some slight wheezing. The patient does endorse some small sharp pin prick chest pain on the left side for the past few days. When he is walking he notes the shortness of breath to be worse, and is not present when in a sedentary position. He reports no pain with inspiration, but does endorse some intermittent diarrhea. The patient denies any fever, ear pain, nausea, vomiting, diaphoresis, abdominal pain, bloody stools, leg swelling, one sided weakness or numbness, or any other acute symptoms. Patient denies any history of asthma, COPD, or any anticoagulation use.     PE/DVT RISK FACTORS:  Sex:    Male  Hormones:   No  Tobacco:   Former  Cancer:   No  Travel:   No  Surgery:   No  Other immobilization: No  Personal history:  No  Family history:  No      Allergies:  No known drug allergies    Medications:    Norvasc  Aspirin  Proscar  Gabapentin  Lisinopril  Metformin  Pravastatin  Inderal  Flomax    Past Medical History:    Anxiety  Aortic root dilation  Arthritis  Basal cell cancer  CAD  Esophageal reflux  GERD  MI  Hyperlipidemia  Obesity  Osteoarthrosis  Sleep apnea  DM II    Past Surgical History:    Cholecystectomy  CABG  Sinus surgery  Bilateral cataract removal  Heart catheterization  Laminectomy    Family History:    Melanoma  Diabetes  Thyroid disease  MI    Social History:  Smoking status: Former-3/1/1992  Alcohol use: Yes  Drug use: No  PCP: Hamzah Hodge  Marital Status:   [2]    Review of Systems   All other systems reviewed and are negative.    Physical Exam  "    Patient Vitals for the past 24 hrs:   BP Temp Temp src Pulse Heart Rate Resp SpO2 Height Weight   01/11/20 1842 -- -- -- -- 83 21 -- -- --   01/11/20 1840 (!) 174/96 -- -- 80 78 20 97 % -- --   01/11/20 1710 121/75 -- -- 78 81 12 96 % -- --   01/11/20 1700 -- -- -- -- 81 12 98 % -- --   01/11/20 1551 (!) 146/77 97.7  F (36.5  C) Oral 90 90 14 98 % 1.905 m (6' 3\") 117.9 kg (260 lb)     Physical Exam  General: Resting on the bed.  Head: No obvious trauma to head.  Ears, Nose, Throat:  External ears normal.  Nose normal.   Eyes:  Conjunctivae clear.  Pupils are equal, round, and reactive.   Neck: Normal range of motion.  Neck supple.   CV: Regular rate and rhythm.  No murmurs. 2+ radial pulses     Respiratory: Effort normal and breath sounds normal.  nomral WOB. scattered RUL wheezing without crackles  Gastrointestinal: Soft.  No distension. There is no tenderness.  There is no rigidity, no rebound and no guarding.   Musculoskeletal: Non tender non edematous calves  Neuro: Alert. Moving all extremities appropriately.  Normal speech.    Skin: Skin is warm and dry.  No rash noted.     Emergency Department Course   ECG (16:40:00):  Rate 82 bpm. SD interval 160. QRS duration 98. QT/QTc 354/413. P-R-T axes 13 48 16. Normal sinus rhythm. Poor baseline. Nonspecific ST abnormality. Abnormal ECG. No significant change compared to EKG on 11/19/1992. Interpreted at 1640 by Eve Ware MD.    Imaging:  Radiographic findings were communicated with the patient who voiced understanding of the findings.    CT Chest PE with contrast:  1.  No evidence for pulmonary artery embolism.  2.  0.5 cm nodular density in the posterior lateral inferior right  upper lobe adjacent to a subtle area of strandy densities in the lung  likely represents an inflammatory or infectious process. Neoplastic  nodules considered less likely.  Recommendations for one or multiple  incidental lung nodules < 6mm :    Low risk patients: No routine " follow-up.    High risk patients: Optional follow-up CT at 12 months; if  unchanged, no further follow-up.    Imaging independently reviewed and agree with radiologist interpretation.    Laboratory:  CBC: WNL (WBC 10.7, HGB 13.5, )    BMP: WNL (Creatinine 0.70)    Troponin: <0.015    D-dimer: 3.4 (H)    Procalcitonin: <0.05    ISTAT gases lactate howard POCT: pH: 7.47 (H), PCO2: 28 (L), PO2: 15, Bicarbonate: 20 (L), O2 Sat: 42, Lactic acid (2033): 2.8 (H)    Blood Culture x2: Pending    Interventions:  1630: Albuterol 2.5 mg nebulization  2044: NS 1L IV Bolus   2121: Rocephin 2 g in 100 IV infusion  2122: Doxycycline 100 mg with 100 mL IV infusion    Emergency Department Course:  Past medical records, nursing notes, and vitals reviewed.  1610: I performed an exam of the patient and obtained history, as documented above.  IV inserted and blood drawn.  The patient was sent for a chest x-ray while in the emergency department, findings above.    1955: I rechecked the patient. Explained findings to patient.    Findings and plan explained to the Patient who consents to admission.     2012: Discussed the patient with Dr. Page, who will admit the patient to a medical tele bed for further monitoring, evaluation, and treatment.     Impression & Plan    CMS Diagnoses:   The patient has signs of Severe Sepsis  as evidenced by:    1. 2 SIRS criteria, AND  2. Suspected infection, AND   3. Organ dysfunction: Lactic Acid > 2.0    Time severe sepsis diagnosis confirmed:2033 01/11/20  as this was the time when Lactate resulted, and the level was > 2.0    3 Hour Severe Sepsis Bundle Completion:  1. Initial Lactic Acid Result:   Recent Labs   Lab Test 01/11/20  2030 06/18/19  1113 06/18/19  0856   LACT 2.8* 1.6 2.2*     2. Blood Cultures before Antibiotics: Yes  3. Broad Spectrum Antibiotics Administered:  yes       Anti-infectives (From admission through now)    Start     Dose/Rate Route Frequency Ordered Stop    01/11/20    cefTRIAXone (ROCEPHIN) 2 g vial to attach to  ml bag for ADULTS or NS 50 ml bag for PEDS      2 g  over 30 Minutes Intravenous ONCE 20  doxycycline (VIBRAMYCIN) 100 mg vial to attach to  mL bag      100 mg  over 1-2 Hours Intravenous ONCE 20            4. Volume of IV Fluid administered in ED: 1L      Medical Decision Makin-year-old male presents with persistent weakness, shortness of breath, cough.  Vital signs are reassuring.  Vital signs notable for hypoxia with ambulation 85%.  Broad differential was pursued including but not limited to pneumonia, pneumothorax, effusion, PE, dissection, electrolyte, metabolic, renal dysfunction, acute coronary syndrome, etc.  CBC shows no leukocytosis or anemia.  BMP shows no acute electrolyte, metabolic, renal dysfunction.  EKG shows sinus rhythm, no acute ST-T wave changes.  No evidence of arrhythmia.  No evidence of ischemia.  Troponin is negative.  Patient denies any chest pain therefore low suspicion for ACS.  Considered PE and d-dimer is markedly elevated.  CT PE shows no evidence of pulmonary embolism.  Does show a nodular density in the posterior lateral inferior right upper lobe consistent with inflammatory or infectious process.  Pro-Efra is negative.  Lactate minimally elevated 2.8.  Blood cultures pending.  Fluid hydration initiated.  Given this appears to be infectious, did start ceftriaxone and doxycycline.  Patient becomes hypoxic with ambulation and therefore would not be appropriate for outpatient management.  Plan to admit to the hospitalist who graciously accepted.    Diagnosis:    ICD-10-CM    1. Cough R05    2. Shortness of breath R06.02    3. Pulmonary nodules R91.8    4. Hypoxia R09.02      Disposition:  Admitted to a medical [place] bed.     Naldo Pichardo  2020    EMERGENCY DEPARTMENT  Scribe Disclosure:  Naldo HERNANDEZ, am serving as a scribe at 4:10 PM on 2020 to document services  personally performed by Eve Ware MD based on my observations and the provider's statements to me.     Eve Ware MD  01/12/20 0033

## 2020-01-12 ENCOUNTER — APPOINTMENT (OUTPATIENT)
Dept: OCCUPATIONAL THERAPY | Facility: CLINIC | Age: 85
End: 2020-01-12
Attending: HOSPITALIST
Payer: COMMERCIAL

## 2020-01-12 ENCOUNTER — APPOINTMENT (OUTPATIENT)
Dept: CARDIOLOGY | Facility: CLINIC | Age: 85
End: 2020-01-12
Attending: HOSPITALIST
Payer: COMMERCIAL

## 2020-01-12 ENCOUNTER — APPOINTMENT (OUTPATIENT)
Dept: PHYSICAL THERAPY | Facility: CLINIC | Age: 85
End: 2020-01-12
Attending: HOSPITALIST
Payer: COMMERCIAL

## 2020-01-12 VITALS
SYSTOLIC BLOOD PRESSURE: 114 MMHG | OXYGEN SATURATION: 95 % | TEMPERATURE: 98.1 F | HEIGHT: 75 IN | DIASTOLIC BLOOD PRESSURE: 77 MMHG | WEIGHT: 260 LBS | HEART RATE: 69 BPM | RESPIRATION RATE: 18 BRPM | BODY MASS INDEX: 32.33 KG/M2

## 2020-01-12 PROBLEM — R06.02 SHORTNESS OF BREATH: Status: ACTIVE | Noted: 2020-01-12

## 2020-01-12 LAB
ALBUMIN SERPL-MCNC: 3 G/DL (ref 3.4–5)
ALP SERPL-CCNC: 52 U/L (ref 40–150)
ALT SERPL W P-5'-P-CCNC: 13 U/L (ref 0–70)
ANION GAP SERPL CALCULATED.3IONS-SCNC: 7 MMOL/L (ref 3–14)
AST SERPL W P-5'-P-CCNC: 24 U/L (ref 0–45)
BILIRUB SERPL-MCNC: 0.4 MG/DL (ref 0.2–1.3)
BUN SERPL-MCNC: 14 MG/DL (ref 7–30)
CALCIUM SERPL-MCNC: 8.8 MG/DL (ref 8.5–10.1)
CHLORIDE SERPL-SCNC: 110 MMOL/L (ref 94–109)
CO2 SERPL-SCNC: 24 MMOL/L (ref 20–32)
CREAT SERPL-MCNC: 0.69 MG/DL (ref 0.66–1.25)
GFR SERPL CREATININE-BSD FRML MDRD: 87 ML/MIN/{1.73_M2}
GLUCOSE BLDC GLUCOMTR-MCNC: 102 MG/DL (ref 70–99)
GLUCOSE BLDC GLUCOMTR-MCNC: 107 MG/DL (ref 70–99)
GLUCOSE BLDC GLUCOMTR-MCNC: 108 MG/DL (ref 70–99)
GLUCOSE BLDC GLUCOMTR-MCNC: 121 MG/DL (ref 70–99)
GLUCOSE SERPL-MCNC: 123 MG/DL (ref 70–99)
LACTATE BLD-SCNC: 2.3 MMOL/L (ref 0.7–2)
LACTATE SERPL-SCNC: NORMAL MMOL/L (ref 0.4–2)
POTASSIUM SERPL-SCNC: 3.4 MMOL/L (ref 3.4–5.3)
PROT SERPL-MCNC: 6.5 G/DL (ref 6.8–8.8)
SODIUM SERPL-SCNC: 141 MMOL/L (ref 133–144)

## 2020-01-12 PROCEDURE — 25000125 ZZHC RX 250: Performed by: HOSPITALIST

## 2020-01-12 PROCEDURE — 97165 OT EVAL LOW COMPLEX 30 MIN: CPT | Mod: GO | Performed by: OCCUPATIONAL THERAPIST

## 2020-01-12 PROCEDURE — 40000264 ECHOCARDIOGRAM COMPLETE

## 2020-01-12 PROCEDURE — 25000128 H RX IP 250 OP 636: Performed by: HOSPITALIST

## 2020-01-12 PROCEDURE — 80053 COMPREHEN METABOLIC PANEL: CPT | Performed by: HOSPITALIST

## 2020-01-12 PROCEDURE — 96372 THER/PROPH/DIAG INJ SC/IM: CPT

## 2020-01-12 PROCEDURE — G0378 HOSPITAL OBSERVATION PER HR: HCPCS

## 2020-01-12 PROCEDURE — 25000132 ZZH RX MED GY IP 250 OP 250 PS 637: Performed by: INTERNAL MEDICINE

## 2020-01-12 PROCEDURE — 00000146 ZZHCL STATISTIC GLUCOSE BY METER IP

## 2020-01-12 PROCEDURE — 25000132 ZZH RX MED GY IP 250 OP 250 PS 637: Performed by: HOSPITALIST

## 2020-01-12 PROCEDURE — 93306 TTE W/DOPPLER COMPLETE: CPT | Mod: 26 | Performed by: INTERNAL MEDICINE

## 2020-01-12 PROCEDURE — 97162 PT EVAL MOD COMPLEX 30 MIN: CPT | Mod: GP

## 2020-01-12 PROCEDURE — 25500064 ZZH RX 255 OP 636: Performed by: HOSPITALIST

## 2020-01-12 PROCEDURE — 97530 THERAPEUTIC ACTIVITIES: CPT | Mod: GP

## 2020-01-12 PROCEDURE — 99207 ZZC CDG-CODE CATEGORY CHANGED: CPT | Performed by: INTERNAL MEDICINE

## 2020-01-12 PROCEDURE — 99217 ZZC OBSERVATION CARE DISCHARGE: CPT | Performed by: INTERNAL MEDICINE

## 2020-01-12 PROCEDURE — 97535 SELF CARE MNGMENT TRAINING: CPT | Mod: GO,59 | Performed by: OCCUPATIONAL THERAPIST

## 2020-01-12 PROCEDURE — 83605 ASSAY OF LACTIC ACID: CPT | Performed by: INTERNAL MEDICINE

## 2020-01-12 PROCEDURE — 36415 COLL VENOUS BLD VENIPUNCTURE: CPT | Performed by: HOSPITALIST

## 2020-01-12 RX ORDER — DOXYCYCLINE 100 MG/1
100 CAPSULE ORAL EVERY 12 HOURS SCHEDULED
Status: DISCONTINUED | OUTPATIENT
Start: 2020-01-12 | End: 2020-01-12 | Stop reason: HOSPADM

## 2020-01-12 RX ORDER — DOXYCYCLINE 100 MG/1
100 CAPSULE ORAL EVERY 12 HOURS
Qty: 12 CAPSULE | Refills: 0 | Status: SHIPPED | OUTPATIENT
Start: 2020-01-12 | End: 2020-02-12

## 2020-01-12 RX ORDER — ALBUTEROL SULFATE 90 UG/1
2 AEROSOL, METERED RESPIRATORY (INHALATION) EVERY 6 HOURS
Qty: 1 INHALER | Refills: 0 | Status: SHIPPED | OUTPATIENT
Start: 2020-01-12 | End: 2020-02-12

## 2020-01-12 RX ADMIN — HEPARIN SODIUM 5000 UNITS: 5000 INJECTION, SOLUTION INTRAVENOUS; SUBCUTANEOUS at 12:03

## 2020-01-12 RX ADMIN — HUMAN ALBUMIN MICROSPHERES AND PERFLUTREN 9 ML: 10; .22 INJECTION, SOLUTION INTRAVENOUS at 10:45

## 2020-01-12 RX ADMIN — METFORMIN HYDROCHLORIDE 1000 MG: 500 TABLET, EXTENDED RELEASE ORAL at 08:13

## 2020-01-12 RX ADMIN — FINASTERIDE 5 MG: 5 TABLET, FILM COATED ORAL at 08:14

## 2020-01-12 RX ADMIN — TAMSULOSIN HYDROCHLORIDE 0.4 MG: 0.4 CAPSULE ORAL at 08:14

## 2020-01-12 RX ADMIN — DOXYCYCLINE 100 MG: 100 INJECTION, POWDER, LYOPHILIZED, FOR SOLUTION INTRAVENOUS at 08:13

## 2020-01-12 RX ADMIN — OMEPRAZOLE 20 MG: 20 CAPSULE, DELAYED RELEASE ORAL at 08:14

## 2020-01-12 RX ADMIN — GABAPENTIN 900 MG: 300 CAPSULE ORAL at 16:25

## 2020-01-12 RX ADMIN — AMLODIPINE BESYLATE 5 MG: 5 TABLET ORAL at 08:14

## 2020-01-12 RX ADMIN — LISINOPRIL 20 MG: 20 TABLET ORAL at 08:14

## 2020-01-12 RX ADMIN — ASPIRIN 81 MG: 81 TABLET, DELAYED RELEASE ORAL at 08:13

## 2020-01-12 RX ADMIN — PRAVASTATIN SODIUM 20 MG: 20 TABLET ORAL at 08:13

## 2020-01-12 RX ADMIN — DOXYCYCLINE 100 MG: 100 CAPSULE ORAL at 17:58

## 2020-01-12 RX ADMIN — GABAPENTIN 900 MG: 300 CAPSULE ORAL at 08:13

## 2020-01-12 ASSESSMENT — ACTIVITIES OF DAILY LIVING (ADL)
ADLS_ACUITY_SCORE: 12

## 2020-01-12 NOTE — ED NOTES
[facility]  ED Nurse Handoff Report    ED Chief complaint: Cough and Shortness of Breath      ED Diagnosis:   Final diagnoses:   Cough   Shortness of breath   Pulmonary nodules   Hypoxia       Code Status: to be addressed by admitting doctor    Allergies: No Known Allergies    Patient Story: Pt has had increased SOB and persistent pruductive cough for past 2 months  Focused Assessment:  Pt has persistent productive cough with reported green phlegm. States now has dyspnia with exertion. Sats dropped from 95% RA to 85-89% when ambulating short distance. Denies CP. No N/V. Pt has chronic low back pain.    Treatments and/or interventions provided: IV fluids, antibiotics breathing treatments.  Patient's response to treatments and/or interventions: states feels SOB better when at rest.    To be done/followed up on inpatient unit:  REPEAT Lactic acid not done, 1st NS bolus started at 2044 for Lactic acid 2.8    Does this patient have any cognitive concerns?: none    Activity level - Baseline/Home:  Independent  Activity Level - Current:   Stand with Assist    Patient's Preferred language: English   Needed?: No    Isolation: None  Infection: Not Applicable  Bariatric?: No    Vital Signs:   Vitals:    01/11/20 1700 01/11/20 1710 01/11/20 1840 01/11/20 1842   BP:  121/75 (!) 174/96    Pulse:  78 80    Resp: 12 12 20 21   Temp:       TempSrc:       SpO2: 98% 96% 97%    Weight:       Height:           Cardiac Rhythm:Cardiac Rhythm: Normal sinus rhythm    Was the PSS-3 completed:   Yes  What interventions are required if any?               Family Comments: Wife bedside  OBS brochure/video discussed/provided to patient/family: N/A              Name of person given brochure if not patient: n/a              Relationship to patient: n/a    For the majority of the shift this patient's behavior was Green.   Behavioral interventions performed were none.    ED NURSE PHONE NUMBER: #[id number]

## 2020-01-12 NOTE — PLAN OF CARE
Date/Time 1/12 Days    Trauma/Ortho/Medical (Choose one) Medical    Diagnosis:SOB, cough  POD#:NA  Mental Status:A&Ox4  Activity/dangle SBA  Diet:Mod carb  Pain: Chronic back pain  Cosme/Voiding: BR  Tele/Restraints/Iso: NSR  02/LDA: Iv abx  D/C Date:tbd  Other Info:    VSS. Room air. A&O. Ambulating well SBA. IV abx continue. Pulmonology consult completed today. Tolerating mod carb diet.

## 2020-01-12 NOTE — CONSULTS
"Pulmonology Consultation      Austen Fowler MRN# [id number]   YOB: 1935 Age: 84 year old   Date of Admission: 1/11/2020     Reason for consult: I was asked by dr lin to evaluate this patient for tiny lung nodule and dyspnea.           Assessment and Plan:   Indeterminate lung nodule, 5 mm RUL, incidental finding.   Dyspnea, multifactorial, sob walking a few feet PTA. Post viral illness, onset of cough and audible \"wheezing\". Now resolved.  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   Acute and Chronic bacterial bronchitis since viral illness 2 mo ago, improved  Former smoker over 1 ppd from late teens until about age 51 something.       Also:  CAD   AODM  Hx stroke    Complaints of 2 months of productive cough that began with a viral illness. CT showed subtle RUL findings and a 5 mm nodule that are likely inflammatory.   Does not desaturate walking now, see today's PT note.   Atelectasis likely improved  No evidence of acute PE or DVT on testing  Small area of abnormality on CT in RUL will likely respond to  Empiric po abx and if 5 mm nodule persists, repeat CT in 12 months is optional per guidelines. Primary may order this if nodule persists on repeat CT scan in 2 weeks--adjacent \"stranding\" doesn't show up well on the CXR.  Suggest discharge on po abx and further eval as outpatient with full PFTS in clinic.   Suggest outpatient PT/OT, mostly chairfast recently.   Dyspnea is far out of proportion to what is seen on CT scan and no pulmonary cause is presently known. The mild hypoxemia has resolved, and was likely from atelectasis and retained secretions. Consider other causes, like underlying COPD.     We will see as outpatient in clinic in the next 2 weeks if he books appt.  Full PFTS and repeat walking oximetry at that OV. Suspect some underlying COPD but would like him to recover from the bronchitis first. He feels very much better " today with less cough and sputum and says he thinks the abx have helped a lot. Consider prn albuterol and we will do PFTS before suggesting other inhaled meds because he has no prior hx of lung problems.    [facility] and [facility], for which face to face appt is pending at  [facility] but he is using it and doing well.     Thank you for asking us to see him. I discussed plan for cough follow up with him.  He can go home any time so far as we are concerned. Should have a week of po coverage similar to current, which is working fine.     KRGromerMD                     Chief Complaint:   Hypoxemia on walking oximetry, dyspnea, and cough for 2 months    History is obtained from the patient and electronic health record         History of Present Illness:   This patient is a 84 year old male who presents with 2 months of post infectious cough and sputum production; he is much better today after antibiotics etc. Walking oximetry after being chairfast for days showed mild desats--this is better too now. He feels better. He used to smoke over 1 ppd from late teens until around age 51 but has not previously done PFTS. He is known to have CAD, htxn, PENELOPE, and hx of CVA.               Past Medical History:     Past Medical History:   Diagnosis Date     Anxiety state, unspecified      Aortic root dilatation (H)      Arthritis      Ascending aorta dilatation (H)      Basal cell cancer     s/p Mohs nose and bridge of nose on R.     Bunion      CAD (coronary artery disease)     CABG 1992: LIMA to LAD, SANDI to RCA, SVG to OM1 and OM2     Contact dermatitis and other eczema, due to unspecified cause     eczema - has light treatments with Dr. Rivera     Disorders of bursae and tendons in shoulder region, unspecified      Esophageal reflux      Essential hypertension, benign      Gallbladder disease      GERD (gastroesophageal reflux disease)      Heart attack (H)      Hyperlipidemia LDL goal <70      Left  ventricular diastolic dysfunction      Low HDL (under 40) 3/28/2014     Nasal/sinus dis NEC     s/p surgery in 1995     Obesity      Osteoarthrosis, unspecified whether generalized or localized, shoulder region      Other hammer toe (acquired)      Sleep apnea     USES CPAP     Spinal stenosis, unspecified region other than cervical     MRI done 2006     Type 2 diabetes, HbA1c goal < 7% (H) 3/12/2015     Urge incontinence              Past Surgical History:     Past Surgical History:   Procedure Laterality Date     ABDOMEN SURGERY  1994    gall bladder     BIOPSY      arms     C CABG, ARTERY-VEIN, FOUR  11/1992    CABG - LIMA to left anterior descending and saphenous vein bypass graft to the first and second obtuse marginal branch of the circumflex and the right mammary to the right coronary artery     C NONSPECIFIC PROCEDURE  6-94    Gail lap     C NONSPECIFIC PROCEDURE  1995    sinus surgery     C NONSPECIFIC PROCEDURE      bilateral cataract surgery     CHOLECYSTECTOMY  6/1994     COLONOSCOPY N/A 4/11/2017    Procedure: COMBINED COLONOSCOPY, SINGLE OR MULTIPLE BIOPSY/POLYPECTOMY BY BIOPSY;  Surgeon: Bladimir Garner MD;  Location:  GI     CV LEFT HEART CATH  5-92, 6-92    Angioplasty     ENT SURGERY  5/1995    sinus surgery     ESOPHAGOSCOPY, GASTROSCOPY, DUODENOSCOPY (EGD), DILATATION, COMBINED  7/17/2014    Procedure: COMBINED ESOPHAGOSCOPY, GASTROSCOPY, DUODENOSCOPY (EGD), DILATATION;  Surgeon: Bladimir Garner MD;  Location:  GI     EYE SURGERY      cataracts removed both eyes     HC COLONOSCOPY THRU STOMA W BIOPSY/CAUTERY TUMOR/POLYP/LESION  5/2007     INJECT EPIDURAL LUMBAR       LAMINECTOMY LUMBAR TWO LEVELS N/A 4/29/2015    Procedure: LAMINECTOMY LUMBAR TWO LEVELS;  Surgeon: Bladimir Keenan MD;  Location:  OR     THORACIC SURGERY  11/1992    bypass               Social History:     Social History     Tobacco Use     Smoking status: Former Smoker     Packs/day: 2.00     Years: 30.00     Pack  years: 60.00     Types: Cigarettes, Cigars, Pipe     Start date:      Last attempt to quit: 3/1/1992     Years since quittin.8     Smokeless tobacco: Never Used     Tobacco comment: quit in    Substance Use Topics     Alcohol use: Yes     Alcohol/week: 0.0 standard drinks     Comment: minimal less than 1/week             Family History:     Family History   Problem Relation Age of Onset     Cancer Brother         MELANOMA     Diabetes Mother         AODM     Thyroid Disease Mother      Diabetes Father         AODM     Myocardial Infarction Father      Cerebrovascular Disease Brother      Family History Negative Brother      Family History Negative Sister      Family History Negative Son      Family History Negative Son      Family History Negative Son      Family History Negative Daughter      Coronary Artery Disease Brother         dod 2019     Cerebrovascular Disease Brother         dod      Other Cancer Brother         dod 2000/melanoma     Breast Cancer Other         2017     Thyroid Disease Other              Immunizations:     Immunization History   Administered Date(s) Administered     Influenza (High Dose) 3 valent vaccine 10/14/2015, 10/14/2016, 10/25/2017, 10/17/2018, 2019     Influenza (IIV3) PF 10/25/2001, 2010, 10/12/2011, 10/10/2012, 10/02/2013     Influenza Vaccine IM > 6 months Valent IIV4 10/11/2014     Pneumo Conj 13-V (2010&after) 2014     Pneumococcal 23 valent 2007     TD (ADULT, 7+) 2000     TDAP Vaccine (Adacel) 2013     Zoster vaccine recombinant adjuvanted (SHINGRIX) 2018, 2018     Zoster vaccine, live 03/10/2008             Allergies:   No Known Allergies          Medications:     Current Facility-Administered Medications Ordered in Epic   Medication Dose Route Frequency Last Rate Last Dose     acetaminophen (TYLENOL) Suppository 650 mg  650 mg Rectal Q4H PRN         acetaminophen (TYLENOL) tablet 650 mg  650 mg Oral Q4H PRN          amLODIPine (NORVASC) tablet 5 mg  5 mg Oral BID   5 mg at 01/12/20 0814     aspirin EC tablet 81 mg  81 mg Oral Daily   81 mg at 01/12/20 0813     bisacodyl (DULCOLAX) Suppository 10 mg  10 mg Rectal Daily PRN         calcium carbonate (TUMS) chewable tablet 1,000 mg  1,000 mg Oral 4x Daily PRN         cefTRIAXone (ROCEPHIN) 1 g vial to attach to  mL bag for ADULTS or NS 50 mL bag for PEDS  1 g Intravenous Q24H         glucose gel 15-30 g  15-30 g Oral Q15 Min PRN        Or     dextrose 50 % injection 25-50 mL  25-50 mL Intravenous Q15 Min PRN        Or     glucagon injection 1 mg  1 mg Subcutaneous Q15 Min PRN         diclofenac (VOLTAREN) 1 % topical gel 4 g  4 g Transdermal 4x Daily PRN         doxycycline (VIBRAMYCIN) 100 mg vial to attach to  mL bag  100 mg Intravenous Q12H   100 mg at 01/12/20 0813     finasteride (PROSCAR) tablet 5 mg  5 mg Oral Daily   5 mg at 01/12/20 0814     gabapentin (NEURONTIN) capsule 900 mg  900 mg Oral TID   900 mg at 01/12/20 0813     heparin ANTICOAGULANT injection 5,000 Units  5,000 Units Subcutaneous Q12H   5,000 Units at 01/12/20 1203     insulin aspart (NovoLOG) inj (RAPID ACTING)  1-7 Units Subcutaneous TID AC         insulin aspart (NovoLOG) inj (RAPID ACTING)  1-5 Units Subcutaneous At Bedtime         lidocaine (LMX4) cream   Topical Q1H PRN         lidocaine 1 % 0.1-1 mL  0.1-1 mL Other Q1H PRN         lisinopril (PRINIVIL/ZESTRIL) tablet 20 mg  20 mg Oral Daily   20 mg at 01/12/20 0814     magnesium hydroxide (MILK OF MAGNESIA) suspension 30 mL  30 mL Oral Daily PRN         melatonin tablet 1 mg  1 mg Oral At Bedtime PRN         metFORMIN (GLUCOPHAGE-XR) 24 hr tablet 1,000 mg  1,000 mg Oral BID w/meals   1,000 mg at 01/12/20 0813     naloxone (NARCAN) injection 0.1-0.4 mg  0.1-0.4 mg Intravenous Q2 Min PRN         omeprazole (priLOSEC) CR capsule 20 mg  20 mg Oral Daily   20 mg at 01/12/20 0814     ondansetron (ZOFRAN-ODT) ODT tab 4 mg  4 mg Oral Q6H PRN  "       Or     ondansetron (ZOFRAN) injection 4 mg  4 mg Intravenous Q6H PRN         pravastatin (PRAVACHOL) tablet 20 mg  20 mg Oral Daily   20 mg at 01/12/20 0813     sodium chloride (PF) 0.9% PF flush 3 mL  3 mL Intracatheter q1 min prn         sodium chloride (PF) 0.9% PF flush 3 mL  3 mL Intracatheter Q8H         sodium chloride (PF) 0.9% PF flush 3 mL  3 mL Intracatheter q1 min prn         sodium chloride (PF) 0.9% PF flush 3 mL  3 mL Intracatheter Q8H         tamsulosin (FLOMAX) capsule 0.4 mg  0.4 mg Oral Daily   0.4 mg at 01/12/20 0814     No current [facility]-ordered outpatient medications on file.             Review of Systems:   The 5 point Review of Systems is negative other than noted in the HPI            Physical Exam:     Vitals were reviewed  Patient Vitals for the past 24 hrs:   BP Temp Temp src Pulse Heart Rate Resp SpO2 Height Weight   01/12/20 0755 129/73 98.2  F (36.8  C) Oral 69 64 16 94 % -- --   01/11/20 2322 (!) 144/75 -- -- -- 65 20 95 % -- --   01/11/20 2145 (!) 148/77 97.9  F (36.6  C) Oral -- 84 20 96 % -- --   01/11/20 2129 (!) 146/74 -- -- -- 82 20 94 % -- --   01/11/20 2115 -- -- -- -- 85 18 96 % -- --   01/11/20 2105 (!) 146/74 -- -- 81 87 16 95 % -- --   01/11/20 1842 -- -- -- -- 83 21 -- -- --   01/11/20 1840 (!) 174/96 -- -- 80 78 20 97 % -- --   01/11/20 1710 121/75 -- -- 78 81 12 96 % -- --   01/11/20 1700 -- -- -- -- 81 12 98 % -- --   01/11/20 1551 (!) 146/77 97.7  F (36.5  C) Oral 90 90 14 98 % 1.905 m (6' 3\") 117.9 kg (260 lb)     Pleasant alert man sitting upright on bed facing bedside table with lunch tray; nad ox3 not coughing during interview, on room air.     No tachypnea  No wheeze  No rhonchi  No cough  Faint inspiratory rales in bases bilaterally  RRR  1+ edema nonpitting  No diaphoresis  No pallor         Data:     Lab Results   Component Value Date    WBC 10.7 01/11/2020    WBC 7.0 06/28/2019    WBC 7.3 06/18/2019    HGB 13.5 01/11/2020    HGB 12.4 (L) 06/28/2019    " HGB 12.1 (L) 2019    HCT 42.1 2020    HCT 38.5 (L) 2019    HCT 37.6 (L) 2019     2020     2019     2019     2020     2020     2019    POTASSIUM 3.4 2020    POTASSIUM 3.6 2020    POTASSIUM 4.5 2019    CHLORIDE 110 (H) 2020    CHLORIDE 107 2020    CHLORIDE 107 2019    CO2 24 2020    CO2 24 2020    CO2 27 2019    BUN 14 2020    BUN 17 2020    BUN 17 2019    CR 0.69 2020    CR 0.70 2020    CR 0.67 2019     (H) 2020    GLC 97 2020     (H) 2019    SED 30 (H) 2020    SED 23 (H) 2019    DD 3.4 (H) 2020    NTBNPI 92 2020    TROPI <0.015 2020    AST 24 2020    AST 19 2019    AST 24 2016    ALT 13 2020    ALT 13 2019    ALT <5 (L) 2019    ALKPHOS 52 2020    ALKPHOS 68 2019    ALKPHOS 82 2016    BILITOTAL 0.4 2020    BILITOTAL 0.4 2019    BILITOTAL 0.4 2016    INR 1.10 2014       All imaging studies reviewed by me.  Peribronchial inflammation bilaterally, mild. Minimal findings in RUL on CT.             Echocardiogram Complete [[id number]] Collected: 20 1002   Order Status: Completed Updated: 20 1242   Narrative:     [id number]  [id number]  [id number]  [id number]^[id number]^GUSTAVO           [facility]  Echocardiography Laboratory  [address] [place] [address]        Name: NAYELI RAI ALLIE  MRN: [id number]  : 1935  Study Date: 2020 10:02 AM  Age: 84 yrs  Gender: Male  Patient Location: [facility]  Reason For Study: [facility]  Ordering Physician: GUSTAVO MUÑOZ  Referring Physician: Hamzah Hodge  Performed By: Ruperto Tobias     BSA: 2.5 m2  Height: 75 in  Weight: 260 lb  HR: 73  BP: 146/74  mmHg  _____________________________________________________________________________  __        Procedure  Complete Portable Echo Adult. Optison (NDC #5841-1169) given intravenously.  _____________________________________________________________________________  __        Interpretation Summary     Left ventricular systolic function is normal. The visual ejection fraction is  estimated at 60-65%. There is borderline concentric left ventricular  hypertrophy.  No hemodynamically significant valvular aortic stenosis.  The right ventricle is not well visualized. but appears moderately dilated.  There is mild to moderate right ventricular hypertrophy.  Right ventricular systolic pressure could not be approximated but imaging  suggests a low/normal RA pressure of 3 mmHg.  RV has been difficult to image on this and the prior study.  Technically difficult, suboptimal study. Contrast was used without apparent  complications. RV has been difficult to image on this and the prior study.  _____________________________________________________________________________  __        Left Ventricle  The left ventricle is normal in size. There is borderline concentric left  ventricular hypertrophy. Proximal septal thickening is noted. Left ventricular  systolic function is normal. The visual ejection fraction is estimated at 55-  60%. Diastolic Doppler findings (E/E' ratio and/or other parameters) suggest  left ventricular filling pressures are indeterminate. No regional wall motion  abnormalities noted.     Right Ventricle  The right ventricle is moderately dilated. There is mild to moderate right  ventricular hypertrophy. The right ventricle is not well visualized. The right  ventricular systolic function is borderline reduced.     Atria  There is mild-moderate biatrial enlargement. There is no atrial shunt seen.     Mitral Valve  There is trace to mild mitral regurgitation.        Tricuspid Valve  There is trace tricuspid  regurgitation. Right ventricular systolic pressure  could not be approximated due to inadequate tricuspid regurgitation. IVC  diameter <2.1 cm collapsing >50% with sniff suggests a normal RA pressure of 3  mmHg.     Aortic Valve  There is moderate trileaflet aortic sclerosis. No aortic regurgitation is  present. No hemodynamically significant valvular aortic stenosis.     Pulmonic Valve  There is no pulmonic valvular stenosis.     Vessels  Mild aortic root dilatation. Sinuses are 3.9 cm.     Pericardium  The pericardium appears normal.        Rhythm  Sinus rhythm was noted.  _____________________________________________________________________________  __  MMode/2D Measurements & Calculations  IVSd: 1.3 cm     LVIDd: 5.7 cm  LVIDs: 4.4 cm  LVPWd: 0.96 cm  FS: 22.3 %  LV mass(C)d: 269.8 grams  LV mass(C)dI: 110.0 grams/m2  Ao root diam: 3.9 cm  asc Aorta Diam: 3.5 cm  LVOT diam: 2.0 cm  LVOT area: 3.3 cm2  LA Volume (BP): 101.0 ml  LA Volume Index (BP): 41.2 ml/m2  RWT: 0.34           Doppler Measurements & Calculations  MV E max migel: 118.0 cm/sec  MV A max migel: 99.9 cm/sec  MV E/A: 1.2  MV dec slope: 425.5 cm/sec2  MV dec time: 0.28 sec  PA acc time: 0.12 sec  Pulm Sys Migel: 49.0 cm/sec  Pulm Thomas Migel: 37.1 cm/sec  Pulm S/D: 1.3  E/E' avg: 10.6  Lateral E/e': 8.2  Medial E/e': 13.1           _____________________________________________________________________________  __           Report approved by: José Manuel Pham 01/12/2020 12:39 PM      US Lower Extremity Venous Duplex Bilateral [[id number]] Collected: 01/11/20 2243   Order Status: Completed Updated: 01/11/20 9048   Narrative:     EXAM: US LOWER EXTREMITY VENOUS DUPLEX BILATERAL  LOCATION: [facility]  DATE/TIME: 1/11/2020 10:43 PM    INDICATION: Elevated d-dimer, no pain, swelling  COMPARISON: None.  TECHNIQUE: Venous Duplex ultrasound of bilateral lower extremities with and without compression, augmentation and duplex. Color flow and spectral  Doppler with waveform analysis performed.    FINDINGS: Exam includes the common femoral, femoral, popliteal veins as well as segmentally visualized deep calf veins and greater saphenous vein.     RIGHT: No deep vein thrombosis. No superficial thrombophlebitis. No popliteal cyst.    LEFT: No deep vein thrombosis. No superficial thrombophlebitis. No popliteal cyst.   Impression:     IMPRESSION:  1.  No deep venous thrombosis in the bilateral lower extremities.   CT Chest Pulmonary Embolism w Contrast [[id number]] Resulted: 01/11/20 1851   Order Status: Completed Updated: 01/11/20 1852   Narrative:     CT CHEST PULMONARY EMBOLISM WITH CONTRAST  1/11/2020 6:21 PM    HISTORY:  PE suspected, intermediate probability, positive D-dimer.    TECHNIQUE: Scans obtained from the apices through the diaphragm with  IV contrast. 80 mL Isovue-370     injected.  Radiation dose for this scan was reduced using automated exposure  control, adjustment of the mA and/or kV according to patient size, or  iterative reconstruction technique.    COMPARISON:  None.    FINDINGS:  0.5 cm nodular density in the posterior lateral right upper  lobe inferiorly (image 153 series 8) likely represents inflammatory or  infectious process. Neoplastic nodules considered less likely. Mild  linear density along the right major fissure laterally in that  location is also noted and is likely similar process. No other  significant nodule, mass, infiltrate, effusion, or pneumothorax is  identified.    The heart is grossly normal in size. There are extensive coronary  artery calcifications and/or stents. Thoracic aortic calcifications  are also noted. No mediastinal, hilar, or axillary lymphadenopathy is  identified.    There is nonaneurysmal atherosclerosis. Mild scarring right kidney is  noted. Patient is status post cholecystectomy. Visualized portions of  the upper abdominal contents are otherwise unremarkable.    No aggressive osseous lesion or acute osseous  fracture seen. There are  degenerative changes in the spine. Sternal cerclage wires are noted.    The central to third order pulmonary arteries demonstrate no filling  defects to suggest pulmonary artery embolism.   Impression:     IMPRESSION:   1.  No evidence for pulmonary artery embolism.  2.  0.5 cm nodular density in the posterior lateral inferior right  upper lobe adjacent to a subtle area of strandy densities in the lung  likely represents an inflammatory or infectious process. Neoplastic  nodules considered less likely.  Recommendations for one or multiple  incidental lung nodules < 6mm :    Low risk patients: No routine follow-up.    High risk patients: Optional follow-up CT at 12 months; if  unchanged, no further follow-up.    *Low Risk: Minimal or absent history of smoking or other known risk  factors.  *Nonsolid (ground glass) or partly solid nodules may require longer  follow-up to exclude indolent adenocarcinoma.  *Recommendations based on Guidelines for the Management of Incidental  Pulmonary Nodules Detected at CT: From the Fleischner Society 2017,  Radiology 2017.  3. Extensive atherosclerosis as described above. The coronary artery  findings may be postoperative or also due to stents. Patient does  appear to be status post CABG.    PRICE JONAS MD         Reviewed scan of PSG ordered by Dr Artis on referral from Dr Moreno 1/04/2007 which demonstrated severe PENELOPE; ahi 53 RDI 66 desats to 86% and events lasting up to 79 seconds.

## 2020-01-12 NOTE — DISCHARGE SUMMARY
[facility]  Hospitalist Discharge Summary       Date of Admission:  1/11/2020  Date of Discharge:  1/12/2020  Discharging Provider: Heather Tejada MD      Discharge Diagnoses   Dyspnea on exertion - possible competent of infectious, reactive airway vs deconditioning  Lung nodule - infectious versus inflammatory.  Mild lactic acid elevation without acidosis, likely related to metformin   Elevated d-dimer  Obstructive sleep apnea on CPAP  Obesity with a BMI of 32.50  Physical deconditioning from acute illness      Follow-ups Needed After Discharge   Follow-up Appointments     Follow-up and recommended labs and tests       Make appointment with Dr. Kelly's office at [facility] for 2 weeks from   discharge with follow up chest CT and full pulmonary function tests and   walking oximetry. Tell them Dr. Kelly made these recommendations when she   saw you at [facility] on 01/12/20. She can order the   tests prior to your appointment.    Follow up with primary care provider, Hamzah Hodge, within 7 days for   hospital follow- up.             Unresulted Labs Ordered in the Past 30 Days of this Admission     Date and Time Order Name Status Description    1/11/2020 2015 Blood culture Preliminary     1/11/2020 2015 Blood culture Preliminary       Hospitalist group will be notified if cultures turn positive. No growth to date.     Discharge Disposition   Discharged to home  Condition at discharge: Good    Hospital Course   Austen Fowler is a 84 year old  male with medical history of hypertension, hyperlipidemia, diabetes, coronary artery disease presented to the ED with shortness of breath and had some transient hypoxia which resolved.      Dyspnea on exertion -possible competent of infectious, deconditioning.  Transient hypoxia on ambulation, RESOLVED  Lung nodule -infectious versus inflammatory.  Mild lactic acid elevation without acidosis, likely related to metformin   Patient reports  ongoing shortness of breath with cough for 2 months following a URI, progressively worsened last week. Unable to ambulate for more than few feet due to shortness of breath. However, also notes that he has no SOB when working out at the [facility] and exerting him further. Reports on and off chest prick sensation over the last week, no chest pain.    In ED he was afebrile, oxygen saturation in mid to high 90s on room air on rest.  Per ED report decompensated to mid 80s on minimal ambulation, short of breath. By hospital day #2 he had no desaturations with ambulation on RA.     WBC 10.7, procalcitonin less than 0.05.  Hemoglobin 13.5 pH 7.47, PCO2 28, lactic acid 2.8 to 2.3. BNP and  troponin negative. Procalcitonin low. Sed rate 30, CRP < 2.9. CK nl. D-dimer elevated. Chest CT and LE U/S were negative for DVT.     CT chest with contrast showed no evidence for pulmonary artery embolism.0.5 cm nodular density in the posterior lateral inferior right upper lobe adjacent to a subtle area of strandy densities in the lung likely represents an inflammatory or infectious process. Neoplastic nodules considered less likely.    He received IV ceftriaxone, IV doxycycline during his stay with improvement in his symptoms. He had no hypoxia, chest pain or hemodynamic instability throughout the rest of his stay.     He was seen by Dr. Kelly of [facility] who made the following recommendations:   - He should complete a course of PO antibiotics  - Trial of albuterol inhaler.   - Follow up with [facility] in 2 weeks with repeat chest CT, full PFTs and walking oximetry.   - If nodule persists, consider f/u CT in 12 months.      Elevated d-dimer.  D-dimer 3.4.    CT chest no PE. U/S negative for DVT      Coronary artery disease status post CABG.  Hypertension.  Reports on and off chest prick sensation over the last week, no chest pain.    Exercise stress test 2015 negative   Echo 9/2019 with estimated EF of 55 to 60%.  Borderline dilated ascending  "aorta 3.6 cm.  EKG with heart rate of 82, QTc 413.  Normal sinus rhythm.  Troponin undetectable. BNP normal   Telemetry monitoring showed NSR.   Echocardiogram showed LV nl systolic fxn EF 60-65% with borderline LVH, no valvular disease. RV not well visualized but appears moderately dilated with mild to moderate RVH. RVSP could not be approximated.     -  Continue PTA aspirin,  Amlodipine, PTA lisinopril, PTA pravastatin.  -  If above unremarkable, symptoms persist would pursue further cardiac workup with stress test. Although, patient states symptoms are better when exercising at the gym, which argues against cardiac disease.      Cervicalgia:    Continue PTA PRN Tylenol, gabapentin.     Diabetes: Hemoglobin A1c of 6.5 in October 2019.  Continue PTA metformin.  Insulin sliding scale during hospitalization.  Recent Labs   Lab 01/12/20  1632 01/12/20  1316 01/12/20  0823 01/12/20  0659 01/12/20  0203 01/11/20  2153 01/11/20  1641   GLC  --   --   --  123*  --   --  97   * 107* 121*  --  108* 100*  --         Hyperlipidemia:   Last LDL 57 in October 2019.  Continue PTA pravastatin.     GERD:   Continue PTA omeprazole.     BPH:    Continue PTA finasteride and Flomax.     Obstructive sleep apnea on CPAP.  Continue PTA CPAP. Tolerates well.      Obesity with a BMI of 32.50.  We will need to consider lifestyle modification with diet and exercise as able to.     Physical deconditioning from acute illness.  Reports has been mostly sedentary in bed or in the recliner due to ongoing shortness of breath.   PT evaluated:   \" Patient is currently below baseline activity tolerance and requires SBA; previously independent with all mobility. He did not desat on RA during activity but does demonstrate increased RR and mild wheezing following activity. Anticipate patient will safely discharge home with support from wife for ambulation, use of walking sticks for safety, and continued OP PT services to address back pain and " "return to PLOF. Continue with IP PT during stay to address limitations.\"       - Patient declined any referral for PT services stating he will continue with current program.     Communication: Discussed with patient, wife and RN on 01/12/20    Consultations This Hospital Stay   SOCIAL WORK IP CONSULT  PHYSICAL THERAPY ADULT IP CONSULT  OCCUPATIONAL THERAPY ADULT IP CONSULT  PULMONARY IP CONSULT    Code Status   DNR    Time Spent on this Encounter   I, Heather Tejada MD, personally saw the patient today and spent greater than 30 minutes discharging this patient.       Heather Tejada MD  [facility]  ______________________________________________________________________    Physical Exam   Vital Signs: Temp: 98.3  F (36.8  C) Temp src: Oral BP: 133/77 Pulse: 69 Heart Rate: 66 Resp: 16 SpO2: 93 % O2 Device: None (Room air)    Weight: 260 lbs 0 oz  Constitutional:  NAD,   Neuropsyche:  alert and oriented, answers questions appropriately.   Respiratory:  Breathing comfortably, good air exchange, no wheezes, no crackles.   Cardiovascular:  Regular rate and rhythm, no edema.  GI:  soft, NT/ND, BS normal  Skin/Integumen:  No acute rash, bruising or sign of bleeding.          Primary Care Physician   Hamzah Hodge    Discharge Orders      Reason for your hospital stay    You were admitted to the hospital for evaluation of shortness of breath. Evidence of bronchitis / mild pneumonia was seen on your CT. You were placed on antibiotics.     Follow-up and recommended labs and tests     Make appointment with Dr. Kelly's office at [facility] for 2 weeks from discharge with follow up chest CT and full pulmonary function tests and walking oximetry. Tell them Dr. Kelly made these recommendations when she saw you at [facility] on 01/12/20. She can order the tests prior to your appointment.    Follow up with primary care provider, Hamzah Hodge, within 7 days for hospital follow- up.     Activity    " Your activity upon discharge: activity as tolerated     Discharge Instructions    Take a full glass of water with each dose of antibiotic (doxycycline) and remain upright for at least one hour after dose.     Diet    Combination Diet 2982-7517 Calories: Moderate Consistent CHO (4-6 CHO units/meal); Low Saturated Fat Na <2400mg Diet       Significant Results and Procedures   Most Recent 3 CBC's:  Recent Labs   Lab Test 01/11/20  1641 06/28/19  1127 06/18/19  0856   WBC 10.7 7.0 7.3   HGB 13.5 12.4* 12.1*   MCV 85 91 88    252 263     Most Recent 3 BMP's:  Recent Labs   Lab Test 01/12/20  0659 01/11/20  1641 06/28/19  1127    140 142   POTASSIUM 3.4 3.6 4.5   CHLORIDE 110* 107 107   CO2 24 24 27   BUN 14 17 17   CR 0.69 0.70 0.67   ANIONGAP 7 9 8   BRANDON 8.8 9.4 9.3   * 97 120*     Most Recent 2 LFT's:  Recent Labs   Lab Test 01/12/20  0659 06/18/19  0856   AST 24 19   ALT 13 13   ALKPHOS 52 68   BILITOTAL 0.4 0.4       Most Recent 3 Troponin's:  Recent Labs   Lab Test 01/11/20  1641   TROPI <0.015     Most Recent 3 BNP's:  Recent Labs   Lab Test 01/11/20  1641   NTBNPI 92     Most Recent D-dimer:  Recent Labs   Lab Test 01/11/20  1641   DD 3.4*     Most Recent 6 Bacteria Isolates From Any Culture (See EPIC Reports for Culture Details):  Recent Labs   Lab Test 01/11/20 2030 01/11/20 2029 06/18/19  1017   CULT No growth after 13 hours No growth after 13 hours No growth     Most Recent TSH and T4:  Recent Labs   Lab Test 04/09/18  0853   TSH 1.14     Most Recent ESR & CRP:  Recent Labs   Lab Test 01/11/20  1641   SED 30*   CRP <2.9   ,   Results for orders placed or performed during the hospital encounter of 01/11/20   CT Chest Pulmonary Embolism w Contrast    Narrative    CT CHEST PULMONARY EMBOLISM WITH CONTRAST  1/11/2020 6:21 PM    HISTORY:  PE suspected, intermediate probability, positive D-dimer.    TECHNIQUE: Scans obtained from the apices through the diaphragm with  IV contrast. 80 mL  Isovue-370     injected.  Radiation dose for this scan was reduced using automated exposure  control, adjustment of the mA and/or kV according to patient size, or  iterative reconstruction technique.    COMPARISON:  None.    FINDINGS:  0.5 cm nodular density in the posterior lateral right upper  lobe inferiorly (image 153 series 8) likely represents inflammatory or  infectious process. Neoplastic nodules considered less likely. Mild  linear density along the right major fissure laterally in that  location is also noted and is likely similar process. No other  significant nodule, mass, infiltrate, effusion, or pneumothorax is  identified.    The heart is grossly normal in size. There are extensive coronary  artery calcifications and/or stents. Thoracic aortic calcifications  are also noted. No mediastinal, hilar, or axillary lymphadenopathy is  identified.    There is nonaneurysmal atherosclerosis. Mild scarring right kidney is  noted. Patient is status post cholecystectomy. Visualized portions of  the upper abdominal contents are otherwise unremarkable.    No aggressive osseous lesion or acute osseous fracture seen. There are  degenerative changes in the spine. Sternal cerclage wires are noted.    The central to third order pulmonary arteries demonstrate no filling  defects to suggest pulmonary artery embolism.      Impression    IMPRESSION:   1.  No evidence for pulmonary artery embolism.  2.  0.5 cm nodular density in the posterior lateral inferior right  upper lobe adjacent to a subtle area of strandy densities in the lung  likely represents an inflammatory or infectious process. Neoplastic  nodules considered less likely.  Recommendations for one or multiple  incidental lung nodules < 6mm :    Low risk patients: No routine follow-up.    High risk patients: Optional follow-up CT at 12 months; if  unchanged, no further follow-up.    *Low Risk: Minimal or absent history of smoking or other known  risk  factors.  *Nonsolid (ground glass) or partly solid nodules may require longer  follow-up to exclude indolent adenocarcinoma.  *Recommendations based on Guidelines for the Management of Incidental  Pulmonary Nodules Detected at CT: From the Fleischner Society 2017,  Radiology 2017.  3. Extensive atherosclerosis as described above. The coronary artery  findings may be postoperative or also due to stents. Patient does  appear to be status post CABG.    PRICE JONAS MD   US Lower Extremity Venous Duplex Bilateral    Narrative    EXAM: US LOWER EXTREMITY VENOUS DUPLEX BILATERAL  LOCATION: [facility]  DATE/TIME: 2020 10:43 PM    INDICATION: Elevated d-dimer, no pain, swelling  COMPARISON: None.  TECHNIQUE: Venous Duplex ultrasound of bilateral lower extremities with and without compression, augmentation and duplex. Color flow and spectral Doppler with waveform analysis performed.    FINDINGS: Exam includes the common femoral, femoral, popliteal veins as well as segmentally visualized deep calf veins and greater saphenous vein.     RIGHT: No deep vein thrombosis. No superficial thrombophlebitis. No popliteal cyst.    LEFT: No deep vein thrombosis. No superficial thrombophlebitis. No popliteal cyst.      Impression    IMPRESSION:  1.  No deep venous thrombosis in the bilateral lower extremities.   Echocardiogram Complete    Narrative    [id number]  [id number]  [id number]  [id number]^TONI^GUSTAVO           [facility]  Echocardiography Laboratory  [address] [place] [address]        Name: NAYELI RAI ALLIE  MRN: [id number]  : 1935  Study Date: 2020 10:02 AM  Age: 84 yrs  Gender: Male  Patient Location: [facility]  Reason For Study: SOB  Ordering Physician: GUSTAVO MUÑOZ  Referring Physician: Hamzah Hodge  Performed By: Ruperto Tobias     BSA: 2.5 m2  Height: 75 in  Weight: 260 lb  HR: 73  BP: 146/74  mmHg  _____________________________________________________________________________  __        Procedure  Complete Portable Echo Adult. Optison (NDC #[id number]) given intravenously.  _____________________________________________________________________________  __        Interpretation Summary     Left ventricular systolic function is normal. The visual ejection fraction is  estimated at 60-65%. There is borderline concentric left ventricular  hypertrophy.  No hemodynamically significant valvular aortic stenosis.  The right ventricle is not well visualized. but appears moderately dilated.  There is mild to moderate right ventricular hypertrophy.  Right ventricular systolic pressure could not be approximated but imaging  suggests a low/normal RA pressure of 3 mmHg.  RV has been difficult to image on this and the prior study.  Technically difficult, suboptimal study. Contrast was used without apparent  complications. RV has been difficult to image on this and the prior study.  _____________________________________________________________________________  __        Left Ventricle  The left ventricle is normal in size. There is borderline concentric left  ventricular hypertrophy. Proximal septal thickening is noted. Left ventricular  systolic function is normal. The visual ejection fraction is estimated at 55-  60%. Diastolic Doppler findings (E/E' ratio and/or other parameters) suggest  left ventricular filling pressures are indeterminate. No regional wall motion  abnormalities noted.     Right Ventricle  The right ventricle is moderately dilated. There is mild to moderate right  ventricular hypertrophy. The right ventricle is not well visualized. The right  ventricular systolic function is borderline reduced.     Atria  There is mild-moderate biatrial enlargement. There is no atrial shunt seen.     Mitral Valve  There is trace to mild mitral regurgitation.        Tricuspid Valve  There is trace tricuspid  regurgitation. Right ventricular systolic pressure  could not be approximated due to inadequate tricuspid regurgitation. IVC  diameter <2.1 cm collapsing >50% with sniff suggests a normal RA pressure of 3  mmHg.     Aortic Valve  There is moderate trileaflet aortic sclerosis. No aortic regurgitation is  present. No hemodynamically significant valvular aortic stenosis.     Pulmonic Valve  There is no pulmonic valvular stenosis.     Vessels  Mild aortic root dilatation. Sinuses are 3.9 cm.     Pericardium  The pericardium appears normal.        Rhythm  Sinus rhythm was noted.  _____________________________________________________________________________  __  MMode/2D Measurements & Calculations  IVSd: 1.3 cm     LVIDd: 5.7 cm  LVIDs: 4.4 cm  LVPWd: 0.96 cm  FS: 22.3 %  LV mass(C)d: 269.8 grams  LV mass(C)dI: 110.0 grams/m2  Ao root diam: 3.9 cm  asc Aorta Diam: 3.5 cm  LVOT diam: 2.0 cm  LVOT area: 3.3 cm2  LA Volume (BP): 101.0 ml  LA Volume Index (BP): 41.2 ml/m2  RWT: 0.34           Doppler Measurements & Calculations  MV E max migel: 118.0 cm/sec  MV A max migel: 99.9 cm/sec  MV E/A: 1.2  MV dec slope: 425.5 cm/sec2  MV dec time: 0.28 sec  PA acc time: 0.12 sec  Pulm Sys Migel: 49.0 cm/sec  Pulm Thomas Migel: 37.1 cm/sec  Pulm S/D: 1.3  E/E' avg: 10.6  Lateral E/e': 8.2  Medial E/e': 13.1           _____________________________________________________________________________  __           Report approved by: José Manuel Pham 01/12/2020 12:39 PM            Discharge Medications   Current Discharge Medication List      START taking these medications    Details   albuterol (PROAIR HFA/PROVENTIL HFA/VENTOLIN HFA) 108 (90 Base) MCG/ACT inhaler Inhale 2 puffs into the lungs every 6 hours  Qty: 1 Inhaler, Refills: 0    Comments: Pharmacy may dispense brand covered by insurance (Proair, or proventil or ventolin or generic albuterol inhaler)  Associated Diagnoses: Pneumonia of right upper lobe due to infectious organism (H)       doxycycline hyclate (VIBRAMYCIN) 100 MG capsule Take 1 capsule (100 mg) by mouth every 12 hours for 6 days  Qty: 12 capsule, Refills: 0    Associated Diagnoses: Pneumonia of right upper lobe due to infectious organism (H)         CONTINUE these medications which have NOT CHANGED    Details   acetaminophen (ACETAMIN) 500 MG tablet Take 1,000 mg by mouth 2 times daily       amLODIPine (NORVASC) 5 MG tablet TAKE 1 TABLET BY MOUTH TWO  TIMES DAILY  Qty: 180 tablet, Refills: 2    Associated Diagnoses: Essential hypertension, benign      Cholecalciferol (VITAMIN D) 2000 UNIT tablet Take 2,000 Units by mouth daily.  Qty: 90 tablet, Refills: 3    Comments: OTC      diclofenac (VOLTAREN) 1 % topical gel Place 4 g onto the skin 4 times daily as needed for moderate pain (Apply to back to neck.)      finasteride (PROSCAR) 5 MG tablet Take 1 tablet (5 mg) by mouth daily  Qty: 90 tablet, Refills: 1    Associated Diagnoses: Slowing of urinary stream      gabapentin (NEURONTIN) 300 MG capsule Take 900 mg three times daily  Qty: 810 capsule, Refills: 2    Associated Diagnoses: Chronic pain syndrome; Sacroiliac joint pain      lisinopril (PRINIVIL/ZESTRIL) 20 MG tablet Take 1 tablet (20 mg) by mouth daily Due for follow up appointment. 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)      omeprazole (PRILOSEC) 20 MG DR capsule Take 1 capsule (20 mg) by mouth daily  Qty: 90 capsule, Refills: 1    Associated Diagnoses: Dysphagia, unspecified type      pravastatin (PRAVACHOL) 20 MG tablet TAKE 1 TABLET BY MOUTH  DAILY  Qty: 90 tablet, Refills: 3     Associated Diagnoses: Hyperlipidemia LDL goal <70      propranolol (INDERAL) 60 MG tablet Take 60 mg by mouth 2 times daily      tamsulosin (FLOMAX) 0.4 MG capsule Take 1 capsule (0.4 mg) by mouth daily  Qty: 90 capsule, Refills: 1    Associated Diagnoses: Benign prostatic hyperplasia, unspecified whether lower urinary tract symptoms present      triamcinolone (ARISTOCORT HP) 0.5 % external cream Apply topically 2 times daily as needed for irritation      aspirin 81 MG EC tablet Take 81 mg by mouth daily       blood glucose (ONETOUCH ULTRA) test strip Use to test blood sugar 2 times daily or as directed.  Qty: 200 strip, Refills: 1    Associated Diagnoses: Other abnormal glucose      blood glucose monitoring (ONE TOUCH ULTRASOFT) lancets Use to test blood sugar 2 times daily or as directed.  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 order for DME Equipment being ordered: Medium compression stockings   20-30 mm  Qty: 2 each, Refills: 3    Associated Diagnoses: Edema, unspecified type      !! ORDER FOR DME Uses Cpap machine for sleep apnea       !! - Potential duplicate medications found. Please discuss with provider.        Allergies   No Known Allergies

## 2020-01-12 NOTE — UTILIZATION REVIEW
"  Admission Status; Secondary Review Determination         Under the authority of the Utilization Management Committee, the utilization review process indicated a secondary review on the above patient.  The review outcome is based on review of the medical records, discussions with staff, and applying clinical experience noted on the date of the review.          (x) Observation Status Appropriate - This patient does not meet hospital inpatient criteria and is placed in observation status. If this patient's primary payer is Medicare and was admitted as an inpatient, Condition Code 44 should be used and patient status changed to \"observation\".     RATIONALE FOR DETERMINATION   84 year old  male with medical history of hypertension, hyperlipidemia, diabetes, coronary artery disease presented to the ED with shortness of breath. v patient had hypoxia with ambulation, oxygen saturation this morning is mid-90% on room air, no evidence of pulmonary embolism or severe infection requiring prolonged inpatient stay.  Expected short stay on admission.  The severity of illness, intensity of service provided, expected LOS and risk for adverse outcome make the care appropriate for further observation; however, doesn't meet criteria for hospital inpatient admission. Dr. Tejada  notified of this determination.    This document was produced using voice recognition software.      The information on this document is developed by the utilization review team in order for the business office to ensure compliance.  This only denotes the appropriateness of proper admission status and does not reflect the quality of care rendered.         The definitions of Inpatient Status and Observation Status used in making the determination above are those provided in the CMS Coverage Manual, Chapter 1 and Chapter 6, section 70.4.      Sincerely,     CECILY PARSONS MD    System Medical Director  Utilization Management  [facility].      "

## 2020-01-12 NOTE — PHARMACY-ADMISSION MEDICATION HISTORY
Pharmacy Medication History  Admission medication history interview status for the 1/11/2020  admission is complete. See EPIC admission navigator for prior to admission medications     Medication history sources: Patient and Surescripts  Medication history source reliability: Good  Adherence assessment: Good    Significant changes made to the medication list:  1) Patient states he stopped taking propranolol due to upset stomach/diarrhea. He states that his primary care provider was aware of the change. Patient has a past medical history of MI but was using propranolol for essential tremor. Patient is not currently on any alternative beta blocker. I left propranolol on his list for hospitalist to review.     Medication reconciliation completed by provider prior to medication history? No    Time spent in this activity: 35 minutes      Prior to Admission medications    Medication Sig Last Dose Taking? Auth Provider   acetaminophen (ACETAMIN) 500 MG tablet Take 1,000 mg by mouth 2 times daily  1/11/2020 at am x 1 dose Yes Reported, Patient   amLODIPine (NORVASC) 5 MG tablet TAKE 1 TABLET BY MOUTH TWO  TIMES DAILY 1/11/2020 at am x 1 dose Yes Hamzah Hodge MD   Cholecalciferol (VITAMIN D) 2000 UNIT tablet Take 2,000 Units by mouth daily. 1/11/2020 at am Yes Hamzah Hodge MD   diclofenac (VOLTAREN) 1 % topical gel Place 4 g onto the skin 4 times daily as needed for moderate pain (Apply to back to neck.) prn med Yes Unknown, Entered By History   finasteride (PROSCAR) 5 MG tablet Take 1 tablet (5 mg) by mouth daily Unknown Yes Hamzah Hodge MD   gabapentin (NEURONTIN) 300 MG capsule Take 900 mg three times daily 1/11/2020 at am x 1 dose Yes Hamzah Hodge MD   lisinopril (PRINIVIL/ZESTRIL) 20 MG tablet Take 1 tablet (20 mg) by mouth daily Due for follow up appointment. Please schedule: [phone] 1/11/2020 at am Yes Hamzah Hodge MD   Menthol, Topical Analgesic, (ICY HOT BACK EX) Externally apply topically daily as  needed (neck pain)  prn med Yes Reported, Patient   metFORMIN (GLUCOPHAGE-XR) 500 MG 24 hr tablet Take 2 tablets (1,000 mg) by mouth 2 times daily (with meals) 1/11/2020 at am x 1 dose Yes Hamzah Hodge MD   omeprazole (PRILOSEC) 20 MG DR capsule Take 1 capsule (20 mg) by mouth daily 1/11/2020 at am Yes Hamzah Hodge MD   pravastatin (PRAVACHOL) 20 MG tablet TAKE 1 TABLET BY MOUTH  DAILY 1/10/2020 at pm Yes Hamzah Hodge MD   propranolol (INDERAL) 60 MG tablet Take 60 mg by mouth 2 times daily  Yes Unknown, Entered By History   tamsulosin (FLOMAX) 0.4 MG capsule Take 1 capsule (0.4 mg) by mouth daily 1/10/2020 at pm Yes Hamzah Hodge MD   triamcinolone (ARISTOCORT HP) 0.5 % external cream Apply topically 2 times daily as needed for irritation prn med Yes Unknown, Entered By History   aspirin 81 MG EC tablet Take 81 mg by mouth daily  Past month - on hold for bruising  Unknown, Entered By History   blood glucose (ONETOUCH ULTRA) test strip Use to test blood sugar 2 times daily or as directed.   Hamzah Hodge MD   blood glucose monitoring (ONE TOUCH ULTRASOFT) lancets Use to test blood sugar 2 times daily or as directed.   Hamzah Hodge MD   order for DME Equipment being ordered: Medium compression stockings   20-30 mm  Patient not taking: Reported on 10/31/2019   Hamzah Hodge MD   ORDER FOR DME Uses Cpap machine for sleep apnea   Dominga Aguirre PA-C

## 2020-01-12 NOTE — PLAN OF CARE
Discharge Planner OT   Patient plan for discharge: Home  Current status: Initial evaluation complete.  Pt in bed, spouse and daughter present. Pt stated he was feeling much better, a little more tired than usual.  Pt was able to demonstrate ability to get in and out of bed with SBA, complete toilet transfer with SBA, and complete dressing with SBA.  Pt also was able to demonstrate a simulated tub transfer with SBA.  Uses tub to shower on a regular basis.  Some SOB with activity, presented pt and spouse education on EC techniuqes.  Left handouts for pt use.  Pt appears close to baseline, no further IP OT needs at this time.  Barriers to return to prior living situation: None seen from an OT standpoint at this time.  Defer stairs and stair climbing to PT.  Recommendations for discharge: Home  Rationale for recommendations: Pt appears to be close to baseline, anticipate he will continue to improve and be able to go home with the AE he uses at home and assist from spouse as needed.  Pt has shower chair and handheld showerhead and walking sticks that he uses on an ongoing basis.       Entered by: Sean Claudio 01/12/2020 3:00 PM        Occupational Therapy Discharge Summary    Reason for therapy discharge:    All goals and outcomes met, no further needs identified.    Progress towards therapy goal(s). See goals on Care Plan in [facility] electronic health record for goal details.  Goals met    Therapy recommendation(s):    No further therapy is recommended.

## 2020-01-12 NOTE — H&P
[facility]    History and Physical  Hospitalist    Austen Fowler MRN# [id number]   Age: 84 year old YOB: 1935     Date of Admission:  1/11/2020    Primary care provider: Hamzah Hodge          Assessment and Plan:     Austen Fowler is a 84 year old  male with medical history of hypertension, hyperlipidemia, diabetes, coronary artery disease presented to the ED with shortness of breath.    Dyspnea on exertion -possible competent of infectious, deconditioning.  Acute hypoxia on ambulation  Lung nodule -infectious versus inflammatory.  Lactic acidosis  Patient reports ongoing shortness of breath with cough for 2 months, progressively worsened last week.  Unable to ambulate for more than few feet due to shortness of breath. Reports on and off chest prick sensation over the last week, no chest pain.    In ED afebrile, oxygen saturation in mid to high 90s on room air on rest.  Per ED report decompensated to mid 80s on minimal ambulation, short of breath.  WBC 10.7, procalcitonin less than 0.05.  Hemoglobin 13.5 pH 7.47, PCO2 28, lactic acid 2.8.  CT chest with contrast showed no evidence for pulmonary artery embolism.0.5 cm nodular density in the posterior lateral inferior right upper lobe adjacent to a subtle area of strandy densities in the lung likely represents an inflammatory or infectious process. Neoplastic nodules considered less likely.  Admit to inpatient.  Received IV ceftriaxone, IV doxycycline, will continue.  Follow blood culture results.  De-escalate antibiotics depending on culture results.  Gentle hydration with normal saline at 100 mL fluid bolus in ED.  Recheck lactic acid level a.m. or earlier if symptomatic.  We will check ESR, CRP, CK levels and LFT.  Sputum Gram stain, cultures.  Pulmonology consult requested- ? Bronch, plan of care given ongoing symptoms.  Aggressive incentive spirometry.  Ambulate as able to.    Elevated d-dimer.  D-dimer 3.4.    CT chest no  PE.  Patient reports to being mostly sedentary, over the last 2 months   No calf tenderness or leg swelling   Will get US to rule out lower extremity DVT.    Coronary artery disease status post CABG.  Hypertension.  Reports on and off chest prick sensation over the last week, no chest pain.    Exercise stress test 2015 negative   Echo 9/2019 with estimated EF of 55 to 60%.  Borderline dilated ascending aorta 3.6 cm.  EKG with heart rate of 82, QTc 413.  Normal sinus rhythm.  Troponin undetectable.    Telemetry monitoring, check troponin and proBNP.  Echocardiogram as above.  Continue PTA aspirin.  Continue PTA amlodipine.  Continue PTA lisinopril.  Continue PTA pravastatin.    Cervicalgia:    Continue PTA PRN Tylenol, gabapentin.    Diabetes: Hemoglobin A1c of 6.5 in October 2019.  Continue PTA metformin.  Insulin sliding scale during hospitalization.     Hyperlipidemia:   Last LDL 57 in October 2019.  Continue PTA pravastatin.    GERD:   Continue PTA omeprazole.     BPH:    Continue PTA finasteride and Flomax.    Obstructive sleep apnea on CPAP.  Continue PTA CPAP.    Obesity with a BMI of 32.50.  We will need to consider lifestyle modification with diet and exercise as able to.    Physical deconditioning from acute illness.  Reports has been mostly sedentary in bed or in the recliner due to ongoing shortness of breath.   PT eval prior to discharge.     DVT Prophylaxis: SCD, subcutaneous heparin.  Code Status: DNR.  Okay with trial of intubation.  Discussed with patient and his wife.    Disposition: Expected discharge in 1-2 days pending clinical improvement.  Discussed with patient, his wife by the bedside, ED team.    Shana Page MD          Chief Complaint:     History is obtained from patient and medical records.    Austen Fowler is a 84 year old  male with medical history of hypertension, hyperlipidemia, diabetes, coronary artery disease presented to the ED with shortness of breath.    Patient  reports ongoing shortness of breath with cough for 2 months.  Started around Thanksgiving time, thought was a virus and his wife had viral URI, wife had gotten better, he continued to have shortness of breath coughing and wheezing.  In terms progressively worsened last week.  Unable to ambulate for more than few feet due to shortness of breath.  Reports has been mostly sedentary in bed or in the recliner due to ongoing shortness of breath.  Reports on and off chest prick sensation over the last week, no chest pain.  Chest prick sensation worse on inspiration.  No fever or chills.  Complains of on and off diarrhea, loose stool 1 time per day. None at this time   No nausea or vomiting.  No weight loss, no diaphoresis.  No abdominal pain.  No blood in the stools or blood in the urine.  No leg swelling.  No recent travel.  As of weakness.  Not smoking at this time, not exposed to passive smoking.    In ED afebrile, oxygen saturation in mid to high 90s on room air on rest.  Per ED team report decompensated to mid 80s on minimal ambulation, short of breath.  WBC 10.7, procalcitonin less than 0.05.  Hemoglobin 13.5 pH 7.47, PCO2 28, lactic acid 2.8.  Troponin undetectable.  D-dimer 3.4.  Creatinine 0.70.  CT chest with contrast showed no evidence for pulmonary artery embolism.0.5 cm nodular density in the posterior lateral inferior right upper lobe adjacent to a subtle area of strandy densities in the lung likely represents an inflammatory or infectious process. Neoplastic nodules considered less likely.  EKG with heart rate of 82, QTc 413.  Normal sinus rhythm.  Received IV ceftriaxone, IV doxycycline, albuterol nebulizer, 1 L fluid bolus.            Review of Systems:     GENERAL: No weight changes, no fever or chills  EENT: no sinus problems, no difficulty swallowing, no hearing difficulty  PULMONARY: see HPI   CARDIAC:  no irregular or fast heart beats   GI: No abdominal pain, nausea, vomiting, black or bloody  stools  : No burning/pain with urination  NEURO: No significant headaches, no slurred speech, no dizziness  ENDOCRINE: No excessive thirst  MUSCULOSKELETAL: No new joint pain or swelling.  SKIN: No skin rashes  PSYCHIATRY no acute issues     Medical History:     Past Medical History:   Diagnosis Date     Anxiety state, unspecified      Aortic root dilatation (H)      Arthritis      Ascending aorta dilatation (H)      Basal cell cancer     s/p Mohs nose and bridge of nose on R.     Bunion      CAD (coronary artery disease)     CABG 1992: LIMA to LAD, SANDI to RCA, SVG to OM1 and OM2     Contact dermatitis and other eczema, due to unspecified cause     eczema - has light treatments with Dr. Rivera     Disorders of bursae and tendons in shoulder region, unspecified      Esophageal reflux      Essential hypertension, benign      Gallbladder disease      GERD (gastroesophageal reflux disease)      Heart attack (H)      Hyperlipidemia LDL goal <70      Left ventricular diastolic dysfunction      Low HDL (under 40) 3/28/2014     Nasal/sinus dis NEC     s/p surgery in 1995     Obesity      Osteoarthrosis, unspecified whether generalized or localized, shoulder region      Other hammer toe (acquired)      Sleep apnea     USES CPAP     Spinal stenosis, unspecified region other than cervical     MRI done 2006     Type 2 diabetes, HbA1c goal < 7% (H) 3/12/2015     Urge incontinence         Surgical History:      Past Surgical History:   Procedure Laterality Date     ABDOMEN SURGERY  1994    gall bladder     BIOPSY      arms     C CABG, ARTERY-VEIN, FOUR  11/1992    CABG - LIMA to left anterior descending and saphenous vein bypass graft to the first and second obtuse marginal branch of the circumflex and the right mammary to the right coronary artery     C NONSPECIFIC PROCEDURE  6-94    Gail lap     C NONSPECIFIC PROCEDURE  1995    sinus surgery     C NONSPECIFIC PROCEDURE      bilateral cataract surgery     CHOLECYSTECTOMY   1994     COLONOSCOPY N/A 2017    Procedure: COMBINED COLONOSCOPY, SINGLE OR MULTIPLE BIOPSY/POLYPECTOMY BY BIOPSY;  Surgeon: Bladimir Garner MD;  Location:  GI     CV LEFT HEART CATH  ,     Angioplasty     ENT SURGERY  1995    sinus surgery     ESOPHAGOSCOPY, GASTROSCOPY, DUODENOSCOPY (EGD), DILATATION, COMBINED  2014    Procedure: COMBINED ESOPHAGOSCOPY, GASTROSCOPY, DUODENOSCOPY (EGD), DILATATION;  Surgeon: Bladimir Garner MD;  Location:  GI     EYE SURGERY      cataracts removed both eyes     HC COLONOSCOPY THRU STOMA W BIOPSY/CAUTERY TUMOR/POLYP/LESION  2007     INJECT EPIDURAL LUMBAR       LAMINECTOMY LUMBAR TWO LEVELS N/A 2015    Procedure: LAMINECTOMY LUMBAR TWO LEVELS;  Surgeon: Bladimir Keenan MD;  Location:  OR     THORACIC SURGERY  1992    bypass             Social History:      Social History     Tobacco Use     Smoking status: Former Smoker     Packs/day: 2.00     Years: 30.00     Pack years: 60.00     Types: Cigarettes, Cigars, Pipe     Start date:      Last attempt to quit: 3/1/1992     Years since quittin.8     Smokeless tobacco: Never Used     Tobacco comment: quit in    Substance Use Topics     Alcohol use: Yes     Alcohol/week: 0.0 standard drinks     Comment: minimal less than 1/week             Family History:     Family History   Problem Relation Age of Onset     Cancer Brother         MELANOMA     Diabetes Mother         AODM     Thyroid Disease Mother      Diabetes Father         AODM     Myocardial Infarction Father      Cerebrovascular Disease Brother      Family History Negative Brother      Family History Negative Sister      Family History Negative Son      Family History Negative Son      Family History Negative Son      Family History Negative Daughter      Coronary Artery Disease Brother         dod 2019     Cerebrovascular Disease Brother         dod 2019     Other Cancer Brother         dod 2000/melanoma     Breast Cancer  Other         2017     Thyroid Disease Other              Allergies:   No Known Allergies          Medications:   Home meds reviewed          Physical Exam      Admission Weight: 117.9 kg (260 lb)  Vital Signs with Ranges  Temp:  [97.7  F (36.5  C)] 97.7  F (36.5  C)  Pulse:  [78-90] 80  Heart Rate:  [78-90] 83  Resp:  [12-21] 21  BP: (121-174)/(75-96) 174/96  SpO2:  [96 %-98 %] 97 %    PHYSICAL EXAM  GENERAL: Patient is in no distress on rest. Alert and oriented.  HEENT: Oropharynx pink, moist.  HEART: Regular rate and rhythm. S1S2. No murmurs  LUNGS: Bilateral decreased breath sounds, no wheezing no crackles.  Respirations unlabored on rest.  ABDOMEN: Soft, no abdominal tenderness, bowel sounds heard   NEURO: Moving all extremities, no focal weakness.  EXTREMITIES: trace pedal edema.  1+ peripheral pulses.  SKIN: Warm, dry. No rash or bruising.  PSYCHIATRY Cooperative       Data:   All new lab and imaging data was reviewed.

## 2020-01-12 NOTE — PROGRESS NOTES
RECEIVING UNIT ED HANDOFF REVIEW    ED Nurse Handoff Report was reviewed by: Valorie Jung RN on January 11, 2020 at 9:12 PM

## 2020-01-12 NOTE — PROGRESS NOTES
01/12/20 1109   Quick Adds   Type of Visit Initial PT Evaluation   Living Environment   Lives With spouse   Living Arrangements house   Home Accessibility stairs within home   Number of Stairs, Within Home, Primary other (see comments)  (16 (8, platform, 8))   Stair Railings, Within Home, Primary railings on both sides of stairs;railings safe and in good condition   Transportation Anticipated family or friend will provide   Living Environment Comment Pt lives with wife in a 3-level house, they use the first 2 levels   Self-Care   Usual Activity Tolerance good   Current Activity Tolerance moderate   Regular Exercise Yes   Activity/Exercise Type walking;strength training   Exercise Amount/Frequency 1 hr;3-5 times/wk   Equipment Currently Used at Home other (see comments)  (walking sticks for distance/ outdoor ambulation)   Activity/Exercise/Self-Care Comment Pt with hx of cardiac rehab, reports he works out 3x/wk for 1 hour either at [employer] or walking   Functional Level Prior   Ambulation 0-->independent   Transferring 0-->independent   Toileting 0-->independent   Bathing 0-->independent   Communication 0-->understands/communicates without difficulty   Fall history within last six months no   Which of the above functional risks had a recent onset or change? ambulation;transferring   Prior Functional Level Comment Pv IND with mobility and ADLs   General Information   Onset of Illness/Injury or Date of Surgery - Date 01/11/20   Referring Physician Shana Page MD   Patient/Family Goals Statement Home when ready   Pertinent History of Current Problem (include personal factors and/or comorbidities that impact the POC)  Pt is an 84 year old year old male admitted 1/11/20 for SOB, cough. PMHx signficant for MI, DMII. Pt previously living with his wife in a 3-level house with 16 stairs (1 landing) and 2 railings to enter upper level with bedroom and bathroom. Prior level of mobility including independent with all  "mobility, uses 2 walking sticks for distance ambulation.    Precautions/Limitations fall precautions;oxygen therapy device and L/min   General Observations monitor O2 due to FINN   Cognitive Status Examination   Orientation orientation to person, place and time   Level of Consciousness alert   Follows Commands and Answers Questions 100% of the time   Personal Safety and Judgment intact   Pain Assessment   Patient Currently in Pain No   Integumentary/Edema   Integumentary/Edema no deficits were identifed   Posture    Posture Lordosis   Range of Motion (ROM)   ROM Comment B LE ROM WFL   Strength   Strength Comments B LE strength 5/5 with exception of B hip flexion 4/5   Bed Mobility   Bed Mobility Comments Karen with additional time   Transfer Skills   Transfer Comments sit<>stand with SBA   Gait   Gait Comments amb with CGA initially   Balance   Balance Comments seated balance intact, standing balance impaired secondary to current medical status   General Therapy Interventions   Planned Therapy Interventions balance training;transfer training;gait training   Clinical Impression   Criteria for Skilled Therapeutic Intervention yes, treatment indicated   PT Diagnosis impaired gait   Influenced by the following impairments current medical status, comorbidities, activity tolerance   Functional limitations due to impairments increased assist required for mobility, fall risk, impaired gait distance   Clinical Presentation Evolving/Changing   Clinical Presentation Rationale comorbidities, multiple systems affected, clinical judgment   Clinical Decision Making (Complexity) Moderate complexity   Therapy Frequency Daily   Predicted Duration of Therapy Intervention (days/wks) 2-5 days   Anticipated Discharge Disposition Home with Outpatient Therapy   Risk & Benefits of therapy have been explained Yes   Patient, Family & other staff in agreement with plan of care Yes   [facility] AM-PAC TM \"6 Clicks\"   2016, Trustees of " "[facility], under license to [employer].  All rights reserved.   6 Clicks Short Forms Basic Mobility Inpatient Short Form   [facility] AM-PAC  \"6 Clicks\" V.2 Basic Mobility Inpatient Short Form   1. Turning from your back to your side while in a flat bed without using bedrails? 4 - None   2. Moving from lying on your back to sitting on the side of a flat bed without using bedrails? 4 - None   3. Moving to and from a bed to a chair (including a wheelchair)? 3 - A Little   4. Standing up from a chair using your arms (e.g., wheelchair, or bedside chair)? 3 - A Little   5. To walk in hospital room? 3 - A Little   6. Climbing 3-5 steps with a railing? 3 - A Little   Basic Mobility Raw Score (Score out of 24.Lower scores equate to lower levels of function) 20   Total Evaluation Time   Total Evaluation Time (Minutes) 13     "

## 2020-01-12 NOTE — PROGRESS NOTES
01/12/20 1444   Quick Adds   Type of Visit Initial Occupational Therapy Evaluation   Living Environment   Lives With spouse   Living Arrangements house   Home Accessibility stairs to enter home;stairs within home   Number of Stairs, Main Entrance 6   Number of Stairs, Within Home, Primary other (see comments)   Stair Railings, Within Home, Primary railings on both sides of stairs  (8 stairs, landing, another 8 stairs)   Transportation Anticipated family or friend will provide   Living Environment Comment pt lives with wife in 3 level home.  Pt uses primarily the upper 2 levels   Self-Care   Usual Activity Tolerance good   Current Activity Tolerance moderate   Regular Exercise Yes   Activity/Exercise Type swimming;walking   Exercise Amount/Frequency 1 hr;3-5 times/wk   Equipment Currently Used at Home shower chair;other (see comments)  (walking sticks, handheld shower head)   Activity/Exercise/Self-Care Comment history of 2 vessel bypass   Functional Level   Ambulation 0-->independent   Transferring 0-->independent   Toileting 0-->independent   Bathing 0-->independent   Dressing 0-->independent   Eating 0-->independent   Communication 0-->understands/communicates without difficulty   Swallowing 0-->swallows foods/liquids without difficulty   Cognition 0 - no cognition issues reported   Fall history within last six months no   Which of the above functional risks had a recent onset or change? none   Prior Functional Level Comment pt independent with ADLS at baseline, returning to this level but still some fatigue issues   General Information   Onset of Illness/Injury or Date of Surgery - Date 01/11/20   Referring Physician Shana Page   Patient/Family Goals Statement return home   Additional Occupational Profile Info/Pertinent History of Current Problem Pt is an 84 year old male admitted with SOB, fatigue, and coughing.  PMH includes remote MI with double bypass surgery, DMII   Precautions/Limitations no known  precautions/limitations   General Observations Pt in bed, spouse present   Cognitive Status Examination   Orientation orientation to person, place and time   Level of Consciousness alert   Follows Commands (Cognition) WNL   Memory intact   Attention No deficits were identified   Visual Perception   Visual Perception Wears glasses  (for reading)   Pain Assessment   Patient Currently in Pain No   Range of Motion (ROM)   ROM Quick Adds No deficits were identified   ROM Comment B UEs   Strength   Manual Muscle Testing Quick Adds No deficits were identified   Strength Comments B UEs   Coordination   Upper Extremity Coordination No deficits were identified   Mobility   Bed Mobility Bed mobility skill: Sit to supine;Bed mobility skill: Supine to sit   Bed Mobility Skill: Sit to Supine   Level of Gates: Sit/Supine stand-by assist   Physical Assist/Nonphysical Assist: Sit/Supine supervision   Bed Mobility Skill: Supine to Sit   Level of Gates: Supine/Sit stand-by assist   Physical Assist/Nonphysical Assist: Supine/Sit supervision   Transfer Skill: Sit to Stand   Level of Gates: Sit/Stand stand-by assist   Physical Assist/Nonphysical Assist: Sit/Stand supervision   Transfer Skill: Toilet Transfer   Level of Gates: Toilet stand-by assist   Physical Assist/Nonphysical Assist: Toilet supervision   Weight-Bearing Restrictions: Toilet full weight-bearing   Assistive Device grab bars   Lower Body Dressing   Level of Gates: Dress Lower Body stand-by assist   Physical Assist/Nonphysical Assist: Dress Lower Body supervision   Eating/Self Feeding   Level of Gates: Eating independent   Activities of Daily Living Analysis   Impairments Contributing to Impaired Activities of Daily Living strength decreased   ADL Comments pt has some mild SOB and low endurance with activity   General Therapy Interventions   Planned Therapy Interventions ADL retraining;home program guidelines   Clinical Impression  "  Criteria for Skilled Therapeutic Interventions Met yes, treatment indicated   OT Diagnosis decreased endurance for ADLS   Influenced by the following impairments SOB, fatigue   Assessment of Occupational Performance 1-3 Performance Deficits   Identified Performance Deficits decresaed endurance for functional mobility and usual exercise program   Clinical Decision Making (Complexity) Low complexity   Therapy Frequency Other (see comments)   Predicted Duration of Therapy Intervention (days/wks) eval and one session   Anticipated Discharge Disposition Home   Risks and Benefits of Treatment have been explained. Yes   Patient, Family & other staff in agreement with plan of care Yes   [facility] TM \"6 Clicks\"   2016, Trustees of [facility], under license to [employer].  All rights reserved.   6 Clicks Short Forms Daily Activity Inpatient Short Form   [facility]  \"6 Clicks\" Daily Activity Inpatient Short Form   1. Putting on and taking off regular lower body clothing? 4 - None   2. Bathing (including washing, rinsing, drying)? 3 - A Little   3. Toileting, which includes using toilet, bedpan or urinal? 4 - None   4. Putting on and taking off regular upper body clothing? 4 - None   5. Taking care of personal grooming such as brushing teeth? 4 - None   6. Eating meals? 4 - None   Daily Activity Raw Score (Score out of 24.Lower scores equate to lower levels of function) 23   Total Evaluation Time   Total Evaluation Time (Minutes) 15     "

## 2020-01-12 NOTE — PLAN OF CARE
A&Ox4. VSS on RA ex HTN. Tele: NSR. LS diminished. FINN. Productive cough. IVF infusing, intermittent IV abx. Up SBA. Mod carb diet, blood sugar checks. Plan to be seen by pulmonology and work with PT.

## 2020-01-12 NOTE — PLAN OF CARE
Discharge Planner PT   Patient plan for discharge: Home with wife  Current status: PT orders received, chart reviewed, eval completed and treatment initiated. Pt is an 84 year old year old male admitted 1/11/20 for SOB, cough. PMHx signficant for MI, DMII. Pt previously living with his wife in a 3-level house with 16 stairs (1 landing) and 2 railings to enter upper level with bedroom and bathroom. Prior level of mobility including independent with all mobility, uses 2 walking sticks for distance ambulation.     Currently, pt rolling in bed independently and transitioning supine<>sit independently. Sit<>stand with SBA, additional time. Amb 100 ft with SEC, CGA progressing to SBA, and SpO2 >93% on RA. Patient amb 180 ft with no AD, CGA progressing to SBA, increased L lateral path deviation and wide JHONATAN noted, and no LOB but occasional reaching for hallway railings. SpO2 94% on RA following activity.     Pt supine in bed with alarm on and needs within reach on PT exit.  Barriers to return to prior living situation: medical status, current level of assist, stairs not assessed  Recommendations for discharge: Home with SBA from spouse for ambulation and transfers  Rationale for recommendations: Patient is currently below baseline activity tolerance and requires SBA; previously independent with all mobility. He did not desat on RA during activity but does demonstrate increased RR and mild wheezing following activity. Anticipate patient will safely discharge home with support from wife for ambulation, use of walking sticks for safety, and continued OP PT services to address back pain and return to PLOF. Continue with IP PT during stay to address limitations.       Entered by: Jordyn Vivar 01/12/2020 11:03 AM

## 2020-01-13 ENCOUNTER — TELEPHONE (OUTPATIENT)
Dept: FAMILY MEDICINE | Facility: CLINIC | Age: 85
End: 2020-01-13

## 2020-01-13 NOTE — PLAN OF CARE
Pt is A&OX4, VSS on RA, Up ind, amb to bathroom and voiding o.k, reviewed by MD,deemed fit for discharge.I.V ABO changed to po doxycycline, first dose administered prior to discharge, discharge instruction given to pt and his wife, they will pick his medication(albuterol and doxycycline) at his [facility] pharmacy on [facility], they left in good condition .Pt and wife not happy about pt being switched to observation status, wants to write to pt relation,charge nurse talked with them and gave them contact info for manager.

## 2020-01-13 NOTE — TELEPHONE ENCOUNTER
Chief Complaint: Cough, Pneumonia Of Right Upper Lobe Due To Infectious Organism (H),  SUN 12-JAN-2020 0 / 1    [phone] (home)

## 2020-01-13 NOTE — TELEPHONE ENCOUNTER
"ED / Discharge Outreach Protocol    Patient Contact    Attempt # 1    Was call answered?  Yes.  \"May I please speak with <patient name>\"  Is patient available?   Yes      ED for acute condition Discharge Protocol    \"Hi, my name is Jay Ramos RN, a registered nurse, and I am calling from [facility].  I am calling to follow up and see how things are going for you after your recent emergency visit.\"    Tell me how you are doing now that you are home?\" \"Doing pretty good. Breathing fine.\"      Discharge Instructions    \"Let's review your discharge instructions.  What is/are the follow-up recommendations?  Pt. Response: \"to get an appt with dr. dick within a week of discharge to go over the findings\"    \"Has an appointment with your primary care provider been scheduled?\"  Yes. (confirm and remind to bring meds)    Medications    \"Tell me what changed about your medicines when you discharged?\"    Start inhaler or antibiotic     \"What questions do you have about your medications?\"   get medications from sleep clinic     Call Summary    \"What questions or concerns do you have about your recent visit and your follow-up care?\"     none    \"If you have questions or things don't continue to improve, we encourage you contact us through the main clinic number (give number).  Even if the clinic is not open, triage nurses are available 24/7 to help you.     We would like you to know that our clinic has extended hours (provide information).  We also have urgent care (provide details on closest location and hours/contact info)\"    \"Thank you for your time and take care!\"    Brice BHAGAT RN  "

## 2020-01-13 NOTE — PLAN OF CARE
Physical Therapy Discharge Summary    Reason for therapy discharge:    Discharged to home.    Progress towards therapy goal(s). See goals on Care Plan in [facility] electronic health record for goal details.  Goals not met.  Barriers to achieving goals:   discharge on same date as initial evaluation.    Therapy recommendation(s):    No further therapy is recommended.  Recommended SBA for mobility.

## 2020-01-17 LAB
BACTERIA SPEC CULT: NO GROWTH
BACTERIA SPEC CULT: NO GROWTH
Lab: NORMAL
Lab: NORMAL
SPECIMEN SOURCE: NORMAL
SPECIMEN SOURCE: NORMAL

## 2020-01-20 ENCOUNTER — OFFICE VISIT (OUTPATIENT)
Dept: FAMILY MEDICINE | Facility: CLINIC | Age: 85
End: 2020-01-20
Payer: COMMERCIAL

## 2020-01-20 VITALS
HEART RATE: 69 BPM | HEIGHT: 75 IN | SYSTOLIC BLOOD PRESSURE: 109 MMHG | BODY MASS INDEX: 32.33 KG/M2 | WEIGHT: 260 LBS | OXYGEN SATURATION: 97 % | TEMPERATURE: 98.4 F | DIASTOLIC BLOOD PRESSURE: 73 MMHG

## 2020-01-20 DIAGNOSIS — M46.1 SACROILIITIS (H): ICD-10-CM

## 2020-01-20 DIAGNOSIS — R06.00 DYSPNEA, UNSPECIFIED TYPE: Primary | ICD-10-CM

## 2020-01-20 DIAGNOSIS — R68.89 COLD INTOLERANCE: ICD-10-CM

## 2020-01-20 DIAGNOSIS — Z95.1 S/P CABG (CORONARY ARTERY BYPASS GRAFT): ICD-10-CM

## 2020-01-20 DIAGNOSIS — R05.9 COUGH: ICD-10-CM

## 2020-01-20 DIAGNOSIS — E11.59 TYPE 2 DIABETES MELLITUS WITH VASCULAR DISEASE (H): ICD-10-CM

## 2020-01-20 LAB
ERYTHROCYTE [DISTWIDTH] IN BLOOD BY AUTOMATED COUNT: 14.8 % (ref 10–15)
HBA1C MFR BLD: 5.8 % (ref 0–5.6)
HCT VFR BLD AUTO: 40.1 % (ref 40–53)
HGB BLD-MCNC: 12.8 G/DL (ref 13.3–17.7)
MCH RBC QN AUTO: 28.4 PG (ref 26.5–33)
MCHC RBC AUTO-ENTMCNC: 31.9 G/DL (ref 31.5–36.5)
MCV RBC AUTO: 89 FL (ref 78–100)
PLATELET # BLD AUTO: 230 10E9/L (ref 150–450)
RBC # BLD AUTO: 4.5 10E12/L (ref 4.4–5.9)
VIT B12 SERPL-MCNC: 188 PG/ML (ref 193–986)
WBC # BLD AUTO: 7.9 10E9/L (ref 4–11)

## 2020-01-20 PROCEDURE — 82607 VITAMIN B-12: CPT | Performed by: INTERNAL MEDICINE

## 2020-01-20 PROCEDURE — 80048 BASIC METABOLIC PNL TOTAL CA: CPT | Performed by: INTERNAL MEDICINE

## 2020-01-20 PROCEDURE — 36415 COLL VENOUS BLD VENIPUNCTURE: CPT | Performed by: INTERNAL MEDICINE

## 2020-01-20 PROCEDURE — 84443 ASSAY THYROID STIM HORMONE: CPT | Performed by: INTERNAL MEDICINE

## 2020-01-20 PROCEDURE — 99495 TRANSJ CARE MGMT MOD F2F 14D: CPT | Performed by: INTERNAL MEDICINE

## 2020-01-20 PROCEDURE — 83036 HEMOGLOBIN GLYCOSYLATED A1C: CPT | Performed by: INTERNAL MEDICINE

## 2020-01-20 PROCEDURE — 85027 COMPLETE CBC AUTOMATED: CPT | Performed by: INTERNAL MEDICINE

## 2020-01-20 ASSESSMENT — MIFFLIN-ST. JEOR: SCORE: 1959.98

## 2020-01-20 NOTE — PROGRESS NOTES
Subjective     Austen Fwoler is a 84 year old male who presents to clinic today for the following health issues:    HPI     Patient is accompanied by his wife  He was not feeling well for the last 2 months with cough and feeling cold that time  By the time he got to emergency room he was a little hypoxic with exercise and was more tremulous  Work-up was almost negative except that he had a spot on his right lung with mild lactic acid elevation which was felt to be metformin  He was seen by pulmonology and was treated with IV ceftriaxone followed by doxycycline  He and his wife both feel that he is improving now  His cough is also improved and feeling of cold intolerance has improved    Hospital Follow-up Visit:    Hospital/Nursing Home/IP Rehab Facility: [facility]  Date of Admission: 1/11/2020  Date of Discharge: 1/12/2020  Reason(s) for Admission:     Dyspnea on exertion - possible competent of infectious, reactive airway vs deconditioning  Lung nodule - infectious versus inflammatory.  Mild lactic acid elevation without acidosis, likely related to metformin   Elevated d-dimer  Obstructive sleep apnea on CPAP  Obesity with a BMI of 32.50  Physical deconditioning from acute illness  Austen Fowler is a 84 year old  male with medical history of hypertension, hyperlipidemia, diabetes, coronary artery disease presented to the ED with shortness of breath and had some transient hypoxia which resolved.      Dyspnea on exertion -possible competent of infectious, deconditioning.  Transient hypoxia on ambulation, RESOLVED  Lung nodule -infectious versus inflammatory.  Mild lactic acid elevation without acidosis, likely related to metformin   Patient reports ongoing shortness of breath with cough for 2 months following a URI, progressively worsened last week. Unable to ambulate for more than few feet due to shortness of breath. However, also notes that he has no SOB when working out at the [facility] and  exerting him further. Reports on and off chest prick sensation over the last week, no chest pain.    In ED he was afebrile, oxygen saturation in mid to high 90s on room air on rest.  Per ED report decompensated to mid 80s on minimal ambulation, short of breath. By hospital day #2 he had no desaturations with ambulation on RA.      WBC 10.7, procalcitonin less than 0.05.  Hemoglobin 13.5 pH 7.47, PCO2 28, lactic acid 2.8 to 2.3. BNP and  troponin negative. Procalcitonin low. Sed rate 30, CRP < 2.9. CK nl. D-dimer elevated. Chest CT and LE U/S were negative for DVT.      CT chest with contrast showed no evidence for pulmonary artery embolism.0.5 cm nodular density in the posterior lateral inferior right upper lobe adjacent to a subtle area of strandy densities in the lung likely represents an inflammatory or infectious process. Neoplastic nodules considered less likely.     He received IV ceftriaxone, IV doxycycline during his stay with improvement in his symptoms. He had no hypoxia, chest pain or hemodynamic instability throughout the rest of his stay.      He was seen by Dr. Kelly of [facility] who made the following recommendations:   - He should complete a course of PO antibiotics  - Trial of albuterol inhaler.   - Follow up with [facility] in 2 weeks with repeat chest CT, full PFTs and walking oximetry.   - If nodule persists, consider f/u CT in 12 months.      Elevated d-dimer.  D-dimer 3.4.    CT chest no PE. U/S negative for DVT      Coronary artery disease status post CABG.  Hypertension.  Reports on and off chest prick sensation over the last week, no chest pain.    Exercise stress test 2015 negative   Echo 9/2019 with estimated EF of 55 to 60%.  Borderline dilated ascending aorta 3.6 cm.  EKG with heart rate of 82, QTc 413.  Normal sinus rhythm.  Troponin undetectable. BNP normal   Telemetry monitoring showed NSR.   Echocardiogram showed LV nl systolic fxn EF 60-65% with borderline LVH, no valvular disease. RV  not well visualized but appears moderately dilated with mild to moderate RVH. RVSP could not be approximated.      -  Continue PTA aspirin,  Amlodipine, PTA lisinopril, PTA pravastatin.  -  If above unremarkable, symptoms persist would pursue further cardiac workup with stress test. Although, patient states symptoms are better when exercising at the gym, which argues against cardiac disease.      Cervicalgia:    Continue PTA PRN Tylenol, gabapentin.     Diabetes: Hemoglobin A1c of 6.5 in October 2019.  Continue PTA metformin.  Insulin sliding scale during hospitalization.            Problems taking medications regularly:  None       Medication changes since discharge: Has not filled albuterol due to cost, finished doxycycline yesterday.       Problems adhering to non-medication therapy:  None    Summary of hospitalization:  [facility] discharge summary reviewed  Diagnostic Tests/Treatments reviewed.  Follow up needed: none  Other Healthcare Providers Involved in Patient s Care:         None  Update since discharge: improved.   Post Discharge Medication Reconciliation: discharge medications reconciled, continue medications without change.  Plan of care communicated with patient             No evidence for pulmonary artery embolism.  2.  0.5 cm nodular density in the posterior lateral inferior right  upper lobe adjacent to a subtle area of strandy densities in the lung  likely represents an inflammatory or infectious process. Neoplastic  nodules considered less likely.  Recommendations for one or multiple  incidental lung nodules < 6mm :    Low risk patients: No routine follow-up.    High risk patients: Optional follow-up CT at 12 months; if  unchanged, no further follow-up.      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)     S/P lumbar laminectomy     Health Care Home     Coronary artery disease involving native coronary artery of native heart without angina pectoris     Left ventricular diastolic dysfunction     Ascending aorta dilatation (H)     Aortic root dilatation (H)     Non morbid obesity, unspecified obesity type     Neck pain     Cellulitis of right lower extremity     Chronic bilateral low back pain without sciatica     Hypoxia     Shortness of breath     Past Surgical History:   Procedure Laterality Date     ABDOMEN SURGERY  1994    gall bladder     BIOPSY      arms     C CABG, ARTERY-VEIN, FOUR  11/1992    CABG - LIMA to left anterior descending and saphenous vein bypass graft to the first and second obtuse marginal branch of the circumflex and the right mammary to the right coronary artery     C NONSPECIFIC PROCEDURE  6-94    Gail lap     C NONSPECIFIC PROCEDURE  1995    sinus surgery     C NONSPECIFIC PROCEDURE      bilateral cataract surgery     CHOLECYSTECTOMY  6/1994     COLONOSCOPY N/A 4/11/2017    Procedure: COMBINED COLONOSCOPY, SINGLE OR MULTIPLE BIOPSY/POLYPECTOMY BY BIOPSY;  Surgeon: Bladimir Garner MD;  Location:  GI     CV LEFT HEART CATH  5-92, 6-92    Angioplasty     ENT SURGERY  5/1995    sinus surgery     ESOPHAGOSCOPY, GASTROSCOPY, DUODENOSCOPY (EGD), DILATATION, COMBINED  7/17/2014    Procedure: COMBINED ESOPHAGOSCOPY, GASTROSCOPY, DUODENOSCOPY (EGD), DILATATION;  Surgeon: Bladimir Garner MD;  Location:  GI     EYE SURGERY      cataracts removed both eyes     HC COLONOSCOPY THRU STOMA W BIOPSY/CAUTERY TUMOR/POLYP/LESION   2007     INJECT EPIDURAL LUMBAR       LAMINECTOMY LUMBAR TWO LEVELS N/A 2015    Procedure: LAMINECTOMY LUMBAR TWO LEVELS;  Surgeon: Bladimir Keenan MD;  Location: [facility] OR     THORACIC SURGERY  1992    bypass       Social History     Tobacco Use     Smoking status: Former Smoker     Packs/day: 2.00     Years: 30.00     Pack years: 60.00     Types: Cigarettes, Cigars, Pipe     Start date:      Last attempt to quit: 3/1/1992     Years since quittin.9     Smokeless tobacco: Never Used     Tobacco comment: quit in    Substance Use Topics     Alcohol use: Yes     Alcohol/week: 0.0 standard drinks     Comment: minimal less than 1/week     Family History   Problem Relation Age of Onset     Cancer Brother         MELANOMA     Diabetes Mother         AODM     Thyroid Disease Mother      Diabetes Father         AODM     Myocardial Infarction Father      Cerebrovascular Disease Brother      Parkinsonism Sister      Family History Negative Son      Family History Negative Son      Family History Negative Son      Family History Negative Daughter      Coronary Artery Disease Brother         dod 2019     Cerebrovascular Disease Brother         dod 2019     Other Cancer Brother         dod 2000/melanoma         Current Outpatient Medications   Medication Sig Dispense Refill     acetaminophen (ACETAMIN) 500 MG tablet Take 1,000 mg by mouth 2 times daily        albuterol (PROAIR HFA/PROVENTIL HFA/VENTOLIN HFA) 108 (90 Base) MCG/ACT inhaler Inhale 2 puffs into the lungs every 6 hours 1 Inhaler 0     amLODIPine (NORVASC) 5 MG tablet TAKE 1 TABLET BY MOUTH TWO  TIMES DAILY 180 tablet 2     aspirin 81 MG EC tablet Take 81 mg by mouth daily        blood glucose (ONETOUCH ULTRA) test strip Use to test blood sugar 2 times daily or as directed. 200 strip 1     blood glucose monitoring (ONE TOUCH ULTRASOFT) lancets Use to test blood sugar 2 times daily or as directed. 200 each 1     Cholecalciferol (VITAMIN D)   "UNIT tablet Take 2,000 Units by mouth daily. 90 tablet 3     diclofenac (VOLTAREN) 1 % topical gel Place 4 g onto the skin 4 times daily as needed for moderate pain (Apply to back to neck.)       finasteride (PROSCAR) 5 MG tablet Take 1 tablet (5 mg) by mouth daily 90 tablet 1     gabapentin (NEURONTIN) 300 MG capsule Take 900 mg three times daily 810 capsule 2     lisinopril (PRINIVIL/ZESTRIL) 20 MG tablet Take 1 tablet (20 mg) by mouth daily Due for follow up appointment. Please schedule: [phone] 90 tablet 0     Menthol, Topical Analgesic, (ICY HOT BACK EX) Externally apply topically daily as needed (neck pain)        metFORMIN (GLUCOPHAGE-XR) 500 MG 24 hr tablet Take 2 tablets (1,000 mg) by mouth 2 times daily (with meals) 360 tablet 1     omeprazole (PRILOSEC) 20 MG DR capsule Take 1 capsule (20 mg) by mouth daily 90 capsule 1     order for DME Equipment being ordered: Medium compression stockings   20-30 mm (Patient not taking: Reported on 10/31/2019) 2 each 3     ORDER FOR DME Uses Cpap machine for sleep apnea       pravastatin (PRAVACHOL) 20 MG tablet TAKE 1 TABLET BY MOUTH  DAILY 90 tablet 3     propranolol (INDERAL) 60 MG tablet Take 60 mg by mouth 2 times daily       tamsulosin (FLOMAX) 0.4 MG capsule Take 1 capsule (0.4 mg) by mouth daily 90 capsule 1     No Known Allergies    Reviewed and updated as needed this visit by Provider  Tobacco  Allergies  Meds  Problems  Med Hx  Surg Hx  Fam Hx         Review of Systems   10 point ROS of systems including Constitutional, Eyes, Respiratory, Cardiovascular, Gastroenterology, Genitourinary, Integumentary, Muscularskeletal, Psychiatric were all negative except for pertinent positives noted in my HPI.        Objective    /73 (BP Location: Left arm, Cuff Size: Adult Large)   Pulse 69   Temp 98.4  F (36.9  C) (Oral)   Ht 1.905 m (6' 3\")   Wt 117.9 kg (260 lb)   SpO2 97%   BMI 32.50 kg/m    Body mass index is 32.5 kg/m .  Physical Exam "   GENERAL: healthy, alert and no distress  NECK: no adenopathy, no asymmetry, masses, or scars and thyroid normal to palpation  RESP: lungs clear to auscultation - no rales, rhonchi or wheezes  CV: regular rate and rhythm, normal S1 S2, no S3 or S4, no murmur, click or rub, no peripheral edema and peripheral pulses strong  ABDOMEN: soft, nontender, no hepatosplenomegaly, no masses and bowel sounds normal  MS: no gross musculoskeletal defects noted, no edema    Tenderness noticed throughout the lower back including the left SI joint        Assessment & Plan     Austen was seen today for hospital f/u.    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)  -     Basic metabolic panel  -     Hemoglobin A1c    Sacroiliitis (H)  -     PAIN MANAGEMENT REFERRAL    S/P CABG (coronary artery bypass graft)    Cold intolerance  -     Vitamin B12  -     CBC with platelets  -     TSH with free T4 reflex           Hospital records and pulmonology consultation is reviewed and patient needs to stay off metformin and we will check his A1c today  His lactic acid was slightly high and was attributed to metformin, I however believe that he did have lower respiratory tract infection causing this lactic acidosis  Hydration and antibiotics have improved his symptoms  I will check his labs as above today  We will arrange SI joint injection  He will also see pulmonology next week and I discussed the CT scan and showed the findings to him and his wife for the CT film    Return in about 6 months (around 7/20/2020) for diabetes, chronic pain.    Hamzah Hodge MD  [facility]

## 2020-01-21 ENCOUNTER — TELEPHONE (OUTPATIENT)
Dept: PALLIATIVE MEDICINE | Facility: CLINIC | Age: 85
End: 2020-01-21

## 2020-01-21 LAB
ANION GAP SERPL CALCULATED.3IONS-SCNC: 8 MMOL/L (ref 3–14)
BUN SERPL-MCNC: 18 MG/DL (ref 7–30)
CALCIUM SERPL-MCNC: 9.7 MG/DL (ref 8.5–10.1)
CHLORIDE SERPL-SCNC: 107 MMOL/L (ref 94–109)
CO2 SERPL-SCNC: 27 MMOL/L (ref 20–32)
CREAT SERPL-MCNC: 0.65 MG/DL (ref 0.66–1.25)
GFR SERPL CREATININE-BSD FRML MDRD: 89 ML/MIN/{1.73_M2}
GLUCOSE SERPL-MCNC: 89 MG/DL (ref 70–99)
POTASSIUM SERPL-SCNC: 3.9 MMOL/L (ref 3.4–5.3)
SODIUM SERPL-SCNC: 142 MMOL/L (ref 133–144)
TSH SERPL DL<=0.005 MIU/L-ACNC: 1.02 MU/L (ref 0.4–4)

## 2020-01-21 NOTE — TELEPHONE ENCOUNTER
Pt will call back to schedule as he is leaving [facility] for a few days.    Teetee MC    [facility]

## 2020-01-21 NOTE — TELEPHONE ENCOUNTER
Order received from Dr. Hodge.    Reason for Referral: Procedure Order Epidural: SI    Pt has had both Lumbar Epidural and SI Join Injections. Routing to review.    Teetee MC    [facility]

## 2020-01-21 NOTE — TELEPHONE ENCOUNTER
Can we call the patient and ask him about current symptoms?    The epidural was for right low back, buttock and thigh pain. If these are the current symptoms and the epidural helped, can schedule for repeat epidural. Otherwise he has seen me in clinic in the past and I could re-evaluate him in clinic prior to doing another injection.      Nicholas Nuñez, DO  [facility]

## 2020-01-21 NOTE — TELEPHONE ENCOUNTER
No PA required, okay to schedule. This is not a guarantee of coverage, patient will need to call his insurance to verify coverage          Ayla MOONEY    [facility]

## 2020-01-21 NOTE — TELEPHONE ENCOUNTER
Called pt.  He would like to have a repeat L2-3 interlaminar epidural steroid injection.  He states the 10/2019 injection lasted 6 weeks @ 95%.    Routed to Ayla to see if insurance covers.    Kalani Angela, RN-BSN  [facility]

## 2020-01-24 ENCOUNTER — ALLIED HEALTH/NURSE VISIT (OUTPATIENT)
Dept: NURSING | Facility: CLINIC | Age: 85
End: 2020-01-24
Payer: COMMERCIAL

## 2020-01-24 DIAGNOSIS — E53.8 VITAMIN B12 DEFICIENCY (NON ANEMIC): Primary | ICD-10-CM

## 2020-01-24 PROCEDURE — 96372 THER/PROPH/DIAG INJ SC/IM: CPT

## 2020-01-24 PROCEDURE — 99207 ZZC NO CHARGE NURSE ONLY: CPT

## 2020-01-24 RX ORDER — CYANOCOBALAMIN 1000 UG/ML
1000 INJECTION, SOLUTION INTRAMUSCULAR; SUBCUTANEOUS WEEKLY
Status: COMPLETED | OUTPATIENT
Start: 2020-01-24 | End: 2020-03-13

## 2020-01-24 RX ADMIN — CYANOCOBALAMIN 1000 MCG: 1000 INJECTION, SOLUTION INTRAMUSCULAR; SUBCUTANEOUS at 14:51

## 2020-01-24 NOTE — PROGRESS NOTES
Clinic Administered Medication Documentation    MEDICATION LIST:   Injectable Medication Documentation    Patient was given Cyanocobalamin (B-12). Prior to medication administration, verified patients identity using patient s name and date of birth. Please see MAR and medication order for additional information. Patient instructed to remain in clinic for 15 minutes.      Was entire vial of medication used? Yes  Vial/Syringe: Single dose vial  Expiration Date:  02/2021  Was this medication supplied by the patient? No     Bell Rodney MA

## 2020-01-31 ENCOUNTER — ALLIED HEALTH/NURSE VISIT (OUTPATIENT)
Dept: NURSING | Facility: CLINIC | Age: 85
End: 2020-01-31
Payer: COMMERCIAL

## 2020-01-31 DIAGNOSIS — E53.8 VITAMIN B12 DEFICIENCY (NON ANEMIC): Primary | ICD-10-CM

## 2020-01-31 PROCEDURE — 99207 ZZC NO CHARGE LOS: CPT

## 2020-01-31 PROCEDURE — 96372 THER/PROPH/DIAG INJ SC/IM: CPT

## 2020-01-31 RX ADMIN — CYANOCOBALAMIN 1000 MCG: 1000 INJECTION, SOLUTION INTRAMUSCULAR; SUBCUTANEOUS at 12:33

## 2020-01-31 NOTE — PROGRESS NOTES
Clinic Administered Medication Documentation    MEDICATION LIST:   Injectable Medication Documentation    Patient was given Cyanocobalamin (B-12). Prior to medication administration, verified patients identity using patient s name and date of birth. Please see MAR and medication order for additional information. Patient instructed to report any adverse reaction to staff immediately .      Was entire vial of medication used? Yes  Vial/Syringe: Single dose vial  Expiration Date:  02/2021  Was this medication supplied by the patient? No     Iliana BASS MA

## 2020-02-03 DIAGNOSIS — I10 ESSENTIAL HYPERTENSION, BENIGN: ICD-10-CM

## 2020-02-03 NOTE — TELEPHONE ENCOUNTER
"lisinopril (PRINIVIL/ZESTRIL) 20 MG tablet    Last Written Prescription Date:  12/3/2019   Last Fill Quantity: 90,  # refills: 0   Last office visit: 1/20/2020 with prescribing provider:  Dr. Hodge    Future Office Visit:   Next 5 appointments (look out 90 days)    Feb 07, 2020  1:15 PM CST  Nurse Only with CS NURSE  [facility]) [address]  [place] [address]  [phone]   Feb 14, 2020  1:00 PM CST  Nurse Only with CS NURSE  [facility] [address]  [facility] [address]  [phone]   Apr 02, 2020 10:15 AM CDT  Return Visit with Lino Hernandez MD  [facility]) [id number]  [facility] [address] [phone] OPT 2         Requested Prescriptions   Pending Prescriptions Disp Refills     lisinopril (PRINIVIL/ZESTRIL) 20 MG tablet [Pharmacy Med Name: LISINOPRIL  20MG  TAB] 90 tablet 0     Sig: TAKE 1 TABLET BY MOUTH  EVERY DAY       ACE Inhibitors (Including Combos) Protocol Failed - 2/3/2020  4:39 AM        Failed - Normal serum creatinine on file in past 12 months     Recent Labs   Lab Test 01/20/20  1435  09/21/16  0943   CR 0.65*   < >  --    CREAT  --   --  0.8    < > = values in this interval not displayed.             Passed - Blood pressure under 140/90 in past 12 months     BP Readings from Last 3 Encounters:   01/20/20 109/73   01/12/20 114/77   12/16/19 125/73                 Passed - Recent (12 mo) or future (30 days) visit within the authorizing provider's specialty     Patient has had an office visit with the authorizing provider or a provider within the authorizing providers department within the previous 12 mos or has a future within next 30 days. See \"Patient Info\" tab in inbasket, or \"Choose Columns\" in Meds & Orders section of the refill encounter.              Passed - Medication is active on med list        Passed - Patient is age " 18 or older        Passed - Normal serum potassium on file in past 12 months     Recent Labs   Lab Test 01/20/20  1435   POTASSIUM 3.9

## 2020-02-04 RX ORDER — LISINOPRIL 20 MG/1
TABLET ORAL
Start: 2020-02-04

## 2020-02-07 ENCOUNTER — ALLIED HEALTH/NURSE VISIT (OUTPATIENT)
Dept: NURSING | Facility: CLINIC | Age: 85
End: 2020-02-07
Payer: COMMERCIAL

## 2020-02-07 DIAGNOSIS — E53.8 VITAMIN B12 DEFICIENCY (NON ANEMIC): Primary | ICD-10-CM

## 2020-02-07 PROCEDURE — 96372 THER/PROPH/DIAG INJ SC/IM: CPT

## 2020-02-07 PROCEDURE — 99207 ZZC NO CHARGE NURSE ONLY: CPT

## 2020-02-07 RX ADMIN — CYANOCOBALAMIN 1000 MCG: 1000 INJECTION, SOLUTION INTRAMUSCULAR; SUBCUTANEOUS at 12:38

## 2020-02-07 NOTE — PROGRESS NOTES
Clinic Administered Medication Documentation    MEDICATION LIST:   Injectable Medication Documentation    Patient was given Cyanocobalamin (B-12). Prior to medication administration, verified patients identity using patient s name and date of birth. Please see MAR and medication order for additional information. Patient instructed to report any adverse reaction to staff immediately .      Was entire vial of medication used? Yes  Vial/Syringe: Single dose vial  Expiration Date:  02/2021  Was this medication supplied by the patient? No

## 2020-02-12 ENCOUNTER — OFFICE VISIT (OUTPATIENT)
Dept: FAMILY MEDICINE | Facility: CLINIC | Age: 85
End: 2020-02-12
Payer: COMMERCIAL

## 2020-02-12 VITALS
WEIGHT: 262 LBS | SYSTOLIC BLOOD PRESSURE: 127 MMHG | HEART RATE: 75 BPM | BODY MASS INDEX: 32.75 KG/M2 | DIASTOLIC BLOOD PRESSURE: 72 MMHG | TEMPERATURE: 97.8 F | OXYGEN SATURATION: 96 %

## 2020-02-12 DIAGNOSIS — H61.23 BILATERAL IMPACTED CERUMEN: Primary | ICD-10-CM

## 2020-02-12 PROCEDURE — 99212 OFFICE O/P EST SF 10 MIN: CPT | Mod: 25 | Performed by: INTERNAL MEDICINE

## 2020-02-12 PROCEDURE — 69209 REMOVE IMPACTED EAR WAX UNI: CPT | Mod: 50 | Performed by: INTERNAL MEDICINE

## 2020-02-12 NOTE — NURSING NOTE
Patient identified using two patient identifiers.  Ear exam showing wax occlusion completed by provider.  Solution: warm water was placed in the both ear(s) via irrigation tool: elephant ear.  Both ears were cleared of cerumen.   Bell Rodney MA

## 2020-02-12 NOTE — PROGRESS NOTES
Subjective     Austen Fowler is a 84 year old male who presents to clinic today for the following health issues:    HPI   Chief Complaint   Patient presents with     Cerumen (impacted)     pt c/o impacted cerumen in both ears; can't hear well even with hearing aids   Pt notes that he wears hearing aids regularly.     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)     S/P lumbar laminectomy     Health Care Home     Coronary artery disease involving native coronary artery of native heart without angina pectoris     Left ventricular diastolic dysfunction     Ascending aorta dilatation (H)     Aortic root dilatation (H)     Non morbid obesity, unspecified obesity type     Neck pain     Cellulitis of right lower extremity     Chronic bilateral low back pain without sciatica     Hypoxia     Shortness of breath     Past Surgical History:   Procedure Laterality Date     ABDOMEN SURGERY  1994    gall bladder     BIOPSY      arms     C CABG, ARTERY-VEIN, FOUR  11/1992    CABG - LIMA to left anterior descending and saphenous vein bypass graft to the first and second obtuse marginal branch of the circumflex and the right mammary to the right coronary artery     C NONSPECIFIC PROCEDURE  6-94    Gail lap     C NONSPECIFIC PROCEDURE  1995    sinus surgery     C NONSPECIFIC PROCEDURE      bilateral cataract surgery     CHOLECYSTECTOMY  6/1994     COLONOSCOPY N/A  2017    Procedure: COMBINED COLONOSCOPY, SINGLE OR MULTIPLE BIOPSY/POLYPECTOMY BY BIOPSY;  Surgeon: Bladimir Garner MD;  Location:  GI     CV LEFT HEART CATH  ,     Angioplasty     ENT SURGERY  1995    sinus surgery     ESOPHAGOSCOPY, GASTROSCOPY, DUODENOSCOPY (EGD), DILATATION, COMBINED  2014    Procedure: COMBINED ESOPHAGOSCOPY, GASTROSCOPY, DUODENOSCOPY (EGD), DILATATION;  Surgeon: Bladimir Garner MD;  Location:  GI     EYE SURGERY      cataracts removed both eyes     HC COLONOSCOPY THRU STOMA W BIOPSY/CAUTERY TUMOR/POLYP/LESION  2007     INJECT EPIDURAL LUMBAR       LAMINECTOMY LUMBAR TWO LEVELS N/A 2015    Procedure: LAMINECTOMY LUMBAR TWO LEVELS;  Surgeon: Bladimir Keenan MD;  Location:  OR     THORACIC SURGERY  1992    bypass       Social History     Tobacco Use     Smoking status: Former Smoker     Packs/day: 2.00     Years: 30.00     Pack years: 60.00     Types: Cigarettes, Cigars, Pipe     Start date:      Last attempt to quit: 3/1/1992     Years since quittin.9     Smokeless tobacco: Never Used     Tobacco comment: quit in    Substance Use Topics     Alcohol use: Yes     Alcohol/week: 0.0 standard drinks     Comment: minimal less than 1/week     Family History   Problem Relation Age of Onset     Cancer Brother         MELANOMA     Diabetes Mother         AODM     Thyroid Disease Mother      Diabetes Father         AODM     Myocardial Infarction Father      Cerebrovascular Disease Brother      Parkinsonism Sister      Family History Negative Son      Family History Negative Son      Family History Negative Son      Family History Negative Daughter      Coronary Artery Disease Brother         dod 2019     Cerebrovascular Disease Brother         dod 2019     Other Cancer Brother         dod 2000/melanoma         Current Outpatient Medications   Medication Sig Dispense Refill     acetaminophen (ACETAMIN) 500 MG tablet Take 1,000 mg by mouth 2 times daily         amLODIPine (NORVASC) 5 MG tablet TAKE 1 TABLET BY MOUTH TWO  TIMES DAILY 180 tablet 2     aspirin 81 MG EC tablet Take 81 mg by mouth daily        blood glucose (ONETOUCH ULTRA) test strip Use to test blood sugar 2 times daily or as directed. 200 strip 1     blood glucose monitoring (ONE TOUCH ULTRASOFT) lancets Use to test blood sugar 2 times daily or as directed. 200 each 1     Cholecalciferol (VITAMIN D) 2000 UNIT tablet Take 2,000 Units by mouth daily. 90 tablet 3     diclofenac (VOLTAREN) 1 % topical gel Place 4 g onto the skin 4 times daily as needed for moderate pain (Apply to back to neck.)       finasteride (PROSCAR) 5 MG tablet Take 1 tablet (5 mg) by mouth daily 90 tablet 1     gabapentin (NEURONTIN) 300 MG capsule Take 900 mg three times daily 810 capsule 2     lisinopril (PRINIVIL/ZESTRIL) 20 MG tablet Take 1 tablet (20 mg) by mouth daily Due for follow up appointment. Please schedule: [phone] 90 tablet 0     Menthol, Topical Analgesic, (ICY HOT BACK EX) Externally apply topically daily as needed (neck pain)        metFORMIN (GLUCOPHAGE-XR) 500 MG 24 hr tablet Take 2 tablets (1,000 mg) by mouth 2 times daily (with meals) 360 tablet 1     omeprazole (PRILOSEC) 20 MG DR capsule Take 1 capsule (20 mg) by mouth daily 90 capsule 1     ORDER FOR DME Uses Cpap machine for sleep apnea       pravastatin (PRAVACHOL) 20 MG tablet TAKE 1 TABLET BY MOUTH  DAILY 90 tablet 3     tamsulosin (FLOMAX) 0.4 MG capsule Take 1 capsule (0.4 mg) by mouth daily 90 capsule 1     No Known Allergies    Reviewed and updated as needed this visit by Provider         Review of Systems   ROS COMP: Constitutional, HEENT, cardiovascular, pulmonary, gi and gu systems are negative, except as otherwise noted.    This document serves as a record of the services and decisions personally performed and made by Dada Gordillo MD. It was created on his behalf by Mac Monroy, a trained medical scribe. The creation of this document is  based on the provider's statements to the medical scribe.  Mac Monroy February 12, 2020 10:24 AM          Objective    /72 (BP Location: Left arm, Cuff Size: Adult Large)   Pulse 75   Temp 97.8  F (36.6  C) (Oral)   Wt 118.8 kg (262 lb)   SpO2 96%   BMI 32.75 kg/m    Body mass index is 32.75 kg/m .     Physical Exam   HEENT ears were full of cerumen bilaterally.   Instrumentation of the cerumen was not completely successful so he subsequently underwent bilateral ear lavage.  Reevaluation of his ear showed wax to be completely cleared  Diagnostic Test Results:  Labs reviewed in Epic  none         Assessment & Plan     Bilateral impacted cerumen  Provided the pt with a bilateral ear lavage. Recommended that the pt wash his ears with a mixture of warm water and hydrogen peroxide 2x week while bathing.   - HC REMOVAL IMPACTED CERUMEN IRRIGATION/LVG UNILAT           FUTURE APPOINTMENTS:       - Follow-up visit in D     No follow-ups on file.    The information in this document, created by the medical scribe for me, accurately reflects the services I personally performed and the decisions made by me. I have reviewed and approved this document for accuracy prior to leaving the patient care area.  February 12, 2020 10:30 AM    Dada Gordillo MD  [facility]

## 2020-02-14 ENCOUNTER — ALLIED HEALTH/NURSE VISIT (OUTPATIENT)
Dept: NURSING | Facility: CLINIC | Age: 85
End: 2020-02-14
Payer: COMMERCIAL

## 2020-02-14 DIAGNOSIS — E53.8 VITAMIN B12 DEFICIENCY (NON ANEMIC): Primary | ICD-10-CM

## 2020-02-14 PROCEDURE — 96372 THER/PROPH/DIAG INJ SC/IM: CPT

## 2020-02-14 PROCEDURE — 99207 ZZC NO CHARGE NURSE ONLY: CPT

## 2020-02-14 RX ADMIN — CYANOCOBALAMIN 1000 MCG: 1000 INJECTION, SOLUTION INTRAMUSCULAR; SUBCUTANEOUS at 12:32

## 2020-02-14 NOTE — NURSING NOTE
Clinic Administered Medication Documentation    MEDICATION LIST:   Injectable Medication Documentation    Patient was given Cyanocobalamin (B-12). Prior to medication administration, verified patients identity using patient s name and date of birth. Please see MAR and medication order for additional information. Patient instructed to report any adverse reaction to staff immediately .      Was entire vial of medication used? Yes  Vial/Syringe: Single dose vial  Expiration Date:  05/2021  Was this medication supplied by the patient? No

## 2020-02-28 DIAGNOSIS — N40.0 BENIGN PROSTATIC HYPERPLASIA, UNSPECIFIED WHETHER LOWER URINARY TRACT SYMPTOMS PRESENT: ICD-10-CM

## 2020-02-28 RX ORDER — TAMSULOSIN HYDROCHLORIDE 0.4 MG/1
CAPSULE ORAL
Qty: 90 CAPSULE | Refills: 1 | Status: SHIPPED | OUTPATIENT
Start: 2020-02-28 | End: 2020-08-07

## 2020-02-28 NOTE — TELEPHONE ENCOUNTER
Rx(s) approved per [facility] refill protocol   Sierra ARGUELLO RN

## 2020-02-28 NOTE — TELEPHONE ENCOUNTER
"Last Written Prescription Date:  9/18/19  Last Fill Quantity: 90,  # refills: 1   Last office visit: 2/12/2020 with prescribing provider:     Future Office Visit:   Next 5 appointments (look out 90 days)    Mar 13, 2020  1:00 PM CDT  Nurse Only with CS NURSE  [facility] [address]  [place] [address]  [phone]   Apr 02, 2020 10:15 AM CDT  Return Visit with Lino Hernandez MD  [facility]) [address]  [place] [address]  [phone] OPT 2         Requested Prescriptions   Pending Prescriptions Disp Refills     tamsulosin (FLOMAX) 0.4 MG capsule [Pharmacy Med Name: TAMSULOSIN  0.4MG  CAP] 90 capsule 1     Sig: TAKE 1 CAPSULE BY MOUTH  DAILY       Alpha Blockers Passed - 2/28/2020  4:07 AM        Passed - Blood pressure under 140/90 in past 12 months     BP Readings from Last 3 Encounters:   02/12/20 127/72   01/20/20 109/73   01/12/20 114/77                 Passed - Recent (12 mo) or future (30 days) visit within the authorizing provider's specialty     Patient has had an office visit with the authorizing provider or a provider within the authorizing providers department within the previous 12 mos or has a future within next 30 days. See \"Patient Info\" tab in inbasket, or \"Choose Columns\" in Meds & Orders section of the refill encounter.              Passed - Patient does not have Tadalafil, Vardenafil, or Sildenafil on their medication list        Passed - Medication is active on med list        Passed - Patient is 18 years of age or older          "

## 2020-03-03 DIAGNOSIS — M53.3 SACROILIAC JOINT PAIN: ICD-10-CM

## 2020-03-03 DIAGNOSIS — G89.4 CHRONIC PAIN SYNDROME: ICD-10-CM

## 2020-03-04 RX ORDER — GABAPENTIN 300 MG/1
CAPSULE ORAL
Qty: 810 CAPSULE | Refills: 2 | Status: SHIPPED | OUTPATIENT
Start: 2020-03-04 | End: 2021-04-16

## 2020-03-04 NOTE — TELEPHONE ENCOUNTER
Routing refill request to provider for review/approval because:  Drug not on the [facility] refill protocol   Christiane WATTS RN    Last Written Prescription Date:  4/22/2019  Last Fill Quantity: 810,  # refills: 2   Last office visit: 2/12/2020 with prescribing provider:     Future Office Visit:   Next 5 appointments (look out 90 days)    Mar 13, 2020  1:00 PM CDT  Nurse Only with  NURSE  [facility] [address]  [place] [address] [phone]   Apr 02, 2020 10:15 AM CDT  Return Visit with Lino Hernandez MD  [facility]) [address]  [place] [address] [phone] OPT 2         Requested Prescriptions   Pending Prescriptions Disp Refills     gabapentin (NEURONTIN) 300 MG capsule [Pharmacy Med Name: GABAPENTIN 300MG CAPSULE] 810 capsule 2     Sig: TAKE 3 CAPSULES BY MOUTH 3  TIMES DAILY       There is no refill protocol information for this order

## 2020-03-13 ENCOUNTER — ALLIED HEALTH/NURSE VISIT (OUTPATIENT)
Dept: NURSING | Facility: CLINIC | Age: 85
End: 2020-03-13
Payer: COMMERCIAL

## 2020-03-13 DIAGNOSIS — E53.8 VITAMIN B12 DEFICIENCY (NON ANEMIC): Primary | ICD-10-CM

## 2020-03-13 PROCEDURE — 96372 THER/PROPH/DIAG INJ SC/IM: CPT

## 2020-03-13 PROCEDURE — 99207 ZZC NO CHARGE NURSE ONLY: CPT

## 2020-03-13 RX ADMIN — CYANOCOBALAMIN 1000 MCG: 1000 INJECTION, SOLUTION INTRAMUSCULAR; SUBCUTANEOUS at 13:03

## 2020-03-13 NOTE — PROGRESS NOTES
The following medication was given:     MEDICATION: Vitamin B12  1000 mcg  ROUTE: IM  SITE: Deltoid - Right  DOSE: 1 mL  LOT #: [id number]  :  [employer]  EXPIRATION DATE:  5/2021  NDC#: 3338-7758-71

## 2020-03-18 ENCOUNTER — VIRTUAL VISIT (OUTPATIENT)
Dept: SLEEP MEDICINE | Facility: CLINIC | Age: 85
End: 2020-03-18
Payer: COMMERCIAL

## 2020-03-18 VITALS — HEIGHT: 75 IN | WEIGHT: 260 LBS | BODY MASS INDEX: 32.33 KG/M2

## 2020-03-18 DIAGNOSIS — G47.33 OSA (OBSTRUCTIVE SLEEP APNEA): Primary | ICD-10-CM

## 2020-03-18 PROCEDURE — 99213 OFFICE O/P EST LOW 20 MIN: CPT | Mod: 95 | Performed by: PHYSICIAN ASSISTANT

## 2020-03-18 ASSESSMENT — MIFFLIN-ST. JEOR: SCORE: 1959.98

## 2020-03-18 NOTE — PROGRESS NOTES
"Austen Fowler is a 84 year old male who is being evaluated via a billable telephone visit.      The patient has been notified of following:     \"This telephone visit will be conducted via a call between you and your physician/provider. We have found that certain health care needs can be provided without the need for a physical exam.  This service lets us provide the care you need with a short phone conversation.  If a prescription is necessary we can send it directly to your pharmacy.  If lab work is needed we can place an order for that and you can then stop by our lab to have the test done at a later time.    If during the course of the call the physician/provider feels a telephone visit is not appropriate, you will not be charged for this service.\"       Austen Fowler complains of  No chief complaint on file.      I have reviewed and updated the patient's Past Medical History, Social History, Family History and Medication List.    ALLERGIES  Patient has no known allergies.    Milvia [facility]    Sleep Follow-Up Virtual Visit:    Date on this visit: 3/18/2020    Austen Fowler has a phone visit today for follow-up of his CPAP use for previously diagnosed PENELOPE. His medical history is significant for neck and back pain, DM 2, HTN, CAD (s/p double bypass).      He was initially diagnosed at [facility] in 1/3/2007. His AHI was 53/hr, RDI 66/hr, O2 daniel 86%. That was performed at the request of his cardiologist. Weight was 291.     He is on auto CPAP 9-15 cm. He uses an AirFit F20 full face mask. At the last visit, his pressure minimum was increased from 5/hr because his residual AHI was 15.9/hr (3.3/hr are centrals, 6.3/hr obstructive apneas, 6.3/hr hypops). He had periodic breathing 4.7% of the night. His snore index was 12.8. He has tolerated the pressure change just fine. He is so comfortable with the pressures he often wonders if the pressure is even blowing. He will lift the mask to verify the pressure is " still on. He is not aware of snoring. He does get some dry mouth every once in a while. He will add water to the humidifier when he wakes for the restroom at 3 AM and that helps reduce the dry mouth. He has a cleaning spray for the mask that he uses weekly. He sleeps mostly on his back.     He feels he is sleeping pretty well. He notices his mask fits better if he shaves at night versus in the morning. He has not gotten a new mask since he got his CPAP set up at the end of October.       The compliance data shows that the patient used the CPAP for 30/30 nights, 100% of nights for >4 hours.  The 90th% pressure is 11.5 cm.  The average time in large leak is 1 hour 16 minutes.  The average nightly usage is 7:10.  The average AHI is 11.1/hr (3/hr Keith, 3.4/he OAs, 4.7/hr hyp). The snore index is 15.3. He spends 5.5% of the night in periodic breathing.     He goes to bed around 11 PM. He falls asleep quickly (10-30 minutes). He is up for the day around 8 AM. He may remove the mask as early as 6 AM. He wakes 1-3 times per night, usually 1-2 times. He may be up 10-15 minutes. He rarely naps. He says his energy was down a bit with the pneumonia, but is picking up recently with getting into Spring.    He had pneumonia in January-February. He feels good now for the most part. He still has an occasional cough. He has a tendency towards shortness of breath regardless of the pneumonia. He had made an appointment with [facility], but that got cancelled. I advised him to reschedule that appointment.    Past medical/surgical history, family history, social history, medications and allergies were reviewed.      Problem List:  Patient Active Problem List    Diagnosis Date Noted     Shortness of breath 01/12/2020     Priority: Medium     Hypoxia 01/11/2020     Priority: Medium     Chronic bilateral low back pain without sciatica 12/13/2019     Priority: Medium     Cellulitis of right lower extremity 06/18/2019     Priority: Medium      Neck pain 11/13/2017     Priority: Medium     Non morbid obesity, unspecified obesity type 08/05/2016     Priority: Medium     Left ventricular diastolic dysfunction      Priority: Medium     Ascending aorta dilatation (H)      Priority: Medium     Aortic root dilatation (H)      Priority: Medium     Coronary artery disease involving native coronary artery of native heart without angina pectoris 02/04/2016     Priority: Medium     CABG 1992: LIMA to LAD, SANDI to RCA, SVG to OM1 and OM2       Health Care Home 05/05/2015     Priority: Medium     State Tier Level:  unknown  Status:  in [facility] care start of care 5/3/15; Deb Diony  [phone]  Care Coordinator:  Cathy Brand    EMERGENCY CARE PLAN  Presenting Problem Signs and Symptoms Treatment Plan    Questions or conerns during clinic hours    I will call the clinic directly     Questions or conerns outside clinic hours    I will call the 24 hour nurse line at [phone]    Patient needs to schedule an appointment    I will call the 24 hour scheduling team at [phone] or clinic directly    Same day treatment     I will call the clinic first, nurse line if after hours, urgent care and express care if needed                          Date:  May 5, 2015         S/P lumbar laminectomy 04/29/2015     Priority: Medium     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 Provider to review       Slowing of urinary stream 01/19/2004     Priority: Medium     Essential hypertension, benign 08/01/2003     Priority: Medium     Rash and other nonspecific skin eruption 08/01/2003     Priority: Medium        Impression/Plan:    (G47.33) PENELOPE (obstructive sleep apnea)  (primary encounter diagnosis)  Comment: Doing well with CPAP overall. Leak is high, has not replaced mask. His leak was only 20 minutes at the last visit, now almost 120 minutes.  His AHI has improved with increasing the starting pressure, but it is still high at 11/hr. Events seem to cluster around awakenings, suggesting they are transitional events.   Plan: Comprehensive DME         Continue auto CPAP 9-15 cm. An order was placed for new supplies. We reviewed recommendations for cleaning and replacing supplies. I strongly recommended he clean the cushion daily and replace the cushion close to monthly. He was also encouraged to reschedule with [place] [facility] for evaluation of his shortness of breath in the daytime.      He will follow up with me in about 6 month(s).       Phone call contact time  Call Started at 10:17 AM  Call Ended at 10:34 AM    Bennett Goltz, PA-C    CC: No ref. provider found

## 2020-03-24 ENCOUNTER — TELEPHONE (OUTPATIENT)
Dept: PALLIATIVE MEDICINE | Facility: CLINIC | Age: 85
End: 2020-03-24

## 2020-03-24 NOTE — TELEPHONE ENCOUNTER
Patient's return visit will be converted to Telephone Visit due to COVID-19.    Called patient and explained:    We re taking every precaution to prevent the spread of COVID-19. Our top priority is to protect and care for our patients.    After review by your provider, we are changing your visit to a telephone appointment.    Your visit is at 10:00.  We will be calling you 15-20 min early for check-in with the MA. Please be available at 9:90 (20 min prior to appointment) for this check in.    Confirm number that the patient wants us to call for this appointment: [phone]    If you have concerns about this plan, please let us know, and we will send a message to the nurses to further discuss your concerns.    Erin Hensley CMA on 3/24/2020 at 3:42 PM

## 2020-03-25 PROBLEM — G89.29 CHRONIC BILATERAL LOW BACK PAIN WITHOUT SCIATICA: Status: RESOLVED | Noted: 2019-12-13 | Resolved: 2020-03-25

## 2020-03-25 PROBLEM — M54.50 CHRONIC BILATERAL LOW BACK PAIN WITHOUT SCIATICA: Status: RESOLVED | Noted: 2019-12-13 | Resolved: 2020-03-25

## 2020-03-25 NOTE — PROGRESS NOTES
Patient has not returned to PT since the last visit on 1-3-20. Therefore we will discharge patient from PT at this time and resolve the episode.

## 2020-03-31 ENCOUNTER — VIRTUAL VISIT (OUTPATIENT)
Dept: PALLIATIVE MEDICINE | Facility: CLINIC | Age: 85
End: 2020-03-31
Payer: COMMERCIAL

## 2020-03-31 DIAGNOSIS — M48.061 SPINAL STENOSIS OF LUMBAR REGION WITHOUT NEUROGENIC CLAUDICATION: Primary | ICD-10-CM

## 2020-03-31 PROCEDURE — 99441 ZZC PHYSICIAN TELEPHONE EVALUATION 5-10 MIN: CPT | Performed by: PHYSICAL MEDICINE & REHABILITATION

## 2020-03-31 ASSESSMENT — PAIN SCALES - GENERAL: PAINLEVEL: MODERATE PAIN (5)

## 2020-03-31 NOTE — PROGRESS NOTES
"Austen Fowler is a 85 year old male who is being evaluated via a billable telephone visit.      The patient has been notified of following:     \"This telephone visit will be conducted via a call between you and your physician/provider. We have found that certain health care needs can be provided without the need for a physical exam.  This service lets us provide the care you need with a short phone conversation.  If a prescription is necessary we can send it directly to your pharmacy.  If lab work is needed we can place an order for that and you can then stop by our lab to have the test done at a later time.    If during the course of the call the physician/provider feels a telephone visit is not appropriate, you will not be charged for this service.\"     Patient has given verbal consent for Telephone visit?  Yes    Austen Fowler complains of    Chief Complaint   Patient presents with     Pain     Telephone Visit due to COVID-19.       I have reviewed and updated the patient's Past Medical History, Social History, Family History and Medication List.    ALLERGIES  Patient has no known allergies.    Additional provider notes:   -He underwent a L2-3 lumbar epidural steroid injection on 10/2/2019 with significant relief in his back and leg pain.  -Over the last couple months the pain has gradually returned and is currently severe.  -The pain bothers him most when walking, when he is resting he is comfortable. He can walk about a half block before the pain is severe. When he uses a walking stick he can do better.  -He has a walker but its bulky so he doesn't like to use it.  -He takes gabapentin 300mg 3 times/day.  -He takes tylenol 650 twice daily as well.         Assessment:  Mr. Fowler is an 84 year old with past medical history including: HTN, DM II, HLD, PENELOPE, Obesity, CAD, and we discussed the followin. Lumbar spondylosis, spinal stenosis and radiculopathy: He has had a laminectomy in the past " presumably for stenosis/radicular symptoms although he doesn't recall the details. Started having bilateral buttock/thigh pain with intermittent radicular pain on the right side since September of 2019. MRI showed widespread degenerative changes with mod-severe central stenosis at L3-4 with foraminal narrowing at this level as well. He underwent a L2-3 aleksandr in October 2019 with significant improvement for 3+ months. Would like this repeated at this time but understands the importance of delaying this due to COVID-19 risks.     2. Sacroiliac joint dysfunction: Left buttock and thigh pain treated over the years with left sacroiliac joint injection every 9-12 months with significant pain relief.      3. Chronic left sided neck pain: Likely due to facet arthritis and overlying myofascial pain. Improvement noted with use of voltaren gel, continue this for the time being.     Plan:  The following recommendations were given to the patient. Diagnosis, treatment options, risks, benefits, and alternatives were discussed, and all questions were answered. The patient expressed understanding of the plan for management.      I am recommending a multidisciplinary treatment plan to help this patient better manage his pain.  This includes:      1. Physical Therapy: He has done PT for his back before, discussed the importance of continuing these exercises.  2. Self Care Recommendations: Gentle progressive exercise that does not increase pain - gradually increase daily walking program.  Take mini breaks - 5 minutes of mindfullness a couple times a day.   3. Diagnostic Studies: None  4. Medication Management:   1. Continue voltaren gel as needed for the neck  2. Continue gabapentin 900mg TID  3. Continue tylenol 1000mg 3-4 times daily  4. Consider muscle relaxants as next step, hold off for now due to sedation risks.  5. Further procedures recommended: Patient will call in 1 month to request a lumbar epidural steroid injection, order  placed today  6. Follow up: 1 month post procedure in clinic    Phone call duration: 7 minutes      DO [facility]

## 2020-03-31 NOTE — PROGRESS NOTES
The patient has been notified of following:     This telephone visit will be conducted via a call between you and your provider. We have found that certain health care needs can be provided without the need for a physical exam.  This service lets us provide the care you need with a phone conversation.  If a prescription is necessary we can send it directly to your pharmacy.  If lab work is needed we can place an order for that and you can then stop by our lab to have the test done at a later time. This is a billable service but we do not know the cost at this time.     Lita Leigh MA on 3/31/2020 at 8:33 AM

## 2020-04-01 ENCOUNTER — MYC REFILL (OUTPATIENT)
Dept: FAMILY MEDICINE | Facility: CLINIC | Age: 85
End: 2020-04-01

## 2020-04-01 DIAGNOSIS — I10 ESSENTIAL HYPERTENSION, BENIGN: ICD-10-CM

## 2020-04-01 DIAGNOSIS — R39.198 SLOWING OF URINARY STREAM: ICD-10-CM

## 2020-04-01 DIAGNOSIS — R13.10 DYSPHAGIA, UNSPECIFIED TYPE: ICD-10-CM

## 2020-04-01 NOTE — TELEPHONE ENCOUNTER
"Requested Prescriptions   Pending Prescriptions Disp Refills     finasteride (PROSCAR) 5 MG tablet  Last Written Prescription Date:  11/19/19  Last Fill Quantity: 90,  # refills: 1   Last office visit: 2/12/2020 with prescribing provider:  Rand   Future Office Visit:   Next 5 appointments (look out 90 days)    Apr 10, 2020  1:00 PM CDT  Nurse Only with CS NURSE  [facility] ([facility] [address] [facility] [address]  [phone]   May 07, 2020 11:00 AM CDT  Office Visit with Hamzah Hodge MD  [facility] ([facility]) [address] [facility] [address] [phone]   Jun 02, 2020 11:15 AM CDT  Return Visit with Lino Hernandez MD  [facility] ([facility]) [address]  [facility] [address] [phone] OPT 2          90 tablet 1     Sig: Take 1 tablet (5 mg) by mouth daily       BPH Agents Passed - 4/1/2020 10:41 AM        Passed - Recent (12 mo) or future (30 days) visit within the authorizing provider's department     Patient has had an office visit with the authorizing provider or a provider within the authorizing providers department within the previous 12 mos or has a future within next 30 days. See \"Patient Info\" tab in inbasket, or \"Choose Columns\" in Meds & Orders section of the refill encounter.              Passed - Medication is active on med list        Passed - Patient is 18 years of age or older           lisinopril (ZESTRIL) 20 MG tablet  Last Written Prescription Date:  12/3/19  Last Fill Quantity: 90,  # refills: 0   Last office visit: 2/12/2020 with prescribing provider:  Rand Horton Office Visit:   Next 5 appointments (look out 90 days)    Apr 10, 2020  1:00 PM CDT  Nurse Only with CS NURSE  [facility] ([facility]) [address] [facility] [address]  [phone]   May 07, 2020 11:00 AM CDT  Office Visit with Hamzah Hodge, " "MD  [facility] ([facility]) [address]  [place] [address]  [phone]   Jun 02, 2020 11:15 AM CDT  Return Visit with Lino Hernandez MD  [facility] ([facility]) [address]  [place] [address]  [phone] OPT 2          90 tablet 0     Sig: Take 1 tablet (20 mg) by mouth daily Due for follow up appointment. Please schedule: [phone]       ACE Inhibitors (Including Combos) Protocol Failed - 4/1/2020 10:41 AM        Failed - Normal serum creatinine on file in past 12 months     Recent Labs   Lab Test 01/20/20  1435  09/21/16  0943   CR 0.65*   < >  --    CREAT  --   --  0.8    < > = values in this interval not displayed.       Ok to refill medication if creatinine is low          Passed - Blood pressure under 140/90 in past 12 months     BP Readings from Last 3 Encounters:   02/12/20 127/72   01/20/20 109/73   01/12/20 114/77                 Passed - Recent (12 mo) or future (30 days) visit within the authorizing provider's specialty     Patient has had an office visit with the authorizing provider or a provider within the authorizing providers department within the previous 12 mos or has a future within next 30 days. See \"Patient Info\" tab in inbasket, or \"Choose Columns\" in Meds & Orders section of the refill encounter.              Passed - Medication is active on med list        Passed - Patient is age 18 or older        Passed - Normal serum potassium on file in past 12 months     Recent Labs   Lab Test 01/20/20  1435   POTASSIUM 3.9                omeprazole (PRILOSEC) 20 MG DR capsule  Last Written Prescription Date:  12/31/19  Last Fill Quantity: 90,  # refills: 1   Last office visit: 2/12/2020 with prescribing provider:  Rand   Future Office Visit:   Next 5 appointments (look out 90 days)    Apr 10, 2020  1:00 PM CDT  Nurse Only with CS NURSE  [facility] ([facility] " "[place]) [address] [place] [address]  [phone]   May 07, 2020 11:00 AM CDT  Office Visit with Hamzah Hodge MD  [facility] ([facility]) [address]  [place] [address]  [phone]   Jun 02, 2020 11:15 AM CDT  Return Visit with Lino Hernandez MD  [facility] ([facility]) [address]  [place] [address]  [phone] OPT 2          90 capsule 1     Sig: Take 1 capsule (20 mg) by mouth daily       PPI Protocol Passed - 4/1/2020 10:41 AM        Passed - Not on Clopidogrel (unless Pantoprazole ordered)        Passed - No diagnosis of osteoporosis on record        Passed - Recent (12 mo) or future (30 days) visit within the authorizing provider's specialty     Patient has had an office visit with the authorizing provider or a provider within the authorizing providers department within the previous 12 mos or has a future within next 30 days. See \"Patient Info\" tab in inbasket, or \"Choose Columns\" in Meds & Orders section of the refill encounter.              Passed - Medication is active on med list        Passed - Patient is age 18 or older             "

## 2020-04-02 RX ORDER — FINASTERIDE 5 MG/1
1 TABLET, FILM COATED ORAL DAILY
Qty: 90 TABLET | Refills: 1 | Status: CANCELLED | OUTPATIENT
Start: 2020-04-02

## 2020-04-02 RX ORDER — LISINOPRIL 20 MG/1
20 TABLET ORAL DAILY
Qty: 90 TABLET | Refills: 0 | Status: SHIPPED | OUTPATIENT
Start: 2020-04-02 | End: 2020-06-03

## 2020-04-02 NOTE — TELEPHONE ENCOUNTER
Routing refill request to provider for review/approval because:  Labs out of range:  Creat.  Please authorize if appropriate.  Thanks,  Meron Richmond RN    [email or web address] message sent to pt regarding other two meds-   should have refills until 5/19/20 of your finasteride and your Protonix until 6/31/20.  Has apt 5/7 scheduled with PCP.

## 2020-04-07 DIAGNOSIS — R39.198 SLOWING OF URINARY STREAM: ICD-10-CM

## 2020-04-07 DIAGNOSIS — R73.09 OTHER ABNORMAL GLUCOSE: ICD-10-CM

## 2020-04-07 RX ORDER — FINASTERIDE 5 MG/1
TABLET, FILM COATED ORAL
Qty: 90 TABLET | Refills: 3 | Status: SHIPPED | OUTPATIENT
Start: 2020-04-07 | End: 2021-04-16

## 2020-04-07 NOTE — TELEPHONE ENCOUNTER
SEE 4-1-20 MY CHART refill ---    Meron Richmond, RN to [facility]      10:37 AM   Ehsan Moya,  In reviewing your chart, you should have refills until 5/19/20 of your finasteride       /finasteride (PROSCAR) 5 MG tablet  90 tablet  1  11/19/2019   No    Sig - Route: Take 1 tablet (5 mg) by mouth sriram     Last Written Prescription Date:  11/19/2019  Last Fill Quantity: 90,  # refills: 1   Last office visit: 2/12/2020 with prescribing provider:     Future Office Visit:   Next 5 appointments (look out 90 days)    May 07, 2020 11:00 AM CDT  Office Visit with Hamzah Hodge MD  [facility] ([facility]) [address] [address]  [phone]   Jun 02, 2020 11:15 AM CDT  Return Visit with Lino Hernandez MD  [facility] ([facility]) [address] [place] [address] [phone] OPT 2           blood glucose (ONETOUCH ULTRA) test strip  200 strip  1  7/29/2019   No    Sig: Use to test blood sugar 2 times daily or as directed.      / Last Written Prescription Date:  07/29/2019  Last Fill Quantity: 200,  # refills: 1   Last office visit: 2/12/2020 with prescribing provider:     Future Office Visit:   Next 5 appointments (look out 90 days)    May 07, 2020 11:00 AM CDT  Office Visit with Hamzah Hodge MD  [facility] ([facility]) [address] [address]  [phone]   Jun 02, 2020 11:15 AM CDT  Return Visit with Lino Hernandez MD  [facility] ([facility]) [address] [place] [address] [phone] OPT 2         Requested Prescriptions   Pending Prescriptions Disp Refills     blood glucose (ONETOUCH ULTRA) test strip [Pharmacy Med Name: T/S ONE TOUCH ULTRA BLUE] 200 strip 1     Sig: USE TO TEST BLOOD SUGAR 2  TIMES DAILY OR AS DIRECTED.       Diabetic Supplies Protocol Passed - 4/7/2020   "4:18 AM        Passed - Medication is active on med list        Passed - Patient is 18 years of age or older        Passed - Recent (6 mo) or future (30 days) visit within the authorizing provider's specialty     Patient had office visit in the last 6 months or has a visit in the next 30 days with authorizing provider.  See \"Patient Info\" tab in inbasket, or \"Choose Columns\" in Meds & Orders section of the refill encounter.               finasteride (PROSCAR) 5 MG tablet [Pharmacy Med Name: FINASTERIDE 5MG TABLET] 90 tablet 1     Sig: TAKE 1 TABLET BY MOUTH  DAILY       BPH Agents Passed - 4/7/2020  4:18 AM        Passed - Recent (12 mo) or future (30 days) visit within the authorizing provider's department     Patient has had an office visit with the authorizing provider or a provider within the authorizing providers department within the previous 12 mos or has a future within next 30 days. See \"Patient Info\" tab in inbasket, or \"Choose Columns\" in Meds & Orders section of the refill encounter.              Passed - Medication is active on med list        Passed - Patient is 18 years of age or older             "

## 2020-05-06 ENCOUNTER — ALLIED HEALTH/NURSE VISIT (OUTPATIENT)
Dept: PHARMACY | Facility: CLINIC | Age: 85
End: 2020-05-06

## 2020-05-06 DIAGNOSIS — Z78.9 TAKES DIETARY SUPPLEMENTS: ICD-10-CM

## 2020-05-06 DIAGNOSIS — M54.2 CERVICALGIA: ICD-10-CM

## 2020-05-06 DIAGNOSIS — I10 ESSENTIAL HYPERTENSION, BENIGN: Primary | ICD-10-CM

## 2020-05-06 DIAGNOSIS — I25.10 CORONARY ARTERY DISEASE INVOLVING NATIVE CORONARY ARTERY OF NATIVE HEART WITHOUT ANGINA PECTORIS: ICD-10-CM

## 2020-05-06 DIAGNOSIS — R39.198 SLOWING OF URINARY STREAM: ICD-10-CM

## 2020-05-06 DIAGNOSIS — K21.9 GASTROESOPHAGEAL REFLUX DISEASE, ESOPHAGITIS PRESENCE NOT SPECIFIED: ICD-10-CM

## 2020-05-06 DIAGNOSIS — E78.5 HYPERLIPIDEMIA LDL GOAL <70: ICD-10-CM

## 2020-05-06 DIAGNOSIS — E11.59 TYPE 2 DIABETES MELLITUS WITH VASCULAR DISEASE (H): ICD-10-CM

## 2020-05-06 PROCEDURE — 99606 MTMS BY PHARM EST 15 MIN: CPT | Performed by: PHARMACIST

## 2020-05-06 NOTE — PROGRESS NOTES
"MTM ENCOUNTER  SUBJECTIVE/OBJECTIVE:                           Austen Fowler is a 85 year old male called for a follow-up visit. He was referred to me from Dr. Reddy.  Today's visit is a follow-up MTM visit from 10/31/19.     Patient consented to a telehealth visit: yes  Telemedicine Visit Details  Type of service:  Telephone visit  Start Time: 10:01 AM  End Time: 10:15 AM  Originating Location (pt. Location): Home  Distant Location (provider location):  [facility] [place]  Mode of Communication:  Telephone    Chief Complaint: Follow-up visit.  Per [facility] from [facility]: \"I am now taking a B12 supplement per DR. REDDY but the 1 tab 1000mcg a day at breakfast seems to be giving me some problems similar to PROPRANOLOL. Upset stomach and Diarrhea.  Will stop it for a week or so to see what happens.  Does the Metformin regimen have any adverse reaction with my other meds?? Can you steer me to uses of Tylenol and/or Advil??\"    Tobacco:  reports that he quit smoking about 28 years ago. His smoking use included cigarettes, cigars, and pipe. He started smoking about 66 years ago. He has a 60.00 pack-year smoking history. He has never used smokeless tobacco.  Alcohol: Less than 1 beverages / week    Medication Adherence/Access: no issues reported    Hypertension/CAD: Current medications include amlodipine 5mg BID and lisinopril 20mg daily. His propranolol was stopped due to diarrhea and stomach upset.  He does monitor his BP at home and it's been 110s systolic over 70s diastolic, this AM was 114/71. He also monitors his HR, which is always around 55-65 bpm.  He denies any chest pains or side effects of therapy.  Last ECHO on 1/12/20 estimated EF to be 60-65%.  Pt follows with cardiology.    BP Readings from Last 3 Encounters:   02/12/20 127/72   01/20/20 109/73   01/12/20 114/77         Cervicalgia:  He's been working with pain management on this, he follows  with Dr. Nuñez, plans to schedule a nerve block now " that elective surgeries are opening back up again.  He's been prescribed Voltaren gel, which he's using regularly on his neck.  He has also found IcyHot gel to be helpful (he alternates between Voltaren gel and IcyHot).  He continues taking APAP 1000mg BID (might take a 3rd dose during the day if needed) and gabapentin 900mg BID (although prescribed for TID).  He reports this regimen is working well for him and he denies side effects.      Type 2 Diabetes:  Pt currently taking  metformin XR 2000 mg daily. Lactic acid was high earlier this year, which was attributed to metformin use. Pt has since resumed taking Metformin under the direction of Dr. Hodge. Pt is not experiencing side effects.  SMBG: one time(s) daily. Ranges (patient reported): Fasting- 158 this AM, 140-150 normally  Symptoms of low blood sugar? none, Frequency of lows- never  Symptoms of high blood sugar? none  Eye exam: up to date  Foot exam: due  Aspirin: Taking 81mg once every 2-3 days, cut back on this due to excessive bruising   Statin: Yes: pravastatin   ACEi/ARB: Yes: lisinopril.   Urine Albumin:   Lab Results   Component Value Date    UMALCR 7.33 04/17/2019      Lab Results   Component Value Date    A1C 5.8 01/20/2020    A1C 6.5 10/08/2019    A1C 6.1 06/19/2019    A1C 6.0 04/17/2019    A1C 6.0 09/24/2018        Hyperlipidemia: Current therapy includes pravastatin 20 mg once daily.  Pt reports no significant myalgias or other side effects.    LDL Cholesterol Calculated   Date Value Ref Range Status   12/30/2019 48 <100 mg/dL Final     Comment:     Desirable:       <100 mg/dl       GERD: Current medications include: omeprazole 20mg once daily.  He's no longer needing to use Tums. Pt c/o no current symptoms.  Patient feels that current regimen is effective.      BPH:   Current medications include tamsulosin 0.4 mg daily and finasteride 5 mg daily.  He reports that the tamsulosin helps provide relief, his urination is much more regular. No side  effects noted.       Supplements:  Current medications include cholecalciferol 2000 units daily and vit B12 1000 mcg daily (pt actually taking every other day). He was started on B12 as a result of low lab levels, likely due to chronic metformin use and age. He spaced out his B12 because he was experiencing diarrhea and stomach upset, this has improved greatly since spacing out the vitamin. He notes he didn't have any issues from the vitamin B 12 injections, he only experiences side effects from the tablets.  He finds this regimen effective and he denies additional side effects.  Lab Results   Component Value Date    B12 188 (L) 01/20/2020     Today's Vitals: There were no vitals taken for this visit.- telephone visit    ASSESSMENT:                            Medication Adherence: good, no issues identified    Hypertension/CAD: Stable. Patient is meeting BP goal of < 140/90mmHg.     Cervicalgia:  Stable.     Type 2 Diabetes: Stable. Patient is meeting A1c goal of < 8%. Self monitoring of blood glucose is at goal of fasting  mg/dL.     Hyperlipidemia: Stable.    GERD: Stable.    BPH:  Stable.      Supplements: Stable. Pt is tolerating Vitamin B-12 well now that he reduced the dosing to every other day.    PLAN:                            1. Continue current medication regimen    I spent 15 minutes with this patient today. All changes were made via collaborative practice agreement with Dr. Hodge. A copy of the visit note was provided to the patient's primary care provider.    Will follow up in 6 months, sooner if needed.    The patient declined a summary of these recommendations.     Teetee Carreon PharmD  PGY1 Medication Therapy Management Resident   [phone]    The patient was seen independently by Dr. Carreon.  I have read the note and agree with the assessment and plan.  Marielos Morocho PharmD, [facility]  Medication Therapy Management Provider  Pager: [phone]

## 2020-05-07 ENCOUNTER — VIRTUAL VISIT (OUTPATIENT)
Dept: FAMILY MEDICINE | Facility: CLINIC | Age: 85
End: 2020-05-07
Payer: COMMERCIAL

## 2020-05-07 DIAGNOSIS — M54.41 CHRONIC LOW BACK PAIN WITH BILATERAL SCIATICA, UNSPECIFIED BACK PAIN LATERALITY: ICD-10-CM

## 2020-05-07 DIAGNOSIS — E53.8 VITAMIN B12 DEFICIENCY (NON ANEMIC): ICD-10-CM

## 2020-05-07 DIAGNOSIS — I10 ESSENTIAL HYPERTENSION, BENIGN: ICD-10-CM

## 2020-05-07 DIAGNOSIS — R10.84 ABDOMINAL PAIN, GENERALIZED: Primary | ICD-10-CM

## 2020-05-07 DIAGNOSIS — M54.42 CHRONIC LOW BACK PAIN WITH BILATERAL SCIATICA, UNSPECIFIED BACK PAIN LATERALITY: ICD-10-CM

## 2020-05-07 DIAGNOSIS — E11.59 TYPE 2 DIABETES MELLITUS WITH VASCULAR DISEASE (H): ICD-10-CM

## 2020-05-07 DIAGNOSIS — G89.29 CHRONIC LOW BACK PAIN WITH BILATERAL SCIATICA, UNSPECIFIED BACK PAIN LATERALITY: ICD-10-CM

## 2020-05-07 DIAGNOSIS — K21.00 GASTROESOPHAGEAL REFLUX DISEASE WITH ESOPHAGITIS: ICD-10-CM

## 2020-05-07 PROCEDURE — 99214 OFFICE O/P EST MOD 30 MIN: CPT | Mod: 95 | Performed by: INTERNAL MEDICINE

## 2020-05-07 NOTE — PROGRESS NOTES
"I believe that the patient will need endoscopy and if negative will need a CT of the abdomen and pelvis patient has been dealing with a lot of back pain because his Austen Fowler is a 85 year old male who is being evaluated via a billable telephone visit.      The patient has been notified of following:     \"This telephone visit will be conducted via a call between you and your physician/provider. We have found that certain health care needs can be provided without the need for a physical exam.  This service lets us provide the care you need with a short phone conversation.  If a prescription is necessary we can send it directly to your pharmacy.  If lab work is needed we can place an order for that and you can then stop by our lab to have the test done at a later time.    Telephone visits are billed at different rates depending on your insurance coverage. During this emergency period, for some insurers they may be billed the same as an in-person visit.  Please reach out to your insurance provider with any questions.    If during the course of the call the physician/provider feels a telephone visit is not appropriate, you will not be charged for this service.\"    Patient has given verbal consent for Telephone visit?  Yes    What phone number would you like to be contacted at? [phone]    How would you like to obtain your AVS? Briana Whelan     Austen Fowler is a 85 year old male who presents to clinic today for the following health issues:    HPI  Follow up   Lumbar spine injection was postponed and that has caused back pain  Blood sugars are running pretty good and he does not check them consistently  Blood pressure has been excellent  His main issue today is that his abdominal discomfort since his January admission with pneumonia has continued  He underwent imaging which was negative  He wonders if he should undergo endoscopy now because this is food related    Patient Active Problem List "   Diagnosis     Essential hypertension, benign     Rash and other nonspecific skin eruption     Slowing of urinary stream     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     Cervicalgia     Benign neoplasm of skin of other and unspecified parts of face     Impacted cerumen     Infected sebaceous cyst     Anxiety     Chronic low back pain     Sacroiliitis (H)     Advanced directives, counseling/discussion     Lumbosacral radiculitis     PENELOPE (obstructive sleep apnea)     Low HDL (under 40)     Mixed hyperlipidemia     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)     S/P lumbar laminectomy     Health Care Home     Coronary artery disease involving native coronary artery of native heart without angina pectoris     Left ventricular diastolic dysfunction     Ascending aorta dilatation (H)     Aortic root dilatation (H)     Non morbid obesity, unspecified obesity type     Neck pain     Cellulitis of right lower extremity     Hypoxia     Shortness of breath     Past Surgical History:   Procedure Laterality Date     ABDOMEN SURGERY  1994    gall bladder     BIOPSY      arms     C CABG, ARTERY-VEIN, FOUR  11/1992    CABG - LIMA to left anterior descending and saphenous vein bypass graft to the first and second obtuse marginal branch of the circumflex and the right mammary to the right coronary artery     C NONSPECIFIC PROCEDURE  6-94    Gail lap     C NONSPECIFIC PROCEDURE  1995    sinus surgery     C NONSPECIFIC PROCEDURE      bilateral cataract surgery     CHOLECYSTECTOMY  6/1994     COLONOSCOPY N/A 4/11/2017    Procedure: COMBINED COLONOSCOPY, SINGLE OR MULTIPLE BIOPSY/POLYPECTOMY BY BIOPSY;  Surgeon: Bladimir Garner MD;  Location:  GI     CV LEFT HEART CATH  5-92, 6-92    Angioplasty     ENT SURGERY  5/1995    sinus surgery     ESOPHAGOSCOPY, GASTROSCOPY, DUODENOSCOPY (EGD), DILATATION, COMBINED  7/17/2014    Procedure: COMBINED ESOPHAGOSCOPY, GASTROSCOPY, DUODENOSCOPY (EGD), DILATATION;  Surgeon:  Bladimir Garner MD;  Location:  GI     EYE SURGERY      cataracts removed both eyes     HC COLONOSCOPY THRU STOMA W BIOPSY/CAUTERY TUMOR/POLYP/LESION  2007     INJECT EPIDURAL LUMBAR       LAMINECTOMY LUMBAR TWO LEVELS N/A 2015    Procedure: LAMINECTOMY LUMBAR TWO LEVELS;  Surgeon: Bladimir Keenan MD;  Location:  OR     THORACIC SURGERY  1992    bypass       Social History     Tobacco Use     Smoking status: Former Smoker     Packs/day: 2.00     Years: 30.00     Pack years: 60.00     Types: Cigarettes, Cigars, Pipe     Start date:      Last attempt to quit: 3/1/1992     Years since quittin.2     Smokeless tobacco: Never Used     Tobacco comment: quit in    Substance Use Topics     Alcohol use: Yes     Alcohol/week: 0.0 standard drinks     Comment: minimal less than 1/week     Family History   Problem Relation Age of Onset     Cancer Brother         MELANOMA     Diabetes Mother         AODM     Thyroid Disease Mother      Diabetes Father         AODM     Myocardial Infarction Father      Cerebrovascular Disease Brother      Parkinsonism Sister      Family History Negative Son      Family History Negative Son      Family History Negative Son      Family History Negative Daughter      Coronary Artery Disease Brother         dod 2019     Cerebrovascular Disease Brother         dod 2019     Other Cancer Brother         dod 2000/melanoma         Current Outpatient Medications   Medication Sig Dispense Refill     acetaminophen (ACETAMIN) 500 MG tablet Take 1,000 mg by mouth 2 times daily        amLODIPine (NORVASC) 5 MG tablet TAKE 1 TABLET BY MOUTH TWO  TIMES DAILY 180 tablet 2     aspirin 81 MG EC tablet Take 81 mg by mouth daily        blood glucose (ONETOUCH ULTRA) test strip USE TO TEST BLOOD SUGAR 2  TIMES DAILY OR AS DIRECTED. 200 strip 1     blood glucose monitoring (ONE TOUCH ULTRASOFT) lancets USE TO TEST BLOOD SUGAR 2  TIMES DAILY OR AS DIRECTED. 200 each 3     Cholecalciferol  (VITAMIN D) 2000 UNIT tablet Take 2,000 Units by mouth daily. 90 tablet 3     diclofenac (VOLTAREN) 1 % topical gel Place 4 g onto the skin 4 times daily as needed for moderate pain (Apply to back to neck.)       finasteride (PROSCAR) 5 MG tablet TAKE 1 TABLET BY MOUTH  DAILY 90 tablet 3     gabapentin (NEURONTIN) 300 MG capsule TAKE 3 CAPSULES BY MOUTH 3  TIMES DAILY (Patient taking differently: Pt takes 3 capsules twice daily.) 810 capsule 2     lisinopril (ZESTRIL) 20 MG tablet Take 1 tablet (20 mg) by mouth daily Due for follow up appointment. Please schedule: [phone] 90 tablet 0     Menthol, Topical Analgesic, (ICY HOT BACK EX) Externally apply topically daily as needed (neck pain)        metFORMIN (GLUCOPHAGE-XR) 500 MG 24 hr tablet TAKE 2 TABLETS BY MOUTH TWO TIMES DAILY WITH MEALS 360 tablet 3     omeprazole (PRILOSEC) 20 MG DR capsule Take 1 capsule (20 mg) by mouth daily 90 capsule 1     ORDER FOR DME Uses Cpap machine for sleep apnea       pravastatin (PRAVACHOL) 20 MG tablet TAKE 1 TABLET BY MOUTH  DAILY 90 tablet 3     tamsulosin (FLOMAX) 0.4 MG capsule TAKE 1 CAPSULE BY MOUTH  DAILY 90 capsule 1     vitamin B-12 (CYANOCOBALAMIN) 1000 MCG tablet Take 1,000 mcg by mouth daily Pt taking every other day.       No Known Allergies    Reviewed and updated as needed this visit by Provider         Review of Systems   10 point ROS of systems including Constitutional, Eyes, Respiratory, Cardiovascular, Gastroenterology, Genitourinary, Integumentary, Muscularskeletal, Psychiatric were all negative except for pertinent positives noted in my HPI.         Objective   Reported vitals:  There were no vitals taken for this visit.   healthy, alert and no distress  PSYCH: Alert and oriented times 3; coherent speech, normal   rate and volume, able to articulate logical thoughts, able   to abstract reason, no tangential thoughts, no hallucinations   or delusions  His affect is normal  RESP: No cough, no audible  wheezing, able to talk in full sentences  Remainder of exam unable to be completed due to telephone visits        blood pressure 111/70  Weight 255 lb  Blood glucose this         Assessment/Plan:    ICD-10-CM    1. Abdominal pain, generalized  R10.84 GASTROENTEROLOGY PEDS REFERRAL +/- PROCEDURE   2. Gastroesophageal reflux disease with esophagitis  K21.0 GASTROENTEROLOGY PEDS REFERRAL +/- PROCEDURE   3. 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 BENIGN HYPERTENSION  I10    5. Chronic low back pain with bilateral sciatica, unspecified back pain laterality  M54.41     G89.29     M54.42    6. Vitamin B12 deficiency (non anemic)  E53.8 Vitamin B12       Patient will need a CT of the abdomen and pelvis if endoscopy is normal  I do not think more medication changes are required    Patient also had questions about why his B12 level was not checked earlier  I explained to him why we do not routinely offer B12 check  Giving B12 has not helped him with his fatigue and we will check another level    Patient will reach out to his pain medicine physician for lumbar injection now that elective procedures are open  He has enough medications and will check an A1c and B12 value  Along with basic metabolic panel when he sees the cardiologist next month  His diabetes is under good control  His weight has been about the same  No follow-ups on file.      Phone call duration:  12  minutes    Hamzah Hogde MD

## 2020-05-15 ENCOUNTER — TELEPHONE (OUTPATIENT)
Dept: PALLIATIVE MEDICINE | Facility: CLINIC | Age: 85
End: 2020-05-15

## 2020-05-15 NOTE — TELEPHONE ENCOUNTER
"Pre-screening Questions for Radiology Injections:    Injection to be done at which interventional clinic site? [facility]    Instruct patient to arrive as directed prior to the scheduled appointment time:    [place] minutes before      [place]: 30 minutes before; if IV needed 1 hour before     Dr. Nuñez-no IV needed for Cervical ANSELMO; please instruct to arrive 30\" early    Procedure ordered by Dr. Nuñez    Procedure ordered? lumbar epidural steroid injection L2-3    As a reminder, receiving steroids can decrease your body's ability to fight infection.   Would you still like to move forward with scheduling the injection?  Yes      Transforaminal Cervical ANSELMO - no pain provider currently performing    What insurance would patient like us to bill for this procedure? [employer]      Worker's comp or MVA (motor vehicle accident) -Any injection DO NOT SCHEDULE and route to Ayla Villaseñor.      [employer] insurance - For SI joint injections, DO NOT SCHEDULE and route Ayla Villaseñor.       [employer] - Any injection besides hip/shoulder/knee joint DO NOT SCHEDULE and route to Ayla Villaseñor. She will obtain PA and call pt back to schedule procedure or notify pt of denial.       [employer]-Route to [place] for review      **[employer]BS- ALL need to be routed to [place] for review if a PA is needed**      IF SCHEDULING IN [place] AND NEEDS A PA, IT IS OKAY TO SCHEDULE. [place] HANDLES THEIR OWN PA'S AFTER THE PATIENT IS SCHEDULED. PLEASE SCHEDULE AT LEAST 1 WEEK OUT SO A PA CAN BE OBTAINED.    Any chance of pregnancy? Not Applicable   If YES, do NOT schedule and route to RN [place]    Is an  needed? No     Patient has a drive home? (mandatory) YES: Informed    Is patient taking any blood thinners (i.e. plavix, coumadin, jantoven, warfarin, heparin, pradaxa or dabigatran, etc)? No   If hold needed, do NOT schedule, route to RN pool     Is patient taking any aspirin products (includes Excedrin and Fiorinal)? Yes " - Pt takes 81mg daily; instructed to hold 0 day(s) prior to procedure.      If more than 325mg/day, OK to schedule; Instruct pt to decrease to less than 325 mg for 7 days AND route to RN pool    For CERVICAL procedures, hold all aspirin products for 6 days.     Tell pt that if aspirin product is not held for 6 days, the procedure WILL BE cancelled.      Does the patient have a bleeding or clotting disorder? No     If YES, okay to schedule AND route to RN nurse pool    For any patients with platelet count <100, must be forwarded to provider    Is patient diabetic?  Yes  If YES, instruct them to bring their glucometer.    Does patient have an active infection or treated for one within the past week? No     Is patient currently taking any antibiotics?  No     For patients on chronic, preventative, or prophylactic antibiotics, procedures may be scheduled.     For patients on antibiotics for active or recent infection:antibiotic course must have been completed for 4 days    Is patient currently taking any steroid medications? (i.e. Prednisone, Medrol)  No     For patients on steroid medications, course must have been completed for 4 days    Is patient actively being treated for cancer or immunocompromised? No  If YES, do NOT schedule and route to RN pool     Are you able to get on and off an exam table with minimal or no assistance? Yes  If NO, do NOT schedule and route to RN pool    Are you able to roll over and lay on your stomach with minimal or no assistance? Yes  If NO, do NOT schedule and route to RN pool     Any allergies to contrast dye, iodine, shellfish, or numbing and steroid medications? No  If YES, route to RN pool AND add allergy information to appointment notes    Allergies: Patient has no known allergies.      Has the patient had a flu shot or any other vaccinations within 7 days before or after the procedure.  No     Does patient have an MRI/CT?  YES: 2019  Check Procedure Scheduling Grid to see if  required.      Was the MRI done within the last 3 years?  Yes    If yes, [facility], [place], [facility] etc? [facility]      If no, do not schedule and route to RN pool    If MRI was not done at [place], [facility] or [facility] do NOT schedule and route to RN pool.      If pt has an imaging disc, the injection MAY be scheduled but pt has to bring disc to appt.     If they show up without the disc the injection cannot be done    Procedure Specific Instructions:      If celiac plexus block, informed patient NPO for 6 hours and that it is okay to take medications with sips of water, especially blood pressure medications  Not Applicable         If this is for a cervical procedure, informed patient that aspirin needs to be held for 6 days.   Not Applicable      If IV needed:    Do not schedule procedures requiring IV placement in the first appointment of the day or first appointment after lunch. Do NOT schedule at 0745, 0815 or 1245.     Instructed pt to arrive 30 minutes early for IV start if required. (Check Procedure Scheduling Grid)  Not Applicable    Reminders:      If you are started on any steroids or antibiotics between now and your appointment, you must contact us because the procedure may need to be cancelled.  Yes      For all procedures except radiofrequency ablations (RFAs) and spinal cord stimulator (SCS) trials, informed patient:    IV sedation is not provided for this procedure.  If you feel that an oral anti-anxiety medication is needed, you can discuss this further with your referring provider or primary care provider.  The Pain Clinic provider will discuss specifics of what the procedure includes at your appointment.  Most procedures last 10-20 minutes.  We use numbing medications to help with any discomfort during the procedure.  Not Applicable      For patients 85 or older we recommend having an adult stay w/ them for the remainder of the day.       Does the  patient have any questions?  NO  Teetee Riley  [facility]

## 2020-05-18 ENCOUNTER — ANCILLARY PROCEDURE (OUTPATIENT)
Dept: GENERAL RADIOLOGY | Facility: CLINIC | Age: 85
End: 2020-05-18
Attending: PHYSICAL MEDICINE & REHABILITATION
Payer: COMMERCIAL

## 2020-05-18 ENCOUNTER — RADIOLOGY INJECTION OFFICE VISIT (OUTPATIENT)
Dept: PALLIATIVE MEDICINE | Facility: CLINIC | Age: 85
End: 2020-05-18
Payer: COMMERCIAL

## 2020-05-18 VITALS — OXYGEN SATURATION: 97 % | HEART RATE: 77 BPM | SYSTOLIC BLOOD PRESSURE: 156 MMHG | DIASTOLIC BLOOD PRESSURE: 79 MMHG

## 2020-05-18 DIAGNOSIS — M48.061 SPINAL STENOSIS OF LUMBAR REGION WITHOUT NEUROGENIC CLAUDICATION: ICD-10-CM

## 2020-05-18 DIAGNOSIS — M48.061 SPINAL STENOSIS OF LUMBAR REGION WITHOUT NEUROGENIC CLAUDICATION: Primary | ICD-10-CM

## 2020-05-18 PROCEDURE — 62323 NJX INTERLAMINAR LMBR/SAC: CPT | Performed by: PHYSICAL MEDICINE & REHABILITATION

## 2020-05-18 NOTE — NURSING NOTE
Pre-procedure Intake    Have you been fasting? No    If yes, for how long?     Are you taking a prescribed blood thinner such as coumadin, Plavix, Xarelto?    No    If yes, when did you take your last dose?     Do you take aspirin?  Yes -   ASA    If cervical procedure, have you held aspirin for 6 days?   NA    Do you have any allergies to contrast dye, iodine, steroid and/or numbing medications?  NO    Are you currently taking antibiotics or have an active infection?  NO    Have you had a fever/elevated temperature within the past week? NO    Are you currently taking oral steroids? NO    Do you have a ? Yes       Are you pregnant or breastfeeding?  NO    Are the vital signs normal?  no

## 2020-05-18 NOTE — PROGRESS NOTES
[facility] - Procedure Note    Date of Visit: 5/18/2020    Procedure performed: Lumbar 2-3 interlaminar epidural steroid injection  Diagnosis: Lumbar spondylosis; Lumbar radiculitis/radiculopathy, spinal stenosis  : Nicholas Nuñez DO   Anesthesia: none    Indications: Austen Fowler is a 85 year old male who is seen for a lumbar epidural steroid injection. The patient describes pain across his low back that radiates into his thighs anteriorly and posteriorly. The patient has been exhibiting symptoms consistent with lumbar intraspinal inflammation and radiculopathy. Symptoms have been persistent, disabling, and intermittently severe. The patient reports minimal improvement with conservative treatment, including oral medications and PT.    He had a lumbar L2-3 ANSELMO on 10/2/2019 by myself with significant pain relief for 5 months.    Lumbar MRI was done on 9/18/19 which showed      FINDINGS: There are 5 lumbar-type vertebral bodies assumed for the  purposes of this dictation. The tip of the conus terminates at the  level of L1.     There is evidence of previous postsurgical changes posteriorly at the  L3-L4 and L4-L5 areas with scarring at those levels. No fluid  collection within the posterior paraspinous tissues.     Mild grade 1 anterolisthesis of L4 on L5. No loss of vertebral body  height or suspicious osseous lesion. Moderate loss of intervertebral  disc height with disc desiccation and degenerative endplate changes at  L1-2, L2-L3, L3-L4, and L4-L5. Mild degenerative changes and loss of  disc height at L5-S1. No STIR hyperintense endplate edema at any  level.     Level by level as follows:      T12-L1: No significant disc herniation. No spinal canal or neural  foraminal narrowing.      L1-L2: Mild circumferential disc bulge with mild endplate osteophytic  spurring. No spinal canal narrowing. No significant neural foraminal  narrowing.      L2-L3: Circumferential disc  bulge with mild endplate osteophytic  spurring. Bilateral facet hypertrophy and ligamentum flavum infolding.  Moderate spinal canal narrowing particularly in an anterior posterior  dimension. Moderate right neural foraminal narrowing. Moderate left  neural foraminal narrowing.      L3-L4: Circumferential disc bulge and endplate osteophytic spurring.  Marked bilateral facet hypertrophy and ligamentum flavum infolding.  Moderate to severe central spinal canal narrowing. Moderate to severe  right neural foraminal narrowing. Moderate left neural foraminal  narrowing.      L4-L5: Previous surgical changes at this level. Mild grade 1  anterolisthesis with uncovering of the disc and bilateral uncinate  spurring. Marked bilateral facet of atrophy and ligamentum flavum  thickening. Severe central spinal canal narrowing. Moderate to severe  right neural foraminal narrowing. Severe left neural foraminal  narrowing.      L5-S1: Mild posterior disc bulge asymmetric in the right subarticular  region with mild uncinate spurring. Mild bilateral facet hypertrophy.  No spinal canal narrowing. Mild right neural foraminal narrowing. No  significant left neural foraminal narrowing.      Paraspinous soft tissues: Unremarkable.                                                                       IMPRESSION:  Marked multilevel degenerative changes throughout the  lumbar spine most present at L2-L3, L3-L4, and L4-L5 levels as  detailed above. Spinal canal narrowings have increased since previous  MR, particularly at the L2-L3 and L3-L4 levels. Neural foraminal  narrowings are also increased since that time.     CAR SHARP MD    Allergies:    No Known Allergies     Vitals:  BP (!) 156/79   Pulse 77   SpO2 97%     Review of Systems: The patient denies recent fever, chills, illness, use of antibiotics or anticoagulants. All other 10-point review of systems negative.     Procedure: The procedure and risks were explained, and informed  written consent was obtained from the patient. Risks include but are not limited to: infection, bleeding, increased pain, and damage to soft tissue, nerve, muscle, and vasculature structures. After getting informed consent, patient was brought into the procedure suite and was placed in a prone position on the procedure table. A Pause for the Cause was performed. Patient was prepped and draped in sterile fashion.     The 2-3 interspace was identified with use of fluoroscopy in AP view. A 25-gauge, 1.5 inch needle was used to anesthetize the skin and subcutaneous tissue entry site with a total of 2 ml of 1% lidocaine. Under fluoroscopic visualization, a 22-gauge, 4.5 inch Tuohy epidural needle was slowly advanced towards the epidural space a few millimeters left-sided of midline. The latter part of the needle advancement was guided with fluoroscopy in the lateral view. The epidural space was identified using loss of resistance technique. After negative aspiration for heme and cerebrospinal fluid, a total of 1 mL of non-ionic contrast was injected to confirm needle placement with 9 mL of contrast wasted. Epidurogram confirmed spread within the posterior epidural space. 1 ml of 40mg/ml of triamcinolone, 1 ml of 1% lidocaine, and 3 ml of preservative free saline was injected. The needle was removed.  Images were saved to PACS.    The patient tolerated the procedure well, and there was no evidence of procedural complications. No new sensory or motor deficits were noted following the procedure. The patient was stable and able to ambulate on discharge home. Post-procedure instructions were provided.     Pre-procedure pain score: 8/10 in the back, 6/10 in the leg  Post-procedure pain score: 4/10 in the back, 3/10 in the leg    Assessment/Plan: Austen Fowler is a 85 year old male s/p lumbar interlaminar epidural steroid injection today for lumbar spondylosis and radiculitis/radiculopathy.     1. Following today's  procedure, the patient was advised to contact the [facility] for any of the following:   Fever, chills, or night sweats   New onset of pain, numbness, or weakness   Any questions/concerns regarding the procedure  If unable to contact the Pain Center, the patient was instructed to go to a local Emergency Room for any complications.   2. The patient will receive a follow-up call in 1 week.   3. Follow-up with the referring provider in 2 weeks for post-procedure evaluation.        Nicholas Nuñez DO  [facility]

## 2020-05-18 NOTE — PATIENT INSTRUCTIONS
[facility] Procedure Discharge Instructions    Today you saw:   Dr. Nicholas Nuñez     Your procedure:  Epidural steroid injection    Medications used:  Lidocaine (anesthetic),   Kenalog (steroid), Normal  Saline,  Omnipaque (contrast)             Be cautious when walking as numbness and/or weakness in the legs may occur up to 6-8 hours after the procedure due to effect of the local anesthetic    Do not drive for 6 hours. The effect of the local anesthetic could slow your reflexes.     Avoid strenuous activity for the first 24 hours. You may resume your regular activities after that.     You may shower, however avoid swimming, tub baths or hot tubs for 24 hours following your procedure    You may have a mild to moderate increase in pain for several days following the injection.      You may use ice packs for 10-15 minutes, 3 to 4 times a day at the injection site for comfort    Do not use heat to painful areas for 6 to 8 hours. This will give the local anesthetic time to wear off and prevent you from accidentally burning your skin.    Unless you have been directed to avoid the use of anti-inflammatory medications (NSAIDS-ibuprofen, Aleve, Motrin), you may use these medications or Tylenol for pain control if needed.     With diabetes, check your blood sugar more frequently than usual as your blood sugar may be higher than normal for 10-14 days following a steroid injection. Contact your doctor who manages your diabetes if your blood sugar is higher than usual    Possible side effects of steroids that you may experience include flushing, elevated blood pressure, increased appetite, mild headaches and restlessness.  All of these symptoms will get better with time.    It may take up to 14 days for the steroid medication to start working although you may feel the effect as early as a few days after the procedure.     Follow up with your referring provider in 2-3 weeks      If you experience any of  the following, call the pain center line during work hours at [phone] or on-call physician after hours at [phone]:  -Fever over 100 degree F  -Swelling, bleeding, redness, drainage, warmth at the injection site  -Progressive weakness or numbness in your legs   -Loss of bowel or bladder function  -Unusual headache that is not relieved by Tylenol or your regular headache medication  -Unusual new onset of pain that is not improving

## 2020-05-18 NOTE — NURSING NOTE
Discharge Information    IV Discontiued Time:  NA    Amount of Fluid Infused:  NA    Discharge Criteria = When patient returns to baseline or as per MD order    Consciousness:  Pt is fully awake    Circulation:  BP +/- 20% of pre-procedure level    Respiration:  Patient is able to breathe deeply    O2 Sat:  Patient is able to maintain O2 Sat >92% on room air    Activity:  Moves 4 extremities on command    Ambulation:  Patient is able to stand and walk or stand and pivot into wheelchair    Dressing:  Clean/dry or No Dressing    Notes:   Discharge instructions and AVS given to patient    Patient meets criteria for discharge?  YES    Admitted to PCU?  No    Responsible adult present to accompany patient home?  Yes    Signature/Title:    Angela Sanchez RN  RN Care Coordinator  [facility]

## 2020-06-02 ENCOUNTER — VIRTUAL VISIT (OUTPATIENT)
Dept: CARDIOLOGY | Facility: CLINIC | Age: 85
End: 2020-06-02
Payer: COMMERCIAL

## 2020-06-02 VITALS
DIASTOLIC BLOOD PRESSURE: 74 MMHG | SYSTOLIC BLOOD PRESSURE: 126 MMHG | BODY MASS INDEX: 33.57 KG/M2 | HEART RATE: 55 BPM | HEIGHT: 75 IN | WEIGHT: 270 LBS

## 2020-06-02 DIAGNOSIS — E78.6 HDL DEFICIENCY: ICD-10-CM

## 2020-06-02 DIAGNOSIS — I77.810 AORTIC ROOT DILATATION (H): Primary | ICD-10-CM

## 2020-06-02 DIAGNOSIS — I10 BENIGN ESSENTIAL HYPERTENSION: ICD-10-CM

## 2020-06-02 DIAGNOSIS — Z95.1 S/P CABG (CORONARY ARTERY BYPASS GRAFT): ICD-10-CM

## 2020-06-02 DIAGNOSIS — I25.10 CORONARY ARTERY DISEASE INVOLVING NATIVE CORONARY ARTERY OF NATIVE HEART WITHOUT ANGINA PECTORIS: ICD-10-CM

## 2020-06-02 PROCEDURE — 99213 OFFICE O/P EST LOW 20 MIN: CPT | Mod: 95 | Performed by: INTERNAL MEDICINE

## 2020-06-02 ASSESSMENT — MIFFLIN-ST. JEOR: SCORE: 2000.34

## 2020-06-02 NOTE — PROGRESS NOTES
"Austen Fowler is a 85 year old male who is being evaluated via a billable telephone visit.      The patient has been notified of following:     \"This telephone visit will be conducted via a call between you and your physician/provider. We have found that certain health care needs can be provided without the need for a physical exam.  This service lets us provide the care you need with a short phone conversation.  If a prescription is necessary we can send it directly to your pharmacy.  If lab work is needed we can place an order for that and you can then stop by our lab to have the test done at a later time.    Telephone visits are billed at different rates depending on your insurance coverage. During this emergency period, for some insurers they may be billed the same as an in-person visit.  Please reach out to your insurance provider with any questions.    If during the course of the call the physician/provider feels a telephone visit is not appropriate, you will not be charged for this service.\"    Patient has given verbal consent for Telephone visit?  Yes    What phone number would you like to be contacted at? [phone]    How would you like to obtain your AVS? MyChart  Vital signs reported by patient  BP. 126/74  P. 55  Wt. 270lb  Ht.6'3\"  Review Of Systems  Skin: NEGATIVE  Eyes:Ears/Nose/Throat: NEGATIVE  Respiratory: sob on exertion, sleep apnea, wears cpap  Cardiovascular:NEGATIVE  Gastrointestinal: NEGATIVE  Genitourinary:NEGATIVE   Musculoskeletal: NEGATIVE  Neurologic: NEGATIVE  Psychiatric: NEGATIVE  Hematologic/Lymphatic/Immunologic: NEGATIVE  Endocrine:  diabetes  Joceline Ortiz Lpn    Phone call duration: 20 minutes    Lino Hernandez MD, FACC FRCPI    "

## 2020-06-02 NOTE — LETTER
6/2/2020    Hamzah Hodge MD  [address] [facility] [address]  [facility] [address]    RE: Austen Fowler       Dear Colleague,    I had the pleasure of seeing Austen Fowler in the [facility].    Austen Fowler is a 85 year old male who is being evaluated via a billable telephone visit.      REFERRING PHYSICIAN:  Dr. Hamzah Hodge.      HISTORY OF PRESENT ILLNESS:  This is a telephone visit because of the COVID-19 pandemic.  It is my pleasure to see your patient, Austen Fowler, in followup.  As you know, this is a patient who underwent coronary artery bypass grafting in the past.  This was in 1991.  He had a LIMA to his LAD, a saphenous vein graft to both the first and second obtuse marginal arteries and a right internal mammary artery graft to the right coronary artery.  He also has a mildly dilated aortic root and borderline dilated ascending aorta.  He also has HDL deficiency, diabetes mellitus and he has moderate to severe obesity.      Since I last spoke to him, he has been doing well.  He has no chest pains, no chest pressure, no unusual shortness of breath.  He did have an echocardiogram performed approximately 5 months ago, and this showed that the ascending aorta measures 3.5 cm, which is borderline dilated.  The aortic root is 3.9 cm, which is mildly dilated.  No significant valvular heart disease is present.  His last lipid profile was on 12/30, again approximately 5 months ago, with an LDL of 48, which is excellent, mild HDL deficiency at 34 and triglycerides normal at 108 with a total cholesterol of 104.  His liver function tests were normal with an ALT of 13.  His kidney function and electrolytes are also normal.      His blood pressure is well controlled at 126/74 with a pulse of 55 beats per minute.  He is not complaining of any chest pains, chest pressure or unusual shortness of breath.      IMPRESSION:   1.  Coronary artery disease, status post coronary artery bypass  grafting.  The patient is asymptomatic with respect to coronary artery disease with no symptoms suggestive of angina pectoris.   2.  Mildly dilated aortic root and borderline dilated ascending aorta.  The dimensions have not changed significantly, although the aortic root is slightly larger at 3.9 cm as opposed to 3.6 cm in 2019.     3.  Essential hypertension.  His blood pressure is well controlled.   4.  HDL deficiency.   5.  Moderate to severe obesity.   6.  Diabetes mellitus.      PLAN:  We will continue the patient on his present medications.  I will repeat his echocardiogram in 1 year and I will see the patient back again myself in 1 year's time.  I will repeat his lipids and basic metabolic profile at that stage.  As always, however, he has been told to contact us if he has any questions or any concerns.       Thank you for allowing me to participate in the care of your patient.    Sincerely,     Lino Hernandez MD, MD     [facility]

## 2020-06-02 NOTE — PROGRESS NOTES
Service Date: 06/02/2020      REFERRING PHYSICIAN:  Dr. Hamzah Hodge.      HISTORY OF PRESENT ILLNESS:  This is a telephone visit because of the COVID-19 pandemic.  It is my pleasure to see your patient, Austen Fowler, in followup.  As you know, this is a patient who underwent coronary artery bypass grafting in the past.  This was in 1991.  He had a LIMA to his LAD, a saphenous vein graft to both the first and second obtuse marginal arteries and a right internal mammary artery graft to the right coronary artery.  He also has a mildly dilated aortic root and borderline dilated ascending aorta.  He also has HDL deficiency, diabetes mellitus and he has moderate to severe obesity.      Since I last spoke to him, he has been doing well.  He has no chest pains, no chest pressure, no unusual shortness of breath.  He did have an echocardiogram performed approximately 5 months ago, and this showed that the ascending aorta measures 3.5 cm, which is borderline dilated.  The aortic root is 3.9 cm, which is mildly dilated.  No significant valvular heart disease is present.  His last lipid profile was on 12/30, again approximately 5 months ago, with an LDL of 48, which is excellent, mild HDL deficiency at 34 and triglycerides normal at 108 with a total cholesterol of 104.  His liver function tests were normal with an ALT of 13.  His kidney function and electrolytes are also normal.      His blood pressure is well controlled at 126/74 with a pulse of 55 beats per minute.  He is not complaining of any chest pains, chest pressure or unusual shortness of breath.      IMPRESSION:   1.  Coronary artery disease, status post coronary artery bypass grafting.  The patient is asymptomatic with respect to coronary artery disease with no symptoms suggestive of angina pectoris.   2.  Mildly dilated aortic root and borderline dilated ascending aorta.  The dimensions have not changed significantly, although the aortic root is slightly larger at  3.9 cm as opposed to 3.6 cm in 2019.     3.  Essential hypertension.  His blood pressure is well controlled.   4.  HDL deficiency.   5.  Moderate to severe obesity.   6.  Diabetes mellitus.      PLAN:  We will continue the patient on his present medications.  I will repeat his echocardiogram in 1 year and I will see the patient back again myself in 1 year's time.  I will repeat his lipids and basic metabolic profile at that stage.  As always, however, he has been told to contact us if he has any questions or any concerns.      cc:   Hamzah Hodge MD   [facility]   [address] [place] [address] Maria Victoria S, [address] [place]  [address]         TUYET GARCIA MD, [facility]             D: 2020   T: 2020   MT: [email or web address]      Name:     NAYELI RAI   MRN:      [id number]        Account:      [id number]   :      1935           Service Date: 2020      Document: [id number]

## 2020-06-03 DIAGNOSIS — I10 ESSENTIAL HYPERTENSION, BENIGN: ICD-10-CM

## 2020-06-03 RX ORDER — LISINOPRIL 20 MG/1
TABLET ORAL
Qty: 90 TABLET | Refills: 1 | Status: SHIPPED | OUTPATIENT
Start: 2020-06-03 | End: 2020-09-19

## 2020-06-03 NOTE — TELEPHONE ENCOUNTER
"Requested Prescriptions   Pending Prescriptions Disp Refills     lisinopril (ZESTRIL) 20 MG tablet [Pharmacy Med Name: LISINOPRIL  20MG  TAB] 90 tablet 0     Sig: TAKE 1 TABLET BY MOUTH  DAILY.  DUE FOR FOLLOW UP  APPOINTMENT. PLEASE  SCHEDULE: [phone]       ACE Inhibitors (Including Combos) Protocol Failed - 6/3/2020  4:35 AM        Failed - Normal serum creatinine on file in past 12 months     Recent Labs   Lab Test 01/20/20  1435  09/21/16  0943   CR 0.65*   < >  --    CREAT  --   --  0.8    < > = values in this interval not displayed.       Ok to refill medication if creatinine is low          Passed - Blood pressure under 140/90 in past 12 months     BP Readings from Last 3 Encounters:   06/02/20 126/74   05/18/20 (!) 156/79   02/12/20 127/72                 Passed - Recent (12 mo) or future (30 days) visit within the authorizing provider's specialty     Patient has had an office visit with the authorizing provider or a provider within the authorizing providers department within the previous 12 mos or has a future within next 30 days. See \"Patient Info\" tab in inbasket, or \"Choose Columns\" in Meds & Orders section of the refill encounter.              Passed - Medication is active on med list        Passed - Patient is age 18 or older        Passed - Normal serum potassium on file in past 12 months     Recent Labs   Lab Test 01/20/20  1435   POTASSIUM 3.9                  Prescription approved per [facility] Refill Protocol.  "

## 2020-07-28 ENCOUNTER — MYC MEDICAL ADVICE (OUTPATIENT)
Dept: PALLIATIVE MEDICINE | Facility: CLINIC | Age: 85
End: 2020-07-28

## 2020-07-28 DIAGNOSIS — M48.061 SPINAL STENOSIS OF LUMBAR REGION WITHOUT NEUROGENIC CLAUDICATION: Primary | ICD-10-CM

## 2020-07-29 NOTE — TELEPHONE ENCOUNTER
Order placed for repeat lumbar epidural steroid injection.    DO [facility]

## 2020-07-29 NOTE — TELEPHONE ENCOUNTER
From: Griffin Fowler      Created: 7/28/2020 9:26 PM      Please set an appt for me to have a steroid injection for pain in my low back. It has been  2+ months since the prior one. It can be at [facility] or at [facility]. Please advise and send me the time and place of the event. Thank you.  Austen driver) flavio  [phone] home  [phone] cell  [email or web address]         Last office Visit: 3/31/20     Procedure performed 5/18/20: Lumbar 2-3 interlaminar epidural steroid injection    Dr Nuñez please review. Do you want to see him prior to ordering an additional injection?    HERLINDA SevillaN, RN  Care Coordinator  [facility] [place]

## 2020-07-30 NOTE — TELEPHONE ENCOUNTER
Scheduled 8/10/20    HERLINDA SevillaN, RN  Care Coordinator  [facility] [place]

## 2020-08-06 DIAGNOSIS — I10 ESSENTIAL HYPERTENSION, BENIGN: ICD-10-CM

## 2020-08-06 DIAGNOSIS — N40.0 BENIGN PROSTATIC HYPERPLASIA, UNSPECIFIED WHETHER LOWER URINARY TRACT SYMPTOMS PRESENT: ICD-10-CM

## 2020-08-07 ENCOUNTER — TELEPHONE (OUTPATIENT)
Dept: PALLIATIVE MEDICINE | Facility: CLINIC | Age: 85
End: 2020-08-07

## 2020-08-07 RX ORDER — TAMSULOSIN HYDROCHLORIDE 0.4 MG/1
CAPSULE ORAL
Qty: 90 CAPSULE | Refills: 2 | Status: SHIPPED | OUTPATIENT
Start: 2020-08-07 | End: 2021-04-16

## 2020-08-07 RX ORDER — AMLODIPINE BESYLATE 5 MG/1
TABLET ORAL
Qty: 180 TABLET | Refills: 3 | Status: SHIPPED | OUTPATIENT
Start: 2020-08-07 | End: 2021-04-16

## 2020-08-07 NOTE — TELEPHONE ENCOUNTER
Routing refill request to provider for review/approval because:  Labs out of range:  Creat    Creatinine   Date Value Ref Range Status   01/20/2020 0.65 (L) 0.66 - 1.25 mg/dL Final     Please review and authorize if appropriate,     Thank you,   Elsa RENEE RN

## 2020-08-07 NOTE — TELEPHONE ENCOUNTER
Spoke to patient and  reminded patient of date, time and location of appointment.     Requested patient to contact clinic to reschedule if had fever or coughing in the last 14 days.   Reminded patient to bring and wear mask during their visit.    Reminded patient they will need a  for this appointment and requested that the  stays out of the clinic unless its medically necessary.      Lita Leigh [facility]  [place]

## 2020-08-07 NOTE — TELEPHONE ENCOUNTER
Tamsulosin- Prescription approved per [facility] Refill Protocol.    Amlodipine- Routing refill request to provider for review/approval because:  Labs out of range:  Creat

## 2020-08-10 ENCOUNTER — ANCILLARY PROCEDURE (OUTPATIENT)
Dept: GENERAL RADIOLOGY | Facility: CLINIC | Age: 85
End: 2020-08-10
Attending: PHYSICAL MEDICINE & REHABILITATION
Payer: COMMERCIAL

## 2020-08-10 ENCOUNTER — RADIOLOGY INJECTION OFFICE VISIT (OUTPATIENT)
Dept: PALLIATIVE MEDICINE | Facility: CLINIC | Age: 85
End: 2020-08-10
Payer: COMMERCIAL

## 2020-08-10 VITALS — OXYGEN SATURATION: 95 % | HEART RATE: 75 BPM | DIASTOLIC BLOOD PRESSURE: 76 MMHG | SYSTOLIC BLOOD PRESSURE: 149 MMHG

## 2020-08-10 DIAGNOSIS — M54.16 LUMBAR RADICULOPATHY: ICD-10-CM

## 2020-08-10 DIAGNOSIS — M48.061 SPINAL STENOSIS OF LUMBAR REGION WITHOUT NEUROGENIC CLAUDICATION: ICD-10-CM

## 2020-08-10 DIAGNOSIS — M54.16 LUMBAR RADICULOPATHY: Primary | ICD-10-CM

## 2020-08-10 PROCEDURE — 62323 NJX INTERLAMINAR LMBR/SAC: CPT | Performed by: PHYSICAL MEDICINE & REHABILITATION

## 2020-08-10 NOTE — PATIENT INSTRUCTIONS
[facility] Procedure Discharge Instructions    Today you saw:     Dr. Nicholas Nuñez    Your procedure:  Epidural steroid injection        Medications used:  Lidocaine (anesthetic) Kenalog (steroid)  Omnipaque (contrast)    Normal Saline               Be cautious when walking as numbness and/or weakness in the legs may occur up to 6-8 hours after the procedure due to effect of the local anesthetic    Do not drive for 6 hours. The effect of the local anesthetic could slow your reflexes.     Avoid strenuous activity for the first 24 hours. You may resume your regular activities after that.     You may shower, however avoid swimming, tub baths or hot tubs for 24 hours following your procedure    You may have a mild to moderate increase in pain for several days following the injection.      You may use ice packs for 10-15 minutes, 3 to 4 times a day at the injection site for comfort    Do not use heat to painful areas for 6 to 8 hours. This will give the local anesthetic time to wear off and prevent you from accidentally burning your skin.    Unless you have been directed to avoid the use of anti-inflammatory medications (NSAIDS-ibuprofen, Aleve, Motrin), you may use these medications or Tylenol for pain control if needed.     With diabetes, check your blood sugar more frequently than usual as your blood sugar may be higher than normal for 10-14 days following a steroid injection. Contact your doctor who manages your diabetes if your blood sugar is higher than usual    Possible side effects of steroids that you may experience include flushing, elevated blood pressure, increased appetite, mild headaches and restlessness.  All of these symptoms will get better with time.    It may take up to 14 days for the steroid medication to start working although you may feel the effect as early as a few days after the procedure.     Follow up with your referring provider in 2-3 weeks      If you experience any  of the following, call the pain center line during work hours at [phone] or on-call physician after hours at [phone]:  -Fever over 100 degree F  -Swelling, bleeding, redness, drainage, warmth at the injection site  -Progressive weakness or numbness in your legs   -Loss of bowel or bladder function  -Unusual headache that is not relieved by Tylenol or your regular headache medication  -Unusual new onset of pain that is not improving

## 2020-08-10 NOTE — PROGRESS NOTES
Pre-procedure Intake    Have you been fasting? NA    If yes, for how long?     Are you taking a prescribed blood thinner such as coumadin, Plavix, Xarelto?    No    If yes, when did you take your last dose?     Do you take aspirin?  Yes -   ASA    If cervical procedure, have you held aspirin for 6 days?   NA    Do you have any allergies to contrast dye, iodine, steroid and/or numbing medications?  NO    Are you currently taking antibiotics or have an active infection?  NO    Have you had a fever/elevated temperature within the past week? NO    Are you currently taking oral steroids? NO    Do you have a ? Yes       Are you pregnant or breastfeeding?  Not Applicable    Are the vital signs normal?  No: BP:146/74

## 2020-08-10 NOTE — PROGRESS NOTES
[facility] - Procedure Note    Date of Visit: 8/10/2020    Procedure performed: Lumbar 2-3 interlaminar epidural steroid injection  Diagnosis: Lumbar spondylosis; Lumbar radiculitis/radiculopathy, spinal stenosis  : Nicholas Nuñez DO   Anesthesia: none    Indications: Austen Fowler is a 85 year old male who is seen for a lumbar epidural steroid injection. The patient describes pain across his low back that radiates into his thighs posteriorly. The patient has been exhibiting symptoms consistent with lumbar intraspinal inflammation and radiculopathy. Symptoms have been persistent, disabling, and intermittently severe. The patient reports minimal improvement with conservative treatment, including oral medications and PT.    He had a lumbar L2-3 ALEKSANDR on 10/2/2019 by myself with significant pain relief for 5 months.  Repeat L2-3 aleksandr on 5/18/2020 provided relief for 1 month, he then restarted an exercise regimen which caused a pain flare.    Lumbar MRI was done on 9/18/19 which showed      FINDINGS: There are 5 lumbar-type vertebral bodies assumed for the  purposes of this dictation. The tip of the conus terminates at the  level of L1.     There is evidence of previous postsurgical changes posteriorly at the  L3-L4 and L4-L5 areas with scarring at those levels. No fluid  collection within the posterior paraspinous tissues.     Mild grade 1 anterolisthesis of L4 on L5. No loss of vertebral body  height or suspicious osseous lesion. Moderate loss of intervertebral  disc height with disc desiccation and degenerative endplate changes at  L1-2, L2-L3, L3-L4, and L4-L5. Mild degenerative changes and loss of  disc height at L5-S1. No STIR hyperintense endplate edema at any  level.     Level by level as follows:      T12-L1: No significant disc herniation. No spinal canal or neural  foraminal narrowing.      L1-L2: Mild circumferential disc bulge with mild endplate  osteophytic  spurring. No spinal canal narrowing. No significant neural foraminal  narrowing.      L2-L3: Circumferential disc bulge with mild endplate osteophytic  spurring. Bilateral facet hypertrophy and ligamentum flavum infolding.  Moderate spinal canal narrowing particularly in an anterior posterior  dimension. Moderate right neural foraminal narrowing. Moderate left  neural foraminal narrowing.      L3-L4: Circumferential disc bulge and endplate osteophytic spurring.  Marked bilateral facet hypertrophy and ligamentum flavum infolding.  Moderate to severe central spinal canal narrowing. Moderate to severe  right neural foraminal narrowing. Moderate left neural foraminal  narrowing.      L4-L5: Previous surgical changes at this level. Mild grade 1  anterolisthesis with uncovering of the disc and bilateral uncinate  spurring. Marked bilateral facet of atrophy and ligamentum flavum  thickening. Severe central spinal canal narrowing. Moderate to severe  right neural foraminal narrowing. Severe left neural foraminal  narrowing.      L5-S1: Mild posterior disc bulge asymmetric in the right subarticular  region with mild uncinate spurring. Mild bilateral facet hypertrophy.  No spinal canal narrowing. Mild right neural foraminal narrowing. No  significant left neural foraminal narrowing.      Paraspinous soft tissues: Unremarkable.                                                                       IMPRESSION:  Marked multilevel degenerative changes throughout the  lumbar spine most present at L2-L3, L3-L4, and L4-L5 levels as  detailed above. Spinal canal narrowings have increased since previous  MR, particularly at the L2-L3 and L3-L4 levels. Neural foraminal  narrowings are also increased since that time.     CAR SHARP MD    Allergies:    No Known Allergies     Vitals:  BP (!) 149/76 (BP Location: Left arm, Cuff Size: Adult Large)   Pulse 75   SpO2 95%     Review of Systems: The patient denies recent fever,  chills, illness, use of antibiotics or anticoagulants. All other 10-point review of systems negative.     Procedure: The procedure and risks were explained, and informed written consent was obtained from the patient. Risks include but are not limited to: infection, bleeding, increased pain, and damage to soft tissue, nerve, muscle, and vasculature structures. After getting informed consent, patient was brought into the procedure suite and was placed in a prone position on the procedure table. A Pause for the Cause was performed. Patient was prepped and draped in sterile fashion.     The 2-3 interspace was identified with use of fluoroscopy in AP view. A 25-gauge, 1.5 inch needle was used to anesthetize the skin and subcutaneous tissue entry site with a total of 2 ml of 1% lidocaine. Under fluoroscopic visualization, a 22-gauge, 4.5 inch Tuohy epidural needle was slowly advanced towards the epidural space a few millimeters left-sided of midline. The latter part of the needle advancement was guided with fluoroscopy in the lateral view. The epidural space was identified using loss of resistance technique. After negative aspiration for heme and cerebrospinal fluid, a total of 1 mL of non-ionic contrast was injected to confirm needle placement with 9 mL of contrast wasted. Epidurogram confirmed spread within the posterior epidural space. 1 ml of 40mg/ml of triamcinolone, 1 ml of 1% lidocaine, and 3 ml of preservative free saline was injected. The needle was removed.  Images were saved to PACS.    The patient tolerated the procedure well, and there was no evidence of procedural complications. No new sensory or motor deficits were noted following the procedure. The patient was stable and able to ambulate on discharge home. Post-procedure instructions were provided.     Pre-procedure pain score: 6/10 in the back, 6/10 in the leg  Post-procedure pain score: 3/10 in the back, 3/10 in the leg    Assessment/Plan: Austen Rodriguez  Malcolm is a 85 year old male s/p lumbar interlaminar epidural steroid injection today for lumbar spondylosis and radiculitis/radiculopathy.     1. Following today's procedure, the patient was advised to contact the [facility] for any of the following:   Fever, chills, or night sweats   New onset of pain, numbness, or weakness   Any questions/concerns regarding the procedure  If unable to contact the Pain Center, the patient was instructed to go to a local Emergency Room for any complications.   2. The patient will receive a follow-up call in 1 week.   3. Follow-up with the referring provider in 2 weeks for post-procedure evaluation.        Nicholas Nuñez DO  [facility]

## 2020-08-10 NOTE — NURSING NOTE
Discharge Information    IV Discontiued Time:  NA    Amount of Fluid Infused:  NA    Discharge Criteria = When patient returns to baseline or as per MD order    Consciousness:  Pt is fully awake    Circulation:  BP +/- 20% of pre-procedure level    Respiration:  Patient is able to breathe deeply    O2 Sat:  Patient is able to maintain O2 Sat >92% on room air    Activity:  Moves 4 extremities on command    Ambulation:  Patient is able to stand and walk or stand and pivot into wheelchair    Dressing:  Clean/dry or No Dressing    Notes:   Discharge instructions and AVS given to patient    Patient meets criteria for discharge?  YES    Admitted to PCU?  No    Responsible adult present to accompany patient home?  Yes    Signature/Title:    Heather Berg RN Care Coordinator  [facility] [place]

## 2020-08-17 ENCOUNTER — TELEPHONE (OUTPATIENT)
Dept: PALLIATIVE MEDICINE | Facility: CLINIC | Age: 85
End: 2020-08-17

## 2020-08-17 NOTE — TELEPHONE ENCOUNTER
Patient had a Lumbar 2-3 interlaminar epidural steroid injection on 8/10/20.  Called patient for an update.      Spoke with pt's wife who reported the following details:  Injection has been helpful.   Told her that the information will be forwarded to his provider.  Also explained that, if a steroid medication was used, it could take up to 14 days to feel the full effect and if pt has any further questions or concerns pt should call the clinic at [phone].

## 2020-09-19 ENCOUNTER — MYC REFILL (OUTPATIENT)
Dept: FAMILY MEDICINE | Facility: CLINIC | Age: 85
End: 2020-09-19

## 2020-09-19 DIAGNOSIS — I10 ESSENTIAL HYPERTENSION, BENIGN: ICD-10-CM

## 2020-09-19 DIAGNOSIS — E78.5 HYPERLIPIDEMIA LDL GOAL <70: ICD-10-CM

## 2020-09-21 RX ORDER — LISINOPRIL 20 MG/1
20 TABLET ORAL DAILY
Qty: 90 TABLET | Refills: 0 | Status: SHIPPED | OUTPATIENT
Start: 2020-09-21 | End: 2020-10-10

## 2020-09-21 RX ORDER — PRAVASTATIN SODIUM 20 MG
20 TABLET ORAL DAILY
Qty: 90 TABLET | Refills: 0 | OUTPATIENT
Start: 2020-09-21

## 2020-09-29 ENCOUNTER — MYC MEDICAL ADVICE (OUTPATIENT)
Dept: PALLIATIVE MEDICINE | Facility: CLINIC | Age: 85
End: 2020-09-29

## 2020-09-29 NOTE — TELEPHONE ENCOUNTER
Will leave encounter open for patient response/chart review by nursing.     Moni Moya,     I apologize but I am unable to locate any sort of appointment request and I am not sure that this is an option for our clinic.  I am not sure what [employer] would have allowed you to click on. You would need an order for an injection in order to schedule one as well.  It looks like you had an injection 08/10/20 with Dr. Nuñez and he wanted you to have a follow up appointment with him 2-3 weeks after the injection. Injections are generally only scheduled every 3 months as to space out the amount of steroid that is administered into our body. I would recommend that you call to make an appointment to discuss with Dr Nuñez. At this time, majority of our appointments are virtual.     [phone]    Angela FLEMING, RN Care Coordinator  [facility]  Pain Management  --------below from pt-----------  Hello:  I submitted an [facility] on [facility] but have not   received any indication about the request. Please advise so I can get some relief for this low back pain.  Austen (Griffin) Fowler  [phone] home  [phone] cell (voice or text msg)    Angela FLEMING, RN Care Coordinator  [facility]  Pain Management

## 2020-10-10 ENCOUNTER — MYC REFILL (OUTPATIENT)
Dept: FAMILY MEDICINE | Facility: CLINIC | Age: 85
End: 2020-10-10

## 2020-10-10 DIAGNOSIS — E78.5 HYPERLIPIDEMIA LDL GOAL <70: ICD-10-CM

## 2020-10-10 DIAGNOSIS — I10 ESSENTIAL HYPERTENSION, BENIGN: ICD-10-CM

## 2020-10-12 RX ORDER — PRAVASTATIN SODIUM 20 MG
20 TABLET ORAL DAILY
Qty: 90 TABLET | Refills: 0 | Status: SHIPPED | OUTPATIENT
Start: 2020-10-12 | End: 2020-11-30

## 2020-10-12 RX ORDER — LISINOPRIL 20 MG/1
20 TABLET ORAL DAILY
Qty: 90 TABLET | Refills: 0 | Status: SHIPPED | OUTPATIENT
Start: 2020-10-12 | End: 2020-12-11

## 2020-11-04 ENCOUNTER — MYC MEDICAL ADVICE (OUTPATIENT)
Dept: PHARMACY | Facility: CLINIC | Age: 85
End: 2020-11-04

## 2020-11-16 ENCOUNTER — VIRTUAL VISIT (OUTPATIENT)
Dept: PHARMACY | Facility: CLINIC | Age: 85
End: 2020-11-16

## 2020-11-16 DIAGNOSIS — Z78.9 TAKES DIETARY SUPPLEMENTS: ICD-10-CM

## 2020-11-16 DIAGNOSIS — K21.00 GASTROESOPHAGEAL REFLUX DISEASE WITH ESOPHAGITIS WITHOUT HEMORRHAGE: ICD-10-CM

## 2020-11-16 DIAGNOSIS — E11.59 TYPE 2 DIABETES MELLITUS WITH VASCULAR DISEASE (H): Primary | ICD-10-CM

## 2020-11-16 DIAGNOSIS — R39.198 SLOWING OF URINARY STREAM: ICD-10-CM

## 2020-11-16 DIAGNOSIS — E78.5 HYPERLIPIDEMIA LDL GOAL <70: ICD-10-CM

## 2020-11-16 DIAGNOSIS — I25.10 CORONARY ARTERY DISEASE INVOLVING NATIVE CORONARY ARTERY OF NATIVE HEART WITHOUT ANGINA PECTORIS: ICD-10-CM

## 2020-11-16 DIAGNOSIS — I10 ESSENTIAL HYPERTENSION, BENIGN: ICD-10-CM

## 2020-11-16 DIAGNOSIS — M54.2 CERVICALGIA: ICD-10-CM

## 2020-11-16 PROCEDURE — 99607 MTMS BY PHARM ADDL 15 MIN: CPT | Mod: TEL | Performed by: PHARMACIST

## 2020-11-16 PROCEDURE — 99606 MTMS BY PHARM EST 15 MIN: CPT | Mod: TEL | Performed by: PHARMACIST

## 2020-11-16 NOTE — PROGRESS NOTES
MTM ENCOUNTER  SUBJECTIVE/OBJECTIVE:                           Austen Fowler is a 85 year old male called for a follow-up visit. He was referred to me from Dr. Hodge.  Today's visit is a follow-up MTM visit from 5/6/20.     Reason for visit: Routine f/up.  Pt has been concerned about metformin recalls.    Tobacco: He reports that he quit smoking about 28 years ago. His smoking use included cigarettes, cigars, and pipe. He started smoking about 66 years ago. He has a 60.00 pack-year smoking history. He has never used smokeless tobacco.  Alcohol: Less than 1 beverages / week    Medication Adherence/Access: no issues reported    Type 2 Diabetes:  Pt currently taking  metformin XR 2000 mg daily. Lactic acid was high earlier this year, which was attributed to metformin use. Pt has since resumed taking Metformin under the direction of Dr. Hodge. Pt is not experiencing side effects.  He's had some concern about whether or not his metformin is impacted by the recent recall - he's checked and doesn't believe his supply is affected.  SMBG: one time(s) daily. Ranges (patient reported): Usually around 150mg/dL, occasionally spikes to 170-180mg/dL (which he reports is quite rare)  Symptoms of low blood sugar? none, Frequency of lows- never  Symptoms of high blood sugar? none  Eye exam: due - was due over the summer but had postponed due to COVID  Foot exam: due  Aspirin: Taking 81mg once daily and he denies s/s bruising/bleeding  Statin: Yes: pravastatin   ACEi/ARB: Yes: lisinopril.   Urine Albumin:    Lab Results   Component Value Date    UMALCR 7.33 04/17/2019     Lab Results   Component Value Date    A1C 5.8 01/20/2020    A1C 6.5 10/08/2019    A1C 6.1 06/19/2019    A1C 6.0 04/17/2019    A1C 6.0 09/24/2018     Hypertension/CAD: Current medications include amlodipine 5mg BID and lisinopril 20mg daily. His propranolol was stopped due to diarrhea and stomach upset.  He does monitor his BP at home and reports it's been very  stable.  He has not checked BP yet today.  He denies any chest pains or side effects of therapy.  Last ECHO on 1/12/20 estimated EF to be 60-65%.  Pt follows with cardiology.    BP Readings from Last 3 Encounters:   08/10/20 (!) 149/76   06/02/20 126/74   05/18/20 (!) 156/79      Hyperlipidemia: Current therapy includes pravastatin 20 mg once daily.  Pt reports no significant myalgias or other side effects.    Recent Labs   Lab Test 12/30/19  0929 10/08/19  0900 11/12/15  0807 11/12/15  0807 07/28/15  0855   CHOL 104 111   < > 102 108   HDL 34* 38*   < > 28* 31*   LDL 48 57   < > 45* 51*   TRIG 108 82   < > 144 132   CHOLHDLRATIO  --   --   --  3.6 3.5    < > = values in this interval not displayed.       Back Pain:  He's been working with pain management on this, he follows  with Dr. Nuñez. He received a Lumbar triamcinolone injections in May and in August.  He says they were very helpful, but he reports pain has been increased recently so he'd like to get another injection soon.  He's been prescribed Voltaren gel, which he's using regularly on his neck.  He has also found IcyHot gel to be helpful (he alternates between Voltaren gel and IcyHot).  He continues taking APAP 1000mg BID (might take a 3rd dose during the day if needed) and gabapentin 900mg BID (although prescribed for TID).  He reports this regimen is working well for him and he denies side effects.      GERD: Current medications include: omeprazole 20mg once daily.  He occasionally takes Tums or Tiffanie-[place].  Pt c/o no current symptoms. Patient feels that current regimen is effective.      BPH:   Current medications include tamsulosin 0.4 mg daily and finasteride 5 mg daily.  He reports that the tamsulosin helps provide relief, his urination is much more regular. No side effects noted.       Supplements:  Current medications include cholecalciferol 2000 units daily and vit B12 1000 mcg daily (pt actually taking every other day). He was started on B12  as a result of low lab levels, likely due to chronic metformin use and age. He was having stomach upset with daily administration of vitamin B12, and this has improved with every other day dosing.  He finds this regimen effective and he denies additional side effects.        Lab Results   Component Value Date     B12 188 (L) 01/20/2020     Vitamin D Deficiency Screening Results:  No results found for: VITDT     ASSESSMENT:                            Medication Adherence: No issues identified    Type 2 Diabetes: Patient is meeting A1c goal of < 8%. Self monitoring of blood glucose is at goal of fasting  mg/dL (on the upper end of this goal).  Pt is due for A1c (order is already in his chart), microalbumin, and BMP.     Hypertension/CAD: Last BP in Epic was high, will have pt check BP to ensure it remains at goal.    Hyperlipidemia: Stable.  No change in current medication necessary at this time. Further increase in statin dose not warranted as LDL is well controlled (close to or less than 40mg/dL).     Back Pain: Plan in place.    GERD: Current treatment is effective. Would prefer he use Tums vs Tiffanie-[place] if something is needed as some varieties have ASA, and others have significant amounts of sodium.    BPH: Stable.    Supplements:  Due for checking Vitamin B12 level (order already in his chart).    PLAN:                            1.  Pt to call clinic to schedule labs - A1c and Vitamin B12 are already ordered by PCP.  MTM will talk with PCP about adding BMP, lipids and microalbumin.  2.  Pt will check BP after our visit and will send via vitalclip.  3.  Pt to schedule annual eye exam when he feels comfortable doing so with COVID-19  4.  Recommended using Tums vs Tiffanie-[place] if needed.    I spent 30 minutes with this patient today. I offer these suggestions for consideration by Dr. Hodge. A copy of the visit note was provided to the patient's primary care provider.    Will follow up pending lab  results.    The patient was sent via [employer] a summary of these recommendations.     Marielos Morocho PharmD, BCACP  Medication Therapy Management Provider  Pager: [phone]     Azul Tinajero PharmD  Medication Therapy Management Resident  Pager: [phone]     Patient consented to a telehealth visit: yes  Telemedicine Visit Details  Type of service:  Telephone visit  Start Time: 8:33 AM  End Time: 9:03 AM  Originating Location (patient location): Home  Distant Location (provider location):  [facility]  Mode of Communication:  Telephone

## 2020-11-16 NOTE — Clinical Note
Moni Hodge,     I spoke with Griffin today, and I have some thoughts I would like to run by you. I see you already have A1c and vitamin B12 labs ordered. In addition to those, it would recommend to check his microalbumin, BMP, and fasting lipid panel.     Does that plan sound ok to you? If so, are you ok with me putting in the orders?     Thank you, I appreciate collaborating with you to care for Griffin.     Azul Tinajero, PharmD  [facility] Pharmacy Resident

## 2020-11-16 NOTE — PATIENT INSTRUCTIONS
Recommendations from today's MTM visit:                                                      1.  Azul will reach out to Dr. Hodge about the plan for your labs and update you over [employer]. I am recommending A1c, urine albumin, basic metabolic panel, cholesterol, and vitamin B12 labs.   2.  Check your blood pressure and send us the numbers on [employer].  3.  Call to schedule an eye exam  4.  Avoid using Tiffanie-[place] because it has extra aspirin in it, and it also has a lot of sodium which can increase blood pressure.    It was great to speak with you today.  I value your experience and would be very thankful for your time with providing feedback on our clinic survey. You may receive a survey via email or text message in the next few days.     Next MTM visit: Azul will follow up with you over [employer].     To schedule another MTM appointment, please call the clinic directly or you may call the MTM scheduling line at [phone] or toll-free at [phone].     My Clinical Pharmacist's contact information:                                                      It was a pleasure talking with you today!  Please feel free to contact me with any questions or concerns you have.      Marielos Morocho PharmD, BCACP  Medication Therapy Management Provider  Pager: [phone]     Azul Tinajero PharmD  Medication Therapy Management Resident  Pager: [phone]

## 2020-11-30 DIAGNOSIS — E78.5 HYPERLIPIDEMIA LDL GOAL <70: ICD-10-CM

## 2020-11-30 RX ORDER — PRAVASTATIN SODIUM 20 MG
TABLET ORAL
Qty: 90 TABLET | Refills: 0 | Status: SHIPPED | OUTPATIENT
Start: 2020-11-30 | End: 2021-04-16

## 2020-11-30 NOTE — TELEPHONE ENCOUNTER
Refill approved through [facility] protocol.  Carin BENITEZ RN  [facility]  [phone]

## 2020-12-02 NOTE — PROGRESS NOTES
Orders for BMP, lipids and microalbumin placed per approval from Dr. Hodge. Patient to have these done with the A1c and B12 previously ordered by Dr. Hodge. Will reach out to patient over [employer] to update him.     Azul Tinajero, PharmD  [facility] Pharmacy Resident

## 2020-12-04 ENCOUNTER — DOCUMENTATION ONLY (OUTPATIENT)
Dept: FAMILY MEDICINE | Facility: CLINIC | Age: 85
End: 2020-12-04

## 2020-12-07 DIAGNOSIS — E11.59 TYPE 2 DIABETES MELLITUS WITH VASCULAR DISEASE (H): ICD-10-CM

## 2020-12-07 DIAGNOSIS — E53.8 VITAMIN B12 DEFICIENCY (NON ANEMIC): ICD-10-CM

## 2020-12-07 DIAGNOSIS — E78.5 HYPERLIPIDEMIA LDL GOAL <70: ICD-10-CM

## 2020-12-07 DIAGNOSIS — I10 BENIGN ESSENTIAL HYPERTENSION: ICD-10-CM

## 2020-12-07 DIAGNOSIS — I10 ESSENTIAL HYPERTENSION, BENIGN: ICD-10-CM

## 2020-12-07 DIAGNOSIS — I25.10 CORONARY ARTERY DISEASE INVOLVING NATIVE CORONARY ARTERY OF NATIVE HEART WITHOUT ANGINA PECTORIS: ICD-10-CM

## 2020-12-07 LAB
HBA1C MFR BLD: 6.3 % (ref 0–5.6)
VIT B12 SERPL-MCNC: 490 PG/ML (ref 193–986)

## 2020-12-07 PROCEDURE — 36415 COLL VENOUS BLD VENIPUNCTURE: CPT | Performed by: INTERNAL MEDICINE

## 2020-12-07 PROCEDURE — 80061 LIPID PANEL: CPT | Performed by: INTERNAL MEDICINE

## 2020-12-07 PROCEDURE — 82043 UR ALBUMIN QUANTITATIVE: CPT | Performed by: INTERNAL MEDICINE

## 2020-12-07 PROCEDURE — 83036 HEMOGLOBIN GLYCOSYLATED A1C: CPT | Performed by: INTERNAL MEDICINE

## 2020-12-07 PROCEDURE — 82607 VITAMIN B-12: CPT | Performed by: INTERNAL MEDICINE

## 2020-12-07 PROCEDURE — 80048 BASIC METABOLIC PNL TOTAL CA: CPT | Performed by: INTERNAL MEDICINE

## 2020-12-07 PROCEDURE — 84460 ALANINE AMINO (ALT) (SGPT): CPT | Performed by: INTERNAL MEDICINE

## 2020-12-07 NOTE — PROGRESS NOTES
"Spoke to patient.  He has NO symptoms or concerns. He just though it would be \"good to get a Covid test since he's not had one\".    Advised various \"routine Covid testing options\". Routine testing is not done at the clinic lab appointments.  He appreciates the call.    Annmarie Andrade RN on 12/7/2020 at 9:09 AM    "

## 2020-12-08 LAB
ALT SERPL W P-5'-P-CCNC: 23 U/L (ref 0–70)
ANION GAP SERPL CALCULATED.3IONS-SCNC: 7 MMOL/L (ref 3–14)
BUN SERPL-MCNC: 13 MG/DL (ref 7–30)
CALCIUM SERPL-MCNC: 9.4 MG/DL (ref 8.5–10.1)
CHLORIDE SERPL-SCNC: 106 MMOL/L (ref 94–109)
CHOLEST SERPL-MCNC: 137 MG/DL
CO2 SERPL-SCNC: 27 MMOL/L (ref 20–32)
CREAT SERPL-MCNC: 0.64 MG/DL (ref 0.66–1.25)
CREAT UR-MCNC: 33 MG/DL
GFR SERPL CREATININE-BSD FRML MDRD: 89 ML/MIN/{1.73_M2}
GLUCOSE SERPL-MCNC: 114 MG/DL (ref 70–99)
HDLC SERPL-MCNC: 38 MG/DL
LDLC SERPL CALC-MCNC: 54 MG/DL
MICROALBUMIN UR-MCNC: <5 MG/L
MICROALBUMIN/CREAT UR: NORMAL MG/G CR (ref 0–17)
NONHDLC SERPL-MCNC: 99 MG/DL
POTASSIUM SERPL-SCNC: 4.2 MMOL/L (ref 3.4–5.3)
SODIUM SERPL-SCNC: 140 MMOL/L (ref 133–144)
TRIGL SERPL-MCNC: 223 MG/DL

## 2020-12-10 ENCOUNTER — TELEPHONE (OUTPATIENT)
Dept: CARDIOLOGY | Facility: CLINIC | Age: 85
End: 2020-12-10

## 2020-12-10 DIAGNOSIS — I25.10 CORONARY ARTERY DISEASE INVOLVING NATIVE CORONARY ARTERY OF NATIVE HEART WITHOUT ANGINA PECTORIS: Primary | ICD-10-CM

## 2020-12-10 DIAGNOSIS — I10 ESSENTIAL HYPERTENSION, BENIGN: ICD-10-CM

## 2020-12-10 DIAGNOSIS — E78.5 HYPERLIPIDEMIA LDL GOAL <70: ICD-10-CM

## 2020-12-10 DIAGNOSIS — E78.6 HDL DEFICIENCY: ICD-10-CM

## 2020-12-10 NOTE — TELEPHONE ENCOUNTER
Reviewed labs showing   Results for ADITHYA RAI (MRN [id number]) as of 12/10/2020 13:12   Ref. Range 12/7/2020 15:10   Sodium Latest Ref Range: 133 - 144 mmol/L 140   Potassium Latest Ref Range: 3.4 - 5.3 mmol/L 4.2   Chloride Latest Ref Range: 94 - 109 mmol/L 106   Carbon Dioxide Latest Ref Range: 20 - 32 mmol/L 27   Urea Nitrogen Latest Ref Range: 7 - 30 mg/dL 13   Creatinine Latest Ref Range: 0.66 - 1.25 mg/dL 0.64 (L)   GFR Estimate Latest Ref Range: >60 mL/min/1.73_m2 89   GFR Estimate If Black Latest Ref Range: >60 mL/min/1.73_m2 >90   Calcium Latest Ref Range: 8.5 - 10.1 mg/dL 9.4   Anion Gap Latest Ref Range: 3 - 14 mmol/L 7   ALT Latest Ref Range: 0 - 70 U/L 23   Hemoglobin A1C Latest Ref Range: 0 - 5.6 % 6.3 (H)   Albumin Urine mg/g Cr Latest Ref Range: 0 - 17 mg/g Cr Unable to calculate due to low value   Albumin Urine mg/L Latest Units: mg/L <5   Cholesterol Latest Ref Range: <200 mg/dL 137   Creatinine Urine Latest Units: mg/dL 33   HDL Cholesterol Latest Ref Range: >39 mg/dL 38 (L)   LDL Cholesterol Calculated Latest Ref Range: <100 mg/dL 54   Non HDL Cholesterol Latest Ref Range: <130 mg/dL 99   Triglycerides Latest Ref Range: <150 mg/dL 223 (H)   Vitamin B12   Latest Ref Range: 193 - 986 pg/mL 490   Glucose Latest Ref Range: 70 - 99 mg/dL 114 (H)     Current Outpatient Medications   Medication     acetaminophen (ACETAMIN) 500 MG tablet     amLODIPine (NORVASC) 5 MG tablet     aspirin 81 MG EC tablet     blood glucose (ONETOUCH ULTRA) test strip     blood glucose monitoring (ONE TOUCH ULTRASOFT) lancets     Cholecalciferol (VITAMIN D) 2000 UNIT tablet     diclofenac (VOLTAREN) 1 % topical gel     finasteride (PROSCAR) 5 MG tablet     gabapentin (NEURONTIN) 300 MG capsule     lisinopril (ZESTRIL) 20 MG tablet     Menthol, Topical Analgesic, (ICY HOT BACK EX)     metFORMIN (GLUCOPHAGE-XR) 500 MG 24 hr tablet     omeprazole (PRILOSEC) 20 MG DR capsule     ORDER FOR DME     pravastatin (PRAVACHOL)  20 MG tablet     tamsulosin (FLOMAX) 0.4 MG capsule     vitamin B-12 (CYANOCOBALAMIN) 1000 MCG tablet     No current facility-administered medications for this visit.         Called pt with results, he is taking pravastatin 20 mg daily without complications. He follows a low fat diet & watches the carbs in his diet. Discussed his HgbA1C in up a little which might be contributing to the elevated trigs. He will watch the carbs more closely & has been following with Dr. Hodge re: increased HgbA1c. Will message Dr. Hernandez to review. Salas GALE

## 2020-12-11 RX ORDER — LISINOPRIL 20 MG/1
TABLET ORAL
Qty: 90 TABLET | Refills: 1 | Status: SHIPPED | OUTPATIENT
Start: 2020-12-11 | End: 2021-04-16

## 2020-12-11 NOTE — TELEPHONE ENCOUNTER
Routing refill request to provider for review/approval because:  Last BP high   BP Readings from Last 3 Encounters:   08/10/20 (!) 149/76   06/02/20 126/74   05/18/20 (!) 156/79       Sierra ARGUELLO RN    
full weight-bearing

## 2020-12-11 NOTE — TELEPHONE ENCOUNTER
Called pt with recommendations from Dr. Hernandez. Pt verbalized understanding. Order placed for recheck in 3 months & will message scheduling to call pt to arrange. Salas GALE

## 2020-12-23 ENCOUNTER — MYC MEDICAL ADVICE (OUTPATIENT)
Dept: FAMILY MEDICINE | Facility: CLINIC | Age: 85
End: 2020-12-23

## 2020-12-23 NOTE — TELEPHONE ENCOUNTER
Panel Management Review      Patient has the following on his problem list:     Hypertension   Last three blood pressure readings:  BP Readings from Last 3 Encounters:   08/10/20 (!) 149/76   06/02/20 126/74   05/18/20 (!) 156/79     Blood pressure: FAILED    HTN Guidelines:  Less than 140/90      Composite cancer screening  Chart review shows that this patient is due/due soon for the following BP Check   Summary:    Patient is due/failing the following:   BP CHECK    Action needed:   BP Check     Type of outreach:    Sent [employer] message.    Questions for provider review:    None                                                                                                                                    Candi Roy CMA

## 2020-12-28 VITALS — SYSTOLIC BLOOD PRESSURE: 125 MMHG | DIASTOLIC BLOOD PRESSURE: 74 MMHG

## 2021-01-10 ENCOUNTER — HEALTH MAINTENANCE LETTER (OUTPATIENT)
Age: 86
End: 2021-01-10

## 2021-03-02 DIAGNOSIS — I25.10 CORONARY ARTERY DISEASE INVOLVING NATIVE CORONARY ARTERY OF NATIVE HEART WITHOUT ANGINA PECTORIS: ICD-10-CM

## 2021-03-02 DIAGNOSIS — E78.5 HYPERLIPIDEMIA LDL GOAL <70: ICD-10-CM

## 2021-03-02 DIAGNOSIS — E78.6 HDL DEFICIENCY: ICD-10-CM

## 2021-03-02 LAB
ALT SERPL W P-5'-P-CCNC: 23 U/L (ref 0–70)
CHOLEST SERPL-MCNC: 126 MG/DL
HDLC SERPL-MCNC: 38 MG/DL
LDLC SERPL CALC-MCNC: 42 MG/DL
NONHDLC SERPL-MCNC: 88 MG/DL
TRIGL SERPL-MCNC: 229 MG/DL

## 2021-03-02 PROCEDURE — 36415 COLL VENOUS BLD VENIPUNCTURE: CPT | Performed by: INTERNAL MEDICINE

## 2021-03-02 PROCEDURE — 84460 ALANINE AMINO (ALT) (SGPT): CPT | Performed by: INTERNAL MEDICINE

## 2021-03-02 PROCEDURE — 80061 LIPID PANEL: CPT | Performed by: INTERNAL MEDICINE

## 2021-03-03 ENCOUNTER — OFFICE VISIT (OUTPATIENT)
Dept: CARDIOLOGY | Facility: CLINIC | Age: 86
End: 2021-03-03
Payer: COMMERCIAL

## 2021-03-03 VITALS
WEIGHT: 283 LBS | SYSTOLIC BLOOD PRESSURE: 128 MMHG | DIASTOLIC BLOOD PRESSURE: 56 MMHG | BODY MASS INDEX: 35.37 KG/M2 | HEART RATE: 60 BPM

## 2021-03-03 DIAGNOSIS — I77.810 AORTIC ROOT DILATATION (H): ICD-10-CM

## 2021-03-03 DIAGNOSIS — I25.10 CORONARY ARTERY DISEASE INVOLVING NATIVE CORONARY ARTERY OF NATIVE HEART WITHOUT ANGINA PECTORIS: Primary | ICD-10-CM

## 2021-03-03 DIAGNOSIS — I10 BENIGN ESSENTIAL HYPERTENSION: ICD-10-CM

## 2021-03-03 DIAGNOSIS — Z95.1 S/P CABG (CORONARY ARTERY BYPASS GRAFT): ICD-10-CM

## 2021-03-03 DIAGNOSIS — E78.6 HDL DEFICIENCY: ICD-10-CM

## 2021-03-03 PROCEDURE — 93000 ELECTROCARDIOGRAM COMPLETE: CPT | Performed by: INTERNAL MEDICINE

## 2021-03-03 PROCEDURE — 99214 OFFICE O/P EST MOD 30 MIN: CPT | Performed by: INTERNAL MEDICINE

## 2021-03-03 NOTE — PROGRESS NOTES
HPI and Plan:   See dictation    Orders Placed This Encounter   Procedures     Lipid Profile     ALT     Follow-Up with Cardiologist     EKG 12-lead complete w/read - Clinics (performed today)     Echocardiogram Complete       No orders of the defined types were placed in this encounter.      There are no discontinued medications.      Encounter Diagnoses   Name Primary?     Coronary artery disease involving native coronary artery of native heart without angina pectoris Yes     Aortic root dilatation (H)      S/P CABG (coronary artery bypass graft)      HDL deficiency      Benign essential hypertension        CURRENT MEDICATIONS:  Current Outpatient Medications   Medication Sig Dispense Refill     acetaminophen (ACETAMIN) 500 MG tablet Take 1,000 mg by mouth 2 times daily        amLODIPine (NORVASC) 5 MG tablet TAKE 1 TABLET BY MOUTH TWO  TIMES DAILY 180 tablet 3     aspirin 81 MG EC tablet Take 81 mg by mouth daily        Cholecalciferol (VITAMIN D) 2000 UNIT tablet Take 2,000 Units by mouth daily. 90 tablet 3     finasteride (PROSCAR) 5 MG tablet TAKE 1 TABLET BY MOUTH  DAILY 90 tablet 3     gabapentin (NEURONTIN) 300 MG capsule TAKE 3 CAPSULES BY MOUTH 3  TIMES DAILY (Patient taking differently: Pt takes 3 capsules twice daily.) 810 capsule 2     lisinopril (ZESTRIL) 20 MG tablet TAKE 1 TABLET BY MOUTH  DAILY 90 tablet 1     metFORMIN (GLUCOPHAGE-XR) 500 MG 24 hr tablet TAKE 2 TABLETS BY MOUTH TWO TIMES DAILY WITH MEALS 360 tablet 3     omeprazole (PRILOSEC) 20 MG DR capsule TAKE 1 CAPSULE BY MOUTH  DAILY 90 capsule 2     pravastatin (PRAVACHOL) 20 MG tablet TAKE 1 TABLET BY MOUTH  DAILY 90 tablet 0     tamsulosin (FLOMAX) 0.4 MG capsule TAKE 1 CAPSULE BY MOUTH  DAILY 90 capsule 2     vitamin B-12 (CYANOCOBALAMIN) 1000 MCG tablet Take 1,000 mcg by mouth daily Pt taking every other day.       blood glucose (ONETOUCH ULTRA) test strip USE TO TEST BLOOD SUGAR 2  TIMES DAILY OR AS DIRECTED. 200 strip 1     blood  glucose monitoring (ONE TOUCH ULTRASOFT) lancets USE TO TEST BLOOD SUGAR 2  TIMES DAILY OR AS DIRECTED. 200 each 3     diclofenac (VOLTAREN) 1 % topical gel Place 4 g onto the skin 4 times daily as needed for moderate pain (Apply to back to neck.)       Menthol, Topical Analgesic, (ICY HOT BACK EX) Externally apply topically daily as needed (neck pain)        ORDER FOR DME Uses Cpap machine for sleep apnea         ALLERGIES   No Known Allergies    PAST MEDICAL HISTORY:  Past Medical History:   Diagnosis Date     Anxiety state, unspecified      Aortic root dilatation (H)      Arthritis      Ascending aorta dilatation (H)      Basal cell cancer     s/p Mohs nose and bridge of nose on R.     Bunion      CAD (coronary artery disease)     CABG 1992: LIMA to LAD, SANDI to RCA, SVG to OM1 and OM2     Contact dermatitis and other eczema, due to unspecified cause     eczema - has light treatments with Dr. Rivera     Disorders of bursae and tendons in shoulder region, unspecified      Esophageal reflux      Essential hypertension, benign      Gallbladder disease      GERD (gastroesophageal reflux disease)      Heart attack (H)      Hyperlipidemia LDL goal <70      Left ventricular diastolic dysfunction      Low HDL (under 40) 3/28/2014     Nasal/sinus dis NEC     s/p surgery in 1995     Obesity      Osteoarthrosis, unspecified whether generalized or localized, shoulder region      Other hammer toe (acquired)      Sleep apnea     USES CPAP     Spinal stenosis, unspecified region other than cervical     MRI done 2006     Type 2 diabetes, HbA1c goal < 7% (H) 3/12/2015     Urge incontinence        PAST SURGICAL HISTORY:  Past Surgical History:   Procedure Laterality Date     ABDOMEN SURGERY  1994    gall bladder     BIOPSY      arms     C CABG, ARTERY-VEIN, FOUR  11/1992    CABG - LIMA to left anterior descending and saphenous vein bypass graft to the first and second obtuse marginal branch of the circumflex and the right  mammary to the right coronary artery     CHOLECYSTECTOMY  6/1994     COLONOSCOPY N/A 4/11/2017    Procedure: COMBINED COLONOSCOPY, SINGLE OR MULTIPLE BIOPSY/POLYPECTOMY BY BIOPSY;  Surgeon: Bladimir Garner MD;  Location:  GI     CV LEFT HEART CATH  5-92, 6-92    Angioplasty     ENT SURGERY  5/1995    sinus surgery     ESOPHAGOSCOPY, GASTROSCOPY, DUODENOSCOPY (EGD), DILATATION, COMBINED  7/17/2014    Procedure: COMBINED ESOPHAGOSCOPY, GASTROSCOPY, DUODENOSCOPY (EGD), DILATATION;  Surgeon: Bladimir Garner MD;  Location:  GI     EYE SURGERY      cataracts removed both eyes     HC COLONOSCOPY THRU STOMA W BIOPSY/CAUTERY TUMOR/POLYP/LESION  5/2007     INJECT EPIDURAL LUMBAR       LAMINECTOMY LUMBAR TWO LEVELS N/A 4/29/2015    Procedure: LAMINECTOMY LUMBAR TWO LEVELS;  Surgeon: Bladimir Keenan MD;  Location:  OR     THORACIC SURGERY  11/1992    bypass     ZZC NONSPECIFIC PROCEDURE  6-94    Gail lap     ZZC NONSPECIFIC PROCEDURE  1995    sinus surgery     ZZC NONSPECIFIC PROCEDURE      bilateral cataract surgery       FAMILY HISTORY:  Family History   Problem Relation Age of Onset     Cancer Brother         MELANOMA     Diabetes Mother         AODM     Thyroid Disease Mother      Diabetes Father         AODM     Myocardial Infarction Father      Cerebrovascular Disease Brother      Parkinsonism Sister      Family History Negative Son      Family History Negative Son      Family History Negative Son      Family History Negative Daughter      Coronary Artery Disease Brother         dod 2019     Cerebrovascular Disease Brother         dod 2019     Other Cancer Brother         dod 2000/melanoma       SOCIAL HISTORY:  Social History     Socioeconomic History     Marital status:      Spouse name: Anastasia Caballero     Number of children: 4     Years of education: 14     Highest education level: None   Occupational History     Occupation: retired     Comment:    Social Needs     Financial resource  strain: None     Food insecurity     Worry: None     Inability: None     Transportation needs     Medical: None     Non-medical: None   Tobacco Use     Smoking status: Former Smoker     Packs/day: 2.00     Years: 30.00     Pack years: 60.00     Types: Cigarettes, Cigars, Pipe     Start date:      Quit date: 3/1/1992     Years since quittin.0     Smokeless tobacco: Never Used     Tobacco comment: quit in    Substance and Sexual Activity     Alcohol use: Yes     Alcohol/week: 0.0 standard drinks     Comment: minimal less than 1/week     Drug use: No     Sexual activity: Not Currently     Partners: Female     Birth control/protection: Post-menopausal   Lifestyle     Physical activity     Days per week: None     Minutes per session: None     Stress: None   Relationships     Social connections     Talks on phone: None     Gets together: None     Attends Anabaptism service: None     Active member of club or organization: None     Attends meetings of clubs or organizations: None     Relationship status: None     Intimate partner violence     Fear of current or ex partner: None     Emotionally abused: None     Physically abused: None     Forced sexual activity: None   Other Topics Concern      Service Yes     Comment: [employer], served in [employer]     Blood Transfusions Yes     Comment: Unsure if he had one with heart surgery     Caffeine Concern Yes     Comment: occ cofee     Occupational Exposure No     Hobby Hazards No     Sleep Concern Yes     Comment: CPAP     Stress Concern No     Weight Concern Not Asked     Special Diet No     Back Care Not Asked     Exercise Yes     Comment: [employer] 3 times a week, biking walking.      Bike Helmet Yes     Seat Belt Yes     Self-Exams Yes     Comment: does HIRAL about once a month.     Parent/sibling w/ CABG, MI or angioplasty before 65F 55M? No   Social History Narrative        4 kids       Review of Systems:  Skin:  Positive for bruising     Eyes:  Positive  for glasses    ENT:  Positive for hearing loss    Respiratory:  Positive for CPAP;sleep apnea;dyspnea on exertion     Cardiovascular:  Negative Positive for;edema    Gastroenterology: Positive for heartburn(occastional)    Genitourinary:  not assessed prostate problem    Musculoskeletal:  Positive for back pain;arthritis    Neurologic:  Positive for headaches(related to neck and shoulders)    Psychiatric:  Positive for excessive stress    Heme/Lymph/Imm:  Negative weight loss;easy bruising    Endocrine:  Positive for diabetes (borderline diabetes)    Physical Exam:  Vitals: /56   Pulse 60   Wt 128.4 kg (283 lb)   BMI 35.37 kg/m      Constitutional:  cooperative, alert and oriented, well developed, well nourished, in no acute distress obese      Skin:  warm and dry to the touch, no apparent skin lesions or masses noted          Head:  normocephalic        Eyes:  pupils equal and round        Lymph:No Cervical lymphadenopathy present     ENT:  no pallor or cyanosis, dentition good        Neck:  carotid pulses are full and equal bilaterally        Respiratory:  normal breath sounds, clear to auscultation, normal A-P diameter, normal symmetry, normal respiratory excursion, no use of accessory muscles         Cardiac: regular rhythm;normal S1 and S2   S4   systolic murmur;LUSB;grade 1        pulses full and equal                                        GI:    obese      Extremities and Muscular Skeletal:  no deformities, clubbing, cyanosis, erythema observed;no edema              Neurological:  no gross motor deficits;affect appropriate        Psych:  Alert and Oriented x 3        CC  No referring provider defined for this encounter.

## 2021-03-03 NOTE — PROGRESS NOTES
Service Date: 03/03/2021      REFERRING PHYSICIAN:  Hamzah Hodge MD      HISTORY OF PRESENT ILLNESS:  It is my pleasure to see your patient, Austen Fowler, who is a very pleasant 85-year-old patient with a past history of coronary artery bypass grafting in 1991 when he had a LIMA to the LAD, a saphenous vein graft to both the first and second obtuse marginal arteries and a right internal mammary artery graft to the right coronary artery.  He has a history of a mildly dilated aortic root and a borderline dilated ascending aorta.  He has mixed hyperlipidemia with hypertriglyceridemia and HDL deficiency and has a history of essential hypertension also.  This patient has type 2 diabetes mellitus related to morbid obesity.      Since I last saw the patient, he has been feeling well.  He has no chest pains, no chest pressure and no unusual shortness of breath.  His blood pressure initially was raised when he came in at 156/77, but I repeated his blood pressure at the end of our visit with a large cuff, and his blood pressure was normal at 128/56.  His lipid profile, which was drawn yesterday, showed that his LDL was normal at 42 and well within secondary prevention guidelines.  His HDL is borderline low at 38 and his triglycerides are borderline raised at 229.  His liver function tests are normal at 23.      IMPRESSION:   1.  Coronary artery disease and status post coronary artery bypass grafting.  The patient is asymptomatic with respect to coronary artery disease with no symptoms suggestive of angina pectoris.   2.  Mixed hyperlipidemia. This is most likely due to diabetes mellitus and severe obesity with the patient weighing 283 pounds.  In fact, he has gained 21 pounds in weight from this time last year.   3.  Mildly dilated ascending aorta and aortic root.  I did order a repeat echocardiogram for the visit this year, but for some reason, the echocardiogram was not performed.   4.  Obesity.   5.  Diabetes mellitus.    6.  Normal liver function tests, indicating that the Pravachol is not causing hepatic toxicity.      PLAN:  I have encouraged the patient to exercise and to lose weight.  This should improve his HDL deficiency and hypertriglyceridemia.  I did explain to the patient that gemfibrozil, though it does lower triglycerides and though it does raise HDL, has not been shown to improve cardiac events.  For that reason, these fibrates are not recommended for mixed hyperlipidemia anymore.  Lifestyle changes are certainly useful, but even if the triglycerides and HDL deficiency do not improve with lifestyle changes, Vascepa 2 grams twice a day has been shown to improve survival in those patients with coronary artery disease and specifically with hypertriglyceridemia.      We will see the patient back again in 1 year's time and at that stage, we will repeat the echocardiogram to look at the aortic root and ascending aorta, which are mildly dilated.      It has been my pleasure to be involved in the care of this nice patient.  All of his questions were answered at the end of our visit, and as always, he has been told to contact me if he has any questions or any concerns.      Lino Hernandez MD      cc:   Hamzah Hodge MD   [facility]   [address]   [place] [address]         LINO GARCIA MD, [facility]             D: 2021   T: 2021   MT: al      Name:     NAYELI RAI   MRN:      [id number]        Account:      [id number]   :      1935           Service Date: 2021      Document: [id number]

## 2021-03-03 NOTE — LETTER
3/3/2021    Hamzah Hodge MD  [address]  [place] [address]    RE: Austen Fowler       Dear Colleague,    I had the pleasure of seeing Austen Fowler in the [facility].    HPI and Plan:   See dictation    Orders Placed This Encounter   Procedures     Lipid Profile     ALT     Follow-Up with Cardiologist     EKG 12-lead complete w/read - Clinics (performed today)     Echocardiogram Complete       No orders of the defined types were placed in this encounter.      There are no discontinued medications.      Encounter Diagnoses   Name Primary?     Coronary artery disease involving native coronary artery of native heart without angina pectoris Yes     Aortic root dilatation (H)      S/P CABG (coronary artery bypass graft)      HDL deficiency      Benign essential hypertension        CURRENT MEDICATIONS:  Current Outpatient Medications   Medication Sig Dispense Refill     acetaminophen (ACETAMIN) 500 MG tablet Take 1,000 mg by mouth 2 times daily        amLODIPine (NORVASC) 5 MG tablet TAKE 1 TABLET BY MOUTH TWO  TIMES DAILY 180 tablet 3     aspirin 81 MG EC tablet Take 81 mg by mouth daily        Cholecalciferol (VITAMIN D) 2000 UNIT tablet Take 2,000 Units by mouth daily. 90 tablet 3     finasteride (PROSCAR) 5 MG tablet TAKE 1 TABLET BY MOUTH  DAILY 90 tablet 3     gabapentin (NEURONTIN) 300 MG capsule TAKE 3 CAPSULES BY MOUTH 3  TIMES DAILY (Patient taking differently: Pt takes 3 capsules twice daily.) 810 capsule 2     lisinopril (ZESTRIL) 20 MG tablet TAKE 1 TABLET BY MOUTH  DAILY 90 tablet 1     metFORMIN (GLUCOPHAGE-XR) 500 MG 24 hr tablet TAKE 2 TABLETS BY MOUTH TWO TIMES DAILY WITH MEALS 360 tablet 3     omeprazole (PRILOSEC) 20 MG DR capsule TAKE 1 CAPSULE BY MOUTH  DAILY 90 capsule 2     pravastatin (PRAVACHOL) 20 MG tablet TAKE 1 TABLET BY MOUTH  DAILY 90 tablet 0     tamsulosin (FLOMAX) 0.4 MG capsule TAKE 1 CAPSULE BY MOUTH  DAILY  90 capsule 2     vitamin B-12 (CYANOCOBALAMIN) 1000 MCG tablet Take 1,000 mcg by mouth daily Pt taking every other day.       blood glucose (ONETOUCH ULTRA) test strip USE TO TEST BLOOD SUGAR 2  TIMES DAILY OR AS DIRECTED. 200 strip 1     blood glucose monitoring (ONE TOUCH ULTRASOFT) lancets USE TO TEST BLOOD SUGAR 2  TIMES DAILY OR AS DIRECTED. 200 each 3     diclofenac (VOLTAREN) 1 % topical gel Place 4 g onto the skin 4 times daily as needed for moderate pain (Apply to back to neck.)       Menthol, Topical Analgesic, (ICY HOT BACK EX) Externally apply topically daily as needed (neck pain)        ORDER FOR DME Uses Cpap machine for sleep apnea         ALLERGIES   No Known Allergies    PAST MEDICAL HISTORY:  Past Medical History:   Diagnosis Date     Anxiety state, unspecified      Aortic root dilatation (H)      Arthritis      Ascending aorta dilatation (H)      Basal cell cancer     s/p Mohs nose and bridge of nose on R.     Bunion      CAD (coronary artery disease)     CABG 1992: LIMA to LAD, SANDI to RCA, SVG to OM1 and OM2     Contact dermatitis and other eczema, due to unspecified cause     eczema - has light treatments with Dr. Rivera     Disorders of bursae and tendons in shoulder region, unspecified      Esophageal reflux      Essential hypertension, benign      Gallbladder disease      GERD (gastroesophageal reflux disease)      Heart attack (H)      Hyperlipidemia LDL goal <70      Left ventricular diastolic dysfunction      Low HDL (under 40) 3/28/2014     Nasal/sinus dis NEC     s/p surgery in 1995     Obesity      Osteoarthrosis, unspecified whether generalized or localized, shoulder region      Other hammer toe (acquired)      Sleep apnea     USES CPAP     Spinal stenosis, unspecified region other than cervical     MRI done 2006     Type 2 diabetes, HbA1c goal < 7% (H) 3/12/2015     Urge incontinence        PAST SURGICAL HISTORY:  Past Surgical History:   Procedure Laterality Date     ABDOMEN  SURGERY  1994    gall bladder     BIOPSY      arms     C CABG, ARTERY-VEIN, FOUR  11/1992    CABG - LIMA to left anterior descending and saphenous vein bypass graft to the first and second obtuse marginal branch of the circumflex and the right mammary to the right coronary artery     CHOLECYSTECTOMY  6/1994     COLONOSCOPY N/A 4/11/2017    Procedure: COMBINED COLONOSCOPY, SINGLE OR MULTIPLE BIOPSY/POLYPECTOMY BY BIOPSY;  Surgeon: Bladimir Garner MD;  Location:  GI     CV LEFT HEART CATH  5-92, 6-92    Angioplasty     ENT SURGERY  5/1995    sinus surgery     ESOPHAGOSCOPY, GASTROSCOPY, DUODENOSCOPY (EGD), DILATATION, COMBINED  7/17/2014    Procedure: COMBINED ESOPHAGOSCOPY, GASTROSCOPY, DUODENOSCOPY (EGD), DILATATION;  Surgeon: Bladimir Garner MD;  Location:  GI     EYE SURGERY      cataracts removed both eyes     HC COLONOSCOPY THRU STOMA W BIOPSY/CAUTERY TUMOR/POLYP/LESION  5/2007     INJECT EPIDURAL LUMBAR       LAMINECTOMY LUMBAR TWO LEVELS N/A 4/29/2015    Procedure: LAMINECTOMY LUMBAR TWO LEVELS;  Surgeon: Bladimir Keenan MD;  Location:  OR     THORACIC SURGERY  11/1992    bypass     ZZC NONSPECIFIC PROCEDURE  6-94    Gail lap     ZZC NONSPECIFIC PROCEDURE  1995    sinus surgery     ZZC NONSPECIFIC PROCEDURE      bilateral cataract surgery       FAMILY HISTORY:  Family History   Problem Relation Age of Onset     Cancer Brother         MELANOMA     Diabetes Mother         AODM     Thyroid Disease Mother      Diabetes Father         AODM     Myocardial Infarction Father      Cerebrovascular Disease Brother      Parkinsonism Sister      Family History Negative Son      Family History Negative Son      Family History Negative Son      Family History Negative Daughter      Coronary Artery Disease Brother         dod 2019     Cerebrovascular Disease Brother         dod 2019     Other Cancer Brother         dod 2000/melanoma       SOCIAL HISTORY:  Social History     Socioeconomic History     Marital  status:      Spouse name: Anastasia Caballero     Number of children: 4     Years of education: 14     Highest education level: None   Occupational History     Occupation: retired     Comment:    Social Needs     Financial resource strain: None     Food insecurity     Worry: None     Inability: None     Transportation needs     Medical: None     Non-medical: None   Tobacco Use     Smoking status: Former Smoker     Packs/day: 2.00     Years: 30.00     Pack years: 60.00     Types: Cigarettes, Cigars, Pipe     Start date:      Quit date: 3/1/1992     Years since quittin.0     Smokeless tobacco: Never Used     Tobacco comment: quit in    Substance and Sexual Activity     Alcohol use: Yes     Alcohol/week: 0.0 standard drinks     Comment: minimal less than 1/week     Drug use: No     Sexual activity: Not Currently     Partners: Female     Birth control/protection: Post-menopausal   Lifestyle     Physical activity     Days per week: None     Minutes per session: None     Stress: None   Relationships     Social connections     Talks on phone: None     Gets together: None     Attends Worship service: None     Active member of club or organization: None     Attends meetings of clubs or organizations: None     Relationship status: None     Intimate partner violence     Fear of current or ex partner: None     Emotionally abused: None     Physically abused: None     Forced sexual activity: None   Other Topics Concern      Service Yes     Comment: [employer], served in [employer]     Blood Transfusions Yes     Comment: Unsure if he had one with heart surgery     Caffeine Concern Yes     Comment: occ cofee     Occupational Exposure No     Hobby Hazards No     Sleep Concern Yes     Comment: CPAP     Stress Concern No     Weight Concern Not Asked     Special Diet No     Back Care Not Asked     Exercise Yes     Comment: [employer] 3 times a week, biking walking.      Bike Helmet Yes     Seat Belt Yes      Self-Exams Yes     Comment: does HIRAL about once a month.     Parent/sibling w/ CABG, MI or angioplasty before 65F 55M? No   Social History Narrative        4 kids       Review of Systems:  Skin:  Positive for bruising     Eyes:  Positive for glasses    ENT:  Positive for hearing loss    Respiratory:  Positive for CPAP;sleep apnea;dyspnea on exertion     Cardiovascular:  Negative Positive for;edema    Gastroenterology: Positive for heartburn(occastional)    Genitourinary:  not assessed prostate problem    Musculoskeletal:  Positive for back pain;arthritis    Neurologic:  Positive for headaches(related to neck and shoulders)    Psychiatric:  Positive for excessive stress    Heme/Lymph/Imm:  Negative weight loss;easy bruising    Endocrine:  Positive for diabetes (borderline diabetes)    Physical Exam:  Vitals: /56   Pulse 60   Wt 128.4 kg (283 lb)   BMI 35.37 kg/m      Constitutional:  cooperative, alert and oriented, well developed, well nourished, in no acute distress obese      Skin:  warm and dry to the touch, no apparent skin lesions or masses noted          Head:  normocephalic        Eyes:  pupils equal and round        Lymph:No Cervical lymphadenopathy present     ENT:  no pallor or cyanosis, dentition good        Neck:  carotid pulses are full and equal bilaterally        Respiratory:  normal breath sounds, clear to auscultation, normal A-P diameter, normal symmetry, normal respiratory excursion, no use of accessory muscles         Cardiac: regular rhythm;normal S1 and S2   S4   systolic murmur;LUSB;grade 1        pulses full and equal                                        GI:    obese      Extremities and Muscular Skeletal:  no deformities, clubbing, cyanosis, erythema observed;no edema              Neurological:  no gross motor deficits;affect appropriate        Psych:  Alert and Oriented x 3      Thank you for allowing me to participate in the care of your patient.      Sincerely,     Lino  Brian Hernandez MD, MD     [facility]    cc:   No referring provider defined for this encounter.

## 2021-04-01 ENCOUNTER — APPOINTMENT (OUTPATIENT)
Dept: ULTRASOUND IMAGING | Facility: CLINIC | Age: 86
DRG: 602 | End: 2021-04-01
Attending: EMERGENCY MEDICINE
Payer: COMMERCIAL

## 2021-04-01 ENCOUNTER — HOSPITAL ENCOUNTER (INPATIENT)
Facility: CLINIC | Age: 86
LOS: 7 days | Discharge: NURSING, REHAB OR LONG-TERM CARE | DRG: 602 | End: 2021-04-08
Attending: EMERGENCY MEDICINE | Admitting: INTERNAL MEDICINE
Payer: COMMERCIAL

## 2021-04-01 ENCOUNTER — APPOINTMENT (OUTPATIENT)
Dept: GENERAL RADIOLOGY | Facility: CLINIC | Age: 86
DRG: 602 | End: 2021-04-01
Attending: EMERGENCY MEDICINE
Payer: COMMERCIAL

## 2021-04-01 DIAGNOSIS — I50.31 ACUTE DIASTOLIC HEART FAILURE (H): Primary | ICD-10-CM

## 2021-04-01 DIAGNOSIS — A41.9 SEPTIC SHOCK (H): ICD-10-CM

## 2021-04-01 DIAGNOSIS — R65.21 SEPTIC SHOCK (H): ICD-10-CM

## 2021-04-01 DIAGNOSIS — L03.90 CELLULITIS, UNSPECIFIED CELLULITIS SITE: ICD-10-CM

## 2021-04-01 DIAGNOSIS — L03.115 CELLULITIS OF RIGHT LOWER EXTREMITY: ICD-10-CM

## 2021-04-01 LAB
ALBUMIN SERPL-MCNC: 2.9 G/DL (ref 3.4–5)
ALBUMIN UR-MCNC: 50 MG/DL
ALP SERPL-CCNC: 44 U/L (ref 40–150)
ALT SERPL W P-5'-P-CCNC: 57 U/L (ref 0–70)
ANION GAP SERPL CALCULATED.3IONS-SCNC: 7 MMOL/L (ref 3–14)
APPEARANCE UR: CLEAR
AST SERPL W P-5'-P-CCNC: 304 U/L (ref 0–45)
BASOPHILS # BLD AUTO: 0 10E9/L (ref 0–0.2)
BASOPHILS NFR BLD AUTO: 0.2 %
BILIRUB SERPL-MCNC: 0.7 MG/DL (ref 0.2–1.3)
BILIRUB UR QL STRIP: NEGATIVE
BUN SERPL-MCNC: 17 MG/DL (ref 7–30)
CALCIUM SERPL-MCNC: 8.3 MG/DL (ref 8.5–10.1)
CHLORIDE SERPL-SCNC: 103 MMOL/L (ref 94–109)
CO2 BLDCOV-SCNC: 24 MMOL/L (ref 21–28)
CO2 SERPL-SCNC: 25 MMOL/L (ref 20–32)
COLOR UR AUTO: ABNORMAL
CREAT SERPL-MCNC: 0.81 MG/DL (ref 0.66–1.25)
CREAT SERPL-MCNC: 0.82 MG/DL (ref 0.66–1.25)
DIFFERENTIAL METHOD BLD: ABNORMAL
EOSINOPHIL # BLD AUTO: 0 10E9/L (ref 0–0.7)
EOSINOPHIL NFR BLD AUTO: 0 %
ERYTHROCYTE [DISTWIDTH] IN BLOOD BY AUTOMATED COUNT: 14.8 % (ref 10–15)
FLUAV RNA RESP QL NAA+PROBE: NEGATIVE
FLUBV RNA RESP QL NAA+PROBE: NEGATIVE
GFR SERPL CREATININE-BSD FRML MDRD: 81 ML/MIN/{1.73_M2}
GFR SERPL CREATININE-BSD FRML MDRD: 81 ML/MIN/{1.73_M2}
GLUCOSE SERPL-MCNC: 133 MG/DL (ref 70–99)
GLUCOSE UR STRIP-MCNC: NEGATIVE MG/DL
HBA1C MFR BLD: 6.4 % (ref 0–5.6)
HCT VFR BLD AUTO: 40.7 % (ref 40–53)
HGB BLD-MCNC: 13.1 G/DL (ref 13.3–17.7)
HGB UR QL STRIP: ABNORMAL
IMM GRANULOCYTES # BLD: 0.1 10E9/L (ref 0–0.4)
IMM GRANULOCYTES NFR BLD: 0.7 %
KETONES UR STRIP-MCNC: 40 MG/DL
LABORATORY COMMENT REPORT: NORMAL
LACTATE BLD-SCNC: 3.1 MMOL/L (ref 0.7–2)
LACTATE BLD-SCNC: 3.4 MMOL/L (ref 0.7–2)
LACTATE BLD-SCNC: 4.5 MMOL/L (ref 0.7–2)
LACTATE BLD-SCNC: 5.9 MMOL/L (ref 0.7–2.1)
LACTATE BLD-SCNC: 6.4 MMOL/L (ref 0.7–2)
LEUKOCYTE ESTERASE UR QL STRIP: NEGATIVE
LYMPHOCYTES # BLD AUTO: 1.3 10E9/L (ref 0.8–5.3)
LYMPHOCYTES NFR BLD AUTO: 6.7 %
MCH RBC QN AUTO: 27.9 PG (ref 26.5–33)
MCHC RBC AUTO-ENTMCNC: 32.2 G/DL (ref 31.5–36.5)
MCV RBC AUTO: 87 FL (ref 78–100)
MONOCYTES # BLD AUTO: 1.1 10E9/L (ref 0–1.3)
MONOCYTES NFR BLD AUTO: 5.6 %
NEUTROPHILS # BLD AUTO: 16.7 10E9/L (ref 1.6–8.3)
NEUTROPHILS NFR BLD AUTO: 86.8 %
NITRATE UR QL: NEGATIVE
NRBC # BLD AUTO: 0 10*3/UL
NRBC BLD AUTO-RTO: 0 /100
PCO2 BLDV: 36 MM HG (ref 40–50)
PH BLDV: 7.43 PH (ref 7.32–7.43)
PH UR STRIP: 5 PH (ref 5–7)
PLATELET # BLD AUTO: 192 10E9/L (ref 150–450)
PO2 BLDV: 21 MM HG (ref 25–47)
POTASSIUM SERPL-SCNC: 3.6 MMOL/L (ref 3.4–5.3)
PROT SERPL-MCNC: 6.5 G/DL (ref 6.8–8.8)
RBC # BLD AUTO: 4.7 10E12/L (ref 4.4–5.9)
RBC #/AREA URNS AUTO: 0 /HPF (ref 0–2)
RSV RNA SPEC QL NAA+PROBE: NEGATIVE
SAO2 % BLDV FROM PO2: 36 %
SARS-COV-2 RNA RESP QL NAA+PROBE: NEGATIVE
SODIUM SERPL-SCNC: 135 MMOL/L (ref 133–144)
SOURCE: ABNORMAL
SP GR UR STRIP: 1.02 (ref 1–1.03)
SPECIMEN SOURCE: NORMAL
SQUAMOUS #/AREA URNS AUTO: <1 /HPF (ref 0–1)
UROBILINOGEN UR STRIP-MCNC: 0 MG/DL (ref 0–2)
WBC # BLD AUTO: 19.2 10E9/L (ref 4–11)
WBC #/AREA URNS AUTO: <1 /HPF (ref 0–5)

## 2021-04-01 PROCEDURE — 96366 THER/PROPH/DIAG IV INF ADDON: CPT

## 2021-04-01 PROCEDURE — 96367 TX/PROPH/DG ADDL SEQ IV INF: CPT

## 2021-04-01 PROCEDURE — 99223 1ST HOSP IP/OBS HIGH 75: CPT | Mod: AI | Performed by: INTERNAL MEDICINE

## 2021-04-01 PROCEDURE — 87636 SARSCOV2 & INF A&B AMP PRB: CPT | Performed by: EMERGENCY MEDICINE

## 2021-04-01 PROCEDURE — 250N000013 HC RX MED GY IP 250 OP 250 PS 637: Performed by: INTERNAL MEDICINE

## 2021-04-01 PROCEDURE — 82565 ASSAY OF CREATININE: CPT | Performed by: INTERNAL MEDICINE

## 2021-04-01 PROCEDURE — 258N000003 HC RX IP 258 OP 636: Performed by: INTERNAL MEDICINE

## 2021-04-01 PROCEDURE — C9803 HOPD COVID-19 SPEC COLLECT: HCPCS

## 2021-04-01 PROCEDURE — 250N000011 HC RX IP 250 OP 636: Performed by: INTERNAL MEDICINE

## 2021-04-01 PROCEDURE — 87800 DETECT AGNT MULT DNA DIREC: CPT | Performed by: EMERGENCY MEDICINE

## 2021-04-01 PROCEDURE — 36415 COLL VENOUS BLD VENIPUNCTURE: CPT | Performed by: INTERNAL MEDICINE

## 2021-04-01 PROCEDURE — 71045 X-RAY EXAM CHEST 1 VIEW: CPT

## 2021-04-01 PROCEDURE — 87186 SC STD MICRODIL/AGAR DIL: CPT | Performed by: EMERGENCY MEDICINE

## 2021-04-01 PROCEDURE — 87040 BLOOD CULTURE FOR BACTERIA: CPT | Performed by: EMERGENCY MEDICINE

## 2021-04-01 PROCEDURE — 83605 ASSAY OF LACTIC ACID: CPT | Performed by: INTERNAL MEDICINE

## 2021-04-01 PROCEDURE — 83605 ASSAY OF LACTIC ACID: CPT | Performed by: EMERGENCY MEDICINE

## 2021-04-01 PROCEDURE — 85025 COMPLETE CBC W/AUTO DIFF WBC: CPT | Performed by: EMERGENCY MEDICINE

## 2021-04-01 PROCEDURE — 96365 THER/PROPH/DIAG IV INF INIT: CPT

## 2021-04-01 PROCEDURE — 120N000001 HC R&B MED SURG/OB

## 2021-04-01 PROCEDURE — 80053 COMPREHEN METABOLIC PANEL: CPT | Performed by: EMERGENCY MEDICINE

## 2021-04-01 PROCEDURE — 83605 ASSAY OF LACTIC ACID: CPT

## 2021-04-01 PROCEDURE — 81001 URINALYSIS AUTO W/SCOPE: CPT | Performed by: EMERGENCY MEDICINE

## 2021-04-01 PROCEDURE — 250N000011 HC RX IP 250 OP 636: Performed by: EMERGENCY MEDICINE

## 2021-04-01 PROCEDURE — 999N001017 HC STATISTIC GLUCOSE BY METER IP

## 2021-04-01 PROCEDURE — 258N000003 HC RX IP 258 OP 636: Performed by: EMERGENCY MEDICINE

## 2021-04-01 PROCEDURE — 87086 URINE CULTURE/COLONY COUNT: CPT | Performed by: EMERGENCY MEDICINE

## 2021-04-01 PROCEDURE — 83036 HEMOGLOBIN GLYCOSYLATED A1C: CPT | Performed by: INTERNAL MEDICINE

## 2021-04-01 PROCEDURE — 87077 CULTURE AEROBIC IDENTIFY: CPT | Performed by: EMERGENCY MEDICINE

## 2021-04-01 PROCEDURE — 82803 BLOOD GASES ANY COMBINATION: CPT

## 2021-04-01 PROCEDURE — 99285 EMERGENCY DEPT VISIT HI MDM: CPT | Mod: 25

## 2021-04-01 PROCEDURE — 93971 EXTREMITY STUDY: CPT | Mod: RT

## 2021-04-01 RX ORDER — LISINOPRIL 10 MG/1
20 TABLET ORAL DAILY
Status: DISCONTINUED | OUTPATIENT
Start: 2021-04-02 | End: 2021-04-05

## 2021-04-01 RX ORDER — ONDANSETRON 4 MG/1
4 TABLET, ORALLY DISINTEGRATING ORAL EVERY 6 HOURS PRN
Status: DISCONTINUED | OUTPATIENT
Start: 2021-04-01 | End: 2021-04-08 | Stop reason: HOSPADM

## 2021-04-01 RX ORDER — SODIUM CHLORIDE 9 MG/ML
INJECTION, SOLUTION INTRAVENOUS CONTINUOUS
Status: DISCONTINUED | OUTPATIENT
Start: 2021-04-01 | End: 2021-04-03

## 2021-04-01 RX ORDER — ACETAMINOPHEN 500 MG
1000 TABLET ORAL 2 TIMES DAILY
Status: DISCONTINUED | OUTPATIENT
Start: 2021-04-01 | End: 2021-04-08 | Stop reason: HOSPADM

## 2021-04-01 RX ORDER — NALOXONE HYDROCHLORIDE 0.4 MG/ML
0.2 INJECTION, SOLUTION INTRAMUSCULAR; INTRAVENOUS; SUBCUTANEOUS
Status: DISCONTINUED | OUTPATIENT
Start: 2021-04-01 | End: 2021-04-08 | Stop reason: HOSPADM

## 2021-04-01 RX ORDER — DEXTROSE MONOHYDRATE 25 G/50ML
25-50 INJECTION, SOLUTION INTRAVENOUS
Status: DISCONTINUED | OUTPATIENT
Start: 2021-04-01 | End: 2021-04-08 | Stop reason: HOSPADM

## 2021-04-01 RX ORDER — CHOLECALCIFEROL (VITAMIN D3) 50 MCG
50 TABLET ORAL DAILY
Status: DISCONTINUED | OUTPATIENT
Start: 2021-04-02 | End: 2021-04-08 | Stop reason: HOSPADM

## 2021-04-01 RX ORDER — PROCHLORPERAZINE 25 MG
12.5 SUPPOSITORY, RECTAL RECTAL EVERY 12 HOURS PRN
Status: DISCONTINUED | OUTPATIENT
Start: 2021-04-01 | End: 2021-04-08 | Stop reason: HOSPADM

## 2021-04-01 RX ORDER — NICOTINE POLACRILEX 4 MG
15-30 LOZENGE BUCCAL
Status: DISCONTINUED | OUTPATIENT
Start: 2021-04-01 | End: 2021-04-08 | Stop reason: HOSPADM

## 2021-04-01 RX ORDER — LANOLIN ALCOHOL/MO/W.PET/CERES
1000 CREAM (GRAM) TOPICAL EVERY OTHER DAY
Status: DISCONTINUED | OUTPATIENT
Start: 2021-04-02 | End: 2021-04-08 | Stop reason: HOSPADM

## 2021-04-01 RX ORDER — LIDOCAINE 40 MG/G
CREAM TOPICAL
Status: DISCONTINUED | OUTPATIENT
Start: 2021-04-01 | End: 2021-04-08 | Stop reason: HOSPADM

## 2021-04-01 RX ORDER — ONDANSETRON 2 MG/ML
4 INJECTION INTRAMUSCULAR; INTRAVENOUS EVERY 6 HOURS PRN
Status: DISCONTINUED | OUTPATIENT
Start: 2021-04-01 | End: 2021-04-08 | Stop reason: HOSPADM

## 2021-04-01 RX ORDER — SODIUM CHLORIDE 9 MG/ML
INJECTION, SOLUTION INTRAVENOUS CONTINUOUS
Status: DISCONTINUED | OUTPATIENT
Start: 2021-04-01 | End: 2021-04-01

## 2021-04-01 RX ORDER — PIPERACILLIN SODIUM, TAZOBACTAM SODIUM 4; .5 G/20ML; G/20ML
4.5 INJECTION, POWDER, LYOPHILIZED, FOR SOLUTION INTRAVENOUS ONCE
Status: COMPLETED | OUTPATIENT
Start: 2021-04-01 | End: 2021-04-01

## 2021-04-01 RX ORDER — PROCHLORPERAZINE MALEATE 5 MG
5 TABLET ORAL EVERY 6 HOURS PRN
Status: DISCONTINUED | OUTPATIENT
Start: 2021-04-01 | End: 2021-04-08 | Stop reason: HOSPADM

## 2021-04-01 RX ORDER — NALOXONE HYDROCHLORIDE 0.4 MG/ML
0.4 INJECTION, SOLUTION INTRAMUSCULAR; INTRAVENOUS; SUBCUTANEOUS
Status: DISCONTINUED | OUTPATIENT
Start: 2021-04-01 | End: 2021-04-08 | Stop reason: HOSPADM

## 2021-04-01 RX ORDER — PIPERACILLIN SODIUM, TAZOBACTAM SODIUM 3; .375 G/15ML; G/15ML
3.38 INJECTION, POWDER, LYOPHILIZED, FOR SOLUTION INTRAVENOUS EVERY 6 HOURS
Status: DISCONTINUED | OUTPATIENT
Start: 2021-04-01 | End: 2021-04-03

## 2021-04-01 RX ORDER — ACETAMINOPHEN 325 MG/1
975 TABLET ORAL EVERY 6 HOURS PRN
Status: DISCONTINUED | OUTPATIENT
Start: 2021-04-01 | End: 2021-04-08 | Stop reason: HOSPADM

## 2021-04-01 RX ORDER — POLYETHYLENE GLYCOL 3350 17 G/17G
17 POWDER, FOR SOLUTION ORAL DAILY PRN
Status: DISCONTINUED | OUTPATIENT
Start: 2021-04-01 | End: 2021-04-08 | Stop reason: HOSPADM

## 2021-04-01 RX ORDER — GABAPENTIN 300 MG/1
900 CAPSULE ORAL 2 TIMES DAILY
Status: DISCONTINUED | OUTPATIENT
Start: 2021-04-01 | End: 2021-04-08 | Stop reason: HOSPADM

## 2021-04-01 RX ORDER — OXYCODONE HYDROCHLORIDE 5 MG/1
5 TABLET ORAL EVERY 4 HOURS PRN
Status: DISCONTINUED | OUTPATIENT
Start: 2021-04-01 | End: 2021-04-08 | Stop reason: HOSPADM

## 2021-04-01 RX ORDER — FINASTERIDE 5 MG/1
5 TABLET, FILM COATED ORAL DAILY
Status: DISCONTINUED | OUTPATIENT
Start: 2021-04-02 | End: 2021-04-08 | Stop reason: HOSPADM

## 2021-04-01 RX ORDER — PRAVASTATIN SODIUM 20 MG
20 TABLET ORAL DAILY
Status: DISCONTINUED | OUTPATIENT
Start: 2021-04-02 | End: 2021-04-08 | Stop reason: HOSPADM

## 2021-04-01 RX ORDER — TAMSULOSIN HYDROCHLORIDE 0.4 MG/1
0.4 CAPSULE ORAL DAILY
Status: DISCONTINUED | OUTPATIENT
Start: 2021-04-02 | End: 2021-04-08 | Stop reason: HOSPADM

## 2021-04-01 RX ORDER — ASPIRIN 81 MG/1
81 TABLET ORAL DAILY
Status: DISCONTINUED | OUTPATIENT
Start: 2021-04-02 | End: 2021-04-08 | Stop reason: HOSPADM

## 2021-04-01 RX ORDER — AMLODIPINE BESYLATE 5 MG/1
5 TABLET ORAL DAILY
Status: DISCONTINUED | OUTPATIENT
Start: 2021-04-02 | End: 2021-04-08 | Stop reason: HOSPADM

## 2021-04-01 RX ADMIN — SODIUM CHLORIDE: 9 INJECTION, SOLUTION INTRAVENOUS at 23:15

## 2021-04-01 RX ADMIN — PIPERACILLIN SODIUM AND TAZOBACTAM SODIUM 4.5 G: 4; .5 INJECTION, POWDER, LYOPHILIZED, FOR SOLUTION INTRAVENOUS at 16:59

## 2021-04-01 RX ADMIN — ACETAMINOPHEN 1000 MG: 500 TABLET, FILM COATED ORAL at 23:15

## 2021-04-01 RX ADMIN — PIPERACILLIN SODIUM AND TAZOBACTAM SODIUM 3.38 G: 3; .375 INJECTION, POWDER, LYOPHILIZED, FOR SOLUTION INTRAVENOUS at 23:14

## 2021-04-01 RX ADMIN — SODIUM CHLORIDE 1000 ML: 9 INJECTION, SOLUTION INTRAVENOUS at 17:01

## 2021-04-01 RX ADMIN — VANCOMYCIN HYDROCHLORIDE 2500 MG: 5 INJECTION, POWDER, LYOPHILIZED, FOR SOLUTION INTRAVENOUS at 18:12

## 2021-04-01 RX ADMIN — GABAPENTIN 900 MG: 300 CAPSULE ORAL at 23:15

## 2021-04-01 RX ADMIN — ENOXAPARIN SODIUM 40 MG: 40 INJECTION SUBCUTANEOUS at 23:15

## 2021-04-01 ASSESSMENT — MIFFLIN-ST. JEOR: SCORE: 2018.02

## 2021-04-01 NOTE — ED NOTES
Bed: ED30  Expected date:   Expected time:   Means of arrival:   Comments:  443  46 M sob/fever/repeated falls  1542

## 2021-04-01 NOTE — ED PROVIDER NOTES
History   Chief Complaint:  Fever    HPI   Austen Fowler is a 86 year old male, with a history of right lower extremity cellulitis, DM II, hypertension, hyperlipidemia, CAD, and aortic root dilatation, who presents to the ED for evaluation of fever. The patient reports he has had a fever for the past three days with the highest recorded temperature to 102. The patient conveys he has had a normal temperature today and yesterday with measures at 98.4. the patient endorses a couple days of non-bloody or black diarrhea as well. He has not had any cough, shortness of breath, or chest pain. The patient says he has had increased warmth, swelling, and redness to the right lower extremity. He denies any abdominal pain, nausea, or vomiting. He states he has had a couple falls over the past couple days when he was in the bath tub. He states he slipped and landed on his shoulders, but did not hit his head or lose consciousness. The patient is adamant about not having any back pain, acute neck pain, or extremity pain aside from his chronic left hip pain. He has not had any numbness or weakness as well. The patient has not taken any pain or antipyretics today. He has no headache.     Review of Systems   All other systems reviewed and are negative.    Allergies:  No known drug allergies    Medications:    Norvasc  Aspirin  Proscar  Gabapentin  Lisinopril  Metformin  Pravastatin  Flomax    Past Medical History:    Anxiety  Aortic root dilatation  Arthritis  Ascending aorta dilatation  Basal cell cancer  CAD  Contact dermatitis  GERD  Hypertension  Hyperlipidemia  Obesity  Sleep apnea  DM II  Left ventricular diastolic dysfunction  Sacroiliitis    Past Surgical History:    Cholecystectomy  CABG  Sinus surgery  Bilateral cataract removal  Laminectomy    Family History:    Diabetes  Thyroid diease  MI  Cerebrovascular disease  Parkinsonism  Melanoma    Social History:  PCP: Hamzah Hodge  Presents to the ED alone    Physical Exam  "    Patient Vitals for the past 24 hrs:   BP Temp Temp src Pulse Resp SpO2 Height Weight   04/01/21 2030 113/69 -- -- 96 8 92 % -- --   04/01/21 2000 132/59 -- -- 94 16 93 % -- --   04/01/21 1930 (!) 150/73 -- -- 98 21 93 % -- --   04/01/21 1900 132/65 -- -- 92 21 95 % -- --   04/01/21 1800 131/65 -- -- 99 (!) 33 95 % -- --   04/01/21 1630 129/82 -- -- 93 (!) 37 96 % -- --   04/01/21 1559 (!) 141/83 99.3  F (37.4  C) Temporal 96 22 95 % 1.905 m (6' 3\") 124.7 kg (275 lb)       Physical Exam  General: Resting on the bed.  Head: No obvious trauma to head.  Ears, Nose, Throat:  External ears normal.  Nose normal.    Eyes:  Conjunctivae clear.  Pupils are equal, round, and reactive.   Neck: Normal range of motion.  Neck supple. no nuchal rigidity   CV: Regular rate and rhythm.  No murmurs.      Respiratory: Effort normal and breath sounds normal.  No wheezing or crackles.   Gastrointestinal: Soft.  No distension. There is no tenderness.   Neuro: Alert. Moving all extremities appropriately.  Normal speech.    Skin: Skin is warm and dry.  Erythema warmth, induration noted to the right lower extremity.  2+ DP pulses.  Minimal swelling.    Emergency Department Course   Imaging:    XR Chest, portable, 1 view:  Linear opacities in the left lung base could represent   atelectasis or developing pneumonia. No pleural effusions or   pneumothorax. Median sternotomy and mediastinal surgical clips.   Tortuous aorta with atherosclerotic calcifications. Normal heart size.      US Lower Extremity Venous Duplex Right:  1.  No deep venous thrombosis in the right lower extremity.    Imaging independently reviewed and agree with radiologist interpretation.     Laboratory:  CBC: WBC 19.2 (H), HGB 13.1 (L),     CMP:  (H), Ca 8.3 (L), Albumin 2.9 (L), Protein Total 6.5 (L),  (H), o/w WNL (Creatinine 0.81)    Lactic Acid (Resulted 1645): 6.4 (HH)  Lactic Acid (Resulted 1831): 4.5 (HH)  Lactic Acid (Resulted 1945): 3.4 " (H)    ISTAT lactate: pH 7.43, PCO2 Venous 36 (L), PO2 21 (L), HCO3 24, Lactic acid (Resulted 1623) 5.9 (HH)    UA: Ketone 40, Blood Large, Protein Albumin 50, o/w Negative    Urine Culture Aerobic Bacterial: Pending    Blood Culture x2: Pending    Influenza A/B & SARS-CoV2 (COVID-19) Virus PCR Multiplex: Pending    Emergency Department Course:    Reviewed:  I reviewed the patient's nursing notes, vitals, past available medical records.     Assessments:  1601: I obtained history and examined the patient as noted above.     1917: I rechecked the patient and explained findings. Understands and is amendable to the plan. All questions answered. Family present.     Consults:   1952: I discussed the patient with the admitting hospitalist, Dr. Tapia    Interventions:  1659: Zosyn 4.5 g IV Infusion  1701: NS 1L IV Bolus   1812: Vancocin 2500 mg in NaCl 0.9% 500 mL IV Infusion    Disposition:  The patient was admitted to the hospital under the care of Dr. Tapia.    Impression & Plan    CMS Diagnoses:   The patient has signs of Septic Shock    The patient has signs of septic shock as evidenced by:  1. Presence of Sepsis, AND  2. Lactic Acidosis with value greater than or equal to 4    Time septic shock diagnosis confirmed = 1623  04/01/21   as this was the time when Lactate was resulted and the level was greater than or equal to 4    3 Hour Septic Shock Bundle Completion:  1. Initial Lactic Acid Result:   Recent Labs   Lab Test 04/01/21  1934 04/01/21  1819 04/01/21  1617   LACT 3.4* 4.5* 5.9*     2. Blood Cultures before Antibiotics: Yes  3. Broad Spectrum Antibiotics Administered:  yes       Anti-infectives (From admission through now)    Start     Dose/Rate Route Frequency Ordered Stop    04/01/21 1740  vancomycin 2500 mg in 0.9% NaCl 500 ml intermittent infusion 2,500 mg      2,500 mg  over 120 Minutes Intravenous ONCE 04/01/21 1736 04/01/21 2012 04/01/21 1630  piperacillin-tazobactam (ZOSYN) 4.5 g vial to attach to NS  100 mL bag      4.5 g  over 30 Minutes Intravenous ONCE 04/01/21 1625 04/01/21 1809          4. IF patient is in septic shock within 6 hours of time zero, as defined by:  -Initial Hypotension:  2 low BP readings (SBP <90, MAP <65, or decrease > 40 from baseline due to infection) within 3 hrs of each other during the time period of 6hrs before and 6 hrs  after time zero  -Lactate > or = 4  THEN: Fluid volume administered in ED:  Full 30 mL/kg bolus given (see amount below).  30 mL/kg fluids based on IBW (must be >= 60 inches tall): 2,540 mL    Septic Shock reassessment:  1. Repeat Lactic Acid Level: 4.5  2. MAP>65 after initial IVF bolus, will continue to monitor fluid status and vital signs    I attest to having performed a repeat sepsis exam and assessment of perfusion at 1917 and the results demonstrate improved perfusion.    Medical Decision Making:  Austen Fowler is a 86 year old male who presents for evaluation of skin redness.  Broad differential pursued including not limited to severe sepsis, septic shock, DVT, PE, pneumonia, pneumothorax, effusion, bacteremia, UTI, etc.  the history, physical exam and supporting data are consistent with cellulitis.  The patient does appear to be in septic shock upon evaluation, vitals, and laboratory work up.  Patient denies chest pain or shortness of breath.  Do not suspect ACS, PE, etc.  Patient fevers at home in addition to infectious markers here showing signs of infection this seems to be the most likely cause of his presentation.  He had weakness and falls but do not see any evidence of trauma on head to toe exam.  He reports not hitting his head.  No indication for head imaging.  CBC shows white count 19.2, minimal anemia.  BMP shows no acute electrolyte, metabolic or renal dysfunction.  Lactate markedly elevated 6.4 did clear with fluids to 3.4.  UA unremarkable without evidence of acute infectious etiology.  Chest x-ray with possible developing pneumonia but  patient has no clinical signs pneumonia.  No effusions or pneumothorax otherwise noted.  Blood cultures are pending.  Covid and influenza swab are pending.  Ultrasound shows no evidence of DVT.  Source appears to be most likely the right lower extremity cellulitis.  Based on septic shock criteria and infection in the right lower extremity with a cellulitis, would admit to medicine for further cares and IV antibiotics.  Stable for admission.     Covid-19  Austen Fowler was evaluated during a global COVID-19 pandemic, which necessitated consideration that the patient might be at risk for infection with the SARS-CoV-2 virus that causes COVID-19.   Applicable protocols for evaluation were followed during the patient's care.   COVID-19 was considered as part of the patient's evaluation. The plan for testing is:  a test was obtained during this visit.    Diagnosis:    ICD-10-CM    1. Cellulitis of right lower extremity  L03.115 Urine Culture     Comprehensive metabolic panel   2. Septic shock (H)  A41.9     R65.21      Scribe Disclosure:  I, Naldo Pichardo, am serving as a scribe at 4:01 PM on 4/1/2021 to document services personally performed by Eve Ware MD based on my observations and the provider's statements to me.      Eve Ware MD  04/01/21 2225

## 2021-04-01 NOTE — PHARMACY-ADMISSION MEDICATION HISTORY
Pharmacy Medication History  Admission medication history interview status for the 4/1/2021  admission is complete. See EPIC admission navigator for prior to admission medications     Location of Interview: Outside patient room but on unit  Medication history sources: Patient's family/friend (wife) and [facility] notes, verified with patient himself that he is taking metformin as wife was unsure. Limited conversation with patient as he is symptomatic Covid rule out.    Significant changes made to the medication list:  none    In the past week, patient estimated taking medication this percent of the time: unable to determine. He did take his usual medications this morning but unable to determine for all medications which is morning or evening meds.    Additional medication history information:   -Wife able to confirm all medications except metformin, verified with patient that he is taking metformin - last fill [employer] 12/9/20 for 90 day supply, he may have stopped taking at some point due to a recall concern. All other prescription medications have been filled for 90 day supply within past 90 days.  -Wife said that he is not using diclofenac or icy hot topicals very often and confirmed that he is prescribed gabapentin three times daily but only takes it twice daily.    Medication reconciliation completed by provider prior to medication history? No    Time spent in this activity: 15 min    Prior to Admission medications    Medication Sig Last Dose Taking? Auth Provider   acetaminophen (ACETAMIN) 500 MG tablet Take 1,000 mg by mouth 2 times daily  4/1/2021 at am Yes Reported, Patient   amLODIPine (NORVASC) 5 MG tablet TAKE 1 TABLET BY MOUTH TWO  TIMES DAILY 4/1/2021 at am Yes Hamzah Hodge MD   aspirin 81 MG EC tablet Take 81 mg by mouth daily  3/31 or 4/1 Yes Unknown, Entered By History   Cholecalciferol (VITAMIN D) 2000 UNIT tablet Take 2,000 Units by mouth daily. 3/31 or 4/1 Yes Hamzah Hodge  MD   diclofenac (VOLTAREN) 1 % topical gel Place 4 g onto the skin 4 times daily as needed for moderate pain (Apply to back to neck.) prn Yes Unknown, Entered By History   finasteride (PROSCAR) 5 MG tablet TAKE 1 TABLET BY MOUTH  DAILY 3/31 or 4/1 Yes Hamzah Hodge MD   gabapentin (NEURONTIN) 300 MG capsule TAKE 3 CAPSULES BY MOUTH 3  TIMES DAILY  Patient taking differently: Pt takes 3 capsules twice daily. 4/1/2021 at am Yes Rohan Chi MD   lisinopril (ZESTRIL) 20 MG tablet TAKE 1 TABLET BY MOUTH  DAILY 3/31 or 4/1 Yes Hamzah Hodge MD   Menthol, Topical Analgesic, (ICY HOT BACK EX) Externally apply topically daily as needed (neck pain)  prn Yes Reported, Patient   metFORMIN (GLUCOPHAGE-XR) 500 MG 24 hr tablet TAKE 2 TABLETS BY MOUTH TWO TIMES DAILY WITH MEALS 4/1/2021 at am Yes Hamzah Hodge MD   omeprazole (PRILOSEC) 20 MG DR capsule TAKE 1 CAPSULE BY MOUTH  DAILY 3/31 or 4/1 Yes Hamzah Hodge MD   pravastatin (PRAVACHOL) 20 MG tablet TAKE 1 TABLET BY MOUTH  DAILY 3/31 or 4/1 Yes Hamzah Hodge MD   tamsulosin (FLOMAX) 0.4 MG capsule TAKE 1 CAPSULE BY MOUTH  DAILY 3/31 or 4/1 Yes Hamzah Hodge MD   vitamin B-12 (CYANOCOBALAMIN) 1000 MCG tablet Take 1,000 mcg by mouth every other day  Past Week Yes Reported, Patient   blood glucose (ONETOUCH ULTRA) test strip USE TO TEST BLOOD SUGAR 2  TIMES DAILY OR AS DIRECTED.   Hamzah Hodge MD   blood glucose monitoring (ONE TOUCH ULTRASOFT) lancets USE TO TEST BLOOD SUGAR 2  TIMES DAILY OR AS DIRECTED.   Hamzah Hodge MD   ORDER FOR DME Uses Cpap machine for sleep apnea   Dominga Aguirre PA-C       The information provided in this note is only as accurate as the sources available at the time of update(s)

## 2021-04-01 NOTE — ED TRIAGE NOTES
Pt has had fever and diarrhea for past couple days.  Today fever measured 102.  Fell at home x2 today.  No loc and not on blood thinners.  Has gotten both covid vaccines.  Redness and swelling to right lower leg.

## 2021-04-02 ENCOUNTER — APPOINTMENT (OUTPATIENT)
Dept: PHYSICAL THERAPY | Facility: CLINIC | Age: 86
DRG: 602 | End: 2021-04-02
Attending: INTERNAL MEDICINE
Payer: COMMERCIAL

## 2021-04-02 ENCOUNTER — APPOINTMENT (OUTPATIENT)
Dept: OCCUPATIONAL THERAPY | Facility: CLINIC | Age: 86
DRG: 602 | End: 2021-04-02
Attending: INTERNAL MEDICINE
Payer: COMMERCIAL

## 2021-04-02 LAB
ALBUMIN SERPL-MCNC: 2.6 G/DL (ref 3.4–5)
ALP SERPL-CCNC: 41 U/L (ref 40–150)
ALT SERPL W P-5'-P-CCNC: 69 U/L (ref 0–70)
ANION GAP SERPL CALCULATED.3IONS-SCNC: 7 MMOL/L (ref 3–14)
AST SERPL W P-5'-P-CCNC: 339 U/L (ref 0–45)
BACTERIA SPEC CULT: NO GROWTH
BASOPHILS # BLD AUTO: 0 10E9/L (ref 0–0.2)
BASOPHILS NFR BLD AUTO: 0.3 %
BILIRUB SERPL-MCNC: 0.4 MG/DL (ref 0.2–1.3)
BUN SERPL-MCNC: 13 MG/DL (ref 7–30)
CALCIUM SERPL-MCNC: 8 MG/DL (ref 8.5–10.1)
CHLORIDE SERPL-SCNC: 105 MMOL/L (ref 94–109)
CO2 SERPL-SCNC: 25 MMOL/L (ref 20–32)
CREAT SERPL-MCNC: 0.68 MG/DL (ref 0.66–1.25)
DIFFERENTIAL METHOD BLD: ABNORMAL
EOSINOPHIL # BLD AUTO: 0 10E9/L (ref 0–0.7)
EOSINOPHIL NFR BLD AUTO: 0.1 %
ERYTHROCYTE [DISTWIDTH] IN BLOOD BY AUTOMATED COUNT: 14.9 % (ref 10–15)
GFR SERPL CREATININE-BSD FRML MDRD: 87 ML/MIN/{1.73_M2}
GLUCOSE BLDC GLUCOMTR-MCNC: 125 MG/DL (ref 70–99)
GLUCOSE BLDC GLUCOMTR-MCNC: 143 MG/DL (ref 70–99)
GLUCOSE BLDC GLUCOMTR-MCNC: 148 MG/DL (ref 70–99)
GLUCOSE BLDC GLUCOMTR-MCNC: 153 MG/DL (ref 70–99)
GLUCOSE BLDC GLUCOMTR-MCNC: 159 MG/DL (ref 70–99)
GLUCOSE BLDC GLUCOMTR-MCNC: 211 MG/DL (ref 70–99)
GLUCOSE SERPL-MCNC: 172 MG/DL (ref 70–99)
HCT VFR BLD AUTO: 35.1 % (ref 40–53)
HGB BLD-MCNC: 11.1 G/DL (ref 13.3–17.7)
IMM GRANULOCYTES # BLD: 0.1 10E9/L (ref 0–0.4)
IMM GRANULOCYTES NFR BLD: 0.5 %
LACTATE BLD-SCNC: 2.3 MMOL/L (ref 0.7–2)
LYMPHOCYTES # BLD AUTO: 1 10E9/L (ref 0.8–5.3)
LYMPHOCYTES NFR BLD AUTO: 7.7 %
Lab: NORMAL
MAGNESIUM SERPL-MCNC: 1.2 MG/DL (ref 1.6–2.3)
MCH RBC QN AUTO: 26.9 PG (ref 26.5–33)
MCHC RBC AUTO-ENTMCNC: 31.6 G/DL (ref 31.5–36.5)
MCV RBC AUTO: 85 FL (ref 78–100)
MONOCYTES # BLD AUTO: 0.7 10E9/L (ref 0–1.3)
MONOCYTES NFR BLD AUTO: 5.5 %
NEUTROPHILS # BLD AUTO: 11.5 10E9/L (ref 1.6–8.3)
NEUTROPHILS NFR BLD AUTO: 85.9 %
NRBC # BLD AUTO: 0 10*3/UL
NRBC BLD AUTO-RTO: 0 /100
PLATELET # BLD AUTO: 162 10E9/L (ref 150–450)
POTASSIUM SERPL-SCNC: 3.4 MMOL/L (ref 3.4–5.3)
PROT SERPL-MCNC: 6 G/DL (ref 6.8–8.8)
RBC # BLD AUTO: 4.12 10E12/L (ref 4.4–5.9)
SODIUM SERPL-SCNC: 137 MMOL/L (ref 133–144)
SPECIMEN SOURCE: NORMAL
WBC # BLD AUTO: 13.3 10E9/L (ref 4–11)

## 2021-04-02 PROCEDURE — 80053 COMPREHEN METABOLIC PANEL: CPT | Performed by: INTERNAL MEDICINE

## 2021-04-02 PROCEDURE — 97161 PT EVAL LOW COMPLEX 20 MIN: CPT | Mod: GP

## 2021-04-02 PROCEDURE — 250N000012 HC RX MED GY IP 250 OP 636 PS 637: Performed by: INTERNAL MEDICINE

## 2021-04-02 PROCEDURE — 97530 THERAPEUTIC ACTIVITIES: CPT | Mod: GP

## 2021-04-02 PROCEDURE — 97110 THERAPEUTIC EXERCISES: CPT | Mod: GP

## 2021-04-02 PROCEDURE — 250N000011 HC RX IP 250 OP 636: Performed by: INTERNAL MEDICINE

## 2021-04-02 PROCEDURE — 85025 COMPLETE CBC W/AUTO DIFF WBC: CPT | Performed by: INTERNAL MEDICINE

## 2021-04-02 PROCEDURE — 97165 OT EVAL LOW COMPLEX 30 MIN: CPT | Mod: GO

## 2021-04-02 PROCEDURE — 250N000013 HC RX MED GY IP 250 OP 250 PS 637: Performed by: INTERNAL MEDICINE

## 2021-04-02 PROCEDURE — 120N000001 HC R&B MED SURG/OB

## 2021-04-02 PROCEDURE — 83735 ASSAY OF MAGNESIUM: CPT | Performed by: INTERNAL MEDICINE

## 2021-04-02 PROCEDURE — 97535 SELF CARE MNGMENT TRAINING: CPT | Mod: GO

## 2021-04-02 PROCEDURE — 258N000003 HC RX IP 258 OP 636: Performed by: INTERNAL MEDICINE

## 2021-04-02 PROCEDURE — 83605 ASSAY OF LACTIC ACID: CPT | Performed by: INTERNAL MEDICINE

## 2021-04-02 PROCEDURE — 999N001017 HC STATISTIC GLUCOSE BY METER IP

## 2021-04-02 PROCEDURE — 36415 COLL VENOUS BLD VENIPUNCTURE: CPT | Performed by: INTERNAL MEDICINE

## 2021-04-02 PROCEDURE — 99233 SBSQ HOSP IP/OBS HIGH 50: CPT | Performed by: INTERNAL MEDICINE

## 2021-04-02 RX ORDER — POTASSIUM CHLORIDE 1500 MG/1
40 TABLET, EXTENDED RELEASE ORAL ONCE
Status: COMPLETED | OUTPATIENT
Start: 2021-04-02 | End: 2021-04-02

## 2021-04-02 RX ORDER — MAGNESIUM OXIDE 400 MG/1
400 TABLET ORAL 3 TIMES DAILY
Status: DISPENSED | OUTPATIENT
Start: 2021-04-02 | End: 2021-04-04

## 2021-04-02 RX ADMIN — PIPERACILLIN SODIUM AND TAZOBACTAM SODIUM 3.38 G: 3; .375 INJECTION, POWDER, LYOPHILIZED, FOR SOLUTION INTRAVENOUS at 18:03

## 2021-04-02 RX ADMIN — OMEPRAZOLE 20 MG: 20 CAPSULE, DELAYED RELEASE ORAL at 08:28

## 2021-04-02 RX ADMIN — PRAVASTATIN SODIUM 20 MG: 20 TABLET ORAL at 08:27

## 2021-04-02 RX ADMIN — INSULIN ASPART 1 UNITS: 100 INJECTION, SOLUTION INTRAVENOUS; SUBCUTANEOUS at 21:23

## 2021-04-02 RX ADMIN — PIPERACILLIN SODIUM AND TAZOBACTAM SODIUM 3.38 G: 3; .375 INJECTION, POWDER, LYOPHILIZED, FOR SOLUTION INTRAVENOUS at 05:17

## 2021-04-02 RX ADMIN — PIPERACILLIN SODIUM AND TAZOBACTAM SODIUM 3.38 G: 3; .375 INJECTION, POWDER, LYOPHILIZED, FOR SOLUTION INTRAVENOUS at 23:41

## 2021-04-02 RX ADMIN — TAMSULOSIN HYDROCHLORIDE 0.4 MG: 0.4 CAPSULE ORAL at 08:28

## 2021-04-02 RX ADMIN — ACETAMINOPHEN 1000 MG: 500 TABLET, FILM COATED ORAL at 08:28

## 2021-04-02 RX ADMIN — GABAPENTIN 900 MG: 300 CAPSULE ORAL at 08:28

## 2021-04-02 RX ADMIN — ENOXAPARIN SODIUM 40 MG: 40 INJECTION SUBCUTANEOUS at 21:23

## 2021-04-02 RX ADMIN — INSULIN ASPART 1 UNITS: 100 INJECTION, SOLUTION INTRAVENOUS; SUBCUTANEOUS at 08:30

## 2021-04-02 RX ADMIN — PIPERACILLIN SODIUM AND TAZOBACTAM SODIUM 3.38 G: 3; .375 INJECTION, POWDER, LYOPHILIZED, FOR SOLUTION INTRAVENOUS at 11:29

## 2021-04-02 RX ADMIN — VANCOMYCIN HYDROCHLORIDE 2000 MG: 5 INJECTION, POWDER, LYOPHILIZED, FOR SOLUTION INTRAVENOUS at 05:55

## 2021-04-02 RX ADMIN — LISINOPRIL 20 MG: 10 TABLET ORAL at 08:27

## 2021-04-02 RX ADMIN — Medication 50 MCG: at 08:28

## 2021-04-02 RX ADMIN — AMLODIPINE BESYLATE 5 MG: 5 TABLET ORAL at 08:28

## 2021-04-02 RX ADMIN — ASPIRIN 81 MG: 81 TABLET, DELAYED RELEASE ORAL at 08:28

## 2021-04-02 RX ADMIN — FINASTERIDE 5 MG: 5 TABLET, FILM COATED ORAL at 08:27

## 2021-04-02 RX ADMIN — MAGNESIUM OXIDE TAB 400 MG (241.3 MG ELEMENTAL MG) 400 MG: 400 (241.3 MG) TAB at 21:23

## 2021-04-02 RX ADMIN — CYANOCOBALAMIN TAB 1000 MCG 1000 MCG: 1000 TAB at 08:28

## 2021-04-02 RX ADMIN — POTASSIUM CHLORIDE 40 MEQ: 1500 TABLET, EXTENDED RELEASE ORAL at 15:36

## 2021-04-02 RX ADMIN — MICONAZOLE NITRATE: 20 POWDER TOPICAL at 20:28

## 2021-04-02 RX ADMIN — GABAPENTIN 900 MG: 300 CAPSULE ORAL at 20:21

## 2021-04-02 RX ADMIN — ACETAMINOPHEN 1000 MG: 500 TABLET, FILM COATED ORAL at 20:21

## 2021-04-02 RX ADMIN — MAGNESIUM OXIDE TAB 400 MG (241.3 MG ELEMENTAL MG) 400 MG: 400 (241.3 MG) TAB at 15:37

## 2021-04-02 ASSESSMENT — ACTIVITIES OF DAILY LIVING (ADL)
PREVIOUS_RESPONSIBILITIES: MEAL PREP;LAUNDRY;SHOPPING;YARDWORK;MEDICATION MANAGEMENT;FINANCES;DRIVING
ADLS_ACUITY_SCORE: 16
ADLS_ACUITY_SCORE: 11
ADLS_ACUITY_SCORE: 12
ADLS_ACUITY_SCORE: 16

## 2021-04-02 ASSESSMENT — MIFFLIN-ST. JEOR: SCORE: 2040.63

## 2021-04-02 NOTE — ED NOTES
[facility]  ED Nurse Handoff Report    ED Chief complaint: Fever      ED Diagnosis:   Final diagnoses:   Cellulitis of right lower extremity   Septic shock (H)       Code Status: Confirm with hospitalist    Allergies: No Known Allergies    Patient Story: Pt BIBA from home where he has had fever, diarrhea and fell x2. Noted redness to right leg.    Focused Assessment:  Pt alert and oriented, lactic and WBC elevated, right lower leg red and swollen. See US results. Pt receiving 2nd liter NS currently and will recheck lactic again. Vanco running. Wife at bedside.     Treatments and/or interventions provided: Labs, US, medications, monitoring  Labs Ordered and Resulted from Time of ED Arrival Up to the Time of Departure from the ED   CBC WITH PLATELETS DIFFERENTIAL - Abnormal; Notable for the following components:       Result Value    WBC 19.2 (*)     Hemoglobin 13.1 (*)     Absolute Neutrophil 16.7 (*)     All other components within normal limits   LACTIC ACID WHOLE BLOOD - Abnormal; Notable for the following components:    Lactic Acid 6.4 (*)     All other components within normal limits   ROUTINE UA WITH MICROSCOPIC - Abnormal; Notable for the following components:    Ketones Urine 40 (*)     Blood Urine Large (*)     Protein Albumin Urine 50 (*)     All other components within normal limits   LACTIC ACID WHOLE BLOOD - Abnormal; Notable for the following components:    Lactic Acid 4.5 (*)     All other components within normal limits   LACTIC ACID WHOLE BLOOD - Abnormal; Notable for the following components:    Lactic Acid 3.4 (*)     All other components within normal limits   ISTAT  GASES LACTATE ERAN POCT - Abnormal; Notable for the following components:    PCO2 Venous 36 (*)     PO2 Venous 21 (*)     Lactic Acid 5.9 (*)     All other components within normal limits   INFLUENZA A/B & SARS-COV2 PCR MULTIPLEX   COMPREHENSIVE METABOLIC PANEL   PULSE OXIMETRY NURSING   CARDIAC CONTINUOUS MONITORING  "  STRICT INTAKE AND OUTPUT   PERIPHERAL IV CATHETER   URINE CULTURE AEROBIC BACTERIAL   BLOOD CULTURE   BLOOD CULTURE     US Lower Extremity Venous Duplex Right   Final Result   IMPRESSION:   1.  No deep venous thrombosis in the right lower extremity.      XR Chest Port 1 View   Final Result   IMPRESSION: Linear opacities in the left lung base could represent   atelectasis or developing pneumonia. No pleural effusions or   pneumothorax. Median sternotomy and mediastinal surgical clips.   Tortuous aorta with atherosclerotic calcifications. Normal heart size.      KAY CARRANZA MD          Patient's response to treatments and/or interventions: Tolerated well    To be done/followed up on inpatient unit:  Continue plan of care    Does this patient have any cognitive concerns?: Forgetful    Activity level - Baseline/Home:  Unknown  Activity Level - Current:   Unknown    Patient's Preferred language: English   Needed?: No    Isolation: None and COVID r/o and special precautions, awaiting results (pt has had both covid vaccine doses)  Infection: Not Applicable  COVID r/o and special precautions  Patient tested for COVID 19 prior to admission: YES  Bariatric?: No    Vital Signs:   Vitals:    04/01/21 1559 04/01/21 1630 04/01/21 1800 04/01/21 1900   BP: (!) 141/83 129/82 131/65 132/65   Pulse: 96 93 99 92   Resp: 22 (!) 37 (!) 33 21   Temp: 99.3  F (37.4  C)      TempSrc: Temporal      SpO2: 95% 96% 95% 95%   Weight: 124.7 kg (275 lb)      Height: 1.905 m (6' 3\")          Cardiac Rhythm:     Was the PSS-3 completed:   Yes  What interventions are required if any?               Family Comments: Wife at bedside  OBS brochure/video discussed/provided to patient/family: N/A              Name of person given brochure if not patient: n/a              Relationship to patient: n/a    For the majority of the shift this patient's behavior was Green.   Behavioral interventions performed were Rounding.    ED NURSE PHONE " NUMBER: *[id number]

## 2021-04-02 NOTE — PROGRESS NOTES
04/02/21 1342   Quick Adds   Type of Visit Initial Occupational Therapy Evaluation   Living Environment   People in home spouse   Current Living Arrangements house  (multi level home )   Home Accessibility stairs to enter home;stairs within home   Transportation Anticipated family or friend will provide  (Pt typically drives )   Self-Care   Usual Activity Tolerance good   Current Activity Tolerance moderate   Equipment Currently Used at Home none   Disability/Function   Walking or Climbing Stairs Difficulty no   Dressing/Bathing Difficulty no   Toileting issues no   Fall history within last six months yes   Number of times patient has fallen within last six months 2   General Information   Onset of Illness/Injury or Date of Surgery 04/01/21   Referring Physician Juan Tapia MD   Patient/Family Therapy Goal Statement (OT) Home    Additional Occupational Profile Info/Pertinent History of Current Problem Per chart: This is an 86-year-old male with history of coronary artery disease, status post CABG, hypertension, GERD, diabetes mellitus type 2, anxiety, obstructive sleep apnea on CPAP, ascending aortic dilatation, who came to the ER with complaint of fever, chills, weakness, tiredness and right lower leg redness for the last 2 or 3 days. Saee chart for details.    Cognitive Status Examination   Orientation Status orientation to person, place and time   Transfers   Transfers sit-stand transfer;toilet transfer;bed-chair transfer   Transfer Skill: Bed to Chair/Chair to Bed   Bed-Chair [place] (Transfers) set up;minimum assist (75% patient effort);2 person assist   Sit-Stand Transfer   Sit-Stand [place] (Transfers) set up;minimum assist (75% patient effort);2 person assist   Activities of Daily Living   BADL Assessment/Intervention grooming   Grooming Assessment/Training   [place] (Grooming) set up  (SBA while seated EOB)   Instrumental Activities of Daily Living (IADL)   Previous  Responsibilities meal prep;laundry;shopping;yardwork;medication management;finances;driving   Clinical Impression   Criteria for Skilled Therapeutic Interventions Met (OT) yes   OT Diagnosis Decreased independence for I/ADLs    OT Problem List-Impairments impacting ADL problems related to;activity tolerance impaired;strength   ADL comments/analysis Decreased independence for I/ADLs    Assessment of Occupational Performance 1-3 Performance Deficits   Identified Performance Deficits Decreased independence for I/ADLs (Dressing, bathing, toileting)   Planned Therapy Interventions (OT) ADL retraining;IADL retraining;transfer training   Clinical Decision Making Complexity (OT) low complexity   Therapy Frequency (OT) Daily   Predicted Duration of Therapy 4 days   Risk & Benefits of therapy have been explained evaluation/treatment results reviewed;care plan/treatment goals reviewed;risks/benefits reviewed;patient;spouse/significant other   OT Discharge Planning    OT Discharge Recommendation (DC Rec) Transitional Care Facility   OT Rationale for DC Rec Skilled OT in TCU setting to address independence in I/ADLs. If pt returns home, pt will need assist for all I/ADLs and home OT.    Total Evaluation Time (Minutes)   Total Evaluation Time (Minutes) 7

## 2021-04-02 NOTE — H&P
Admitted:     04/01/2021      HISTORY OF PRESENT ILLNESS:  This is an 86-year-old male with history of coronary artery disease, status post CABG, hypertension, GERD, diabetes mellitus type 2, anxiety, obstructive sleep apnea on CPAP, ascending aortic dilatation, who came to the ER with complaint of fever, chills, weakness, tiredness and right lower leg redness for the last 2 or 3 days.      According to the patient, he started feeling not well over the weekend and since then has been feeling weak and tired, has been having fever up to 102 degrees Fahrenheit, having a lot of chills, feeling weak and tired.  He woke up to go to the bathroom and was feeling weak and the floor was slippery and he lost his balance, fell down.  In the process he tried to prevent hitting the head.  He never hit his head.  He never passed out.  He has some pain in his back, but able to get up with help, so he decided to come to the ER.  The patient denies having any chest pain, orthopnea, PND, palpitation.  He has chronic shortness of breath, which is stable and not worse.  He denies having any lightheadedness or dizziness.  Overall, feeling weak and fatigued.  No abdominal pain, dysuria, hematuria at this time.      In the ER he was evaluated.  He had a low-grade temperature of 99.3, but the lactic acid on the workup came back 6.4.  He was resuscitated and found to have right lower extremity cellulitis, so the Hospitalist Service is consulted for admission.      ASSESSMENT AND PLAN:   1.  Severe sepsis secondary to right lower extremity cellulitis:  This is an 86-year-old male with history of diabetes mellitus, hypertension, coronary artery disease, presenting with weakness, tiredness, low-grade fever.  His white cell count is elevated at 19.2, low-grade temperature and lactic acid was elevated to 6.4 on admission.  He received 1 liter of bolus and he is getting a second one.  His lactic acid came down to 3.4.  Unfortunately, no BMP or CMP  was done uptill now, is pending at this time.  I will keep him on IV fluids at 100-125 mL per hour.  Keep lactic acid checks every 4-hour and boluses as needed.  Now the lactic acid is coming down appropriately.  He was started on IV Zosyn and vancomycin as per severe sepsis protocol.  I agree with that.  I will keep him on vancomycin and Zosyn for now.  Follow the blood culture and deescalate in next 1-2 days if needed.  I asked him to elevate his right lower extremity.  Keep him on IV fluids.  Blood cultures are obtained and we will follow the blood cultures. US negative for DVT  2.  Generalized weakness:  I think this is secondary to severe sepsis, elevated lactic acid and right lower extremity cellulitis and will improve once his infection is improved and sepsis resolves.  We will have PT and OT evaluate the patient.   3.  Coronary artery disease:  Has been stable.  Recently saw his cardiologist, has been doing well at this time.  Keep him on aspirin, lisinopril, amlodipine at this time.   4.  History of diabetes mellitus type 2:  Is on metformin 1000 mg b.i.d.  I will keep holding the metformin for now, keep him on sliding scale insulin for correction, hypoglycemia protocol.  Once his symptoms improve and creatinine remains stable, he can be back on the metformin.   5.  Obstructive sleep apnea on CPAP:  We will continue the CPAP while he is in the hospital.   6.  Hypertension:  He is on amlodipine 5 mg b.i.d. and lisinopril 20 mg daily.  I think he has significant swelling in his lower extremity, I think may be secondary to amlodipine and been on gabapentin.  We will keep him 5 mg daily for now.   7.  Gastroesophageal reflux disease:  On Prilosec.  I will continue with that.   8.  Chronic back pain:  On Neurontin 900 mg 3 times a day.  I will continue with that.   9.  Hyperlipidemia:  On Pravachol.  We will keep him on that.   10.  Deep venous thrombosis prophylaxis with Lovenox.      CODE STATUS:  Full code as  per the patient wishes.  I had a detailed discussion with the patient regarding his code status and he wanted to be full code at this point.  I noted that previously on  he was DNR, but every other time he was full code.  As per his wishes, he wanted to be full code for now.      The case discussed with the ER physician and the nursing staff taking care of the patient.         GREGORIO SULLIVAN MD             D: 2021   T: 2021   MT: BIANKA      Name:     NAYELI RAI   MRN:      [id number]        Account:      [id number]   :      1935        Admitted:     2021                   Document: [id number]       cc: Hamzah Hodge MD

## 2021-04-02 NOTE — H&P
[facility]    History and Physical  Hospitalist       Date of Admission:  4/1/2021    Assessment & Plan     This is an 86-year-old male with history of coronary artery disease, status post CABG, hypertension, GERD, diabetes mellitus type 2, anxiety, obstructive sleep apnea on CPAP, ascending aortic dilatation, who came to the ER with complaint of fever, chills, weakness, tiredness and right lower leg redness for the last 2 or 3 days.      ASSESSMENT AND PLAN:   1.  Severe sepsis secondary to right lower extremity cellulitis:  This is an 86-year-old male with history of diabetes mellitus, hypertension, coronary artery disease, presenting with weakness, tiredness, low-grade fever.  His white cell count is elevated at 19.2, low-grade temperature and lactic acid was elevated to 6.4 on admission.  He received 1 liter of bolus and he is getting a second one.  His lactic acid came down to 3.4.  Unfortunately, no BMP or CMP was done uptill now, is pending at this time.  I will keep him on IV fluids at 100-125 mL per hour.  Keep lactic acid checks every 4-hour and boluses as needed.  Now the lactic acid is coming down appropriately.  He was started on IV Zosyn and vancomycin as per severe sepsis protocol.  I agree with that.  I will keep him on vancomycin and Zosyn for now.  Follow the blood culture and deescalate in next 1-2 days if needed.  I asked him to elevate his right lower extremity.  Keep him on IV fluids.  Blood cultures are obtained and we will follow the blood cultures. US negative for DVT  2.  Generalized weakness:  I think this is secondary to severe sepsis, elevated lactic acid and right lower extremity cellulitis and will improve once his infection is improved and sepsis resolves.  We will have PT and OT evaluate the patient.   3.  Coronary artery disease:  Has been stable.  Recently saw his cardiologist, has been doing well at this time.  Keep him on aspirin, lisinopril, amlodipine  at this time.   4.  History of diabetes mellitus type 2:  Is on metformin 1000 mg b.i.d.  I will keep holding the metformin for now, keep him on sliding scale insulin for correction, hypoglycemia protocol.  Once his symptoms improve and creatinine remains stable, he can be back on the metformin.   5.  Obstructive sleep apnea on CPAP:  We will continue the CPAP while he is in the hospital.   6.  Hypertension:  He is on amlodipine 5 mg b.i.d. and lisinopril 20 mg daily.  I think he has significant swelling in his lower extremity, I think may be secondary to amlodipine and been on gabapentin.  We will keep him 5 mg daily for now.   7.  Gastroesophageal reflux disease:  On Prilosec.  I will continue with that.   8.  Chronic back pain:  On Neurontin 900 mg 3 times a day.  I will continue with that.   9.  Hyperlipidemia:  On Pravachol.  We will keep him on that.   10.  Deep venous thrombosis prophylaxis with Lovenox.      CODE STATUS:  Full code as per the patient wishes.  I had a detailed discussion with the patient regarding his code status and he wanted to be full code at this point.  I noted that previously on 01/11 he was DNR, but every other time he was full code.  As per his wishes, he wanted to be full code for now.      The case discussed with the ER physician and the nursing staff taking care of the patient.         JUAN TAPIA MD         DVT Prophylaxis: Enoxaparin (Lovenox) SQ  Code Status: Full Code    Disposition: Expected discharge in 2 days once stable    Juan Tapia MD    Primary Care Physician   Hamzah Hodge    Chief Complaint   Fever, chills, weakness and fall     History is obtained from the patient    History of Present Illness   Admitted:     04/01/2021      HISTORY OF PRESENT ILLNESS:  This is an 86-year-old male with history of coronary artery disease, status post CABG, hypertension, GERD, diabetes mellitus type 2, anxiety, obstructive sleep apnea on CPAP, ascending aortic dilatation, who came  to the ER with complaint of fever, chills, weakness, tiredness and right lower leg redness for the last 2 or 3 days.      According to the patient, he started feeling not well over the weekend and since then has been feeling weak and tired, has been having fever up to 102 degrees Fahrenheit, having a lot of chills, feeling weak and tired.  He woke up to go to the bathroom and was feeling weak and the floor was slippery and he lost his balance, fell down.  In the process he tried to prevent hitting the head.  He never hit his head.  He never passed out.  He has some pain in his back, but able to get up with help, so he decided to come to the ER.  The patient denies having any chest pain, orthopnea, PND, palpitation.  He has chronic shortness of breath, which is stable and not worse.  He denies having any lightheadedness or dizziness.  Overall, feeling weak and fatigued.  No abdominal pain, dysuria, hematuria at this time.      In the ER he was evaluated.  He had a low-grade temperature of 99.3, but the lactic acid on the workup came back 6.4.  He was resuscitated and found to have right lower extremity cellulitis, so the Hospitalist Service is consulted for admission.       Past Medical History    I have reviewed this patient's medical history and updated it with pertinent information if needed.   Past Medical History:   Diagnosis Date     Anxiety state, unspecified      Aortic root dilatation (H)      Arthritis      Ascending aorta dilatation (H)      Basal cell cancer     s/p Mohs nose and bridge of nose on R.     Bunion      CAD (coronary artery disease)     CABG 1992: LIMA to LAD, SANDI to RCA, SVG to OM1 and OM2     Contact dermatitis and other eczema, due to unspecified cause     eczema - has light treatments with Dr. Rivera     Disorders of bursae and tendons in shoulder region, unspecified      Esophageal reflux      Essential hypertension, benign      Gallbladder disease      GERD (gastroesophageal reflux  disease)      Heart attack (H)      Hyperlipidemia LDL goal <70      Left ventricular diastolic dysfunction      Low HDL (under 40) 3/28/2014     Nasal/sinus dis NEC     s/p surgery in 1995     Obesity      Osteoarthrosis, unspecified whether generalized or localized, shoulder region      Other hammer toe (acquired)      Sleep apnea     USES CPAP     Spinal stenosis, unspecified region other than cervical     MRI done 2006     Type 2 diabetes, HbA1c goal < 7% (H) 3/12/2015     Urge incontinence        Past Surgical History   I have reviewed this patient's surgical history and updated it with pertinent information if needed.  Past Surgical History:   Procedure Laterality Date     ABDOMEN SURGERY  1994    gall bladder     BIOPSY      arms     C CABG, ARTERY-VEIN, FOUR  11/1992    CABG - LIMA to left anterior descending and saphenous vein bypass graft to the first and second obtuse marginal branch of the circumflex and the right mammary to the right coronary artery     CHOLECYSTECTOMY  6/1994     COLONOSCOPY N/A 4/11/2017    Procedure: COMBINED COLONOSCOPY, SINGLE OR MULTIPLE BIOPSY/POLYPECTOMY BY BIOPSY;  Surgeon: Bladimir Garner MD;  Location:  GI     CV LEFT HEART CATH  5-92, 6-92    Angioplasty     ENT SURGERY  5/1995    sinus surgery     ESOPHAGOSCOPY, GASTROSCOPY, DUODENOSCOPY (EGD), DILATATION, COMBINED  7/17/2014    Procedure: COMBINED ESOPHAGOSCOPY, GASTROSCOPY, DUODENOSCOPY (EGD), DILATATION;  Surgeon: Bladimir Garner MD;  Location:  GI     EYE SURGERY      cataracts removed both eyes     HC COLONOSCOPY THRU STOMA W BIOPSY/CAUTERY TUMOR/POLYP/LESION  5/2007     INJECT EPIDURAL LUMBAR       LAMINECTOMY LUMBAR TWO LEVELS N/A 4/29/2015    Procedure: LAMINECTOMY LUMBAR TWO LEVELS;  Surgeon: Bladimir Keenan MD;  Location:  OR     THORACIC SURGERY  11/1992    bypass     [facility] NONSPECIFIC PROCEDURE  6-94    Gail lap     Z NONSPECIFIC PROCEDURE  1995    sinus surgery     [facility] NONSPECIFIC PROCEDURE       bilateral cataract surgery       Prior to Admission Medications   Prior to Admission Medications   Prescriptions Last Dose Informant Patient Reported? Taking?   Cholecalciferol (VITAMIN D) 2000 UNIT tablet 3/31 or 4/1 Self Yes Yes   Sig: Take 2,000 Units by mouth daily.   Menthol, Topical Analgesic, (ICY HOT BACK EX) prn Self Yes Yes   Sig: Externally apply topically daily as needed (neck pain)    ORDER FOR DME  Self Yes No   Sig: Uses Cpap machine for sleep apnea   acetaminophen (ACETAMIN) 500 MG tablet 4/1/2021 at am Self Yes Yes   Sig: Take 1,000 mg by mouth 2 times daily    amLODIPine (NORVASC) 5 MG tablet 4/1/2021 at am  No Yes   Sig: TAKE 1 TABLET BY MOUTH TWO  TIMES DAILY   aspirin 81 MG EC tablet 3/31 or 4/1 Self Yes Yes   Sig: Take 81 mg by mouth daily    blood glucose (ONETOUCH ULTRA) test strip   No No   Sig: USE TO TEST BLOOD SUGAR 2  TIMES DAILY OR AS DIRECTED.   blood glucose monitoring (ONE TOUCH ULTRASOFT) lancets   No No   Sig: USE TO TEST BLOOD SUGAR 2  TIMES DAILY OR AS DIRECTED.   diclofenac (VOLTAREN) 1 % topical gel prn  Yes Yes   Sig: Place 4 g onto the skin 4 times daily as needed for moderate pain (Apply to back to neck.)   finasteride (PROSCAR) 5 MG tablet 3/31 or 4/1  No Yes   Sig: TAKE 1 TABLET BY MOUTH  DAILY   gabapentin (NEURONTIN) 300 MG capsule 4/1/2021 at am  No Yes   Sig: TAKE 3 CAPSULES BY MOUTH 3  TIMES DAILY   Patient taking differently: Pt takes 3 capsules twice daily.   lisinopril (ZESTRIL) 20 MG tablet 3/31 or 4/1  No Yes   Sig: TAKE 1 TABLET BY MOUTH  DAILY   metFORMIN (GLUCOPHAGE-XR) 500 MG 24 hr tablet 4/1/2021 at am  No Yes   Sig: TAKE 2 TABLETS BY MOUTH TWO TIMES DAILY WITH MEALS   omeprazole (PRILOSEC) 20 MG DR capsule 3/31 or 4/1  No Yes   Sig: TAKE 1 CAPSULE BY MOUTH  DAILY   pravastatin (PRAVACHOL) 20 MG tablet 3/31 or 4/1  No Yes   Sig: TAKE 1 TABLET BY MOUTH  DAILY   tamsulosin (FLOMAX) 0.4 MG capsule 3/31 or 4/1  No Yes   Sig: TAKE 1 CAPSULE BY MOUTH  DAILY    vitamin B-12 (CYANOCOBALAMIN) 1000 MCG tablet Past Week  Yes Yes   Sig: Take 1,000 mcg by mouth every other day       Facility-Administered Medications: None     Allergies   No Known Allergies    Social History   I have reviewed this patient's social history and updated it with pertinent information if needed. Austen oFwler  reports that he quit smoking about 29 years ago. His smoking use included cigarettes, cigars, and pipe. He started smoking about 67 years ago. He has a 60.00 pack-year smoking history. He has never used smokeless tobacco. He reports current alcohol use. He reports that he does not use drugs.    Family History   I have reviewed this patient's family history and updated it with pertinent information if needed.   Family History   Problem Relation Age of Onset     Cancer Brother         MELANOMA     Diabetes Mother         AODM     Thyroid Disease Mother      Diabetes Father         AODM     Myocardial Infarction Father      Cerebrovascular Disease Brother      Parkinsonism Sister      Family History Negative Son      Family History Negative Son      Family History Negative Son      Family History Negative Daughter      Coronary Artery Disease Brother         dod 2019     Cerebrovascular Disease Brother         dod 2019     Other Cancer Brother         dod 2000/melanoma       Review of Systems   CONSTITUTIONAL:  positive for  fevers, chills, fatigue and malaise  EYES:  negative  HEENT:  negative  RESPIRATORY:  negative  CARDIOVASCULAR:  negative  GASTROINTESTINAL:  negative  GENITOURINARY:  negative  INTEGUMENT/BREAST:  negative  HEMATOLOGIC/LYMPHATIC:  negative  ALLERGIC/IMMUNOLOGIC:  negative  ENDOCRINE:  negative  MUSCULOSKELETAL:  positive for  Right leg discomfort and redness   NEUROLOGICAL:  negative  BEHAVIOR/PSYCH:  negative    Physical Exam   Temp: 99.3  F (37.4  C) Temp src: Temporal BP: 132/59 Pulse: 94   Resp: 16 SpO2: 93 %      Vital Signs with Ranges  Temp:  [99.3  F (37.4  C)]  99.3  F (37.4  C)  Pulse:  [92-99] 94  Resp:  [16-37] 16  BP: (129-150)/(59-83) 132/59  SpO2:  [93 %-96 %] 93 %  275 lbs 0 oz    Constitutional: patient flushed, having chills, but, cooperative.  Eyes: Conjunctiva and pupils examined and normal.  HEENT: drt mucous membranes, normal dentition.  Respiratory: Clear to auscultation bilaterally, no crackles or wheezing.  Cardiovascular: Regular rate and rhythm, normal S1 and S2, and no murmur noted.  GI: Soft, non-distended, non-tender, normal bowel sounds.  Lymph/Hematologic: No anterior cervical or supraclavicular adenopathy.  Skin: No rashes, no cyanosis, bilateral LE edema, right Leg from knee down is warm, erythematous, swollen and mild tenderness till the dorsal surface of right foot.   Musculoskeletal: No joint swelling, erythema or tenderness.  Neurologic: Cranial nerves 2-12 intact, normal strength and sensation.  Psychiatric: Alert, oriented to person, place and time, no obvious anxiety or depression.    Data   Data reviewed today:  I personally reviewed no images or EKG's today.  Recent Labs   Lab 04/01/21  1934 04/01/21  1615   WBC  --  19.2*   HGB  --  13.1*   MCV  --  87   PLT  --  192     --    POTASSIUM 3.6  --    CHLORIDE 103  --    CO2 PENDING  --    BUN PENDING  --    CR PENDING  --    ANIONGAP PENDING  --    BRANDON PENDING  --    GLC PENDING  --    ALBUMIN PENDING  --    PROTTOTAL PENDING  --    BILITOTAL PENDING  --    ALKPHOS PENDING  --    ALT PENDING  --    AST PENDING  --        Recent Results (from the past 24 hour(s))   XR Chest Port 1 View    Narrative    XR CHEST PORT 1 VW 4/1/2021 5:01 PM    HISTORY: Fever    COMPARISON: Chest CT on 1/11/2020      Impression    IMPRESSION: Linear opacities in the left lung base could represent  atelectasis or developing pneumonia. No pleural effusions or  pneumothorax. Median sternotomy and mediastinal surgical clips.  Tortuous aorta with atherosclerotic calcifications. Normal heart size.    KAY CARRANZA,  MD   US Lower Extremity Venous Duplex Right    Narrative    EXAM: US LOWER EXTREMITY VENOUS DUPLEX RIGHT  LOCATION: [facility]  DATE/TIME: 4/1/2021 5:46 PM    INDICATION: Right leg pain and swelling.  COMPARISON: 8/8/2019.  TECHNIQUE: Venous Duplex ultrasound of the right lower extremity with and without compression, augmentation and duplex. Color flow and spectral Doppler with waveform analysis performed.    FINDINGS: Exam includes the common femoral, femoral, popliteal, and contralateral common femoral veins as well as segmentally visualized deep calf veins and greater saphenous vein.     RIGHT: No deep vein thrombosis. No superficial thrombophlebitis. No popliteal cyst.      Impression    IMPRESSION:  1.  No deep venous thrombosis in the right lower extremity.

## 2021-04-02 NOTE — PHARMACY-VANCOMYCIN DOSING SERVICE
Pharmacy Vancomycin Initial Note  Date of Service 2021  Patient's  1935  86 year old, male    Indication: Skin and Soft Tissue Infection, sepsis symptoms in ed with wbc 19.2, temp, lactate 6.2.  UC and BCx2 pending     Current estimated CrCl = Estimated Creatinine Clearance: 94.9 mL/min (based on SCr of 0.81 mg/dL).    Creatinine for last 3 days  2021:  7:34 PM Creatinine 0.81 mg/dL    Recent Vancomycin Level(s) for last 3 days  No results found for requested labs within last 72 hours.      Vancomycin IV Administrations (past 72 hours)                   vancomycin 2500 mg in 0.9% NaCl 500 ml intermittent infusion 2,500 mg (mg) 2,500 mg New Bag 21                Nephrotoxins and other renal medications (From now, onward)    Start     Dose/Rate Route Frequency Ordered Stop    21 0900  lisinopril (ZESTRIL) tablet 20 mg      20 mg Oral DAILY 21 21421 0600  vancomycin 2000 mg in 0.9% NaCl 500 ml intermittent infusion 2,000 mg      2,000 mg  over 90 Minutes Intravenous EVERY 12 HOURS 21 2201      21 2300  piperacillin-tazobactam (ZOSYN) 3.375 g vial to attach to  mL bag      3.375 g  over 30 Minutes Intravenous EVERY 6 HOURS 21 214            Contrast Orders - past 72 hours (72h ago, onward)    None                Plan:  1.  Start vancomycin  2500mg loading dose then 2000 mg IV q12h.   2.  Goal Trough Level: 15-20 mg/L   3.  Pharmacy will check trough levels as appropriate in 1-3 Days.    4. Serum creatinine levels will be ordered daily for the first week of therapy and at least twice weekly for subsequent weeks.    5. [place] method utilized to dose vancomycin therapy: Method 1    Bladimir Elliott [facility]

## 2021-04-02 NOTE — PLAN OF CARE
DATE & TIME:  4/2/2021 4836-1233   Cognitive Concerns/ Orientation : A&Ox4. Forgetful at times.   BEHAVIOR & AGGRESSION TOOL COLOR: Green  CIWA SCORE: NA   ABNL VS/O2: VSS on RA, on continuous oxygen monitoring.   MOBILITY: Not OOB. Pt extremely weak. Attempted to stand at bedside to use urinal and patient was too weak. Able to turn himself in bed.   PAIN MANAGMENT: Chronic back and hip pain. R lower leg pain from cellulitis rated 3/10. On scheduled tylenol.   DIET: MOD CHO.   BOWEL/BLADDER: Continent, using urinal with assistance. Pt. Has frequency/hesitancy. Good urine output.   ABNL LAB/BG: Q4H lactic acid checks. Most recent 2.3, coming down.  , 148.   DRAIN/DEVICES: PIV in R hand infusing NS at 125/hr. Intermittent zosyn and vanco.   TELEMETRY RHYTHM: Sinus rhythm with BBB  SKIN: Red/warm on RLE. Wound on  L elbow and L forearm. Perineum reddended/moist in between folds. Baby powder applied.   TESTS/PROCEDURES: Blood cultures collected 4/1.   D/C DAY/GOALS/PLACE: Pending improvement  OTHER IMPORTANT INFO: Elevate RLE as tolerated. Patient gets SOB at times. States it is his baseline, on continuous o2 monitoring. Q4H CMS checks. +2 edema on LLE, +3 edema on RLE.

## 2021-04-02 NOTE — ED NOTES
DATE:  4/1/2021   TIME OF RECEIPT FROM LAB:  1830  LAB TEST:  lactic  LAB VALUE:  4.3  RESULTS GIVEN WITH READ-BACK TO (PROVIDER):  Eve Ware MD  TIME LAB VALUE REPORTED TO PROVIDER:   1830

## 2021-04-02 NOTE — PROGRESS NOTES
[facility]    Hospitalist Progress Note    Date of Service (when I saw the patient): 04/02/2021    Assessment & Plan   Austen Fowler is a 86 year old male who was admitted on 4/1/2021.  HISTORY OF PRESENT ILLNESS:  This is an 86-year-old male with history of coronary artery disease, status post CABG, hypertension, GERD, diabetes mellitus type 2, anxiety, obstructive sleep apnea on CPAP, ascending aortic dilatation, who came to the ER with complaint of fever, chills, weakness, tiredness and right lower leg redness for the last 2 or 3 days.     In the ER he was evaluated.  He had a low-grade temperature of 99.3, but the lactic acid on the workup came back 6.4.  He was resuscitated and found to have right lower extremity cellulitis, so the Hospitalist Service is consulted for admission.      ASSESSMENT AND PLAN:   1.  Severe sepsis secondary to right lower extremity cellulitis:   Lactic acidosis secondary to the above  at 6.4   This is an 86-year-old male with history of diabetes mellitus, hypertension, coronary artery disease, presenting with weakness, tiredness, low-grade fever.  His white cell count is elevated at 19.2, low-grade temperature and lactic acid was elevated to 6.4 on admission.  He received 1 liter of bolus and he is getting a second one.  His lactic acid came down to 3.4.  Unfortunately, no BMP or CMP was done up to now, is pending at this time.  I will keep him on IV fluids at 100-125 mL per hour.  Keep lactic acid checks every 4-hour and boluses as needed.  Now the lactic acid is coming down appropriately.  He was started on IV Zosyn and vancomycin as per severe sepsis protocol.  I agree with that.  I will keep him on vancomycin and Zosyn for now.  Follow the blood culture and deescalate in next 1-2 days if needed.  I asked him to elevate his right lower extremity.   Continue Zosyn for now we will stop vancomycin today  Blood cultures are negative so far  Right lower  extremity still red and warm today continue antibiotics for now  Lactate improved  WBC count improved from 19 K to 13 K today  Reduce IV fluids to 50 mL/h today    2.  Generalized weakness:  I think this is secondary to severe sepsis, elevated lactic acid and right lower extremity cellulitis and will improve once his infection is improved and sepsis resolves.  We will have PT and OT evaluate the patient.   3.  Coronary artery disease:    Has been stable.  Recently saw his cardiologist, has been doing well at this time.  Keep him on aspirin, lisinopril, amlodipine at this time.   4.  History of diabetes mellitus type 2:  Is on metformin 1000 mg b.i.d.  I will keep holding the metformin for now, keep him on sliding scale insulin for correction, hypoglycemia protocol.  Once his symptoms improve and creatinine remains stable, he can be back on the metformin.   5.  Obstructive sleep apnea on CPAP:  We will continue the CPAP while he is in the hospital.   6.  Hypertension:  He is on amlodipine 5 mg b.i.d. and lisinopril 20 mg daily.  I think he has significant swelling in his lower extremity, I think may be secondary to amlodipine and been on gabapentin.  We will keep him 5 mg daily for now.   7.  Gastroesophageal reflux disease:  On Prilosec.  I will continue with that.   8.  Chronic back pain:  On Neurontin 900 mg 3 times a day.  I will continue with that.   9.  Hyperlipidemia:  On Pravachol.  We will keep him on that.   10.  Deep venous thrombosis prophylaxis with Lovenox.      CODE STATUS:  Full code as per the patient wishes.  I had a detailed discussion with the patient regarding his code status and he wanted to be full code at this point.  I noted that previously on 01/11 he was DNR, but every other time he was full code.  As per his wishes, he wanted to be full code for now.          Code Status: Full Code    Disposition: Expected discharge in 3 to 4 days after improvement in right lower extremity cellulitis and  sepsis and possible TCU placement    Discussed with bedside RN and patient today  Greater than 35 minutes were spent in reviewing the chart, counseling the patient in collaboration of johanna Valverde MD  [phone] (P)      Interval History   Patient complains of feeling weak and tired.  Right lower extremity cellulitis still red and warm today.  WBC count is improving.  Denies any shortness of breath or chest pain today.  Febrile up to 101.3 last night    -Data reviewed today: I reviewed all new labs and imaging results over the last 24 hours. I personally reviewed no images or EKG's today.    Physical Exam   Temp: 97.5  F (36.4  C) Temp src: Oral BP: 137/79 Pulse: 80   Resp: 20 SpO2: 93 % O2 Device: None (Room air)    Vitals:    04/01/21 1559 04/02/21 0604   Weight: 124.7 kg (275 lb) 127 kg (279 lb 15.8 oz)     Vital Signs with Ranges  Temp:  [97.5  F (36.4  C)-101.3  F (38.5  C)] 97.5  F (36.4  C)  Pulse:  [] 80  Resp:  [8-37] 20  BP: (106-154)/(53-83) 137/79  SpO2:  [92 %-96 %] 93 %  I/O last 3 completed shifts:  In: -   Out: 900 [Urine:900]    Constitutional: Awake, alert, cooperative, no apparent distress  Respiratory: Clear to auscultation bilaterally, no crackles or wheezing  Cardiovascular: Regular rate and rhythm, normal S1 and S2, and no murmur noted  GI: Normal bowel sounds, soft, non-distended, non-tender  Skin/Integumen: No rashes, no cyanosis, right lower extremity is red and warm to touch with active cellulitis.  Right lower extremity tinea pedis with interdigital tinea pedis noted and miconazole powder ordered  Other:     Medications     sodium chloride 50 mL/hr at 04/02/21 1129       acetaminophen  1,000 mg Oral BID     amLODIPine  5 mg Oral Daily     aspirin  81 mg Oral Daily     cyanocobalamin  1,000 mcg Oral Every Other Day     enoxaparin ANTICOAGULANT  40 mg Subcutaneous Q24H     finasteride  5 mg Oral Daily     gabapentin  900 mg Oral BID     insulin aspart  1-7 Units  Subcutaneous TID AC     insulin aspart  1-5 Units Subcutaneous At Bedtime     lisinopril  20 mg Oral Daily     magnesium oxide  400 mg Oral TID     miconazole   Topical BID     omeprazole  20 mg Oral Daily     piperacillin-tazobactam  3.375 g Intravenous Q6H     potassium chloride  40 mEq Oral Once     pravastatin  20 mg Oral Daily     sodium chloride (PF)  3 mL Intracatheter Q8H     sodium chloride (PF)  3 mL Intracatheter Q8H     tamsulosin  0.4 mg Oral Daily     vitamin D3  50 mcg Oral Daily       Data   Recent Labs   Lab 04/02/21  1114 04/01/21  2231 04/01/21  1934 04/01/21  1615   WBC 13.3*  --   --  19.2*   HGB 11.1*  --   --  13.1*   MCV 85  --   --  87     --   --  192     --  135  --    POTASSIUM 3.4  --  3.6  --    CHLORIDE 105  --  103  --    CO2 25  --  25  --    BUN 13  --  17  --    CR 0.68 0.82 0.81  --    ANIONGAP 7  --  7  --    BRANDON 8.0*  --  8.3*  --    *  --  133*  --    ALBUMIN 2.6*  --  2.9*  --    PROTTOTAL 6.0*  --  6.5*  --    BILITOTAL 0.4  --  0.7  --    ALKPHOS 41  --  44  --    ALT 69  --  57  --    *  --  304*  --        Recent Results (from the past 24 hour(s))   XR Chest Port 1 View    Narrative    XR CHEST PORT 1 VW 4/1/2021 5:01 PM    HISTORY: Fever    COMPARISON: Chest CT on 1/11/2020      Impression    IMPRESSION: Linear opacities in the left lung base could represent  atelectasis or developing pneumonia. No pleural effusions or  pneumothorax. Median sternotomy and mediastinal surgical clips.  Tortuous aorta with atherosclerotic calcifications. Normal heart size.    KAY CARRANZA MD   US Lower Extremity Venous Duplex Right    Narrative    EXAM: US LOWER EXTREMITY VENOUS DUPLEX RIGHT  LOCATION: [facility]  DATE/TIME: 4/1/2021 5:46 PM    INDICATION: Right leg pain and swelling.  COMPARISON: 8/8/2019.  TECHNIQUE: Venous Duplex ultrasound of the right lower extremity with and without compression, augmentation and duplex. Color flow and spectral  Doppler with waveform analysis performed.    FINDINGS: Exam includes the common femoral, femoral, popliteal, and contralateral common femoral veins as well as segmentally visualized deep calf veins and greater saphenous vein.     RIGHT: No deep vein thrombosis. No superficial thrombophlebitis. No popliteal cyst.      Impression    IMPRESSION:  1.  No deep venous thrombosis in the right lower extremity.

## 2021-04-02 NOTE — PROGRESS NOTES
RECEIVING UNIT ED HANDOFF REVIEW    ED Nurse Handoff Report was reviewed by: Sylvia Carrero RN on April 1, 2021 at 9:19 PM

## 2021-04-02 NOTE — PROGRESS NOTES
Admission    Patient arrives to room 627 via cart from ED.  Care plan note:   Cognitive Concerns/ Orientation : A&Ox4. Forgetful at times.   BEHAVIOR & AGGRESSION TOOL COLOR: Green  CIWA SCORE: NA   ABNL VS/O2: Slightly hypertensive. Other VSS on RA.   MOBILITY: Not OOB. Pt extremely weak. Attempted to stand at bedside to use urinal and patient was too weak. Able to turn himself in bed.   PAIN MANAGMENT: Chronic back and hip pain. R lower leg pain from cellulitis rated 3/10. On scheduled tylenol.   DIET: MOD CHO.   BOWEL/BLADDER: Continent, using urinal with assistance. Pt. Has frequency/hesitancy.   ABNL LAB/BG: Q4H lactic acid checks. Most recent 3.1, coming down.  , 148.   DRAIN/DEVICES: PIV in R hand infusing NS at 125/hr. Intermittent zosyn and vanco.   TELEMETRY RHYTHM: Sinus rhythm with BBB  SKIN: Red/warm on RLE. Wound on  L elbow and L forearm. Perineum reddended/moist in between folds. Baby powder applied.   TESTS/PROCEDURES: Blood cultures collected 4/1.   D/C DAY/GOALS/PLACE: Pending improvement  OTHER IMPORTANT INFO: Elevate RLE as tolerated. Patient gets SOB at times. States it is his baseline, on continuous o2 monitoring.     Inpatient nursing criteria listed below were met:    PCD's Documented: NA  Skin issues/needs documented :Yes  Isolation education started/completed NA  Patient allergies verified with patient: Yes  Verified completion of [place] Risk Assessment Tool:  Yes  Verified completion of Guardianship screening tool: No  Fall Prevention: Care plan updated, Education given and documented Yes  Care Plan initiated: Yes  Home medications documented in belongings flowsheet: NA  Patient belongings documented in belongings flowsheet: NA. Wife took all belongings home.   Reminder note (belongings/ medications) placed in discharge instructions:NA  Admission profile/ required documentation complete: Yes  Bedside Report Letter given and explained to patient NA  Visitor Designated? Yes. Wife,  Anastasia Caballero  If patient is a 72 hour hold/Commitment are belongings removed from room and locked up? NA

## 2021-04-02 NOTE — PROGRESS NOTES
04/02/21 1500   Quick Adds   Type of Visit Initial PT Evaluation   Living Environment   People in home spouse   Current Living Arrangements house   Home Accessibility stairs to enter home;stairs within home   Number of Stairs, Main Entrance 6   Stair Railings, Main Entrance railings safe and in good condition;railing on right side (ascending)   Number of Stairs, Within Home, Primary other (see comments)  (8 steps, landing, 8 steps)   Stair Railings, Within Home, Primary railing on right side (ascending);railings safe and in good condition   Transportation Anticipated family or friend will provide   Living Environment Comments Patient lives with his wife in a 3 level house.    Self-Care   Usual Activity Tolerance good   Current Activity Tolerance moderate   Equipment Currently Used at Home none   Activity/Exercise/Self-Care Comment Patient reports independence with all mobility at baseline.    Disability/Function   Hearing Difficulty or Deaf yes   Use of hearing assistive devices none   Wear Glasses or Blind yes   Vision Management glasses   Walking or Climbing Stairs Difficulty no   Dressing/Bathing Difficulty no   Toileting issues no   Fall history within last six months yes   Number of times patient has fallen within last six months 2   General Information   Onset of Illness/Injury or Date of Surgery 04/01/21   Referring Physician Juan Tapia MD   Patient/Family Therapy Goals Statement (PT) to improve strength and walking   Pertinent History of Current Problem (include personal factors and/or comorbidities that impact the POC) Ptaient is 85 YO M adimtted with R LE cellulitis and sepsis. He had a fall at home just prior to admission with no injuries. PMH: CAD, CABG, DM2, HTN, chronic LBP, PENELOPE   Existing Precautions/Restrictions fall   Weight-Bearing Status - LUE full weight-bearing   Weight-Bearing Status - RUE full weight-bearing   Weight-Bearing Status - LLE full weight-bearing   Weight-Bearing Status  - RLE full weight-bearing   General Observations Actvity orders: up with assist   Cognition   Orientation Status (Cognition) oriented to;person;place;time   Follows Commands (Cognition) follows two-step commands;25-49% accuracy   Safety Deficit (Cognition) awareness of need for assistance;insight into deficits/self-awareness;judgment;safety precautions awareness;safety precautions follow-through/compliance   Memory Deficit (Cognition) short-term memory   Pain Assessment   Patient Currently in Pain No   Integumentary/Edema   Integumentary/Edema Comments R LE edema and erythema from cellulitis diagnosis; abrasion on R elbow   Posture    Posture Protracted shoulders;Forward head position   Range of Motion (ROM)   ROM Quick Adds ROM WFL   Strength   Manual Muscle Testing Quick Adds Deficits observed during functional mobility   Strength Comments R ankle DF 4+/5, L ankle DF 4/5; B hip flexion 3-/5; functional weakness during transfers and mobility   Bed Mobility   Comment (Bed Mobility) Not observed, patient sitting up in chair upon arrival.    Transfers   Transfers sit-stand transfer   Sit-Stand Transfer   Sit-Stand [place] (Transfers) minimum assist (75% patient effort);verbal cues   Assistive Device (Sit-Stand Transfers) walker, front-wheeled   Sit/Stand Transfer Comments Sit to stand from recliner chair, min assist for balance when transitioning hands   Gait/Stairs (Locomotion)   [place] Level (Gait) minimum assist (75% patient effort)   Assistive Device (Gait) walker, front-wheeled   Pattern (Gait) step-to   Deviations/Abnormal Patterns (Gait) left sided deviations;ganga decreased;festinating/shuffling;stride length decreased   Left Sided Gait Deviations heel strike decreased   Comment (Gait/Stairs) Patient ambulated 5' with heavy weight bearing lucinda NGUYEN UE on walker, unsteadiness during L stance, min asisst for balance.    Balance   Balance Comments Min assist for standing balance   Sensory Examination    Sensory Perception Comments Patient reports interimttent chronic tingling in B LEs, grossly in tact to light touch   Coordination   Coordination no deficits were identified   Coordination Comments Toe taps B LE with alternating pattern   Muscle Tone   Muscle Tone Comments Tremors B hands   Clinical Impression   Criteria for Skilled Therapeutic Intervention yes, treatment indicated   PT Diagnosis (PT) impaired mobility   Influenced by the following impairments muscle weakness, decreased activity tolerance, impaired balance, R LE swelling   Functional limitations due to impairments decreased independence with transfers and ambulation   Clinical Presentation Stable/Uncomplicated   Clinical Presentation Rationale improving medical status, clinical judgement   Clinical Decision Making (Complexity) low complexity   Therapy Frequency (PT) Daily   Predicted Duration of Therapy Intervention (days/wks) 4 days   Planned Therapy Interventions (PT) balance training;bed mobility training;gait training;home exercise program;patient/family education;stair training;strengthening;transfer training   Anticipated Equipment Needs at Discharge (PT) walker, rolling   Risk & Benefits of therapy have been explained evaluation/treatment results reviewed;care plan/treatment goals reviewed;risks/benefits reviewed;current/potential barriers reviewed;participants voiced agreement with care plan;participants included;patient;spouse/significant other   Clinical Impression Comments Shortness of breath on exertion, Spo2> 90% on room air   PT Discharge Planning    PT Discharge Recommendation (DC Rec) Transitional Care Facility   PT Rationale for DC Rec Patient below baseline, requiring assist for all mobility and has 2 recent falls. He would benefit from PT at TCU to progress safety and independence with mobility prior to returning home to his multi-level home.    Total Evaluation Time   Total Evaluation Time (Minutes) 13

## 2021-04-02 NOTE — PLAN OF CARE
Cognitive Concerns/ Orientation : A&Ox4. Forgetful at times.   BEHAVIOR & AGGRESSION TOOL COLOR: Green  CIWA SCORE: NA   ABNL VS/O2: VSS on RA, on continuous oxygen monitoring.   MOBILITY:up with 1, repositions self in bed. Unsteady/weak while on feet. Use GBW.  PAIN MANAGMENT: Chronic back and hip pain. Denies pain upon assessment.   DIET: MOD CHO.   BOWEL/BLADDER: Continent, using urinal with assistance. Good urine output.   ABNL LAB/BG: , 159, 125  DRAIN/DEVICES: PIV in L hand to IVF. Intermittent zosyn and vanco.   TELEMETRY RHYTHM: Sinus rhythm with BBB  SKIN: Red/warm on RLE. Wound on  L elbow and L forearm. Perineum reddended/moist in between folds. Miconazole ordered.  TESTS/PROCEDURES: none pending  D/C DAY/GOALS/PLACE: Pending improvement  OTHER IMPORTANT INFO: Elevate RLE as tolerated. Patient gets SOB at times.monitoring. Q4H CMS checks. +2 edema on LLE, +3 edema on RLE.

## 2021-04-03 ENCOUNTER — APPOINTMENT (OUTPATIENT)
Dept: GENERAL RADIOLOGY | Facility: CLINIC | Age: 86
DRG: 602 | End: 2021-04-03
Attending: INTERNAL MEDICINE
Payer: COMMERCIAL

## 2021-04-03 ENCOUNTER — APPOINTMENT (OUTPATIENT)
Dept: PHYSICAL THERAPY | Facility: CLINIC | Age: 86
DRG: 602 | End: 2021-04-03
Payer: COMMERCIAL

## 2021-04-03 LAB
ANION GAP SERPL CALCULATED.3IONS-SCNC: 4 MMOL/L (ref 3–14)
ANION GAP SERPL CALCULATED.3IONS-SCNC: 9 MMOL/L (ref 3–14)
BASE EXCESS BLDV CALC-SCNC: 5.3 MMOL/L
BUN SERPL-MCNC: 8 MG/DL (ref 7–30)
BUN SERPL-MCNC: 8 MG/DL (ref 7–30)
CALCIUM SERPL-MCNC: 8.1 MG/DL (ref 8.5–10.1)
CALCIUM SERPL-MCNC: 8.3 MG/DL (ref 8.5–10.1)
CHLORIDE SERPL-SCNC: 103 MMOL/L (ref 94–109)
CHLORIDE SERPL-SCNC: 106 MMOL/L (ref 94–109)
CO2 SERPL-SCNC: 23 MMOL/L (ref 20–32)
CO2 SERPL-SCNC: 30 MMOL/L (ref 20–32)
CREAT SERPL-MCNC: 0.6 MG/DL (ref 0.66–1.25)
CREAT SERPL-MCNC: 0.74 MG/DL (ref 0.66–1.25)
ERYTHROCYTE [DISTWIDTH] IN BLOOD BY AUTOMATED COUNT: 14.7 % (ref 10–15)
ERYTHROCYTE [DISTWIDTH] IN BLOOD BY AUTOMATED COUNT: 14.7 % (ref 10–15)
GFR SERPL CREATININE-BSD FRML MDRD: 84 ML/MIN/{1.73_M2}
GFR SERPL CREATININE-BSD FRML MDRD: >90 ML/MIN/{1.73_M2}
GLUCOSE BLDC GLUCOMTR-MCNC: 140 MG/DL (ref 70–99)
GLUCOSE BLDC GLUCOMTR-MCNC: 159 MG/DL (ref 70–99)
GLUCOSE BLDC GLUCOMTR-MCNC: 164 MG/DL (ref 70–99)
GLUCOSE BLDC GLUCOMTR-MCNC: 172 MG/DL (ref 70–99)
GLUCOSE SERPL-MCNC: 191 MG/DL (ref 70–99)
GLUCOSE SERPL-MCNC: 243 MG/DL (ref 70–99)
HCO3 BLDV-SCNC: 31 MMOL/L (ref 21–28)
HCT VFR BLD AUTO: 35.5 % (ref 40–53)
HCT VFR BLD AUTO: 36.3 % (ref 40–53)
HGB BLD-MCNC: 11.3 G/DL (ref 13.3–17.7)
HGB BLD-MCNC: 11.4 G/DL (ref 13.3–17.7)
LACTATE BLD-SCNC: 1.7 MMOL/L (ref 0.7–2)
MAGNESIUM SERPL-MCNC: 1.4 MG/DL (ref 1.6–2.3)
MAGNESIUM SERPL-MCNC: 2.1 MG/DL (ref 1.6–2.3)
MCH RBC QN AUTO: 26.8 PG (ref 26.5–33)
MCH RBC QN AUTO: 27.5 PG (ref 26.5–33)
MCHC RBC AUTO-ENTMCNC: 31.4 G/DL (ref 31.5–36.5)
MCHC RBC AUTO-ENTMCNC: 31.8 G/DL (ref 31.5–36.5)
MCV RBC AUTO: 85 FL (ref 78–100)
MCV RBC AUTO: 86 FL (ref 78–100)
NT-PROBNP SERPL-MCNC: 1798 PG/ML (ref 0–1800)
OXYHGB MFR BLDV: 75 %
PCO2 BLDV: 47 MM HG (ref 40–50)
PH BLDV: 7.42 PH (ref 7.32–7.43)
PLATELET # BLD AUTO: 191 10E9/L (ref 150–450)
PLATELET # BLD AUTO: 204 10E9/L (ref 150–450)
PO2 BLDV: 39 MM HG (ref 25–47)
POTASSIUM SERPL-SCNC: 3.5 MMOL/L (ref 3.4–5.3)
POTASSIUM SERPL-SCNC: 3.7 MMOL/L (ref 3.4–5.3)
RBC # BLD AUTO: 4.11 10E12/L (ref 4.4–5.9)
RBC # BLD AUTO: 4.25 10E12/L (ref 4.4–5.9)
SODIUM SERPL-SCNC: 137 MMOL/L (ref 133–144)
SODIUM SERPL-SCNC: 138 MMOL/L (ref 133–144)
WBC # BLD AUTO: 10.9 10E9/L (ref 4–11)
WBC # BLD AUTO: 11.9 10E9/L (ref 4–11)

## 2021-04-03 PROCEDURE — 250N000011 HC RX IP 250 OP 636: Performed by: NURSE PRACTITIONER

## 2021-04-03 PROCEDURE — 97110 THERAPEUTIC EXERCISES: CPT | Mod: GP

## 2021-04-03 PROCEDURE — 87040 BLOOD CULTURE FOR BACTERIA: CPT | Performed by: NURSE PRACTITIONER

## 2021-04-03 PROCEDURE — 250N000011 HC RX IP 250 OP 636: Performed by: INTERNAL MEDICINE

## 2021-04-03 PROCEDURE — 82805 BLOOD GASES W/O2 SATURATION: CPT | Performed by: NURSE PRACTITIONER

## 2021-04-03 PROCEDURE — 93010 ELECTROCARDIOGRAM REPORT: CPT | Performed by: INTERNAL MEDICINE

## 2021-04-03 PROCEDURE — 83735 ASSAY OF MAGNESIUM: CPT | Performed by: INTERNAL MEDICINE

## 2021-04-03 PROCEDURE — 83735 ASSAY OF MAGNESIUM: CPT | Performed by: NURSE PRACTITIONER

## 2021-04-03 PROCEDURE — 85027 COMPLETE CBC AUTOMATED: CPT | Performed by: INTERNAL MEDICINE

## 2021-04-03 PROCEDURE — 93005 ELECTROCARDIOGRAM TRACING: CPT

## 2021-04-03 PROCEDURE — 5A09357 ASSISTANCE WITH RESPIRATORY VENTILATION, LESS THAN 24 CONSECUTIVE HOURS, CONTINUOUS POSITIVE AIRWAY PRESSURE: ICD-10-PCS | Performed by: INTERNAL MEDICINE

## 2021-04-03 PROCEDURE — 250N000013 HC RX MED GY IP 250 OP 250 PS 637: Performed by: INTERNAL MEDICINE

## 2021-04-03 PROCEDURE — 36415 COLL VENOUS BLD VENIPUNCTURE: CPT | Performed by: NURSE PRACTITIONER

## 2021-04-03 PROCEDURE — 36415 COLL VENOUS BLD VENIPUNCTURE: CPT | Performed by: INTERNAL MEDICINE

## 2021-04-03 PROCEDURE — 80048 BASIC METABOLIC PNL TOTAL CA: CPT | Performed by: INTERNAL MEDICINE

## 2021-04-03 PROCEDURE — 99233 SBSQ HOSP IP/OBS HIGH 50: CPT | Performed by: INTERNAL MEDICINE

## 2021-04-03 PROCEDURE — 999N001017 HC STATISTIC GLUCOSE BY METER IP

## 2021-04-03 PROCEDURE — 120N000001 HC R&B MED SURG/OB

## 2021-04-03 PROCEDURE — 83605 ASSAY OF LACTIC ACID: CPT | Performed by: NURSE PRACTITIONER

## 2021-04-03 PROCEDURE — 97530 THERAPEUTIC ACTIVITIES: CPT | Mod: GP

## 2021-04-03 PROCEDURE — 258N000003 HC RX IP 258 OP 636: Performed by: INTERNAL MEDICINE

## 2021-04-03 PROCEDURE — 80048 BASIC METABOLIC PNL TOTAL CA: CPT | Performed by: NURSE PRACTITIONER

## 2021-04-03 PROCEDURE — 94660 CPAP INITIATION&MGMT: CPT

## 2021-04-03 PROCEDURE — 999N000157 HC STATISTIC RCP TIME EA 10 MIN

## 2021-04-03 PROCEDURE — 83880 ASSAY OF NATRIURETIC PEPTIDE: CPT | Performed by: NURSE PRACTITIONER

## 2021-04-03 PROCEDURE — 99291 CRITICAL CARE FIRST HOUR: CPT | Performed by: NURSE PRACTITIONER

## 2021-04-03 PROCEDURE — 97116 GAIT TRAINING THERAPY: CPT | Mod: GP

## 2021-04-03 PROCEDURE — 85027 COMPLETE CBC AUTOMATED: CPT | Performed by: NURSE PRACTITIONER

## 2021-04-03 PROCEDURE — 71045 X-RAY EXAM CHEST 1 VIEW: CPT

## 2021-04-03 RX ORDER — FUROSEMIDE 10 MG/ML
40 INJECTION INTRAMUSCULAR; INTRAVENOUS ONCE
Status: COMPLETED | OUTPATIENT
Start: 2021-04-03 | End: 2021-04-03

## 2021-04-03 RX ORDER — PIPERACILLIN SODIUM, TAZOBACTAM SODIUM 4; .5 G/20ML; G/20ML
4.5 INJECTION, POWDER, LYOPHILIZED, FOR SOLUTION INTRAVENOUS EVERY 6 HOURS
Status: DISCONTINUED | OUTPATIENT
Start: 2021-04-03 | End: 2021-04-04

## 2021-04-03 RX ORDER — ACETAMINOPHEN 650 MG/1
650 SUPPOSITORY RECTAL EVERY 4 HOURS PRN
Status: DISCONTINUED | OUTPATIENT
Start: 2021-04-03 | End: 2021-04-08 | Stop reason: HOSPADM

## 2021-04-03 RX ORDER — IPRATROPIUM BROMIDE AND ALBUTEROL SULFATE 2.5; .5 MG/3ML; MG/3ML
3 SOLUTION RESPIRATORY (INHALATION) ONCE
Status: DISCONTINUED | OUTPATIENT
Start: 2021-04-03 | End: 2021-04-08 | Stop reason: HOSPADM

## 2021-04-03 RX ORDER — CARBOXYMETHYLCELLULOSE SODIUM 5 MG/ML
1 SOLUTION/ DROPS OPHTHALMIC
Status: DISCONTINUED | OUTPATIENT
Start: 2021-04-03 | End: 2021-04-08 | Stop reason: HOSPADM

## 2021-04-03 RX ORDER — MAGNESIUM SULFATE HEPTAHYDRATE 40 MG/ML
4 INJECTION, SOLUTION INTRAVENOUS ONCE
Status: COMPLETED | OUTPATIENT
Start: 2021-04-03 | End: 2021-04-03

## 2021-04-03 RX ORDER — LIDOCAINE 40 MG/G
CREAM TOPICAL
Status: DISCONTINUED | OUTPATIENT
Start: 2021-04-03 | End: 2021-04-06

## 2021-04-03 RX ADMIN — INSULIN ASPART 1 UNITS: 100 INJECTION, SOLUTION INTRAVENOUS; SUBCUTANEOUS at 08:44

## 2021-04-03 RX ADMIN — ACETAMINOPHEN 1000 MG: 500 TABLET, FILM COATED ORAL at 08:42

## 2021-04-03 RX ADMIN — PIPERACILLIN SODIUM AND TAZOBACTAM SODIUM 4.5 G: 4; .5 INJECTION, POWDER, LYOPHILIZED, FOR SOLUTION INTRAVENOUS at 19:49

## 2021-04-03 RX ADMIN — TAMSULOSIN HYDROCHLORIDE 0.4 MG: 0.4 CAPSULE ORAL at 08:42

## 2021-04-03 RX ADMIN — FUROSEMIDE 40 MG: 10 INJECTION, SOLUTION INTRAMUSCULAR; INTRAVENOUS at 10:52

## 2021-04-03 RX ADMIN — MAGNESIUM OXIDE TAB 400 MG (241.3 MG ELEMENTAL MG) 400 MG: 400 (241.3 MG) TAB at 16:39

## 2021-04-03 RX ADMIN — OMEPRAZOLE 20 MG: 20 CAPSULE, DELAYED RELEASE ORAL at 08:42

## 2021-04-03 RX ADMIN — INSULIN ASPART 1 UNITS: 100 INJECTION, SOLUTION INTRAVENOUS; SUBCUTANEOUS at 12:59

## 2021-04-03 RX ADMIN — PIPERACILLIN SODIUM AND TAZOBACTAM SODIUM 3.38 G: 3; .375 INJECTION, POWDER, LYOPHILIZED, FOR SOLUTION INTRAVENOUS at 04:59

## 2021-04-03 RX ADMIN — GABAPENTIN 900 MG: 300 CAPSULE ORAL at 20:27

## 2021-04-03 RX ADMIN — MAGNESIUM SULFATE HEPTAHYDRATE 4 G: 40 INJECTION, SOLUTION INTRAVENOUS at 14:35

## 2021-04-03 RX ADMIN — PRAVASTATIN SODIUM 20 MG: 20 TABLET ORAL at 08:42

## 2021-04-03 RX ADMIN — ENOXAPARIN SODIUM 40 MG: 40 INJECTION SUBCUTANEOUS at 22:30

## 2021-04-03 RX ADMIN — ASPIRIN 81 MG: 81 TABLET, DELAYED RELEASE ORAL at 08:42

## 2021-04-03 RX ADMIN — PIPERACILLIN SODIUM AND TAZOBACTAM SODIUM 4.5 G: 4; .5 INJECTION, POWDER, LYOPHILIZED, FOR SOLUTION INTRAVENOUS at 12:59

## 2021-04-03 RX ADMIN — MICONAZOLE NITRATE: 20 POWDER TOPICAL at 08:43

## 2021-04-03 RX ADMIN — AMLODIPINE BESYLATE 5 MG: 5 TABLET ORAL at 08:42

## 2021-04-03 RX ADMIN — MAGNESIUM OXIDE TAB 400 MG (241.3 MG ELEMENTAL MG) 400 MG: 400 (241.3 MG) TAB at 08:42

## 2021-04-03 RX ADMIN — LISINOPRIL 20 MG: 10 TABLET ORAL at 08:42

## 2021-04-03 RX ADMIN — Medication 50 MCG: at 08:42

## 2021-04-03 RX ADMIN — INSULIN ASPART 1 UNITS: 100 INJECTION, SOLUTION INTRAVENOUS; SUBCUTANEOUS at 18:49

## 2021-04-03 RX ADMIN — FINASTERIDE 5 MG: 5 TABLET, FILM COATED ORAL at 08:42

## 2021-04-03 RX ADMIN — VANCOMYCIN HYDROCHLORIDE 2000 MG: 5 INJECTION, POWDER, LYOPHILIZED, FOR SOLUTION INTRAVENOUS at 20:29

## 2021-04-03 RX ADMIN — ACETAMINOPHEN 1000 MG: 500 TABLET, FILM COATED ORAL at 20:26

## 2021-04-03 RX ADMIN — VANCOMYCIN HYDROCHLORIDE 2000 MG: 5 INJECTION, POWDER, LYOPHILIZED, FOR SOLUTION INTRAVENOUS at 07:46

## 2021-04-03 RX ADMIN — GABAPENTIN 900 MG: 300 CAPSULE ORAL at 08:42

## 2021-04-03 RX ADMIN — FUROSEMIDE 40 MG: 10 INJECTION, SOLUTION INTRAMUSCULAR; INTRAVENOUS at 22:30

## 2021-04-03 ASSESSMENT — MIFFLIN-ST. JEOR: SCORE: 2034.35

## 2021-04-03 ASSESSMENT — ACTIVITIES OF DAILY LIVING (ADL)
ADLS_ACUITY_SCORE: 14
ADLS_ACUITY_SCORE: 14
ADLS_ACUITY_SCORE: 16
ADLS_ACUITY_SCORE: 14
ADLS_ACUITY_SCORE: 16
ADLS_ACUITY_SCORE: 14

## 2021-04-03 NOTE — PROGRESS NOTES
[facility]    Hospitalist Progress Note    Date of Service (when I saw the patient): 04/03/2021    Assessment & Plan   Austen Fowler is a 86 year old male who was admitted on 4/1/2021.  HISTORY OF PRESENT ILLNESS:  This is an 86-year-old male with history of coronary artery disease, status post CABG, hypertension, GERD, diabetes mellitus type 2, anxiety, obstructive sleep apnea on CPAP, ascending aortic dilatation, who came to the ER with complaint of fever, chills, weakness, tiredness and right lower leg redness for the last 2 or 3 days.     In the ER he was evaluated.  He had a low-grade temperature of 99.3, but the lactic acid on the workup came back 6.4.  He was resuscitated and found to have right lower extremity cellulitis, so the Hospitalist Service is consulted for admission.      ASSESSMENT AND PLAN:   1.  Severe sepsis secondary to right lower extremity cellulitis:   Lactic acidosis secondary to the above  at 6.4   This is an 86-year-old male with history of diabetes mellitus, hypertension, coronary artery disease, presenting with weakness, tiredness, low-grade fever.  His white cell count is elevated at 19.2, low-grade temperature and lactic acid was elevated to 6.4 on admission.  He received 1 liter of bolus and he is getting a second one.  His lactic acid came down to 3.4.  Unfortunately, no BMP or CMP was done up to now, is pending at this time.  I will keep him on IV fluids at 100-125 mL per hour.  Keep lactic acid checks every 4-hour and boluses as needed.  Now the lactic acid is coming down appropriately.  He was started on IV Zosyn and vancomycin as per severe sepsis protocol.  I agree with that.  I will keep him on vancomycin and Zosyn for now.  Follow the blood culture and deescalate in next 1-2 days if needed.  I asked him to elevate his right lower extremity.   Continue Zosyn and vancomycin for now  Blood cultures 1 out of 2 returned positive for gram-positive  cocci in clusters most likely coagulase-negative staph most likely contaminant.  Continue vancomycin for now until final sensitivities are available  Lactate improved  WBC count improved from 19 K to 13 K-11k today  Patient with bilateral lower extremity edema Ace wraps ordered  IV fluids were cut down to 50 mL/h yesterday we will stop today  He complains of shortness of breath with exertion received multiple liters of fluid boluses for lactic acidosis so we will give him 40 mg of Lasix one-time dose with gentle diuresis today      2.  Generalized weakness:  I think this is secondary to severe sepsis, elevated lactic acid and right lower extremity cellulitis and will improve once his infection is improved and sepsis resolves.  We will have PT and OT evaluate the patient.   PT is recommending TCU on discharge  Social work consultation will be requested to help with discharge disposition    3.  Coronary artery disease:    Has been stable.  Recently saw his cardiologist, has been doing well at this time.  Keep him on aspirin, lisinopril, amlodipine at this time.     4.  History of diabetes mellitus type 2:  Is on metformin 1000 mg b.i.d.  I will keep holding the metformin for now, keep him on sliding scale insulin for correction, hypoglycemia protocol.  Once his symptoms improve and creatinine remains stable, he can be back on the metformin.     5.  Obstructive sleep apnea on CPAP:  We will continue the CPAP while he is in the hospital.   6.  Hypertension:  He is on amlodipine 5 mg b.i.d. and lisinopril 20 mg daily.  I think he has significant swelling in his lower extremity, I think may be secondary to amlodipine and been on gabapentin.  We will keep him 5 mg daily for now.  Ace wraps ordered to help with lower extremity edema    7.  Gastroesophageal reflux disease:  On Prilosec.  I will continue with that.   8.  Chronic back pain:  On Neurontin 900 mg 3 times a day.  I will continue with that.   9.  Hyperlipidemia:   On Pravachol.  We will keep him on that.   10.  Deep venous thrombosis prophylaxis with Lovenox.      CODE STATUS:  Full code as per the patient wishes.  I had a detailed discussion with the patient regarding his code status and he wanted to be full code at this point.  I noted that previously on 01/11 he was DNR, but every other time he was full code.  As per his wishes, he wanted to be full code for now.          Code Status: Full Code    Disposition: Expected discharge in 2-3 days after improvement in right lower extremity cellulitis and sepsis and possible TCU placement    Discussed with bedside RN and patient today    Greater than 35 minutes were spent in reviewing the chart, counseling the patient in collaboration of johanna Valverde MD  [phone] (P)      Interval History        Right lower extremity cellulitis is improving.  WBC count is improving.  Complains of shortness of breath with exertion which is little worse compared to his baseline as per the patient.  No chest pain.  No acute issues since yesterday    -Data reviewed today: I reviewed all new labs and imaging results over the last 24 hours. I personally reviewed no images or EKG's today.    Physical Exam   Temp: 99.1  F (37.3  C) Temp src: Oral BP: 125/52 Pulse: 79   Resp: 20 SpO2: 95 % O2 Device: Nasal cannula Oxygen Delivery: 2 LPM  Vitals:    04/01/21 1559 04/02/21 0604 04/03/21 0658   Weight: 124.7 kg (275 lb) 127 kg (279 lb 15.8 oz) 126.4 kg (278 lb 9.6 oz)     Vital Signs with Ranges  Temp:  [97.9  F (36.6  C)-99.1  F (37.3  C)] 99.1  F (37.3  C)  Pulse:  [77-80] 79  Resp:  [16-22] 20  BP: (120-126)/(52-69) 125/52  SpO2:  [93 %-95 %] 95 %  I/O last 3 completed shifts:  In: 480 [P.O.:480]  Out: 1625 [Urine:1625]    Constitutional: Awake, alert, cooperative, no apparent distress  Respiratory: Clear to auscultation bilaterally, no crackles or wheezing  Cardiovascular: Regular rate and rhythm, normal S1 and S2, and no murmur noted  GI:  Normal bowel sounds, soft, non-distended, non-tender  Skin/Integumen: No rashes, no cyanosis, right lower extremity is red and warm to touch with active cellulitis improving.  Right lower extremity tinea pedis with interdigital tinea pedis noted and miconazole powder ordered  Other:     Medications       acetaminophen  1,000 mg Oral BID     amLODIPine  5 mg Oral Daily     aspirin  81 mg Oral Daily     cyanocobalamin  1,000 mcg Oral Every Other Day     enoxaparin ANTICOAGULANT  40 mg Subcutaneous Q24H     finasteride  5 mg Oral Daily     gabapentin  900 mg Oral BID     insulin aspart  1-7 Units Subcutaneous TID AC     insulin aspart  1-5 Units Subcutaneous At Bedtime     lisinopril  20 mg Oral Daily     magnesium oxide  400 mg Oral TID     magnesium sulfate  4 g Intravenous Once     miconazole   Topical BID     omeprazole  20 mg Oral Daily     piperacillin-tazobactam  4.5 g Intravenous Q6H     pravastatin  20 mg Oral Daily     sodium chloride (PF)  3 mL Intracatheter Q8H     tamsulosin  0.4 mg Oral Daily     vancomycin (VANCOCIN) IV  2,000 mg Intravenous Q12H     vitamin D3  50 mcg Oral Daily       Data   Recent Labs   Lab 04/03/21  0928 04/02/21  1114 04/01/21  2231 04/01/21  1934 04/01/21  1615 04/01/21  1615   WBC 11.9* 13.3*  --   --   --  19.2*   HGB 11.4* 11.1*  --   --   --  13.1*   MCV 85 85  --   --   --  87    162  --   --   --  192    137  --  135  --   --    POTASSIUM 3.7 3.4  --  3.6  --   --    CHLORIDE 106 105  --  103  --   --    CO2 23 25  --  25  --   --    BUN 8 13  --  17  --   --    CR 0.60* 0.68 0.82 0.81   < >  --    ANIONGAP 9 7  --  7  --   --    BRANDON 8.3* 8.0*  --  8.3*  --   --    * 172*  --  133*  --   --    ALBUMIN  --  2.6*  --  2.9*  --   --    PROTTOTAL  --  6.0*  --  6.5*  --   --    BILITOTAL  --  0.4  --  0.7  --   --    ALKPHOS  --  41  --  44  --   --    ALT  --  69  --  57  --   --    AST  --  339*  --  304*  --   --     < > = values in this interval not  displayed.       No results found for this or any previous visit (from the past 24 hour(s)).

## 2021-04-03 NOTE — PROVIDER NOTIFICATION
MD Notification    Notified Person: MD    Notified Person Name: Dr. Chowdhury    Notification Date/Time: 4/3/21 3847    Notification Interaction: Telephone    Purpose of Notification: Positive blood cultures gram + cocci in clusters    Orders Received: MD placed orders    Comments:

## 2021-04-03 NOTE — PLAN OF CARE
Cognitive Concerns/ Orientation : A&Ox4. Forgetful at times.   BEHAVIOR & AGGRESSION TOOL COLOR: Green  CIWA SCORE: NA   ABNL VS/O2: VSS on RA, FINN  MOBILITY: up with assist of 2, repositions self in bed.  PAIN MANAGMENT: Chronic back and hip pain, although denies pain. Tylenol is scheduled.    DIET: MOD CHO.   BOWEL/BLADDER: Continent, using urinal with assistance. Good urine output.     ABNL LAB/BG:   , 164, 159.  Blood cultures positive for unidentified staph species, MD notified verbally.   Mg 1.4, replaced--AM recheck. WBC 11.9    DRAIN/DEVICES: PIV in L hand to IVF. Intermittent Zosyn, Vanco  TELEMETRY RHYTHM: SR with BBB  SKIN: Red/warm on RLE. Wound on  L elbow and L forearm with polymem bandages. Perineum reddended/moist in between folds. Miconazole applied.   TESTS/PROCEDURES: none pending    D/C DAY/GOALS/PLACE:   --2-3 days  --TCU, per therapies recommendation.  --pending per any additional antibiotic needs/changes r/t BC staph identification.

## 2021-04-03 NOTE — CONSULTS
Care Management Initial Consult    General Information  Assessment completed with: Spouse or significant other,    Type of CM/SW Visit: Initial Assessment    Primary Care Provider verified and updated as needed:     Readmission within the last 30 days:        Reason for Consult: discharge planning, facility placement  Advance Care Planning:            Communication Assessment  Patient's communication style: spoken language (English or Bilingual)    Hearing Difficulty or Deaf: yes   Wear Glasses or Blind: yes    Cognitive  Cognitive/Neuro/Behavioral: WDL                      Living Environment:   People in home: spouse     Current living Arrangements: house      Able to return to prior arrangements: no       Family/Social Support:  Care provided by: self  Provides care for: no one  Marital Status:   Wife          Description of Support System: Involved, Supportive    Support Assessment: Adequate family and caregiver support    Current Resources:   Patient receiving home care services: No     Community Resources: None  Equipment currently used at home: none  Supplies currently used at home:      Employment/Financial:  Employment Status: retired        Financial Concerns: No concerns identified   Referral to Financial Counselor: No       Lifestyle & Psychosocial Needs:        Socioeconomic History     Marital status:      Spouse name: Anastasia Caballero     Number of children: 4     Years of education: 14     Highest education level: Not on file   Occupational History     Occupation: retired     Comment:      Tobacco Use     Smoking status: Former Smoker     Packs/day: 2.00     Years: 30.00     Pack years: 60.00     Types: Cigarettes, Cigars, Pipe     Start date:      Quit date: 3/1/1992     Years since quittin.1     Smokeless tobacco: Never Used     Tobacco comment: quit in    Substance and Sexual Activity     Alcohol use: Yes     Alcohol/week: 0.0 standard drinks     Comment: minimal  "less than 1/week     Drug use: No     Sexual activity: Not Currently     Partners: Female     Birth control/protection: Post-menopausal       Functional Status:  Prior to admission patient needed assistance:              Mental Health Status:  Mental Health Status: No Current Concerns       Chemical Dependency Status:  Chemical Dependency Status: No Current Concerns             Values/Beliefs:  Spiritual, Cultural Beliefs, Rastafarian Practices, Values that affect care: no               Additional Information:  SW was approached by pt's spouse. Pt/family goal is TCU. She would like pt to go to [facility] on the \"2nd floor.\" SW sent referral.     SPARKLE Winston, LGSW  [phone]  [facility]        "

## 2021-04-03 NOTE — PROGRESS NOTES
Hospitalist service cross-cover note:     Paged regarding positive blood cultures with gram-positive cocci in clusters.  Currently on piperacillin-tazobactam IV, previously on vancomycin as well. Will add back vancomycin while awaiting additional culture data.     Finn Chowdhury MD   Hospitalist  [phone]

## 2021-04-04 LAB
CREAT SERPL-MCNC: 0.66 MG/DL (ref 0.66–1.25)
GFR SERPL CREATININE-BSD FRML MDRD: 88 ML/MIN/{1.73_M2}
GLUCOSE BLDC GLUCOMTR-MCNC: 159 MG/DL (ref 70–99)
GLUCOSE BLDC GLUCOMTR-MCNC: 173 MG/DL (ref 70–99)
GLUCOSE BLDC GLUCOMTR-MCNC: 188 MG/DL (ref 70–99)
GLUCOSE BLDC GLUCOMTR-MCNC: 213 MG/DL (ref 70–99)
MAGNESIUM SERPL-MCNC: 2 MG/DL (ref 1.6–2.3)
PLATELET # BLD AUTO: 213 10E9/L (ref 150–450)
POTASSIUM SERPL-SCNC: 3.7 MMOL/L (ref 3.4–5.3)

## 2021-04-04 PROCEDURE — 250N000013 HC RX MED GY IP 250 OP 250 PS 637: Performed by: INTERNAL MEDICINE

## 2021-04-04 PROCEDURE — 258N000003 HC RX IP 258 OP 636: Performed by: INTERNAL MEDICINE

## 2021-04-04 PROCEDURE — 94660 CPAP INITIATION&MGMT: CPT

## 2021-04-04 PROCEDURE — 36415 COLL VENOUS BLD VENIPUNCTURE: CPT | Performed by: INTERNAL MEDICINE

## 2021-04-04 PROCEDURE — 120N000001 HC R&B MED SURG/OB

## 2021-04-04 PROCEDURE — 99233 SBSQ HOSP IP/OBS HIGH 50: CPT | Performed by: INTERNAL MEDICINE

## 2021-04-04 PROCEDURE — 999N001017 HC STATISTIC GLUCOSE BY METER IP

## 2021-04-04 PROCEDURE — 999N000157 HC STATISTIC RCP TIME EA 10 MIN

## 2021-04-04 PROCEDURE — 82565 ASSAY OF CREATININE: CPT | Performed by: INTERNAL MEDICINE

## 2021-04-04 PROCEDURE — 84132 ASSAY OF SERUM POTASSIUM: CPT | Performed by: INTERNAL MEDICINE

## 2021-04-04 PROCEDURE — 250N000011 HC RX IP 250 OP 636: Performed by: INTERNAL MEDICINE

## 2021-04-04 PROCEDURE — 85049 AUTOMATED PLATELET COUNT: CPT | Performed by: INTERNAL MEDICINE

## 2021-04-04 PROCEDURE — 83735 ASSAY OF MAGNESIUM: CPT | Performed by: INTERNAL MEDICINE

## 2021-04-04 RX ORDER — FUROSEMIDE 10 MG/ML
40 INJECTION INTRAMUSCULAR; INTRAVENOUS ONCE
Status: COMPLETED | OUTPATIENT
Start: 2021-04-04 | End: 2021-04-04

## 2021-04-04 RX ORDER — FUROSEMIDE 40 MG
40 TABLET ORAL DAILY
Status: DISCONTINUED | OUTPATIENT
Start: 2021-04-05 | End: 2021-04-05

## 2021-04-04 RX ORDER — CEFUROXIME AXETIL 500 MG/1
500 TABLET ORAL EVERY 12 HOURS SCHEDULED
Status: DISCONTINUED | OUTPATIENT
Start: 2021-04-04 | End: 2021-04-08 | Stop reason: HOSPADM

## 2021-04-04 RX ADMIN — INSULIN ASPART 1 UNITS: 100 INJECTION, SOLUTION INTRAVENOUS; SUBCUTANEOUS at 22:27

## 2021-04-04 RX ADMIN — INSULIN ASPART 1 UNITS: 100 INJECTION, SOLUTION INTRAVENOUS; SUBCUTANEOUS at 08:53

## 2021-04-04 RX ADMIN — AMLODIPINE BESYLATE 5 MG: 5 TABLET ORAL at 10:15

## 2021-04-04 RX ADMIN — GABAPENTIN 900 MG: 300 CAPSULE ORAL at 10:15

## 2021-04-04 RX ADMIN — ACETAMINOPHEN 1000 MG: 500 TABLET, FILM COATED ORAL at 10:14

## 2021-04-04 RX ADMIN — INSULIN ASPART 1 UNITS: 100 INJECTION, SOLUTION INTRAVENOUS; SUBCUTANEOUS at 13:14

## 2021-04-04 RX ADMIN — ASPIRIN 81 MG: 81 TABLET, DELAYED RELEASE ORAL at 10:15

## 2021-04-04 RX ADMIN — GABAPENTIN 900 MG: 300 CAPSULE ORAL at 21:59

## 2021-04-04 RX ADMIN — OMEPRAZOLE 20 MG: 20 CAPSULE, DELAYED RELEASE ORAL at 10:15

## 2021-04-04 RX ADMIN — ENOXAPARIN SODIUM 40 MG: 40 INJECTION SUBCUTANEOUS at 21:58

## 2021-04-04 RX ADMIN — INSULIN ASPART 1 UNITS: 100 INJECTION, SOLUTION INTRAVENOUS; SUBCUTANEOUS at 18:40

## 2021-04-04 RX ADMIN — VANCOMYCIN HYDROCHLORIDE 2000 MG: 5 INJECTION, POWDER, LYOPHILIZED, FOR SOLUTION INTRAVENOUS at 06:09

## 2021-04-04 RX ADMIN — FUROSEMIDE 40 MG: 10 INJECTION, SOLUTION INTRAMUSCULAR; INTRAVENOUS at 08:56

## 2021-04-04 RX ADMIN — Medication 50 MCG: at 10:15

## 2021-04-04 RX ADMIN — MICONAZOLE NITRATE: 20 POWDER TOPICAL at 08:53

## 2021-04-04 RX ADMIN — CYANOCOBALAMIN TAB 1000 MCG 1000 MCG: 1000 TAB at 10:15

## 2021-04-04 RX ADMIN — CEFUROXIME AXETIL 500 MG: 500 TABLET ORAL at 15:39

## 2021-04-04 RX ADMIN — TAMSULOSIN HYDROCHLORIDE 0.4 MG: 0.4 CAPSULE ORAL at 10:15

## 2021-04-04 RX ADMIN — ACETAMINOPHEN 1000 MG: 500 TABLET, FILM COATED ORAL at 21:59

## 2021-04-04 RX ADMIN — PIPERACILLIN SODIUM AND TAZOBACTAM SODIUM 4.5 G: 4; .5 INJECTION, POWDER, LYOPHILIZED, FOR SOLUTION INTRAVENOUS at 02:23

## 2021-04-04 RX ADMIN — CEFUROXIME AXETIL 500 MG: 500 TABLET ORAL at 21:59

## 2021-04-04 RX ADMIN — MICONAZOLE NITRATE: 20 POWDER TOPICAL at 22:03

## 2021-04-04 RX ADMIN — FINASTERIDE 5 MG: 5 TABLET, FILM COATED ORAL at 10:15

## 2021-04-04 RX ADMIN — PRAVASTATIN SODIUM 20 MG: 20 TABLET ORAL at 10:15

## 2021-04-04 RX ADMIN — LISINOPRIL 20 MG: 10 TABLET ORAL at 10:15

## 2021-04-04 RX ADMIN — MAGNESIUM OXIDE TAB 400 MG (241.3 MG ELEMENTAL MG) 400 MG: 400 (241.3 MG) TAB at 10:15

## 2021-04-04 RX ADMIN — PIPERACILLIN SODIUM AND TAZOBACTAM SODIUM 4.5 G: 4; .5 INJECTION, POWDER, LYOPHILIZED, FOR SOLUTION INTRAVENOUS at 08:33

## 2021-04-04 ASSESSMENT — ACTIVITIES OF DAILY LIVING (ADL)
ADLS_ACUITY_SCORE: 14
ADLS_ACUITY_SCORE: 14
ADLS_ACUITY_SCORE: 19
ADLS_ACUITY_SCORE: 14
ADLS_ACUITY_SCORE: 19
ADLS_ACUITY_SCORE: 19

## 2021-04-04 NOTE — PROGRESS NOTES
Call for dyspnea and wheezing.  Patient admitted with severe sepsis due to cellulitis and also bacteremic with gram-positive cocci in clusters.  Weight is up about 3 pounds from admission, intake and output reveals net -2.7 L.  Patient received 40 mg of Lasix earlier today.  Currently not on any supplemental IV fluids.  Oxygen requirement has been stable at 2 L since earlier today.    Plan  -Chest x-ray now  -Duo nebs x1 now

## 2021-04-04 NOTE — PLAN OF CARE
Transfer    S- Transfer to [facility]  Room 312  from 627-1.    B- Pt had increased RR, Shortness of breath and Wheezing, O2 Sats high 80s on RA, placed on 2L Oxygen; with improved oxygen saturations to 93-94% however Pt remained symptomatic with increased RR 30s-40s, work of breathing, shortness of breath, wheezing.  Dr. Austin notified, new orders for CXR and neb received. Pt symptoms worsened and RRT was called. Report given to Resource RN,      A- Brief systems assessment: Pt was admitted with RLE cellulitis. Blood cultures positive. Was on Zosyn and Vancomycin    R- Transfer to [facility] per physician orders. Continue to monitor pt and update physician as needed.     Code status: Full Code  Skin: Cellulites to RLE, bruises, abrasions to elbows   Fall Risk: Yes- Department fall risk interventions implemented.  Isolation: None  Patient belongings: All belongings sent with patient  Medication drips upon transfer: Vancomycin  Bedside Report Letter Given and explained to pt: No  Visitor Designated? No  If patient is a 72 hour hold/Commitment are belongings removed from room and locked up? NA

## 2021-04-04 NOTE — CODE/RAPID RESPONSE
[facility]    RRT Note  4/3/2021   Time Called: 2056    RRT called for: increased work of breathing, increased O2 needs    Assessment & Plan     Acute Hypoxic Respiratory Failure suspect secondary to flash pulmonary edema  I was called to assess the patient for increased work of breathing and increased O2 needs. O2 saturations to 80s on RA which improved on 2L briefly but up to 6L per oxymask at the time of the RRT.  Upon my arrival, patient is sitting upright at the edge of the bed and appears to be acutely distressed. RR 40's, O2 saturation 92-93% on 6L oxymask, /79; HR 80's on monitor. He appears flushed and endorses shortness of breath. He denies any chest pain or nausea. He has +3-4 lower extremity edema R>L which reports as worsening since admission. He has crackles throughout. IVF were stopped yesterday and he received a dose of IV lasix today. He does not feel he passed much more urine than baseline today. I/O status appears very inaccurate as no IVF have been documented. Weights are relatively flat.  Reviewed last Echocardiogram from 1/12/2020.  Patient improved with initiation of bipap. Respiratory rates in the 20s and BP 130s/70's. CXR consistent with pulmonary edema, additional lasix ordered, patient will transfer to [facility] status due to bipap.  Rectal temp noted to be 101.5, plan to repeat blood cultures. He is on board spectrum antibiotics (zosyn and vanco).    INTERVENTIONS:  -Bipap; hopefully will be able to wean off by morning or at least tolerate breaks  -Rectal temp  -CXR now  -EKG now  -BMP, Mg, Phos, LA, VBG, CBC, BC x2 now  -Transfer to [facility]  -Lasix 40mg IV x1 now    At the end of the RRT transferred to [facility] status for bipap.    Discussed with and defer further cares to bedside nurse and flying squad.    Interval History     Austen Fowler is a 86 year old male who was admitted on 4/1/2021 for fever, chills, weakness, and right lower extremity  cellulitis.    Medical history significant for:   Past Medical History:   Diagnosis Date     Anxiety state, unspecified      Aortic root dilatation (H)      Arthritis      Ascending aorta dilatation (H)      Basal cell cancer     s/p Mohs nose and bridge of nose on R.     Bunion      CAD (coronary artery disease)     CABG 1992: LIMA to LAD, SANDI to RCA, SVG to OM1 and OM2     Contact dermatitis and other eczema, due to unspecified cause     eczema - has light treatments with Dr. Rivera     Disorders of bursae and tendons in shoulder region, unspecified      Esophageal reflux      Essential hypertension, benign      Gallbladder disease      GERD (gastroesophageal reflux disease)      Heart attack (H)      Hyperlipidemia LDL goal <70      Left ventricular diastolic dysfunction      Low HDL (under 40) 3/28/2014     Nasal/sinus dis NEC     s/p surgery in 1995     Obesity      Osteoarthrosis, unspecified whether generalized or localized, shoulder region      Other hammer toe (acquired)      Sleep apnea     USES CPAP     Spinal stenosis, unspecified region other than cervical     MRI done 2006     Type 2 diabetes, HbA1c goal < 7% (H) 3/12/2015     Urge incontinence      Past Surgical History:   Procedure Laterality Date     ABDOMEN SURGERY  1994    gall bladder     BIOPSY      arms     C CABG, ARTERY-VEIN, FOUR  11/1992    CABG - LIMA to left anterior descending and saphenous vein bypass graft to the first and second obtuse marginal branch of the circumflex and the right mammary to the right coronary artery     CHOLECYSTECTOMY  6/1994     COLONOSCOPY N/A 4/11/2017    Procedure: COMBINED COLONOSCOPY, SINGLE OR MULTIPLE BIOPSY/POLYPECTOMY BY BIOPSY;  Surgeon: Bladimir Garner MD;  Location:  GI     CV LEFT HEART CATH  5-92, 6-92    Angioplasty     ENT SURGERY  5/1995    sinus surgery     ESOPHAGOSCOPY, GASTROSCOPY, DUODENOSCOPY (EGD), DILATATION, COMBINED  7/17/2014    Procedure: COMBINED ESOPHAGOSCOPY, GASTROSCOPY,  DUODENOSCOPY (EGD), DILATATION;  Surgeon: Bladimir Garner MD;  Location:  GI     EYE SURGERY      cataracts removed both eyes     HC COLONOSCOPY THRU STOMA W BIOPSY/CAUTERY TUMOR/POLYP/LESION  5/2007     INJECT EPIDURAL LUMBAR       LAMINECTOMY LUMBAR TWO LEVELS N/A 4/29/2015    Procedure: LAMINECTOMY LUMBAR TWO LEVELS;  Surgeon: Bladimir Keenan MD;  Location:  OR     THORACIC SURGERY  11/1992    bypass     [facility] NONSPECIFIC PROCEDURE  6-94    Gail lap     [facility] NONSPECIFIC PROCEDURE  1995    sinus surgery     [facility] NONSPECIFIC PROCEDURE      bilateral cataract surgery       Code Status: Full Code    Allergies   No Known Allergies    Physical Exam   Vital Signs with Ranges:  Temp:  [98.9  F (37.2  C)-100.2  F (37.9  C)] 100.2  F (37.9  C)  Pulse:  [68-80] 77  Resp:  [18-28] 28  BP: (100-147)/(49-76) 147/76  SpO2:  [87 %-96 %] (P) 96 %  I/O last 3 completed shifts:  In: 720 [P.O.:720]  Out: 2150 [Urine:2150]    Constitutional: alert, cooperative, acutely distressed, flushed  ENT: EOM intact, mucus membranes moist   Neck: ROM intact, unable to assess JVP as the patient is sitting at the edge of the bed  Pulmonary: crackles throughout, increased work of breathing  Cardiovascular: S1, S2, regular rate and rhythm  GI: rounded, soft  Skin/Integumen: warm to touch, diaphoretic  Neuro: alert and oriented, no focal deficits  Psych:  Slightly anxious  Extremities: +3-4 lower extremities edema R>L. Right lower extremity red and warm to touch    Data     EKG:  Interpreted by CRISTIANO Hatch CNP  Time reviewed: 2130  Symptoms at time of EKG: dyspnea   Rhythm: normal sinus   Rate: Normal  Axis: Normal  Ectopy: none  Conduction: normal  ST Segments/ T Waves: T wave inversion III and aVF  Q Waves: none  Comparison to prior: Unchanged    Clinical Impression: no acute changes    ABG:  -No lab results found in last 7 days.    Troponin:    Recent Labs   Lab Test 01/11/20  1641   TROPI <0.015       IMAGING: (X-ray/CT/MRI)    Recent Results (from the past 24 hour(s))   XR Chest Port 1 View    Narrative    XR CHEST PORT 1 VW 4/3/2021 9:08 PM    HISTORY: wheezing, dyspnea    COMPARISON: 4/1/2021      Impression    IMPRESSION: Slight increase in linear interstitial opacities which  could represent developing pulmonary edema. No focal infiltrate,  pleural effusion or pneumothorax. Median sternotomy and mediastinal  surgical clips.    KAY CARRANZA MD       CBC with Diff:  Recent Labs   Lab Test 04/03/21  0928 08/19/14  1435 08/19/14  1435   WBC 11.9*   < >  --    HGB 11.4*   < >  --    MCV 85   < >  --       < >  --    INR  --   --  1.10    < > = values in this interval not displayed.        Lactic Acid:    Lab Results   Component Value Date    LACT 2.3 04/02/2021           Comprehensive Metabolic Panel:  Recent Labs   Lab 04/03/21  0928 04/02/21  1114    137   POTASSIUM 3.7 3.4   CHLORIDE 106 105   CO2 23 25   ANIONGAP 9 7   * 172*   BUN 8 13   CR 0.60* 0.68   GFRESTIMATED >90 87   GFRESTBLACK >90 >90   BRANDON 8.3* 8.0*   MAG 1.4* 1.2*   PROTTOTAL  --  6.0*   ALBUMIN  --  2.6*   BILITOTAL  --  0.4   ALKPHOS  --  41   AST  --  339*   ALT  --  69       INR:    Recent Labs   Lab Test 08/19/14  1435   INR 1.10       D-DIMER:  No results found for: DIMER    BNP:  No results found for: BNP    UA:  Recent Labs   Lab 04/01/21  1631   COLOR Light Yellow   APPEARANCE Clear   URINEGLC Negative   URINEBILI Negative   URINEKETONE 40*   SG 1.022   UBLD Large*   URINEPH 5.0   PROTEIN 50*   NITRITE Negative   LEUKEST Negative   RBCU 0   WBCU <1         Time Spent on this Encounter   I spent 35 minutes of critical care time on the unit/floor managing the care of Austen Fowler. Upon evaluation, this patient had a high probability of imminent or life-threatening deterioration due to acute hypoxia, which required my direct attention, intervention, and personal management. 100% of my time was spent at the bedside counseling the patient  and/or coordinating care regarding services listed in this note.    Simona Roth, APRN CNP

## 2021-04-04 NOTE — PLAN OF CARE
Alert and oriented x4, forgetful. Vital signs stable on 1L nasal cannula. Assist of 1 + gait belt and walker. Tolerating 2g Na+ diet. Lung sounds clear in upper lobes with crackles in bases. Bowel sounds active, + flatus, BM this shift. Adequate urine output. Incontinent of bowel and bladder at times. RLE red and warm with 3+ edema. LLE with 2+ edema. Ace wraps on. Mepilex changed to abrasions on bilateral elbows, CDI. Preventative mepilex to coccyx. Denies pain and nausea. Tele SR with BBB. Electrolytes WDL, rechecks in AM.

## 2021-04-04 NOTE — PLAN OF CARE
Pt is A/Ox3, d/o to time, as shift progressed became more consfused as to situation and place, needed continuous reorientation. VSS on BiPap 16/10 with 50% FIO2, Denied pain. External catheter intact, incontinent of B&B. NPO. Up AX2. Discharge pending.

## 2021-04-04 NOTE — PROVIDER NOTIFICATION
MD Notification    Notified Person: MD    Notified Person Name: Dr. Kingstonrai    Notification Date/Time: 4/3/21 8:40pm    Notification Interaction: Phone    Purpose of Notification: Temp 100.2.  Increased shortness of breath, wheezing. Noted to be sating 88% on RA. Placed on O2 sats 93-94% on 2L. On IV Vanco and Zosyn for Cellulitis, +ve blood cultures. ? Chest X-ray    Orders Received:    Comments: New orders for Neb , chest x-ray

## 2021-04-04 NOTE — PROGRESS NOTES
[facility]    Hospitalist Progress Note    Date of Service (when I saw the patient): 04/04/2021    Assessment & Plan   Austen Fowler is a 86 year old male who was admitted on 4/1/2021.  HISTORY OF PRESENT ILLNESS:  This is an 86-year-old male with history of coronary artery disease, status post CABG, hypertension, GERD, diabetes mellitus type 2, anxiety, obstructive sleep apnea on CPAP, ascending aortic dilatation, who came to the ER with complaint of fever, chills, weakness, tiredness and right lower leg redness for the last 2 or 3 days.     In the ER he was evaluated.  He had a low-grade temperature of 99.3, but the lactic acid on the workup came back 6.4.  He was resuscitated and found to have right lower extremity cellulitis, so the Hospitalist Service is consulted for admission.      ASSESSMENT AND PLAN:   1.  Severe sepsis secondary to right lower extremity cellulitis:   Lactic acidosis secondary to the above  at 6.4   This is an 86-year-old male with history of diabetes mellitus, hypertension, coronary artery disease, presenting with weakness, tiredness, low-grade fever.  His white cell count is elevated at 19.2, low-grade temperature and lactic acid was elevated to 6.4 on admission.  He received 1 liter of bolus and he is getting a second one.  His lactic acid came down to 3.4.  Unfortunately, no BMP or CMP was done up to now, is pending at this time.  I will keep him on IV fluids at 100-125 mL per hour.  Keep lactic acid checks every 4-hour and boluses as needed.  Now the lactic acid is coming down appropriately.  He was started on IV Zosyn and vancomycin as per severe sepsis protocol.  I agree with that.  I will keep him on vancomycin and Zosyn for now.  Follow the blood culture and deescalate in next 1-2 days if needed.  I asked him to elevate his right lower extremity.   Stop vancomycin and Zosyn today.  Switch him to oral Ceftin twice daily  Blood cultures 1 out of 2  returned positive for gram-positive cocci in clusters most likely coagulase-negative staph most likely contaminant.  Final sensitivities pending.  Lactate improved  WBC count improved from 19 K to 13 [id number]  today  Patient with bilateral lower extremity edema .  Lymphedema therapy consultation requested  IV fluids were cut down to 50 mL/h on 4/2. Stopped on 4/3/21    Acute hypoxic respiratory failure secondary to fluid overload  Acute diastolic congestive heart failure exacerbation;  Patient became more short of breath and hypoxic overnight needing BiPAP RRT was called  Chest x-ray was done showed bilateral infiltrates consistent with pulmonary edema  He was given 40 mg of Lasix IV earlier during the day and 1 more dose repeated during RRT  He is able to come off of BiPAP today  We will give him additional 40 mg of IV Lasix today  Start him on Lasix 40 mg p.o. daily from tomorrow to help with the chronic lower extremity edema for gentle diuresis  Echo in January 2020 showed EF of 60 to 65%.  There is borderline concentric LVH.  There is mild to moderate right ventricular hypertrophy.  Right ventricular systolic function is borderline reduced  We will repeat echo during this hospitalization  Patient complains of baseline shortness of breath with exertion    2.  Generalized weakness:  I think this is secondary to severe sepsis, elevated lactic acid and right lower extremity cellulitis and will improve once his infection is improved and sepsis resolves.  We will have PT and OT evaluate the patient.   PT is recommending TCU on discharge  Social work consultation will be requested to help with discharge disposition    3.  Coronary artery disease:    Has been stable.  Recently saw his cardiologist, has been doing well at this time.  Keep him on aspirin, lisinopril, amlodipine at this time.     4.  History of diabetes mellitus type 2:  Is on metformin 1000 mg b.i.d.  I will keep holding the metformin for now, keep him  on sliding scale insulin for correction, hypoglycemia protocol.  Once his symptoms improve and creatinine remains stable, he can be back on the metformin.     5.  Obstructive sleep apnea on CPAP:  We will continue the CPAP while he is in the hospital.   6.  Hypertension:  He is on amlodipine 5 mg b.i.d. and lisinopril 20 mg daily.  I think he has significant swelling in his lower extremity, I think may be secondary to amlodipine and been on gabapentin.  We will keep him 5 mg daily for now.  Ace wraps ordered to help with lower extremity edema    7.  Gastroesophageal reflux disease:  On Prilosec.  I will continue with that.   8.  Chronic back pain:  On Neurontin 900 mg 3 times a day.  I will continue with that.   9.  Hyperlipidemia:  On Pravachol.  We will keep him on that.   10.  Deep venous thrombosis prophylaxis with Lovenox.      CODE STATUS:  Full code as per the patient wishes.  I had a detailed discussion with the patient regarding his code status and he wanted to be full code at this point.  I noted that previously on 01/11 he was DNR, but every other time he was full code.  As per his wishes, he wanted to be full code for now.          Code Status: Full Code    Disposition: Expected discharge in 2-3 days after improvement in right lower extremity cellulitis and sepsis and possible TCU placement    Discussed with bedside RN and patient today    Greater than 35 minutes were spent in reviewing the chart, counseling the patient in collaboration of johanna Valverde MD  [phone] (P)      Interval History        Right lower extremity cellulitis is improving.  WBC count is improving.  Patient became more short of breath and hypoxic overnight needing an RRT and was placed on BiPAP and was transferred to  with [facility] status.  Given 1 dose of IV Lasix.  Feels better today.  Weak overall  -Data reviewed today: I reviewed all new labs and imaging results over the last 24 hours. I personally reviewed no images or  EKG's today.    Physical Exam   Temp: 98.4  F (36.9  C) Temp src: Axillary BP: 138/80 Pulse: 74   Resp: (!) 39 SpO2: 93 % O2 Device: Nasal cannula Oxygen Delivery: 2 LPM  Vitals:    04/01/21 1559 04/02/21 0604 04/03/21 0658   Weight: 124.7 kg (275 lb) 127 kg (279 lb 15.8 oz) 126.4 kg (278 lb 9.6 oz)     Vital Signs with Ranges  Temp:  [98.4  F (36.9  C)-101.5  F (38.6  C)] 98.4  F (36.9  C)  Pulse:  [68-91] 74  Resp:  [13-44] 39  BP: (100-163)/() 138/80  FiO2 (%):  [45 %-50 %] 45 %  SpO2:  [87 %-100 %] 93 %  I/O last 3 completed shifts:  In: 720 [P.O.:720]  Out: 1875 [Urine:1875]    Constitutional: Awake, alert, cooperative, no apparent distress  Respiratory: Bibasilar crackles heard on auscultation.  No wheezing  Cardiovascular: Regular rate and rhythm, normal S1 and S2, and no murmur noted  GI: Normal bowel sounds, soft, non-distended, non-tender  Skin/Integumen: No rashes, no cyanosis, right lower extremity is red and warm to touch with active cellulitis improving.  Right lower extremity tinea pedis with interdigital tinea pedis noted and miconazole powder ordered  Other:     Medications     - MEDICATION INSTRUCTIONS -       - MEDICATION INSTRUCTIONS -         acetaminophen  1,000 mg Oral BID     amLODIPine  5 mg Oral Daily     aspirin  81 mg Oral Daily     cefuroxime  500 mg Oral Q12H CHACHO     cyanocobalamin  1,000 mcg Oral Every Other Day     enoxaparin ANTICOAGULANT  40 mg Subcutaneous Q24H     finasteride  5 mg Oral Daily     [START ON 4/5/2021] furosemide  40 mg Oral Daily     gabapentin  900 mg Oral BID     insulin aspart  1-7 Units Subcutaneous TID AC     insulin aspart  1-5 Units Subcutaneous At Bedtime     ipratropium - albuterol 0.5 mg/2.5 mg/3 mL  3 mL Nebulization Once     lisinopril  20 mg Oral Daily     magnesium oxide  400 mg Oral TID     miconazole   Topical BID     omeprazole  20 mg Oral Daily     pravastatin  20 mg Oral Daily     sodium chloride (PF)  3 mL Intracatheter Q8H     sodium  chloride (PF)  3 mL Intracatheter Q8H     tamsulosin  0.4 mg Oral Daily     vitamin D3  50 mcg Oral Daily       Data   Recent Labs   Lab 04/04/21  0602 04/03/21  2232 04/03/21  0928 04/02/21  1114 04/01/21  1934 04/01/21 1934   WBC  --  10.9 11.9* 13.3*  --   --    HGB  --  11.3* 11.4* 11.1*  --   --    MCV  --  86 85 85  --   --     204 191 162  --   --    NA  --  137 138 137  --  135   POTASSIUM 3.7 3.5 3.7 3.4  --  3.6   CHLORIDE  --  103 106 105  --  103   CO2  --  30 23 25  --  25   BUN  --  8 8 13  --  17   CR 0.66 0.74 0.60* 0.68   < > 0.81   ANIONGAP  --  4 9 7  --  7   BRANDON  --  8.1* 8.3* 8.0*  --  8.3*   GLC  --  191* 243* 172*  --  133*   ALBUMIN  --   --   --  2.6*  --  2.9*   PROTTOTAL  --   --   --  6.0*  --  6.5*   BILITOTAL  --   --   --  0.4  --  0.7   ALKPHOS  --   --   --  41  --  44   ALT  --   --   --  69  --  57   AST  --   --   --  339*  --  304*    < > = values in this interval not displayed.       Recent Results (from the past 24 hour(s))   XR Chest Port 1 View    Narrative    XR CHEST PORT 1 VW 4/3/2021 9:08 PM    HISTORY: wheezing, dyspnea    COMPARISON: 4/1/2021      Impression    IMPRESSION: Slight increase in linear interstitial opacities which  could represent developing pulmonary edema. No focal infiltrate,  pleural effusion or pneumothorax. Median sternotomy and mediastinal  surgical clips.    KAY CARRANZA MD

## 2021-04-05 ENCOUNTER — APPOINTMENT (OUTPATIENT)
Dept: CARDIOLOGY | Facility: CLINIC | Age: 86
DRG: 602 | End: 2021-04-05
Attending: INTERNAL MEDICINE
Payer: COMMERCIAL

## 2021-04-05 ENCOUNTER — APPOINTMENT (OUTPATIENT)
Dept: PHYSICAL THERAPY | Facility: CLINIC | Age: 86
DRG: 602 | End: 2021-04-05
Attending: INTERNAL MEDICINE
Payer: COMMERCIAL

## 2021-04-05 ENCOUNTER — APPOINTMENT (OUTPATIENT)
Dept: OCCUPATIONAL THERAPY | Facility: CLINIC | Age: 86
DRG: 602 | End: 2021-04-05
Payer: COMMERCIAL

## 2021-04-05 LAB
ANION GAP SERPL CALCULATED.3IONS-SCNC: 4 MMOL/L (ref 3–14)
BUN SERPL-MCNC: 8 MG/DL (ref 7–30)
CALCIUM SERPL-MCNC: 8.5 MG/DL (ref 8.5–10.1)
CHLORIDE SERPL-SCNC: 103 MMOL/L (ref 94–109)
CO2 SERPL-SCNC: 30 MMOL/L (ref 20–32)
CREAT SERPL-MCNC: 0.62 MG/DL (ref 0.66–1.25)
ERYTHROCYTE [DISTWIDTH] IN BLOOD BY AUTOMATED COUNT: 14.4 % (ref 10–15)
GFR SERPL CREATININE-BSD FRML MDRD: >90 ML/MIN/{1.73_M2}
GLUCOSE BLDC GLUCOMTR-MCNC: 151 MG/DL (ref 70–99)
GLUCOSE BLDC GLUCOMTR-MCNC: 166 MG/DL (ref 70–99)
GLUCOSE BLDC GLUCOMTR-MCNC: 174 MG/DL (ref 70–99)
GLUCOSE BLDC GLUCOMTR-MCNC: 178 MG/DL (ref 70–99)
GLUCOSE BLDC GLUCOMTR-MCNC: 185 MG/DL (ref 70–99)
GLUCOSE SERPL-MCNC: 161 MG/DL (ref 70–99)
HCT VFR BLD AUTO: 36.7 % (ref 40–53)
HGB BLD-MCNC: 11.8 G/DL (ref 13.3–17.7)
INTERPRETATION ECG - MUSE: NORMAL
MAGNESIUM SERPL-MCNC: 1.9 MG/DL (ref 1.6–2.3)
MCH RBC QN AUTO: 27.7 PG (ref 26.5–33)
MCHC RBC AUTO-ENTMCNC: 32.2 G/DL (ref 31.5–36.5)
MCV RBC AUTO: 86 FL (ref 78–100)
PLATELET # BLD AUTO: 270 10E9/L (ref 150–450)
POTASSIUM SERPL-SCNC: 3.2 MMOL/L (ref 3.4–5.3)
RBC # BLD AUTO: 4.26 10E12/L (ref 4.4–5.9)
SODIUM SERPL-SCNC: 137 MMOL/L (ref 133–144)
WBC # BLD AUTO: 10.1 10E9/L (ref 4–11)

## 2021-04-05 PROCEDURE — 85027 COMPLETE CBC AUTOMATED: CPT | Performed by: INTERNAL MEDICINE

## 2021-04-05 PROCEDURE — 93306 TTE W/DOPPLER COMPLETE: CPT | Mod: 26 | Performed by: INTERNAL MEDICINE

## 2021-04-05 PROCEDURE — 83735 ASSAY OF MAGNESIUM: CPT | Performed by: INTERNAL MEDICINE

## 2021-04-05 PROCEDURE — 250N000013 HC RX MED GY IP 250 OP 250 PS 637: Performed by: INTERNAL MEDICINE

## 2021-04-05 PROCEDURE — 250N000011 HC RX IP 250 OP 636: Performed by: INTERNAL MEDICINE

## 2021-04-05 PROCEDURE — 255N000002 HC RX 255 OP 636: Performed by: INTERNAL MEDICINE

## 2021-04-05 PROCEDURE — 999N001017 HC STATISTIC GLUCOSE BY METER IP

## 2021-04-05 PROCEDURE — 97140 MANUAL THERAPY 1/> REGIONS: CPT | Mod: GP | Performed by: PHYSICAL THERAPIST

## 2021-04-05 PROCEDURE — 97530 THERAPEUTIC ACTIVITIES: CPT | Mod: GP | Performed by: PHYSICAL THERAPIST

## 2021-04-05 PROCEDURE — 120N000001 HC R&B MED SURG/OB

## 2021-04-05 PROCEDURE — 97535 SELF CARE MNGMENT TRAINING: CPT | Mod: GO | Performed by: OCCUPATIONAL THERAPIST

## 2021-04-05 PROCEDURE — 36415 COLL VENOUS BLD VENIPUNCTURE: CPT | Performed by: INTERNAL MEDICINE

## 2021-04-05 PROCEDURE — 99233 SBSQ HOSP IP/OBS HIGH 50: CPT | Performed by: INTERNAL MEDICINE

## 2021-04-05 PROCEDURE — 80048 BASIC METABOLIC PNL TOTAL CA: CPT | Performed by: INTERNAL MEDICINE

## 2021-04-05 PROCEDURE — 999N000208 ECHOCARDIOGRAM COMPLETE

## 2021-04-05 RX ORDER — LISINOPRIL 2.5 MG/1
2.5 TABLET ORAL DAILY
Status: DISCONTINUED | OUTPATIENT
Start: 2021-04-06 | End: 2021-04-07

## 2021-04-05 RX ORDER — FUROSEMIDE 10 MG/ML
40 INJECTION INTRAMUSCULAR; INTRAVENOUS EVERY 8 HOURS
Status: DISCONTINUED | OUTPATIENT
Start: 2021-04-05 | End: 2021-04-08

## 2021-04-05 RX ORDER — FUROSEMIDE 10 MG/ML
40 INJECTION INTRAMUSCULAR; INTRAVENOUS ONCE
Status: COMPLETED | OUTPATIENT
Start: 2021-04-05 | End: 2021-04-05

## 2021-04-05 RX ADMIN — CEFUROXIME AXETIL 500 MG: 500 TABLET ORAL at 09:34

## 2021-04-05 RX ADMIN — PRAVASTATIN SODIUM 20 MG: 20 TABLET ORAL at 09:35

## 2021-04-05 RX ADMIN — FINASTERIDE 5 MG: 5 TABLET, FILM COATED ORAL at 09:32

## 2021-04-05 RX ADMIN — FUROSEMIDE 40 MG: 40 TABLET ORAL at 09:35

## 2021-04-05 RX ADMIN — INSULIN ASPART 1 UNITS: 100 INJECTION, SOLUTION INTRAVENOUS; SUBCUTANEOUS at 17:36

## 2021-04-05 RX ADMIN — FUROSEMIDE 40 MG: 10 INJECTION, SOLUTION INTRAMUSCULAR; INTRAVENOUS at 09:31

## 2021-04-05 RX ADMIN — TAMSULOSIN HYDROCHLORIDE 0.4 MG: 0.4 CAPSULE ORAL at 09:32

## 2021-04-05 RX ADMIN — ACETAMINOPHEN 1000 MG: 500 TABLET, FILM COATED ORAL at 20:57

## 2021-04-05 RX ADMIN — HUMAN ALBUMIN MICROSPHERES AND PERFLUTREN 9 ML: 10; .22 INJECTION, SOLUTION INTRAVENOUS at 08:53

## 2021-04-05 RX ADMIN — Medication 50 MCG: at 09:34

## 2021-04-05 RX ADMIN — LISINOPRIL 20 MG: 10 TABLET ORAL at 09:33

## 2021-04-05 RX ADMIN — AMLODIPINE BESYLATE 5 MG: 5 TABLET ORAL at 09:35

## 2021-04-05 RX ADMIN — INSULIN ASPART 1 UNITS: 100 INJECTION, SOLUTION INTRAVENOUS; SUBCUTANEOUS at 13:11

## 2021-04-05 RX ADMIN — MICONAZOLE NITRATE: 20 POWDER TOPICAL at 09:39

## 2021-04-05 RX ADMIN — MICONAZOLE NITRATE: 20 POWDER TOPICAL at 21:00

## 2021-04-05 RX ADMIN — ASPIRIN 81 MG: 81 TABLET, DELAYED RELEASE ORAL at 09:33

## 2021-04-05 RX ADMIN — INSULIN ASPART 1 UNITS: 100 INJECTION, SOLUTION INTRAVENOUS; SUBCUTANEOUS at 09:31

## 2021-04-05 RX ADMIN — ACETAMINOPHEN 1000 MG: 500 TABLET, FILM COATED ORAL at 09:33

## 2021-04-05 RX ADMIN — FUROSEMIDE 40 MG: 10 INJECTION, SOLUTION INTRAMUSCULAR; INTRAVENOUS at 17:35

## 2021-04-05 RX ADMIN — CEFUROXIME AXETIL 500 MG: 500 TABLET ORAL at 20:57

## 2021-04-05 RX ADMIN — GABAPENTIN 900 MG: 300 CAPSULE ORAL at 20:57

## 2021-04-05 RX ADMIN — GABAPENTIN 900 MG: 300 CAPSULE ORAL at 09:34

## 2021-04-05 RX ADMIN — ENOXAPARIN SODIUM 40 MG: 40 INJECTION SUBCUTANEOUS at 21:11

## 2021-04-05 RX ADMIN — OMEPRAZOLE 20 MG: 20 CAPSULE, DELAYED RELEASE ORAL at 09:35

## 2021-04-05 ASSESSMENT — ACTIVITIES OF DAILY LIVING (ADL)
ADLS_ACUITY_SCORE: 19

## 2021-04-05 ASSESSMENT — MIFFLIN-ST. JEOR: SCORE: 2020.63

## 2021-04-05 NOTE — PROGRESS NOTES
[facility]    Hospitalist Progress Note    Date of Service (when I saw the patient): 04/05/2021    Assessment & Plan   Austen Fowler is a 86 year old male who was admitted on 4/1/2021.  HISTORY OF PRESENT ILLNESS:  This is an 86-year-old male with history of coronary artery disease, status post CABG, hypertension, GERD, diabetes mellitus type 2, anxiety, obstructive sleep apnea on CPAP, ascending aortic dilatation, who came to the ER with complaint of fever, chills, weakness, tiredness and right lower leg redness for the last 2 or 3 days.     In the ER he was evaluated.  He had a low-grade temperature of 99.3, but the lactic acid on the workup came back 6.4.  He was resuscitated and found to have right lower extremity cellulitis, so the Hospitalist Service is consulted for admission.      ASSESSMENT AND PLAN:   1.  Severe sepsis secondary to right lower extremity cellulitis:   Lactic acidosis secondary to the above  at 6.4   -Admitted for lower extremity cellulitis on right lower extremity.  -Initially treated with vancomycin and Zosyn, changed to Ceftin on 4/4  -Blood cultures 1/2 returned positive for possible coag negative staph, lactate improved with hydration.  -White count is significantly back to baseline.  Off IV fluids now.  -Lymphedema therapy to follow.      Acute hypoxic respiratory failure secondary to fluid overload  Acute on chronic diastolic congestive heart failure exacerbation;  Patient became more short of breath and hypoxic overnight needing BiPAP RRT was called  Chest x-ray was done showed bilateral infiltrates consistent with pulmonary edema  -Currently patient requiring 2 to 3 L of oxygen, echocardiogram results noted from 4/5, grade 3 diastolic dysfunction seen will start on IV Lasix 40 mg every 8 hourly.  BMP ordered for tomorrow a.m.    2.  Generalized weakness: Secondary to above  -PT recommending TCU, social work consulted.    3.  Coronary artery disease:     Has been stable.  Recently saw his cardiologist, has been doing well at this time.  Keep him on aspirin, lisinopril, amlodipine at this time.  No wall motion abnormalities noted in echo here.    4.  History of diabetes mellitus type 2:  Is on metformin 1000 mg b.i.d.  I will keep holding the metformin for now, keep him on sliding scale insulin for correction, hypoglycemia protocol.    Possibly can discharge back on Metformin.  5.  Obstructive sleep apnea on CPAP:  We will continue the CPAP while he is in the hospital.  -Patient refused hospital BiPAP, and that he does not want to use his home CPAP here either.  6.  Hypertension:  He is on amlodipine 5 mg b.i.d. and lisinopril 20 mg daily.  We will hold amlodipine while on IV Lasix..  Ace wraps ordered to help with lower extremity edema    7.  Gastroesophageal reflux disease:  On Prilosec.  I will continue with that.   8.  Chronic back pain:  On Neurontin 900 mg 3 times a day.  I will continue with that.   9.  Hyperlipidemia:  On Pravachol.  We will keep him on that.   10.  Deep venous thrombosis prophylaxis with Lovenox.      CODE STATUS:  Full code as per the patient wishes.     Code Status: Full Code    Disposition: Expected discharge in 1 to 2 days depending on oxygen needs, possibly to TCU.    Greater than 35 minutes were spent in reviewing the chart, counseling the patient in collaboration of care, discussed extensively with the patient regarding plan of care.    Patience Meza MD        Interval History      Patient is on 2 L of oxygen, did not use oxygen prior to this admission, he mentions that he has no awareness of any heart failure diagnosis, his echocardiograms were was obtained here today, he mentions a history of CABG and having no other coronary artery issues since then.  Patient was requesting if we can send him home with oxygen, mentioned about weaning the oxygen down as possible and if he still requires we will arrange oxygen at the time of  discharge.  Discharge possibly in 1 to 2 days, need for TCU discharge also discussed.  -Data reviewed today: I reviewed all new labs and imaging results over the last 24 hours. I personally reviewed no images or EKG's today.    Physical Exam   Temp: 98.1  F (36.7  C) Temp src: Oral BP: 108/61 Pulse: 75   Resp: 20 SpO2: 95 % O2 Device: Nasal cannula Oxygen Delivery: 1.5 LPM  Vitals:    04/02/21 0604 04/03/21 0658 04/05/21 0428   Weight: 127 kg (279 lb 15.8 oz) 126.4 kg (278 lb 9.6 oz) 125 kg (275 lb 9.2 oz)     Vital Signs with Ranges  Temp:  [97.8  F (36.6  C)-99.2  F (37.3  C)] 98.1  F (36.7  C)  Pulse:  [66-81] 75  Resp:  [20-24] 20  BP: (108-147)/(40-73) 108/61  SpO2:  [94 %-96 %] 95 %  I/O last 3 completed shifts:  In: 960 [P.O.:960]  Out: 5885 [Urine:5885]    Constitutional: Awake, alert, cooperative, no apparent distress  Respiratory: Bibasilar crackles heard on auscultation.  No wheezing  Cardiovascular: Regular rate and rhythm, normal S1 and S2, and no murmur noted  GI: Normal bowel sounds, soft, non-distended, non-tender  Skin/Integumen: Erythema noted right lower extremities, tinea pedis infection also noted.  Other:     Medications     - MEDICATION INSTRUCTIONS -       - MEDICATION INSTRUCTIONS -         acetaminophen  1,000 mg Oral BID     amLODIPine  5 mg Oral Daily     aspirin  81 mg Oral Daily     cefuroxime  500 mg Oral Q12H CHACHO     cyanocobalamin  1,000 mcg Oral Every Other Day     enoxaparin ANTICOAGULANT  40 mg Subcutaneous Q24H     finasteride  5 mg Oral Daily     furosemide  40 mg Oral Daily     gabapentin  900 mg Oral BID     insulin aspart  1-7 Units Subcutaneous TID AC     insulin aspart  1-5 Units Subcutaneous At Bedtime     ipratropium - albuterol 0.5 mg/2.5 mg/3 mL  3 mL Nebulization Once     lisinopril  20 mg Oral Daily     miconazole   Topical BID     omeprazole  20 mg Oral Daily     pravastatin  20 mg Oral Daily     sodium chloride (PF)  3 mL Intracatheter Q8H     sodium chloride (PF)   3 mL Intracatheter Q8H     tamsulosin  0.4 mg Oral Daily     vitamin D3  50 mcg Oral Daily       Data   Recent Labs   Lab 21  1122 21  0634 21  0602 21  22321  0928 21  1114 21  1934 21   WBC 10.1  --   --  10.9 11.9* 13.3*  --   --    HGB 11.8*  --   --  11.3* 11.4* 11.1*  --   --    MCV 86  --   --  86 85 85  --   --      --  213 204 191 162  --   --    NA  --  137  --  137 138 137  --  135   POTASSIUM  --  3.2* 3.7 3.5 3.7 3.4  --  3.6   CHLORIDE  --  103  --  103 106 105  --  103   CO2  --  30  --  30 23 25  --  25   BUN  --  8  --  8 8 13  --  17   CR  --  0.62* 0.66 0.74 0.60* 0.68   < > 0.81   ANIONGAP  --  4  --  4 9 7  --  7   BRANDON  --  8.5  --  8.1* 8.3* 8.0*  --  8.3*   GLC  --  161*  --  191* 243* 172*  --  133*   ALBUMIN  --   --   --   --   --  2.6*  --  2.9*   PROTTOTAL  --   --   --   --   --  6.0*  --  6.5*   BILITOTAL  --   --   --   --   --  0.4  --  0.7   ALKPHOS  --   --   --   --   --  41  --  44   ALT  --   --   --   --   --  69  --  57   AST  --   --   --   --   --  339*  --  304*    < > = values in this interval not displayed.       Recent Results (from the past 24 hour(s))   Echocardiogram Complete    Narrative    [id number]  [id number]  [id number]  [id number]^KENA     [facility]  Echocardiography Laboratory  [address] [place] [address]     Name: NAYELI RAI ALLIE  MRN: [id number]  : 1935  Study Date: 2021 08:40 AM  Age: 86 yrs  Gender: Male  Patient Location: [facility]  Reason For Study: SOB  Ordering Physician: KENA GALLARDO  Performed By: Malou Hobbs     BSA: 2.5 m2  Height: 75 in  Weight: 278 lb  HR: 66  BP: 138/80 mmHg  ______________________________________________________________________________  Procedure  Complete Portable Echo Adult. Optison (NDC #[id number]) given intravenously.  ______________________________________________________________________________  Interpretation  Summary     Technically difficult study limited views obtained due to body habitus  The left ventricle is borderline dilated.  The visual ejection fraction is estimated at 55-60%.  Grade III or advanced diastolic dysfunction.  Diastolic Doppler findings (E/E' ratio and/or other parameters) suggest left  ventricular filling pressures are increased.  Normal left ventricular wall motion  RV not well seen but grossly normal size/function  Right ventricular systolic pressure could not be approximated due to  inadequate tricuspid regurgitation.  Borderline aortic root dilatation.  IVC diameter and respiratory changes fall into an intermediate range  suggesting an RA pressure of 8 mmHg.  ______________________________________________________________________________  Left Ventricle  The left ventricle is borderline dilated. There is normal left ventricular  wall thickness. The visual ejection fraction is estimated at 55-60%. Grade III  or advanced diastolic dysfunction. Diastolic Doppler findings (E/E' ratio  and/or other parameters) suggest left ventricular filling pressures are  increased. Normal left ventricular wall motion. There is no thrombus seen in  the left ventricle.     Right Ventricle  RV not well seen but grossly normal size/function. The right ventricle is not  well visualized.     Atria  The left atrium is borderline dilated. Right atrial size is normal.     Mitral Valve  The mitral valve leaflets appear normal. There is no evidence of stenosis,  fluttering, or prolapse.     Tricuspid Valve  Normal tricuspid valve. There is trace tricuspid regurgitation. Right  ventricular systolic pressure could not be approximated due to inadequate  tricuspid regurgitation.     Aortic Valve  There is mild aortic sclerosis of the non-coronary cusp. No hemodynamically  significant valvular aortic stenosis.     Pulmonic Valve  The pulmonic valve is not well seen, but is grossly normal.     Vessels  Borderline aortic root  dilatation. Normal size ascending aorta. IVC diameter  and respiratory changes fall into an intermediate range suggesting an RA  pressure of 8 mmHg.     Pericardium  The pericardium appears normal.     Rhythm  Sinus rhythm was noted.  ______________________________________________________________________________  MMode/2D Measurements & Calculations     RVDd: 4.0 cm  IVSd: 0.97 cm  LVIDd: 5.9 cm  LVIDs: 4.6 cm  LVPWd: 0.96 cm  FS: 20.8 %  LV mass(C)d: 225.4 grams  LV mass(C)dI: 89.3 grams/m2  Ao root diam: 3.8 cm  LA dimension: 4.7 cm  asc Aorta Diam: 3.3 cm  LA/Ao: 1.2  LA Volume (BP): 57.0 ml  LA Volume Index (BP): 22.6 ml/m2  RWT: 0.33     Doppler Measurements & Calculations  MV E max annel: 109.0 cm/sec  MV A max annel: 48.7 cm/sec  MV E/A: 2.2  MV dec time: 0.21 sec  PA acc time: 0.07 sec  E/E' av.5  Lateral E/e': 10.6  Medial E/e': 18.4     ______________________________________________________________________________  Report approved by: José Manuel Souza 2021 10:06 AM

## 2021-04-05 NOTE — PLAN OF CARE
A+O x 4. Right leg cellulitis. No longer septic. On  lasix, BLE edema, and pulmonary edema. Slight expiatory wheezes in lower lungs. No complaints of pain. Up with 1 x assist/GB/W. Sodium restricted diet. Full code. Wife at bedside. Possible discharge to TCU in about 1-2 days. Lymph wrap on right leg. May be pulled off early if needed, but PT would like it on for 22.5 hours.

## 2021-04-05 NOTE — PROGRESS NOTES
04/05/21 1416   General Information   Discipline PT   Onset of Edema 04/01/21   Affected Body Part(s) Right LE   Edema Etiology Infection  (cellulitis)   Etiology Comments being treated for cellulitis, heart failure and fluid overload   Pertinent history of current problem (PT: include personal factors and/or comorbidities that impact the POC; OT: include additional occupational profile info) per chart: 86-year-old male with history of coronary artery disease, status post CABG, hypertension, GERD, diabetes mellitus type 2, anxiety, obstructive sleep apnea on CPAP, ascending aortic dilatation, who came to the ER with complaint of fever, chills, weakness, tiredness and right lower leg redness for the last 2 or 3 days. Found  to have R LE cellulitis; fluid overload, and heart failure; see medical record for further information   Edema Precautions Cardiac Edema/CHF;Acute infection   Pain   Patient currently in pain No   Edema Examination / Assessment   Skin Condition Pitting;Dryness;Other (see comments)  (lower leg red from cellulitis)   ROM   Range of Motion (WFL) other (describe)   Description of Range of Motion Deficits R lower leg limited by extra fluid but functional; others appear WFL   Strength   Strength (WFL) other (describe)   Description of Strength Deficits functional weakness noted with mobility   Assessment/Plan   Patient presents with Edema   Assessment Patient with R LE edema which is pitting; recent diagnosis of cellulitis on R LE being treated with antibiotics > 2 days; has fluid overload; heart failure; L LE without pitting; no significant pitting in upper legs   Clinical Presentation Evolving/Changing   Clinical Presentation Rationale clinical judgement; level of assist   Clinical Decision Making (Complexity) Moderate complexity   Planned Edema Interventions Gradient compression bandaging;Exercises;Education;Manual therapy   Treatment Frequency Daily   Treatment Duration 4 days   Patient, Family  and/or Staff in agreement with plan of care. Yes   Risks and benefits of treatment have been explained. Yes   Total Evaluation Time   Total Evaluation Time (Minutes) 10

## 2021-04-05 NOTE — PROGRESS NOTES
Pt placed on Bipap for overnight and only tolerated for few hours. Pt requested to be taken off and was placed on 2L NC.

## 2021-04-05 NOTE — PLAN OF CARE
A&O x4, forgetful at times. VSS on 1.5 L nasal cannula. Tele NSR. Lung sounds with crackles and expiratory wheezes. Dyspnea on exertion. Low sodium diet, tolerating well. Bowel sounds active + flatus. Voiding adequately, incontinent at times. Arm/elbow dressings CDI. RLE red with +3 edema. Denies pain. Up with 1 assist.

## 2021-04-05 NOTE — PLAN OF CARE
[id number] Alert and oriented x4 but forgetful. Vital signs stable ex. Dysnea on exertion, on 2L O2 via NC. Assist of 1 WB and walker. Tolerating diet. Lungs sounds crackles at bases. BS+, No BM this shift. Voiding adequately. Mepilex in place on elbow abrasions. Ace wraps on BLE, removed at 2300. Pain managed with scheduled Tylenol. Denies nausea. Tele SR with BBB.

## 2021-04-06 ENCOUNTER — APPOINTMENT (OUTPATIENT)
Dept: PHYSICAL THERAPY | Facility: CLINIC | Age: 86
DRG: 602 | End: 2021-04-06
Payer: COMMERCIAL

## 2021-04-06 ENCOUNTER — APPOINTMENT (OUTPATIENT)
Dept: OCCUPATIONAL THERAPY | Facility: CLINIC | Age: 86
DRG: 602 | End: 2021-04-06
Payer: COMMERCIAL

## 2021-04-06 LAB
CREAT SERPL-MCNC: 0.65 MG/DL (ref 0.66–1.25)
GFR SERPL CREATININE-BSD FRML MDRD: 89 ML/MIN/{1.73_M2}
GLUCOSE BLDC GLUCOMTR-MCNC: 158 MG/DL (ref 70–99)
GLUCOSE BLDC GLUCOMTR-MCNC: 172 MG/DL (ref 70–99)
GLUCOSE BLDC GLUCOMTR-MCNC: 173 MG/DL (ref 70–99)
GLUCOSE BLDC GLUCOMTR-MCNC: 223 MG/DL (ref 70–99)

## 2021-04-06 PROCEDURE — 97140 MANUAL THERAPY 1/> REGIONS: CPT | Mod: GP | Performed by: PHYSICAL THERAPIST

## 2021-04-06 PROCEDURE — 250N000011 HC RX IP 250 OP 636: Performed by: INTERNAL MEDICINE

## 2021-04-06 PROCEDURE — 97535 SELF CARE MNGMENT TRAINING: CPT | Mod: GO | Performed by: OCCUPATIONAL THERAPIST

## 2021-04-06 PROCEDURE — 82565 ASSAY OF CREATININE: CPT | Performed by: INTERNAL MEDICINE

## 2021-04-06 PROCEDURE — 36415 COLL VENOUS BLD VENIPUNCTURE: CPT | Performed by: INTERNAL MEDICINE

## 2021-04-06 PROCEDURE — 999N001017 HC STATISTIC GLUCOSE BY METER IP

## 2021-04-06 PROCEDURE — 120N000001 HC R&B MED SURG/OB

## 2021-04-06 PROCEDURE — 99233 SBSQ HOSP IP/OBS HIGH 50: CPT | Performed by: INTERNAL MEDICINE

## 2021-04-06 PROCEDURE — 250N000013 HC RX MED GY IP 250 OP 250 PS 637: Performed by: INTERNAL MEDICINE

## 2021-04-06 PROCEDURE — 97116 GAIT TRAINING THERAPY: CPT | Mod: GP | Performed by: PHYSICAL THERAPIST

## 2021-04-06 PROCEDURE — 97530 THERAPEUTIC ACTIVITIES: CPT | Mod: GP | Performed by: PHYSICAL THERAPIST

## 2021-04-06 RX ADMIN — OMEPRAZOLE 20 MG: 20 CAPSULE, DELAYED RELEASE ORAL at 09:02

## 2021-04-06 RX ADMIN — FUROSEMIDE 40 MG: 10 INJECTION, SOLUTION INTRAMUSCULAR; INTRAVENOUS at 05:00

## 2021-04-06 RX ADMIN — MICONAZOLE NITRATE: 20 POWDER TOPICAL at 21:34

## 2021-04-06 RX ADMIN — INSULIN ASPART 2 UNITS: 100 INJECTION, SOLUTION INTRAVENOUS; SUBCUTANEOUS at 12:51

## 2021-04-06 RX ADMIN — CEFUROXIME AXETIL 500 MG: 500 TABLET ORAL at 09:02

## 2021-04-06 RX ADMIN — INSULIN ASPART 1 UNITS: 100 INJECTION, SOLUTION INTRAVENOUS; SUBCUTANEOUS at 08:59

## 2021-04-06 RX ADMIN — Medication 50 MCG: at 09:02

## 2021-04-06 RX ADMIN — CYANOCOBALAMIN TAB 1000 MCG 1000 MCG: 1000 TAB at 09:02

## 2021-04-06 RX ADMIN — PRAVASTATIN SODIUM 20 MG: 20 TABLET ORAL at 09:02

## 2021-04-06 RX ADMIN — GABAPENTIN 900 MG: 300 CAPSULE ORAL at 09:02

## 2021-04-06 RX ADMIN — TAMSULOSIN HYDROCHLORIDE 0.4 MG: 0.4 CAPSULE ORAL at 09:02

## 2021-04-06 RX ADMIN — FUROSEMIDE 40 MG: 10 INJECTION, SOLUTION INTRAMUSCULAR; INTRAVENOUS at 12:50

## 2021-04-06 RX ADMIN — ACETAMINOPHEN 1000 MG: 500 TABLET, FILM COATED ORAL at 21:25

## 2021-04-06 RX ADMIN — LISINOPRIL 2.5 MG: 2.5 TABLET ORAL at 09:02

## 2021-04-06 RX ADMIN — FUROSEMIDE 40 MG: 10 INJECTION, SOLUTION INTRAMUSCULAR; INTRAVENOUS at 21:25

## 2021-04-06 RX ADMIN — INSULIN ASPART 1 UNITS: 100 INJECTION, SOLUTION INTRAVENOUS; SUBCUTANEOUS at 17:38

## 2021-04-06 RX ADMIN — FINASTERIDE 5 MG: 5 TABLET, FILM COATED ORAL at 09:03

## 2021-04-06 RX ADMIN — MICONAZOLE NITRATE: 20 POWDER TOPICAL at 09:05

## 2021-04-06 RX ADMIN — ACETAMINOPHEN 1000 MG: 500 TABLET, FILM COATED ORAL at 09:03

## 2021-04-06 RX ADMIN — GABAPENTIN 900 MG: 300 CAPSULE ORAL at 21:26

## 2021-04-06 RX ADMIN — ENOXAPARIN SODIUM 40 MG: 40 INJECTION SUBCUTANEOUS at 21:26

## 2021-04-06 RX ADMIN — CEFUROXIME AXETIL 500 MG: 500 TABLET ORAL at 21:26

## 2021-04-06 RX ADMIN — ASPIRIN 81 MG: 81 TABLET, DELAYED RELEASE ORAL at 09:02

## 2021-04-06 ASSESSMENT — ACTIVITIES OF DAILY LIVING (ADL)
ADLS_ACUITY_SCORE: 19

## 2021-04-06 ASSESSMENT — MIFFLIN-ST. JEOR: SCORE: 2018.02

## 2021-04-06 NOTE — CONSULTS
"Care Management Follow Up    Length of Stay (days): 5    Expected Discharge Date: 04/07/21(TCU)     Concerns to be Addressed: discharge planning     Patient plan of care discussed at interdisciplinary rounds: Yes    Anticipated Discharge Disposition: Transitional Care     Anticipated Discharge Services:  Therapy   Anticipated Discharge DME:  NA    Patient/family educated on Medicare website which has current facility and service quality ratings:  No, but discussed facilities that take [employer]  Education Provided on the Discharge Plan:  Yes  Patient/Family in Agreement with the Plan: yes    Referrals Placed by CM/SW: Post Acute Facilities  Private pay costs discussed: Not applicable    Additional Information:  SAMMIE met with pt and discussed facilities that take [employer]. Pt was surprised to learn [facility] does not take [facility]. SW explained they do not have a contract with them. SW gave pt a list of facilities that take [employer]. Pt reports he would like to discuss the discharge options with his wife. SW stated that was fine. While SW was wrapping up with pt's meeting, hospitalist came into the room. Hospitalist verified pt would be ready for discharge tomorrow. SW to follow up with pt and his wife regarding placement.     Addendum: SAMMIE met with pt and his wife Anastasia Caballero. Anastasia Caballero was displeased with her options for placement. She reports \"None of these will work because they are too far away!\" SW explained due to pt's insurance, he is limited to the facilities he can attend. Anastasia Caballero suggested [facility]. SW informed tem SW was unaware if they take [employer]. SW stated they would call and follow up on insurance. SAMMIE left a VM with [facility] admission staff. SAMMIE called three different times and no one in TCU admissions answered. SAMMIE sent referral via [facility]. SAMMIE updated pt and Anastasia Caballero. Anastasia Caballero reports she is not up until late morning. She endorsed SAMMIE calling around noon.       ANURADHA Millan        "

## 2021-04-06 NOTE — PLAN OF CARE
Cognition: A&Ox4.   Behavior/aggression tool: Green. Calm and Cooperative  VS/O2: VSS on 1L-sating low 90s. Denies chest pain. Expiratory wheezes at times. Mild FINN.  Tele: NSR  Mobility: Up Ax1 with walker+GB  Pain management: Denies.  Diet: 2gr NA diet  Bowel/Bladder: Continent. Strict I&O. Voiding to B/R  Abnormal lab/blood glucose: , 158,   Drain/Devices: PIV SL.   Skin/Wound: Lymphedema wraps to RLE. Ace wraps to LLE. Skin tear and abrasion to BUE-mepilex dressings changed.  CMS: intact:    Specialist following: Lymphedema therapy, SAMMIE for discharge planning.     Plan: Possible discharge to TCU tomorrow.

## 2021-04-06 NOTE — PROGRESS NOTES
[facility]    Hospitalist Progress Note    Date of Service (when I saw the patient): 04/06/2021    Assessment & Plan   Austen Fowler is a 86 year old male who was admitted on 4/1/2021.  HISTORY OF PRESENT ILLNESS:  This is an 86-year-old male with history of coronary artery disease, status post CABG, hypertension, GERD, diabetes mellitus type 2, anxiety, obstructive sleep apnea on CPAP, ascending aortic dilatation, who came to the ER with complaint of fever, chills, weakness, tiredness and right lower leg redness for the last 2 or 3 days.     In the ER he was evaluated.  He had a low-grade temperature of 99.3, but the lactic acid on the workup came back 6.4.  He was resuscitated and found to have right lower extremity cellulitis, so the Hospitalist Service is consulted for admission.      ASSESSMENT AND PLAN:   1.  Severe sepsis secondary to right lower extremity cellulitis:   Lactic acidosis secondary to the above  at 6.4   -Admitted for lower extremity cellulitis on right lower extremity.  -Initially treated with vancomycin and Zosyn, changed to Ceftin on 4/4  -Blood cultures 1/2 returned positive for possible coag negative staph, lactate improved with hydration.  -White count is significantly back to baseline.  Off IV fluids now.  -Lymphedema therapy to follow.      Acute hypoxic respiratory failure secondary to fluid overload  Acute on chronic diastolic congestive heart failure exacerbation;  Patient became more short of breath and hypoxic overnight needing BiPAP RRT was called  Chest x-ray was done showed bilateral infiltrates consistent with pulmonary edema  -Currently patient requiring 2 to 3 L of oxygen, echocardiogram results noted from 4/5, grade 3 diastolic dysfunction seen .  --Continue IV Lasix today, -8 L output noted, oxygen needs are slightly improving, still on 2 L of oxygen.  ---Continue strict intake output monitoring.    2.  Generalized weakness: Secondary to  above  -PT recommending TCU, social work consulted.    3.  Coronary artery disease:    Has been stable.  Recently saw his cardiologist, has been doing well at this time.  Keep him on aspirin, lisinopril, amlodipine at this time.  No wall motion abnormalities noted in echo here.    4.  History of diabetes mellitus type 2:  Is on metformin 1000 mg b.i.d.  I will keep holding the metformin for now, keep him on sliding scale insulin for correction, hypoglycemia protocol.    Possibly can discharge back on Metformin.  5.  Obstructive sleep apnea on CPAP:  We will continue the CPAP while he is in the hospital.  -Patient refused hospital BiPAP, and that he does not want to use his home CPAP here either.  6.  Hypertension:  He is on amlodipine 5 mg b.i.d. and lisinopril 20 mg daily.  We will hold amlodipine while on IV Lasix..  Ace wraps ordered to help with lower extremity edema    7.  Gastroesophageal reflux disease:  On Prilosec.  I will continue with that.   8.  Chronic back pain:  On Neurontin 900 mg 3 times a day.  I will continue with that.   9.  Hyperlipidemia:  On Pravachol.  We will keep him on that.   10.  Deep venous thrombosis prophylaxis with Lovenox.      CODE STATUS:  Full code as per the patient wishes.     Code Status: Full Code    Disposition: Expected discharge possibly 4/7 to TCU depending on bed availability and diuretic needs  Plan of care discussed with patient, nursing and social work.  Discharge planning initiated.    Patience Meza MD        Interval History      Good urine output noted, patient feels short of breath but improved since yesterday, -8 L output seen.  He still has shortness of breath with conversations, occasional wheezing heard, discussed about initiating nebs.  Patient is occasionally forgetful.  Denies any pain.  -Data reviewed today: I reviewed all new labs and imaging results over the last 24 hours. I personally reviewed no images or EKG's today.    Physical Exam   Temp: 98.6  F  (37  C) Temp src: Oral BP: 118/68 Pulse: 72   Resp: 16 SpO2: 93 % O2 Device: Nasal cannula Oxygen Delivery: 1 LPM  Vitals:    04/03/21 0658 04/05/21 0428 04/06/21 0500   Weight: 126.4 kg (278 lb 9.6 oz) 125 kg (275 lb 9.2 oz) 124.7 kg (275 lb)     Vital Signs with Ranges  Temp:  [97.5  F (36.4  C)-98.6  F (37  C)] 98.6  F (37  C)  Pulse:  [65-72] 72  Resp:  [16-18] 16  BP: (111-143)/(58-77) 118/68  SpO2:  [90 %-96 %] 93 %  I/O last 3 completed shifts:  In: 120 [P.O.:120]  Out: 675 [Urine:675]    Constitutional: Awake, alert, cooperative, no apparent distress  Respiratory: Bibasilar crackles heard on auscultation.  No wheezing  Cardiovascular: Regular rate and rhythm, normal S1 and S2, and no murmur noted  GI: Normal bowel sounds, soft, non-distended, non-tender  Skin/Integumen: Erythema noted right lower extremities, tinea pedis infection also noted.  Other:     Medications     - MEDICATION INSTRUCTIONS -       - MEDICATION INSTRUCTIONS -         acetaminophen  1,000 mg Oral BID     [Held by provider] amLODIPine  5 mg Oral Daily     aspirin  81 mg Oral Daily     cefuroxime  500 mg Oral Q12H CHACHO     cyanocobalamin  1,000 mcg Oral Every Other Day     enoxaparin ANTICOAGULANT  40 mg Subcutaneous Q24H     finasteride  5 mg Oral Daily     furosemide  40 mg Intravenous Q8H     gabapentin  900 mg Oral BID     insulin aspart  1-7 Units Subcutaneous TID AC     insulin aspart  1-5 Units Subcutaneous At Bedtime     ipratropium - albuterol 0.5 mg/2.5 mg/3 mL  3 mL Nebulization Once     lisinopril  2.5 mg Oral Daily     miconazole   Topical BID     omeprazole  20 mg Oral Daily     pravastatin  20 mg Oral Daily     sodium chloride (PF)  3 mL Intracatheter Q8H     tamsulosin  0.4 mg Oral Daily     vitamin D3  50 mcg Oral Daily       Data   Recent Labs   Lab 04/06/21  0608 04/05/21  1122 04/05/21  0634 04/04/21  0602 04/03/21  2232 04/03/21  0928 04/02/21  1114 04/01/21  1934 04/01/21 1934   WBC  --  10.1  --   --  10.9 11.9*  13.3*  --   --    HGB  --  11.8*  --   --  11.3* 11.4* 11.1*  --   --    MCV  --  86  --   --  86 85 85  --   --    PLT  --  270  --  213 204 191 162  --   --    NA  --   --  137  --  137 138 137  --  135   POTASSIUM  --   --  3.2* 3.7 3.5 3.7 3.4  --  3.6   CHLORIDE  --   --  103  --  103 106 105  --  103   CO2  --   --  30  --  30 23 25  --  25   BUN  --   --  8  --  8 8 13  --  17   CR 0.65*  --  0.62* 0.66 0.74 0.60* 0.68   < > 0.81   ANIONGAP  --   --  4  --  4 9 7  --  7   BRANDON  --   --  8.5  --  8.1* 8.3* 8.0*  --  8.3*   GLC  --   --  161*  --  191* 243* 172*  --  133*   ALBUMIN  --   --   --   --   --   --  2.6*  --  2.9*   PROTTOTAL  --   --   --   --   --   --  6.0*  --  6.5*   BILITOTAL  --   --   --   --   --   --  0.4  --  0.7   ALKPHOS  --   --   --   --   --   --  41  --  44   ALT  --   --   --   --   --   --  69  --  57   AST  --   --   --   --   --   --  339*  --  304*    < > = values in this interval not displayed.       No results found for this or any previous visit (from the past 24 hour(s)).

## 2021-04-06 NOTE — PLAN OF CARE
Alert and oriented x4, forgetful. Vital signs stable on 1.5 L nasal cannula. Assist of 1 + gait belt and walker. Tolerating 2g Na+ diet. Lung sounds clear in upper lobes with crackles in bases. Bowel sounds active, + flatus. Adequate I & O's. Incontinent of bowel and bladder at times. RLE red and warm with 3+ edema. LLE with 1+ edema. Ace wraps on. Mepilex on bilateral elbows, CDI. Preventative mepilex to coccyx. Denies pain and nausea. Tele NSR.

## 2021-04-07 ENCOUNTER — APPOINTMENT (OUTPATIENT)
Dept: PHYSICAL THERAPY | Facility: CLINIC | Age: 86
DRG: 602 | End: 2021-04-07
Payer: COMMERCIAL

## 2021-04-07 ENCOUNTER — APPOINTMENT (OUTPATIENT)
Dept: OCCUPATIONAL THERAPY | Facility: CLINIC | Age: 86
DRG: 602 | End: 2021-04-07
Payer: COMMERCIAL

## 2021-04-07 LAB
ANION GAP SERPL CALCULATED.3IONS-SCNC: 6 MMOL/L (ref 3–14)
BACTERIA SPEC CULT: NO GROWTH
BUN SERPL-MCNC: 11 MG/DL (ref 7–30)
CALCIUM SERPL-MCNC: 8.7 MG/DL (ref 8.5–10.1)
CHLORIDE SERPL-SCNC: 102 MMOL/L (ref 94–109)
CO2 SERPL-SCNC: 32 MMOL/L (ref 20–32)
CREAT SERPL-MCNC: 0.65 MG/DL (ref 0.66–1.25)
GFR SERPL CREATININE-BSD FRML MDRD: 89 ML/MIN/{1.73_M2}
GLUCOSE BLDC GLUCOMTR-MCNC: 146 MG/DL (ref 70–99)
GLUCOSE BLDC GLUCOMTR-MCNC: 158 MG/DL (ref 70–99)
GLUCOSE BLDC GLUCOMTR-MCNC: 164 MG/DL (ref 70–99)
GLUCOSE BLDC GLUCOMTR-MCNC: 178 MG/DL (ref 70–99)
GLUCOSE SERPL-MCNC: 170 MG/DL (ref 70–99)
MAGNESIUM SERPL-MCNC: 1.7 MG/DL (ref 1.6–2.3)
PHOSPHATE SERPL-MCNC: 3.2 MG/DL (ref 2.5–4.5)
PLATELET # BLD AUTO: 306 10E9/L (ref 150–450)
POTASSIUM SERPL-SCNC: 3.1 MMOL/L (ref 3.4–5.3)
POTASSIUM SERPL-SCNC: 3.3 MMOL/L (ref 3.4–5.3)
SODIUM SERPL-SCNC: 140 MMOL/L (ref 133–144)
SPECIMEN SOURCE: NORMAL

## 2021-04-07 PROCEDURE — 99233 SBSQ HOSP IP/OBS HIGH 50: CPT | Performed by: INTERNAL MEDICINE

## 2021-04-07 PROCEDURE — 250N000013 HC RX MED GY IP 250 OP 250 PS 637: Performed by: INTERNAL MEDICINE

## 2021-04-07 PROCEDURE — 82565 ASSAY OF CREATININE: CPT | Performed by: INTERNAL MEDICINE

## 2021-04-07 PROCEDURE — 97140 MANUAL THERAPY 1/> REGIONS: CPT | Mod: GP | Performed by: PHYSICAL THERAPIST

## 2021-04-07 PROCEDURE — 84132 ASSAY OF SERUM POTASSIUM: CPT | Performed by: INTERNAL MEDICINE

## 2021-04-07 PROCEDURE — 250N000011 HC RX IP 250 OP 636: Performed by: INTERNAL MEDICINE

## 2021-04-07 PROCEDURE — 97530 THERAPEUTIC ACTIVITIES: CPT | Mod: GO

## 2021-04-07 PROCEDURE — 120N000001 HC R&B MED SURG/OB

## 2021-04-07 PROCEDURE — 84100 ASSAY OF PHOSPHORUS: CPT | Performed by: INTERNAL MEDICINE

## 2021-04-07 PROCEDURE — 85049 AUTOMATED PLATELET COUNT: CPT | Performed by: INTERNAL MEDICINE

## 2021-04-07 PROCEDURE — 97110 THERAPEUTIC EXERCISES: CPT | Mod: GP | Performed by: PHYSICAL THERAPIST

## 2021-04-07 PROCEDURE — 83735 ASSAY OF MAGNESIUM: CPT | Performed by: INTERNAL MEDICINE

## 2021-04-07 PROCEDURE — 97116 GAIT TRAINING THERAPY: CPT | Mod: GP | Performed by: PHYSICAL THERAPIST

## 2021-04-07 PROCEDURE — 97530 THERAPEUTIC ACTIVITIES: CPT | Mod: GP | Performed by: PHYSICAL THERAPIST

## 2021-04-07 PROCEDURE — 80048 BASIC METABOLIC PNL TOTAL CA: CPT | Performed by: INTERNAL MEDICINE

## 2021-04-07 PROCEDURE — 999N001017 HC STATISTIC GLUCOSE BY METER IP

## 2021-04-07 PROCEDURE — 36415 COLL VENOUS BLD VENIPUNCTURE: CPT | Performed by: INTERNAL MEDICINE

## 2021-04-07 RX ORDER — LISINOPRIL 5 MG/1
5 TABLET ORAL DAILY
Status: DISCONTINUED | OUTPATIENT
Start: 2021-04-08 | End: 2021-04-08 | Stop reason: HOSPADM

## 2021-04-07 RX ORDER — LISINOPRIL 10 MG/1
20 TABLET ORAL DAILY
Status: DISCONTINUED | OUTPATIENT
Start: 2021-04-07 | End: 2021-04-07

## 2021-04-07 RX ORDER — POTASSIUM CHLORIDE 1.5 G/1.58G
40 POWDER, FOR SOLUTION ORAL ONCE
Status: COMPLETED | OUTPATIENT
Start: 2021-04-07 | End: 2021-04-07

## 2021-04-07 RX ADMIN — FUROSEMIDE 40 MG: 10 INJECTION, SOLUTION INTRAMUSCULAR; INTRAVENOUS at 20:57

## 2021-04-07 RX ADMIN — Medication 50 MCG: at 09:17

## 2021-04-07 RX ADMIN — CEFUROXIME AXETIL 500 MG: 500 TABLET ORAL at 09:18

## 2021-04-07 RX ADMIN — ACETAMINOPHEN 1000 MG: 500 TABLET, FILM COATED ORAL at 20:56

## 2021-04-07 RX ADMIN — GABAPENTIN 900 MG: 300 CAPSULE ORAL at 20:56

## 2021-04-07 RX ADMIN — ASPIRIN 81 MG: 81 TABLET, DELAYED RELEASE ORAL at 09:18

## 2021-04-07 RX ADMIN — FUROSEMIDE 40 MG: 10 INJECTION, SOLUTION INTRAMUSCULAR; INTRAVENOUS at 14:20

## 2021-04-07 RX ADMIN — TAMSULOSIN HYDROCHLORIDE 0.4 MG: 0.4 CAPSULE ORAL at 09:17

## 2021-04-07 RX ADMIN — ACETAMINOPHEN 1000 MG: 500 TABLET, FILM COATED ORAL at 09:17

## 2021-04-07 RX ADMIN — ENOXAPARIN SODIUM 40 MG: 40 INJECTION SUBCUTANEOUS at 21:02

## 2021-04-07 RX ADMIN — PRAVASTATIN SODIUM 20 MG: 20 TABLET ORAL at 09:19

## 2021-04-07 RX ADMIN — INSULIN ASPART 1 UNITS: 100 INJECTION, SOLUTION INTRAVENOUS; SUBCUTANEOUS at 17:56

## 2021-04-07 RX ADMIN — Medication 1 MG: at 00:17

## 2021-04-07 RX ADMIN — GABAPENTIN 900 MG: 300 CAPSULE ORAL at 09:18

## 2021-04-07 RX ADMIN — POTASSIUM CHLORIDE 40 MEQ: 1.5 POWDER, FOR SOLUTION ORAL at 14:20

## 2021-04-07 RX ADMIN — CEFUROXIME AXETIL 500 MG: 500 TABLET ORAL at 20:56

## 2021-04-07 RX ADMIN — MICONAZOLE NITRATE: 20 POWDER TOPICAL at 20:59

## 2021-04-07 RX ADMIN — INSULIN ASPART 1 UNITS: 100 INJECTION, SOLUTION INTRAVENOUS; SUBCUTANEOUS at 14:49

## 2021-04-07 RX ADMIN — FUROSEMIDE 40 MG: 10 INJECTION, SOLUTION INTRAMUSCULAR; INTRAVENOUS at 05:21

## 2021-04-07 RX ADMIN — FINASTERIDE 5 MG: 5 TABLET, FILM COATED ORAL at 09:18

## 2021-04-07 RX ADMIN — LISINOPRIL 20 MG: 10 TABLET ORAL at 09:38

## 2021-04-07 RX ADMIN — INSULIN ASPART 1 UNITS: 100 INJECTION, SOLUTION INTRAVENOUS; SUBCUTANEOUS at 09:32

## 2021-04-07 RX ADMIN — MICONAZOLE NITRATE: 20 POWDER TOPICAL at 09:39

## 2021-04-07 RX ADMIN — OMEPRAZOLE 20 MG: 20 CAPSULE, DELAYED RELEASE ORAL at 09:18

## 2021-04-07 ASSESSMENT — ACTIVITIES OF DAILY LIVING (ADL)
ADLS_ACUITY_SCORE: 18
ADLS_ACUITY_SCORE: 19
ADLS_ACUITY_SCORE: 20

## 2021-04-07 ASSESSMENT — MIFFLIN-ST. JEOR: SCORE: 1993.98

## 2021-04-07 NOTE — PLAN OF CARE
[id number]: A&O, forgetful. VSS on 1-2L NC. Lung sounds with expiratory wheezes and fine crackles in the bases bilaterally. Denies pain. Voiding to urinal or bathroom with a-1, adequate output. Left leg Ace wraps off HS. CMS intact.

## 2021-04-07 NOTE — PROGRESS NOTES
[facility]    Hospitalist Progress Note    Date of Service (when I saw the patient): 04/07/2021    Assessment & Plan   Austen Fowler is a 86 year old male who was admitted on 4/1/2021.  HISTORY OF PRESENT ILLNESS:  This is an 86-year-old male with history of coronary artery disease, status post CABG, hypertension, GERD, diabetes mellitus type 2, anxiety, obstructive sleep apnea on CPAP, ascending aortic dilatation, who came to the ER with complaint of fever, chills, weakness, tiredness and right lower leg redness for the last 2 or 3 days.     In the ER he was evaluated.  He had a low-grade temperature of 99.3, but the lactic acid on the workup came back 6.4.  He was resuscitated and found to have right lower extremity cellulitis, so the Hospitalist Service is consulted for admission.      ASSESSMENT AND PLAN:   1.  Severe sepsis secondary to right lower extremity cellulitis:   Lactic acidosis secondary to the above  at 6.4   -Admitted for lower extremity cellulitis on right lower extremity.  -Initially treated with vancomycin and Zosyn, changed to Ceftin on 4/4  -Blood cultures 1/2 returned positive for possible coag negative staph, lactate improved with hydration.  -White count is significantly back to baseline.  Off IV fluids now.  -Lymphedema therapy to follow.      Acute hypoxic respiratory failure secondary to fluid overload  Acute on chronic diastolic congestive heart failure exacerbation;  Patient became more short of breath and hypoxic overnight needing BiPAP RRT was called  Chest x-ray was done showed bilateral infiltrates consistent with pulmonary edema  - echocardiogram results noted from 4/5, grade 3 diastolic dysfunction seen .  --Continue IV Lasix today, -10 L output noted, try to wean oxygen down as possible, possible discharge to TCU tomorrow.  ---Continue strict intake output monitoring.  BMP  ordered for ongoing creatinine and potassium monitoring.    2.  Generalized  weakness: Secondary to above  -PT recommending TCU, social work consulted.    3.  Coronary artery disease:    Has been stable.  Recently saw his cardiologist, has been doing well at this time.  Keep him on aspirin, lisinopril, amlodipine at this time.  No wall motion abnormalities noted in echo here.    4.  History of diabetes mellitus type 2:  Is on metformin 1000 mg b.i.d.  I will keep holding the metformin for now, keep him on sliding scale insulin for correction, hypoglycemia protocol.    Possibly can discharge back on Metformin.  5.  Obstructive sleep apnea on CPAP:  We will continue the CPAP while he is in the hospital.  -Patient refused hospital BiPAP, and that he does not want to use his home CPAP here either.  6.  Hypertension:  He is PTA on amlodipine 5 mg b.i.d. and lisinopril 20 mg daily.  Amlodipine was on hold during this admission at a lower dose, lisinopril was started at a lower dose of 2.5 mg., when the dose was increased to 20 mg,  his blood pressures dropped, will reduce lisinopril dose to 5 mg.   Ace wraps ordered to help with lower extremity edema    7.  Gastroesophageal reflux disease:  On Prilosec.  I will continue with that.   8.  Chronic back pain:  On Neurontin 900 mg 3 times a day.  I will continue with that.   9.  Hyperlipidemia:  On Pravachol.  We will keep him on that.   10.  Deep venous thrombosis prophylaxis with Lovenox.    Hypokalemia  ---Replace per protocol  CODE STATUS:  Full code as per the patient wishes.     Code Status: Full Code    Disposition: Expected discharge to TCU 4/8    Patience Meza MD        Interval History      Patient has good diuresis ongoing potassium levels 3.1 today, visited with him briefly, his oxygen needs are improving now, was on 1 L overnight and 1.5 today morning, some exertional dyspnea still present  -Data reviewed today: I reviewed all new labs and imaging results over the last 24 hours. I personally reviewed no images or EKG's today.    Physical  Exam   Temp: 97.6  F (36.4  C) Temp src: Oral BP: (!) 86/52 Pulse: 70   Resp: 16 SpO2: 93 % O2 Device: Nasal cannula Oxygen Delivery: 2 LPM  Vitals:    04/05/21 0428 04/06/21 0500 04/07/21 0523   Weight: 125 kg (275 lb 9.2 oz) 124.7 kg (275 lb) 122.3 kg (269 lb 11.2 oz)     Vital Signs with Ranges  Temp:  [97.6  F (36.4  C)-98.7  F (37.1  C)] 97.6  F (36.4  C)  Pulse:  [61-76] 70  Resp:  [16-22] 16  BP: ()/(52-78) 86/52  SpO2:  [93 %-96 %] 93 %  I/O last 3 completed shifts:  In: 580 [P.O.:580]  Out: 2300 [Urine:2300]    Constitutional: Awake, alert, cooperative, no apparent distress  Respiratory: Bibasilar crackles heard on auscultation.  No wheezing  Cardiovascular: Regular rate and rhythm, normal S1 and S2, and no murmur noted  GI: Normal bowel sounds, soft, non-distended, non-tender  Skin/Integumen: Erythema noted right lower extremities, tinea pedis infection also noted.  Other:     Medications     - MEDICATION INSTRUCTIONS -       - MEDICATION INSTRUCTIONS -         acetaminophen  1,000 mg Oral BID     [Held by provider] amLODIPine  5 mg Oral Daily     aspirin  81 mg Oral Daily     cefuroxime  500 mg Oral Q12H CHACHO     cyanocobalamin  1,000 mcg Oral Every Other Day     enoxaparin ANTICOAGULANT  40 mg Subcutaneous Q24H     finasteride  5 mg Oral Daily     furosemide  40 mg Intravenous Q8H     gabapentin  900 mg Oral BID     insulin aspart  1-7 Units Subcutaneous TID AC     insulin aspart  1-5 Units Subcutaneous At Bedtime     ipratropium - albuterol 0.5 mg/2.5 mg/3 mL  3 mL Nebulization Once     lisinopril  20 mg Oral Daily     miconazole   Topical BID     omeprazole  20 mg Oral Daily     potassium chloride  40 mEq Oral or Feeding Tube Once     pravastatin  20 mg Oral Daily     sodium chloride (PF)  3 mL Intracatheter Q8H     tamsulosin  0.4 mg Oral Daily     vitamin D3  50 mcg Oral Daily       Data   Recent Labs   Lab 04/07/21  0614 04/06/21  0608 04/05/21  1122 04/05/21  0634 04/04/21  0602 04/03/21  2231  04/03/21  0928 04/02/21  1114 04/01/21  1934 04/01/21 1934   WBC  --   --  10.1  --   --  10.9 11.9* 13.3*  --   --    HGB  --   --  11.8*  --   --  11.3* 11.4* 11.1*  --   --    MCV  --   --  86  --   --  86 85 85  --   --      --  270  --  213 204 191 162  --   --      --   --  137  --  137 138 137  --  135   POTASSIUM 3.1*  --   --  3.2* 3.7 3.5 3.7 3.4  --  3.6   CHLORIDE 102  --   --  103  --  103 106 105  --  103   CO2 32  --   --  30  --  30 23 25  --  25   BUN 11  --   --  8  --  8 8 13  --  17   CR 0.65* 0.65*  --  0.62* 0.66 0.74 0.60* 0.68   < > 0.81   ANIONGAP 6  --   --  4  --  4 9 7  --  7   BRANDON 8.7  --   --  8.5  --  8.1* 8.3* 8.0*  --  8.3*   *  --   --  161*  --  191* 243* 172*  --  133*   ALBUMIN  --   --   --   --   --   --   --  2.6*  --  2.9*   PROTTOTAL  --   --   --   --   --   --   --  6.0*  --  6.5*   BILITOTAL  --   --   --   --   --   --   --  0.4  --  0.7   ALKPHOS  --   --   --   --   --   --   --  41  --  44   ALT  --   --   --   --   --   --   --  69  --  57   AST  --   --   --   --   --   --   --  339*  --  304*    < > = values in this interval not displayed.       No results found for this or any previous visit (from the past 24 hour(s)).

## 2021-04-07 NOTE — PROGRESS NOTES
"Care Management Follow Up    Length of Stay (days): 6    Expected Discharge Date: 04/07/21(TCU)     Concerns to be Addressed: discharge planning  Pt's wife upset that pt is limited to facilities that only take his specific insurance.  Patient plan of care discussed at interdisciplinary rounds: Yes    Anticipated Discharge Disposition: Transitional Care  Disposition Comments: Plan for pt toattend TCU at discharge. Pt has [facility] and can only go where his insurance is accepted.  Anticipated Discharge Services: Other (see comment)(Therapy)  Anticipated Discharge DME: None    Patient/family educated on Medicare website which has current facility and service quality ratings: yes  Education Provided on the Discharge Plan:    Patient/Family in Agreement with the Plan: yes    Referrals Placed by CM/SW: Internal Clinic Care Coordination, Senior Linkage Line, Post Acute Facilities, Transportation, Communication hand-offs to next level of Care Providers  Private pay costs discussed: Not applicable    Additional Information:  Pt declined by [facility] because pt's insurance is out of network. SW would need to obtain more choices from pt and his wife. Call from pt's wife, Anastasia caballero inquiring if [facility] could accept pt. SAMMIE explained that [facility] was out of pt's insurance network. Anastasia Caballero stated \" This is unbelievable!\" Yamile reports she would be in soon to discuss it. Kelvin caballero called  and inquired if SW had another list for placement. SAMMIE explained the list that was given is the list of facilities that SW knows of. SAMMIE did call [facility] because SW had a pt come in today who had [employer] from [facility]. Debi in admissions confirmed they do take [facility].SAMMIE called Anastasia caballero and received the okay to send a referral to [facility]. Pt accepted at [facility]. SAMMIE called Anastasia Caballero and she accepted the bed. SAMMIE called debi and left a VM. SAMMIE updated pt. SAMMIE paged hospitalist to see if pt was ready for discharge today.     Call from hospitalist inquiring if pt could " discharge tomorrow morning. SAMMIE said they would check with [facility]. AW left a VM for Betty inquiring about tomorrow. Call back from Betty reporting they can take pt tomorrow at 1100. Betty inquired if pt needs a bariatric bed. SAMMIE looked into it and pt is currently not in a bariatric bed. Pt does have an extender on his bed due to height. Pt's wife was inquiring about visitation. SAMMIE called [facility]. SAMMIE informed Betty pt and Anastasia Caballero have received both does of their vaccinated. Pt and Anastasia Caballero claim to be two weeks past their last vaccination. Betty reports pt would not have to quarantine. Pt can have visitors as long as they are screened at the , sign up for a visit, and wear proper PPE. Betty went on to report pt can have up to two visitors per visit. If there are more than two visitors they could utilize the patio. Betty did say Anastasia Caballero could come with pt and transport aide through the skyway as long as she has the proper ppe. Betty states visitation is subject to change due to the rising cases in the [facility]. Betty would inform SAMMIE if the case count affects visitation tomorrow.  SAMMIE informed pt and Anastasia Caballero of this. Anastasia Caballero agreed to the visitation policy. SAMMIE asked if Anastasia Caballero could bring pt's cpap tomorrow. Anastasia Caballero agreed. Evelyn caballero inquired if pt's leg wraps could come off today. SAMMIE stated PT would during their session today. Anastasia inquired if pt would be off oxygen at discharge. SAMMIE reported they were unsure of the oxygen plan. SAMMIE asked for pt and Anastasia Caballero to inquire with bedside nurse. PAS completed and placed on chart. SAMMIE to fax PAS 4/8.    PAS-RR    D: Per DHS regulation, SAMMIE completed and submitted PAS-RR to [place] [employer] via the [employer].  PAS-RR confirmation # is : [id number]     I: SAMMIE spoke with patient and family and they are aware a PAS-RR has been submitted.  SAMMIE reviewed with patient and family that they may be contacted for a follow up appointment within 10 days of  hospital discharge if their SNF stay is < 30 days.  Contact information for Senior LinkAge Line was also provided.    A: Patient and family verbalized understanding.    P: Further questions may be directed to Senior LinkAge Line at #[phone], option #4 for [facility]- staff.      ANURADHA Millan

## 2021-04-07 NOTE — PLAN OF CARE
A&O x4, sometimes forgetful. VSS on 1.5 L O2. Lung sounds w/ crackles. Low sodium diet, tolerating well. Bowel sounds active + flatus. BM this morning. Voiding adequately. RLE w/ lymph wraps. Denies pain. Up with stand by assist.

## 2021-04-08 ENCOUNTER — APPOINTMENT (OUTPATIENT)
Dept: OCCUPATIONAL THERAPY | Facility: CLINIC | Age: 86
DRG: 602 | End: 2021-04-08
Payer: COMMERCIAL

## 2021-04-08 VITALS
SYSTOLIC BLOOD PRESSURE: 113 MMHG | WEIGHT: 270.2 LBS | OXYGEN SATURATION: 94 % | TEMPERATURE: 98.6 F | RESPIRATION RATE: 18 BRPM | HEART RATE: 72 BPM | DIASTOLIC BLOOD PRESSURE: 55 MMHG | BODY MASS INDEX: 33.77 KG/M2

## 2021-04-08 VITALS
TEMPERATURE: 98.1 F | RESPIRATION RATE: 18 BRPM | DIASTOLIC BLOOD PRESSURE: 76 MMHG | HEIGHT: 75 IN | WEIGHT: 267.64 LBS | SYSTOLIC BLOOD PRESSURE: 148 MMHG | BODY MASS INDEX: 33.28 KG/M2 | HEART RATE: 66 BPM | OXYGEN SATURATION: 94 %

## 2021-04-08 LAB
ANION GAP SERPL CALCULATED.3IONS-SCNC: 5 MMOL/L (ref 3–14)
BACTERIA SPEC CULT: ABNORMAL
BASE EXCESS BLDV CALC-SCNC: 9.8 MMOL/L
BUN SERPL-MCNC: 12 MG/DL (ref 7–30)
CALCIUM SERPL-MCNC: 8.9 MG/DL (ref 8.5–10.1)
CHLORIDE SERPL-SCNC: 102 MMOL/L (ref 94–109)
CO2 SERPL-SCNC: 32 MMOL/L (ref 20–32)
CREAT SERPL-MCNC: 0.68 MG/DL (ref 0.66–1.25)
ERYTHROCYTE [DISTWIDTH] IN BLOOD BY AUTOMATED COUNT: 14.4 % (ref 10–15)
GFR SERPL CREATININE-BSD FRML MDRD: 87 ML/MIN/{1.73_M2}
GLUCOSE BLDC GLUCOMTR-MCNC: 161 MG/DL (ref 70–99)
GLUCOSE BLDC GLUCOMTR-MCNC: 178 MG/DL (ref 70–99)
GLUCOSE SERPL-MCNC: 188 MG/DL (ref 70–99)
HCO3 BLDV-SCNC: 36 MMOL/L (ref 21–28)
HCT VFR BLD AUTO: 38.7 % (ref 40–53)
HGB BLD-MCNC: 12.2 G/DL (ref 13.3–17.7)
MCH RBC QN AUTO: 27.2 PG (ref 26.5–33)
MCHC RBC AUTO-ENTMCNC: 31.5 G/DL (ref 31.5–36.5)
MCV RBC AUTO: 86 FL (ref 78–100)
O2/TOTAL GAS SETTING VFR VENT: 89 %
OXYHGB MFR BLDV: 49 %
PCO2 BLDV: 53 MM HG (ref 40–50)
PH BLDV: 7.44 PH (ref 7.32–7.43)
PLATELET # BLD AUTO: 355 10E9/L (ref 150–450)
PO2 BLDV: 27 MM HG (ref 25–47)
POTASSIUM SERPL-SCNC: 3.1 MMOL/L (ref 3.4–5.3)
RBC # BLD AUTO: 4.48 10E12/L (ref 4.4–5.9)
SODIUM SERPL-SCNC: 139 MMOL/L (ref 133–144)
SPECIMEN SOURCE: ABNORMAL
WBC # BLD AUTO: 8.9 10E9/L (ref 4–11)

## 2021-04-08 PROCEDURE — 80048 BASIC METABOLIC PNL TOTAL CA: CPT | Performed by: INTERNAL MEDICINE

## 2021-04-08 PROCEDURE — 999N001017 HC STATISTIC GLUCOSE BY METER IP

## 2021-04-08 PROCEDURE — 36415 COLL VENOUS BLD VENIPUNCTURE: CPT | Performed by: INTERNAL MEDICINE

## 2021-04-08 PROCEDURE — 85027 COMPLETE CBC AUTOMATED: CPT | Performed by: INTERNAL MEDICINE

## 2021-04-08 PROCEDURE — 250N000013 HC RX MED GY IP 250 OP 250 PS 637: Performed by: INTERNAL MEDICINE

## 2021-04-08 PROCEDURE — 82805 BLOOD GASES W/O2 SATURATION: CPT | Performed by: INTERNAL MEDICINE

## 2021-04-08 PROCEDURE — 250N000011 HC RX IP 250 OP 636: Performed by: INTERNAL MEDICINE

## 2021-04-08 PROCEDURE — 97530 THERAPEUTIC ACTIVITIES: CPT | Mod: GO

## 2021-04-08 PROCEDURE — 99239 HOSP IP/OBS DSCHRG MGMT >30: CPT | Performed by: INTERNAL MEDICINE

## 2021-04-08 PROCEDURE — 97535 SELF CARE MNGMENT TRAINING: CPT | Mod: GO

## 2021-04-08 RX ORDER — FUROSEMIDE 40 MG
40 TABLET ORAL DAILY
Status: DISCONTINUED | OUTPATIENT
Start: 2021-04-08 | End: 2021-04-08 | Stop reason: HOSPADM

## 2021-04-08 RX ORDER — FUROSEMIDE 40 MG
40 TABLET ORAL DAILY
DISCHARGE
Start: 2021-04-09 | End: 2021-04-16

## 2021-04-08 RX ORDER — CEFUROXIME AXETIL 500 MG/1
500 TABLET ORAL EVERY 12 HOURS
DISCHARGE
Start: 2021-04-08 | End: 2021-04-13

## 2021-04-08 RX ORDER — POTASSIUM CHLORIDE 1.5 G/1.58G
20 POWDER, FOR SOLUTION ORAL DAILY
DISCHARGE
Start: 2021-04-09 | End: 2021-04-16

## 2021-04-08 RX ORDER — POTASSIUM CHLORIDE 1500 MG/1
40 TABLET, EXTENDED RELEASE ORAL ONCE
Status: COMPLETED | OUTPATIENT
Start: 2021-04-08 | End: 2021-04-08

## 2021-04-08 RX ORDER — POLYETHYLENE GLYCOL 3350 17 G/17G
17 POWDER, FOR SOLUTION ORAL DAILY PRN
DISCHARGE
Start: 2021-04-08 | End: 2021-05-14

## 2021-04-08 RX ORDER — POTASSIUM CHLORIDE 1.5 G/1.58G
20 POWDER, FOR SOLUTION ORAL DAILY
Status: DISCONTINUED | OUTPATIENT
Start: 2021-04-08 | End: 2021-04-08 | Stop reason: HOSPADM

## 2021-04-08 RX ADMIN — OMEPRAZOLE 20 MG: 20 CAPSULE, DELAYED RELEASE ORAL at 08:18

## 2021-04-08 RX ADMIN — ASPIRIN 81 MG: 81 TABLET, DELAYED RELEASE ORAL at 08:18

## 2021-04-08 RX ADMIN — POTASSIUM CHLORIDE 40 MEQ: 1500 TABLET, EXTENDED RELEASE ORAL at 08:18

## 2021-04-08 RX ADMIN — POTASSIUM CHLORIDE 20 MEQ: 1.5 POWDER, FOR SOLUTION ORAL at 09:37

## 2021-04-08 RX ADMIN — GABAPENTIN 900 MG: 300 CAPSULE ORAL at 08:18

## 2021-04-08 RX ADMIN — LISINOPRIL 5 MG: 5 TABLET ORAL at 08:18

## 2021-04-08 RX ADMIN — CEFUROXIME AXETIL 500 MG: 500 TABLET ORAL at 08:18

## 2021-04-08 RX ADMIN — FINASTERIDE 5 MG: 5 TABLET, FILM COATED ORAL at 08:18

## 2021-04-08 RX ADMIN — MICONAZOLE NITRATE: 20 POWDER TOPICAL at 08:19

## 2021-04-08 RX ADMIN — FUROSEMIDE 40 MG: 10 INJECTION, SOLUTION INTRAMUSCULAR; INTRAVENOUS at 04:42

## 2021-04-08 RX ADMIN — TAMSULOSIN HYDROCHLORIDE 0.4 MG: 0.4 CAPSULE ORAL at 08:18

## 2021-04-08 RX ADMIN — FUROSEMIDE 40 MG: 40 TABLET ORAL at 09:37

## 2021-04-08 RX ADMIN — Medication 50 MCG: at 08:18

## 2021-04-08 RX ADMIN — AMLODIPINE BESYLATE 5 MG: 5 TABLET ORAL at 09:37

## 2021-04-08 RX ADMIN — CYANOCOBALAMIN TAB 1000 MCG 1000 MCG: 1000 TAB at 08:18

## 2021-04-08 RX ADMIN — PRAVASTATIN SODIUM 20 MG: 20 TABLET ORAL at 08:18

## 2021-04-08 RX ADMIN — INSULIN ASPART 1 UNITS: 100 INJECTION, SOLUTION INTRAVENOUS; SUBCUTANEOUS at 08:17

## 2021-04-08 RX ADMIN — ACETAMINOPHEN 1000 MG: 500 TABLET, FILM COATED ORAL at 08:17

## 2021-04-08 ASSESSMENT — ACTIVITIES OF DAILY LIVING (ADL)
ADLS_ACUITY_SCORE: 19

## 2021-04-08 ASSESSMENT — MIFFLIN-ST. JEOR: SCORE: 1984.63

## 2021-04-08 NOTE — DISCHARGE SUMMARY
Occupational Therapy Discharge Summary    Reason for therapy discharge:    Discharged to transitional care facility.    Progress towards therapy goal(s). See goals on Care Plan in [facility] electronic health record for goal details.  Goals partially met.  Barriers to achieving goals:   discharge from facility.    Therapy recommendation(s):    Continued therapy is recommended.  Rationale/Recommendations:  Continued therapy to increased indep with ADL and functional mobility..

## 2021-04-08 NOTE — CONSULTS
"BRIEF NUTRITION ASSESSMENT      REASON FOR ASSESSMENT:  Austen Fowler is a 86 year old male seen by Registered Dietitian for LOS and Provider Order - \"CHF\" (pt request)      CURRENT DIET AND INTAKE:  Diet:  2gm Na              Chart reviewed  Visited with pt this morning - \"I am leaving in a few hours\"  Just starting breakfast  Has a good appetite  States that his wife does the shopping and cooking  \"She is the one who wanted low Na info\"      ANTHROPOMETRICS:  Height: 6' 3\"  Weight:(4/8) 121.4 kg /  267 lbs 10.22 oz  Body mass index is 33.45 kg/m .   Weight Status: Obesity Grade I BMI 30-34.9  IBW:  89.1 kg  %IBW: 136%  Weight History: wt down with diuresis  Vitals:    04/01/21 1559 04/02/21 0604 04/03/21 0658 04/05/21 0428   Weight: 124.7 kg (275 lb) 127 kg (279 lb 15.8 oz) 126.4 kg (278 lb 9.6 oz) 125 kg (275 lb 9.2 oz)    04/06/21 0500 04/07/21 0523 04/08/21 0619   Weight: 124.7 kg (275 lb) 122.3 kg (269 lb 11.2 oz) 121.4 kg (267 lb 10.2 oz)         LABS:  Labs noted    MALNUTRITION:  Patient does not meet two of the criteria necessary for diagnosing malnutrition.       NUTRITION INTERVENTION:  Nutrition Diagnosis:  No nutrition diagnosis at this time.    Implementation:  Nutrition Education ---> Provided written handouts on low Na diet tips (pt states he will give them to his wife).  Encouraged pt to request to see dietitian at [place] prior to him going home.     FOLLOW UP/MONITORING:   Will re-evaluate in 7 - 10 days, or sooner, if re-consulted.          "

## 2021-04-08 NOTE — PLAN OF CARE
A&O x4. VSS on RA. Tele SR w/ BBB. CMS intact. Lungs diminished. BS+, BM+, flatus+. Tolerating diet. Denies N/V. BGs 178. Voiding adequately. Tylenol for pain. Up SBA w/ walker and GB. Education complete w/ teach back. Discharging to TCU..

## 2021-04-08 NOTE — PLAN OF CARE
A&O x4, forgetful at times. VSS on 1.5L. Tele NSR. Lung sounds clear. Low sodium diet, tolerating well. Bowel sounds active + flatus. Voiding adequately. RLE red w/ +1 edema. Lymph wraps on, ACE wraps removed at bedtime. Denies pain. Up with stand by assist. CMS intact.  Plan to discharge today to TCU.

## 2021-04-08 NOTE — PLAN OF CARE
Physical Therapy Discharge Summary    Reason for therapy discharge:    Discharged to transitional care facility.    Progress towards therapy goal(s). See goals on Care Plan in [facility] electronic health record for goal details.  Goals partially met.  Barriers to achieving goals:   discharge from facility.    Therapy recommendation(s):    Continued therapy is recommended.  Rationale/Recommendations:  Recommend TCU to improve IND with baseline as pt below baseline at this time .

## 2021-04-08 NOTE — PROGRESS NOTES
Care Management Discharge Note    Discharge Date: 04/08/21(11am)       Discharge Disposition: Transitional Care    Discharge Services: Other (see comment)(Therapy)    Discharge DME: None    Discharge Transportation: family or friend will provide, other (see comments)([facility])    Private pay costs discussed: Not applicable    PAS Confirmation Code: [id number]  Patient/family educated on Medicare website which has current facility and service quality ratings: yes    Education Provided on the Discharge Plan:    Persons Notified of Discharge Plans: pt, pt's wife, charge nurse, Betty in admissions at [facility]  Patient/Family in Agreement with the Plan: yes    Handoff Referral Completed: Yes    Additional Information:  Call from Betty at [facility] asking to confirm pt's discharge plan. SAMMIE paged provider and they confirmed the discharge. They also noted they were working on the orders.     Call from [facility] inquiring about orders. [facility] normally likes to receive orders an hour prior to discharge. SW and bedside nurse have paged for orders. Call from [facility] reporting since they do not have orders the discharge would have to be delayed to 1200. SAMMIE paged the provider for orders and informed them of the delay. SAMMIE faxed PAS to [facility]. SAMMIE updated pt and wife, Anastasia Caballero regarding the delay in discharge. Orders in and faxed to [facility].         ANURADHA Millan

## 2021-04-08 NOTE — DISCHARGE SUMMARY
[facility]    Discharge Summary  Hospitalist    Date of Admission:  4/1/2021  Date of Discharge:  4/8/2021  Discharging Provider: Patience Meza MD    Discharge Diagnoses   Cellulitis of right lower extremity    grade 3 diastolic dysfunction acute on chronic with exacerbation    History of Present Illness   Please review admission history and physical.  Hospital Course   Austen Fowler was admitted on 4/1/2021.  The following problems were addressed during his hospitalization:    Active Problems:    Cellulitis  Austen Fowler is a 86 year old male who was admitted on 4/1/2021.  HISTORY OF PRESENT ILLNESS:  This is an 86-year-old male with history of coronary artery disease, status post CABG, hypertension, GERD, diabetes mellitus type 2, anxiety, obstructive sleep apnea on CPAP, ascending aortic dilatation, who came to the ER with complaint of fever, chills, weakness, tiredness and right lower leg redness for the last 2 or 3 days.      In the ER he was evaluated.  He had a low-grade temperature of 99.3, but the lactic acid on the workup came back 6.4.  He was resuscitated and found to have right lower extremity cellulitis, so the Hospitalist Service is consulted for admission.      ASSESSMENT AND PLAN:   1.  Severe sepsis secondary to right lower extremity cellulitis:   Lactic acidosis secondary to the above  at 6.4   -Admitted for lower extremity cellulitis on right lower extremity.  Patient was first initiated on vancomycin and Zosyn, changed to Ceftin on 4/4, patient to complete 10-day course.  One out of 2  blood cultures came back positive for coag negative staph, possibly contaminant.  Currently white count is back to baseline, patient is off of IV fluids, continue lymphedema therapy.        Acute hypoxic respiratory failure secondary to fluid overload  Acute on chronic diastolic congestive heart failure exacerbation;  Patient became more short of breath and hypoxic overnight  needing BiPAP RRT was called Chest x-ray was done showed bilateral infiltrates consistent with pulmonary edema, echocardiogram was obtained on 4/5 which showed grade 3 diastolic dysfunction, patient was aggressively diuresed with IV Lasix, he had almost 10.5 L output, patient is currently on room air plan is to continue p.o. Lasix and potassium supplementation, need to follow on intake output, daily weights and fluid status.  Following hydration his creatinine remained stable.  Patient was noted to have significant generalized weakness, seen by PT/OT, TCU was recommended.  Rest of his medical problems including CAD, diabetes type 2, PENELOPE stable during his stay here.       Hypertension:  He is PTA on amlodipine 5 mg b.i.d. and lisinopril 20 mg daily.  Amlodipine was on hold during this admission at a lower dose, lisinopril was started at a lower dose of 2.5 mg., when the dose was increased to 20 mg,  his blood pressures dropped, will reduce lisinopril dose to 5 mg.   Ace wraps ordered to help with lower extremity edema  Rest of the medical problems as below remained stable.   Gastroesophageal reflux disease:  On Prilosec.  I will continue with that.   Chronic back pain:  On Neurontin 900 mg 3 times a day.  I will continue with that.   Hyperlipidemia:  On Pravachol.  We will keep him on that.       Patience Meza MD    Significant Results and Procedures       Pending Results   These results will be followed up by nursing home physician   Unresulted Labs Ordered in the Past 30 Days of this Admission     Date and Time Order Name Status Description    4/3/2021 2122 Blood culture Preliminary     4/3/2021 2122 Blood culture Preliminary     4/1/2021 1651 Blood culture Preliminary           Code Status   Full Code       Primary Care Physician   Hamzah Hodge    Physical Exam   Temp: 98.1  F (36.7  C) Temp src: Oral BP: (!) 148/76 Pulse: 66   Resp: 18 SpO2: 94 % O2 Device: None (Room air) Oxygen Delivery: 3 LPM  Vitals:     04/06/21 0500 04/07/21 0523 04/08/21 0619   Weight: 124.7 kg (275 lb) 122.3 kg (269 lb 11.2 oz) 121.4 kg (267 lb 10.2 oz)     Vital Signs with Ranges  Temp:  [97.6  F (36.4  C)-98.8  F (37.1  C)] 98.1  F (36.7  C)  Pulse:  [61-70] 66  Resp:  [16-20] 18  BP: ()/(52-76) 148/76  SpO2:  [88 %-96 %] 94 %  I/O last 3 completed shifts:  In: 1560 [P.O.:1560]  Out: 2860 [Urine:2860]    The patient was examined on the day of discharge.    Discharge Disposition   Discharged to nursing home  Condition at discharge: Stable    Consultations This Hospital Stay   PHARMACY TO DOSE VANCO  SMOKING CESSATION PROGRAM IP CONSULT  PHYSICAL THERAPY ADULT IP CONSULT  OCCUPATIONAL THERAPY ADULT IP CONSULT  PHARMACY TO DOSE VANCO  PHARMACY TO DOSE VANCO  CARE MANAGEMENT / SOCIAL WORK IP CONSULT  LYMPHEDEMA THERAPY IP CONSULT  SOCIAL WORK IP CONSULT  CARE MANAGEMENT / SOCIAL WORK IP CONSULT  NUTRITION SERVICES ADULT IP CONSULT  PHYSICAL THERAPY ADULT IP CONSULT  OCCUPATIONAL THERAPY ADULT IP CONSULT    Time Spent on this Encounter   I, Patience Meza MD, personally saw the patient today and spent greater than 30 minutes discharging this patient.    Discharge Orders      General info for SNF    Length of Stay Estimate: Short Term Care: Estimated # of Days <30  Condition at Discharge: Improving  Level of care:skilled   Rehabilitation Potential: Good  Admission H&P remains valid and up-to-date: Yes  Recent Chemotherapy: N/A  Use Nursing Home Standing Orders: Yes     Mantoux instructions    Give two-step Mantoux (PPD) Per Facility Policy Yes     Reason for your hospital stay    Cellulitis then developed acute on chronic Congestive heart failure     Glucose monitor nursing POCT    Before meals and at bedtime     Intake and output    Every shift     Daily weights    Call Provider for weight gain of more than 2 pounds per day or 5 pounds per week.     Follow Up and recommended labs and tests    Follow up with senior care physician.  The  following labs/tests are recommended: basic metabolic panel in 3 days, adjust lasix depending on his fluid status , monitor potassium, adjust dosage..     Activity - Up with nursing assistance     Physical Therapy Adult Consult    Evaluate and treat as clinically indicated.    Reason:  deconditioning     Occupational Therapy Adult Consult    Evaluate and treat as clinically indicated.    Reason:  deconditioning     Fall precautions     Oxygen Adult/Peds     Advance Diet as Tolerated    Follow this diet upon discharge: Orders Placed This Encounter      2 Gram Sodium Diet, low fat diet, fluid restriction to 1500 ml /24 hours     Discharge Medications   Current Discharge Medication List      START taking these medications    Details   cefuroxime (CEFTIN) 500 MG tablet Take 1 tablet (500 mg) by mouth every 12 hours for 5 days    Associated Diagnoses: Cellulitis, unspecified cellulitis site      furosemide (LASIX) 40 MG tablet Take 1 tablet (40 mg) by mouth daily  Qty:      Associated Diagnoses: Acute diastolic heart failure (H)      miconazole (MICATIN) 2 % external powder Apply topically 2 times daily  Qty:      Associated Diagnoses: Cellulitis, unspecified cellulitis site      polyethylene glycol (MIRALAX) 17 g packet Take 17 g by mouth daily as needed for constipation  Qty:      Associated Diagnoses: Acute diastolic heart failure (H)      potassium chloride (KLOR-CON) 20 MEQ packet Take 20 mEq by mouth daily  Qty:      Associated Diagnoses: Acute diastolic heart failure (H)         CONTINUE these medications which have NOT CHANGED    Details   acetaminophen (ACETAMIN) 500 MG tablet Take 1,000 mg by mouth 2 times daily       amLODIPine (NORVASC) 5 MG tablet TAKE 1 TABLET BY MOUTH TWO  TIMES DAILY  Qty: 180 tablet, Refills: 3    Comments: Requesting 1 year supply  Associated Diagnoses: Essential hypertension, benign      aspirin 81 MG EC tablet Take 81 mg by mouth daily       Cholecalciferol (VITAMIN D) 2000 UNIT  tablet Take 2,000 Units by mouth daily.  Qty: 90 tablet, Refills: 3    Comments: OTC      diclofenac (VOLTAREN) 1 % topical gel Place 4 g onto the skin 4 times daily as needed for moderate pain (Apply to back to neck.)      finasteride (PROSCAR) 5 MG tablet TAKE 1 TABLET BY MOUTH  DAILY  Qty: 90 tablet, Refills: 3    Associated Diagnoses: Slowing of urinary stream      gabapentin (NEURONTIN) 300 MG capsule TAKE 3 CAPSULES BY MOUTH 3  TIMES DAILY  Qty: 810 capsule, Refills: 2    Associated Diagnoses: Chronic pain syndrome; Sacroiliac joint pain      lisinopril (ZESTRIL) 20 MG tablet TAKE 1 TABLET BY MOUTH  DAILY  Qty: 90 tablet, Refills: 1    Comments: Please schedule appointment to discuss further refills ([phone])  Associated Diagnoses: Essential hypertension, benign      Menthol, Topical Analgesic, (ICY HOT BACK EX) Externally apply topically daily as needed (neck pain)       metFORMIN (GLUCOPHAGE-XR) 500 MG 24 hr tablet TAKE 2 TABLETS BY MOUTH TWO TIMES DAILY WITH MEALS  Qty: 360 tablet, Refills: 3    Associated Diagnoses: 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)      omeprazole (PRILOSEC) 20 MG DR capsule TAKE 1 CAPSULE BY MOUTH  DAILY  Qty: 90 capsule, Refills: 2    Associated Diagnoses: Dysphagia, unspecified type      pravastatin (PRAVACHOL) 20 MG tablet TAKE 1 TABLET BY MOUTH  DAILY  Qty: 90 tablet, Refills: 0    Comments: Requesting 1 year supply  Associated Diagnoses: Hyperlipidemia LDL goal <70      tamsulosin (FLOMAX) 0.4 MG capsule TAKE 1 CAPSULE BY MOUTH  DAILY  Qty: 90 capsule, Refills: 2    Comments: Requesting 1 year supply  Associated Diagnoses: Benign prostatic hyperplasia, unspecified whether lower urinary tract symptoms present      vitamin B-12 (CYANOCOBALAMIN) 1000 MCG tablet Take 1,000 mcg by mouth every other day       blood glucose (ONETOUCH ULTRA) test strip USE TO TEST BLOOD SUGAR 2  TIMES DAILY OR AS DIRECTED.  Qty: 200 strip, Refills: 1    Associated Diagnoses: Other  abnormal glucose      blood glucose monitoring (ONE TOUCH ULTRASOFT) lancets USE TO TEST BLOOD SUGAR 2  TIMES DAILY OR AS DIRECTED.  Qty: 200 each, Refills: 3    Associated Diagnoses: 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)      ORDER FOR DME Uses Cpap machine for sleep apnea           Allergies   No Known Allergies  Data   Most Recent 3 CBC's:  Recent Labs   Lab Test 04/08/21  0603 04/07/21  0614 04/05/21  1122 04/03/21  2232 04/03/21  2232   WBC 8.9  --  10.1  --  10.9   HGB 12.2*  --  11.8*  --  11.3*   MCV 86  --  86  --  86    306 270   < > 204    < > = values in this interval not displayed.      Most Recent 3 BMP's:  Recent Labs   Lab Test 04/08/21  0603 04/07/21  1905 04/07/21  0614 04/06/21  0608 04/05/21  0634     --  140  --  137   POTASSIUM 3.1* 3.3* 3.1*  --  3.2*   CHLORIDE 102  --  102  --  103   CO2 32  --  32  --  30   BUN 12  --  11  --  8   CR 0.68  --  0.65* 0.65* 0.62*   ANIONGAP 5  --  6  --  4   BRANDON 8.9  --  8.7  --  8.5   *  --  170*  --  161*     Most Recent 2 LFT's:  Recent Labs   Lab Test 04/02/21  1114 04/01/21  1934   * 304*   ALT 69 57   ALKPHOS 41 44   BILITOTAL 0.4 0.7     Most Recent INR's and Anticoagulation Dosing History:  Anticoagulation Dose History     Recent Dosing and Labs Latest Ref Rng & Units 8/19/2014    INR 0.86 - 1.14 1.10        Most Recent 3 Troponin's:  Recent Labs   Lab Test 01/11/20  1641   TROPI <0.015     Most Recent Cholesterol Panel:  Recent Labs   Lab Test 03/02/21  0842   CHOL 126   LDL 42   HDL 38*   TRIG 229*     Most Recent 6 Bacteria Isolates From Any Culture (See EPIC Reports for Culture Details):  Recent Labs   Lab Test 04/03/21  2231 04/03/21  2227 04/01/21  1658 04/01/21  1631 04/01/21  1615 01/11/20  2030   CULT No growth after 4 days No growth after 4 days Cultured on the 2nd day of incubation:  Staphylococcus pettenkoferi  Susceptibility testing in progress  *  Critical Value/Significant Value, preliminary  result only, called to and read back by  MILY LINTON RN  04.03.21 CF    (Note)  POSITIVE for Staphylococci other than S.aureus, S.epidermidis and  S.lugdunensis, by Job36 multiplex nucleic acid test.  Coagulase-negative staphylococci are the most common venipuncture or  collection associated skin CONTAMINANTS grown in blood cultures.  Final identification and antimicrobial susceptibility testing will be  verified by standard methods.    Specimen tested with Verigene multiplex, gram-positive blood culture  nucleic acid test for the following targets: Staph aureus, Staph  epidermidis, Staph lugdunensis, other Staph species, Enterococcus  faecalis, Enterococcus faecium, Streptococcus species, S. agalactiae,  S. anginosus grp., S. pneumoniae, S. pyogenes, Listeria sp., mecA  (methicillin resistance) and Aaron/B (vancomycin resistance).    Critical Value/Significant Value called to and read back by Refugio Mccall RN, 807 4/3/21 JT     No growth No growth No growth     Most Recent TSH, T4 and A1c Labs:  Recent Labs   Lab Test 04/01/21  2231 01/20/20  1435 01/20/20  1435   TSH  --   --  1.02   A1C 6.4*   < > 5.8*    < > = values in this interval not displayed.     Results for orders placed or performed during the hospital encounter of 04/01/21   XR Chest Port 1 View    Narrative    XR CHEST PORT 1 VW 4/1/2021 5:01 PM    HISTORY: Fever    COMPARISON: Chest CT on 1/11/2020      Impression    IMPRESSION: Linear opacities in the left lung base could represent  atelectasis or developing pneumonia. No pleural effusions or  pneumothorax. Median sternotomy and mediastinal surgical clips.  Tortuous aorta with atherosclerotic calcifications. Normal heart size.    KAY CARRANZA MD   US Lower Extremity Venous Duplex Right    Narrative    EXAM: US LOWER EXTREMITY VENOUS DUPLEX RIGHT  LOCATION: [facility]  DATE/TIME: 4/1/2021 5:46 PM    INDICATION: Right leg pain and swelling.  COMPARISON:  2019.  TECHNIQUE: Venous Duplex ultrasound of the right lower extremity with and without compression, augmentation and duplex. Color flow and spectral Doppler with waveform analysis performed.    FINDINGS: Exam includes the common femoral, femoral, popliteal, and contralateral common femoral veins as well as segmentally visualized deep calf veins and greater saphenous vein.     RIGHT: No deep vein thrombosis. No superficial thrombophlebitis. No popliteal cyst.      Impression    IMPRESSION:  1.  No deep venous thrombosis in the right lower extremity.   XR Chest Port 1 View    Narrative    XR CHEST PORT 1 VW 4/3/2021 9:08 PM    HISTORY: wheezing, dyspnea    COMPARISON: 2021      Impression    IMPRESSION: Slight increase in linear interstitial opacities which  could represent developing pulmonary edema. No focal infiltrate,  pleural effusion or pneumothorax. Median sternotomy and mediastinal  surgical clips.    KAY CARRANZA MD   Echocardiogram Complete    Narrative    [id number]  [id number]  [id number]  [id number]^SAMI^KENA     [facility]  Echocardiography Laboratory  [address] [place] [address]     Name: NAYELI RAI ALLIE  MRN: [id number]  : 1935  Study Date: 2021 08:40 AM  Age: 86 yrs  Gender: Male  Patient Location: [facility]  Reason For Study: SOB  Ordering Physician: KENA GALLARDO  Performed By: Malou Hobbs     BSA: 2.5 m2  Height: 75 in  Weight: 278 lb  HR: 66  BP: 138/80 mmHg  ______________________________________________________________________________  Procedure  Complete Portable Echo Adult. Optison (NDC #4659-6679) given intravenously.  ______________________________________________________________________________  Interpretation Summary     Technically difficult study limited views obtained due to body habitus  The left ventricle is borderline dilated.  The visual ejection fraction is estimated at 55-60%.  Grade III or advanced diastolic  dysfunction.  Diastolic Doppler findings (E/E' ratio and/or other parameters) suggest left  ventricular filling pressures are increased.  Normal left ventricular wall motion  RV not well seen but grossly normal size/function  Right ventricular systolic pressure could not be approximated due to  inadequate tricuspid regurgitation.  Borderline aortic root dilatation.  IVC diameter and respiratory changes fall into an intermediate range  suggesting an RA pressure of 8 mmHg.  ______________________________________________________________________________  Left Ventricle  The left ventricle is borderline dilated. There is normal left ventricular  wall thickness. The visual ejection fraction is estimated at 55-60%. Grade III  or advanced diastolic dysfunction. Diastolic Doppler findings (E/E' ratio  and/or other parameters) suggest left ventricular filling pressures are  increased. Normal left ventricular wall motion. There is no thrombus seen in  the left ventricle.     Right Ventricle  RV not well seen but grossly normal size/function. The right ventricle is not  well visualized.     Atria  The left atrium is borderline dilated. Right atrial size is normal.     Mitral Valve  The mitral valve leaflets appear normal. There is no evidence of stenosis,  fluttering, or prolapse.     Tricuspid Valve  Normal tricuspid valve. There is trace tricuspid regurgitation. Right  ventricular systolic pressure could not be approximated due to inadequate  tricuspid regurgitation.     Aortic Valve  There is mild aortic sclerosis of the non-coronary cusp. No hemodynamically  significant valvular aortic stenosis.     Pulmonic Valve  The pulmonic valve is not well seen, but is grossly normal.     Vessels  Borderline aortic root dilatation. Normal size ascending aorta. IVC diameter  and respiratory changes fall into an intermediate range suggesting an RA  pressure of 8 mmHg.     Pericardium  The pericardium appears normal.     Rhythm  Sinus  rhythm was noted.  ______________________________________________________________________________  MMode/2D Measurements & Calculations     RVDd: 4.0 cm  IVSd: 0.97 cm  LVIDd: 5.9 cm  LVIDs: 4.6 cm  LVPWd: 0.96 cm  FS: 20.8 %  LV mass(C)d: 225.4 grams  LV mass(C)dI: 89.3 grams/m2  Ao root diam: 3.8 cm  LA dimension: 4.7 cm  asc Aorta Diam: 3.3 cm  LA/Ao: 1.2  LA Volume (BP): 57.0 ml  LA Volume Index (BP): 22.6 ml/m2  RWT: 0.33     Doppler Measurements & Calculations  MV E max annel: 109.0 cm/sec  MV A max annel: 48.7 cm/sec  MV E/A: 2.2  MV dec time: 0.21 sec  PA acc time: 0.07 sec  E/E' av.5  Lateral E/e': 10.6  Medial E/e': 18.4     ______________________________________________________________________________  Report approved by: José Manuel Souza 2021 10:06 AM

## 2021-04-08 NOTE — PLAN OF CARE
VSS on 1.5LPM, attempted to wean but was unsuccessful. Tele NSR. A/Ox4. RLE wrapped per orders. Bilateral arm abrasions dressings changed on shift.  Denies pain. Up with SBA and walker.  Mod carb diet, good appetite, dietician consult in for tomorrow for diet advise as requested per patient. BS active, Flatus +, BM+ on shift. Voiding small amounts despite being diuresed. LS dim. IS to 1500. Likely discharge to TCU tomorrow ([place]).

## 2021-04-09 ENCOUNTER — NURSING HOME VISIT (OUTPATIENT)
Dept: GERIATRICS | Facility: CLINIC | Age: 86
End: 2021-04-09
Payer: COMMERCIAL

## 2021-04-09 DIAGNOSIS — R06.02 SHORTNESS OF BREATH: ICD-10-CM

## 2021-04-09 DIAGNOSIS — E11.59 TYPE 2 DIABETES MELLITUS WITH VASCULAR DISEASE (H): ICD-10-CM

## 2021-04-09 DIAGNOSIS — L03.115 CELLULITIS OF RIGHT LOWER EXTREMITY: ICD-10-CM

## 2021-04-09 DIAGNOSIS — K21.00 GASTROESOPHAGEAL REFLUX DISEASE WITH ESOPHAGITIS, UNSPECIFIED WHETHER HEMORRHAGE: ICD-10-CM

## 2021-04-09 DIAGNOSIS — I10 ESSENTIAL HYPERTENSION, BENIGN: ICD-10-CM

## 2021-04-09 DIAGNOSIS — I25.10 CORONARY ARTERY DISEASE INVOLVING NATIVE CORONARY ARTERY OF NATIVE HEART WITHOUT ANGINA PECTORIS: ICD-10-CM

## 2021-04-09 DIAGNOSIS — E66.9 NON MORBID OBESITY, UNSPECIFIED OBESITY TYPE: ICD-10-CM

## 2021-04-09 DIAGNOSIS — M54.42 CHRONIC LOW BACK PAIN WITH BILATERAL SCIATICA, UNSPECIFIED BACK PAIN LATERALITY: ICD-10-CM

## 2021-04-09 DIAGNOSIS — G47.33 OSA (OBSTRUCTIVE SLEEP APNEA): Primary | ICD-10-CM

## 2021-04-09 DIAGNOSIS — M54.41 CHRONIC LOW BACK PAIN WITH BILATERAL SCIATICA, UNSPECIFIED BACK PAIN LATERALITY: ICD-10-CM

## 2021-04-09 DIAGNOSIS — G89.29 CHRONIC LOW BACK PAIN WITH BILATERAL SCIATICA, UNSPECIFIED BACK PAIN LATERALITY: ICD-10-CM

## 2021-04-09 PROCEDURE — 99309 SBSQ NF CARE MODERATE MDM 30: CPT | Performed by: NURSE PRACTITIONER

## 2021-04-09 PROCEDURE — 99207 PR CDG-MDM COMPONENT: MEETS MODERATE - DOWN CODED: CPT | Performed by: NURSE PRACTITIONER

## 2021-04-09 NOTE — PROGRESS NOTES
[facility]  PRIMARY CARE PROVIDER AND CLINIC:  Hamzah Hodge MD, [address] / [place] [address]  Chief Complaint   Patient presents with     Hospital F/U     [place] [facility] Record Number:  [id number]  Place of Service where encounter took place:  [facility] [[id number]]    Austen Fowler  is a 86 year old  (1935), admitted to the above facility from  [facility]. Hospital stay 4/1/21 through 4/8/21..  Admitted to this facility for  rehab, medical management and nursing care.    HPI:    HPI information obtained from: facility chart records, facility staff, patient report and [facility] chart review.     Brief Summary of Hospital Course:   This is a 86-year-old male with history of coronary artery disease, status post CABG, hypertension, GERD, diabetes mellitus type 2, anxiety, obstructive sleep apnea on CPAP, ascending aortic dilatation, who came to the ER with complaint of fever, chills, weakness, tiredness and right lower leg redness for the last 2 or 3 days. He was diagnosed with cellulitis on right lower extremity, started on vancomycin and zosyn, changed to ceftin on 4/4 to complete a 10d course. He was fluid resuscitated and developed fluid overload per acute on chronic diastolic HF. He was aggressively diuresed with 10.5L removed and required NIPPV support. He also required electrolyte replacement and was liberated from supplemental O2 with stable renal fx. He was restarted on his PTA amlodipine 5mg BID with lisinopril 20mg daily, per aggressive diuresis, his amlodipine and lisinopril needed titration of his lisinopril to 5mg daily at discharge and given ace wraps per lymphedema tx. He was then discharged to the TCU.     Updates on Status Since Skilled nursing Admission: lower BPs    CODE STATUS/ADVANCE DIRECTIVES DISCUSSION:   CPR/Full code   Patient's living condition: lives with spouse  ALLERGIES: Patient has no known  allergies.  PAST MEDICAL HISTORY:  has a past medical history of Anxiety state, unspecified, Aortic root dilatation (H), Arthritis, Ascending aorta dilatation (H), Basal cell cancer, Bunion, CAD (coronary artery disease), Contact dermatitis and other eczema, due to unspecified cause, Disorders of bursae and tendons in shoulder region, unspecified, Esophageal reflux, Essential hypertension, benign, Gallbladder disease, GERD (gastroesophageal reflux disease), Heart attack (H), Hyperlipidemia LDL goal <70, Left ventricular diastolic dysfunction, Low HDL (under 40) (3/28/2014), Nasal/sinus dis NEC, Obesity, Osteoarthrosis, unspecified whether generalized or localized, shoulder region, Other hammer toe (acquired), Sleep apnea, Spinal stenosis, unspecified region other than cervical, Type 2 diabetes, HbA1c goal < 7% (H) (3/12/2015), and Urge incontinence. He also has no past medical history of Cerebral infarction (H), Congestive heart failure (H), COPD (chronic obstructive pulmonary disease) (H), Depressive disorder, History of blood transfusion, PONV (postoperative nausea and vomiting), Thyroid disease, or Uncomplicated asthma.  PAST SURGICAL HISTORY:   has a past surgical history that includes NONSPECIFIC PROCEDURE (6-94); NONSPECIFIC PROCEDURE (1995); NONSPECIFIC PROCEDURE; COLONOSCOPY THRU STOMA W BIOPSY/CAUTERY TUMOR/POLYP/LESION (5/2007); Inject epidural lumbar; Esophagoscopy, gastroscopy, duodenoscopy (EGD), dilatation, combined (7/17/2014); Laminectomy lumbar two levels (N/A, 4/29/2015); Colonoscopy (N/A, 4/11/2017); CABG, ARTERY-VEIN, FOUR (11/1992); Left Heart Cath (5-92, 6-92); Abdomen surgery (1994); biopsy; Cholecystectomy (6/1994); ENT surgery (5/1995); Eye surgery; and Thoracic surgery (11/1992).  FAMILY HISTORY: family history includes Cancer in his brother; Cerebrovascular Disease in his brother and brother; Coronary Artery Disease in his brother; Diabetes in his father and mother; Family History Negative  in his daughter, son, son, and son; Myocardial Infarction in his father; Other Cancer in his brother; Parkinsonism in his sister; Thyroid Disease in his mother.  SOCIAL HISTORY:   reports that he quit smoking about 29 years ago. His smoking use included cigarettes, cigars, and pipe. He started smoking about 67 years ago. He has a 60.00 pack-year smoking history. He has never used smokeless tobacco. He reports current alcohol use. He reports that he does not use drugs.    Post Discharge Medication Reconciliation Status: discharge medications reconciled and changed, per note/orders    Current Outpatient Medications   Medication Sig Dispense Refill     acetaminophen (ACETAMIN) 500 MG tablet Take 1,000 mg by mouth 2 times daily        amLODIPine (NORVASC) 5 MG tablet TAKE 1 TABLET BY MOUTH TWO  TIMES DAILY 180 tablet 3     aspirin 81 MG EC tablet Take 81 mg by mouth daily        blood glucose (ONETOUCH ULTRA) test strip USE TO TEST BLOOD SUGAR 2  TIMES DAILY OR AS DIRECTED. 200 strip 1     blood glucose monitoring (ONE TOUCH ULTRASOFT) lancets USE TO TEST BLOOD SUGAR 2  TIMES DAILY OR AS DIRECTED. 200 each 3     cefuroxime (CEFTIN) 500 MG tablet Take 1 tablet (500 mg) by mouth every 12 hours for 5 days       Cholecalciferol (VITAMIN D) 2000 UNIT tablet Take 2,000 Units by mouth daily. 90 tablet 3     diclofenac (VOLTAREN) 1 % topical gel Place 4 g onto the skin 4 times daily as needed for moderate pain (Apply to back to neck.)       finasteride (PROSCAR) 5 MG tablet TAKE 1 TABLET BY MOUTH  DAILY 90 tablet 3     furosemide (LASIX) 40 MG tablet Take 1 tablet (40 mg) by mouth daily       gabapentin (NEURONTIN) 300 MG capsule TAKE 3 CAPSULES BY MOUTH 3  TIMES DAILY (Patient taking differently: Pt takes 3 capsules twice daily.) 810 capsule 2     lisinopril (ZESTRIL) 20 MG tablet TAKE 1 TABLET BY MOUTH  DAILY (Patient taking differently: 5 mg daily ) 90 tablet 1     Menthol, Topical Analgesic, (ICY HOT BACK EX) Externally  apply topically daily as needed (neck pain)        metFORMIN (GLUCOPHAGE-XR) 500 MG 24 hr tablet TAKE 2 TABLETS BY MOUTH TWO TIMES DAILY WITH MEALS 360 tablet 3     miconazole (MICATIN) 2 % external powder Apply topically 2 times daily       omeprazole (PRILOSEC) 20 MG DR capsule TAKE 1 CAPSULE BY MOUTH  DAILY 90 capsule 2     ORDER FOR DME Uses Cpap machine for sleep apnea       polyethylene glycol (MIRALAX) 17 g packet Take 17 g by mouth daily as needed for constipation       potassium chloride (KLOR-CON) 20 MEQ packet Take 20 mEq by mouth daily       pravastatin (PRAVACHOL) 20 MG tablet TAKE 1 TABLET BY MOUTH  DAILY 90 tablet 0     tamsulosin (FLOMAX) 0.4 MG capsule TAKE 1 CAPSULE BY MOUTH  DAILY 90 capsule 2     vitamin B-12 (CYANOCOBALAMIN) 1000 MCG tablet Take 1,000 mcg by mouth every other day          ROS:  10 point ROS of systems including Constitutional, Eyes, Respiratory, Cardiovascular, Gastroenterology, Genitourinary, Integumentary, Musculoskeletal, Psychiatric were all negative except for pertinent positives noted in my HPI.    Vitals:  /55   Pulse 72   Temp 98.6  F (37  C)   Resp 18   Wt 122.6 kg (270 lb 3.2 oz)   SpO2 94%   BMI 33.77 kg/m    Exam:  GENERAL APPEARANCE:  Alert, oriented, morbidly obese, cooperative, denies pain  ENT:  Mouth and posterior oropharynx normal, moist mucous membranes, normal hearing acuity  NECK:  No adenopathy,masses or thyromegaly  RESP:  respiratory effort and palpation of chest normal, lungs clear to auscultation , no respiratory distress  CV:  Palpation and auscultation of heart done , regular rate and rhythm, no murmur, rub, or gallop, peripheral edema 2+ in RLE, 1+ LLE  ABDOMEN:  normal bowel sounds, soft, nontender, no hepatosplenomegaly or other masses  M/S:   Able to move all extremities  SKIN:  Inspection of skin and subcutaneous tissue baseline  NEURO:   Cranial nerves 2-12 are normal tested and grossly at patient's baseline  PSYCH:  oriented X 3,  memory impaired , affect and mood normal    Lab/Diagnostic data:    Most Recent 3 CBC's:  Recent Labs   Lab Test 04/08/21  0603 04/07/21  0614 04/05/21  1122 04/03/21  2232 04/03/21  2232   WBC 8.9  --  10.1  --  10.9   HGB 12.2*  --  11.8*  --  11.3*   MCV 86  --  86  --  86    306 270   < > 204    < > = values in this interval not displayed.     Most Recent 3 BMP's:  Recent Labs   Lab Test 04/08/21  0603 04/07/21  1905 04/07/21  0614 04/06/21  0608 04/05/21  0634     --  140  --  137   POTASSIUM 3.1* 3.3* 3.1*  --  3.2*   CHLORIDE 102  --  102  --  103   CO2 32  --  32  --  30   BUN 12  --  11  --  8   CR 0.68  --  0.65* 0.65* 0.62*   ANIONGAP 5  --  6  --  4   BRANDON 8.9  --  8.7  --  8.5   *  --  170*  --  161*       ASSESSMENT/PLAN:    PENELOPE (obstructive sleep apnea)  Continue CPAP at HS    Shortness of breath  Acute on chronic diastolic HF  EF 55-60%  Apparently 10L removed in hospital, continue lasix 40mg daily, last 271lb. Will discontinue fluid restriction, monitor BMP. Start lymphedema tx    Non morbid obesity, unspecified obesity type  Last BMI 33.8    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)  Last A1c 6.4, continue metformin 1000mg BID, monitor BS BID    Essential hypertension, benign  Continue amlodipine 5mg BID and lisinopril will be decreased to 5mg per discharge summary, monitor    Coronary artery disease involving native coronary artery of native heart without angina pectoris  Continue ASA and statin    Cellulitis of right lower extremity  Initially given vancomycin and zosyn, switched to ceftin, will finish course on 4/13. Site improved    Gastroesophageal reflux disease with esophagitis, unspecified whether hemorrhage  Continue omeprazole 20mg daily    Chronic low back pain with bilateral sciatica, unspecified back pain laterality  Continue gabapentin 900mg TID and tylenol 1000mg BID, voltaren gel 1% 4gm QID PRN, denies pain    Hypokalemia  Last 3.1 on 4/8, give extra  40mEq x1, increase potassium to 20mEq BID, recheck BMP on 4/12    Hypomagnesia  Recheck on 4/12    Vit D deficiency  Continue D3 2000U daily    B12 deficiency  Continue cyanocobalamin 1000mcg daily    Constipation  Continue miralax daily PRN    BPH  Continue flomax 0.4mg daily and proscar 5mg daily       Orders written by provider at facility  Decrease lisinopril, discontinue fluid restriction, labs, lymphedema tx, potassium replacement    Total time spent with patient visit at the [facility] nursing [facility] was 37 including patient visit and review of past records. Greater than 50% of total time spent with counseling and coordinating care due to htn tx, CHF with significant diuresis, hypokalemia, lymphedema and therapy, which lasted 20 minutes.     Electronically signed by:  Lazaro Dooley NP

## 2021-04-10 PROBLEM — K21.00 GASTROESOPHAGEAL REFLUX DISEASE WITH ESOPHAGITIS: Status: ACTIVE | Noted: 2021-04-10

## 2021-04-10 LAB
BACTERIA SPEC CULT: NO GROWTH
BACTERIA SPEC CULT: NO GROWTH
SPECIMEN SOURCE: NORMAL
SPECIMEN SOURCE: NORMAL

## 2021-04-12 ENCOUNTER — HOSPITAL LABORATORY (OUTPATIENT)
Dept: OTHER | Facility: CLINIC | Age: 86
End: 2021-04-12

## 2021-04-12 LAB
ANION GAP SERPL CALCULATED.3IONS-SCNC: 8 MMOL/L (ref 3–14)
BUN SERPL-MCNC: 13 MG/DL (ref 7–30)
CALCIUM SERPL-MCNC: 9.6 MG/DL (ref 8.5–10.1)
CHLORIDE SERPL-SCNC: 104 MMOL/L (ref 94–109)
CO2 SERPL-SCNC: 26 MMOL/L (ref 20–32)
CREAT SERPL-MCNC: 0.7 MG/DL (ref 0.66–1.25)
ERYTHROCYTE [DISTWIDTH] IN BLOOD BY AUTOMATED COUNT: 14.8 % (ref 10–15)
GFR SERPL CREATININE-BSD FRML MDRD: 86 ML/MIN/{1.73_M2}
GLUCOSE SERPL-MCNC: 182 MG/DL (ref 70–99)
HCT VFR BLD AUTO: 40.7 % (ref 40–53)
HGB BLD-MCNC: 12.8 G/DL (ref 13.3–17.7)
MAGNESIUM SERPL-MCNC: 1.6 MG/DL (ref 1.6–2.3)
MCH RBC QN AUTO: 27.6 PG (ref 26.5–33)
MCHC RBC AUTO-ENTMCNC: 31.4 G/DL (ref 31.5–36.5)
MCV RBC AUTO: 88 FL (ref 78–100)
PLATELET # BLD AUTO: 365 10E9/L (ref 150–450)
POTASSIUM SERPL-SCNC: 4.2 MMOL/L (ref 3.4–5.3)
RBC # BLD AUTO: 4.64 10E12/L (ref 4.4–5.9)
SODIUM SERPL-SCNC: 138 MMOL/L (ref 133–144)
WBC # BLD AUTO: 10.6 10E9/L (ref 4–11)

## 2021-04-12 NOTE — PROGRESS NOTES
[facility]  INITIAL VISIT NOTE  April 13, 2021    PRIMARY CARE PROVIDER AND CLINIC:  Ayesha Reyesandrés [address] / [place] [address]    CHIEF COMPLAINT:  Hospital follow-up/Initial visit    HPI:    Austen Fowler is a 86 year old  (1935) male who was seen at [facility] on April 13, 2021 for an initial visit.     Medical history is notable for CAD,chronic HFpEF, hypertension, dyslipidemia, DM type II, GERD, BPH, obesity, and PENELOPE.    Summary of hospital course:  Patient was hospitalized at [facility] from April 1 through April 8, 2021 for severe sepsis secondary to right lower extremity cellulitis.  Upon arrival to the emergency department, he had low-grade fever of 99.3  F, elevated lactic acid of 6.4, elevated white count of 9.2, and normal renal function and electrolytes.  Chest x-ray showed linear opacity in the left lung base suggestive of atelectasis or developing pneumonia.  Doppler study of right lower extremity was negative for DVT.  She was resuscitated with IV fluids and started on IV Zosyn and vancomycin.  Blood culture grew Staphylococcus pettenkoferi.  He was discharged on oral Ceftin.  Hospital course was complicated by acute hypoxic respiratory failure secondary to fluid overload and acute on chronic diastolic heart failure.  Patient was placed on BiPAP and treated with IV furosemide.  EKG showed normal sinus rhythm.  Echocardiogram was significant for grade 3 or advanced diastolic dysfunction, normal LV systolic function with EF of 55 to 60% and normal LV wall motion.  His respiratory condition improved and he was discharged on oral furosemide.  Notably, he was weaned off supplemental oxygen by the time of discharge from hospital.  TCU was recommended by therapies.    Patient is admitted to this facility for medical management, nursing care, and rehab.     Of note, history was obtained from patient, facility RN, and extensive review of the  chart necessitated by complex hospitalization.    Today's visit:  Patient was seen in his room, while lying in bed.  He appears comfortable.  He denies fever, chills, chest pain, palpitation, dyspnea, nausea, vomiting, abdominal pain, or urinary symptoms.  He reports that he had a bowel movement yesterday.  His right leg cellulitis is improving.  He is saturating well on room air.    CODE STATUS:   CPR/Full code     PAST MEDICAL HISTORY:   CAD, s/p CABG in 1992, using LIMA to LAD, SANDI to RCA, and SVG to OM1 and OM2  Chronic HFpEF, LVEF 55 to 60% on April 5, 2021  Hypertension  Dyslipidemia  DM type II  Borderline aortic root dilation  GERD  BPH  Right lower extremity cellulitis in June 2019 and April 2021  Cervicalgia  BCC of the skin  Vitamin D deficiency  Vitamin B12 deficiency  Obesity  PENELOPE, on CPAP    Past Medical History:   Diagnosis Date     Anxiety state, unspecified      Aortic root dilatation (H)      Arthritis      Ascending aorta dilatation (H)      Basal cell cancer     s/p Mohs nose and bridge of nose on R.     Bunion      CAD (coronary artery disease)     CABG 1992: LIMA to LAD, SANDI to RCA, SVG to OM1 and OM2     Contact dermatitis and other eczema, due to unspecified cause     eczema - has light treatments with Dr. Rivera     Disorders of bursae and tendons in shoulder region, unspecified      Esophageal reflux      Essential hypertension, benign      Gallbladder disease      GERD (gastroesophageal reflux disease)      Heart attack (H)      Hyperlipidemia LDL goal <70      Left ventricular diastolic dysfunction      Low HDL (under 40) 3/28/2014     Nasal/sinus dis NEC     s/p surgery in 1995     Obesity      Osteoarthrosis, unspecified whether generalized or localized, shoulder region      Other hammer toe (acquired)      Sleep apnea     USES CPAP     Spinal stenosis, unspecified region other than cervical     MRI done 2006     Type 2 diabetes, HbA1c goal < 7% (H) 3/12/2015     Urge incontinence         PAST SURGICAL HISTORY:   Past Surgical History:   Procedure Laterality Date     ABDOMEN SURGERY  1994    gall bladder     BIOPSY      arms     C CABG, ARTERY-VEIN, FOUR  11/1992    CABG - LIMA to left anterior descending and saphenous vein bypass graft to the first and second obtuse marginal branch of the circumflex and the right mammary to the right coronary artery     CHOLECYSTECTOMY  6/1994     COLONOSCOPY N/A 4/11/2017    Procedure: COMBINED COLONOSCOPY, SINGLE OR MULTIPLE BIOPSY/POLYPECTOMY BY BIOPSY;  Surgeon: Bladimir Garner MD;  Location:  GI     CV LEFT HEART CATH  5-92, 6-92    Angioplasty     ENT SURGERY  5/1995    sinus surgery     ESOPHAGOSCOPY, GASTROSCOPY, DUODENOSCOPY (EGD), DILATATION, COMBINED  7/17/2014    Procedure: COMBINED ESOPHAGOSCOPY, GASTROSCOPY, DUODENOSCOPY (EGD), DILATATION;  Surgeon: Bladimir Garner MD;  Location:  GI     EYE SURGERY      cataracts removed both eyes     HC COLONOSCOPY THRU STOMA W BIOPSY/CAUTERY TUMOR/POLYP/LESION  5/2007     INJECT EPIDURAL LUMBAR       LAMINECTOMY LUMBAR TWO LEVELS N/A 4/29/2015    Procedure: LAMINECTOMY LUMBAR TWO LEVELS;  Surgeon: Bladimir Keenan MD;  Location:  OR     THORACIC SURGERY  11/1992    bypass     ZZC NONSPECIFIC PROCEDURE  6-94    Gail lap     ZZC NONSPECIFIC PROCEDURE  1995    sinus surgery     ZZC NONSPECIFIC PROCEDURE      bilateral cataract surgery       FAMILY HISTORY:   Family History   Problem Relation Age of Onset     Cancer Brother         MELANOMA     Diabetes Mother         AODM     Thyroid Disease Mother      Diabetes Father         AODM     Myocardial Infarction Father      Cerebrovascular Disease Brother      Parkinsonism Sister      Family History Negative Son      Family History Negative Son      Family History Negative Son      Family History Negative Daughter      Coronary Artery Disease Brother         dod 2019     Cerebrovascular Disease Brother         dod 2019     Other Cancer Brother         dod  2000/melanoma       SOCIAL HISTORY:  Patient is  and lives in a house.  He occasionally uses a walking stick for ambulation.    Social History     Tobacco Use     Smoking status: Former Smoker     Packs/day: 2.00     Years: 30.00     Pack years: 60.00     Types: Cigarettes, Cigars, Pipe     Start date:      Quit date: 3/1/1992     Years since quittin.1     Smokeless tobacco: Never Used     Tobacco comment: quit in    Substance Use Topics     Alcohol use: Yes     Alcohol/week: 0.0 standard drinks     Comment: minimal less than 1/week       MEDICATIONS:  Current Outpatient Medications   Medication Sig Dispense Refill     acetaminophen (ACETAMIN) 500 MG tablet Take 1,000 mg by mouth 2 times daily        amLODIPine (NORVASC) 5 MG tablet TAKE 1 TABLET BY MOUTH TWO  TIMES DAILY 180 tablet 3     aspirin 81 MG EC tablet Take 81 mg by mouth daily        blood glucose (ONETOUCH ULTRA) test strip USE TO TEST BLOOD SUGAR 2  TIMES DAILY OR AS DIRECTED. 200 strip 1     blood glucose monitoring (ONE TOUCH ULTRASOFT) lancets USE TO TEST BLOOD SUGAR 2  TIMES DAILY OR AS DIRECTED. 200 each 3     cefuroxime (CEFTIN) 500 MG tablet Take 1 tablet (500 mg) by mouth every 12 hours for 5 days (Patient taking differently: Take 500 mg by mouth every 12 hours Complete on )       Cholecalciferol (VITAMIN D) 2000 UNIT tablet Take 2,000 Units by mouth daily. 90 tablet 3     diclofenac (VOLTAREN) 1 % topical gel Place 4 g onto the skin 4 times daily as needed for moderate pain (Apply to back to neck.)       finasteride (PROSCAR) 5 MG tablet TAKE 1 TABLET BY MOUTH  DAILY 90 tablet 3     furosemide (LASIX) 40 MG tablet Take 1 tablet (40 mg) by mouth daily       gabapentin (NEURONTIN) 300 MG capsule TAKE 3 CAPSULES BY MOUTH 3  TIMES DAILY (Patient taking differently: 300 mg 3 times daily ) 810 capsule 2     lisinopril (ZESTRIL) 20 MG tablet TAKE 1 TABLET BY MOUTH  DAILY (Patient taking differently: 5 mg daily ) 90 tablet 1      Menthol, Topical Analgesic, (ICY HOT BACK EX) Externally apply topically daily as needed (neck pain)        metFORMIN (GLUCOPHAGE-XR) 500 MG 24 hr tablet TAKE 2 TABLETS BY MOUTH TWO TIMES DAILY WITH MEALS 360 tablet 3     miconazole (MICATIN) 2 % external powder Apply topically 2 times daily       omeprazole (PRILOSEC) 20 MG DR capsule TAKE 1 CAPSULE BY MOUTH  DAILY 90 capsule 2     ORDER FOR DME Uses Cpap machine for sleep apnea       polyethylene glycol (MIRALAX) 17 g packet Take 17 g by mouth daily as needed for constipation       potassium chloride (KLOR-CON) 20 MEQ packet Take 20 mEq by mouth daily (Patient taking differently: Take 20 mEq by mouth 2 times daily )       pravastatin (PRAVACHOL) 20 MG tablet TAKE 1 TABLET BY MOUTH  DAILY 90 tablet 0     tamsulosin (FLOMAX) 0.4 MG capsule TAKE 1 CAPSULE BY MOUTH  DAILY 90 capsule 2     vitamin B-12 (CYANOCOBALAMIN) 1000 MCG tablet Take 1,000 mcg by mouth every other day          ALLERGIES:  No Known Allergies    ROS:  10 point ROS were negative other than the symptoms noted above in the HPI.    PHYSICAL EXAM:  Vital signs were reviewed in the chart.  Vital Signs: Blood pressure 135/79, heart rate 75, respiratory rate 20, temperature 98.5  F, oxygen saturation 93%, weight 271 LBS  General: Cooperative, comfortable, and in no acute distress  HEENT: Normocephalic; oropharynx clear  Cardiovascular: Normal S1, S2, RRR  Respiratory: Lungs clear to auscultation bilaterally  GI: Abdomen soft, non-tender, non-distended, +BS  Extremities: Both legs are wrapped.  There is 2+ right lower extremity and 1+ left lower extremity edema.  Neuro: CX II-XII grossly intact; ROM in all four extremities grossly intact  Psych: Alert and oriented x3; normal affect  Skin: No acute rash    LABORATORY/IMAGING DATA:    Most Recent 3 CBC's:  Recent Labs   Lab Test 04/12/21  0901 04/08/21  0603 04/07/21  0614 04/05/21  1122   WBC 10.6 8.9  --  10.1   HGB 12.8* 12.2*  --  11.8*   MCV 88 86  --   86    355 306 270     Most Recent 3 BMP's:  Recent Labs   Lab Test 04/12/21  0901 04/08/21  0603 04/07/21  1905 04/07/21  0614    139  --  140   POTASSIUM 4.2 3.1* 3.3* 3.1*   CHLORIDE 104 102  --  102   CO2 26 32  --  32   BUN 13 12  --  11   CR 0.70 0.68  --  0.65*   ANIONGAP 8 5  --  6   BRANDON 9.6 8.9  --  8.7   * 188*  --  170*     Most Recent 2 LFT's:  Recent Labs   Lab Test 04/02/21  1114 04/01/21  1934   * 304*   ALT 69 57   ALKPHOS 41 44   BILITOTAL 0.4 0.7     Most Recent Cholesterol Panel:  Recent Labs   Lab Test 03/02/21  0842   CHOL 126   LDL 42   HDL 38*   TRIG 229*     Most Recent TSH and T4:  Recent Labs   Lab Test 01/20/20  1435   TSH 1.02     Most Recent Hemoglobin A1c:  Recent Labs   Lab Test 04/01/21  2231   A1C 6.4*     Most Recent 6 glucoses:  Recent Labs   Lab Test 04/12/21  0901 04/08/21  0603 04/07/21  0614 04/05/21  0634 04/03/21  2232 04/03/21  0928   * 188* 170* 161* 191* 243*     Most Recent Urinalysis:  Recent Labs   Lab Test 04/01/21  1631 09/20/16  1658   COLOR Light Yellow Yellow   APPEARANCE Clear Clear   URINEGLC Negative Negative   URINEBILI Negative Negative   URINEKETONE 40* Negative   SG 1.022 1.020   UBLD Large* Negative   URINEPH 5.0 7.5*   PROTEIN 50* Negative   UROBILINOGEN  --  1.0   NITRITE Negative Negative   LEUKEST Negative Negative   RBCU 0  --    WBCU <1  --      Most Recent ESR & CRP:  Recent Labs   Lab Test 01/11/20  1641   SED 30*   CRP <2.9     Most Recent Anemia Panel:  Recent Labs   Lab Test 04/12/21  0901 12/07/20  1510 12/07/20  1510   WBC 10.6   < >  --    HGB 12.8*   < >  --    HCT 40.7   < >  --    MCV 88   < >  --       < >  --    B12  --   --  490    < > = values in this interval not displayed.       ASSESSMENT/PLAN:  Severe sepsis secondary to right lower extremity cellulitis.  Patient was treated with IV vancomycin Zosyn and discharged on oral Ceftin.  Blood culture grew Staphylococcus pettenkoferi.   Patient is  currently afebrile and hemodynamically stable.  Cellulitis is improving.  Plan:  Continue Ceftin 500 mg p.o. every 12 hours, last dose later today, April 13  Monitor fever curve    Acute on chronic HFpEF, LVEF 55 to 60% on April 5, 2021,  Acute hypoxic respiratory failure.  Patient was diuresed with IV furosemide.  He is now off supplemental oxygen.  Plan:  Continue furosemide 40 mg p.o. daily  Continue PTA lisinopril 20 mg p.o. daily  Closely monitor weight, electrolytes, renal function, and volume status    CAD, s/p CABG in 1992 (using LIMA to LAD, SANDI to RCA, and SVG to OM1 and OM2),  Hypertension,  Dyslipidemia.  Stable.  Plan:  Continue PTA aspirin 81 mg p.o. daily, lisinopril 20 mg p.o. daily, amlodipine 5 mg p.o. daily, and pravastatin 20 mg p.o. daily  Continue furosemide 40 mg p.o. daily as above  Monitor blood pressure and cardiac status    DM type II.  Hemoglobin A1c was 6.4% on April 1.  Blood glucose is currently in the range of 130-199.  Plan:  Continue diabetic diet  Continue PTA Metformin XR 1000 mg p.o. twice daily  Monitor blood glucose    GERD.  Plan:  Continue PTA omeprazole 20 mg p.o. daily    BPH.  Plan:  Continue PTA finasteride 5 mg p.o. daily and tamsulosin 0.4 mg p.o. daily    Chronic low back pain.  Plan:  Continue PTA acetaminophen 1000 g p.o. twice daily and gabapentin 900 mg p.o. 3 times daily     Obesity, BMI 33.77  PENELOPE, on CPAP.  Plan:  Staff to assist with mobility and daily care  Continue nocturnal CPAP at home setting    Physical deconditioning.  Plan:  Continue PT/OT evaluation and therapy        Orders written by provider at facility:  None.          Electronically signed by:  Jennyfer Perez MD

## 2021-04-13 ENCOUNTER — NURSING HOME VISIT (OUTPATIENT)
Dept: GERIATRICS | Facility: CLINIC | Age: 86
End: 2021-04-13
Payer: COMMERCIAL

## 2021-04-13 ENCOUNTER — DOCUMENTATION ONLY (OUTPATIENT)
Dept: OTHER | Facility: CLINIC | Age: 86
End: 2021-04-13

## 2021-04-13 VITALS
TEMPERATURE: 98.2 F | HEIGHT: 75 IN | RESPIRATION RATE: 20 BRPM | SYSTOLIC BLOOD PRESSURE: 135 MMHG | DIASTOLIC BLOOD PRESSURE: 76 MMHG | HEART RATE: 74 BPM | WEIGHT: 271 LBS | OXYGEN SATURATION: 93 % | BODY MASS INDEX: 33.69 KG/M2

## 2021-04-13 VITALS
SYSTOLIC BLOOD PRESSURE: 120 MMHG | OXYGEN SATURATION: 93 % | HEART RATE: 71 BPM | WEIGHT: 271 LBS | BODY MASS INDEX: 33.87 KG/M2 | TEMPERATURE: 98.5 F | RESPIRATION RATE: 18 BRPM | DIASTOLIC BLOOD PRESSURE: 74 MMHG

## 2021-04-13 DIAGNOSIS — I10 ESSENTIAL HYPERTENSION: ICD-10-CM

## 2021-04-13 DIAGNOSIS — L03.115 CELLULITIS OF RIGHT LOWER EXTREMITY: ICD-10-CM

## 2021-04-13 DIAGNOSIS — E11.59 TYPE 2 DIABETES MELLITUS WITH VASCULAR DISEASE (H): ICD-10-CM

## 2021-04-13 DIAGNOSIS — I50.33 ACUTE ON CHRONIC HEART FAILURE WITH PRESERVED EJECTION FRACTION (H): ICD-10-CM

## 2021-04-13 DIAGNOSIS — G47.33 OSA (OBSTRUCTIVE SLEEP APNEA): ICD-10-CM

## 2021-04-13 DIAGNOSIS — E78.5 DYSLIPIDEMIA: ICD-10-CM

## 2021-04-13 DIAGNOSIS — R53.81 PHYSICAL DECONDITIONING: ICD-10-CM

## 2021-04-13 DIAGNOSIS — A41.9 SEVERE SEPSIS (H): Primary | ICD-10-CM

## 2021-04-13 DIAGNOSIS — K21.9 GASTROESOPHAGEAL REFLUX DISEASE, UNSPECIFIED WHETHER ESOPHAGITIS PRESENT: ICD-10-CM

## 2021-04-13 DIAGNOSIS — R65.20 SEVERE SEPSIS (H): Primary | ICD-10-CM

## 2021-04-13 DIAGNOSIS — I25.10 CORONARY ARTERY DISEASE INVOLVING NATIVE CORONARY ARTERY OF NATIVE HEART WITHOUT ANGINA PECTORIS: ICD-10-CM

## 2021-04-13 DIAGNOSIS — J96.01 ACUTE RESPIRATORY FAILURE WITH HYPOXIA (H): ICD-10-CM

## 2021-04-13 PROCEDURE — 99305 1ST NF CARE MODERATE MDM 35: CPT | Performed by: INTERNAL MEDICINE

## 2021-04-13 ASSESSMENT — MIFFLIN-ST. JEOR: SCORE: 1999.88

## 2021-04-14 ENCOUNTER — NURSING HOME VISIT (OUTPATIENT)
Dept: GERIATRICS | Facility: CLINIC | Age: 86
End: 2021-04-14
Payer: COMMERCIAL

## 2021-04-14 DIAGNOSIS — I10 ESSENTIAL HYPERTENSION: ICD-10-CM

## 2021-04-14 DIAGNOSIS — I50.33 ACUTE ON CHRONIC HEART FAILURE WITH PRESERVED EJECTION FRACTION (H): ICD-10-CM

## 2021-04-14 DIAGNOSIS — L03.115 CELLULITIS OF RIGHT LOWER EXTREMITY: Primary | ICD-10-CM

## 2021-04-14 DIAGNOSIS — R53.81 PHYSICAL DECONDITIONING: ICD-10-CM

## 2021-04-14 DIAGNOSIS — E11.59 TYPE 2 DIABETES MELLITUS WITH VASCULAR DISEASE (H): ICD-10-CM

## 2021-04-14 DIAGNOSIS — I25.10 CORONARY ARTERY DISEASE INVOLVING NATIVE CORONARY ARTERY OF NATIVE HEART WITHOUT ANGINA PECTORIS: ICD-10-CM

## 2021-04-14 PROCEDURE — 99309 SBSQ NF CARE MODERATE MDM 30: CPT | Performed by: NURSE PRACTITIONER

## 2021-04-14 NOTE — PROGRESS NOTES
[facility] [facility]  [place] [facility] Record Number:  [id number]  Place of Service where encounter took place:  [facility] ([facility]) [[id number]]  Chief Complaint   Patient presents with     RECHECK       HPI:    Austen Fowler  is a 86 year old (1935), who is being seen today for an episodic care visit.  HPI information obtained from: facility chart records, facility staff, patient report and [facility] chart review. Today's concern is:  Patient has been in TCU since 4/8 following hospitalization due to sepsis due to RLE cellulitis. PMH includes CAD, HFpEF, hypertension, DM2, GERd, BPH, obesity and PENELOPE.  During hospitalization he was treated with IV vanco and zosyn due to blood cultures growing out staphylococcus pettenkeoferi. He was transferred to TCU on oral ceftin. Today he reports no pain in RLE. He is making progress with therapy.     Past Medical and Surgical History reviewed in Epic today.    MEDICATIONS:    Current Outpatient Medications   Medication Sig Dispense Refill     acetaminophen (ACETAMIN) 500 MG tablet Take 1,000 mg by mouth 2 times daily        amLODIPine (NORVASC) 5 MG tablet TAKE 1 TABLET BY MOUTH TWO  TIMES DAILY 180 tablet 3     aspirin 81 MG EC tablet Take 81 mg by mouth daily        blood glucose (ONETOUCH ULTRA) test strip USE TO TEST BLOOD SUGAR 2  TIMES DAILY OR AS DIRECTED. 200 strip 1     blood glucose monitoring (ONE TOUCH ULTRASOFT) lancets USE TO TEST BLOOD SUGAR 2  TIMES DAILY OR AS DIRECTED. 200 each 3     Cholecalciferol (VITAMIN D) 2000 UNIT tablet Take 2,000 Units by mouth daily. 90 tablet 3     diclofenac (VOLTAREN) 1 % topical gel Place 4 g onto the skin 4 times daily as needed for moderate pain (Apply to back to neck.)       finasteride (PROSCAR) 5 MG tablet TAKE 1 TABLET BY MOUTH  DAILY 90 tablet 3     furosemide (LASIX) 40 MG tablet Take 1 tablet (40 mg) by mouth daily       gabapentin (NEURONTIN) 300 MG capsule TAKE 3 CAPSULES BY MOUTH 3   TIMES DAILY (Patient taking differently: 300 mg 3 times daily ) 810 capsule 2     lisinopril (ZESTRIL) 20 MG tablet TAKE 1 TABLET BY MOUTH  DAILY (Patient taking differently: 5 mg daily ) 90 tablet 1     Menthol, Topical Analgesic, (ICY HOT BACK EX) Externally apply topically daily as needed (neck pain)        metFORMIN (GLUCOPHAGE-XR) 500 MG 24 hr tablet TAKE 2 TABLETS BY MOUTH TWO TIMES DAILY WITH MEALS 360 tablet 3     miconazole (MICATIN) 2 % external powder Apply topically 2 times daily       omeprazole (PRILOSEC) 20 MG DR capsule TAKE 1 CAPSULE BY MOUTH  DAILY 90 capsule 2     ORDER FOR DME Uses Cpap machine for sleep apnea       polyethylene glycol (MIRALAX) 17 g packet Take 17 g by mouth daily as needed for constipation       potassium chloride (KLOR-CON) 20 MEQ packet Take 20 mEq by mouth daily (Patient taking differently: Take 20 mEq by mouth 2 times daily )       pravastatin (PRAVACHOL) 20 MG tablet TAKE 1 TABLET BY MOUTH  DAILY 90 tablet 0     tamsulosin (FLOMAX) 0.4 MG capsule TAKE 1 CAPSULE BY MOUTH  DAILY 90 capsule 2     vitamin B-12 (CYANOCOBALAMIN) 1000 MCG tablet Take 1,000 mcg by mouth every other day            REVIEW OF SYSTEMS:  4 point ROS including Respiratory, CV, GI and , other than that noted in the HPI,  is negative    Objective:  /74   Pulse 71   Temp 98.5  F (36.9  C)   Resp 18   Wt 122.9 kg (271 lb)   SpO2 93%   BMI 33.87 kg/m    Exam:  GENERAL APPEARANCE:  Alert, in no distress  ENT:  Mouth and posterior oropharynx normal, moist mucous membranes, hearing acuity adequate   EYES:  EOM, conjunctivae, lids, pupils and irises normal    RESP:  respiratory effort normal, no respiratory distress,  CV:   Edema none  ABDOMEN:  normal bowel sounds, soft, nontender, no hepatosplenomegaly or other masses  M/S:   Gait and station not observed, Digits and nails nl   SKIN:  Inspection/Palpation of skin and subcutaneous tissue RLE with venous stasis changes  NEURO: 2-12 in normal limits  and at patient's baseline  PSYCH:  insight and judgement, memory mild cognitive impairment  , affect and mood normal    Labs:   CBC RESULTS:   Recent Labs   Lab Test 04/12/21  0901 04/08/21  0603   WBC 10.6 8.9   RBC 4.64 4.48   HGB 12.8* 12.2*   HCT 40.7 38.7*   MCV 88 86   MCH 27.6 27.2   MCHC 31.4* 31.5   RDW 14.8 14.4    355       Last Basic Metabolic Panel:  Recent Labs   Lab Test 04/12/21  0901 04/08/21  0603    139   POTASSIUM 4.2 3.1*   CHLORIDE 104 102   BRANDON 9.6 8.9   CO2 26 32   BUN 13 12   CR 0.70 0.68   * 188*       Liver Function Studies -   Recent Labs   Lab Test 04/02/21  1114 04/01/21  1934   PROTTOTAL 6.0* 6.5*   ALBUMIN 2.6* 2.9*   BILITOTAL 0.4 0.7   ALKPHOS 41 44   * 304*   ALT 69 57       TSH   Date Value Ref Range Status   01/20/2020 1.02 0.40 - 4.00 mU/L Final   04/09/2018 1.14 0.40 - 4.00 mU/L Final   ]    Lab Results   Component Value Date    A1C 6.4 04/01/2021    A1C 6.3 12/07/2020           ASSESSMENT/PLAN:  Cellulitis of right lower extremity  Recently treated for sepsis due to RLE cellulitis. Now on ceftin. Patient is afebrile. RLE with no erythema. It is slightly tender to touch  -complete antiobiotics    Acute on chronic heart failure with preserved ejection fraction (H)  EF in hospital 55-60%  In hospital he develped volume overload and was treated with IV lasix. 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 OCCUPATIONAL THERAPY reports SLUMS 21/30. He is walking up to 300'  -continue physical therapy and OCCUPATIONAL THERAPY         Electronically signed by:  Vania Aldrich  CRISTIANO Carlos CNP

## 2021-04-15 VITALS
HEART RATE: 70 BPM | RESPIRATION RATE: 18 BRPM | DIASTOLIC BLOOD PRESSURE: 76 MMHG | TEMPERATURE: 98.2 F | SYSTOLIC BLOOD PRESSURE: 132 MMHG | BODY MASS INDEX: 33.5 KG/M2 | WEIGHT: 268 LBS | OXYGEN SATURATION: 99 %

## 2021-04-16 ENCOUNTER — DISCHARGE SUMMARY NURSING HOME (OUTPATIENT)
Dept: GERIATRICS | Facility: CLINIC | Age: 86
End: 2021-04-16
Payer: COMMERCIAL

## 2021-04-16 ENCOUNTER — HOSPITAL LABORATORY (OUTPATIENT)
Dept: OTHER | Facility: CLINIC | Age: 86
End: 2021-04-16

## 2021-04-16 DIAGNOSIS — I10 ESSENTIAL HYPERTENSION, BENIGN: ICD-10-CM

## 2021-04-16 DIAGNOSIS — Z71.89 OTHER SPECIFIED COUNSELING: ICD-10-CM

## 2021-04-16 DIAGNOSIS — M53.3 SACROILIAC JOINT PAIN: ICD-10-CM

## 2021-04-16 DIAGNOSIS — G47.33 OSA (OBSTRUCTIVE SLEEP APNEA): Primary | ICD-10-CM

## 2021-04-16 DIAGNOSIS — E66.9 NON MORBID OBESITY, UNSPECIFIED OBESITY TYPE: ICD-10-CM

## 2021-04-16 DIAGNOSIS — E78.5 HYPERLIPIDEMIA LDL GOAL <70: ICD-10-CM

## 2021-04-16 DIAGNOSIS — R39.198 SLOWING OF URINARY STREAM: ICD-10-CM

## 2021-04-16 DIAGNOSIS — I50.31 ACUTE DIASTOLIC HEART FAILURE (H): ICD-10-CM

## 2021-04-16 DIAGNOSIS — G89.4 CHRONIC PAIN SYNDROME: ICD-10-CM

## 2021-04-16 DIAGNOSIS — E11.59 TYPE 2 DIABETES MELLITUS WITH VASCULAR DISEASE (H): ICD-10-CM

## 2021-04-16 DIAGNOSIS — N40.0 BENIGN PROSTATIC HYPERPLASIA, UNSPECIFIED WHETHER LOWER URINARY TRACT SYMPTOMS PRESENT: ICD-10-CM

## 2021-04-16 DIAGNOSIS — I25.10 CORONARY ARTERY DISEASE INVOLVING NATIVE CORONARY ARTERY OF NATIVE HEART WITHOUT ANGINA PECTORIS: ICD-10-CM

## 2021-04-16 DIAGNOSIS — L03.115 CELLULITIS OF RIGHT LOWER EXTREMITY: ICD-10-CM

## 2021-04-16 DIAGNOSIS — K21.00 GASTROESOPHAGEAL REFLUX DISEASE WITH ESOPHAGITIS, UNSPECIFIED WHETHER HEMORRHAGE: ICD-10-CM

## 2021-04-16 PROCEDURE — 99316 NF DSCHRG MGMT 30 MIN+: CPT | Performed by: NURSE PRACTITIONER

## 2021-04-16 RX ORDER — TAMSULOSIN HYDROCHLORIDE 0.4 MG/1
0.4 CAPSULE ORAL DAILY
Qty: 30 CAPSULE | Refills: 0 | Status: SHIPPED | OUTPATIENT
Start: 2021-04-16 | End: 2021-06-25

## 2021-04-16 RX ORDER — GABAPENTIN 300 MG/1
300 CAPSULE ORAL 3 TIMES DAILY
Qty: 90 CAPSULE | Refills: 0 | Status: SHIPPED | OUTPATIENT
Start: 2021-04-16 | End: 2021-04-19

## 2021-04-16 RX ORDER — METFORMIN HCL 500 MG
TABLET, EXTENDED RELEASE 24 HR ORAL
Qty: 60 TABLET | Refills: 0 | Status: SHIPPED | OUTPATIENT
Start: 2021-04-16 | End: 2021-05-03

## 2021-04-16 RX ORDER — AMLODIPINE BESYLATE 5 MG/1
TABLET ORAL
Qty: 60 TABLET | Refills: 0 | Status: SHIPPED | OUTPATIENT
Start: 2021-04-16 | End: 2021-06-25

## 2021-04-16 RX ORDER — FUROSEMIDE 40 MG
40 TABLET ORAL DAILY
Qty: 30 TABLET | Refills: 0 | Status: SHIPPED | OUTPATIENT
Start: 2021-04-16 | End: 2021-05-10

## 2021-04-16 RX ORDER — POTASSIUM CHLORIDE 1.5 G/1.58G
20 POWDER, FOR SOLUTION ORAL 2 TIMES DAILY
Qty: 60 EACH | Refills: 0 | Status: SHIPPED | OUTPATIENT
Start: 2021-04-16 | End: 2021-05-12

## 2021-04-16 RX ORDER — LISINOPRIL 20 MG/1
10 TABLET ORAL DAILY
Qty: 15 TABLET | Refills: 0 | Status: SHIPPED | OUTPATIENT
Start: 2021-04-16 | End: 2021-06-25

## 2021-04-16 RX ORDER — FINASTERIDE 5 MG/1
1 TABLET, FILM COATED ORAL DAILY
Qty: 30 TABLET | Refills: 0 | Status: SHIPPED | OUTPATIENT
Start: 2021-04-16 | End: 2021-04-19

## 2021-04-16 RX ORDER — PRAVASTATIN SODIUM 20 MG
20 TABLET ORAL DAILY
Qty: 30 TABLET | Refills: 0 | Status: SHIPPED | OUTPATIENT
Start: 2021-04-16 | End: 2021-04-19

## 2021-04-16 NOTE — PROGRESS NOTES
[facility] DISCHARGE SUMMARY  PATIENT'S NAME: Austen Fowler  YOB: 1935  MEDICAL RECORD NUMBER:  [id number]  Place of Service where encounter took place:  [facility] ([facility]) [[id number]]    PRIMARY CARE PROVIDER AND CLINIC RESPONSIBLE AFTER TRANSFER:   Hamzah Hodge MD, [address] / [place] [address]    [facility] Provider     Transferring providers: Lazaro Dooley NP, Dr. Chris MD  Recent Hospitalization/ED:  [facility] Hospital stay 4/1/21 to 4/8/21.  Date of SNF Admission: April/08/2021  Date of SNF (anticipated) Discharge: April/17/2021  Discharged to: previous independent home  Cognitive Scores: SLUMS: 28/30  Physical Function: No limitations  DME: NA    CODE STATUS/ADVANCE DIRECTIVES DISCUSSION:  Full Code   ALLERGIES: Patient has no known allergies.     Brief Summary of Hospital Course:   This is a 86-year-old male with history of coronary artery disease, status post CABG, hypertension, GERD, diabetes mellitus type 2, anxiety, obstructive sleep apnea on CPAP, ascending aortic dilatation, who came to the ER with complaint of fever, chills, weakness, tiredness and right lower leg redness for the last 2 or 3 days. He was diagnosed with cellulitis on right lower extremity, started on vancomycin and zosyn, changed to ceftin on 4/4 to complete a 10d course. He was fluid resuscitated and developed fluid overload per acute on chronic diastolic HF. He was aggressively diuresed with 10.5L removed and required NIPPV support. He also required electrolyte replacement and was liberated from supplemental O2 with stable renal fx. He was restarted on his PTA amlodipine 5mg BID with lisinopril 20mg daily, per aggressive diuresis, his amlodipine and lisinopril needed titration of his lisinopril to 5mg daily at discharge and given ace wraps per lymphedema tx. He was then discharged to the TCU.     DISCHARGE DIAGNOSIS/NURSING FACILITY COURSE:     PENELOPE  (obstructive sleep apnea)  Continue CPAP at HS     Shortness of breath  Acute on chronic diastolic HF  EF 55-60%  Apparently 10L removed in hospital, continue lasix 40mg daily, last 271lb. 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 BS <160     Essential hypertension, benign  Continue amlodipine 5mg BID and lisinopril 5mg per increased diuresis     Coronary artery disease involving native coronary artery of native heart without angina pectoris  Continue ASA and statin     Cellulitis of right lower extremity  Initially given vancomycin and zosyn, switched to ceftin, finished course on 4/13. Resolved     Gastroesophageal reflux disease with esophagitis, unspecified whether hemorrhage  Continue omeprazole 20mg daily     Chronic low back pain with bilateral sciatica, unspecified back pain laterality  Continue gabapentin 900mg TID and tylenol 1000mg BID, voltaren gel 1% 4gm QID PRN, denies pain     Hypokalemia  Last 4.2 on 4/12, continue potassium 20mEq BID     Hypomagnesia  Last 1.6 on 4/12     Vit D deficiency  Continue D3 2000U daily     B12 deficiency  Continue cyanocobalamin 1000mcg daily     Constipation  Continue miralax daily PRN     BPH  Continue flomax 0.4mg daily and proscar 5mg daily    Past Medical History:  has a past medical history of Anxiety state, unspecified, Aortic root dilatation (H), Arthritis, Ascending aorta dilatation (H), Basal cell cancer, Bunion, CAD (coronary artery disease), Contact dermatitis and other eczema, due to unspecified cause, Disorders of bursae and tendons in shoulder region, unspecified, Esophageal reflux, Essential hypertension, benign, Gallbladder disease, GERD (gastroesophageal reflux disease), Heart attack (H), Hyperlipidemia LDL goal <70, Left ventricular diastolic dysfunction, Low HDL (under 40) (3/28/2014), Nasal/sinus dis  NEC, Obesity, Osteoarthrosis, unspecified whether generalized or localized, shoulder region, Other hammer toe (acquired), Sleep apnea, Spinal stenosis, unspecified region other than cervical, Type 2 diabetes, HbA1c goal < 7% (H) (3/12/2015), and Urge incontinence. He also has no past medical history of Cerebral infarction (H), Congestive heart failure (H), COPD (chronic obstructive pulmonary disease) (H), Depressive disorder, History of blood transfusion, PONV (postoperative nausea and vomiting), Thyroid disease, or Uncomplicated asthma.    Discharge Medications:    Current Outpatient Medications   Medication Sig Dispense Refill     acetaminophen (ACETAMIN) 500 MG tablet Take 1,000 mg by mouth 2 times daily        amLODIPine (NORVASC) 5 MG tablet TAKE 1 TABLET BY MOUTH TWO  TIMES DAILY 180 tablet 3     aspirin 81 MG EC tablet Take 81 mg by mouth daily        blood glucose (ONETOUCH ULTRA) test strip USE TO TEST BLOOD SUGAR 2  TIMES DAILY OR AS DIRECTED. 200 strip 1     blood glucose monitoring (ONE TOUCH ULTRASOFT) lancets USE TO TEST BLOOD SUGAR 2  TIMES DAILY OR AS DIRECTED. 200 each 3     Cholecalciferol (VITAMIN D) 2000 UNIT tablet Take 2,000 Units by mouth daily. 90 tablet 3     diclofenac (VOLTAREN) 1 % topical gel Place 4 g onto the skin 4 times daily as needed for moderate pain (Apply to back to neck.)       finasteride (PROSCAR) 5 MG tablet TAKE 1 TABLET BY MOUTH  DAILY 90 tablet 3     furosemide (LASIX) 40 MG tablet Take 1 tablet (40 mg) by mouth daily       gabapentin (NEURONTIN) 300 MG capsule TAKE 3 CAPSULES BY MOUTH 3  TIMES DAILY (Patient taking differently: 300 mg 3 times daily ) 810 capsule 2     lisinopril (ZESTRIL) 20 MG tablet TAKE 1 TABLET BY MOUTH  DAILY (Patient taking differently: 5 mg daily ) 90 tablet 1     Menthol, Topical Analgesic, (ICY HOT BACK EX) Externally apply topically daily as needed (neck pain)        metFORMIN (GLUCOPHAGE-XR) 500 MG 24 hr tablet TAKE 2 TABLETS BY MOUTH TWO  TIMES DAILY WITH MEALS 360 tablet 3     miconazole (MICATIN) 2 % external powder Apply topically 2 times daily       omeprazole (PRILOSEC) 20 MG DR capsule TAKE 1 CAPSULE BY MOUTH  DAILY 90 capsule 2     ORDER FOR DME Uses Cpap machine for sleep apnea       polyethylene glycol (MIRALAX) 17 g packet Take 17 g by mouth daily as needed for constipation       potassium chloride (KLOR-CON) 20 MEQ packet Take 20 mEq by mouth daily (Patient taking differently: Take 20 mEq by mouth 2 times daily )       pravastatin (PRAVACHOL) 20 MG tablet TAKE 1 TABLET BY MOUTH  DAILY 90 tablet 0     tamsulosin (FLOMAX) 0.4 MG capsule TAKE 1 CAPSULE BY MOUTH  DAILY 90 capsule 2     vitamin B-12 (CYANOCOBALAMIN) 1000 MCG tablet Take 1,000 mcg by mouth every other day        Medication Changes/Rationale:     NA    Controlled medications sent with patient:   not applicable/none     ROS:   4 point ROS including Respiratory, CV, GI and , other than that noted in the HPI,  is negative    Physical Exam:   Vitals: /76   Pulse 70   Temp 98.2  F (36.8  C)   Resp 18   Wt 121.6 kg (268 lb)   SpO2 99%   BMI 33.50 kg/m    BMI= Body mass index is 33.5 kg/m .  GENERAL APPEARANCE:  Alert, oriented, morbidly obese, cooperative, denies pain  RESP:  respiratory effort and palpation of chest normal, lungs clear to auscultation , no respiratory distress  CV:  Palpation and auscultation of heart done , regular rate and rhythm, no murmur, rub, or gallop, peripheral edema 1+ BLE, wrapped  PSYCH:  oriented X 3, memory impaired , affect and mood normal    SNF labs:   Most Recent 3 CBC's:  Recent Labs   Lab Test 04/12/21  0901 04/08/21  0603 04/07/21  0614 04/05/21  1122   WBC 10.6 8.9  --  10.1   HGB 12.8* 12.2*  --  11.8*   MCV 88 86  --  86    355 306 270     Most Recent 3 BMP's:  Recent Labs   Lab Test 04/12/21  0901 04/08/21  0603 04/07/21  1905 04/07/21  0614    139  --  140   POTASSIUM 4.2 3.1* 3.3* 3.1*   CHLORIDE 104 102  --  102    CO2 26 32  --  32   BUN 13 12  --  11   CR 0.70 0.68  --  0.65*   ANIONGAP 8 5  --  6   BRANDON 9.6 8.9  --  8.7   * 188*  --  170*     NA    DISCHARGE PLAN:    Follow up labs: No labs orders/due    Medical Follow Up:      Follow up with primary care provider in 1-2 weeks    MTM referral needed and placed by this provider: No    Current [place] scheduled appointments:       Discharge Services: Home Care:  Occupational Therapy, Physical Therapy, Registered Nurse and Home Health Aide    Discharge Instructions Verbalized to Patient at Discharge:     None      TOTAL DISCHARGE TIME:   Greater than 30 minutes  Electronically signed by:  Lazaro Dooley NP     Home care Face to Face documentation done in EPIC attached to Home care orders for [facility].

## 2021-04-17 LAB
SARS-COV-2 RNA RESP QL NAA+PROBE: NORMAL
SPECIMEN SOURCE: NORMAL

## 2021-04-18 LAB
LABORATORY COMMENT REPORT: NORMAL
SARS-COV-2 RNA RESP QL NAA+PROBE: NEGATIVE
SPECIMEN SOURCE: NORMAL

## 2021-04-19 ENCOUNTER — PATIENT OUTREACH (OUTPATIENT)
Dept: CARE COORDINATION | Facility: CLINIC | Age: 86
End: 2021-04-19

## 2021-04-19 DIAGNOSIS — R39.198 SLOWING OF URINARY STREAM: ICD-10-CM

## 2021-04-19 DIAGNOSIS — M53.3 SACROILIAC JOINT PAIN: ICD-10-CM

## 2021-04-19 DIAGNOSIS — E78.5 HYPERLIPIDEMIA LDL GOAL <70: ICD-10-CM

## 2021-04-19 DIAGNOSIS — G89.4 CHRONIC PAIN SYNDROME: ICD-10-CM

## 2021-04-19 RX ORDER — FINASTERIDE 5 MG/1
1 TABLET, FILM COATED ORAL DAILY
Qty: 90 TABLET | Refills: 0 | Status: SHIPPED | OUTPATIENT
Start: 2021-04-19 | End: 2021-06-25

## 2021-04-19 RX ORDER — GABAPENTIN 300 MG/1
900 CAPSULE ORAL 3 TIMES DAILY
Qty: 270 CAPSULE | Refills: 1 | Status: SHIPPED | OUTPATIENT
Start: 2021-04-19 | End: 2021-06-25

## 2021-04-19 RX ORDER — PRAVASTATIN SODIUM 20 MG
20 TABLET ORAL DAILY
Qty: 90 TABLET | Refills: 0 | Status: SHIPPED | OUTPATIENT
Start: 2021-04-19 | End: 2021-06-25

## 2021-04-19 NOTE — PROGRESS NOTES
Clinic Care Coordination Contact  Community Health Worker Initial Outreach    CHW Initial Information Gathering:  Referral Source: ED Follow-Up  No PCP office visit in Past Year: No  CHW Additional Questions  MyChart active?: Yes    Patient accepts CC: No, Patient stated he is doing good and did not need anything at this time . Patient will be sent Care Coordination introduction letter for future reference.

## 2021-04-19 NOTE — TELEPHONE ENCOUNTER
LOV 5-7-2020 BK virtual    Patient discharged from TCU 4- - OV notes state:  Chronic low back pain with bilateral sciatica, unspecified back pain laterality  Continue gabapentin 900mg TID and tylenol 1000mg BID, voltaren gel 1% 4gm QID PRN, denies pain    However Rx for gabapentin only is 300mg TID    Updated to 900mg TID, 30 day supply with 1 refill.    Please review     Patient due for fasting physical with Dr Hodge    No future OV scheduled    RT Parrish (R)

## 2021-04-20 ENCOUNTER — TELEPHONE (OUTPATIENT)
Dept: FAMILY MEDICINE | Facility: CLINIC | Age: 86
End: 2021-04-20

## 2021-04-20 ENCOUNTER — MEDICAL CORRESPONDENCE (OUTPATIENT)
Dept: HEALTH INFORMATION MANAGEMENT | Facility: CLINIC | Age: 86
End: 2021-04-20

## 2021-04-20 NOTE — TELEPHONE ENCOUNTER
"Spoke with Maci - home care RN     1) Pt discharged with directions 1/2 tablet rather than full tab 20mg Lisinopril, But pt is insistent it should be full tablet - and that's how he is taking   BP on a full tab 108/68 sitting 102/62 standing   Did complain of episodes of dizziness with standing   On Furosemide 40mg also   Pt only trusts directions from PCP   Ok to advise pt to take the 1/2 tablet as directed- as he is dizzy with the full tab?    2) pt decided on his own not to take ASA 81mg due to arm bruising though it's on med list -should we advise he resume?      3) glucose testing - was advised to do BID, but only feels like doing once per day     4) B12 - he has been taking daily \"always\" but discharge orders states every other day but he insists daily instructions and states every other day is wrong - which sig should pt follow?    5) pt told home care he tried scheduling through [facility] but got frustrated. Does not want a video visit for F2F, are you able to work him in for OV?      6) okay to give verbal on home care orders? Last visit with you was virtual May 2020. And not future visit currently    Sierra ARGUELLO RN          "

## 2021-04-20 NOTE — TELEPHONE ENCOUNTER
Reason for Call:  Home Health Care    Maci with [facility] called regarding (reason for call):     Orders are needed for this patient.     PT: eval & treat    OT: eval & treat    Skilled Nursing: 2x3 weeks, 1x8 weeks    Pt Provider: wanted specific instructions for the lisinopril Rx as Pt did not trust the instructions given to him at TCU. Pt is taking a full tablet rather than a half tablet    Also, Pt is taking vitamin B12 every day and discharge instructions on it stated to take it every other day    Pt also decided not to take 81mg aspirin due to bruising    Only monitoring glucose once per day, and fasting glucose is in mid-100's since discharge    Pt is not taking potassium due to issues with pharmacy being able to fill and insurance covering it. Pt has opted to eat bananas and avocados while he waits for the Rx.    Phone Number Homecare Nurse can be reached at: [phone]    Can we leave a detailed message on this number? YES    Phone number patient can be reached at: [phone]    Best Time: any    Call taken on 4/20/2021 at 4:42 PM by Cheri Morin

## 2021-04-21 DIAGNOSIS — R73.09 OTHER ABNORMAL GLUCOSE: ICD-10-CM

## 2021-04-21 NOTE — TELEPHONE ENCOUNTER
"Per below, \"arrange with Pari\". :)  Where can we add this patient?    We will give medication recommendations when we call with an appointment date (in-person visit)    Thank you,  Annmarie Andrade RN on 4/21/2021 at 3:54 PM    "

## 2021-04-21 NOTE — TELEPHONE ENCOUNTER
Orders ok  Ok to take full tab  I will address other issues at appointment   Please arrange with trudi

## 2021-04-22 ENCOUNTER — TELEPHONE (OUTPATIENT)
Dept: FAMILY MEDICINE | Facility: CLINIC | Age: 86
End: 2021-04-22

## 2021-04-22 RX ORDER — BLOOD SUGAR DIAGNOSTIC
STRIP MISCELLANEOUS
Qty: 200 STRIP | Refills: 0 | Status: SHIPPED | OUTPATIENT
Start: 2021-04-22 | End: 2021-06-23

## 2021-04-22 NOTE — TELEPHONE ENCOUNTER
Medication is being filled for 1 time refill only due to:  Due for appt with PCP. Pt notified.    Sarah FLEMING, RN      April 22, 2021  9:42 AM

## 2021-04-26 ENCOUNTER — TELEPHONE (OUTPATIENT)
Dept: FAMILY MEDICINE | Facility: CLINIC | Age: 86
End: 2021-04-26

## 2021-04-26 NOTE — TELEPHONE ENCOUNTER
[facility] for orders requested. Spoke to home care. Patient has a clinic visit this week IN PERSON with .  Annmarie Andrade RN on 4/26/2021 at 4:20 PM

## 2021-04-28 ENCOUNTER — OFFICE VISIT (OUTPATIENT)
Dept: FAMILY MEDICINE | Facility: CLINIC | Age: 86
End: 2021-04-28
Payer: COMMERCIAL

## 2021-04-28 VITALS
DIASTOLIC BLOOD PRESSURE: 75 MMHG | HEIGHT: 75 IN | TEMPERATURE: 97.7 F | HEART RATE: 79 BPM | BODY MASS INDEX: 34.44 KG/M2 | WEIGHT: 277 LBS | SYSTOLIC BLOOD PRESSURE: 125 MMHG | OXYGEN SATURATION: 98 %

## 2021-04-28 DIAGNOSIS — I89.0 LYMPHEDEMA OF EXTREMITY: ICD-10-CM

## 2021-04-28 DIAGNOSIS — Z09 HOSPITAL DISCHARGE FOLLOW-UP: Primary | ICD-10-CM

## 2021-04-28 DIAGNOSIS — I10 ESSENTIAL HYPERTENSION, BENIGN: ICD-10-CM

## 2021-04-28 DIAGNOSIS — Z87.2 HISTORY OF CELLULITIS: ICD-10-CM

## 2021-04-28 DIAGNOSIS — R42 SPELL OF DIZZINESS: ICD-10-CM

## 2021-04-28 DIAGNOSIS — I50.33 ACUTE ON CHRONIC DIASTOLIC HEART FAILURE (H): ICD-10-CM

## 2021-04-28 DIAGNOSIS — L60.3 NAIL DYSTROPHY: ICD-10-CM

## 2021-04-28 LAB
ANION GAP SERPL CALCULATED.3IONS-SCNC: 5 MMOL/L (ref 3–14)
BASOPHILS # BLD AUTO: 0 10E9/L (ref 0–0.2)
BASOPHILS NFR BLD AUTO: 0.1 %
BUN SERPL-MCNC: 14 MG/DL (ref 7–30)
CALCIUM SERPL-MCNC: 9.7 MG/DL (ref 8.5–10.1)
CHLORIDE SERPL-SCNC: 106 MMOL/L (ref 94–109)
CO2 SERPL-SCNC: 28 MMOL/L (ref 20–32)
CREAT SERPL-MCNC: 0.74 MG/DL (ref 0.66–1.25)
CRP SERPL-MCNC: <2.9 MG/L (ref 0–8)
DIFFERENTIAL METHOD BLD: NORMAL
EOSINOPHIL # BLD AUTO: 0.4 10E9/L (ref 0–0.7)
EOSINOPHIL NFR BLD AUTO: 4.5 %
ERYTHROCYTE [DISTWIDTH] IN BLOOD BY AUTOMATED COUNT: 15 % (ref 10–15)
GFR SERPL CREATININE-BSD FRML MDRD: 84 ML/MIN/{1.73_M2}
GLUCOSE SERPL-MCNC: 115 MG/DL (ref 70–99)
HCT VFR BLD AUTO: 41.9 % (ref 40–53)
HGB BLD-MCNC: 13.5 G/DL (ref 13.3–17.7)
LYMPHOCYTES # BLD AUTO: 2.5 10E9/L (ref 0.8–5.3)
LYMPHOCYTES NFR BLD AUTO: 32 %
MCH RBC QN AUTO: 28.5 PG (ref 26.5–33)
MCHC RBC AUTO-ENTMCNC: 32.2 G/DL (ref 31.5–36.5)
MCV RBC AUTO: 88 FL (ref 78–100)
MONOCYTES # BLD AUTO: 0.7 10E9/L (ref 0–1.3)
MONOCYTES NFR BLD AUTO: 8.7 %
NEUTROPHILS # BLD AUTO: 4.3 10E9/L (ref 1.6–8.3)
NEUTROPHILS NFR BLD AUTO: 54.7 %
PLATELET # BLD AUTO: 206 10E9/L (ref 150–450)
POTASSIUM SERPL-SCNC: 4.1 MMOL/L (ref 3.4–5.3)
RBC # BLD AUTO: 4.74 10E12/L (ref 4.4–5.9)
SODIUM SERPL-SCNC: 139 MMOL/L (ref 133–144)
WBC # BLD AUTO: 7.9 10E9/L (ref 4–11)

## 2021-04-28 PROCEDURE — 36415 COLL VENOUS BLD VENIPUNCTURE: CPT | Performed by: INTERNAL MEDICINE

## 2021-04-28 PROCEDURE — 86140 C-REACTIVE PROTEIN: CPT | Performed by: INTERNAL MEDICINE

## 2021-04-28 PROCEDURE — 99214 OFFICE O/P EST MOD 30 MIN: CPT | Performed by: INTERNAL MEDICINE

## 2021-04-28 PROCEDURE — 85025 COMPLETE CBC W/AUTO DIFF WBC: CPT | Performed by: INTERNAL MEDICINE

## 2021-04-28 PROCEDURE — 80048 BASIC METABOLIC PNL TOTAL CA: CPT | Performed by: INTERNAL MEDICINE

## 2021-04-28 ASSESSMENT — MIFFLIN-ST. JEOR: SCORE: 2027.09

## 2021-04-28 NOTE — PROGRESS NOTES
Assessment & Plan   Problem List Items Addressed This Visit        Circulatory    Essential hypertension, benign    Relevant Orders    Basic metabolic panel  (Ca, Cl, CO2, Creat, Gluc, K, Na, BUN) (Completed)      Other Visit Diagnoses     Hospital discharge follow-up    -  Primary    Lymphedema of extremity        Relevant Orders    CBC with platelets and differential (Completed)    Basic metabolic panel  (Ca, Cl, CO2, Creat, Gluc, K, Na, BUN) (Completed)    Acute on chronic diastolic heart failure (H)        Relevant Orders    Basic metabolic panel  (Ca, Cl, CO2, Creat, Gluc, K, Na, BUN) (Completed)    History of cellulitis        Relevant Orders    CBC with platelets and differential (Completed)    CRP, inflammation (Completed)    Basic metabolic panel  (Ca, Cl, CO2, Creat, Gluc, K, Na, BUN) (Completed)    Nail dystrophy        Relevant Orders    Orthopedic & Spine  Referral    Spell of dizziness             Right lower extremity lymphedema secondary to cellulitis.  I do not think in my medical opinion there is ongoing active infection/cellulitis at this time ,there is a small area on distal anterior chin that is slightly sensitive to touch , advised to wear compression stockings and keep leg elevated.  They will discuss with the physical therapist about the compression stockings.  Probably low pressure below-knee stockingse.  Patient is on diuretic with Lasix seems well tolerated ,we will check a potassium level and kidney function test.  He does have diastolic dysfunction grade 3 and moderate LVH  borderline normal ejection fraction.  He may need to be on chronic diuresis to help with the diastolic dysfunction and some shortness of breath that he usually gets with exertion.  He is on a potassium twice a day started after diuresis; as potassium dropped and he was started on potassium replacement.  He was diuresed 10 L in the hospital.  Blood pressure seems well controlled, keep on same medication  "for now.  Patient requested follow-up in 2 weeks and as needed.  We will check basic labs to make sure white count is normal, and inflammatory marker is normal.  He had a right lower extremity Doppler ultrasound that ruled out DVT in the hospital which is reassuring.  Patient/wife requesting referral to podiatry for history of thick dystrophic toenails.  Referral was placed.   patient is on gabapentin 300 mg 3 times daily states sometimes he takes twice daily dosing and not 3 times daily.  Advised that medication can cause orthostatic hypotension.  Advised also to check his blood pressure before taking blood pressure medications or gabapentin; advised to hold on blood pressure medicine  if blood pressure less than 100 or if dizzy.  He had one episode of dizziness that resolved ; he reports having low blood pressure reading during the dizzy spell.  As mentioned his blood pressure today in the clinic is normal.  Being on new diuretic, on alpha blocker with Flomax as well as gabapentin these are all predisposing factors for orthostatic dizziness ; and stand up slowly from sitting or supine position, advised him to keep well-hydrated.       BMI:   Estimated body mass index is 34.62 kg/m  as calculated from the following:    Height as of this encounter: 1.905 m (6' 3\").    Weight as of this encounter: 125.6 kg (277 lb).   Weight management plan: Discussed healthy diet and exercise guidelines    MEDICATIONS:  Continue current medications without change  Work on weight loss  Regular exercise  See Patient Instructions    Return in about 2 weeks (around 5/12/2021), or if symptoms worsen or fail to improve, for As needed and if symptoms worsen.    Ady Bucio MD  [facility] ASHLEY Moya is a 86 year old who presents for the following health issues   HPI       Hospital Follow-up Visit:    Hospital/Nursing Home/IP Rehab Facility: [facility]  Date of Admission: 04/01/2021  Date " "of Discharge: 04/08/2021  Reason(s) for Admission:     Cellulitis of right lower extremity    grade 3 diastolic dysfunction acute on chronic with exacerbation      Was your hospitalization related to COVID-19? No   Problems taking medications regularly:  None  Medication changes since discharge: None  Problems adhering to non-medication therapy:  None    Summary of hospitalization:  [facility] discharge summary reviewed  Diagnostic Tests/Treatments reviewed.  Follow up needed: yes  Other Healthcare Providers Involved in Patient s Care:         Specialist appointment - PT for lymphedema and cardiology  Update since discharge: improved.       Post Discharge Medication Reconciliation: discharge medications reconciled, continue medications without change.  Plan of care communicated with patient              Patient presenting for follow-up he is doing much better.  He still has some swelling of the right lower extremity.  He is getting physical therapy and occupational therapy at home.  He finished 10-day course of antibiotics at [place].  Denies any diarrhea.  Denies any shortness of breath ,difficulty breathing or chest pain.  He had 1 day of dizziness with low blood pressure that has resolved. currently  Denies any orthostatic dizziness.  No lethargy or fatigue.  No other systemic complaints.    Review of Systems   Constitutional, HEENT, cardiovascular, pulmonary, gi and gu systems are negative, except as otherwise noted.      Objective    /75 (BP Location: Left arm, Patient Position: Sitting, Cuff Size: Adult Large)   Pulse 79   Temp 97.7  F (36.5  C) (Temporal)   Ht 1.905 m (6' 3\")   Wt 125.6 kg (277 lb)   SpO2 98%   BMI 34.62 kg/m    Body mass index is 34.62 kg/m .  Physical Exam   General appearance not in acute distress alert oriented x3  Lungs good air entry bilateral , mild basilar rales left more than right, no crackles, no use of accessory muscles  Heart rate and rhythm grade 1-2 systolic " murmur left lower sternal border +S4 , no S3  Abdomen obese soft nontender.  Extremities right lower extremity, +1-2 lymphedema minimal erythroderma, there is an area of the distal anterior chin area that is slightly sensitive to touch with minimal surface superficial skin desquamation.  No evidence of cellulitis.  Left lower extremity no edema  Neurologic grossly intact  Pulses +2 distally equal and symmetric      Hospital Laboratory on 04/16/2021   Component Date Value Ref Range Status     COVID-19 Virus PCR to [facility] - So* 04/16/2021 Nasopharyngeal   Final     COVID-19 Virus PCR to [facility] - Re* 04/16/2021 Test received-See reflex to [facility] test SARS CoV2 (COVID-19) Virus RT-PCR   Final     SARS-CoV-2 Virus Specimen Source 04/16/2021 Nasopharyngeal   Final     SARS-CoV-2 PCR Result 04/16/2021 NEGATIVE   Final    SARS-CoV2 (COVID-19) RNA not detected, presumed negative.     SARS-CoV-2 PCR Comment 04/16/2021    Final                    Value:Testing was performed using the GPalima SARS-CoV-2 Assay on the [employer] Instrument System.   Additional information about this Emergency Use Authorization (EUA) assay can be found via   the Lab Guide.      Comment: This test should be ordered for the detection of SARS-CoV-2 in individuals who   meet SARS-CoV-2 clinical and/or epidemiological criteria. Test performance is   unknown in asymptomatic patients.  This test is for in vitro diagnostic use under the FDA EUA for laboratories   certified under CLIA to perform high complexity testing. This test has not   been FDA cleared or approved.  A negative result does not rule out the presence of PCR inhibitors in the   specimen or target RNA in concentration below the limit of detection for the   assay. The possibility of a false negative should be considered if the   patient's recent exposure or clinical presentation suggests COVID-19.  This test was validated by the [facility]   Diagnostic Laboratory. This  laboratory is certified under the Clinical   Laboratory Improvement Amendments of 1988 (CLIA-88) as qualified to perform   high complexity laboratory testing.

## 2021-04-29 ENCOUNTER — TELEPHONE (OUTPATIENT)
Dept: FAMILY MEDICINE | Facility: CLINIC | Age: 86
End: 2021-04-29

## 2021-04-29 NOTE — TELEPHONE ENCOUNTER
[facility] now requests orders and shares plan of care/discharge summaries for some patients through [facility].  Please REPLY TO THIS MESSAGE OR ROUTE BACK TO THE AUTHOR in order to give authorization for orders when needed.  This is considered a verbal order, you will still receive a faxed copy of orders for signature.  Thank you for your assistance in improving collaboration for our patients.    ORDER: Home Lymphedema Therapy for R LE swelling.     Ayla Tanner, PT, DPT  [email or web address]  [phone]

## 2021-05-03 ENCOUNTER — TELEPHONE (OUTPATIENT)
Dept: FAMILY MEDICINE | Facility: CLINIC | Age: 86
End: 2021-05-03

## 2021-05-03 DIAGNOSIS — E11.59 TYPE 2 DIABETES MELLITUS WITH VASCULAR DISEASE (H): ICD-10-CM

## 2021-05-03 RX ORDER — METFORMIN HCL 500 MG
TABLET, EXTENDED RELEASE 24 HR ORAL
Qty: 180 TABLET | Refills: 1 | Status: SHIPPED | OUTPATIENT
Start: 2021-05-03 | End: 2021-05-26

## 2021-05-03 RX ORDER — METFORMIN HCL 500 MG
TABLET, EXTENDED RELEASE 24 HR ORAL
Qty: 60 TABLET | Refills: 0 | Status: SHIPPED | OUTPATIENT
Start: 2021-05-03 | End: 2021-05-03

## 2021-05-03 NOTE — TELEPHONE ENCOUNTER
[facility] now requests orders and shares plan of care/discharge summaries for some patients through [facility].  Please REPLY TO THIS MESSAGE OR ROUTE BACK TO THE AUTHOR in order to give authorization for orders when needed.  This is considered a verbal order, you will still receive a faxed copy of orders for signature.  Thank you for your assistance in improving collaboration for our patients.    ORDER  Requesting orders to extend order for OT lymphedema therapy to eval and treat per pt request. Current order expires 5/8/2021, OT lymphedema assessment scheduled for 5/10/2021.

## 2021-05-03 NOTE — TELEPHONE ENCOUNTER
Pt's wife called the clinic stating pt has been out of metformin 500 mg since Sunday. BG is 190. Advised to stay off sugary foods until med is refilled.     30 day supply sent to [facility].   90 day +1 refill sent to [facility].

## 2021-05-03 NOTE — TELEPHONE ENCOUNTER
Next 5 appointments (look out 90 days)    May 14, 2021  3:00 PM  Office Visit with Ady Bucio MD  [facility] ([facility] ) [address] [facility] [address]  [phone]        Ok for below orders

## 2021-05-09 DIAGNOSIS — I50.31 ACUTE DIASTOLIC HEART FAILURE (H): ICD-10-CM

## 2021-05-10 ENCOUNTER — TELEPHONE (OUTPATIENT)
Dept: FAMILY MEDICINE | Facility: CLINIC | Age: 86
End: 2021-05-10

## 2021-05-10 RX ORDER — FUROSEMIDE 40 MG
TABLET ORAL
Qty: 30 TABLET | Refills: 0 | Status: SHIPPED | OUTPATIENT
Start: 2021-05-10 | End: 2021-06-16

## 2021-05-10 NOTE — TELEPHONE ENCOUNTER
[facility] now requests orders and shares plan of care/discharge summaries for some patients through [employer].  Please REPLY TO THIS MESSAGE OR ROUTE BACK TO THE AUTHOR in order to give authorization for orders when needed.  This is considered a verbal order, you will still receive a faxed copy of orders for signature.  Thank you for your assistance in improving collaboration for our patients.    ORDER  Requesting orders for OT lymphedema therapy 2x/week x 1 week, 3x/week x 1 week, 2x/week x 2 weeks to include gradient compression bandaging, manual lymphatic drainage as tolerated, exercises to facilitate lymphatic flow, fitting for appropriate compression garments, and pt/caregiver education.

## 2021-05-11 ENCOUNTER — MEDICAL CORRESPONDENCE (OUTPATIENT)
Dept: HEALTH INFORMATION MANAGEMENT | Facility: CLINIC | Age: 86
End: 2021-05-11

## 2021-05-12 DIAGNOSIS — I50.31 ACUTE DIASTOLIC HEART FAILURE (H): ICD-10-CM

## 2021-05-12 RX ORDER — POTASSIUM CHLORIDE 1.5 G/1.58G
POWDER, FOR SOLUTION ORAL
Qty: 60 PACKET | Refills: 11 | Status: SHIPPED | OUTPATIENT
Start: 2021-05-12 | End: 2021-06-16

## 2021-05-13 ENCOUNTER — MYC MEDICAL ADVICE (OUTPATIENT)
Dept: FAMILY MEDICINE | Facility: CLINIC | Age: 86
End: 2021-05-13

## 2021-05-13 DIAGNOSIS — G47.33 OSA (OBSTRUCTIVE SLEEP APNEA): Primary | ICD-10-CM

## 2021-05-13 NOTE — TELEPHONE ENCOUNTER
Doctor Ayesha Please advice should appt be scheduled for Monday ? Or ok to keep appt 05/14?    Appointment was changed as requested by PCP. Will have to call pt to verify he will be able to keep appt.

## 2021-05-14 ENCOUNTER — OFFICE VISIT (OUTPATIENT)
Dept: FAMILY MEDICINE | Facility: CLINIC | Age: 86
End: 2021-05-14
Payer: COMMERCIAL

## 2021-05-14 VITALS
DIASTOLIC BLOOD PRESSURE: 72 MMHG | SYSTOLIC BLOOD PRESSURE: 120 MMHG | HEART RATE: 68 BPM | OXYGEN SATURATION: 97 % | TEMPERATURE: 97.5 F | WEIGHT: 281 LBS | BODY MASS INDEX: 35.12 KG/M2

## 2021-05-14 DIAGNOSIS — M54.42 CHRONIC LOW BACK PAIN WITH BILATERAL SCIATICA, UNSPECIFIED BACK PAIN LATERALITY: ICD-10-CM

## 2021-05-14 DIAGNOSIS — G89.29 CHRONIC LOW BACK PAIN WITH BILATERAL SCIATICA, UNSPECIFIED BACK PAIN LATERALITY: ICD-10-CM

## 2021-05-14 DIAGNOSIS — E11.59 TYPE 2 DIABETES MELLITUS WITH VASCULAR DISEASE (H): ICD-10-CM

## 2021-05-14 DIAGNOSIS — I10 ESSENTIAL HYPERTENSION, BENIGN: ICD-10-CM

## 2021-05-14 DIAGNOSIS — Z23 NEED FOR VACCINATION: ICD-10-CM

## 2021-05-14 DIAGNOSIS — I50.32 CHRONIC DIASTOLIC CONGESTIVE HEART FAILURE (H): ICD-10-CM

## 2021-05-14 DIAGNOSIS — R25.1 TREMOR: ICD-10-CM

## 2021-05-14 DIAGNOSIS — E78.5 HYPERLIPIDEMIA LDL GOAL <70: ICD-10-CM

## 2021-05-14 DIAGNOSIS — I89.0 LYMPHEDEMA OF EXTREMITY: ICD-10-CM

## 2021-05-14 DIAGNOSIS — L03.119 CELLULITIS OF LOWER EXTREMITY, UNSPECIFIED LATERALITY: Primary | ICD-10-CM

## 2021-05-14 DIAGNOSIS — R13.10 DYSPHAGIA, UNSPECIFIED TYPE: ICD-10-CM

## 2021-05-14 DIAGNOSIS — M54.41 CHRONIC LOW BACK PAIN WITH BILATERAL SCIATICA, UNSPECIFIED BACK PAIN LATERALITY: ICD-10-CM

## 2021-05-14 DIAGNOSIS — E66.01 MORBID OBESITY (H): ICD-10-CM

## 2021-05-14 PROCEDURE — G0009 ADMIN PNEUMOCOCCAL VACCINE: HCPCS | Performed by: INTERNAL MEDICINE

## 2021-05-14 PROCEDURE — 99215 OFFICE O/P EST HI 40 MIN: CPT | Mod: 25 | Performed by: INTERNAL MEDICINE

## 2021-05-14 PROCEDURE — 90732 PPSV23 VACC 2 YRS+ SUBQ/IM: CPT | Performed by: INTERNAL MEDICINE

## 2021-05-14 NOTE — NURSING NOTE
Prior to immunization administration, verified patients identity using patient s name and date of birth. Please see Immunization Activity for additional information.     Screening Questionnaire for Adult Immunization    Are you sick today?   No   Do you have allergies to medications, food, a vaccine component or latex?   No   Have you ever had a serious reaction after receiving a vaccination?   No   Do you have a long-term health problem with heart, lung, kidney, or metabolic disease (e.g., diabetes), asthma, a blood disorder, no spleen, complement component deficiency, a cochlear implant, or a spinal fluid leak?  Are you on long-term aspirin therapy?   Yes   Do you have cancer, leukemia, HIV/AIDS, or any other immune system problem?   No   Do you have a parent, brother, or sister with an immune system problem?   No   In the past 3 months, have you taken medications that affect  your immune system, such as prednisone, other steroids, or anticancer drugs; drugs for the treatment of rheumatoid arthritis, Crohn s disease, or psoriasis; or have you had radiation treatments?   No   Have you had a seizure, or a brain or other nervous system problem?   No   During the past year, have you received a transfusion of blood or blood    products, or been given immune (gamma) globulin or antiviral drug?   No   For women: Are you pregnant or is there a chance you could become       pregnant during the next month?   No   Have you received any vaccinations in the past 4 weeks?   No     Immunization questionnaire answers were all negative.        Per orders of Dr. Hodge, injection of Pneumovax given by Bell Santamaria CMA. Patient instructed to remain in clinic for 15 minutes afterwards, and to report any adverse reaction to me immediately.       Screening performed by Bell Santamaria CMA on 5/14/2021 at 12:12 PM.

## 2021-05-14 NOTE — PROGRESS NOTES
Assessment & Plan     Cellulitis of lower extremity, unspecified laterality  This is very concerning and 86 years old who got septicemic on cellulitis related to lymphedema  Last year he had pneumonia and the year prior he had leg infection  If this leg edema does not improve penicillin prophylaxis will be considered  I will also check the ABIs  - REVIEW OF HEALTH MAINTENANCE PROTOCOL ORDERS  - LYMPHEDEMA THERAPY REFERRAL  - US KOFI Doppler No Exercise    Chronic diastolic congestive heart failure (H)  Patient is on Lasix and ACE inhibitor's and we need to continue underlying cause control for treatment of CHF  His coronary artery disease and ejection fraction are stable    Morbid obesity (H)  Weight loss will help with lymphedema    Lymphedema of extremity  Lymphedema therapy will be arranged and we will also check the circulation  I do not think Lasix is helping much here and I will taper off  - US KOFI Doppler No Exercise    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)    - REVIEW OF HEALTH MAINTENANCE PROTOCOL ORDERS  - US KOFI Doppler No Exercise    Chronic low back pain with bilateral sciatica, unspecified back pain laterality  Patient has sacroiliitis as well but pain is at present stable and he follows up with pain medicine  Patient is on gabapentin for pain control and Voltaren gel topically  Dysphagia, unspecified type  No symptoms at present    Hyperlipidemia LDL goal <70  We will continue statins because of coronary artery disease  - REVIEW OF HEALTH MAINTENANCE PROTOCOL ORDERS    Essential hypertension, benign  His blood pressure at times is low at home and we will continue with lisinopril and hold Lasix when his blood pressure goes down      - REVIEW OF HEALTH MAINTENANCE PROTOCOL ORDERS    Tremor  This is benign essential tremor and not parkinsonian but we will need to address it because it is getting worse and his handwriting is getting worse    Need for vaccination    - Pneumococcal vaccine  23 valent PPSV23  (Pneumovax) [40676]  - ADMIN MEDICARE: Pneumococcal Vaccine ()      Ordering of each unique test  60 minutes spent on the date of the encounter doing chart review, history and exam, documentation and further activities per the note    I also reviewed the hospital records      Return in about 4 weeks (around 6/11/2021) for leg edema 10.30 6/21.    Hamzah Hodge MD  [facility] ASHLEY Moya is a 86 year old who presents for the following health issues     HPI     Chief Complaint   Patient presents with     RECHECK     cellulitis   Patient is accompanied by his wife  He is in home physical therapy  He was admitted to hospital last month with severe sepsis  He also had respiratory failure and CHF  This was related to diastolic dysfunction  I have reviewed detailed notes and hospital summary as below  His edema has continued  Blood sugars and blood pressures are excellent at home and he keeps a good record  But his handwriting is getting worse  And at times he has low blood pressure    .  Severe sepsis secondary to right lower extremity cellulitis:   Lactic acidosis secondary to the above  at 6.4   -Admitted for lower extremity cellulitis on right lower extremity.  Patient was first initiated on vancomycin and Zosyn, changed to Ceftin on 4/4, patient to complete 10-day course.  One out of 2  blood cultures came back positive for coag negative staph, possibly contaminant.  Currently white count is back to baseline, patient is off of IV fluids, continue lymphedema therapy.        Acute hypoxic respiratory failure secondary to fluid overload  Acute on chronic diastolic congestive heart failure exacerbation;  Patient became more short of breath and hypoxic overnight needing BiPAP RRT was called Chest x-ray was done showed bilateral infiltrates consistent with pulmonary edema, echocardiogram was obtained on 4/5 which showed grade 3 diastolic dysfunction, patient was  aggressively diuresed with IV Lasix, he had almost 10.5 L output, patient is currently on room air plan is to continue p.o. Lasix and potassium supplementation, need to follow on intake output, daily weights and fluid status.  Following hydration his creatinine remained stable.  Patient was noted to have significant generalized weakness, seen by PT/OT, TCU was recommended.  Rest of his medical problems including CAD, diabetes type 2, PENELOPE stable during his stay here.        Hypertension:  He is PTA on amlodipine 5 mg b.i.d. and lisinopril 20 mg daily.  Amlodipine was on hold during this admission at a lower dose, lisinopril was started at a lower dose of 2.5 mg., when the dose was increased to 20 mg,  his blood pressures dropped, will reduce lisinopril dose to 5 mg.   Ace wraps ordered to help with lower extremity edema  Rest of the medical problems as below remained stable.   Gastroesophageal reflux disease:  On Prilosec.  I will continue with that.   Chronic back pain:  On Neurontin 900 mg 3 times a day.  I will continue with that.   Hyperlipidemia:  On Pravachol.  We will keep him on that.    per Patience Meza MD      Review of Systems   10 point ROS of systems including Constitutional, Eyes, Respiratory, Cardiovascular, Gastroenterology, Genitourinary, Integumentary, Muscularskeletal, Psychiatric were all negative except for pertinent positives noted in my HPI.        Objective    /72 (BP Location: Right arm, Cuff Size: Adult Large)   Pulse 68   Temp 97.5  F (36.4  C) (Oral)   Wt 127.5 kg (281 lb)   SpO2 97%   BMI 35.12 kg/m    Body mass index is 35.12 kg/m .  Physical Exam   GENERAL: healthy, alert and no distress  NECK: no adenopathy, no asymmetry, masses, or scars and thyroid normal to palpation  RESP: lungs clear to auscultation - no rales, rhonchi or wheezes  CV: regular rate and rhythm, normal S1 S2, no S3 or S4, no murmur, click or rub, no peripheral edema and peripheral pulses  strong  ABDOMEN: soft, nontender, no hepatosplenomegaly, no masses and bowel sounds normal  MS: no gross musculoskeletal defects noted, he has chronic tenderness in the lower back    Marked edema of the legs with lymphedema is observed    Psychiatric exam reveals very pleasant with anxiety    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     Cervicalgia     Benign neoplasm of skin of other and unspecified parts of face     Impacted cerumen     Infected sebaceous cyst     Anxiety     Chronic low back pain     Advanced directives, counseling/discussion     Lumbosacral radiculitis     PENELOPE (obstructive sleep apnea)     Low HDL (under 40)     Mixed hyperlipidemia     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)     S/P lumbar laminectomy     Health Care Home     Coronary artery disease involving native coronary artery of native heart without angina pectoris     Left ventricular diastolic dysfunction     Ascending aorta dilatation (H)     Aortic root dilatation (H)     Non morbid obesity, unspecified obesity type     Neck pain     Cellulitis of right lower extremity     Hypoxia     Shortness of breath     Cellulitis     Gastroesophageal reflux disease with esophagitis     Morbid obesity (H)     Current Outpatient Medications   Medication     acetaminophen (ACETAMIN) 500 MG tablet     amLODIPine (NORVASC) 5 MG tablet     aspirin 81 MG EC tablet     blood glucose (ONETOUCH ULTRA) test strip     blood glucose monitoring (ONE TOUCH ULTRASOFT) lancets     Cholecalciferol (VITAMIN D) 2000 UNIT tablet     diclofenac (VOLTAREN) 1 % topical gel     finasteride (PROSCAR) 5 MG tablet     furosemide (LASIX) 40 MG tablet     gabapentin (NEURONTIN) 300 MG capsule     lisinopril (ZESTRIL) 20 MG tablet     metFORMIN (GLUCOPHAGE-XR) 500 MG 24 hr tablet     omeprazole  (PRILOSEC) 20 MG DR capsule     ORDER FOR DME     potassium chloride (KLOR-CON) 20 MEQ packet     pravastatin (PRAVACHOL) 20 MG tablet     tamsulosin (FLOMAX) 0.4 MG capsule     vitamin B-12 (CYANOCOBALAMIN) 1000 MCG tablet     No current facility-administered medications for this visit.

## 2021-05-14 NOTE — TELEPHONE ENCOUNTER
Patient arrived to  appointment today with Dr. Hodge on 5/14/21.    Richie Vasquez RN  [facility]

## 2021-05-21 ENCOUNTER — HOSPITAL ENCOUNTER (OUTPATIENT)
Dept: ULTRASOUND IMAGING | Facility: CLINIC | Age: 86
Discharge: HOME | End: 2021-05-21
Attending: INTERNAL MEDICINE | Admitting: INTERNAL MEDICINE
Payer: COMMERCIAL

## 2021-05-21 DIAGNOSIS — L03.119 CELLULITIS OF LOWER EXTREMITY, UNSPECIFIED LATERALITY: ICD-10-CM

## 2021-05-21 DIAGNOSIS — E11.59 TYPE 2 DIABETES MELLITUS WITH VASCULAR DISEASE (H): ICD-10-CM

## 2021-05-21 DIAGNOSIS — I89.0 LYMPHEDEMA OF EXTREMITY: ICD-10-CM

## 2021-05-21 PROCEDURE — 93922 UPR/L XTREMITY ART 2 LEVELS: CPT

## 2021-05-25 ENCOUNTER — OFFICE VISIT (OUTPATIENT)
Dept: PODIATRY | Facility: CLINIC | Age: 86
End: 2021-05-25
Attending: INTERNAL MEDICINE
Payer: COMMERCIAL

## 2021-05-25 VITALS
WEIGHT: 276 LBS | SYSTOLIC BLOOD PRESSURE: 102 MMHG | DIASTOLIC BLOOD PRESSURE: 66 MMHG | HEIGHT: 75 IN | BODY MASS INDEX: 34.32 KG/M2

## 2021-05-25 DIAGNOSIS — L60.8 CHANGE IN NAIL APPEARANCE: Primary | ICD-10-CM

## 2021-05-25 DIAGNOSIS — L60.3 NAIL DYSTROPHY: ICD-10-CM

## 2021-05-25 DIAGNOSIS — E11.59 TYPE 2 DIABETES MELLITUS WITH VASCULAR DISEASE (H): ICD-10-CM

## 2021-05-25 PROCEDURE — 99203 OFFICE O/P NEW LOW 30 MIN: CPT | Performed by: PODIATRIST

## 2021-05-25 ASSESSMENT — MIFFLIN-ST. JEOR: SCORE: 2022.56

## 2021-05-25 NOTE — LETTER
5/25/2021         RE: Austen Fowler  [address]  [facility] [address]        Dear Colleague,    Thank you for referring your patient, Austen Fowler, to the [facility]. Please see a copy of my visit note below.    ASSESSMENT:  Encounter Diagnoses   Name Primary?     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  I suspect that there is also fungal involvement.    It was explained that many people opt to simply keep the involved nails trimmed and filed. [facility] does not offer this service.  Another option is a trial of a topical antifungal. Many times these are not successful nor provide a cure.  These medications do not correct a nail deformity.      Permanent removal of deformed toenails is an option.      I explained to him that I think his toenails have likely changed to a point that a topical antifungal will not help.  Although it does not hurt to try.  Given his polypharmacy, I do not recommend an oral antifungal.    Using a nail nippers, I debrided his toenails today.  Thickness nails were skived down to debulk.  There is no bleeding.  I did not charge for this today, explained that this might be an out-of-pocket expense and that [facility] typically does not offer routine foot care.    He was given a list of alternatives for foot care.    We discussed the importance of daily monitoring of his feet.  I referred him for diabetic multidensity orthotics and extra-depth shoes.      Disclaimer: This note consists of symbols derived from keyboarding, dictation and/or voice recognition software. As a result, there may be errors in the script that have gone undetected. Please consider this when interpreting information found in this chart.    Lazaro Lilly DPM,  LANIE, MS [facility] of Podiatry/Foot & Ankle Surgery      ____________________________________________________________________    HPI:       I was asked by Ady Bucio MD  to evaluate Austen Fowler in consultation for nail dystrophy.     Chief Complaint: toenails bilateral foot  Inquires about having them trimmed  Onset of problem: months  Occasional pain, more recently associated with his left hallux nail  Pain Ratin/10 at worst   Type 2 DM  Last Hgb A1C = 6.4  Recently hospitalized in early April for treatment of cellulitis or right lower extremity with grade 3 diastolic dysfunction, acute on chronic with exacerbation.    He uses bilateral LE compression wraps.      Past Medical History:   Diagnosis Date     Anxiety state, unspecified      Aortic root dilatation (H)      Arthritis      Ascending aorta dilatation (H)      Basal cell cancer     s/p Mohs nose and bridge of nose on R.     Bunion      CAD (coronary artery disease)     CABG : LIMA to LAD, SANDI to RCA, SVG to OM1 and OM2     Contact dermatitis and other eczema, due to unspecified cause     eczema - has light treatments with Dr. Rivera     Disorders of bursae and tendons in shoulder region, unspecified      Esophageal reflux      Essential hypertension, benign      Gallbladder disease      GERD (gastroesophageal reflux disease)      Heart attack (H)      Hyperlipidemia LDL goal <70      Left ventricular diastolic dysfunction      Low HDL (under 40) 3/28/2014     Nasal/sinus dis NEC     s/p surgery in      Obesity      Osteoarthrosis, unspecified whether generalized or localized, shoulder region      Other hammer toe (acquired)      Sleep apnea     USES CPAP     Spinal stenosis, unspecified region other than cervical     MRI done      Type 2 diabetes, HbA1c goal < 7% (H) 3/12/2015     Urge incontinence    *  *  Past Surgical History:   Procedure Laterality Date     ABDOMEN SURGERY      gall bladder      BIOPSY      arms     C CABG, ARTERY-VEIN, FOUR  11/1992    CABG - LIMA to left anterior descending and saphenous vein bypass graft to the first and second obtuse marginal branch of the circumflex and the right mammary to the right coronary artery     CHOLECYSTECTOMY  6/1994     COLONOSCOPY N/A 4/11/2017    Procedure: COMBINED COLONOSCOPY, SINGLE OR MULTIPLE BIOPSY/POLYPECTOMY BY BIOPSY;  Surgeon: Bladimir Garner MD;  Location:  GI     CV LEFT HEART CATH  5-92, 6-92    Angioplasty     ENT SURGERY  5/1995    sinus surgery     ESOPHAGOSCOPY, GASTROSCOPY, DUODENOSCOPY (EGD), DILATATION, COMBINED  7/17/2014    Procedure: COMBINED ESOPHAGOSCOPY, GASTROSCOPY, DUODENOSCOPY (EGD), DILATATION;  Surgeon: Bladimir Garner MD;  Location:  GI     EYE SURGERY      cataracts removed both eyes     HC COLONOSCOPY THRU STOMA W BIOPSY/CAUTERY TUMOR/POLYP/LESION  5/2007     INJECT EPIDURAL LUMBAR       LAMINECTOMY LUMBAR TWO LEVELS N/A 4/29/2015    Procedure: LAMINECTOMY LUMBAR TWO LEVELS;  Surgeon: Bladimir Keenan MD;  Location:  OR     THORACIC SURGERY  11/1992    bypass     [facility] NONSPECIFIC PROCEDURE  6-94    Gail lap     [facility] NONSPECIFIC PROCEDURE  1995    sinus surgery     [facility] NONSPECIFIC PROCEDURE      bilateral cataract surgery   *  *  Current Outpatient Medications   Medication Sig Dispense Refill     acetaminophen (ACETAMIN) 500 MG tablet Take 1,000 mg by mouth 2 times daily        amLODIPine (NORVASC) 5 MG tablet TAKE 1 TABLET BY MOUTH TWO  TIMES DAILY 60 tablet 0     aspirin 81 MG EC tablet Take 81 mg by mouth daily        blood glucose (ONETOUCH ULTRA) test strip Use to test blood sugar 2 times daily or as directed.  Due for appointment. Please schedule: [phone] 200 strip 0     blood glucose monitoring (ONE TOUCH ULTRASOFT) lancets USE TO TEST BLOOD SUGAR 2  TIMES DAILY OR AS DIRECTED. 200 each 3     Cholecalciferol (VITAMIN D) 2000 UNIT tablet Take 2,000 Units by mouth daily. 90 tablet 3     diclofenac  "(VOLTAREN) 1 % topical gel Place 4 g onto the skin 4 times daily as needed for moderate pain (Apply to back to neck.)       finasteride (PROSCAR) 5 MG tablet Take 1 tablet (5 mg) by mouth daily - due for fasting physical with Dr Hodge for refills 90 tablet 0     furosemide (LASIX) 40 MG tablet TAKE 1 TABLET BY MOUTH EVERY DAY 30 tablet 0     gabapentin (NEURONTIN) 300 MG capsule Take 3 capsules (900 mg) by mouth 3 times daily - Fasting physical due for further refills 270 capsule 1     lisinopril (ZESTRIL) 20 MG tablet Take 0.5 tablets (10 mg) by mouth daily 15 tablet 0     metFORMIN (GLUCOPHAGE-XR) 500 MG 24 hr tablet TAKE 2 TABLETS BY MOUTH TWO TIMES DAILY WITH MEALS 180 tablet 1     omeprazole (PRILOSEC) 20 MG DR capsule TAKE 1 CAPSULE BY MOUTH  DAILY 90 capsule 2     ORDER FOR DME Uses Cpap machine for sleep apnea       potassium chloride (KLOR-CON) 20 MEQ packet **MIX AS DIRECTED ** USE 1 PACKET TWICE A DAY 60 packet 11     pravastatin (PRAVACHOL) 20 MG tablet Take 1 tablet (20 mg) by mouth daily - due for fasting physical with Dr Hodge for refills 90 tablet 0     tamsulosin (FLOMAX) 0.4 MG capsule Take 1 capsule (0.4 mg) by mouth daily 30 capsule 0     vitamin B-12 (CYANOCOBALAMIN) 1000 MCG tablet Take 1,000 mcg by mouth every other day            EXAM:    Vitals: /66   Ht 1.905 m (6' 3\")   Wt 125.2 kg (276 lb)   BMI 34.50 kg/m    BMI: Body mass index is 34.5 kg/m .    Constitutional:  Austen Fowler is in no apparent distress, appears well-nourished.  Cooperative with history and physical exam.    Diabetic Foot Exam (details below):     Vascular:  Pedal pulses are faintly palpable palpable for both the DP and PT arteries.  Difficult to palpate due to bilateral edema.     Neuro: Light touch sensation is intact to the L4, L5, S1 distributions  No evidence of weakness, spasticity, or contracture in the lower extremities.   Protective sensation is intact, bilateral foot, per [place] " Monofilament testing.    Derm: Normal texture and turgor.  No erythema, ecchymosis, or cyanosis.  No open lesions.     All 10 toenails are dystrophic, discolored, some deformed and multiple nails elongated.    Musculoskeletal:    Lower extremity muscle strength is normal. No gross deformities.              Again, thank you for allowing me to participate in the care of your patient.        Sincerely,        Lazaro Lilly DPM

## 2021-05-25 NOTE — PROGRESS NOTES
ASSESSMENT:  Encounter Diagnoses   Name Primary?     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  I suspect that there is also fungal involvement.    It was explained that many people opt to simply keep the involved nails trimmed and filed. [facility] does not offer this service.  Another option is a trial of a topical antifungal. Many times these are not successful nor provide a cure.  These medications do not correct a nail deformity.      Permanent removal of deformed toenails is an option.      I explained to him that I think his toenails have likely changed to a point that a topical antifungal will not help.  Although it does not hurt to try.  Given his polypharmacy, I do not recommend an oral antifungal.    Using a nail nippers, I debrided his toenails today.  Thickness nails were skived down to debulk.  There is no bleeding.  I did not charge for this today, explained that this might be an out-of-pocket expense and that [facility] typically does not offer routine foot care.    He was given a list of alternatives for foot care.    We discussed the importance of daily monitoring of his feet.  I referred him for diabetic multidensity orthotics and extra-depth shoes.      Disclaimer: This note consists of symbols derived from keyboarding, dictation and/or voice recognition software. As a result, there may be errors in the script that have gone undetected. Please consider this when interpreting information found in this chart.    Lazaro Lilly DPM, FACFAS, MS    [facility] of Podiatry/Foot & Ankle Surgery      ____________________________________________________________________    HPI:       I was asked by Ady Bucio MD  to evaluate Austen Fowler in consultation for nail dystrophy.     Chief  Complaint: toenails bilateral foot  Inquires about having them trimmed  Onset of problem: months  Occasional pain, more recently associated with his left hallux nail  Pain Ratin/10 at worst   Type 2 DM  Last Hgb A1C = 6.4  Recently hospitalized in early April for treatment of cellulitis or right lower extremity with grade 3 diastolic dysfunction, acute on chronic with exacerbation.    He uses bilateral LE compression wraps.      Past Medical History:   Diagnosis Date     Anxiety state, unspecified      Aortic root dilatation (H)      Arthritis      Ascending aorta dilatation (H)      Basal cell cancer     s/p Mohs nose and bridge of nose on R.     Bunion      CAD (coronary artery disease)     CABG : LIMA to LAD, SANDI to RCA, SVG to OM1 and OM2     Contact dermatitis and other eczema, due to unspecified cause     eczema - has light treatments with Dr. Rivera     Disorders of bursae and tendons in shoulder region, unspecified      Esophageal reflux      Essential hypertension, benign      Gallbladder disease      GERD (gastroesophageal reflux disease)      Heart attack (H)      Hyperlipidemia LDL goal <70      Left ventricular diastolic dysfunction      Low HDL (under 40) 3/28/2014     Nasal/sinus dis NEC     s/p surgery in      Obesity      Osteoarthrosis, unspecified whether generalized or localized, shoulder region      Other hammer toe (acquired)      Sleep apnea     USES CPAP     Spinal stenosis, unspecified region other than cervical     MRI done      Type 2 diabetes, HbA1c goal < 7% (H) 3/12/2015     Urge incontinence    *  *  Past Surgical History:   Procedure Laterality Date     ABDOMEN SURGERY      gall bladder     BIOPSY      arms     C CABG, ARTERY-VEIN, FOUR  1992    CABG - LIMA to left anterior descending and saphenous vein bypass graft to the first and second obtuse marginal branch of the circumflex and the right mammary to the right coronary artery     CHOLECYSTECTOMY  1994      COLONOSCOPY N/A 4/11/2017    Procedure: COMBINED COLONOSCOPY, SINGLE OR MULTIPLE BIOPSY/POLYPECTOMY BY BIOPSY;  Surgeon: Bladimir Garner MD;  Location:  GI     CV LEFT HEART CATH  5-92, 6-92    Angioplasty     ENT SURGERY  5/1995    sinus surgery     ESOPHAGOSCOPY, GASTROSCOPY, DUODENOSCOPY (EGD), DILATATION, COMBINED  7/17/2014    Procedure: COMBINED ESOPHAGOSCOPY, GASTROSCOPY, DUODENOSCOPY (EGD), DILATATION;  Surgeon: Bladimir Garner MD;  Location:  GI     EYE SURGERY      cataracts removed both eyes     HC COLONOSCOPY THRU STOMA W BIOPSY/CAUTERY TUMOR/POLYP/LESION  5/2007     INJECT EPIDURAL LUMBAR       LAMINECTOMY LUMBAR TWO LEVELS N/A 4/29/2015    Procedure: LAMINECTOMY LUMBAR TWO LEVELS;  Surgeon: Bladimir Keenan MD;  Location:  OR     THORACIC SURGERY  11/1992    bypass     [facility] NONSPECIFIC PROCEDURE  6-94    Gail lap     [email or web address] NONSPECIFIC PROCEDURE  1995    sinus surgery     [facility] [[email or web address] NONSPECIFIC PROCEDURE      bilateral cataract surgery   *  *  Current Outpatient Medications   Medication Sig Dispense Refill     acetaminophen (ACETAMIN) 500 MG tablet Take 1,000 mg by mouth 2 times daily        amLODIPine (NORVASC) 5 MG tablet TAKE 1 TABLET BY MOUTH TWO  TIMES DAILY 60 tablet 0     aspirin 81 MG EC tablet Take 81 mg by mouth daily        blood glucose (ONETOUCH ULTRA) test strip Use to test blood sugar 2 times daily or as directed.  Due for appointment. Please schedule: [phone] 200 strip 0     blood glucose monitoring (ONE TOUCH ULTRASOFT) lancets USE TO TEST BLOOD SUGAR 2  TIMES DAILY OR AS DIRECTED. 200 each 3     Cholecalciferol (VITAMIN D) 2000 UNIT tablet Take 2,000 Units by mouth daily. 90 tablet 3     diclofenac (VOLTAREN) 1 % topical gel Place 4 g onto the skin 4 times daily as needed for moderate pain (Apply to back to neck.)       finasteride (PROSCAR) 5 MG tablet Take 1 tablet (5 mg) by mouth daily - due for fasting physical with Dr Hodge for refills 90 tablet 0     furosemide (LASIX)  "40 MG tablet TAKE 1 TABLET BY MOUTH EVERY DAY 30 tablet 0     gabapentin (NEURONTIN) 300 MG capsule Take 3 capsules (900 mg) by mouth 3 times daily - Fasting physical due for further refills 270 capsule 1     lisinopril (ZESTRIL) 20 MG tablet Take 0.5 tablets (10 mg) by mouth daily 15 tablet 0     metFORMIN (GLUCOPHAGE-XR) 500 MG 24 hr tablet TAKE 2 TABLETS BY MOUTH TWO TIMES DAILY WITH MEALS 180 tablet 1     omeprazole (PRILOSEC) 20 MG DR capsule TAKE 1 CAPSULE BY MOUTH  DAILY 90 capsule 2     ORDER FOR DME Uses Cpap machine for sleep apnea       potassium chloride (KLOR-CON) 20 MEQ packet **MIX AS DIRECTED ** USE 1 PACKET TWICE A DAY 60 packet 11     pravastatin (PRAVACHOL) 20 MG tablet Take 1 tablet (20 mg) by mouth daily - due for fasting physical with Dr Hodge for refills 90 tablet 0     tamsulosin (FLOMAX) 0.4 MG capsule Take 1 capsule (0.4 mg) by mouth daily 30 capsule 0     vitamin B-12 (CYANOCOBALAMIN) 1000 MCG tablet Take 1,000 mcg by mouth every other day            EXAM:    Vitals: /66   Ht 1.905 m (6' 3\")   Wt 125.2 kg (276 lb)   BMI 34.50 kg/m    BMI: Body mass index is 34.5 kg/m .    Constitutional:  Austen Fowler is in no apparent distress, appears well-nourished.  Cooperative with history and physical exam.    Diabetic Foot Exam (details below):     Vascular:  Pedal pulses are faintly palpable palpable for both the DP and PT arteries.  Difficult to palpate due to bilateral edema.     Neuro: Light touch sensation is intact to the L4, L5, S1 distributions  No evidence of weakness, spasticity, or contracture in the lower extremities.   Protective sensation is intact, bilateral foot, per [place] Monofilament testing.    Derm: Normal texture and turgor.  No erythema, ecchymosis, or cyanosis.  No open lesions.     All 10 toenails are dystrophic, discolored, some deformed and multiple nails elongated.    Musculoskeletal:    Lower extremity muscle strength is normal. No gross " deformities.

## 2021-05-25 NOTE — PATIENT INSTRUCTIONS
"Thank you for choosing [facility]iat[facility] / Foot & Ankle Surgery!    DR. CÁRDENAS'S CLINIC LOCATIONS     [facility] SCHEDULE SURGERY: [phone]   [address] APPOINTMENTS: [phone]   [place] [address] BILLING QUESTIONS: [phone]  [phone] RADIOLOGY: [phone]       [place]    [address]    [place] [address] [phone]  [phone]      Follow up: 1 year or sooner with acute issues    ROUTINE FOOT CARE (NAIL TRIMMING / CALLUSES)    Go to afcna.org ([employer]) and search for providers near you.  Otherwise, this is a list we have gathered of  recommended locations/providers in [place].    [facility]   [phone]   [facility]  [phone]  [email or web address]   [facility]  [phone]  [facility] 15 [address] radius [facility]  [phone]  [facility].   Zulma Dawson, DPM  [address] [place] [address]  [phone] Sean Voss, DPM  [address] [place] [address] [phone]   [facility]  [phone] [address] [place] [address]  [facility]  Dr. Derrick Amado  [phone]  [place], [place]   [facility]  [phone]  [facility]  (does not take BCBS) FYI:  *Some providers accept insurance while others are out of pocket. Please contact them for details*     DIABETES AND YOUR FEET  Diabetes can result in several problems in the feet including ulcers (open sores) and amputations. Two of the most important reasons why people develop foot problems when they have diabetes is : 1. Neuropathy (loss of feeling)  2. Vascular disease (loss or decrease of blood flow).    Neuropathy is a term used to describe a loss of nerve function.  Patients with diabetes are at risk of developing neuropathy if their sugars continue to run high and are above the normal value. One theory for neuropathy is that the \"extra\" sugar in the body " enters the nerves and is broken down. These by-products build up in the nerve causing it to swell and impairing nerve function. Often times, this can be prevented by controlling your sugars, dieting and exercise.    When a person develops neuropathy, they usually begin to feel numbness or tingling in their feet and sometime in their legs.  Other symptoms may include painful burning or hot feet, tingling or feeling like insects or ants are crawling on your feet or legs.  If the diabetes is sever and the sugars run high for long periods of time, neuropathy can also occur in the hands.    Vascular disease  is a term used to describe a loss or decrease in circulation (blood flow). There is a problem in getting blood and oxygen to areas that need it. Similar to neuropathy, sugars can build up in the walls of the arteries (blood vessels) and cause them to become swollen, thickened and hardened. This decreases the amount of blood that can go to an area that needs it. Though this is common in the legs of diabetic patients, it can also affect other arteries (blood vessels) in the body such as in the heart and eyes.    In the legs, vascular disease usually results in cramping. Patients who develop leg cramps after walking the same distance every time (i.e. One block, half a mile, ect.) need to let their doctors know so that their circulation may be checked. Cramps causing severe pain in the feet and/or legs while sleeping and the cramps go away when you stand or hang your legs off the side of the bed, may also be a sign of poor blood circulation.  Occasional cramping in cold weather or on rare occasions with activity may not be due to poor circulation, but you should inform your doctor.    PREVENTION OF THESE DISEASES  The key to prevention is good blood sugar control. Poor blood sugar control is a big reason many of these problems start. Physical activity (exercise) is a very good way to help decrease your blood sugars.  "Exercise can lower your blood sugar, blood pressure, and cholesterol. It also reduces your risk for heart disease and stroke, relieves stress, and strengthens your heart, muscles and bones.  In addition, regular activity helps insulin work better, improves your blood circulation, and keeps your joints flexible. If you're trying to lose weight, a combination of exercise and wise food choices can help you reach your target weight and maintain it.      PAIN MANAGEMENT (**Please speak with your primary doctor about any medications**)  1.Blood Sugar Control - Most important  2. Medications such as:  Amytriptylline, duloxetine, gabapentin, lyrica, tramadol (talk with your primary care doctor about this).     NUTRITION:  Nutrition is also important to help with healing. If your body does not have what it needs, it can't heal.   1. Increasing your protein intake is important.  With wounds you need 60-90gm of protein a day to help with healing. Over the counter protein shakes such as Garcia, Glucerna, Ensure, ect... can help to supplement your daily protein intake.   2. It is also important to take Vitamins to help with healing.  Vitamins such as B12, B6 and Vitamin D3 are important for healing. These can be gotten over the counter at pharmacies or at stores like [employer] or the [employer].    I can also prescribe a dietary supplement called \"Rheumate\" that has a lot of essential vitamins in one capsule.  This may not be covered by insurance though.     FOOT CARE RECOMMENDATIONS   1. Wash your feet with lukewarm water and a mild soap and then dry them thoroughly, especially between the toes.     2. Examine your feet daily looking for cuts, corns, blisters, cracks, ect, especially after wearing new shoes. Make sure to look between your toes. If you cannot see the bottom of your feet, set a mirror on the floor and hold your foot over it, or ask a spouse, friend or family member to examine your feet for you. Contact your doctor " immediately if new problems are noted or if sores are not healing.     3. Immediately apply moisturizer to the tops and bottoms of your feet, avoiding areas between the toes. Hand lotion (Intesive Care, Sofia, Eucerin, Neutrogena, Curel, ect) is sufficient unless your doctor prescribes a medicated lotion. Apply sunscreen to your feet when going swimming outside.     4. Use clean comfortable shoes, wear white socks (if you have any bleeding or drainage, you will see it on white socks). Socks should not have thick seams or cut off the circulation around the leg. Break in new shoes slowly and rotate with older shoes until broken in. Check the inside of your shoes with your hand to look for areas of irritation or objects that may have fallen into your shoes.       5. Keep slippers by the side of your bed for use during the night.     6.  Shoes should be fitted by a professional and should not cause areas of irritation.  Check your feet regularly when wearing a new pair of shoes and replace them as needed.     7.  Talk to your doctor about proper exercise. Exercise and stretching stimulate blood flow to your feet and maintain proper glucose levels.     8.  Monitor your blood glucose level as instructed by your doctor. Notify your doctor immediately if your blood sugar is abnormally high or low.    9. Cut your nails straight across, but then gently round any sharp edges with a cardboard nail file. If you have neuropathy, peripheral vascular disease or cannot see that well to trim your own toenails contact [facility] ([phone]) or [facility] ([phone]).      THINGS TO AVOID DOING   1.  Do not soak your feet if you have an open sore. Use only lukewarm water and always check the temperature with your hand as hot water can easily burn your feet.       2.  Never use a hot water bottle or heating pad on your feet. Also do not apply cold compresses to your feet. With decreased sensation, you could burn or freeze your  feet.       3.  Do not apply any of these to your feet:    -  Over the counter medicine for corns or warts    -  Harsh chemicals like boric acid    -  Do not self-treat corns, cuts, blisters or infections. Always consult your doctor.       4.  Do not wear sandals, slippers or walk barefoot, especially on hot sand or concrete or other harsh surfaces.     5.  If you smoke, stop!!!    [place] [facility]UST[facility] LOCATIONS  [facility]  [address]  [place] [address]  Phone: [phone]  Fax: [phone] [facility]  [address]  [place] [address]  Phone: [phone]   Fax: [phone]   [facility]  [address]  [place] [address]  Phone: [phone]  Fax: [phone] [facility]  [address]  [place] [address]  Phone: [phone]   Fax: [phone]   [facility]   [address]  [place] [address]  Phone: [phone]  Fax: [phone] * Please call any location listed to make an appointment for a casting/fitting. Your referral was sent to their central office and they will all have the order on file.     WEARING YOUR CUSTOM FOOT ORTHOTICS   Most insurance plans cover one pair of orthotics per year. You must check with your   insurance plan to see what your payment responsibility will be. Please call your   insurance company by calling the number on the back of your insurance card.   Orthotic's are non-refundable and non-returnable.   Orthotics are made of various designs. Some orthotics are covered with material that extends beyond your toes. If your orthotic is of this design, you will likely need to trim the toe end to get a proper fit. The insole from your shoe can be used as a template. Simply overlay the shoe insert on top of the custom orthotic. Align the heel end while tracing the length of the insert onto the custom  orthotic. Use a large scissor to trim the toe end until you get a proper fit in the shoe.   The orthotic needs to be pushed as far back in the shoe as possible. The heel portion should not ride forward so as not to irritate your heel.   Orthotics are designed to work with socks. Excessive perspiration will shorten the life span of the orthotics. Remove the orthotic from the shoe frequently for proper drying.   The break-in period lasts for weeks. People new to orthotics will likely experience new aches and pains. The orthotic is forcing your foot into a new position. Arch, foot and leg muscle aches and fatigue are common during these weeks. Minor discomfort can be considered normal break in phenomenon. Start wearing your orthotic around your home your first day. Limited activity for one to two hours is recommended. You can increase one or two additional hours each day provided the aches and pains are subsiding. The degree of discomfort, fatigue and problems will dictate the speed of break in. You may require multiple weeks to work up to full time use.   Do not continue wearing your orthotics if they are creating problems such as blisters or sores. Do not hesitate to call the clinic to speak with a nurse regarding orthotic   break in, fit, trimming, etc. You may also need to see the doctor if the orthotics are   simply not working out. Adjustments are sometimes made to improve orthotic   function.     Orthotics will only work in certain styles and types of shoes. Orthotics rarely work in dress shoes. Slip-ons, clogs, sandals and heels are particularly troublesome. Specially designed orthotics may be necessary for these types of shoes. Your custom orthotic was designed for activities that require appropriate walking or running shoes. Lace up athletic shoes, walking shoes or work boots should work appropriately. You may need a wider or longer shoe. Shoes with a removable  or insert work best. In general, you  want to remove an insert from the shoe before placing the orthotic into the shoe. Shoes without a removable liner may not work as well.     When purchasing new shoes, bring your orthotics along to get a proper fit. Shop at stores that are familiar with orthotics.   Frequent washing of the orthotic may shorten the life span of the top cover. The top cover can be replaced but will generally last one to five years depending on use and foot perspiration.

## 2021-06-16 DIAGNOSIS — I50.31 ACUTE DIASTOLIC HEART FAILURE (H): ICD-10-CM

## 2021-06-16 RX ORDER — FUROSEMIDE 40 MG
40 TABLET ORAL DAILY
Qty: 30 TABLET | Refills: 1 | Status: SHIPPED | OUTPATIENT
Start: 2021-06-16 | End: 2021-09-24

## 2021-06-16 RX ORDER — POTASSIUM CHLORIDE 1.5 G/1.58G
POWDER, FOR SOLUTION ORAL
Qty: 180 PACKET | Refills: 3 | Status: SHIPPED | OUTPATIENT
Start: 2021-06-16 | End: 2022-04-19

## 2021-06-16 NOTE — TELEPHONE ENCOUNTER
Change to mail order pharmacy, 90 day supply requested    LOV 5- Ayesha - per OV notes, wean off furosemide    MyChart message 6-1-2021 - stay on furosemide long term    Which is correct?    Pended potassium chloride as 90 day R3    Pended furosemide, still is 30 day supply with zero refills - please review chart and approve/deny as appropriate.    Grace Nicole, RT (R)

## 2021-06-16 NOTE — TELEPHONE ENCOUNTER
Routing refill request to provider for review/approval because:     Can you clarify the potassium RX?   Lasix was approved.   Amanda Ladd RN  [facility] Triage

## 2021-06-25 ENCOUNTER — OFFICE VISIT (OUTPATIENT)
Dept: FAMILY MEDICINE | Facility: CLINIC | Age: 86
End: 2021-06-25
Payer: COMMERCIAL

## 2021-06-25 VITALS
OXYGEN SATURATION: 97 % | HEIGHT: 75 IN | WEIGHT: 276 LBS | SYSTOLIC BLOOD PRESSURE: 102 MMHG | HEART RATE: 76 BPM | DIASTOLIC BLOOD PRESSURE: 68 MMHG | TEMPERATURE: 96.9 F | BODY MASS INDEX: 34.32 KG/M2

## 2021-06-25 DIAGNOSIS — R13.10 DYSPHAGIA, UNSPECIFIED TYPE: ICD-10-CM

## 2021-06-25 DIAGNOSIS — L03.115 CELLULITIS OF RIGHT LOWER EXTREMITY: ICD-10-CM

## 2021-06-25 DIAGNOSIS — Z95.1 S/P CABG (CORONARY ARTERY BYPASS GRAFT): ICD-10-CM

## 2021-06-25 DIAGNOSIS — M53.3 SACROILIAC JOINT PAIN: ICD-10-CM

## 2021-06-25 DIAGNOSIS — E11.59 TYPE 2 DIABETES MELLITUS WITH VASCULAR DISEASE (H): ICD-10-CM

## 2021-06-25 DIAGNOSIS — E53.8 VITAMIN B12 DEFICIENCY (NON ANEMIC): ICD-10-CM

## 2021-06-25 DIAGNOSIS — R25.1 TREMOR: ICD-10-CM

## 2021-06-25 DIAGNOSIS — E78.5 HYPERLIPIDEMIA LDL GOAL <70: ICD-10-CM

## 2021-06-25 DIAGNOSIS — I10 ESSENTIAL HYPERTENSION, BENIGN: ICD-10-CM

## 2021-06-25 DIAGNOSIS — B35.1 ONYCHOMYCOSIS: ICD-10-CM

## 2021-06-25 DIAGNOSIS — G89.4 CHRONIC PAIN SYNDROME: ICD-10-CM

## 2021-06-25 DIAGNOSIS — Z00.00 ENCOUNTER FOR ANNUAL WELLNESS VISIT (AWV) IN MEDICARE PATIENT: Primary | ICD-10-CM

## 2021-06-25 DIAGNOSIS — N40.0 BENIGN PROSTATIC HYPERPLASIA, UNSPECIFIED WHETHER LOWER URINARY TRACT SYMPTOMS PRESENT: ICD-10-CM

## 2021-06-25 LAB
ALBUMIN SERPL-MCNC: 3.6 G/DL (ref 3.4–5)
ALP SERPL-CCNC: 59 U/L (ref 40–150)
ALT SERPL W P-5'-P-CCNC: 26 U/L (ref 0–70)
ANION GAP SERPL CALCULATED.3IONS-SCNC: 5 MMOL/L (ref 3–14)
AST SERPL W P-5'-P-CCNC: 32 U/L (ref 0–45)
BILIRUB SERPL-MCNC: 0.3 MG/DL (ref 0.2–1.3)
BUN SERPL-MCNC: 15 MG/DL (ref 7–30)
CALCIUM SERPL-MCNC: 9.1 MG/DL (ref 8.5–10.1)
CHLORIDE SERPL-SCNC: 108 MMOL/L (ref 94–109)
CHOLEST SERPL-MCNC: 116 MG/DL
CO2 SERPL-SCNC: 28 MMOL/L (ref 20–32)
CREAT SERPL-MCNC: 0.72 MG/DL (ref 0.66–1.25)
ERYTHROCYTE [DISTWIDTH] IN BLOOD BY AUTOMATED COUNT: 15 % (ref 10–15)
GFR SERPL CREATININE-BSD FRML MDRD: 85 ML/MIN/{1.73_M2}
GLUCOSE SERPL-MCNC: 166 MG/DL (ref 70–99)
HBA1C MFR BLD: 6.6 % (ref 0–5.6)
HCT VFR BLD AUTO: 38.9 % (ref 40–53)
HDLC SERPL-MCNC: 38 MG/DL
HGB BLD-MCNC: 13 G/DL (ref 13.3–17.7)
LDLC SERPL CALC-MCNC: 46 MG/DL
MCH RBC QN AUTO: 29 PG (ref 26.5–33)
MCHC RBC AUTO-ENTMCNC: 33.4 G/DL (ref 31.5–36.5)
MCV RBC AUTO: 87 FL (ref 78–100)
NONHDLC SERPL-MCNC: 78 MG/DL
PLATELET # BLD AUTO: 198 10E9/L (ref 150–450)
POTASSIUM SERPL-SCNC: 3.7 MMOL/L (ref 3.4–5.3)
PROT SERPL-MCNC: 7.5 G/DL (ref 6.8–8.8)
RBC # BLD AUTO: 4.48 10E12/L (ref 4.4–5.9)
SODIUM SERPL-SCNC: 141 MMOL/L (ref 133–144)
TRIGL SERPL-MCNC: 158 MG/DL
VIT B12 SERPL-MCNC: 1618 PG/ML (ref 193–986)
WBC # BLD AUTO: 7.2 10E9/L (ref 4–11)

## 2021-06-25 PROCEDURE — 80061 LIPID PANEL: CPT | Performed by: INTERNAL MEDICINE

## 2021-06-25 PROCEDURE — 80053 COMPREHEN METABOLIC PANEL: CPT | Performed by: INTERNAL MEDICINE

## 2021-06-25 PROCEDURE — G0439 PPPS, SUBSEQ VISIT: HCPCS | Performed by: INTERNAL MEDICINE

## 2021-06-25 PROCEDURE — 85027 COMPLETE CBC AUTOMATED: CPT | Performed by: INTERNAL MEDICINE

## 2021-06-25 PROCEDURE — 82607 VITAMIN B-12: CPT | Performed by: INTERNAL MEDICINE

## 2021-06-25 PROCEDURE — 99214 OFFICE O/P EST MOD 30 MIN: CPT | Mod: 25 | Performed by: INTERNAL MEDICINE

## 2021-06-25 PROCEDURE — 83036 HEMOGLOBIN GLYCOSYLATED A1C: CPT | Performed by: INTERNAL MEDICINE

## 2021-06-25 PROCEDURE — 36415 COLL VENOUS BLD VENIPUNCTURE: CPT | Performed by: INTERNAL MEDICINE

## 2021-06-25 RX ORDER — CEPHALEXIN 500 MG/1
500 CAPSULE ORAL 3 TIMES DAILY
Qty: 30 CAPSULE | Refills: 0 | Status: SHIPPED | OUTPATIENT
Start: 2021-06-25 | End: 2021-07-09

## 2021-06-25 RX ORDER — TAMSULOSIN HYDROCHLORIDE 0.4 MG/1
0.4 CAPSULE ORAL DAILY
Qty: 30 CAPSULE | Refills: 3 | Status: SHIPPED | OUTPATIENT
Start: 2021-06-25 | End: 2021-11-15

## 2021-06-25 RX ORDER — FINASTERIDE 5 MG/1
1 TABLET, FILM COATED ORAL DAILY
Qty: 90 TABLET | Refills: 3 | Status: SHIPPED | OUTPATIENT
Start: 2021-06-25 | End: 2022-05-26

## 2021-06-25 RX ORDER — METFORMIN HCL 500 MG
1000 TABLET, EXTENDED RELEASE 24 HR ORAL 2 TIMES DAILY WITH MEALS
Qty: 360 TABLET | Refills: 4 | Status: SHIPPED | OUTPATIENT
Start: 2021-06-25 | End: 2022-07-28

## 2021-06-25 RX ORDER — PRAVASTATIN SODIUM 20 MG
20 TABLET ORAL DAILY
Qty: 90 TABLET | Refills: 3 | Status: SHIPPED | OUTPATIENT
Start: 2021-06-25 | End: 2022-04-27

## 2021-06-25 RX ORDER — GABAPENTIN 300 MG/1
900 CAPSULE ORAL 3 TIMES DAILY
Qty: 270 CAPSULE | Refills: 3 | Status: SHIPPED | OUTPATIENT
Start: 2021-06-25 | End: 2022-03-08

## 2021-06-25 RX ORDER — LISINOPRIL 20 MG/1
10 TABLET ORAL DAILY
Qty: 15 TABLET | Refills: 0 | Status: SHIPPED | OUTPATIENT
Start: 2021-06-25 | End: 2021-07-09

## 2021-06-25 RX ORDER — AMLODIPINE BESYLATE 5 MG/1
TABLET ORAL
Qty: 90 TABLET | Refills: 3 | Status: SHIPPED | OUTPATIENT
Start: 2021-06-25 | End: 2022-03-21

## 2021-06-25 ASSESSMENT — ACTIVITIES OF DAILY LIVING (ADL): CURRENT_FUNCTION: NO ASSISTANCE NEEDED

## 2021-06-25 ASSESSMENT — MIFFLIN-ST. JEOR: SCORE: 2022.56

## 2021-06-25 NOTE — PROGRESS NOTES
"SUBJECTIVE:   Austen Fowler is a 86 year old male who presents for Preventive Visit.  This is a very pleasant 86 years old gentleman who is hard of hearing  He has   Back pain and sacroiliac pain which is chronic but overall is doing well  In April he was admitted to hospital related to right leg cellulitis and sepsis  He feels that this pain is back  He thinks that his right leg cellulitis pain felt just like this  He is wearing compression socks and taking care of his feet  His blood sugars are remaining pretty good and he wonders about CGM monitoring  His blood sugars are higher than what he would like but still less than 140  He does not have hypoglycemic changes  His cardiac disease is stable and he is scheduled to see cardiology in the fall  He has not had any recurrent shortness of breath or cough    Patient has been advised of split billing requirements and indicates understanding: Yes   Are you in the first 12 months of your Medicare coverage?  No    Healthy Habits:     In general, how would you rate your overall health?  Good    Frequency of exercise:  6-7 days/week    Duration of exercise:  Less than 15 minutes    Do you usually eat at least 4 servings of fruit and vegetables a day, include whole grains    & fiber and avoid regularly eating high fat or \"junk\" foods?  Yes    Taking medications regularly:  Yes    Barriers to taking medications:  None    Medication side effects:  None    Ability to successfully perform activities of daily living:  No assistance needed    Home Safety:  Lack of grab bars in the bathroom    Hearing Impairment:  Difficulty following a conversation in a noisy restaurant or crowded room, feel that people are mumbling or not speaking clearly, difficulty understanding soft or whispered speech and need to ask people to speak up or repeat themselves    In the past 6 months, have you been bothered by leaking of urine?  No    In general, how would you rate your overall mental or " emotional health?  Very good      PHQ-2 Total Score: 1    Additional concerns today:  Yes    Do you feel safe in your environment? Yes    Have you ever done Advance Care Planning? (For example, a Health Directive, POLST, or a discussion with a medical provider or your loved ones about your wishes): No, advance care planning information given to patient to review.  Patient plans to discuss their wishes with loved ones or provider.         Fall risk  Fallen 2 or more times in the past year?: No  Any fall with injury in the past year?: No    Cognitive Screening   1) Repeat 3 items (Leader, Season, Table)  2) Clock draw: NORMAL  3) 3 item recall: Recalls 3 objects  Results: 3 items recalled: COGNITIVE IMPAIRMENT LESS LIKELY    Mini-CogTM Copyright MACHO Bhagat. Licensed by the author for use in [facility]; reprinted with permission (jesus@[facility]). All rights reserved.      Do you have sleep apnea, excessive snoring or daytime drowsiness?: yes    Reviewed and updated as needed this visit by clinical staff  Tobacco  Allergies  Meds  Problems             Reviewed and updated as needed this visit by Provider     Problems            Social History     Tobacco Use     Smoking status: Former Smoker     Packs/day: 2.00     Years: 30.00     Pack years: 60.00     Types: Cigarettes, Cigars, Pipe     Start date:      Quit date: 3/1/1992     Years since quittin.3     Smokeless tobacco: Never Used     Tobacco comment: quit in    Substance Use Topics     Alcohol use: Yes     Alcohol/week: 0.0 standard drinks     Comment: minimal less than 1/week     If you drink alcohol do you typically have >3 drinks per day or >7 drinks per week? No    Alcohol Use 2021   Prescreen: >3 drinks/day or >7 drinks/week? -   Prescreen: >3 drinks/day or >7 drinks/week? No             Current providers sharing in care for this patient include:   Patient Care Team:  Hamzah Hodge MD as PCP - Marielos Bradford [facility] as  Pharmacist (Pharmacist)  Lino Hernandez MD as Assigned Heart and Vascular Provider  Nadia Tinajero [facility] as Pharmacist (Pharmacist)  Goltz, Bennett Ezra, PA-C as Assigned Sleep Provider  Goltz, Bennett Ezra, PA-C as Assigned Neuroscience Provider  Hamzah Hodge MD as Assigned PCP  Lazaro Lilly DPM as Assigned Musculoskeletal Provider    The following health maintenance items are reviewed in Epic and correct as of today:  Health Maintenance Due   Topic Date Due     HF ACTION PLAN  Never done     EYE EXAM  08/21/2020     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     Cervicalgia     Benign neoplasm of skin of other and unspecified parts of face     Impacted cerumen     Infected sebaceous cyst     Anxiety     Chronic low back pain     Advanced directives, counseling/discussion     Lumbosacral radiculitis     PENELOPE (obstructive sleep apnea)     Low HDL (under 40)     Hyperlipidemia LDL goal <70     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)     S/P lumbar laminectomy     Health Care Home     Coronary artery disease involving native coronary artery of native heart without angina pectoris     Left ventricular diastolic dysfunction     Ascending aorta dilatation (H)     Aortic root dilatation (H)     Non morbid obesity, unspecified obesity type     Neck pain     Cellulitis of right lower extremity     Hypoxia     Shortness of breath     Cellulitis     Gastroesophageal reflux disease with esophagitis     Morbid obesity (H)     Sacroiliac joint pain     Dysphagia, unspecified type     Benign prostatic hyperplasia, unspecified whether lower urinary tract symptoms present     Past Surgical History:   Procedure Laterality Date     ABDOMEN SURGERY  1994    gall bladder     BIOPSY      arms     C CABG, ARTERY-VEIN, FOUR  11/1992     CABG - LIMA to left anterior descending and saphenous vein bypass graft to the first and second obtuse marginal branch of the circumflex and the right mammary to the right coronary artery     CHOLECYSTECTOMY  1994     COLONOSCOPY N/A 2017    Procedure: COMBINED COLONOSCOPY, SINGLE OR MULTIPLE BIOPSY/POLYPECTOMY BY BIOPSY;  Surgeon: Bladimir Garner MD;  Location:  GI     CV LEFT HEART CATH  ,     Angioplasty     ENT SURGERY  1995    sinus surgery     ESOPHAGOSCOPY, GASTROSCOPY, DUODENOSCOPY (EGD), DILATATION, COMBINED  2014    Procedure: COMBINED ESOPHAGOSCOPY, GASTROSCOPY, DUODENOSCOPY (EGD), DILATATION;  Surgeon: Bladimir Garner MD;  Location:  GI     EYE SURGERY      cataracts removed both eyes     HC COLONOSCOPY THRU STOMA W BIOPSY/CAUTERY TUMOR/POLYP/LESION  2007     INJECT EPIDURAL LUMBAR       LAMINECTOMY LUMBAR TWO LEVELS N/A 2015    Procedure: LAMINECTOMY LUMBAR TWO LEVELS;  Surgeon: Bladimir Keenan MD;  Location:  OR     THORACIC SURGERY  1992    bypass     [facility] NONSPECIFIC PROCEDURE      Gail lap     [facility] NONSPECIFIC PROCEDURE      sinus surgery     [facility] NONSPECIFIC PROCEDURE      bilateral cataract surgery       Social History     Tobacco Use     Smoking status: Former Smoker     Packs/day: 2.00     Years: 30.00     Pack years: 60.00     Types: Cigarettes, Cigars, Pipe     Start date:      Quit date: 3/1/1992     Years since quittin.3     Smokeless tobacco: Never Used     Tobacco comment: quit in    Substance Use Topics     Alcohol use: Yes     Alcohol/week: 0.0 standard drinks     Comment: minimal less than 1/week     Family History   Problem Relation Age of Onset     Cancer Brother         MELANOMA     Diabetes Mother         AODM     Thyroid Disease Mother      Diabetes Father         AODM     Myocardial Infarction Father      Cerebrovascular Disease Brother      Parkinsonism Sister      Family History Negative Son      Family History  Negative Son      Family History Negative Son      Family History Negative Daughter      Coronary Artery Disease Brother         dod 2019     Cerebrovascular Disease Brother         dod 2019     Other Cancer Brother         dod 2000/melanoma         Current Outpatient Medications   Medication Sig Dispense Refill     acetaminophen (ACETAMIN) 500 MG tablet Take 1,000 mg by mouth 2 times daily        amLODIPine (NORVASC) 5 MG tablet TAKE 1 TABLET BY MOUTH TWO  TIMES DAILY 90 tablet 3     aspirin 81 MG EC tablet Take 81 mg by mouth daily        blood glucose (ONETOUCH ULTRA) test strip USE TO TEST BLOOD SUGAR  TWICE DAILY OR AS DIRECTED. DUE FOR APPOINTMENT. PLEASE SCHEDULE: [phone] 200 strip 3     blood glucose monitoring (ONE TOUCH ULTRASOFT) lancets USE TO TEST BLOOD SUGAR 2  TIMES DAILY OR AS DIRECTED. 200 each 3     cephALEXin (KEFLEX) 500 MG capsule Take 1 capsule (500 mg) by mouth 3 times daily 30 capsule 0     Cholecalciferol (VITAMIN D) 2000 UNIT tablet Take 2,000 Units by mouth daily. 90 tablet 3     diclofenac (VOLTAREN) 1 % topical gel Place 4 g onto the skin 4 times daily as needed for moderate pain (Apply to back to neck.)       finasteride (PROSCAR) 5 MG tablet Take 1 tablet (5 mg) by mouth daily - due for fasting physical with Dr Hodge for refills 90 tablet 3     furosemide (LASIX) 40 MG tablet Take 1 tablet (40 mg) by mouth daily 30 tablet 1     gabapentin (NEURONTIN) 300 MG capsule Take 3 capsules (900 mg) by mouth 3 times daily - Fasting physical due for further refills 270 capsule 3     lisinopril (ZESTRIL) 20 MG tablet Take 0.5 tablets (10 mg) by mouth daily 15 tablet 0     metFORMIN (GLUCOPHAGE-XR) 500 MG 24 hr tablet Take 2 tablets (1,000 mg) by mouth 2 times daily (with meals) 360 tablet 4     omeprazole (PRILOSEC) 20 MG DR capsule Take 1 capsule (20 mg) by mouth daily 90 capsule 3     ORDER FOR DME Uses Cpap machine for sleep apnea       potassium chloride (KLOR-CON) 20 MEQ packet **MIX AS  "DIRECTED ** USE 1 PACKET TWICE A  packet 3     pravastatin (PRAVACHOL) 20 MG tablet Take 1 tablet (20 mg) by mouth daily - due for fasting physical with Dr Hodge for refills 90 tablet 3     tamsulosin (FLOMAX) 0.4 MG capsule Take 1 capsule (0.4 mg) by mouth daily 30 capsule 3     vitamin B-12 (CYANOCOBALAMIN) 1000 MCG tablet Take 1,000 mcg by mouth every other day        Allergies   Allergen Reactions     No Known Allergies            Pertinent mammograms are reviewed under the imaging tab.    Review of Systems  10 point ROS of systems including Constitutional, Eyes, Respiratory, Cardiovascular, Gastroenterology, Genitourinary, Integumentary, Muscularskeletal, Psychiatric were all negative except for pertinent positives noted in my HPI.      OBJECTIVE:   /68 (BP Location: Right arm, Patient Position: Sitting, Cuff Size: Adult Large)   Pulse 76   Temp 96.9  F (36.1  C) (Temporal)   Ht 1.905 m (6' 3\")   Wt 125.2 kg (276 lb)   SpO2 97%   BMI 34.50 kg/m   Estimated body mass index is 34.5 kg/m  as calculated from the following:    Height as of this encounter: 1.905 m (6' 3\").    Weight as of this encounter: 125.2 kg (276 lb).  Physical Exam  GENERAL: healthy, alert and no distress  EYES: Eyes grossly normal to inspection, PERRL and conjunctivae and sclerae normal  HENT: ear canals and TM's normal, nose and mouth without ulcers or lesions  NECK: no adenopathy, no asymmetry, masses, or scars and thyroid normal to palpation  RESP: lungs clear to auscultation - no rales, rhonchi or wheezes  CV: regular rate and rhythm, normal S1 S2, no S3 or S4, no murmur, click or rub, no peripheral edema and peripheral pulses strong  ABDOMEN: soft, nontender, no hepatosplenomegaly, no masses and bowel sounds normal   (male): normal male genitalia without lesions or urethral discharge, no hernia  RECTAL: normal sphincter tone, no rectal masses, prostate normal size, smooth, nontender without nodules or masses  MS: no " gross musculoskeletal defects noted,   SKIN: no suspicious lesions     Right leg cellulitis starting in the foot   Not going above ankle area   bilateral lymphedema and onychomycosis  NEURO: Normal strength and tone, mentation intact and speech normal  PSYCH: mentation appears normal, affect normal/bright    Office Visit on 04/28/2021   Component Date Value Ref Range Status     WBC 04/28/2021 7.9  4.0 - 11.0 10e9/L Final     RBC Count 04/28/2021 4.74  4.4 - 5.9 10e12/L Final     Hemoglobin 04/28/2021 13.5  13.3 - 17.7 g/dL Final     Hematocrit 04/28/2021 41.9  40.0 - 53.0 % Final     MCV 04/28/2021 88  78 - 100 fl Final     MCH 04/28/2021 28.5  26.5 - 33.0 pg Final     MCHC 04/28/2021 32.2  31.5 - 36.5 g/dL Final     RDW 04/28/2021 15.0  10.0 - 15.0 % Final     Platelet Count 04/28/2021 206  150 - 450 10e9/L Final     % Neutrophils 04/28/2021 54.7  % Final     % Lymphocytes 04/28/2021 32.0  % Final     % Monocytes 04/28/2021 8.7  % Final     % Eosinophils 04/28/2021 4.5  % Final     % Basophils 04/28/2021 0.1  % Final     Absolute Neutrophil 04/28/2021 4.3  1.6 - 8.3 10e9/L Final     Absolute Lymphocytes 04/28/2021 2.5  0.8 - 5.3 10e9/L Final     Absolute Monocytes 04/28/2021 0.7  0.0 - 1.3 10e9/L Final     Absolute Eosinophils 04/28/2021 0.4  0.0 - 0.7 10e9/L Final     Absolute Basophils 04/28/2021 0.0  0.0 - 0.2 10e9/L Final     Diff Method 04/28/2021 Automated Method   Final     CRP Inflammation 04/28/2021 <2.9  0.0 - 8.0 mg/L Final     Sodium 04/28/2021 139  133 - 144 mmol/L Final     Potassium 04/28/2021 4.1  3.4 - 5.3 mmol/L Final     Chloride 04/28/2021 106  94 - 109 mmol/L Final     Carbon Dioxide 04/28/2021 28  20 - 32 mmol/L Final     Anion Gap 04/28/2021 5  3 - 14 mmol/L Final     Glucose 04/28/2021 115* 70 - 99 mg/dL Final     Urea Nitrogen 04/28/2021 14  7 - 30 mg/dL Final     Creatinine 04/28/2021 0.74  0.66 - 1.25 mg/dL Final     GFR Estimate 04/28/2021 84  >60 mL/min/[1.73_m2] Final    Comment: Non   GFR Calc  Starting 12/18/2018, serum creatinine based estimated GFR (eGFR) will be   calculated using the Chronic Kidney Disease Epidemiology Collaboration   (CKD-EPI) equation.       GFR Estimate If Black 04/28/2021 >90  >60 mL/min/[1.73_m2] Final    Comment:  GFR Calc  Starting 12/18/2018, serum creatinine based estimated GFR (eGFR) will be   calculated using the Chronic Kidney Disease Epidemiology Collaboration   (CKD-EPI) equation.       Calcium 04/28/2021 9.7  8.5 - 10.1 mg/dL Final       ASSESSMENT / PLAN:   Austen was seen today for physical.    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)  -     metFORMIN (GLUCOPHAGE-XR) 500 MG 24 hr tablet; Take 2 tablets (1,000 mg) by mouth 2 times daily (with meals)  -     Hemoglobin A1c  I discussed with him about continuous glucose monitoring which may not be required  We could use Jardiance like medications if A1c is high  BENIGN HYPERTENSION  -     amLODIPine (NORVASC) 5 MG tablet; TAKE 1 TABLET BY MOUTH TWO  TIMES DAILY  -     lisinopril (ZESTRIL) 20 MG tablet; Take 0.5 tablets (10 mg) by mouth daily  He is also on Lasix  S/P CABG (coronary artery bypass graft)  Patient will see cardiology and he also has aortic dilatation which is being followed  He is on statins and aspirin and we are controlling his diabetes and blood pressure  Sacroiliac joint pain  He occasionally needs injections but for now doing well  -     gabapentin (NEURONTIN) 300 MG capsule; Take 3 capsules (900 mg) by mouth 3 times daily - Fasting physical due for further refills    Chronic pain syndrome  Patient is stable on gabapentin  -     gabapentin (NEURONTIN) 300 MG capsule; Take 3  "capsules (900 mg) by mouth 3 times daily - Fasting physical due for further refills    Hyperlipidemia LDL goal <70  LDL is pretty good  He would like to be checked again and I think that is reasonable  LDL Cholesterol Calculated   Date Value Ref Range Status   03/02/2021 42 <100 mg/dL Final     Comment:     Desirable:       <100 mg/dl       -     pravastatin (PRAVACHOL) 20 MG tablet; Take 1 tablet (20 mg) by mouth daily - due for fasting physical with Dr Hodge for refills  -     Lipid panel reflex to direct LDL Fasting  -     Comprehensive metabolic panel    Benign prostatic hyperplasia, unspecified whether lower urinary tract symptoms present  -     tamsulosin (FLOMAX) 0.4 MG capsule; Take 1 capsule (0.4 mg) by mouth daily  Also on Proscar and doing well  Dysphagia, unspecified type  -     omeprazole (PRILOSEC) 20 MG DR capsule; Take 1 capsule (20 mg) by mouth daily  Doing well  Cellulitis of right lower extremity  -     cephALEXin (KEFLEX) 500 MG capsule; Take 1 capsule (500 mg) by mouth 3 times daily  -     CBC with platelets  I will see the patient back after 2 weeks and he will watch for extension of cellulitis  We might have to use penicillin prophylaxis in addition to his hygiene  Onychomycosis  He will use Vicks VapoRub  Vitamin B12 deficiency (non anemic)  -     CBC with platelets  -     Vitamin B12  Patient is not taking B12  Tremor  There is tremor but treatment may not be indicated at present because of the CNS side effects  Other orders  -     finasteride (PROSCAR) 5 MG tablet; Take 1 tablet (5 mg) by mouth daily - due for fasting physical with Dr Hodge for refills          COUNSELING:  Reviewed preventive health counseling, as reflected in patient instructions       Regular exercise       Healthy diet/nutrition    Estimated body mass index is 34.5 kg/m  as calculated from the following:    Height as of this encounter: 1.905 m (6' 3\").    Weight as of this encounter: 125.2 kg (276 lb).    Weight " management plan: Discussed healthy diet and exercise guidelines    He reports that he quit smoking about 29 years ago. His smoking use included cigarettes, cigars, and pipe. He started smoking about 67 years ago. He has a 60.00 pack-year smoking history. He has never used smokeless tobacco.      Appropriate preventive services were discussed with this patient, including applicable screening as appropriate for cardiovascular disease, diabetes, osteopenia/osteoporosis, and glaucoma.  As appropriate for age/gender, discussed screening for colorectal cancer, prostate cancer, Checklist reviewing preventive services available has been given to the patient.    Reviewed patients plan of care and provided an AVS. The Basic Care Plan (routine screening as documented in Health Maintenance) for Austen meets the Care Plan requirement. This Care Plan has been established and reviewed with the Patient.    Counseling Resources:  ATP IV Guidelines  Pooled Cohorts Equation Calculator  Breast Cancer Risk Calculator  Breast Cancer: Medication to Reduce Risk  FRAX Risk Assessment  ICSI Preventive Guidelines  Dietary Guidelines for Americans, 2010  [employer]'s MyPlate  ASA Prophylaxis  Lung CA Screening    Hamzah Hodge MD  [facility]    Identified Health Risks:

## 2021-07-08 ENCOUNTER — TRANSFERRED RECORDS (OUTPATIENT)
Dept: HEALTH INFORMATION MANAGEMENT | Facility: CLINIC | Age: 86
End: 2021-07-08

## 2021-07-08 LAB — RETINOPATHY: NORMAL

## 2021-07-09 ENCOUNTER — OFFICE VISIT (OUTPATIENT)
Dept: FAMILY MEDICINE | Facility: CLINIC | Age: 86
End: 2021-07-09
Payer: COMMERCIAL

## 2021-07-09 VITALS
HEIGHT: 75 IN | HEART RATE: 70 BPM | OXYGEN SATURATION: 97 % | TEMPERATURE: 97.2 F | DIASTOLIC BLOOD PRESSURE: 74 MMHG | BODY MASS INDEX: 34.57 KG/M2 | SYSTOLIC BLOOD PRESSURE: 122 MMHG | WEIGHT: 278 LBS

## 2021-07-09 DIAGNOSIS — I10 ESSENTIAL HYPERTENSION, BENIGN: ICD-10-CM

## 2021-07-09 DIAGNOSIS — I89.0 LYMPHEDEMA: ICD-10-CM

## 2021-07-09 DIAGNOSIS — L03.115 CELLULITIS OF RIGHT LOWER EXTREMITY: Primary | ICD-10-CM

## 2021-07-09 PROCEDURE — 99213 OFFICE O/P EST LOW 20 MIN: CPT | Performed by: INTERNAL MEDICINE

## 2021-07-09 RX ORDER — LISINOPRIL 10 MG/1
10 TABLET ORAL DAILY
Qty: 90 TABLET | Refills: 3 | Status: SHIPPED | OUTPATIENT
Start: 2021-07-09 | End: 2021-09-24

## 2021-07-09 RX ORDER — PENICILLIN V POTASSIUM 250 MG/1
250 TABLET, FILM COATED ORAL 2 TIMES DAILY
Qty: 180 TABLET | Refills: 3 | Status: SHIPPED | OUTPATIENT
Start: 2021-07-09 | End: 2021-11-09

## 2021-07-09 ASSESSMENT — MIFFLIN-ST. JEOR: SCORE: 2031.63

## 2021-07-09 NOTE — Clinical Note
Please abstract the following data from this visit with this patient into the appropriate field in Epic:    Tests that can be patient reported without a hard copy:    Eye exam with ophthalmology on this date: 07/08/2021 at [facility]

## 2021-07-09 NOTE — PROGRESS NOTES
"      ICD-10-CM    1. Cellulitis of right lower extremity  L03.115 penicillin V (VEETID) 250 MG tablet   2. Lymphedema  I89.0 penicillin V (VEETID) 250 MG tablet   3. BENIGN HYPERTENSION  I10 lisinopril (ZESTRIL) 10 MG tablet     This is a follow-up from last week and patient's leg has really improved  He will finish the total 10-day course of cephalexin and then take penicillin 250 mg twice daily for preventive purposes  If he has any breakthrough infections we might have to increase the dose  In addition he will continue compression stockings  Patient was dizzy and we have reduce the dose of lisinopril    He will continue Lasix and potassium as well with it for edema and compression socks  Subjective   Griffin is a 86 year old who presents for the following health issues     HPI     Follow up on Right Leg Cellulitis  Patient states that his pain is much improved  The leg is still swollen  He has questions about lisinopril dose and cephalexin    Review of Systems   10 point ROS of systems including Constitutional, Eyes, Respiratory, Cardiovascular, Gastroenterology, Genitourinary, Integumentary, Muscularskeletal, Psychiatric were all negative except for pertinent positives noted in my HPI.        Objective    /74 (BP Location: Left arm, Patient Position: Chair, Cuff Size: Adult Large)   Pulse 70   Temp 97.2  F (36.2  C) (Temporal)   Ht 1.905 m (6' 3\")   Wt 126.1 kg (278 lb)   SpO2 97%   BMI 34.75 kg/m    Body mass index is 34.75 kg/m .  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     Cervicalgia     Benign neoplasm of skin of other and " unspecified parts of face     Impacted cerumen     Infected sebaceous cyst     Anxiety     Chronic low back pain     Advanced directives, counseling/discussion     Lumbosacral radiculitis     PENELOPE (obstructive sleep apnea)     Low HDL (under 40)     Hyperlipidemia LDL goal <70     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)     S/P lumbar laminectomy     Health Care Home     Coronary artery disease involving native coronary artery of native heart without angina pectoris     Left ventricular diastolic dysfunction     Ascending aorta dilatation (H)     Aortic root dilatation (H)     Non morbid obesity, unspecified obesity type     Neck pain     Cellulitis of right lower extremity     Hypoxia     Shortness of breath     Cellulitis     Gastroesophageal reflux disease with esophagitis     Morbid obesity (H)     Sacroiliac joint pain     Dysphagia, unspecified type     Benign prostatic hyperplasia, unspecified whether lower urinary tract symptoms present     Current Outpatient Medications   Medication     acetaminophen (ACETAMIN) 500 MG tablet     amLODIPine (NORVASC) 5 MG tablet     aspirin 81 MG EC tablet     blood glucose (ONETOUCH ULTRA) test strip     blood glucose monitoring (ONE TOUCH ULTRASOFT) lancets     Cholecalciferol (VITAMIN D) 2000 UNIT tablet     diclofenac (VOLTAREN) 1 % topical gel     finasteride (PROSCAR) 5 MG tablet     furosemide (LASIX) 40 MG tablet     gabapentin (NEURONTIN) 300 MG capsule     lisinopril (ZESTRIL) 10 MG tablet     metFORMIN (GLUCOPHAGE-XR) 500 MG 24 hr tablet     omeprazole (PRILOSEC) 20 MG DR capsule     ORDER FOR DME     penicillin V (VEETID) 250 MG tablet     potassium chloride (KLOR-CON) 20 MEQ packet     pravastatin (PRAVACHOL) 20 MG tablet     tamsulosin (FLOMAX) 0.4 MG capsule     vitamin B-12 (CYANOCOBALAMIN) 1000 MCG tablet     No current facility-administered medications for this visit.

## 2021-07-21 ENCOUNTER — OFFICE VISIT (OUTPATIENT)
Dept: PHARMACY | Facility: CLINIC | Age: 86
End: 2021-07-21

## 2021-07-21 VITALS
DIASTOLIC BLOOD PRESSURE: 61 MMHG | SYSTOLIC BLOOD PRESSURE: 105 MMHG | BODY MASS INDEX: 34.75 KG/M2 | HEART RATE: 64 BPM | WEIGHT: 278 LBS

## 2021-07-21 DIAGNOSIS — E78.5 HYPERLIPIDEMIA LDL GOAL <70: ICD-10-CM

## 2021-07-21 DIAGNOSIS — R39.198 SLOWING OF URINARY STREAM: ICD-10-CM

## 2021-07-21 DIAGNOSIS — L03.115 CELLULITIS OF RIGHT LOWER EXTREMITY: Primary | ICD-10-CM

## 2021-07-21 DIAGNOSIS — E11.59 TYPE 2 DIABETES MELLITUS WITH VASCULAR DISEASE (H): ICD-10-CM

## 2021-07-21 DIAGNOSIS — Z71.85 VACCINE COUNSELING: ICD-10-CM

## 2021-07-21 DIAGNOSIS — Z78.9 TAKES DIETARY SUPPLEMENTS: ICD-10-CM

## 2021-07-21 DIAGNOSIS — M54.2 CERVICALGIA: ICD-10-CM

## 2021-07-21 DIAGNOSIS — I10 ESSENTIAL HYPERTENSION, BENIGN: ICD-10-CM

## 2021-07-21 DIAGNOSIS — I25.10 CORONARY ARTERY DISEASE INVOLVING NATIVE CORONARY ARTERY OF NATIVE HEART WITHOUT ANGINA PECTORIS: ICD-10-CM

## 2021-07-21 DIAGNOSIS — R60.9 EDEMA, UNSPECIFIED TYPE: ICD-10-CM

## 2021-07-21 DIAGNOSIS — K21.00 GASTROESOPHAGEAL REFLUX DISEASE WITH ESOPHAGITIS WITHOUT HEMORRHAGE: ICD-10-CM

## 2021-07-21 PROCEDURE — 99607 MTMS BY PHARM ADDL 15 MIN: CPT | Performed by: PHARMACIST

## 2021-07-21 PROCEDURE — 99606 MTMS BY PHARM EST 15 MIN: CPT | Performed by: PHARMACIST

## 2021-07-21 NOTE — PROGRESS NOTES
Medication Therapy Management (MTM) Encounter    ASSESSMENT:                            Medication Adherence/Access: No issues identified    Cellulitis: Improved.    Type 2 Diabetes: Stable. Patient is meeting A1c goal of < 7%.     Hypertension/CAD/Edema: Stable.    Hyperlipidemia: Stable.     Back/Neck Pain:  Stable.    GERD: Stable. Current treatment is effective.     BPH:  Stable.    Supplements:  Stable.    Immunizations:  Stable.    PLAN:                            1.  Continue current medication regimen.    Follow-up: Return in about 6 months (around 1/21/2022).    SUBJECTIVE/OBJECTIVE:                          Austen Fowler is a 86 year old male coming in for a follow-up visit. He was referred to me from Dr. Hodge.  Today's visit is a follow-up MTM visit from 11/16/20.     Reason for visit: Routine f/up.    Tobacco: He reports that he quit smoking about 29 years ago. His smoking use included cigarettes, cigars, and pipe. He started smoking about 67 years ago. He has a 60.00 pack-year smoking history. He has never used smokeless tobacco.  Alcohol: Less than 1 beverages / week    Medication Adherence/Access: no issues reported    Cellulitis:  Patient continues taking penicillin V 250mg twice daily.  He reports symptoms are improving.  He also continues taking diuretic therapy (as below).    Type 2 Diabetes:  Pt currently taking  metformin XR 2000 mg daily.  Pt is not experiencing side effects.    SMBG: one time(s) daily. Ranges (patient reported): Usually around 150mg/dL, occasionally spikes to 170-180mg/dL (which he reports is quite rare)  Symptoms of low blood sugar? none, Frequency of lows- never  Symptoms of high blood sugar? none  Eye exam: up to date  Foot exam: up to date  Aspirin: Taking 81mg once daily and he denies s/s bruising/bleeding  Statin: Yes: pravastatin   ACEi/ARB: Yes: lisinopril.   Urine Albumin:    Lab Results   Component Value Date    UMALCR Unable to calculate due to low value 12/07/2020       Lab Results   Component Value Date    A1C 6.6 06/25/2021    A1C 6.4 04/01/2021    A1C 6.3 12/07/2020    A1C 5.8 01/20/2020    A1C 6.5 10/08/2019     GFR Estimate   Date Value Ref Range Status   06/25/2021 85 >60 mL/min/[1.73_m2] Final     Comment:     Non  GFR Calc  Starting 12/18/2018, serum creatinine based estimated GFR (eGFR) will be   calculated using the Chronic Kidney Disease Epidemiology Collaboration   (CKD-EPI) equation.       Hypertension/CAD/Edema: Current medications include aspirin 81mg daily, amlodipine 5mg twice daily, furosemide 40mg daily (with corresponding KCl 20mEq twice daily)  and lisinopril 10mg daily.  Furosemide/KCl is new since our last visit and lisinopril dose has been reduced.  He does monitor his BP at home and reports it's been very stable - systolic typically around 110mg, diastolic in the 70s.  He denies any chest pains or side effects of therapy.  Pt follows with cardiology.    BP Readings from Last 3 Encounters:   07/21/21 105/61   07/09/21 122/74   06/25/21 102/68       Potassium   Date Value Ref Range Status   06/25/2021 3.7 3.4 - 5.3 mmol/L Final      Hyperlipidemia: Current therapy includes pravastatin 20 mg once daily.  Pt reports no significant myalgias or other side effects.    Recent Labs   Lab Test 06/25/21  1149 03/02/21  0842 11/12/15  0807 07/28/15  0855   CHOL 116 126 102 108   HDL 38* 38* 28* 31*   LDL 46 42 45* 51*   TRIG 158* 229* 144 132   CHOLHDLRATIO  --   --  3.6 3.5     Back/Neck Pain:  Patient acknowledges he's overdue for follow-up with Dr. Nuñez.  Current regimen includes Voltaren gel as needed, acetaminophen 1000mg twice daily (might take a 3rd dose during the day if needed) and gabapentin 900mg twice daily (although prescribed for three times daily).  He reports this regimen is working well for him and he denies side effects.      GERD: Current medications include: omeprazole 20mg once daily.  Pt c/o no current symptoms. Patient feels  that current regimen is effective.      BPH:   Current medications include tamsulosin 0.4 mg daily and finasteride 5 mg daily.  He reports that the tamsulosin helps provide relief, his urination is much more regular. No side effects noted.       Supplements:  Current medications include cholecalciferol 2000 units daily and vit B12 1000 mcg daily. He was started on B12 as a result of low lab levels, likely due to chronic metformin use and age.  He finds this regimen effective and he denies additional side effects.    Vitamin D Deficiency Screening Results:  No results found for: VITDT     Lab Results   Component Value Date    B12 1,618 (H) 06/25/2021      Immunizations:  Patient has received 2 COVID-19 vaccine doses (Moderna - 2/16/21 and 3/16/21).  He received influenza vaccine fall 2020.  He's received Pneumovax since turning 66, has received Prevnar.  Last tetanus was in 2013.  He's also received 2 doses of Shingrix.    Today's Vitals: /61   Pulse 64   Wt 278 lb (126.1 kg)   BMI 34.75 kg/m    ----------------    I spent 30 minutes with this patient today. A copy of the visit note was provided to the patient's primary care provider.    The patient was given a summary of these recommendations.     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

## 2021-07-21 NOTE — PATIENT INSTRUCTIONS
Recommendations from today's MTM visit:                                                    MTM (medication therapy management) is a service provided by a clinical pharmacist designed to help you get the most of out of your medicines.   Today we reviewed what your medicines are for, how to know if they are working, that your medicines are safe and how to make your medicine regimen as easy as possible.      1.  Continue current medication regimen.    Follow-up: Return in about 6 months (around 1/21/2022).    It was great to speak with you today.  I value your experience and would be very thankful for your time with providing feedback on our clinic survey. You may receive a survey via email or text message in the next few days.     To schedule another MTM appointment, please call the clinic directly or you may call the MTM scheduling line at [phone] or toll-free at [phone].     My Clinical Pharmacist's contact information:                                                      Please feel free to contact me with any questions or concerns you have.      Marielos Morocho PharmD, [facility]  Medication Therapy Management Provider  Pager: [phone]

## 2021-09-09 ENCOUNTER — MYC MEDICAL ADVICE (OUTPATIENT)
Dept: PALLIATIVE MEDICINE | Facility: CLINIC | Age: 86
End: 2021-09-09

## 2021-09-24 ENCOUNTER — TELEPHONE (OUTPATIENT)
Dept: FAMILY MEDICINE | Facility: CLINIC | Age: 86
End: 2021-09-24

## 2021-09-24 DIAGNOSIS — I50.31 ACUTE DIASTOLIC HEART FAILURE (H): ICD-10-CM

## 2021-09-24 DIAGNOSIS — I10 ESSENTIAL HYPERTENSION, BENIGN: ICD-10-CM

## 2021-09-24 RX ORDER — FUROSEMIDE 40 MG
40 TABLET ORAL DAILY
Qty: 30 TABLET | Refills: 1 | Status: CANCELLED | OUTPATIENT
Start: 2021-09-24

## 2021-09-24 RX ORDER — LISINOPRIL 10 MG/1
10 TABLET ORAL DAILY
Qty: 90 TABLET | Refills: 3 | Status: CANCELLED | OUTPATIENT
Start: 2021-09-24

## 2021-09-24 NOTE — TELEPHONE ENCOUNTER
Left voice message asking pt to call triage back.On call back, please triage symptoms. See My chart message below.       Dr Hodge:  I have quit taking the penicillin tabs because of DIARHEA problems. I also need mail-order prescriptions for 90 day supply of LISINOPRIL and FUROSEMIDE(lasix).  Francesco says I can't order these that way, must contact provider or pharmacy!!  WHAT IS GOING ON HERE??  Please advise...  Austen hou [email or web address]

## 2021-10-07 DIAGNOSIS — I10 ESSENTIAL HYPERTENSION, BENIGN: ICD-10-CM

## 2021-10-08 RX ORDER — LISINOPRIL 20 MG/1
TABLET ORAL
Qty: 45 TABLET | Refills: 0 | Status: SHIPPED | OUTPATIENT
Start: 2021-10-08 | End: 2021-11-09 | Stop reason: DRUGHIGH

## 2021-10-23 ENCOUNTER — HEALTH MAINTENANCE LETTER (OUTPATIENT)
Age: 86
End: 2021-10-23

## 2021-11-09 ENCOUNTER — OFFICE VISIT (OUTPATIENT)
Dept: FAMILY MEDICINE | Facility: CLINIC | Age: 86
End: 2021-11-09
Payer: COMMERCIAL

## 2021-11-09 VITALS
HEART RATE: 80 BPM | HEIGHT: 75 IN | WEIGHT: 276 LBS | SYSTOLIC BLOOD PRESSURE: 106 MMHG | BODY MASS INDEX: 34.32 KG/M2 | OXYGEN SATURATION: 100 % | TEMPERATURE: 98.2 F | RESPIRATION RATE: 14 BRPM | DIASTOLIC BLOOD PRESSURE: 69 MMHG

## 2021-11-09 DIAGNOSIS — E78.5 HYPERLIPIDEMIA LDL GOAL <70: ICD-10-CM

## 2021-11-09 DIAGNOSIS — E11.59 TYPE 2 DIABETES MELLITUS WITH VASCULAR DISEASE (H): Primary | ICD-10-CM

## 2021-11-09 DIAGNOSIS — Z95.1 S/P CABG (CORONARY ARTERY BYPASS GRAFT): ICD-10-CM

## 2021-11-09 DIAGNOSIS — R25.1 TREMOR: ICD-10-CM

## 2021-11-09 DIAGNOSIS — I10 ESSENTIAL HYPERTENSION, BENIGN: ICD-10-CM

## 2021-11-09 DIAGNOSIS — K21.00 GASTROESOPHAGEAL REFLUX DISEASE WITH ESOPHAGITIS, UNSPECIFIED WHETHER HEMORRHAGE: ICD-10-CM

## 2021-11-09 DIAGNOSIS — R26.89 POOR BALANCE: ICD-10-CM

## 2021-11-09 DIAGNOSIS — E66.01 MORBID OBESITY (H): ICD-10-CM

## 2021-11-09 LAB — HBA1C MFR BLD: 6.8 % (ref 0–5.6)

## 2021-11-09 PROCEDURE — 83036 HEMOGLOBIN GLYCOSYLATED A1C: CPT | Performed by: INTERNAL MEDICINE

## 2021-11-09 PROCEDURE — 82043 UR ALBUMIN QUANTITATIVE: CPT | Performed by: INTERNAL MEDICINE

## 2021-11-09 PROCEDURE — 99214 OFFICE O/P EST MOD 30 MIN: CPT | Performed by: INTERNAL MEDICINE

## 2021-11-09 PROCEDURE — 36415 COLL VENOUS BLD VENIPUNCTURE: CPT | Performed by: INTERNAL MEDICINE

## 2021-11-09 RX ORDER — LISINOPRIL 10 MG/1
10 TABLET ORAL DAILY
Qty: 90 TABLET | Refills: 2 | Status: SHIPPED | OUTPATIENT
Start: 2021-11-09 | End: 2022-04-27

## 2021-11-09 RX ORDER — AMLODIPINE BESYLATE 2.5 MG/1
2.5 TABLET ORAL DAILY
Qty: 90 TABLET | Refills: 3 | Status: SHIPPED | OUTPATIENT
Start: 2021-11-09 | End: 2022-07-22

## 2021-11-09 ASSESSMENT — MIFFLIN-ST. JEOR: SCORE: 2022.56

## 2021-11-09 NOTE — PROGRESS NOTES
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)  This very nice 86 years old gentleman is on Metformin for diabetes and blood sugars are ranging 95 -115  He states that they are very well controlled  We will check an A1c  - Hemoglobin A1c  - Albumin Random Urine Quantitative with Creat Ratio  - Physical Therapy Referral  - Hemoglobin A1c  - Albumin Random Urine Quantitative with Creat Ratio    BENIGN HYPERTENSION  Blood pressure is on the low side and he also has dizzy spells therefore dose of amlodipine will be reduced  - amLODIPine (NORVASC) 2.5 MG tablet  Dispense: 90 tablet; Refill: 3  - lisinopril (ZESTRIL) 10 MG tablet  Dispense: 90 tablet; Refill: 2    Morbid obesity (H)  Patient has lost weight and we will also do physical therapy  - Physical Therapy Referral    Poor balance  Patient is chronically dizzy and is elderly and has overweight  But his balance is very poor and we will arrange balance and rehab therapy  - Physical Therapy Referral  We will consider tapering of gabapentin if he doesn't improve  Hyperlipidemia LDL goal <70  Patient will continue statin s/p CABG    Gastroesophageal reflux disease with esophagitis, unspecified whether hemorrhage  No reflux now but we will continue PPIs and he has no dysphagia    S/P CABG (coronary artery bypass graft)  Patient is without any chest pain or shortness of breath    Tremor  Patient's tremor is slightly worse but primidone was not acceptable and we have decided to continue watching  He is on gabapentin though      Return in about 6 months (around 5/9/2022) for Physical Exam.    Hamzah Hodge MD  [facility] ASHLEY Moya is a 86 year old who presents for the following health issues     HPI     Griffin is checking his blood sugars every day  Blood sugars are excellent and he doesn't have any concerns  His  "blood pressures are lower and he is feeling dizzy  He does not have any leg infection  He has not felt good since his hospital stay in April  He says that his wife urged him to be seen  He does not have significant back pain or GERD    I have reviewed patient's hospital records      Review of Systems   10 point ROS of systems including Constitutional, Eyes, Respiratory, Cardiovascular, Gastroenterology, Genitourinary, Integumentary, Muscularskeletal, Psychiatric were all negative except for pertinent positives noted in my HPI.        Objective    /69 (BP Location: Right arm, Cuff Size: Adult Large)   Pulse 80   Temp 98.2  F (36.8  C) (Temporal)   Resp 14   Ht 1.905 m (6' 3\")   Wt 125.2 kg (276 lb)   SpO2 100%   BMI 34.50 kg/m    Body mass index is 34.5 kg/m .  Physical Exam   GENERAL: healthy, alert and no distress  NECK: no adenopathy, no asymmetry, masses, or scars and thyroid normal to palpation  RESP: lungs clear to auscultation - no rales, rhonchi or wheezes  CV: regular rate and rhythm, normal S1 S2, no S3 or S4, no murmur, click or rub, no peripheral edema and peripheral pulses strong  ABDOMEN: soft, nontender, no hepatosplenomegaly, no masses and bowel sounds normal  MS: no gross musculoskeletal defects noted, no edema    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     Cervicalgia     Benign neoplasm of skin of other and unspecified parts of face     Impacted cerumen     Infected sebaceous cyst     Anxiety     Chronic low back pain     Advanced directives, counseling/discussion     Lumbosacral radiculitis     PENELOPE (obstructive sleep apnea)     Low HDL (under 40)     Hyperlipidemia LDL goal <70     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)     S/P lumbar laminectomy     Health Care Home     Coronary artery disease involving " native coronary artery of native heart without angina pectoris     Left ventricular diastolic dysfunction     Ascending aorta dilatation (H)     Aortic root dilatation (H)     Non morbid obesity, unspecified obesity type     Neck pain     Cellulitis of right lower extremity     Hypoxia     Shortness of breath     Cellulitis     Gastroesophageal reflux disease with esophagitis     Morbid obesity (H)     Sacroiliac joint pain     Dysphagia, unspecified type     Benign prostatic hyperplasia, unspecified whether lower urinary tract symptoms present     Current Outpatient Medications   Medication     acetaminophen (ACETAMIN) 500 MG tablet     amLODIPine (NORVASC) 2.5 MG tablet     amLODIPine (NORVASC) 5 MG tablet     aspirin 81 MG EC tablet     blood glucose (ONETOUCH ULTRA) test strip     blood glucose monitoring (ONE TOUCH ULTRASOFT) lancets     Cholecalciferol (VITAMIN D) 2000 UNIT tablet     diclofenac (VOLTAREN) 1 % topical gel     finasteride (PROSCAR) 5 MG tablet     furosemide (LASIX) 40 MG tablet     gabapentin (NEURONTIN) 300 MG capsule     lisinopril (ZESTRIL) 10 MG tablet     metFORMIN (GLUCOPHAGE-XR) 500 MG 24 hr tablet     omeprazole (PRILOSEC) 20 MG DR capsule     ORDER FOR DME     potassium chloride (KLOR-CON) 20 MEQ packet     pravastatin (PRAVACHOL) 20 MG tablet     tamsulosin (FLOMAX) 0.4 MG capsule     vitamin B-12 (CYANOCOBALAMIN) 1000 MCG tablet     No current facility-administered medications for this visit.

## 2021-11-10 LAB
CREAT UR-MCNC: 212 MG/DL
MICROALBUMIN UR-MCNC: 19 MG/L
MICROALBUMIN/CREAT UR: 8.96 MG/G CR (ref 0–17)

## 2021-11-12 ENCOUNTER — MYC MEDICAL ADVICE (OUTPATIENT)
Dept: FAMILY MEDICINE | Facility: CLINIC | Age: 86
End: 2021-11-12
Payer: COMMERCIAL

## 2021-11-12 DIAGNOSIS — N40.0 BENIGN PROSTATIC HYPERPLASIA, UNSPECIFIED WHETHER LOWER URINARY TRACT SYMPTOMS PRESENT: ICD-10-CM

## 2021-11-15 RX ORDER — TAMSULOSIN HYDROCHLORIDE 0.4 MG/1
CAPSULE ORAL
Qty: 90 CAPSULE | Refills: 1 | Status: SHIPPED | OUTPATIENT
Start: 2021-11-15 | End: 2021-11-19

## 2021-11-19 RX ORDER — TAMSULOSIN HYDROCHLORIDE 0.4 MG/1
0.4 CAPSULE ORAL DAILY
Qty: 90 CAPSULE | Refills: 1 | Status: SHIPPED | OUTPATIENT
Start: 2021-11-19 | End: 2022-07-21

## 2021-11-19 NOTE — TELEPHONE ENCOUNTER
Prescription approved per [facility] Refill Protocol.  Amanda Ladd RN  [facility]

## 2021-12-06 ENCOUNTER — MYC MEDICAL ADVICE (OUTPATIENT)
Dept: FAMILY MEDICINE | Facility: CLINIC | Age: 86
End: 2021-12-06
Payer: COMMERCIAL

## 2021-12-06 DIAGNOSIS — I50.31 ACUTE DIASTOLIC HEART FAILURE (H): ICD-10-CM

## 2021-12-08 RX ORDER — FUROSEMIDE 40 MG
40 TABLET ORAL DAILY
Qty: 90 TABLET | Refills: 2 | Status: SHIPPED | OUTPATIENT
Start: 2021-12-08 | End: 2022-04-27

## 2021-12-08 NOTE — TELEPHONE ENCOUNTER
Prescription approved per [facility] Refill Protocol.  Amanda Ladd RN  [facility]

## 2021-12-16 ENCOUNTER — HOSPITAL ENCOUNTER (OUTPATIENT)
Dept: PHYSICAL THERAPY | Facility: CLINIC | Age: 86
Setting detail: THERAPIES SERIES
End: 2021-12-16
Attending: INTERNAL MEDICINE
Payer: COMMERCIAL

## 2021-12-16 DIAGNOSIS — R26.89 POOR BALANCE: ICD-10-CM

## 2021-12-16 DIAGNOSIS — E11.59 TYPE 2 DIABETES MELLITUS WITH VASCULAR DISEASE (H): ICD-10-CM

## 2021-12-16 DIAGNOSIS — E66.01 MORBID OBESITY (H): ICD-10-CM

## 2021-12-16 PROCEDURE — 97112 NEUROMUSCULAR REEDUCATION: CPT | Mod: GP | Performed by: PHYSICAL THERAPIST

## 2021-12-16 PROCEDURE — 97161 PT EVAL LOW COMPLEX 20 MIN: CPT | Mod: GP | Performed by: PHYSICAL THERAPIST

## 2021-12-16 NOTE — PROGRESS NOTES
12/16/21 1200   Quick Adds   Quick Adds Certification   Type of Visit Initial OP PT Evaluation   General Information   Start of Care Date 12/16/21   Referring Physician Hamzah Hodge MD   Orders Evaluate and Treat as Indicated   Order Date 11/09/21   Medical Diagnosis 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; Morbid obesity; Poor balance    Onset of illness/injury or Date of Surgery 11/09/21   Surgical/Medical history reviewed Yes   Pertinent history of current problem (include personal factors and/or comorbidities that impact the POC) Griffin presents with balance problems. History of type 2 diabetes, obesity, HTN (but also occasionally hypotension that can cause dizziness). Reports that in April of 2021 pt had an episode that involved using the bathroom in the middle of the night. Fell somehow into the tub (not sure if he passed out) but then felt stuck in the tub. Needed assist from adult children to help him out. Was wobbly the rest of the day. Was in the ED for 3-4 days, then spent a week at [facility] to recover. Has felt fairly unsteady ever since. Finds that he needs to catch himself every once in a while. Has had no falls since April. Occasionally uses trekking poles or a walking stick. Pt reports a history a LBP with occasional steroid injections.    Prior level of function comment Was exercising at the [employer] 3x/week   Patient role/Employment history Retired  (system engineering)   Living environment [place]   Patient/Family Goals Statement Have balance assessed   Fall Risk Screen   Fall screen completed by PT   Have you fallen 2 or more times in the past year? Yes   Have you fallen and had an injury in the past year? No   Timed Up and Go score (seconds) 11.7   Is patient a fall risk? Yes   Abuse Screen (yes response referral indicated)   Feels Unsafe at Home or Work/School no   Feels Threatened by Someone no   Does Anyone Try to Keep You From Having Contact with Others or Doing Things Outside Your  Home? no   Physical Signs of Abuse Present no   Pain   Pain comments mild LBP   Cognitive Status Examination   Orientation orientation to person, place and time   Posture   Posture Forward head position   Posture Comments forward at waist   Strength   Strength Comments Demonstrates functional weakness with sit to stand transfers   Bed Mobility   Bed Mobility Comments Independent   Transfer Skills   Transfer Comments Able without UE support but this is challenging   Gait   Gait Comments Occasional path deviations with initail gait. Slower gait pattern overall   Gait Special Tests   Gait Special Tests DYNAMIC GAIT INDEX   Gait Special Tests Dynamic Gait Index   Score out of 24 19   Balance Special Tests   Balance Special Tests Sit to stand reps;Romberg   Balance Special Tests Romberg   Seconds 12 Seconds   Balance Special Tests Sit to Stand Reps in 30 Seconds   Reps in 30 seconds 1   Planned Therapy Interventions   Planned Therapy Interventions balance training;gait training;neuromuscular re-education;strengthening;stretching;transfer training;ROM   Clinical Impression   Criteria for Skilled Therapeutic Interventions Met yes, treatment indicated   PT Diagnosis Impaired mobility   Influenced by the following impairments weakness, instability, mild fatigue   Functional limitations due to impairments difficulty with community mobility, fall risk   Clinical Presentation Stable/Uncomplicated   Clinical Presentation Rationale improving condition   Clinical Decision Making (Complexity) Low complexity   Therapy Frequency 1 time/week   Predicted Duration of Therapy Intervention (days/wks) 90 days   Risk & Benefits of therapy have been explained Yes   Patient, Family & other staff in agreement with plan of care Yes   Education Assessment   Preferred Learning Style Listening;Demonstration;Pictures/video   Barriers to Learning No barriers   GOALS   PT Eval Goals 1;2;3   Goal 1   Goal Identifier DGI   Goal Description The pt will  improve score on DGI to >19/24 due to a reduction in fall risk   Target Date 03/15/22   Goal 2   Goal Identifier Romberg   Goal Description The pt will maintain stability NBOS EC for 30 seconds, improving ability to complete ADL tasks such as showering safely   Target Date 03/15/22   Goal 3   Goal Identifier 30 sec sit to stand   Goal Description The pt will complete 3 sit to stands in 30 seconds due to improvements in LE strength and stability, assisting with transfers from a variety of surface   Target Date 03/15/22   Total Evaluation Time   PT Eval, Low Complexity Minutes (92119) 30   Therapy Certification   Certification date from 12/16/21   Certification date to 03/15/22   Medical Diagnosis 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; Morbid obesity; Poor balance

## 2021-12-16 NOTE — PROGRESS NOTES
[facility]                                                                                   OUTPATIENT PHYSICAL THERAPY FUNCTIONAL EVALUATION  PLAN OF TREATMENT FOR OUTPATIENT REHABILITATION  (COMPLETE FOR INITIAL CLAIMS ONLY)  Patient's Last Name, First Name, M.I.  YOB: 1935  Austen Fowler     Provider's Name   [facility]   Medical Record No.  [id number]     Start of Care Date:  12/16/21   Onset Date:  11/09/21   Type:     _X__PT   ____OT  ____SLP Medical Diagnosis:  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; Morbid obesity; Poor balance      PT Diagnosis:  Impaired mobility Visits from SOC:  1                              __________________________________________________________________________________  Plan of Treatment/Functional Goals:  balance training,gait training,neuromuscular re-education,strengthening,stretching,transfer training,ROM           GOALS  DGI  The pt will improve score on DGI to >19/24 due to a reduction in fall risk  03/15/22    Romberg  The pt will maintain stability NBOS EC for 30 seconds, improving ability to complete ADL tasks such as showering safely  03/15/22    30 sec sit to stand  The pt will complete 3 sit to stands in 30 seconds due to improvements in LE strength and stability, assisting with transfers from a variety of surface  03/15/22                                                           Therapy Frequency:  1 time/week   Predicted Duration of Therapy Intervention:  90 days    Re Burton, PT                                    I CERTIFY THE NEED FOR THESE SERVICES FURNISHED UNDER        THIS PLAN OF TREATMENT AND WHILE UNDER MY CARE     (Physician co-signature of this document indicates review and certification of the therapy plan).                Certification Date From:  12/16/21   Certification Date To:   03/15/22    Referring Provider:  Hamzah Hodge MD    Initial Assessment  See Epic Evaluation- Start of Care Date: 12/16/21

## 2021-12-20 ENCOUNTER — HOSPITAL ENCOUNTER (OUTPATIENT)
Dept: PHYSICAL THERAPY | Facility: CLINIC | Age: 86
Setting detail: THERAPIES SERIES
End: 2021-12-20
Attending: INTERNAL MEDICINE
Payer: COMMERCIAL

## 2021-12-20 PROCEDURE — 97110 THERAPEUTIC EXERCISES: CPT | Mod: GP | Performed by: PHYSICAL THERAPIST

## 2021-12-20 PROCEDURE — 97112 NEUROMUSCULAR REEDUCATION: CPT | Mod: GP | Performed by: PHYSICAL THERAPIST

## 2021-12-30 ENCOUNTER — HOSPITAL ENCOUNTER (OUTPATIENT)
Dept: PHYSICAL THERAPY | Facility: CLINIC | Age: 86
Setting detail: THERAPIES SERIES
End: 2021-12-30
Attending: INTERNAL MEDICINE
Payer: COMMERCIAL

## 2021-12-30 PROCEDURE — 97110 THERAPEUTIC EXERCISES: CPT | Mod: GP | Performed by: PHYSICAL THERAPIST

## 2021-12-30 PROCEDURE — 97112 NEUROMUSCULAR REEDUCATION: CPT | Mod: GP | Performed by: PHYSICAL THERAPIST

## 2022-01-03 NOTE — ADDENDUM NOTE
Addended by: AUGUSTO REDDY on: 4/13/2018 10:13 AM     Modules accepted: Orders     cervical spinal fusion

## 2022-01-11 ENCOUNTER — HOSPITAL ENCOUNTER (OUTPATIENT)
Dept: PHYSICAL THERAPY | Facility: CLINIC | Age: 87
Setting detail: THERAPIES SERIES
End: 2022-01-11
Attending: INTERNAL MEDICINE
Payer: COMMERCIAL

## 2022-01-11 PROCEDURE — 97110 THERAPEUTIC EXERCISES: CPT | Mod: GP | Performed by: PHYSICAL THERAPIST

## 2022-01-18 ENCOUNTER — HOSPITAL ENCOUNTER (OUTPATIENT)
Dept: PHYSICAL THERAPY | Facility: CLINIC | Age: 87
Setting detail: THERAPIES SERIES
End: 2022-01-18
Attending: INTERNAL MEDICINE
Payer: COMMERCIAL

## 2022-01-18 PROCEDURE — 97110 THERAPEUTIC EXERCISES: CPT | Mod: GP | Performed by: PHYSICAL THERAPIST

## 2022-01-18 PROCEDURE — 97112 NEUROMUSCULAR REEDUCATION: CPT | Mod: GP | Performed by: PHYSICAL THERAPIST

## 2022-02-03 ENCOUNTER — HOSPITAL ENCOUNTER (OUTPATIENT)
Dept: PHYSICAL THERAPY | Facility: CLINIC | Age: 87
End: 2022-02-03
Payer: COMMERCIAL

## 2022-02-03 DIAGNOSIS — R26.89 BALANCE PROBLEMS: ICD-10-CM

## 2022-02-03 DIAGNOSIS — E11.59 TYPE 2 DIABETES MELLITUS WITH VASCULAR DISEASE (H): Primary | ICD-10-CM

## 2022-02-03 PROCEDURE — 97112 NEUROMUSCULAR REEDUCATION: CPT | Mod: GP | Performed by: PHYSICAL THERAPIST

## 2022-02-10 ENCOUNTER — HOSPITAL ENCOUNTER (OUTPATIENT)
Dept: PHYSICAL THERAPY | Facility: CLINIC | Age: 87
End: 2022-02-10
Payer: COMMERCIAL

## 2022-02-10 DIAGNOSIS — R26.89 BALANCE PROBLEMS: ICD-10-CM

## 2022-02-10 DIAGNOSIS — E11.59 TYPE 2 DIABETES MELLITUS WITH VASCULAR DISEASE (H): Primary | ICD-10-CM

## 2022-02-10 PROCEDURE — 97112 NEUROMUSCULAR REEDUCATION: CPT | Mod: GP | Performed by: PHYSICAL THERAPIST

## 2022-02-17 ENCOUNTER — HOSPITAL ENCOUNTER (OUTPATIENT)
Dept: PHYSICAL THERAPY | Facility: CLINIC | Age: 87
End: 2022-02-17
Payer: COMMERCIAL

## 2022-02-17 DIAGNOSIS — E11.59 TYPE 2 DIABETES MELLITUS WITH VASCULAR DISEASE (H): Primary | ICD-10-CM

## 2022-02-17 DIAGNOSIS — R26.89 BALANCE PROBLEMS: ICD-10-CM

## 2022-02-17 PROCEDURE — 97112 NEUROMUSCULAR REEDUCATION: CPT | Mod: GP | Performed by: PHYSICAL THERAPIST

## 2022-02-18 DIAGNOSIS — E11.59 TYPE 2 DIABETES MELLITUS WITH VASCULAR DISEASE (H): ICD-10-CM

## 2022-02-21 RX ORDER — LANCETS
EACH MISCELLANEOUS
Qty: 200 EACH | Refills: 1 | Status: SHIPPED | OUTPATIENT
Start: 2022-02-21 | End: 2022-10-03

## 2022-02-21 NOTE — TELEPHONE ENCOUNTER
Prescription approved per [facility] Refill Protocol.    Orquidea DEWITT RN  EP Triage

## 2022-03-04 DIAGNOSIS — Z95.1 S/P CABG (CORONARY ARTERY BYPASS GRAFT): ICD-10-CM

## 2022-03-04 DIAGNOSIS — E78.5 HYPERLIPIDEMIA LDL GOAL <70: ICD-10-CM

## 2022-03-04 DIAGNOSIS — I10 BENIGN ESSENTIAL HYPERTENSION: ICD-10-CM

## 2022-03-04 DIAGNOSIS — I25.10 CORONARY ARTERY DISEASE INVOLVING NATIVE CORONARY ARTERY OF NATIVE HEART WITHOUT ANGINA PECTORIS: Primary | ICD-10-CM

## 2022-03-07 ENCOUNTER — MYC MEDICAL ADVICE (OUTPATIENT)
Dept: FAMILY MEDICINE | Facility: CLINIC | Age: 87
End: 2022-03-07
Payer: COMMERCIAL

## 2022-03-07 DIAGNOSIS — M53.3 SACROILIAC JOINT PAIN: ICD-10-CM

## 2022-03-07 DIAGNOSIS — G89.4 CHRONIC PAIN SYNDROME: ICD-10-CM

## 2022-03-08 RX ORDER — GABAPENTIN 300 MG/1
CAPSULE ORAL
Qty: 270 CAPSULE | Refills: 3 | Status: SHIPPED | OUTPATIENT
Start: 2022-03-08 | End: 2022-03-21

## 2022-03-08 RX ORDER — GABAPENTIN 300 MG/1
900 CAPSULE ORAL 3 TIMES DAILY
Qty: 270 CAPSULE | Refills: 4 | Status: SHIPPED | OUTPATIENT
Start: 2022-03-08 | End: 2022-09-19

## 2022-03-08 NOTE — TELEPHONE ENCOUNTER
PCP: patient requesting temporary supply to be sent to local pharmacy.   Current [facility]. Pended script to local pharmacy for 30 day supply- please review.     Richie Vasquez RN  [facility]

## 2022-03-08 NOTE — TELEPHONE ENCOUNTER
gabapentin (NEURONTIN) 300 MG capsule 270 capsule 3 6/25/2021  No   Sig - Route: Take 3 capsules (900 mg) by mouth 3 times daily - Fasting physical due for further refills - Oral   Sent to pharmacy as: Gabapentin 300 MG Oral Capsule (NEURONTIN)   Class: E-Prescribe   Order: [id number]   E-Prescribing Status: Receipt confirmed by pharmacy (6/25/2021 11:16 AM CDT)     Last visit 11/9/21     Routing refill request to provider for review/approval because:  Drug not on the [facility] refill protocol     Korey Hansen RN  [facility]

## 2022-03-14 ENCOUNTER — MYC MEDICAL ADVICE (OUTPATIENT)
Dept: CARDIOLOGY | Facility: CLINIC | Age: 87
End: 2022-03-14
Payer: COMMERCIAL

## 2022-03-21 ENCOUNTER — OFFICE VISIT (OUTPATIENT)
Dept: PHARMACY | Facility: CLINIC | Age: 87
End: 2022-03-21

## 2022-03-21 VITALS
SYSTOLIC BLOOD PRESSURE: 121 MMHG | BODY MASS INDEX: 34.37 KG/M2 | HEART RATE: 77 BPM | DIASTOLIC BLOOD PRESSURE: 76 MMHG | WEIGHT: 275 LBS

## 2022-03-21 DIAGNOSIS — M54.2 CERVICALGIA: ICD-10-CM

## 2022-03-21 DIAGNOSIS — J06.9 UPPER RESPIRATORY TRACT INFECTION, UNSPECIFIED TYPE: Primary | ICD-10-CM

## 2022-03-21 DIAGNOSIS — Z78.9 TAKES DIETARY SUPPLEMENTS: ICD-10-CM

## 2022-03-21 DIAGNOSIS — R60.9 EDEMA, UNSPECIFIED TYPE: ICD-10-CM

## 2022-03-21 DIAGNOSIS — E78.5 HYPERLIPIDEMIA LDL GOAL <70: ICD-10-CM

## 2022-03-21 DIAGNOSIS — K21.00 GASTROESOPHAGEAL REFLUX DISEASE WITH ESOPHAGITIS WITHOUT HEMORRHAGE: ICD-10-CM

## 2022-03-21 DIAGNOSIS — E11.59 TYPE 2 DIABETES MELLITUS WITH VASCULAR DISEASE (H): ICD-10-CM

## 2022-03-21 DIAGNOSIS — I10 ESSENTIAL HYPERTENSION, BENIGN: ICD-10-CM

## 2022-03-21 DIAGNOSIS — R39.198 SLOWING OF URINARY STREAM: ICD-10-CM

## 2022-03-21 DIAGNOSIS — I25.10 CORONARY ARTERY DISEASE INVOLVING NATIVE CORONARY ARTERY OF NATIVE HEART WITHOUT ANGINA PECTORIS: ICD-10-CM

## 2022-03-21 PROCEDURE — 99606 MTMS BY PHARM EST 15 MIN: CPT | Performed by: PHARMACIST

## 2022-03-21 PROCEDURE — 99607 MTMS BY PHARM ADDL 15 MIN: CPT | Performed by: PHARMACIST

## 2022-03-21 NOTE — PROGRESS NOTES
Medication Therapy Management (MTM) Encounter    ASSESSMENT:                            Medication Adherence/Access: No issues identified    URI:  No indication for antibiotic therapy, and intermittent use can lead to antibiotic resistance.  Needed additional education regarding this.     Type 2 Diabetes:  A1c is at goal <8%, but SMBG is not at goal 90-150mg/dL.  would benefit from increasing exercise back to baseline levels.  Patient is due for A1C recheck.      Hypertension/CAD/Edema: Stable.  Blood pressure is at goal <130/80mmHg.     Hyperlipidemia: Stable.  Further increase in statin therapy is not necessary as LDL is close to 40mg/dL.     Back/Neck Pain: Stable.     GERD: Stable     BPH:  Stable     Supplements:  Stable.     PLAN:                            1.  Stop taking Penicillin VK. If respiratory symptoms worsen seek medical attention.   2.  Increase excercise to improve blood glucose readings.   3.  A1c ordered to have done with labs scheduled in April.    Follow-up: Return in 4 weeks (on 4/18/2022) for Lab Work.    SUBJECTIVE/OBJECTIVE:                          Austen Fowler is a 86 year old male seen for a follow-up visit. Today's visit is a follow-up MTM visit from 7/21/2021.  Griffin is joined by his wife for our visit today.    Reason for visit: Routine f/up.     Tobacco: He reports that he quit smoking about 30 years ago. His smoking use included cigarettes, cigars, and pipe. He started smoking about 68 years ago. He has a 60.00 pack-year smoking history. He has never used smokeless tobacco.  Alcohol: Less than 1 beverages / week    Medication Adherence/Access: no issues reported    URI:  Patient reports having a recent URI, he had penicillin VK left over from cellulitis last year, so he's been taking this intermittently.  It causes GI upset so hasn't been taking regularly.  He reports symptoms have virtually resolved at this point.    Type 2 Diabetes:  Currently taking Metformin XR 1000mg twice  daily. Patient is not experiencing side effects.   Blood sugar monitorin time daily in the morning. Ranges (patient reported): 180s-190s, which is up from 140s-150s earlier this year.   Symptoms of low blood sugar? none  Symptoms of high blood sugar? none  Diet/Exercise: Exercise has decreased since his gym has not been available to him. Patient is motivated to increase activity as weather improves.   Aspirin: Taking 81mg daily and denies side effects  Statin: Yes: Pravastatin 20mg daily  ACEi/ARB: Yes: Lisinopril 10mg daily.   Urine Albumin:   Lab Results   Component Value Date    UMALCR 8.96 2021      Lab Results   Component Value Date    A1C 6.8 2021    A1C 6.6 2021    A1C 6.4 2021    A1C 6.3 2020    A1C 5.8 2020    A1C 6.5 10/08/2019     GFR Estimate   Date Value Ref Range Status   2021 85 >60 mL/min/[1.73_m2] Final     Comment:     Non  GFR Calc  Starting 2018, serum creatinine based estimated GFR (eGFR) will be   calculated using the Chronic Kidney Disease Epidemiology Collaboration   (CKD-EPI) equation.       Hypertension/CAD/Edema: Current medications include aspirin 81mg daily, amlodipine 2.5mg daily, furosemide 40mg every other day (with corresponding KCl 20mEq twice daily) and lisinopril 10mg daily. He does monitor his BP at home and reports it's been very stable - systolic typically around 110mg, diastolic in the 70s.  He denies any chest pains or side effects of therapy.  Pt follows with cardiology and has follow-up labs scheduled in April.     BP Readings from Last 3 Encounters:   22 121/76   21 106/69   21 105/61     Potassium   Date Value Ref Range Status   2021 3.7 3.4 - 5.3 mmol/L Final     Hyperlipidemia: Current therapy includes pravastatin 20mg daily.  Patient reports no significant myalgias or other side effects.    Recent Labs   Lab Test 21  1149 21  0842 16  0801 11/12/15  0807  07/28/15  0855   CHOL 116 126   < > 102 108   HDL 38* 38*   < > 28* 31*   LDL 46 42   < > 45* 51*   TRIG 158* 229*   < > 144 132   CHOLHDLRATIO  --   --   --  3.6 3.5    < > = values in this interval not displayed.     Back/Neck Pain: Current regimen includes Voltaren gel as needed, acetaminophen 1000mg twice daily (might take a 3rd dose during the day if needed) and gabapentin 900mg twice daily (although prescribed for three times daily).  He reports this regimen is working well for him and he denies side effects.       GERD: Current medications include: omeprazole 20mg once daily.  No complaints of heartburn.      BPH: Patient is stable on tamsulosin 0.4 mg daily and finasteride 5 mg daily.  He reports urinary symptoms are stable.  No side effects reported.     Supplements:  Current medications include cholecalciferol 2000 units daily and vit B12 1000 mcg daily.  No sdie effects reported.  Vitamin D Deficiency Screening Results:  No results found for: VITDT     Lab Results   Component Value Date    B12 1,618 (H) 06/25/2021      Today's Vitals: /76   Pulse 77   Wt 275 lb (124.7 kg)   BMI 34.37 kg/m    ----------------    I spent 45 minutes with this patient today. All changes were made via collaborative practice agreement with Dr. Hodge. A copy of the visit note was provided to the patient's provider(s).    The patient was given a summary of these recommendations.     Collin Patterson, PharmD candidate    Andrey JiangD, [facility]  Medication Therapy Management Provider  Pager: [phone]      Medication Therapy Recommendations  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)    Current Medication: metFORMIN (GLUCOPHAGE-XR) 500 MG 24 hr tablet   Rationale: Medication requires monitoring - Needs additional monitoring - Effectiveness   Recommendation: Order Lab   Status: Accepted per CPA         Upper respiratory tract infection, unspecified type    Rationale: No medical indication at this time -  Unnecessary medication therapy - Indication   Recommendation: Discontinue Medication   Status: Patient Agreed - Adherence/Education

## 2022-03-21 NOTE — PATIENT INSTRUCTIONS
Recommendations from today's MTM visit:                                                    MTM (medication therapy management) is a service provided by a clinical pharmacist designed to help you get the most of out of your medicines.   Today we reviewed what your medicines are for, how to know if they are working, that your medicines are safe and how to make your medicine regimen as easy as possible.      1.  Stop taking Penicillin VK. If respiratory symptoms worsen seek medical attention.   2.  Increase excercise to improve blood glucose readings.     Follow-up: Return pending labs.    It was great to speak with you today.  I value your experience and would be very thankful for your time with providing feedback on our clinic survey. You may receive a survey via email or text message in the next few days.     To schedule another MTM appointment, please call the clinic directly or you may call the MTM scheduling line at [phone] or toll-free at [phone].     My Clinical Pharmacist's contact information:                                                      Please feel free to contact me with any questions or concerns you have.      Marielos Morocho, Liam, [facility]  Medication Therapy Management Provider  Pager: [phone]

## 2022-03-22 ENCOUNTER — HOSPITAL ENCOUNTER (OUTPATIENT)
Dept: PHYSICAL THERAPY | Facility: CLINIC | Age: 87
Discharge: HOME | End: 2022-03-22
Payer: COMMERCIAL

## 2022-03-22 DIAGNOSIS — M62.81 GENERALIZED MUSCLE WEAKNESS: ICD-10-CM

## 2022-03-22 DIAGNOSIS — E11.59 TYPE 2 DIABETES MELLITUS WITH VASCULAR DISEASE (H): Primary | ICD-10-CM

## 2022-03-22 DIAGNOSIS — R26.89 BALANCE PROBLEMS: ICD-10-CM

## 2022-03-22 PROCEDURE — 97112 NEUROMUSCULAR REEDUCATION: CPT | Mod: GP | Performed by: PHYSICAL THERAPIST

## 2022-03-22 NOTE — PROGRESS NOTES
[facility]    Outpatient Physical Therapy Discharge Note  Patient: Austen Fowler  : 1935    Beginning/End Dates of Reporting Period:  21 to 3/22/22    Referring Provider: Dr. Hodge    Therapy Diagnosis: Impaired mobility     Client Self Report: Had a cold recently, a little under the weather    Objective Measurements:  Objective Measure: DGI  Details:   Objective Measure: Romberg  Details: 30 sec EC  Objective Measure: 30 sec sit to stand  Details: 8 reps                    Goals:  Goal Identifier DGI   Goal Description The pt will improve score on DGI to >/24 due to a reduction in fall risk   Target Date 22   Date Met   3/22/22   Progress (detail required for progress note):       Goal Identifier Romberg   Goal Description The pt will maintain stability NBOS EC for 30 seconds, improving ability to complete ADL tasks such as showering safely   Target Date 22   Date Met   3/22/22   Progress (detail required for progress note):       Goal Identifier 30 sec sit to stand   Goal Description The pt will complete 3 sit to stands in 30 seconds due to improvements in LE strength and stability, assisting with transfers from a variety of surface   Target Date 22   Date Met   3/22/22   Progress (detail required for progress note):           Plan:  Discharge from therapy.    Discharge:    Reason for Discharge: Patient has met all goals.      Discharge Plan: Patient to continue home program.

## 2022-03-22 NOTE — PROGRESS NOTES
[facility]    OUTPATIENT PHYSICAL THERAPY  PLAN OF TREATMENT FOR OUTPATIENT REHABILITATION AND PROGRESS NOTE           Patient's Last Name, First Name, Austen Turner  Michael Date of Birth  1935   Provider's Name  [facility] Medical Record No.  [id number]    Onset Date  11/9/21 Start of Care Date  12/16/21   Type:     _X_PT   ___OT   ___SLP Medical Diagnosis  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; Morbid obesity; Poor balance       PT Diagnosis  Impaired mobility Plan of Treatment  Frequency/Duration: 1 final visit or 30 day follow up to ensure pt able to maintain balance  Certification date from 3/15/22 to 3/22/22     Goals:  Goal Identifier DGI   Goal Description The pt will improve score on DGI to >19/24 due to a reduction in fall risk   Target Date 03/22/22   Date Met      Progress (detail required for progress note):       Goal Identifier Romberg   Goal Description The pt will maintain stability NBOS EC for 30 seconds, improving ability to complete ADL tasks such as showering safely   Target Date 03/22/22   Date Met      Progress (detail required for progress note):       Goal Identifier 30 sec sit to stand   Goal Description The pt will complete 3 sit to stands in 30 seconds due to improvements in LE strength and stability, assisting with transfers from a variety of surface   Target Date 03/22/22   Date Met      Progress (detail required for progress note):       Pt's final appointment needed to be rescheduled outside of cert date. Cert extended 1 week to finalize HEP.           I CERTIFY THE NEED FOR THESE SERVICES FURNISHED UNDER        THIS PLAN OF TREATMENT AND WHILE UNDER MY CARE     (Physician co-signature of this document indicates review and certification of the therapy plan).                Referring Provider: Dr. Ayesha GILL  [place], PT

## 2022-04-18 ENCOUNTER — TELEPHONE (OUTPATIENT)
Dept: CARDIOLOGY | Facility: CLINIC | Age: 87
End: 2022-04-18

## 2022-04-18 ENCOUNTER — LAB (OUTPATIENT)
Dept: LAB | Facility: CLINIC | Age: 87
End: 2022-04-18
Payer: COMMERCIAL

## 2022-04-18 ENCOUNTER — HOSPITAL ENCOUNTER (OUTPATIENT)
Dept: CARDIOLOGY | Facility: CLINIC | Age: 87
Discharge: HOME | End: 2022-04-18
Attending: INTERNAL MEDICINE | Admitting: INTERNAL MEDICINE
Payer: COMMERCIAL

## 2022-04-18 DIAGNOSIS — I77.810 AORTIC ROOT DILATATION (H): Primary | ICD-10-CM

## 2022-04-18 DIAGNOSIS — I50.31 ACUTE DIASTOLIC HEART FAILURE (H): ICD-10-CM

## 2022-04-18 DIAGNOSIS — I25.10 CORONARY ARTERY DISEASE INVOLVING NATIVE CORONARY ARTERY OF NATIVE HEART WITHOUT ANGINA PECTORIS: ICD-10-CM

## 2022-04-18 DIAGNOSIS — Z95.1 S/P CABG (CORONARY ARTERY BYPASS GRAFT): ICD-10-CM

## 2022-04-18 DIAGNOSIS — E78.5 HYPERLIPIDEMIA LDL GOAL <70: ICD-10-CM

## 2022-04-18 DIAGNOSIS — I10 BENIGN ESSENTIAL HYPERTENSION: ICD-10-CM

## 2022-04-18 DIAGNOSIS — E87.6 HYPOKALEMIA: Primary | ICD-10-CM

## 2022-04-18 DIAGNOSIS — E11.59 TYPE 2 DIABETES MELLITUS WITH VASCULAR DISEASE (H): ICD-10-CM

## 2022-04-18 LAB
ALT SERPL W P-5'-P-CCNC: 22 U/L (ref 0–70)
ANION GAP SERPL CALCULATED.3IONS-SCNC: 6 MMOL/L (ref 3–14)
BUN SERPL-MCNC: 14 MG/DL (ref 7–30)
CALCIUM SERPL-MCNC: 9.4 MG/DL (ref 8.5–10.1)
CHLORIDE BLD-SCNC: 106 MMOL/L (ref 94–109)
CHOLEST SERPL-MCNC: 123 MG/DL
CO2 SERPL-SCNC: 27 MMOL/L (ref 20–32)
CREAT SERPL-MCNC: 0.69 MG/DL (ref 0.66–1.25)
FASTING STATUS PATIENT QL REPORTED: YES
GFR SERPL CREATININE-BSD FRML MDRD: 90 ML/MIN/1.73M2
GLUCOSE BLD-MCNC: 180 MG/DL (ref 70–99)
HBA1C MFR BLD: 7.1 % (ref 0–5.6)
HDLC SERPL-MCNC: 41 MG/DL
LDLC SERPL CALC-MCNC: 54 MG/DL
LVEF ECHO: NORMAL
NONHDLC SERPL-MCNC: 82 MG/DL
POTASSIUM BLD-SCNC: 3.2 MMOL/L (ref 3.4–5.3)
SODIUM SERPL-SCNC: 139 MMOL/L (ref 133–144)
TRIGL SERPL-MCNC: 142 MG/DL

## 2022-04-18 PROCEDURE — 83036 HEMOGLOBIN GLYCOSYLATED A1C: CPT | Performed by: INTERNAL MEDICINE

## 2022-04-18 PROCEDURE — 84460 ALANINE AMINO (ALT) (SGPT): CPT | Performed by: INTERNAL MEDICINE

## 2022-04-18 PROCEDURE — 80048 BASIC METABOLIC PNL TOTAL CA: CPT | Performed by: INTERNAL MEDICINE

## 2022-04-18 PROCEDURE — 36415 COLL VENOUS BLD VENIPUNCTURE: CPT | Performed by: INTERNAL MEDICINE

## 2022-04-18 PROCEDURE — 80061 LIPID PANEL: CPT | Performed by: INTERNAL MEDICINE

## 2022-04-18 PROCEDURE — 93306 TTE W/DOPPLER COMPLETE: CPT | Mod: 26 | Performed by: INTERNAL MEDICINE

## 2022-04-18 PROCEDURE — 255N000002 HC RX 255 OP 636: Performed by: INTERNAL MEDICINE

## 2022-04-18 PROCEDURE — 999N000208 ECHOCARDIOGRAM COMPLETE

## 2022-04-18 RX ADMIN — HUMAN ALBUMIN MICROSPHERES AND PERFLUTREN 3 ML: 10; .22 INJECTION, SOLUTION INTRAVENOUS at 09:20

## 2022-04-18 NOTE — TELEPHONE ENCOUNTER
Reviewed BMP showing   Recent Labs   Lab Test 04/18/22  0840 06/25/21  1149    141   POTASSIUM 3.2* 3.7   CHLORIDE 106 108   CO2 27 28   ANIONGAP 6 5   * 166*   BUN 14 15   CR 0.69 0.72   BRANDON 9.4 9.1       Called pt with results, states he he has been taking the potassium 20 mEq twice daily, states he rarely misses a dose. Pt has appt to see Dr. Hernandez 4/27/22; will message Dr. Hernandez to review. Salas GALE

## 2022-04-19 RX ORDER — POTASSIUM CHLORIDE 1.5 G/1.58G
20 POWDER, FOR SOLUTION ORAL 3 TIMES DAILY
Qty: 180 PACKET | Refills: 3
Start: 2022-04-19 | End: 2022-06-06

## 2022-04-19 NOTE — TELEPHONE ENCOUNTER
Discussed recommendations from Dr. Hernandez that pt is current taking potassium 20 mEq twice daily, recommends then to increase to 30 mEq twice daily. Called pt with recommendations. Pt takes the packets of potassium 20 mEq which are difficult to divide so he will take 20 mEq three times daily. Pt advised to recheck before seeing Dr. Hernandez next week & scheduled 4/26/22 for BMP & will see Dr. Hernandez 4/27/22 as scheduled. Order in chart. Salas GALE

## 2022-04-21 DIAGNOSIS — R13.10 DYSPHAGIA, UNSPECIFIED TYPE: ICD-10-CM

## 2022-04-22 ENCOUNTER — E-VISIT (OUTPATIENT)
Dept: FAMILY MEDICINE | Facility: CLINIC | Age: 87
End: 2022-04-22
Payer: COMMERCIAL

## 2022-04-22 DIAGNOSIS — K21.00 GASTROESOPHAGEAL REFLUX DISEASE WITH ESOPHAGITIS WITHOUT HEMORRHAGE: Primary | ICD-10-CM

## 2022-04-22 PROCEDURE — 99207 PR NO BILLABLE SERVICE THIS VISIT: CPT | Performed by: INTERNAL MEDICINE

## 2022-04-22 NOTE — TELEPHONE ENCOUNTER
Patient has refills remaining with requesting pharmacy.  Janki HAMM - Registered Nurse  [facility]  Acute and Diagnostic Services

## 2022-04-25 NOTE — PATIENT INSTRUCTIONS
Thank you for choosing us for your care. I have placed an order for a prescription so that you can start treatment. View your full visit summary for details by clicking on the link below. Your pharmacist will able to address any questions you may have about the medication.     If you're not feeling better within 5-7 days, please schedule an appointment.  You can schedule an appointment right here in [facility], or call [phone]  If the visit is for the same symptoms as your eVisit, we'll refund the cost of your eVisit if seen within seven days.

## 2022-04-26 ENCOUNTER — LAB (OUTPATIENT)
Dept: LAB | Facility: CLINIC | Age: 87
End: 2022-04-26
Payer: COMMERCIAL

## 2022-04-26 DIAGNOSIS — E87.6 HYPOKALEMIA: ICD-10-CM

## 2022-04-26 LAB
ANION GAP SERPL CALCULATED.3IONS-SCNC: 5 MMOL/L (ref 3–14)
BUN SERPL-MCNC: 12 MG/DL (ref 7–30)
CALCIUM SERPL-MCNC: 9.6 MG/DL (ref 8.5–10.1)
CHLORIDE BLD-SCNC: 104 MMOL/L (ref 94–109)
CO2 SERPL-SCNC: 28 MMOL/L (ref 20–32)
CREAT SERPL-MCNC: 0.71 MG/DL (ref 0.66–1.25)
GFR SERPL CREATININE-BSD FRML MDRD: 89 ML/MIN/1.73M2
GLUCOSE BLD-MCNC: 131 MG/DL (ref 70–99)
POTASSIUM BLD-SCNC: 3.7 MMOL/L (ref 3.4–5.3)
SODIUM SERPL-SCNC: 137 MMOL/L (ref 133–144)

## 2022-04-26 PROCEDURE — 36415 COLL VENOUS BLD VENIPUNCTURE: CPT | Performed by: INTERNAL MEDICINE

## 2022-04-26 PROCEDURE — 80048 BASIC METABOLIC PNL TOTAL CA: CPT | Performed by: INTERNAL MEDICINE

## 2022-04-27 ENCOUNTER — OFFICE VISIT (OUTPATIENT)
Dept: CARDIOLOGY | Facility: CLINIC | Age: 87
End: 2022-04-27
Payer: COMMERCIAL

## 2022-04-27 VITALS
DIASTOLIC BLOOD PRESSURE: 60 MMHG | BODY MASS INDEX: 34.42 KG/M2 | HEART RATE: 75 BPM | WEIGHT: 276.8 LBS | HEIGHT: 75 IN | OXYGEN SATURATION: 95 % | SYSTOLIC BLOOD PRESSURE: 100 MMHG

## 2022-04-27 DIAGNOSIS — I25.10 CORONARY ARTERY DISEASE INVOLVING NATIVE CORONARY ARTERY OF NATIVE HEART WITHOUT ANGINA PECTORIS: Primary | ICD-10-CM

## 2022-04-27 DIAGNOSIS — I50.31 ACUTE DIASTOLIC HEART FAILURE (H): ICD-10-CM

## 2022-04-27 DIAGNOSIS — I10 ESSENTIAL HYPERTENSION, BENIGN: ICD-10-CM

## 2022-04-27 DIAGNOSIS — R13.10 DYSPHAGIA, UNSPECIFIED TYPE: ICD-10-CM

## 2022-04-27 DIAGNOSIS — Z95.1 S/P CABG (CORONARY ARTERY BYPASS GRAFT): ICD-10-CM

## 2022-04-27 DIAGNOSIS — I10 BENIGN ESSENTIAL HYPERTENSION: ICD-10-CM

## 2022-04-27 DIAGNOSIS — E78.5 HYPERLIPIDEMIA LDL GOAL <70: ICD-10-CM

## 2022-04-27 DIAGNOSIS — E78.6 HDL DEFICIENCY: ICD-10-CM

## 2022-04-27 PROCEDURE — 99214 OFFICE O/P EST MOD 30 MIN: CPT | Performed by: INTERNAL MEDICINE

## 2022-04-27 RX ORDER — LISINOPRIL 10 MG/1
10 TABLET ORAL DAILY
Qty: 90 TABLET | Refills: 3 | Status: SHIPPED | OUTPATIENT
Start: 2022-04-27 | End: 2023-03-22

## 2022-04-27 RX ORDER — PRAVASTATIN SODIUM 20 MG
20 TABLET ORAL DAILY
Qty: 90 TABLET | Refills: 3 | Status: SHIPPED | OUTPATIENT
Start: 2022-04-27 | End: 2023-05-12

## 2022-04-27 RX ORDER — FUROSEMIDE 40 MG
40 TABLET ORAL DAILY
Qty: 90 TABLET | Refills: 3 | Status: SHIPPED | OUTPATIENT
Start: 2022-04-27 | End: 2023-05-12

## 2022-04-27 NOTE — PROGRESS NOTES
HPI and Plan:   See dictation          Orders Placed This Encounter   Procedures     Basic metabolic panel     Lipid Profile     ALT     Basic metabolic panel     Lipid Profile     ALT     Follow-Up with Cardiology LILY     Follow-Up with Cardiology     Echocardiogram Complete       Orders Placed This Encounter   Medications     furosemide (LASIX) 40 MG tablet     Sig: Take 1 tablet (40 mg) by mouth daily     Dispense:  90 tablet     Refill:  3     lisinopril (ZESTRIL) 10 MG tablet     Sig: Take 1 tablet (10 mg) by mouth daily     Dispense:  90 tablet     Refill:  3     omeprazole (PRILOSEC) 20 MG DR capsule     Sig: Take 1 capsule (20 mg) by mouth daily     Dispense:  90 capsule     Refill:  3     pravastatin (PRAVACHOL) 20 MG tablet     Sig: Take 1 tablet (20 mg) by mouth daily - due for fasting physical with Dr Hodge for refills     Dispense:  90 tablet     Refill:  3       Medications Discontinued During This Encounter   Medication Reason     omeprazole (PRILOSEC) 20 MG DR capsule Medication Reconciliation Clean Up     pravastatin (PRAVACHOL) 20 MG tablet Reorder     omeprazole (PRILOSEC) 20 MG DR capsule Reorder     lisinopril (ZESTRIL) 10 MG tablet Reorder     furosemide (LASIX) 40 MG tablet Reorder         Encounter Diagnoses   Name Primary?     Coronary artery disease involving native coronary artery of native heart without angina pectoris Yes     Hyperlipidemia LDL goal <70      Benign essential hypertension      Acute diastolic heart failure (H)      BENIGN HYPERTENSION      Dysphagia, unspecified type      S/P CABG (coronary artery bypass graft)      HDL deficiency        CURRENT MEDICATIONS:  Current Outpatient Medications   Medication Sig Dispense Refill     acetaminophen (ACETAMIN) 500 MG tablet Take 1,000 mg by mouth 2 times daily       amLODIPine (NORVASC) 2.5 MG tablet Take 1 tablet (2.5 mg) by mouth daily 90 tablet 3     aspirin 81 MG EC tablet Take 81 mg by mouth daily        blood glucose (ONETOUCH  ULTRA) test strip USE TO TEST BLOOD SUGAR  TWICE DAILY OR AS DIRECTED. DUE FOR APPOINTMENT. PLEASE SCHEDULE: [phone] 200 strip 3     blood glucose monitoring (ONE TOUCH ULTRASOFT) lancets USE TO TEST BLOOD SUGAR 2  TIMES DAILY OR AS DIRECTED. 200 each 1     Cholecalciferol (VITAMIN D) 2000 UNIT tablet Take 2,000 Units by mouth daily. 90 tablet 3     diclofenac (VOLTAREN) 1 % topical gel Place 4 g onto the skin 4 times daily as needed for moderate pain (Apply to back to neck.)       finasteride (PROSCAR) 5 MG tablet Take 1 tablet (5 mg) by mouth daily - due for fasting physical with Dr Hodge for refills 90 tablet 3     furosemide (LASIX) 40 MG tablet Take 1 tablet (40 mg) by mouth daily 90 tablet 3     gabapentin (NEURONTIN) 300 MG capsule Take 3 capsules (900 mg) by mouth 3 times daily - Fasting physical due for further refills (Patient taking differently: Take 900 mg by mouth 2 times daily - Fasting physical due for further refills) 270 capsule 4     lisinopril (ZESTRIL) 10 MG tablet Take 1 tablet (10 mg) by mouth daily 90 tablet 3     metFORMIN (GLUCOPHAGE-XR) 500 MG 24 hr tablet Take 2 tablets (1,000 mg) by mouth 2 times daily (with meals) 360 tablet 4     omeprazole (PRILOSEC) 20 MG DR capsule Take 1 capsule (20 mg) by mouth daily 90 capsule 3     ORDER FOR DME Uses Cpap machine for sleep apnea       potassium chloride (KLOR-CON) 20 MEQ packet Take 20 mEq by mouth 3 times daily 180 packet 3     pravastatin (PRAVACHOL) 20 MG tablet Take 1 tablet (20 mg) by mouth daily - due for fasting physical with Dr Hodge for refills 90 tablet 3     tamsulosin (FLOMAX) 0.4 MG capsule Take 1 capsule (0.4 mg) by mouth daily 90 capsule 1     vitamin B-12 (CYANOCOBALAMIN) 1000 MCG tablet Take 1,000 mcg by mouth daily          ALLERGIES   No Known Allergies    PAST MEDICAL HISTORY:  Past Medical History:   Diagnosis Date     Anxiety state, unspecified      Aortic root dilatation (H)      Arthritis      Ascending aorta dilatation  (H)      Basal cell cancer     s/p Mohs nose and bridge of nose on DANY Ortiz      CAD (coronary artery disease)     CABG 1992: LIMA to LAD, SANDI to RCA, SVG to OM1 and OM2     Contact dermatitis and other eczema, due to unspecified cause     eczema - has light treatments with Dr. Rivera     Disorders of bursae and tendons in shoulder region, unspecified      Esophageal reflux      Essential hypertension, benign      Gallbladder disease      GERD (gastroesophageal reflux disease)      Heart attack (H)      Hyperlipidemia LDL goal <70      Left ventricular diastolic dysfunction      Low HDL (under 40) 3/28/2014     Nasal/sinus dis NEC     s/p surgery in 1995     Obesity      Osteoarthrosis, unspecified whether generalized or localized, shoulder region      Other hammer toe (acquired)      Sleep apnea     USES CPAP     Spinal stenosis, unspecified region other than cervical     MRI done 2006     Type 2 diabetes, HbA1c goal < 7% (H) 3/12/2015     Urge incontinence        PAST SURGICAL HISTORY:  Past Surgical History:   Procedure Laterality Date     ABDOMEN SURGERY  1994    gall bladder     BIOPSY      arms     CHOLECYSTECTOMY  6/1994     COLONOSCOPY N/A 4/11/2017    Procedure: COMBINED COLONOSCOPY, SINGLE OR MULTIPLE BIOPSY/POLYPECTOMY BY BIOPSY;  Surgeon: Bladimir Garner MD;  Location:  GI     CV LEFT HEART CATH  5-92, 6-92    Angioplasty     ENT SURGERY  5/1995    sinus surgery     ESOPHAGOSCOPY, GASTROSCOPY, DUODENOSCOPY (EGD), DILATATION, COMBINED  7/17/2014    Procedure: COMBINED ESOPHAGOSCOPY, GASTROSCOPY, DUODENOSCOPY (EGD), DILATATION;  Surgeon: Bladimir Garner MD;  Location:  GI     EYE SURGERY      cataracts removed both eyes     INJECT EPIDURAL LUMBAR       LAMINECTOMY LUMBAR TWO LEVELS N/A 4/29/2015    Procedure: LAMINECTOMY LUMBAR TWO LEVELS;  Surgeon: Bladimir Keenan MD;  Location:  OR     THORACIC SURGERY  11/1992    bypass     [facility] CABG, ARTERY-VEIN, FOUR  11/1992    CABG - LIMA to left  anterior descending and saphenous vein bypass graft to the first and second obtuse marginal branch of the circumflex and the right mammary to the right coronary artery     Z NONSPECIFIC PROCEDURE      Gail lap     Z NONSPECIFIC PROCEDURE      sinus surgery     [facility] NONSPECIFIC PROCEDURE      bilateral cataract surgery     [facility] COLONOSCOPY THRU STOMA W BIOPSY/CAUTERY TUMOR/POLYP/LESION  2007       FAMILY HISTORY:  Family History   Problem Relation Age of Onset     Cancer Brother         MELANOMA     Diabetes Mother         AODM     Thyroid Disease Mother      Diabetes Father         AODM     Myocardial Infarction Father      Cerebrovascular Disease Brother      Parkinsonism Sister      Family History Negative Son      Family History Negative Son      Family History Negative Son      Family History Negative Daughter      Coronary Artery Disease Brother         dod 2019     Cerebrovascular Disease Brother         dod 2019     Other Cancer Brother         dod 2000/melanoma       SOCIAL HISTORY:  Social History     Socioeconomic History     Marital status:      Spouse name: Anastasia Caballero     Number of children: 4     Years of education: 14     Highest education level: None   Occupational History     Occupation: retired     Comment:    Tobacco Use     Smoking status: Former Smoker     Packs/day: 2.00     Years: 30.00     Pack years: 60.00     Types: Cigarettes, Cigars, Pipe     Start date:      Quit date: 3/1/1992     Years since quittin.1     Smokeless tobacco: Never Used     Tobacco comment: quit in    Substance and Sexual Activity     Alcohol use: Yes     Alcohol/week: 0.0 standard drinks     Comment: minimal less than 1/week     Drug use: No     Sexual activity: Not Currently     Partners: Female     Birth control/protection: Post-menopausal, Natural Family Planning   Other Topics Concern      Service Yes     Comment: [employer], served in [employer]     Blood  "Transfusions Yes     Comment: Unsure if he had one with heart surgery     Caffeine Concern Yes     Comment: occ cofee     Occupational Exposure No     Hobby Hazards No     Sleep Concern Yes     Comment: CPAP     Stress Concern No     Special Diet No     Exercise Yes     Comment: [employer] 3 times a week, biking walking.      Bike Helmet Yes     Seat Belt Yes     Self-Exams Yes     Comment: does HIRAL about once a month.     Parent/sibling w/ CABG, MI or angioplasty before 65F 55M? No   Social History Narrative        4 kids       Review of Systems:  Skin:  Negative       Eyes:  Positive for glasses    ENT:  Positive for hearing loss wears hearing aids  Respiratory:  Positive for sleep apnea;CPAP;dyspnea on exertion     Cardiovascular:  Negative      Gastroenterology: Positive for heartburn trteated  Genitourinary:  Positive for nocturia    Musculoskeletal:  Positive for arthritis    Neurologic:  Positive for local weakness left arm/hand since around 4/12/22 but no known injury  Psychiatric:  Positive for depression not treated with medication  Heme/Lymph/Imm:  Negative      Endocrine:  Positive for   Type II    Physical Exam:  Vitals: /60   Pulse 75   Ht 1.905 m (6' 3\")   Wt 125.6 kg (276 lb 12.8 oz)   SpO2 95%   BMI 34.60 kg/m      Constitutional:  cooperative, alert and oriented, well developed, well nourished, in no acute distress obese      Skin:  warm and dry to the touch, no apparent skin lesions or masses noted          Head:  normocephalic        Eyes:  pupils equal and round        Lymph:No Cervical lymphadenopathy present     ENT:  no pallor or cyanosis, dentition good        Neck:  carotid pulses are full and equal bilaterally        Respiratory:  normal breath sounds, clear to auscultation, normal A-P diameter, normal symmetry, normal respiratory excursion, no use of accessory muscles         Cardiac: regular rhythm;normal S1 and S2   S4   systolic murmur;LUSB;grade 1        pulses full and " equal                                        GI:    obese      Extremities and Muscular Skeletal:  no deformities, clubbing, cyanosis, erythema observed;no edema              Neurological:  no gross motor deficits;affect appropriate        Psych:  Alert and Oriented x 3        CC  No referring provider defined for this encounter.

## 2022-04-27 NOTE — LETTER
4/27/2022    Hamzah Hodge MD  [address]  [place] [address]    RE: Austen Fowler       Dear Colleague,     I had the pleasure of seeing Austen Fowler in the [facility].  HPI and Plan:   See dictation          Orders Placed This Encounter   Procedures     Basic metabolic panel     Lipid Profile     ALT     Basic metabolic panel     Lipid Profile     ALT     Follow-Up with Cardiology LILY     Follow-Up with Cardiology     Echocardiogram Complete       Orders Placed This Encounter   Medications     furosemide (LASIX) 40 MG tablet     Sig: Take 1 tablet (40 mg) by mouth daily     Dispense:  90 tablet     Refill:  3     lisinopril (ZESTRIL) 10 MG tablet     Sig: Take 1 tablet (10 mg) by mouth daily     Dispense:  90 tablet     Refill:  3     omeprazole (PRILOSEC) 20 MG DR capsule     Sig: Take 1 capsule (20 mg) by mouth daily     Dispense:  90 capsule     Refill:  3     pravastatin (PRAVACHOL) 20 MG tablet     Sig: Take 1 tablet (20 mg) by mouth daily - due for fasting physical with Dr Hodge for refills     Dispense:  90 tablet     Refill:  3       Medications Discontinued During This Encounter   Medication Reason     omeprazole (PRILOSEC) 20 MG DR capsule Medication Reconciliation Clean Up     pravastatin (PRAVACHOL) 20 MG tablet Reorder     omeprazole (PRILOSEC) 20 MG DR capsule Reorder     lisinopril (ZESTRIL) 10 MG tablet Reorder     furosemide (LASIX) 40 MG tablet Reorder         Encounter Diagnoses   Name Primary?     Coronary artery disease involving native coronary artery of native heart without angina pectoris Yes     Hyperlipidemia LDL goal <70      Benign essential hypertension      Acute diastolic heart failure (H)      BENIGN HYPERTENSION      Dysphagia, unspecified type      S/P CABG (coronary artery bypass graft)      HDL deficiency        CURRENT MEDICATIONS:  Current Outpatient Medications   Medication Sig Dispense Refill     acetaminophen (ACETAMIN) 500 MG tablet  Take 1,000 mg by mouth 2 times daily       amLODIPine (NORVASC) 2.5 MG tablet Take 1 tablet (2.5 mg) by mouth daily 90 tablet 3     aspirin 81 MG EC tablet Take 81 mg by mouth daily        blood glucose (ONETOUCH ULTRA) test strip USE TO TEST BLOOD SUGAR  TWICE DAILY OR AS DIRECTED. DUE FOR APPOINTMENT. PLEASE SCHEDULE: [phone] 200 strip 3     blood glucose monitoring (ONE TOUCH ULTRASOFT) lancets USE TO TEST BLOOD SUGAR 2  TIMES DAILY OR AS DIRECTED. 200 each 1     Cholecalciferol (VITAMIN D) 2000 UNIT tablet Take 2,000 Units by mouth daily. 90 tablet 3     diclofenac (VOLTAREN) 1 % topical gel Place 4 g onto the skin 4 times daily as needed for moderate pain (Apply to back to neck.)       finasteride (PROSCAR) 5 MG tablet Take 1 tablet (5 mg) by mouth daily - due for fasting physical with Dr Hodge for refills 90 tablet 3     furosemide (LASIX) 40 MG tablet Take 1 tablet (40 mg) by mouth daily 90 tablet 3     gabapentin (NEURONTIN) 300 MG capsule Take 3 capsules (900 mg) by mouth 3 times daily - Fasting physical due for further refills (Patient taking differently: Take 900 mg by mouth 2 times daily - Fasting physical due for further refills) 270 capsule 4     lisinopril (ZESTRIL) 10 MG tablet Take 1 tablet (10 mg) by mouth daily 90 tablet 3     metFORMIN (GLUCOPHAGE-XR) 500 MG 24 hr tablet Take 2 tablets (1,000 mg) by mouth 2 times daily (with meals) 360 tablet 4     omeprazole (PRILOSEC) 20 MG DR capsule Take 1 capsule (20 mg) by mouth daily 90 capsule 3     ORDER FOR DME Uses Cpap machine for sleep apnea       potassium chloride (KLOR-CON) 20 MEQ packet Take 20 mEq by mouth 3 times daily 180 packet 3     pravastatin (PRAVACHOL) 20 MG tablet Take 1 tablet (20 mg) by mouth daily - due for fasting physical with Dr Hodge for refills 90 tablet 3     tamsulosin (FLOMAX) 0.4 MG capsule Take 1 capsule (0.4 mg) by mouth daily 90 capsule 1     vitamin B-12 (CYANOCOBALAMIN) 1000 MCG tablet Take 1,000 mcg by mouth daily           ALLERGIES   No Known Allergies    PAST MEDICAL HISTORY:  Past Medical History:   Diagnosis Date     Anxiety state, unspecified      Aortic root dilatation (H)      Arthritis      Ascending aorta dilatation (H)      Basal cell cancer     s/p Mohs nose and bridge of nose on R.     Bunion      CAD (coronary artery disease)     CABG 1992: LIMA to LAD, SANDI to RCA, SVG to OM1 and OM2     Contact dermatitis and other eczema, due to unspecified cause     eczema - has light treatments with Dr. Rivera     Disorders of bursae and tendons in shoulder region, unspecified      Esophageal reflux      Essential hypertension, benign      Gallbladder disease      GERD (gastroesophageal reflux disease)      Heart attack (H)      Hyperlipidemia LDL goal <70      Left ventricular diastolic dysfunction      Low HDL (under 40) 3/28/2014     Nasal/sinus dis NEC     s/p surgery in 1995     Obesity      Osteoarthrosis, unspecified whether generalized or localized, shoulder region      Other hammer toe (acquired)      Sleep apnea     USES CPAP     Spinal stenosis, unspecified region other than cervical     MRI done 2006     Type 2 diabetes, HbA1c goal < 7% (H) 3/12/2015     Urge incontinence        PAST SURGICAL HISTORY:  Past Surgical History:   Procedure Laterality Date     ABDOMEN SURGERY  1994    gall bladder     BIOPSY      arms     CHOLECYSTECTOMY  6/1994     COLONOSCOPY N/A 4/11/2017    Procedure: COMBINED COLONOSCOPY, SINGLE OR MULTIPLE BIOPSY/POLYPECTOMY BY BIOPSY;  Surgeon: Bladimir Garnre MD;  Location:  GI     CV LEFT HEART CATH  5-92, 6-92    Angioplasty     ENT SURGERY  5/1995    sinus surgery     ESOPHAGOSCOPY, GASTROSCOPY, DUODENOSCOPY (EGD), DILATATION, COMBINED  7/17/2014    Procedure: COMBINED ESOPHAGOSCOPY, GASTROSCOPY, DUODENOSCOPY (EGD), DILATATION;  Surgeon: Bladimir Garner MD;  Location:  GI     EYE SURGERY      cataracts removed both eyes     INJECT EPIDURAL LUMBAR       LAMINECTOMY LUMBAR TWO LEVELS N/A  2015    Procedure: LAMINECTOMY LUMBAR TWO LEVELS;  Surgeon: Bladimir Keenan MD;  Location: [facility] OR     THORACIC SURGERY  1992    bypass     [facility] CABG, ARTERY-VEIN, FOUR  1992    CABG - LIMA to left anterior descending and saphenous vein bypass graft to the first and second obtuse marginal branch of the circumflex and the right mammary to the right coronary artery     [facility] NONSPECIFIC PROCEDURE      Gail lap     [facility] NONSPECIFIC PROCEDURE      sinus surgery     [facility] NONSPECIFIC PROCEDURE      bilateral cataract surgery     [facility] COLONOSCOPY THRU STOMA W BIOPSY/CAUTERY TUMOR/POLYP/LESION  2007       FAMILY HISTORY:  Family History   Problem Relation Age of Onset     Cancer Brother         MELANOMA     Diabetes Mother         AODM     Thyroid Disease Mother      Diabetes Father         AODM     Myocardial Infarction Father      Cerebrovascular Disease Brother      Parkinsonism Sister      Family History Negative Son      Family History Negative Son      Family History Negative Son      Family History Negative Daughter      Coronary Artery Disease Brother         dod 2019     Cerebrovascular Disease Brother         dod 2019     Other Cancer Brother         dod 2000/melanoma       SOCIAL HISTORY:  Social History     Socioeconomic History     Marital status:      Spouse name: Anastasia Caballero     Number of children: 4     Years of education: 14     Highest education level: None   Occupational History     Occupation: retired     Comment:    Tobacco Use     Smoking status: Former Smoker     Packs/day: 2.00     Years: 30.00     Pack years: 60.00     Types: Cigarettes, Cigars, Pipe     Start date:      Quit date: 3/1/1992     Years since quittin.1     Smokeless tobacco: Never Used     Tobacco comment: quit in    Substance and Sexual Activity     Alcohol use: Yes     Alcohol/week: 0.0 standard drinks     Comment: minimal less than 1/week     Drug use: No     Sexual activity:  "Not Currently     Partners: Female     Birth control/protection: Post-menopausal, Natural Family Planning   Other Topics Concern      Service Yes     Comment: [employer], served in [place]     Blood Transfusions Yes     Comment: Unsure if he had one with heart surgery     Caffeine Concern Yes     Comment: occ cofee     Occupational Exposure No     Hobby Hazards No     Sleep Concern Yes     Comment: CPAP     Stress Concern No     Special Diet No     Exercise Yes     Comment: [employer] 3 times a week, biking walking.      Bike Helmet Yes     Seat Belt Yes     Self-Exams Yes     Comment: does HIRAL about once a month.     Parent/sibling w/ CABG, MI or angioplasty before 65F 55M? No   Social History Narrative        4 kids       Review of Systems:  Skin:  Negative       Eyes:  Positive for glasses    ENT:  Positive for hearing loss wears hearing aids  Respiratory:  Positive for sleep apnea;CPAP;dyspnea on exertion     Cardiovascular:  Negative      Gastroenterology: Positive for heartburn trteated  Genitourinary:  Positive for nocturia    Musculoskeletal:  Positive for arthritis    Neurologic:  Positive for local weakness left arm/hand since around 4/12/22 but no known injury  Psychiatric:  Positive for depression not treated with medication  Heme/Lymph/Imm:  Negative      Endocrine:  Positive for   Type II    Physical Exam:  Vitals: /60   Pulse 75   Ht 1.905 m (6' 3\")   Wt 125.6 kg (276 lb 12.8 oz)   SpO2 95%   BMI 34.60 kg/m      Constitutional:  cooperative, alert and oriented, well developed, well nourished, in no acute distress obese      Skin:  warm and dry to the touch, no apparent skin lesions or masses noted          Head:  normocephalic        Eyes:  pupils equal and round        Lymph:No Cervical lymphadenopathy present     ENT:  no pallor or cyanosis, dentition good        Neck:  carotid pulses are full and equal bilaterally        Respiratory:  normal breath sounds, clear to auscultation, " normal A-P diameter, normal symmetry, normal respiratory excursion, no use of accessory muscles         Cardiac: regular rhythm;normal S1 and S2   S4   systolic murmur;LUSB;grade 1        pulses full and equal                                        GI:    obese      Extremities and Muscular Skeletal:  no deformities, clubbing, cyanosis, erythema observed;no edema              Neurological:  no gross motor deficits;affect appropriate        Psych:  Alert and Oriented x 3        CC  No referring provider defined for this encounter.    Thank you for allowing me to participate in the care of your patient.      Sincerely,     Lino Hernandez MD, MD     [facility]  cc:   No referring provider defined for this encounter.

## 2022-04-27 NOTE — PROGRESS NOTES
Service Date: 04/27/2022    HISTORY OF PRESENT ILLNESS:  It was my pleasure to see your patient, Austen Fowler, who is a pleasant 87-year-old patient previously followed for many years by Dr. Tee Moreno but also followed by JOSE A Harry also.  As you know, this is a patient with a past history of coronary artery bypass grafting, which occurred in 1991.  At that time, he had a LIMA to the LAD, a saphenous vein graft to both the first and second obtuse marginal arteries and a right internal mammary artery graft to the right coronary artery.  This patient also in the past has had a dilated ascending aorta.  He also has hyperlipidemia, which is mixed with HDL deficiency and hypertriglyceridemia.  He has also had a past history of type 2 diabetes mellitus and morbid obesity.  Most recently, the patient was diagnosed with cellulitis in his lower extremity, and during that admission, it appears that he developed acute diastolic heart failure.  Echocardiography at that time showed class 3 diastolic dysfunction.  The patient was diuresed with significant improvement in his symptomatology.    With that background in mind, the patient is feeling considerably better.  He is not as short of breath as he has been in the past.  He certainly has no orthopnea, PND or any worsening ankle edema.  He did have a repeat echocardiogram performed, which was his annual echocardiogram, and that has shown significant improvement.  I reviewed both the echocardiogram from the hospital admission and the echocardiogram that was performed on 04/18, which was just over a week ago.  Firstly, the degree of diastolic dysfunction is significantly better and is now grade 1 diastolic dysfunction.  The left ventricular systolic function is normal.  No significant valvular heart disease was noted.  The ascending aorta is only borderline dilated at 3.5 cm and the aortic root is mildly dilated at 3.8 cm with no significant change from previously.   The tissue Doppler analysis is also improved.  The previous echocardiogram showed clear-cut raised left ventricular end-diastolic pressures.  This particular echo shows indeterminate values.    His blood pressure is well controlled at present at 100/60.  His basic metabolic profile, which was drawn yesterday was excellent. Sodium is 137, potassium 3.7, BUN was 12 and a creatinine of 0.71 with a GFR of 89 and this is on 40 mg of furosemide with 20 mEq of potassium chloride 3 times a day.  If you also remember, his potassium was low at 3.2 on 04/18/2022 and it is now 3.7 on potassium replacement.    His lipid profile is significantly better than it has been in the past.  The LDL is excellent at 54, but his HDL has improved to 41.  This is the first time, at least since 2019, that his HDL is in the normal range. His triglycerides have also dropped from 2020 when they were as high as 229.  There are now 142 and his liver function tests are normal with an ALT of 22.    IMPRESSION:    1.  Diastolic heart failure.  There has been significant improvement symptomatically and on physical examination, as well as on echocardiographic findings in his diastolic function.  His renal function is also in the normal range now, and with potassium replacement, his potassium levels are now in the normal range.  2.  Coronary artery disease and status post coronary artery bypass grafting.  The patient is asymptomatic with respect to coronary disease with no symptoms suggestive of angina pectoris.  3.  Mixed hyperlipidemia.  His lipids are now in the normal range with all components at goal.  4.  Moderate obesity significantly improved from previously.  5.  Type 2 diabetes mellitus.  6.  Mild aortic root dilatation and borderline ascending aortic dilatation, unchanged from previously.    PLAN:  We will continue the patient on his present medications as he appears to be stable.  I will have the patient follow up with Lyndsey  JOSE A Reddy, who has seen the patient in the past in 6 months' time, and at that stage, we will repeat his lipids and his basic metabolic profile.  I will see the patient back again in 1 year's time and we will repeat the echocardiogram to follow his aortic root and diastolic function.    It has been my pleasure to be involved in the care of this very nice patient.  I have told him that he should call us if he has any questions or any concerns but, in particular, if he notices increasing shortness of breath on exertion, orthopnea or PND or increasing ankle edema.    Lino Hernandez MD    cc:  Hamzah Hodge MD  [address], [address]  [address]    Lino Vaughan MD, [facility]        D: 2022   T: 2022   MT: [email or web address] [id number]    Name:     NAYELI RAI SHELIA  MRN:      [id number]        Account:      [id number]   :      1935           Service Date: 2022       Document: [id number]

## 2022-05-12 ENCOUNTER — TRANSFERRED RECORDS (OUTPATIENT)
Dept: HEALTH INFORMATION MANAGEMENT | Facility: CLINIC | Age: 87
End: 2022-05-12
Payer: COMMERCIAL

## 2022-05-12 LAB
RETINOPATHY: NEGATIVE
RETINOPATHY: NEGATIVE

## 2022-05-25 DIAGNOSIS — N40.0 BENIGN PROSTATIC HYPERPLASIA, UNSPECIFIED WHETHER LOWER URINARY TRACT SYMPTOMS PRESENT: Primary | ICD-10-CM

## 2022-05-26 RX ORDER — FINASTERIDE 5 MG/1
TABLET, FILM COATED ORAL
Qty: 90 TABLET | Refills: 2 | Status: SHIPPED | OUTPATIENT
Start: 2022-05-26 | End: 2023-03-22

## 2022-06-02 ENCOUNTER — MYC MEDICAL ADVICE (OUTPATIENT)
Dept: FAMILY MEDICINE | Facility: CLINIC | Age: 87
End: 2022-06-02
Payer: COMMERCIAL

## 2022-06-02 DIAGNOSIS — I50.31 ACUTE DIASTOLIC HEART FAILURE (H): ICD-10-CM

## 2022-06-03 DIAGNOSIS — G47.33 OSA (OBSTRUCTIVE SLEEP APNEA): Primary | ICD-10-CM

## 2022-06-06 RX ORDER — POTASSIUM CHLORIDE 1.5 G/1.58G
20 POWDER, FOR SOLUTION ORAL 3 TIMES DAILY
Qty: 180 PACKET | Refills: 3 | Status: SHIPPED | OUTPATIENT
Start: 2022-06-06 | End: 2023-05-02

## 2022-06-06 RX ORDER — POTASSIUM CHLORIDE 1.5 G/1.58G
20 POWDER, FOR SOLUTION ORAL 3 TIMES DAILY
Qty: 180 PACKET | Refills: 3 | Status: SHIPPED | OUTPATIENT
Start: 2022-06-06 | End: 2022-06-06

## 2022-06-06 NOTE — TELEPHONE ENCOUNTER
Routing refill request to provider for review/approval because:  Passes protocol but previously ordered by different provider, please review/advise.   Jaki MOONEY RN  [facility]

## 2022-06-06 NOTE — TELEPHONE ENCOUNTER
Reread message and RX request.  Patient's mail order pharmacy, [facility], didn't have this Rx in stock.   Patient requested Rx be sent to [facility] local pharmacy.    Rx resent to patient's [facility] pharmacy .   Requested pharmacy notify patient when ready for .     Sarah FLEMING, RN      June 6, 2022  12:37 PM

## 2022-07-07 ASSESSMENT — ENCOUNTER SYMPTOMS
NERVOUS/ANXIOUS: 1
JOINT SWELLING: 0
HEMATURIA: 0
PARESTHESIAS: 0
WEAKNESS: 0
HEARTBURN: 0
PALPITATIONS: 0
MYALGIAS: 0
HEADACHES: 1
DYSURIA: 0
CONSTIPATION: 1
SORE THROAT: 0
FEVER: 0
ARTHRALGIAS: 1
HEMATOCHEZIA: 0
DIARRHEA: 1
DIZZINESS: 0
ABDOMINAL PAIN: 0
EYE PAIN: 1
NAUSEA: 0
CHILLS: 0
FREQUENCY: 1
COUGH: 1
SHORTNESS OF BREATH: 1

## 2022-07-07 ASSESSMENT — ACTIVITIES OF DAILY LIVING (ADL): CURRENT_FUNCTION: NO ASSISTANCE NEEDED

## 2022-07-14 ENCOUNTER — OFFICE VISIT (OUTPATIENT)
Dept: FAMILY MEDICINE | Facility: CLINIC | Age: 87
End: 2022-07-14
Payer: COMMERCIAL

## 2022-07-14 VITALS
HEIGHT: 74 IN | HEART RATE: 78 BPM | OXYGEN SATURATION: 98 % | BODY MASS INDEX: 35.29 KG/M2 | DIASTOLIC BLOOD PRESSURE: 73 MMHG | TEMPERATURE: 96.9 F | SYSTOLIC BLOOD PRESSURE: 111 MMHG | RESPIRATION RATE: 22 BRPM | WEIGHT: 275 LBS

## 2022-07-14 DIAGNOSIS — I25.10 CORONARY ARTERY DISEASE INVOLVING NATIVE CORONARY ARTERY OF NATIVE HEART WITHOUT ANGINA PECTORIS: ICD-10-CM

## 2022-07-14 DIAGNOSIS — R06.02 SHORTNESS OF BREATH: ICD-10-CM

## 2022-07-14 DIAGNOSIS — G89.4 CHRONIC PAIN SYNDROME: ICD-10-CM

## 2022-07-14 DIAGNOSIS — Z79.899 ENCOUNTER FOR LONG-TERM (CURRENT) USE OF MEDICATIONS: ICD-10-CM

## 2022-07-14 DIAGNOSIS — Z23 NEED FOR VACCINATION: ICD-10-CM

## 2022-07-14 DIAGNOSIS — E11.59 TYPE 2 DIABETES MELLITUS WITH VASCULAR DISEASE (H): ICD-10-CM

## 2022-07-14 DIAGNOSIS — M53.3 SACROILIAC JOINT PAIN: ICD-10-CM

## 2022-07-14 DIAGNOSIS — I10 ESSENTIAL HYPERTENSION, BENIGN: ICD-10-CM

## 2022-07-14 DIAGNOSIS — E66.01 MORBID OBESITY (H): ICD-10-CM

## 2022-07-14 DIAGNOSIS — F41.9 ANXIETY: ICD-10-CM

## 2022-07-14 DIAGNOSIS — G47.33 OSA (OBSTRUCTIVE SLEEP APNEA): ICD-10-CM

## 2022-07-14 DIAGNOSIS — E78.5 HYPERLIPIDEMIA LDL GOAL <70: ICD-10-CM

## 2022-07-14 DIAGNOSIS — Z00.00 ENCOUNTER FOR ANNUAL WELLNESS VISIT (AWV) IN MEDICARE PATIENT: Primary | ICD-10-CM

## 2022-07-14 LAB
ERYTHROCYTE [DISTWIDTH] IN BLOOD BY AUTOMATED COUNT: 13.5 % (ref 10–15)
HBA1C MFR BLD: 8 % (ref 0–5.6)
HCT VFR BLD AUTO: 39.5 % (ref 40–53)
HGB BLD-MCNC: 12.8 G/DL (ref 13.3–17.7)
MCH RBC QN AUTO: 28.4 PG (ref 26.5–33)
MCHC RBC AUTO-ENTMCNC: 32.4 G/DL (ref 31.5–36.5)
MCV RBC AUTO: 88 FL (ref 78–100)
PLATELET # BLD AUTO: 204 10E3/UL (ref 150–450)
RBC # BLD AUTO: 4.51 10E6/UL (ref 4.4–5.9)
WBC # BLD AUTO: 8.2 10E3/UL (ref 4–11)

## 2022-07-14 PROCEDURE — 80053 COMPREHEN METABOLIC PANEL: CPT | Performed by: INTERNAL MEDICINE

## 2022-07-14 PROCEDURE — G0009 ADMIN PNEUMOCOCCAL VACCINE: HCPCS | Performed by: INTERNAL MEDICINE

## 2022-07-14 PROCEDURE — 99214 OFFICE O/P EST MOD 30 MIN: CPT | Mod: 25 | Performed by: INTERNAL MEDICINE

## 2022-07-14 PROCEDURE — 90677 PCV20 VACCINE IM: CPT | Performed by: INTERNAL MEDICINE

## 2022-07-14 PROCEDURE — 83036 HEMOGLOBIN GLYCOSYLATED A1C: CPT | Performed by: INTERNAL MEDICINE

## 2022-07-14 PROCEDURE — 82043 UR ALBUMIN QUANTITATIVE: CPT | Performed by: INTERNAL MEDICINE

## 2022-07-14 PROCEDURE — 85027 COMPLETE CBC AUTOMATED: CPT | Performed by: INTERNAL MEDICINE

## 2022-07-14 PROCEDURE — 80061 LIPID PANEL: CPT | Performed by: INTERNAL MEDICINE

## 2022-07-14 PROCEDURE — 36415 COLL VENOUS BLD VENIPUNCTURE: CPT | Performed by: INTERNAL MEDICINE

## 2022-07-14 PROCEDURE — G0439 PPPS, SUBSEQ VISIT: HCPCS | Performed by: INTERNAL MEDICINE

## 2022-07-14 PROCEDURE — 99207 PR FOOT EXAM NO CHARGE: CPT | Performed by: INTERNAL MEDICINE

## 2022-07-14 RX ORDER — BUSPIRONE HYDROCHLORIDE 5 MG/1
5 TABLET ORAL 3 TIMES DAILY PRN
Qty: 90 TABLET | Refills: 4 | Status: SHIPPED | OUTPATIENT
Start: 2022-07-14 | End: 2022-07-21

## 2022-07-14 ASSESSMENT — ACTIVITIES OF DAILY LIVING (ADL): CURRENT_FUNCTION: NO ASSISTANCE NEEDED

## 2022-07-14 ASSESSMENT — PAIN SCALES - GENERAL: PAINLEVEL: MILD PAIN (2)

## 2022-07-14 NOTE — NURSING NOTE
Prior to immunization administration, verified patients identity using patient s name and date of birth. Please see Immunization Activity for additional information.     Screening Questionnaire for Adult Immunization    Are you sick today?   No   Do you have allergies to medications, food, a vaccine component or latex?   No   Have you ever had a serious reaction after receiving a vaccination?   No   Do you have a long-term health problem with heart, lung, kidney, or metabolic disease (e.g., diabetes), asthma, a blood disorder, no spleen, complement component deficiency, a cochlear implant, or a spinal fluid leak?  Are you on long-term aspirin therapy?   Yes   Do you have cancer, leukemia, HIV/AIDS, or any other immune system problem?   No   Do you have a parent, brother, or sister with an immune system problem?   No   In the past 3 months, have you taken medications that affect  your immune system, such as prednisone, other steroids, or anticancer drugs; drugs for the treatment of rheumatoid arthritis, Crohn s disease, or psoriasis; or have you had radiation treatments?   No   Have you had a seizure, or a brain or other nervous system problem?   No   During the past year, have you received a transfusion of blood or blood    products, or been given immune (gamma) globulin or antiviral drug?   No   For women: Are you pregnant or is there a chance you could become       pregnant during the next month?   No   Have you received any vaccinations in the past 4 weeks?   No     Immunization questionnaire was positive for at least one answer.  Notified .        Per orders of Dr. Hodge, injection of Prevnar 20 given by Brenda Jaeger CMA. Patient instructed to remain in clinic for 15 minutes afterwards, and to report any adverse reaction to me immediately.       Screening performed by Brenda Jaeger CMA on 7/14/2022 at 11:34 AM.

## 2022-07-14 NOTE — PROGRESS NOTES
"SUBJECTIVE:   Austen Fowler is a 87 year old male who presents for Preventive Visit.    {Split Bill scripting  The purpose of this visit is to discuss your medical history and prevent health problems before you are sick. You may be responsible for a co-pay, coinsurance, or deductible if your visit today includes services such as checking on a sore throat, having an x-ray or lab test, or treating and evaluating a new or existing condition :652072}  {Patient advised of split billing (Optional):003890}  Are you in the first 12 months of your Medicare coverage?  { :141729::\"No\"}    HPI  Do you feel safe in your environment? { :917218}    Have you ever done Advance Care Planning? (For example, a Health Directive, POLST, or a discussion with a medical provider or your loved ones about your wishes): { :488369}    {Hearing Test Done (Optional):228975}   Fall risk  { :069875}  {If any of the above assessments are answered yes, consider ordering appropriate referrals (Optional):203294::\"click delete button to remove this line now\"}  Cognitive Screening { :644984}    {Do you have sleep apnea, excessive snoring or daytime drowsiness? (Optional):030873}    Reviewed and updated as needed this visit by clinical staff                    Reviewed and updated as needed this visit by Provider                   Social History     Tobacco Use     Smoking status: Former Smoker     Packs/day: 2.00     Years: 30.00     Pack years: 60.00     Types: Cigarettes, Cigars, Pipe     Start date:      Quit date: 3/1/1992     Years since quittin.3     Smokeless tobacco: Never Used     Tobacco comment: quit in    Substance Use Topics     Alcohol use: Yes     Alcohol/week: 0.0 standard drinks     Comment: minimal less than 1/week     {Rooming Staff- Complete this question if Prescreen response is not shown below for today's visit. If you drink alcohol do you typically have >3 drinks per day or >7 drinks per week? " "(Optional):891195}    Alcohol Use 7/7/2022   Prescreen: >3 drinks/day or >7 drinks/week? No   Prescreen: >3 drinks/day or >7 drinks/week? -   {add AUDIT responses (Optional) (A score of 7 for adult men is an indication of hazardous drinking; a score of 8 or more is an indication of an alcohol use disorder.  A score of 7 or more for adult women is an indication of hazardous drinking or an alchohol use disorder):273928}    {Outside tests to abstract? :347544}    {additional problems to add (Optional):518829}    Current providers sharing in care for this patient include: {Rooming staff:  Please update Care Team in Rooming Activity, refresh this note and then delete this statement}  Patient Care Team:  Hamzah Hodge MD as PCP - General  Marielos Morocho PharmD as Pharmacist (Pharmacist)  Lino Hernandez MD as Assigned Heart and Vascular Provider  Hamzah Hodge MD as Assigned PCP  Lazaro Lilly DPM as Assigned Musculoskeletal Provider  Marielos Morocho PharmD as Assigned MTM Pharmacist    The following health maintenance items are reviewed in Epic and correct as of today:  Health Maintenance Due   Topic Date Due     HF ACTION PLAN  Never done     COVID-19 Vaccine (4 - Booster for Moderna series) 02/22/2022     COLORECTAL CANCER SCREENING  04/11/2022     URINE DRUG SCREEN  05/14/2022     ANNUAL REVIEW OF HM ORDERS  05/14/2022     DIABETIC FOOT EXAM  05/25/2022     CBC  06/25/2022     MEDICARE ANNUAL WELLNESS VISIT  06/25/2022     {Chronicprobdata (optional):000192}  {Decision Support (Optional):722739}        Review of Systems  {ROS COMP (Optional):848090}    OBJECTIVE:   There were no vitals taken for this visit. Estimated body mass index is 34.6 kg/m  as calculated from the following:    Height as of 4/27/22: 1.905 m (6' 3\").    Weight as of 4/27/22: 125.6 kg (276 lb 12.8 oz).  Physical Exam  {Exam (Optional) :453744}    {Diagnostic Test Results (Optional):677007::\"Diagnostic Test Results:\",\"Labs " "reviewed in Epic\"}    ASSESSMENT / PLAN:   {Diag Picklist:356128}    {Patient advised of split billing (Optional):844751}    COUNSELING:  {Medicare Counselin}    Estimated body mass index is 34.6 kg/m  as calculated from the following:    Height as of 22: 1.905 m (6' 3\").    Weight as of 22: 125.6 kg (276 lb 12.8 oz).    {Weight Management Plan (ACO) Complete if BMI is abnormal-  Ages 18-64  BMI >24.9.  Age 65+ with BMI <23 or >30 (Optional):444449}    He reports that he quit smoking about 30 years ago. His smoking use included cigarettes, cigars, and pipe. He started smoking about 68 years ago. He has a 60.00 pack-year smoking history. He has never used smokeless tobacco.      Appropriate preventive services were discussed with this patient, including applicable screening as appropriate for cardiovascular disease, diabetes, osteopenia/osteoporosis, and glaucoma.  . Checklist reviewing preventive services available has been given to the patient.    Reviewed patients plan of care and provided an AVS. The Complex Care Plan (for patients with higher acuity and needing more deliberate coordination of services) for Austen meets the Care Plan requirement. This Care Plan has been established and reviewed with the Patient.    Counseling Resources:  ATP IV Guidelines  Pooled Cohorts Equation Calculator  Breast Cancer Risk Calculator  Breast Cancer: Medication to Reduce Risk  FRAX Risk Assessment  ICSI Preventive Guidelines  Dietary Guidelines for Americans,   [employer]s MyPlate  ASA Prophylaxis  Lung CA Screening    Hamzah Hodge MD  [facility]    Identified Health Risks:  "

## 2022-07-14 NOTE — PROGRESS NOTES
"SUBJECTIVE:   Austen Fowler is a 87 year old male who presents for Preventive Visit.  This very pleasant 87 years old gentleman has type 2 diabetes and coronary artery disease and is s/p CABG  He has chronic back pain and sacroiliac joint pain and sleep apnea  He also has recurrent cellulitis of his legs respond to lung infection  He uses a CPAP machine for sleep apnea  He notices more anxiety since his last hospital admission with cellulitis and is bothered by [place] events  He does not want to seek counseling  Patient sees cardiology and has a history of   coronary artery bypass grafting, which occurred in 1991.  At that time, he had a LIMA to the LAD, a saphenous vein graft to both the first and second obtuse marginal arteries and a right internal mammary artery graft to the right coronary artery.  This patient also in the past has had a dilated ascending aorta  He also has diastolic dysfunction  Patient has been advised of split billing requirements and indicates understanding: Yes  Are you in the first 12 months of your Medicare coverage?  No    Healthy Habits:     In general, how would you rate your overall health?  Good    Frequency of exercise:  2-3 days/week    Duration of exercise:  15-30 minutes    Do you usually eat at least 4 servings of fruit and vegetables a day, include whole grains    & fiber and avoid regularly eating high fat or \"junk\" foods?  Yes    Taking medications regularly:  Yes    Barriers to taking medications:  None    Medication side effects:  Not applicable    Ability to successfully perform activities of daily living:  No assistance needed    Home Safety:  Lack of grab bars in the bathroom    Hearing Impairment:  Find that men's voices are easier to understand than woman's, difficulty understanding soft or whispered speech and difficulty understanding speech on the telephone    In the past 6 months, have you been bothered by leaking of urine?  No    In general, how would you rate " your overall mental or emotional health?  Good      PHQ-2 Total Score: 2    Additional concerns today:  No    Do you feel safe in your environment? Yes    Have you ever done Advance Care Planning? (For example, a Health Directive, POLST, or a discussion with a medical provider or your loved ones about your wishes): No, advance care planning information given to patient to review.  Patient plans to discuss their wishes with loved ones or provider.         Fall risk  Fallen 2 or more times in the past year?: Yes  Any fall with injury in the past year?: No    Cognitive Screening   1) Repeat 3 items (Leader, Season, Table)    2) Clock draw: NORMAL  3) 3 item recall: Recalls 3 objects  Results: 3 items recalled: COGNITIVE IMPAIRMENT LESS LIKELY    Mini-CogTM Copyright MACHO Bhagat. Licensed by the author for use in [facility] [employer]; reprinted with permission (jesus@[facility]). All rights reserved.      Do you have sleep apnea, excessive snoring or daytime drowsiness?: yes    Reviewed and updated as needed this visit by clinical staff         Reza Perez            Reviewed and updated as needed this visit by Provider         Reza Perez           Social History     Tobacco Use     Smoking status: Former Smoker     Packs/day: 2.00     Years: 30.00     Pack years: 60.00     Types: Cigarettes, Cigars, Pipe     Start date:      Quit date: 3/1/1992     Years since quittin.3     Smokeless tobacco: Never Used     Tobacco comment: quit in    Substance Use Topics     Alcohol use: Yes     Alcohol/week: 0.0 standard drinks     Comment: minimal less than 1/week     If you drink alcohol do you typically have >3 drinks per day or >7 drinks per week? No    Alcohol Use 2022   Prescreen: >3 drinks/day or >7 drinks/week? No   Prescreen: >3 drinks/day or >7 drinks/week? -         Diabetes Follow-up    How often are you checking your blood sugar? A few times a week  What time of day are you checking your blood sugars (select all  that apply)?  Before meals  Have you had any blood sugars above 200?  Yes at times  Have you had any blood sugars below 70?  No    What symptoms do you notice when your blood sugar is low?  None    What concerns do you have today about your diabetes? None     Do you have any of these symptoms? (Select all that apply)  No numbness or tingling in feet.  No redness, sores or blisters on feet.  No complaints of excessive thirst.  No reports of blurry vision.  No significant changes to weight.      BP Readings from Last 2 Encounters:   22 111/73   22 100/60     Hemoglobin A1C POCT (%)   Date Value   2021 6.6 (H)   2021 6.4 (H)     Hemoglobin A1C (%)   Date Value   2022 7.1 (H)   2021 6.8 (H)     LDL Cholesterol Calculated (mg/dL)   Date Value   2022 54   2021 46   2021 42         Hypertension Follow-up      Do you check your blood pressure regularly outside of the clinic? Yes     Are you following a low salt diet? Yes    Are your blood pressures ever more than 140 on the top number (systolic) OR more   than 90 on the bottom number (diastolic), for example 140/90? No    Vascular Disease Follow-up      How often do you take nitroglycerin? Never    Do you take an aspirin every day? Yes    Anxiety Follow-Up    How are you doing with your anxiety since your last visit? No change    Are you having other symptoms that might be associated with anxiety? No    Have you had a significant life event? No     Are you feeling depressed? No    Do you have any concerns with your use of alcohol or other drugs? No    Social History     Tobacco Use     Smoking status: Former Smoker     Packs/day: 2.00     Years: 30.00     Pack years: 60.00     Types: Cigarettes, Cigars, Pipe     Start date: 1953     Quit date: 3/1/1992     Years since quittin.3     Smokeless tobacco: Never Used     Tobacco comment: quit in    Substance Use Topics     Alcohol use: Yes     Comment: minimal  less than 1/week     Drug use: No     MARLA-7 SCORE 12/5/2016 8/23/2017 6/28/2019   Total Score 3 0 2     PHQ 12/5/2016 11/8/2017 4/28/2019   PHQ-9 Total Score 2 5 4   Q9: Thoughts of better off dead/self-harm past 2 weeks Not at all Not at all Not at all           Current providers sharing in care for this patient include:   Patient Care Team:  Hamzah Hodge MD as PCP - General  Marielos Morocho PharmD as Pharmacist (Pharmacist)  Lino Hernandez MD as Assigned Heart and Vascular Provider  Hamzah Hodge MD as Assigned PCP  Lazaro Lilly DPM as Assigned Musculoskeletal Provider  Marielos Morocho PharmD as Assigned MTM Pharmacist    The following health maintenance items are reviewed in Epic and correct as of today:  Health Maintenance Due   Topic Date Due     HF ACTION PLAN  Never done     COVID-19 Vaccine (4 - Booster for Moderna series) 02/22/2022     URINE DRUG SCREEN  05/14/2022     ANNUAL REVIEW OF HM ORDERS  05/14/2022     DIABETIC FOOT EXAM  05/25/2022     CMP  06/25/2022     CBC  06/25/2022     MEDICARE ANNUAL WELLNESS VISIT  06/25/2022     BP Readings from Last 3 Encounters:   07/14/22 111/73   04/27/22 100/60   03/21/22 121/76    Wt Readings from Last 3 Encounters:   07/14/22 124.7 kg (275 lb)   04/27/22 125.6 kg (276 lb 12.8 oz)   03/21/22 124.7 kg (275 lb)                  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     Cervicalgia     Benign neoplasm of skin of other and unspecified parts of face     Impacted cerumen     Infected sebaceous cyst     Anxiety     Chronic low back pain     Advanced directives, counseling/discussion     Lumbosacral radiculitis     PENELOPE (obstructive sleep apnea)     Low HDL (under 40)     Hyperlipidemia LDL goal <70     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)     S/P lumbar laminectomy      Health Care Home     Coronary artery disease involving native coronary artery of native heart without angina pectoris     Left ventricular diastolic dysfunction     Non morbid obesity, unspecified obesity type     Neck pain     Cellulitis of right lower extremity     Hypoxia     Shortness of breath     Cellulitis     Gastroesophageal reflux disease with esophagitis     Morbid obesity (H)     Sacroiliac joint pain     Dysphagia, unspecified type     Benign prostatic hyperplasia, unspecified whether lower urinary tract symptoms present     Past Surgical History:   Procedure Laterality Date     ABDOMEN SURGERY  1994    gall bladder     BIOPSY      arms     CHOLECYSTECTOMY  6/1994     COLONOSCOPY N/A 4/11/2017    Procedure: COMBINED COLONOSCOPY, SINGLE OR MULTIPLE BIOPSY/POLYPECTOMY BY BIOPSY;  Surgeon: Bladimir Garner MD;  Location:  GI     CV LEFT HEART CATH  5-92, 6-92    Angioplasty     ENT SURGERY  5/1995    sinus surgery     ESOPHAGOSCOPY, GASTROSCOPY, DUODENOSCOPY (EGD), DILATATION, COMBINED  7/17/2014    Procedure: COMBINED ESOPHAGOSCOPY, GASTROSCOPY, DUODENOSCOPY (EGD), DILATATION;  Surgeon: Bladimir Garner MD;  Location:  GI     EYE SURGERY      cataracts removed both eyes     INJECT EPIDURAL LUMBAR       LAMINECTOMY LUMBAR TWO LEVELS N/A 4/29/2015    Procedure: LAMINECTOMY LUMBAR TWO LEVELS;  Surgeon: Bladimir Keenan MD;  Location:  OR     THORACIC SURGERY  11/1992    bypass     [facility] CABG, ARTERY-VEIN, FOUR  11/1992    CABG - LIMA to left anterior descending and saphenous vein bypass graft to the first and second obtuse marginal branch of the circumflex and the right mammary to the right coronary artery     [facility] NONSPECIFIC PROCEDURE  6-94    Gail lap     [facility] NONSPECIFIC PROCEDURE  1995    sinus surgery     [facility] NONSPECIFIC PROCEDURE      bilateral cataract surgery     [facility] COLONOSCOPY THRU STOMA W BIOPSY/CAUTERY TUMOR/POLYP/LESION  5/2007       Social History     Tobacco Use     Smoking  status: Former Smoker     Packs/day: 2.00     Years: 30.00     Pack years: 60.00     Types: Cigarettes, Cigars, Pipe     Start date: 1953     Quit date: 3/1/1992     Years since quittin.3     Smokeless tobacco: Never Used     Tobacco comment: quit in    Substance Use Topics     Alcohol use: Yes     Comment: minimal less than 1/week     Family History   Problem Relation Age of Onset     Cancer Brother         MELANOMA     Diabetes Mother         AODM     Thyroid Disease Mother      Diabetes Father         AODM     Myocardial Infarction Father      Cerebrovascular Disease Brother      Parkinsonism Sister      Family History Negative Son      Family History Negative Son      Family History Negative Son      Family History Negative Daughter      Coronary Artery Disease Brother         dod 2019     Cerebrovascular Disease Brother         dod 2019     Other Cancer Brother         dod 2000/melanoma     Breast Cancer Other         in remission reg chkups         Current Outpatient Medications   Medication Sig Dispense Refill     acetaminophen (ACETAMIN) 500 MG tablet Take 1,000 mg by mouth 2 times daily       amLODIPine (NORVASC) 2.5 MG tablet Take 1 tablet (2.5 mg) by mouth daily 90 tablet 3     aspirin 81 MG EC tablet Take 81 mg by mouth daily        blood glucose (ONETOUCH ULTRA) test strip USE TO TEST BLOOD SUGAR  TWICE DAILY OR AS DIRECTED. DUE FOR APPOINTMENT. PLEASE SCHEDULE: [phone] 200 strip 3     blood glucose monitoring (ONE TOUCH ULTRASOFT) lancets USE TO TEST BLOOD SUGAR 2  TIMES DAILY OR AS DIRECTED. 200 each 1     Cholecalciferol (VITAMIN D) 2000 UNIT tablet Take 2,000 Units by mouth daily. 90 tablet 3     diclofenac (VOLTAREN) 1 % topical gel Place 4 g onto the skin 4 times daily as needed for moderate pain (Apply to back to neck.)       finasteride (PROSCAR) 5 MG tablet TAKE 1 TABLET BY MOUTH  DAILY 90 tablet 2     furosemide (LASIX) 40 MG tablet Take 1 tablet (40 mg) by mouth daily 90  "tablet 3     gabapentin (NEURONTIN) 300 MG capsule Take 3 capsules (900 mg) by mouth 3 times daily - Fasting physical due for further refills (Patient taking differently: Take 900 mg by mouth 2 times daily - Fasting physical due for further refills) 270 capsule 4     lisinopril (ZESTRIL) 10 MG tablet Take 1 tablet (10 mg) by mouth daily 90 tablet 3     metFORMIN (GLUCOPHAGE-XR) 500 MG 24 hr tablet Take 2 tablets (1,000 mg) by mouth 2 times daily (with meals) 360 tablet 4     multivitamin (OCUVITE) TABS tablet Take 1 tablet by mouth daily       omeprazole (PRILOSEC) 20 MG DR capsule Take 1 capsule (20 mg) by mouth daily 90 capsule 3     ORDER FOR DME Uses Cpap machine for sleep apnea       potassium chloride (KLOR-CON) 20 MEQ packet Take 20 mEq by mouth 3 times daily 180 packet 3     pravastatin (PRAVACHOL) 20 MG tablet Take 1 tablet (20 mg) by mouth daily - due for fasting physical with Dr Hodge for refills 90 tablet 3     tamsulosin (FLOMAX) 0.4 MG capsule Take 1 capsule (0.4 mg) by mouth daily 90 capsule 1     vitamin B-12 (CYANOCOBALAMIN) 1000 MCG tablet Take 1,000 mcg by mouth daily        Allergies   Allergen Reactions     No Known Allergies              Review of Systems  10 point ROS of systems including Constitutional, Eyes, Respiratory, Cardiovascular, Gastroenterology, Genitourinary, Integumentary, Muscularskeletal, Psychiatric were all negative except for pertinent positives noted in my HPI.      OBJECTIVE:   There were no vitals taken for this visit. Estimated body mass index is 34.6 kg/m  as calculated from the following:    Height as of 4/27/22: 1.905 m (6' 3\").    Weight as of 4/27/22: 125.6 kg (276 lb 12.8 oz).  Physical Exam  GENERAL: healthy, alert and no distress  EYES: Eyes grossly normal to inspection, PERRL and conjunctivae and sclerae normal  HENT: ear canals and TM's normal, nose and mouth without ulcers or lesions  NECK: no adenopathy, no asymmetry, masses, or scars and thyroid normal to " palpation  RESP: lungs clear to auscultation - no rales, rhonchi or wheezes  CV: regular rate and rhythm, normal S1 S2, no S3 or S4, no murmur, click or rub, no peripheral edema and peripheral pulses strong  ABDOMEN: soft, nontender, no hepatosplenomegaly, no masses and bowel sounds normal  RECTAL: normal sphincter tone, no rectal masses, prostate normal size, smooth, nontender without nodules or masses  MS: no gross musculoskeletal defects noted, no edema  SKIN: no suspicious lesions or rashes  NEURO: Normal strength and tone, mentation intact and speech normal  PSYCH: mentation appears normal, affect normal/bright  Diabetic foot exam: normal DP and PT pulses, no trophic changes or ulcerative lesions and normal sensory exam    Transferred Records on 05/12/2022   Component Date Value Ref Range Status     RETINOPATHY 05/12/2022 NEGATIVE   Final         ASSESSMENT / PLAN:   Austen was seen today for physical.    Diagnoses and all orders for this visit:    Encounter for annual wellness visit (AWV) in Medicare patient  Very pleasant 87 years old  Who does not need colonoscopy anymore  He is trying to lose weight and we checked his feet today and he had retinopathy screening  We discussed about depression and anxiety and poor balance  We will check an A1c today  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)  -     Hemoglobin A1c; Future  -     Albumin Random Urine Quantitative with Creat Ratio; Future  -     REVIEW OF HEALTH MAINTENANCE PROTOCOL ORDERS  -     FOOT EXAM    BENIGN HYPERTENSION  -     REVIEW OF HEALTH MAINTENANCE PROTOCOL ORDERS  Patient is on amlodipine and lisinopril and Lasix  Lasix does bother his BPH symptoms are little bit  Morbid obesity (H)  Patient has lost 6 pounds of weight  Hyperlipidemia LDL goal <70  -     Lipid panel reflex to direct LDL Fasting; Future  -     Comprehensive metabolic panel; Future  -     REVIEW OF HEALTH MAINTENANCE PROTOCOL ORDERS  We will continue with statins  Pravachol  Current Outpatient Medications   Medication     acetaminophen (ACETAMIN) 500 MG tablet     amLODIPine (NORVASC) 2.5 MG tablet     aspirin 81 MG EC tablet     blood glucose (ONETOUCH ULTRA) test strip     blood glucose monitoring (ONE TOUCH ULTRASOFT) lancets     busPIRone (BUSPAR) 5 MG tablet     Cholecalciferol (VITAMIN D) 2000 UNIT tablet     diclofenac (VOLTAREN) 1 % topical gel     finasteride (PROSCAR) 5 MG tablet     furosemide (LASIX) 40 MG tablet     gabapentin (NEURONTIN) 300 MG capsule     lisinopril (ZESTRIL) 10 MG tablet     metFORMIN (GLUCOPHAGE-XR) 500 MG 24 hr tablet     multivitamin (OCUVITE) TABS tablet     omeprazole (PRILOSEC) 20 MG DR capsule     ORDER FOR DME     potassium chloride (KLOR-CON) 20 MEQ packet     pravastatin (PRAVACHOL) 20 MG tablet     tamsulosin (FLOMAX) 0.4 MG capsule     vitamin B-12 (CYANOCOBALAMIN) 1000 MCG tablet     No current facility-administered medications for this visit.       Coronary artery disease involving native coronary artery of native heart without angina pectoris  Comments:   coronary artery bypass grafting, which occurred in 1991.  At that time, he had a LIMA to the LAD, a saphenous vein graft to both the first and second obtuse ma  Orders:  -     CBC with Platelets  ; Future  -     REVIEW OF HEALTH MAINTENANCE PROTOCOL ORDERS  Patient also has diastolic dysfunction and dilated aortic root which will be monitored  We will continue statins and baby aspirin  Anxiety  -     busPIRone (BUSPAR) 5 MG tablet; Take 1 tablet (5 mg) by mouth 3 times daily as needed (anxiety)  He is managing better by exercising  Chronic pain syndrome  Is on gabapentin which takes away some of his balance and he has done those exercises  PENELOPE (obstructive sleep apnea)  He will use CPAP  Sacroiliac joint pain  -     CBC with Platelets  ; Future  Patient is doing fine  Encounter for long-term (current) use of medications  We discussed the side effects of gabapentin in particular  "and BuSpar  Need for vaccination  -     Pneumococcal 20 Valent Conjugate (Prevnar 20)    Shortness of breath  This is from chronic diastolic dysfunction and obesity and we will monitor          COUNSELING:  Prevnar 20    Estimated body mass index is 34.6 kg/m  as calculated from the following:    Height as of 4/27/22: 1.905 m (6' 3\").    Weight as of 4/27/22: 125.6 kg (276 lb 12.8 oz).    Trying to lose weight with diet and exercise    He reports that he quit smoking about 30 years ago. His smoking use included cigarettes, cigars, and pipe. He started smoking about 68 years ago. He has a 60.00 pack-year smoking history. He has never used smokeless tobacco.      Appropriate preventive services were discussed with this patient, including applicable screening as appropriate for cardiovascular disease, diabetes, osteopenia/osteoporosis, and glaucoma.  Cancer screening is not indicated checklist reviewing preventive services available has been given to the patient.    Reviewed patients plan of care and provided an AVS. The Complex Care Plan (for patients with higher acuity and needing more deliberate coordination of services) for Austen meets the Care Plan requirement. This Care Plan has been established and reviewed with the Patient.    Counseling Resources:  ATP IV Guidelines  Pooled Cohorts Equation Calculator  Breast Cancer Risk Calculator  Breast Cancer: Medication to Reduce Risk  FRAX Risk Assessment  ICSI Preventive Guidelines  Dietary Guidelines for Americans, 2010  USDA's MyPlate  ASA Prophylaxis  Lung CA Screening    Hamzah Hodge MD  [facility]    Identified Health Risks:  "

## 2022-07-15 LAB
ALBUMIN SERPL-MCNC: 3.6 G/DL (ref 3.4–5)
ALP SERPL-CCNC: 53 U/L (ref 40–150)
ALT SERPL W P-5'-P-CCNC: 19 U/L (ref 0–70)
ANION GAP SERPL CALCULATED.3IONS-SCNC: 10 MMOL/L (ref 3–14)
AST SERPL W P-5'-P-CCNC: 31 U/L (ref 0–45)
BILIRUB SERPL-MCNC: 0.4 MG/DL (ref 0.2–1.3)
BUN SERPL-MCNC: 11 MG/DL (ref 7–30)
CALCIUM SERPL-MCNC: 9.3 MG/DL (ref 8.5–10.1)
CHLORIDE BLD-SCNC: 105 MMOL/L (ref 94–109)
CHOLEST SERPL-MCNC: 127 MG/DL
CO2 SERPL-SCNC: 24 MMOL/L (ref 20–32)
CREAT SERPL-MCNC: 0.67 MG/DL (ref 0.66–1.25)
CREAT UR-MCNC: 45 MG/DL
FASTING STATUS PATIENT QL REPORTED: NO
GFR SERPL CREATININE-BSD FRML MDRD: >90 ML/MIN/1.73M2
GLUCOSE BLD-MCNC: 198 MG/DL (ref 70–99)
HDLC SERPL-MCNC: 38 MG/DL
LDLC SERPL CALC-MCNC: 54 MG/DL
MICROALBUMIN UR-MCNC: <5 MG/L
MICROALBUMIN/CREAT UR: NORMAL MG/G{CREAT}
NONHDLC SERPL-MCNC: 89 MG/DL
POTASSIUM BLD-SCNC: 3.8 MMOL/L (ref 3.4–5.3)
PROT SERPL-MCNC: 7 G/DL (ref 6.8–8.8)
SODIUM SERPL-SCNC: 139 MMOL/L (ref 133–144)
TRIGL SERPL-MCNC: 173 MG/DL

## 2022-07-15 NOTE — RESULT ENCOUNTER NOTE
Griffin, I am covering today for   Your labs overall are stable except for an elevated A1c.  Please be sure to watch your diet, try to avoid processed carbohydrates and added more vegetables and healthy protein and fat.  Please follow-up with Dr. Hodge if you have any questions  CRISTIANO Velez CNP

## 2022-07-19 DIAGNOSIS — N40.0 BENIGN PROSTATIC HYPERPLASIA, UNSPECIFIED WHETHER LOWER URINARY TRACT SYMPTOMS PRESENT: ICD-10-CM

## 2022-07-21 ENCOUNTER — TELEPHONE (OUTPATIENT)
Dept: FAMILY MEDICINE | Facility: CLINIC | Age: 87
End: 2022-07-21

## 2022-07-21 ENCOUNTER — MYC MEDICAL ADVICE (OUTPATIENT)
Dept: FAMILY MEDICINE | Facility: CLINIC | Age: 87
End: 2022-07-21

## 2022-07-21 DIAGNOSIS — I10 ESSENTIAL HYPERTENSION, BENIGN: ICD-10-CM

## 2022-07-21 DIAGNOSIS — F41.9 ANXIETY: ICD-10-CM

## 2022-07-21 RX ORDER — BUSPIRONE HYDROCHLORIDE 5 MG/1
5 TABLET ORAL 3 TIMES DAILY
Qty: 90 TABLET | Refills: 4 | Status: SHIPPED | OUTPATIENT
Start: 2022-07-21 | End: 2023-02-17

## 2022-07-21 RX ORDER — TAMSULOSIN HYDROCHLORIDE 0.4 MG/1
CAPSULE ORAL
Qty: 90 CAPSULE | Refills: 2 | Status: SHIPPED | OUTPATIENT
Start: 2022-07-21 | End: 2023-04-03

## 2022-07-21 NOTE — TELEPHONE ENCOUNTER
Disp Refills Start End WADE   busPIRone (BUSPAR) 5 MG tablet 90 tablet 4 7/14/2022  --   Sig - Route: Take 1 tablet (5 mg) by mouth 3 times daily as needed (anxiety) - Oral     Pharmacy requesting clarification on directions for Buspirone stating that this med is typically taken on a scheduled basis, not as needed. Please clarify.

## 2022-07-21 NOTE — TELEPHONE ENCOUNTER
"Resent to pharmacy with updated sig per below \"written orders\"     Sierra ARGUELLO, Triage RN  [facility]     "

## 2022-07-21 NOTE — TELEPHONE ENCOUNTER
Prescription approved per [facility] Refill Protocol.  Kym Meeks, RN  [facility] RN Triage Team

## 2022-07-22 RX ORDER — AMLODIPINE BESYLATE 2.5 MG/1
2.5 TABLET ORAL DAILY
Qty: 90 TABLET | Refills: 3 | Status: SHIPPED | OUTPATIENT
Start: 2022-07-22 | End: 2022-09-06

## 2022-07-28 DIAGNOSIS — E11.59 TYPE 2 DIABETES MELLITUS WITH VASCULAR DISEASE (H): ICD-10-CM

## 2022-07-28 RX ORDER — METFORMIN HCL 500 MG
TABLET, EXTENDED RELEASE 24 HR ORAL
Qty: 360 TABLET | Refills: 3 | Status: SHIPPED | OUTPATIENT
Start: 2022-07-28 | End: 2023-07-12

## 2022-09-01 ENCOUNTER — HOSPITAL ENCOUNTER (INPATIENT)
Facility: CLINIC | Age: 87
LOS: 5 days | Discharge: HOME | DRG: 871 | End: 2022-09-06
Attending: EMERGENCY MEDICINE | Admitting: INTERNAL MEDICINE
Payer: COMMERCIAL

## 2022-09-01 ENCOUNTER — APPOINTMENT (OUTPATIENT)
Dept: GENERAL RADIOLOGY | Facility: CLINIC | Age: 87
DRG: 871 | End: 2022-09-01
Attending: EMERGENCY MEDICINE
Payer: COMMERCIAL

## 2022-09-01 ENCOUNTER — APPOINTMENT (OUTPATIENT)
Dept: INTERVENTIONAL RADIOLOGY/VASCULAR | Facility: CLINIC | Age: 87
DRG: 871 | End: 2022-09-01
Attending: EMERGENCY MEDICINE
Payer: COMMERCIAL

## 2022-09-01 DIAGNOSIS — J18.9 COMMUNITY ACQUIRED PNEUMONIA, UNSPECIFIED LATERALITY: ICD-10-CM

## 2022-09-01 DIAGNOSIS — I48.91 ATRIAL FIBRILLATION WITH RVR (H): ICD-10-CM

## 2022-09-01 DIAGNOSIS — R65.21 SEPTIC SHOCK (H): ICD-10-CM

## 2022-09-01 DIAGNOSIS — A41.9 SEPTIC SHOCK (H): ICD-10-CM

## 2022-09-01 DIAGNOSIS — L03.115 CELLULITIS OF RIGHT LOWER EXTREMITY: Primary | ICD-10-CM

## 2022-09-01 DIAGNOSIS — L03.115 CELLULITIS OF RIGHT LOWER LEG: ICD-10-CM

## 2022-09-01 LAB
ALBUMIN UR-MCNC: NEGATIVE MG/DL
ANION GAP SERPL CALCULATED.3IONS-SCNC: 12 MMOL/L (ref 3–14)
APPEARANCE UR: CLEAR
ATRIAL RATE - MUSE: 159 BPM
BASOPHILS # BLD AUTO: 0 10E3/UL (ref 0–0.2)
BASOPHILS NFR BLD AUTO: 0 %
BILIRUB UR QL STRIP: NEGATIVE
BUN SERPL-MCNC: 16 MG/DL (ref 7–30)
CALCIUM SERPL-MCNC: 9 MG/DL (ref 8.5–10.1)
CHLORIDE BLD-SCNC: 103 MMOL/L (ref 94–109)
CO2 SERPL-SCNC: 21 MMOL/L (ref 20–32)
COLOR UR AUTO: ABNORMAL
CREAT SERPL-MCNC: 0.69 MG/DL (ref 0.66–1.25)
DIASTOLIC BLOOD PRESSURE - MUSE: NORMAL MMHG
EOSINOPHIL # BLD AUTO: 0 10E3/UL (ref 0–0.7)
EOSINOPHIL NFR BLD AUTO: 0 %
ERYTHROCYTE [DISTWIDTH] IN BLOOD BY AUTOMATED COUNT: 14.1 % (ref 10–15)
FLUAV RNA SPEC QL NAA+PROBE: NEGATIVE
FLUBV RNA RESP QL NAA+PROBE: NEGATIVE
GFR SERPL CREATININE-BSD FRML MDRD: 90 ML/MIN/1.73M2
GLUCOSE BLD-MCNC: 265 MG/DL (ref 70–99)
GLUCOSE BLDC GLUCOMTR-MCNC: 204 MG/DL (ref 70–99)
GLUCOSE UR STRIP-MCNC: 300 MG/DL
HCO3 BLDV-SCNC: 21 MMOL/L (ref 21–28)
HCT VFR BLD AUTO: 38.7 % (ref 40–53)
HGB BLD-MCNC: 12.7 G/DL (ref 13.3–17.7)
HGB UR QL STRIP: NEGATIVE
HOLD SPECIMEN: NORMAL
IMM GRANULOCYTES # BLD: 0.1 10E3/UL
IMM GRANULOCYTES NFR BLD: 1 %
INTERPRETATION ECG - MUSE: NORMAL
KETONES UR STRIP-MCNC: 40 MG/DL
LACTATE BLD-SCNC: 5.8 MMOL/L
LACTATE SERPL-SCNC: 3.6 MMOL/L (ref 0.7–2)
LACTATE SERPL-SCNC: 4.3 MMOL/L (ref 0.7–2)
LEUKOCYTE ESTERASE UR QL STRIP: NEGATIVE
LYMPHOCYTES # BLD AUTO: 0.7 10E3/UL (ref 0.8–5.3)
LYMPHOCYTES NFR BLD AUTO: 4 %
MCH RBC QN AUTO: 29.1 PG (ref 26.5–33)
MCHC RBC AUTO-ENTMCNC: 32.8 G/DL (ref 31.5–36.5)
MCV RBC AUTO: 89 FL (ref 78–100)
MONOCYTES # BLD AUTO: 1 10E3/UL (ref 0–1.3)
MONOCYTES NFR BLD AUTO: 6 %
NEUTROPHILS # BLD AUTO: 15.9 10E3/UL (ref 1.6–8.3)
NEUTROPHILS NFR BLD AUTO: 89 %
NITRATE UR QL: NEGATIVE
NRBC # BLD AUTO: 0 10E3/UL
NRBC BLD AUTO-RTO: 0 /100
NT-PROBNP SERPL-MCNC: 667 PG/ML (ref 0–1800)
P AXIS - MUSE: NORMAL DEGREES
PCO2 BLDV: 33 MM HG (ref 40–50)
PH BLDV: 7.41 [PH] (ref 7.32–7.43)
PH UR STRIP: 5 [PH] (ref 5–7)
PLATELET # BLD AUTO: 157 10E3/UL (ref 150–450)
PO2 BLDV: 49 MM HG (ref 25–47)
POTASSIUM BLD-SCNC: 3.8 MMOL/L (ref 3.4–5.3)
PR INTERVAL - MUSE: NORMAL MS
QRS DURATION - MUSE: 94 MS
QT - MUSE: 302 MS
QTC - MUSE: 453 MS
R AXIS - MUSE: 45 DEGREES
RBC # BLD AUTO: 4.37 10E6/UL (ref 4.4–5.9)
RBC URINE: 1 /HPF
RSV RNA SPEC NAA+PROBE: NEGATIVE
SAO2 % BLDV: 85 % (ref 94–100)
SARS-COV-2 RNA RESP QL NAA+PROBE: NEGATIVE
SODIUM SERPL-SCNC: 136 MMOL/L (ref 133–144)
SP GR UR STRIP: 1.02 (ref 1–1.03)
SQUAMOUS EPITHELIAL: <1 /HPF
SYSTOLIC BLOOD PRESSURE - MUSE: NORMAL MMHG
T AXIS - MUSE: -11 DEGREES
TROPONIN I SERPL HS-MCNC: 333 NG/L
TROPONIN I SERPL HS-MCNC: 722 NG/L
UROBILINOGEN UR STRIP-MCNC: NORMAL MG/DL
VENTRICULAR RATE- MUSE: 135 BPM
WBC # BLD AUTO: 17.8 10E3/UL (ref 4–11)
WBC URINE: 1 /HPF

## 2022-09-01 PROCEDURE — 96366 THER/PROPH/DIAG IV INF ADDON: CPT

## 2022-09-01 PROCEDURE — 84484 ASSAY OF TROPONIN QUANT: CPT | Performed by: EMERGENCY MEDICINE

## 2022-09-01 PROCEDURE — 83880 ASSAY OF NATRIURETIC PEPTIDE: CPT | Performed by: EMERGENCY MEDICINE

## 2022-09-01 PROCEDURE — 93005 ELECTROCARDIOGRAM TRACING: CPT

## 2022-09-01 PROCEDURE — 250N000011 HC RX IP 250 OP 636: Performed by: INTERNAL MEDICINE

## 2022-09-01 PROCEDURE — 84484 ASSAY OF TROPONIN QUANT: CPT | Performed by: INTERNAL MEDICINE

## 2022-09-01 PROCEDURE — 250N000009 HC RX 250: Performed by: EMERGENCY MEDICINE

## 2022-09-01 PROCEDURE — 82310 ASSAY OF CALCIUM: CPT | Performed by: EMERGENCY MEDICINE

## 2022-09-01 PROCEDURE — 99292 CRITICAL CARE ADDL 30 MIN: CPT

## 2022-09-01 PROCEDURE — 83605 ASSAY OF LACTIC ACID: CPT | Performed by: EMERGENCY MEDICINE

## 2022-09-01 PROCEDURE — 210N000002 HC R&B HEART CARE

## 2022-09-01 PROCEDURE — 999N000040 HC STATISTIC CONSULT NO CHARGE VASC ACCESS

## 2022-09-01 PROCEDURE — 99291 CRITICAL CARE FIRST HOUR: CPT | Mod: 25

## 2022-09-01 PROCEDURE — 85025 COMPLETE CBC W/AUTO DIFF WBC: CPT | Performed by: EMERGENCY MEDICINE

## 2022-09-01 PROCEDURE — 36569 INSJ PICC 5 YR+ W/O IMAGING: CPT

## 2022-09-01 PROCEDURE — 250N000013 HC RX MED GY IP 250 OP 250 PS 637: Performed by: INTERNAL MEDICINE

## 2022-09-01 PROCEDURE — 36415 COLL VENOUS BLD VENIPUNCTURE: CPT | Performed by: INTERNAL MEDICINE

## 2022-09-01 PROCEDURE — 255N000002 HC RX 255 OP 636: Performed by: RADIOLOGY

## 2022-09-01 PROCEDURE — 250N000012 HC RX MED GY IP 250 OP 636 PS 637: Performed by: INTERNAL MEDICINE

## 2022-09-01 PROCEDURE — 83605 ASSAY OF LACTIC ACID: CPT | Performed by: INTERNAL MEDICINE

## 2022-09-01 PROCEDURE — 99223 1ST HOSP IP/OBS HIGH 75: CPT | Mod: AI | Performed by: INTERNAL MEDICINE

## 2022-09-01 PROCEDURE — 76000 FLUOROSCOPY <1 HR PHYS/QHP: CPT

## 2022-09-01 PROCEDURE — 96365 THER/PROPH/DIAG IV INF INIT: CPT | Mod: 59

## 2022-09-01 PROCEDURE — C9803 HOPD COVID-19 SPEC COLLECT: HCPCS

## 2022-09-01 PROCEDURE — 87040 BLOOD CULTURE FOR BACTERIA: CPT | Performed by: EMERGENCY MEDICINE

## 2022-09-01 PROCEDURE — 71046 X-RAY EXAM CHEST 2 VIEWS: CPT

## 2022-09-01 PROCEDURE — 02HV33Z INSERTION OF INFUSION DEVICE INTO SUPERIOR VENA CAVA, PERCUTANEOUS APPROACH: ICD-10-PCS | Performed by: RADIOLOGY

## 2022-09-01 PROCEDURE — 82803 BLOOD GASES ANY COMBINATION: CPT

## 2022-09-01 PROCEDURE — 258N000003 HC RX IP 258 OP 636: Performed by: EMERGENCY MEDICINE

## 2022-09-01 PROCEDURE — 87637 SARSCOV2&INF A&B&RSV AMP PRB: CPT | Performed by: EMERGENCY MEDICINE

## 2022-09-01 PROCEDURE — 250N000011 HC RX IP 250 OP 636: Performed by: EMERGENCY MEDICINE

## 2022-09-01 PROCEDURE — C1769 GUIDE WIRE: HCPCS

## 2022-09-01 PROCEDURE — 258N000003 HC RX IP 258 OP 636: Performed by: INTERNAL MEDICINE

## 2022-09-01 PROCEDURE — 36415 COLL VENOUS BLD VENIPUNCTURE: CPT | Performed by: EMERGENCY MEDICINE

## 2022-09-01 PROCEDURE — 96361 HYDRATE IV INFUSION ADD-ON: CPT

## 2022-09-01 PROCEDURE — 83605 ASSAY OF LACTIC ACID: CPT

## 2022-09-01 PROCEDURE — 272N000452 HC KIT SHRLOCK 5FR POWER PICC TRIPLE LUMEN

## 2022-09-01 PROCEDURE — 81001 URINALYSIS AUTO W/SCOPE: CPT | Performed by: EMERGENCY MEDICINE

## 2022-09-01 RX ORDER — LIDOCAINE 40 MG/G
CREAM TOPICAL
Status: ACTIVE | OUTPATIENT
Start: 2022-09-01 | End: 2022-09-04

## 2022-09-01 RX ORDER — DEXTROSE MONOHYDRATE 25 G/50ML
25-50 INJECTION, SOLUTION INTRAVENOUS
Status: DISCONTINUED | OUTPATIENT
Start: 2022-09-01 | End: 2022-09-06 | Stop reason: HOSPADM

## 2022-09-01 RX ORDER — SODIUM CHLORIDE 9 MG/ML
INJECTION, SOLUTION INTRAVENOUS CONTINUOUS
Status: DISCONTINUED | OUTPATIENT
Start: 2022-09-01 | End: 2022-09-02

## 2022-09-01 RX ORDER — PIPERACILLIN SODIUM, TAZOBACTAM SODIUM 4; .5 G/20ML; G/20ML
4.5 INJECTION, POWDER, LYOPHILIZED, FOR SOLUTION INTRAVENOUS ONCE
Status: COMPLETED | OUTPATIENT
Start: 2022-09-01 | End: 2022-09-01

## 2022-09-01 RX ORDER — PIPERACILLIN SODIUM, TAZOBACTAM SODIUM 3; .375 G/15ML; G/15ML
3.38 INJECTION, POWDER, LYOPHILIZED, FOR SOLUTION INTRAVENOUS EVERY 6 HOURS
Status: DISCONTINUED | OUTPATIENT
Start: 2022-09-01 | End: 2022-09-02

## 2022-09-01 RX ORDER — PANTOPRAZOLE SODIUM 20 MG/1
20 TABLET, DELAYED RELEASE ORAL DAILY
Status: DISCONTINUED | OUTPATIENT
Start: 2022-09-01 | End: 2022-09-06 | Stop reason: HOSPADM

## 2022-09-01 RX ORDER — ASPIRIN 81 MG/1
81 TABLET ORAL DAILY
Status: DISCONTINUED | OUTPATIENT
Start: 2022-09-01 | End: 2022-09-06 | Stop reason: HOSPADM

## 2022-09-01 RX ORDER — ROPIVACAINE IN 0.9% SOD CHL/PF 0.1 %
.03-.125 PLASTIC BAG, INJECTION (ML) EPIDURAL CONTINUOUS
Status: DISCONTINUED | OUTPATIENT
Start: 2022-09-01 | End: 2022-09-01

## 2022-09-01 RX ORDER — NICOTINE POLACRILEX 4 MG
15-30 LOZENGE BUCCAL
Status: DISCONTINUED | OUTPATIENT
Start: 2022-09-01 | End: 2022-09-06 | Stop reason: HOSPADM

## 2022-09-01 RX ORDER — GABAPENTIN 300 MG/1
900 CAPSULE ORAL 2 TIMES DAILY
Status: DISCONTINUED | OUTPATIENT
Start: 2022-09-01 | End: 2022-09-06 | Stop reason: HOSPADM

## 2022-09-01 RX ORDER — LIDOCAINE 40 MG/G
CREAM TOPICAL
Status: DISCONTINUED | OUTPATIENT
Start: 2022-09-01 | End: 2022-09-01

## 2022-09-01 RX ORDER — ONDANSETRON 4 MG/1
4 TABLET, ORALLY DISINTEGRATING ORAL EVERY 6 HOURS PRN
Status: DISCONTINUED | OUTPATIENT
Start: 2022-09-01 | End: 2022-09-06 | Stop reason: HOSPADM

## 2022-09-01 RX ORDER — ONDANSETRON 2 MG/ML
4 INJECTION INTRAMUSCULAR; INTRAVENOUS EVERY 6 HOURS PRN
Status: DISCONTINUED | OUTPATIENT
Start: 2022-09-01 | End: 2022-09-06 | Stop reason: HOSPADM

## 2022-09-01 RX ORDER — NITROGLYCERIN 0.4 MG/1
0.4 TABLET SUBLINGUAL EVERY 5 MIN PRN
Status: DISCONTINUED | OUTPATIENT
Start: 2022-09-01 | End: 2022-09-06 | Stop reason: HOSPADM

## 2022-09-01 RX ORDER — SODIUM CHLORIDE 9 MG/ML
INJECTION, SOLUTION INTRAVENOUS CONTINUOUS
Status: DISCONTINUED | OUTPATIENT
Start: 2022-09-01 | End: 2022-09-01

## 2022-09-01 RX ORDER — ACETAMINOPHEN 650 MG/1
650 SUPPOSITORY RECTAL EVERY 4 HOURS PRN
Status: DISCONTINUED | OUTPATIENT
Start: 2022-09-01 | End: 2022-09-06 | Stop reason: HOSPADM

## 2022-09-01 RX ORDER — CHOLECALCIFEROL (VITAMIN D3) 50 MCG
50 TABLET ORAL DAILY
Status: DISCONTINUED | OUTPATIENT
Start: 2022-09-01 | End: 2022-09-01

## 2022-09-01 RX ORDER — HEPARIN SODIUM,PORCINE 10 UNIT/ML
5-10 VIAL (ML) INTRAVENOUS
Status: DISCONTINUED | OUTPATIENT
Start: 2022-09-01 | End: 2022-09-06 | Stop reason: HOSPADM

## 2022-09-01 RX ORDER — MULTIVITAMIN,THERAPEUTIC
1 TABLET ORAL DAILY
Status: DISCONTINUED | OUTPATIENT
Start: 2022-09-01 | End: 2022-09-06 | Stop reason: HOSPADM

## 2022-09-01 RX ORDER — ACETAMINOPHEN 325 MG/1
650 TABLET ORAL EVERY 4 HOURS PRN
Status: DISCONTINUED | OUTPATIENT
Start: 2022-09-01 | End: 2022-09-06 | Stop reason: HOSPADM

## 2022-09-01 RX ORDER — LIDOCAINE 40 MG/G
CREAM TOPICAL
Status: DISCONTINUED | OUTPATIENT
Start: 2022-09-01 | End: 2022-09-06 | Stop reason: HOSPADM

## 2022-09-01 RX ORDER — PRAVASTATIN SODIUM 20 MG
20 TABLET ORAL EVERY EVENING
Status: DISCONTINUED | OUTPATIENT
Start: 2022-09-01 | End: 2022-09-06 | Stop reason: HOSPADM

## 2022-09-01 RX ORDER — FINASTERIDE 5 MG/1
5 TABLET, FILM COATED ORAL DAILY
Status: DISCONTINUED | OUTPATIENT
Start: 2022-09-02 | End: 2022-09-06 | Stop reason: HOSPADM

## 2022-09-01 RX ORDER — IOPAMIDOL 612 MG/ML
50 INJECTION, SOLUTION INTRAVASCULAR ONCE
Status: COMPLETED | OUTPATIENT
Start: 2022-09-01 | End: 2022-09-01

## 2022-09-01 RX ORDER — TAMSULOSIN HYDROCHLORIDE 0.4 MG/1
0.4 CAPSULE ORAL EVERY EVENING
Status: DISCONTINUED | OUTPATIENT
Start: 2022-09-01 | End: 2022-09-06 | Stop reason: HOSPADM

## 2022-09-01 RX ORDER — HEPARIN SODIUM,PORCINE 10 UNIT/ML
5-10 VIAL (ML) INTRAVENOUS EVERY 24 HOURS
Status: DISCONTINUED | OUTPATIENT
Start: 2022-09-01 | End: 2022-09-06 | Stop reason: HOSPADM

## 2022-09-01 RX ORDER — BUSPIRONE HYDROCHLORIDE 5 MG/1
5 TABLET ORAL 3 TIMES DAILY
Status: DISCONTINUED | OUTPATIENT
Start: 2022-09-01 | End: 2022-09-06 | Stop reason: HOSPADM

## 2022-09-01 RX ADMIN — PRAVASTATIN SODIUM 20 MG: 20 TABLET ORAL at 20:08

## 2022-09-01 RX ADMIN — TAMSULOSIN HYDROCHLORIDE 0.4 MG: 0.4 CAPSULE ORAL at 20:08

## 2022-09-01 RX ADMIN — SODIUM CHLORIDE: 9 INJECTION, SOLUTION INTRAVENOUS at 10:54

## 2022-09-01 RX ADMIN — SODIUM CHLORIDE 3741 ML: 9 INJECTION, SOLUTION INTRAVENOUS at 08:42

## 2022-09-01 RX ADMIN — GABAPENTIN 900 MG: 300 CAPSULE ORAL at 20:08

## 2022-09-01 RX ADMIN — PIPERACILLIN AND TAZOBACTAM 3.38 G: 3; .375 INJECTION, POWDER, FOR SOLUTION INTRAVENOUS at 14:46

## 2022-09-01 RX ADMIN — SODIUM CHLORIDE 100 ML/HR: 9 INJECTION, SOLUTION INTRAVENOUS at 17:33

## 2022-09-01 RX ADMIN — BUSPIRONE HYDROCHLORIDE 5 MG: 5 TABLET ORAL at 14:44

## 2022-09-01 RX ADMIN — PIPERACILLIN AND TAZOBACTAM 3.38 G: 3; .375 INJECTION, POWDER, FOR SOLUTION INTRAVENOUS at 21:20

## 2022-09-01 RX ADMIN — BUSPIRONE HYDROCHLORIDE 5 MG: 5 TABLET ORAL at 20:08

## 2022-09-01 RX ADMIN — IOPAMIDOL 10 ML: 612 INJECTION, SOLUTION INTRAVENOUS at 14:34

## 2022-09-01 RX ADMIN — SODIUM CHLORIDE 1000 ML: 9 INJECTION, SOLUTION INTRAVENOUS at 08:28

## 2022-09-01 RX ADMIN — THERA TABS 1 TABLET: TAB at 14:43

## 2022-09-01 RX ADMIN — ASPIRIN 81 MG: 81 TABLET, COATED ORAL at 14:43

## 2022-09-01 RX ADMIN — LIDOCAINE HYDROCHLORIDE 2 ML: 10 INJECTION, SOLUTION EPIDURAL; INFILTRATION; INTRACAUDAL; PERINEURAL at 12:42

## 2022-09-01 RX ADMIN — INSULIN ASPART 1 UNITS: 100 INJECTION, SOLUTION INTRAVENOUS; SUBCUTANEOUS at 22:49

## 2022-09-01 RX ADMIN — PIPERACILLIN AND TAZOBACTAM 4.5 G: 4; .5 INJECTION, POWDER, FOR SOLUTION INTRAVENOUS at 08:54

## 2022-09-01 RX ADMIN — ACETAMINOPHEN 650 MG: 325 TABLET ORAL at 19:06

## 2022-09-01 RX ADMIN — PANTOPRAZOLE SODIUM 20 MG: 20 TABLET, DELAYED RELEASE ORAL at 14:44

## 2022-09-01 ASSESSMENT — ACTIVITIES OF DAILY LIVING (ADL)
TOILETING_ISSUES: NO
ADLS_ACUITY_SCORE: 37
NUMBER_OF_TIMES_PATIENT_HAS_FALLEN_WITHIN_LAST_SIX_MONTHS: 1
DIFFICULTY_EATING/SWALLOWING: NO
ADLS_ACUITY_SCORE: 37
ADLS_ACUITY_SCORE: 37
ADLS_ACUITY_SCORE: 26
ADLS_ACUITY_SCORE: 26
EQUIPMENT_CURRENTLY_USED_AT_HOME: OTHER (SEE COMMENTS)
DRESSING/BATHING_DIFFICULTY: NO
CONCENTRATING,_REMEMBERING_OR_MAKING_DECISIONS_DIFFICULTY: NO
ADLS_ACUITY_SCORE: 26
DOING_ERRANDS_INDEPENDENTLY_DIFFICULTY: NO
WEAR_GLASSES_OR_BLIND: YES
FALL_HISTORY_WITHIN_LAST_SIX_MONTHS: YES
ADLS_ACUITY_SCORE: 26
WALKING_OR_CLIMBING_STAIRS_DIFFICULTY: YES
DIFFICULTY_COMMUNICATING: NO
ADLS_ACUITY_SCORE: 37

## 2022-09-01 ASSESSMENT — ENCOUNTER SYMPTOMS
FEVER: 1
DYSURIA: 0
ABDOMINAL PAIN: 0
FATIGUE: 1
FREQUENCY: 0
SORE THROAT: 0
DIARRHEA: 0
CHILLS: 1
WEAKNESS: 1
NAUSEA: 0
PALPITATIONS: 0
VOMITING: 0

## 2022-09-01 NOTE — ED NOTES
Bed: ED19  Expected date:   Expected time:   Means of arrival:   Comments:  [facility] [place] - 446 - 87 M fever eta [id number]

## 2022-09-01 NOTE — PROVIDER NOTIFICATION
MD Notification    Notified Person: MD    Notified Person Name: Heladio GómezJOSELINE    Notification Date/Time: 9-1-2022 0624    Notification Interaction: [employer] web    Purpose of Notification: Temp 100.8 oral. Order for Tylenol needed.     Orders Received: Order to notify     Comments:

## 2022-09-01 NOTE — PROVIDER NOTIFICATION
MD Notification    Notified Person: MD    Notified Person Name:     Notification Date/Time: 9-1-2022 1830    Notification Interaction: [employer] messaging    Purpose of Notification: Temp 100.8. Tylenol order needed.    Orders Received: Order received for Tylenol    Comments:

## 2022-09-01 NOTE — ED PROVIDER NOTES
History     Chief Complaint:  Fever and Generalized Weakness     HPI   Austen Fowler is a 87 year old male who presents with generalized weakness and fever.  It began this morning.  The patient took 1000 g of Tylenol just prior to arrival.  Paramedics checked a temporal temperature and it was 102.0.  The patient has not had any significant cough.  He has swelling in his legs.  He has had that for several days.  He has had some redness to the right leg as well.  This is unusual for him.  He has had cellulitis before.  This reminds him of that.  The patient denies any dysuria or frequency or urgency, other than his baseline nocturia.  The patient denies any abdominal pain, nausea, vomiting or diarrhea.  He denies any headache or sore throat.  He has had his COVID vaccines and booster.  Paramedics were called because of the weakness.  They were able to get the patient down the stairs on his own, but he was slower moving than typical.  Paramedics mentioned his twelve-lead tracing showed A. fib with RVR.  Patient does not feel palpitations nor has he recently.  He does not have any known history of A. fib.  He is not on any anticoagulation.    ROS:  Review of Systems   Constitutional: Positive for chills, fatigue and fever.   HENT: Negative for sore throat.    Cardiovascular: Negative for chest pain and palpitations.   Gastrointestinal: Negative for abdominal pain, diarrhea, nausea and vomiting.   Genitourinary: Negative for dysuria, frequency and urgency.   Skin: Positive for rash.   Neurological: Positive for weakness.   Psychiatric/Behavioral: Negative for suicidal ideas.   All other systems reviewed and are negative.    Allergies:  No Known Allergies     Medications:    acetaminophen (ACETAMIN) 500 MG tablet  amLODIPine (NORVASC) 2.5 MG tablet  aspirin 81 MG EC tablet  busPIRone (BUSPAR) 5 MG tablet  Cholecalciferol (VITAMIN D) 2000 UNIT tablet  finasteride (PROSCAR) 5 MG tablet  furosemide (LASIX) 40 MG  tablet  gabapentin (NEURONTIN) 300 MG capsule  lisinopril (ZESTRIL) 10 MG tablet  metFORMIN (GLUCOPHAGE XR) 500 MG 24 hr tablet  multivitamin (OCUVITE) TABS tablet  omeprazole (PRILOSEC) 20 MG DR capsule  potassium chloride (KLOR-CON) 20 MEQ packet  pravastatin (PRAVACHOL) 20 MG tablet  Probiotic Product (PROBIOTIC PO)  tamsulosin (FLOMAX) 0.4 MG capsule  vitamin B-12 (CYANOCOBALAMIN) 1000 MCG tablet  blood glucose (ONETOUCH ULTRA) test strip  blood glucose monitoring (ONE TOUCH ULTRASOFT) lancets  ORDER FOR DME        Past Medical History:    Past Medical History:   Diagnosis Date     Anxiety state, unspecified      Aortic root dilatation (H)      Arthritis      Ascending aorta dilatation (H)      Basal cell cancer      Bunion      CAD (coronary artery disease)      Contact dermatitis and other eczema, due to unspecified cause      Disorders of bursae and tendons in shoulder region, unspecified      Esophageal reflux      Essential hypertension, benign      Gallbladder disease      GERD (gastroesophageal reflux disease)      Heart attack (H)      Hyperlipidemia LDL goal <70      Left ventricular diastolic dysfunction      Low HDL (under 40) 3/28/2014     Nasal/sinus dis NEC      Obesity      Osteoarthrosis, unspecified whether generalized or localized, shoulder region      Other hammer toe (acquired)      Sleep apnea      Spinal stenosis, unspecified region other than cervical      Type 2 diabetes, HbA1c goal < 7% (H) 3/12/2015     Urge incontinence        Past Surgical History:    Past Surgical History:   Procedure Laterality Date     ABDOMEN SURGERY  1994    gall bladder     BIOPSY      arms     CHOLECYSTECTOMY  6/1994     COLONOSCOPY N/A 4/11/2017    Procedure: COMBINED COLONOSCOPY, SINGLE OR MULTIPLE BIOPSY/POLYPECTOMY BY BIOPSY;  Surgeon: Bladimir Garner MD;  Location:  GI     CV LEFT HEART CATH  5-92, 6-92    Angioplasty     ENT SURGERY  5/1995    sinus surgery     ESOPHAGOSCOPY, GASTROSCOPY, DUODENOSCOPY  (EGD), DILATATION, COMBINED  7/17/2014    Procedure: COMBINED ESOPHAGOSCOPY, GASTROSCOPY, DUODENOSCOPY (EGD), DILATATION;  Surgeon: Bladimir Garner MD;  Location:  GI     EYE SURGERY      cataracts removed both eyes     INJECT EPIDURAL LUMBAR       LAMINECTOMY LUMBAR TWO LEVELS N/A 4/29/2015    Procedure: LAMINECTOMY LUMBAR TWO LEVELS;  Surgeon: Bladimir Keenan MD;  Location:  OR     THORACIC SURGERY  11/1992    bypass     [facility] CABG, ARTERY-VEIN, FOUR  11/1992    CABG - LIMA to left anterior descending and saphenous vein bypass graft to the first and second obtuse marginal branch of the circumflex and the right mammary to the right coronary artery     [facility] NONSPECIFIC PROCEDURE  6-94    Gail lap     [facility] NONSPECIFIC PROCEDURE  1995    sinus surgery     [facility] NONSPECIFIC PROCEDURE      bilateral cataract surgery     [facility] COLONOSCOPY THRU STOMA W BIOPSY/CAUTERY TUMOR/POLYP/LESION  5/2007        Family History:    family history includes Breast Cancer in an other family member; Cancer in his brother; Cerebrovascular Disease in his brother and brother; Coronary Artery Disease in his brother; Diabetes in his father and mother; Family History Negative in his daughter, son, son, and son; Myocardial Infarction in his father; Other Cancer in his brother; Parkinsonism in his sister; Thyroid Disease in his mother.    Social History:   reports that he quit smoking about 30 years ago. His smoking use included cigarettes, cigars, and pipe. He started smoking about 68 years ago. He has a 60.00 pack-year smoking history. He has never used smokeless tobacco. He reports current alcohol use. He reports that he does not use drugs.  PCP: Hamzah Hodge     Physical Exam     Patient Vitals for the past 24 hrs:   BP Temp Temp src Pulse Resp SpO2   09/01/22 1149 -- 98  F (36.7  C) Oral -- -- --   09/01/22 1145 94/61 -- -- 114 21 91 %   09/01/22 1115 102/63 -- -- -- -- --   09/01/22 1054 103/61 -- -- 118 15 96 %   09/01/22 1030 98/55  -- -- 116 -- --   09/01/22 1028 -- -- -- (!) 125 27 90 %   09/01/22 1017 (!) 102/93 -- -- (!) 137 (!) 32 94 %   09/01/22 1015 (!) 102/93 -- -- (!) 123 -- --   09/01/22 1000 94/55 -- -- (!) 122 (!) 31 93 %   09/01/22 0945 (!) 87/53 -- -- 115 19 95 %   09/01/22 0930 (!) 81/50 -- -- (!) 123 18 95 %   09/01/22 0915 96/61 -- -- (!) 124 22 96 %   09/01/22 0908 -- -- -- 120 (!) 32 95 %   09/01/22 0900 (!) 82/59 -- -- (!) 121 28 96 %   09/01/22 0855 (!) 86/58 -- -- (!) 128 (!) 36 96 %   09/01/22 0842 -- -- -- (!) 129 (!) 37 98 %   09/01/22 0840 (!) 75/48 -- -- (!) 134 -- --   09/01/22 0831 92/57 -- -- (!) 128 17 98 %   09/01/22 0830 (!) 77/50 -- -- (!) 138 (!) 37 --   09/01/22 0815 117/54 -- -- -- -- --   09/01/22 0813 -- 100.2  F (37.9  C) Oral (!) 128 17 94 %        Physical Exam  Physical Exam   General:  Sitting on bed by self.   HENT:  No obvious trauma to head  Right Ear:  External ear normal.   Left Ear:  External ear normal.   Nose:  Nose normal.   Eyes:  Conjunctivae and EOM are normal. Pupils are equal, round, and reactive.   Neck: Normal range of motion. Neck supple. No tracheal deviation present.   CV:  Normal heart sounds. No murmur heard.  Irregular irregular and tachycardic.  Pulm/Chest: Effort normal and breath sounds normal.   Abd: Soft. No distension. There is no tenderness. There is no rigidity, no rebound and no guarding.   M/S: Normal range of motion. +2 pitting edema bilateral.  DP pulses are +2 bilateral  Neuro: Alert. GCS 15.  Skin: Skin is warm and dry.  On the right lower extremity there is an area of erythema circumferential around the mid shin that is warm to palpation.  No abscess.  No crepitus.  No necrosis.  No other rash noted. Not diaphoretic.   Psych: Normal mood and affect. Behavior is normal.     Emergency Department Course   ECG:  Indication: tachycardia  Obtained at 8:18 AM and interpreted at 8:20 AM  Atrial fibrillation with a rapid ventricular response  Nonspecific ST  changes  Abnormal ECG  Ventricular rate 135 bpm  MA interval *  QRS duration 94 MS  QT/QTc 302/453 MS  PRT axis * 45 -11    Imaging:  XR Chest 2 Views   Final Result   IMPRESSION: There are no acute infiltrates. The cardiac silhouette is   not enlarged. Pulmonary vasculature is unremarkable.      RIA PAEZ MD            SYSTEM ID:  [id number]         Report per radiology    Laboratory:  Labs Ordered and Resulted from Time of ED Arrival to Time of ED Departure   BASIC METABOLIC PANEL - Abnormal       Result Value    Sodium 136      Potassium 3.8      Chloride 103      Carbon Dioxide (CO2) 21      Anion Gap 12      Urea Nitrogen 16      Creatinine 0.69      Calcium 9.0      Glucose 265 (*)     GFR Estimate 90     LACTIC ACID WHOLE BLOOD - Abnormal    Lactic Acid 4.3 (*)    TROPONIN I - Abnormal    Troponin I High Sensitivity 333 (*)    CBC WITH PLATELETS AND DIFFERENTIAL - Abnormal    WBC Count 17.8 (*)     RBC Count 4.37 (*)     Hemoglobin 12.7 (*)     Hematocrit 38.7 (*)     MCV 89      MCH 29.1      MCHC 32.8      RDW 14.1      Platelet Count 157      % Neutrophils 89      % Lymphocytes 4      % Monocytes 6      % Eosinophils 0      % Basophils 0      % Immature Granulocytes 1      NRBCs per 100 WBC 0      Absolute Neutrophils 15.9 (*)     Absolute Lymphocytes 0.7 (*)     Absolute Monocytes 1.0      Absolute Eosinophils 0.0      Absolute Basophils 0.0      Absolute Immature Granulocytes 0.1      Absolute NRBCs 0.0     ISTAT GASES LACTATE VENOUS POCT - Abnormal    Lactic Acid POCT 5.8 (*)     Bicarbonate Venous POCT 21      O2 Sat, Venous POCT 85 (*)     pCO2V Venous POCT 33 (*)     pH Venous POCT 7.41      pO2 Venous POCT 49 (*)    ROUTINE UA WITH MICROSCOPIC REFLEX TO CULTURE - Abnormal    Color Urine Light Yellow      Appearance Urine Clear      Glucose Urine 300  (*)     Bilirubin Urine Negative      Ketones Urine 40  (*)     Specific Gravity Urine 1.020      Blood Urine Negative      pH Urine 5.0      Protein  Albumin Urine Negative      Urobilinogen Urine Normal      Nitrite Urine Negative      Leukocyte Esterase Urine Negative      RBC Urine 1      WBC Urine 1      Squamous Epithelials Urine <1     NT PROBNP INPATIENT - Normal    N terminal Pro BNP Inpatient 667     INFLUENZA A/B & SARS-COV2 PCR MULTIPLEX - Normal    Influenza A PCR Negative      Influenza B PCR Negative      RSV PCR Negative      SARS CoV2 PCR Negative     BLOOD CULTURE   BLOOD CULTURE     Emergency Department Course:  Reviewed:  I reviewed nursing notes, vitals and past medical history    Assessments:  0804 Met EMS.  Received report with RN from EMS.  0808 I obtained history and examined the patient as noted above.   0935 Wife now at bedside. RN udpated  1000 I rechecked the patient and explained findings to him and wife.     Consults:   1128 I spoke with the intensivist, dr. Posadas, who has agreed to admit the patient for continued evaluation and treatment.  1148 I spoke with the hospitalist, , who has agreed to admit the patient for continued evaluation and treatment.    Interventions:  Medications   0.9% sodium chloride BOLUS (0 mLs Intravenous Stopped 9/1/22 1042)     Followed by   sodium chloride 0.9% infusion (has no administration in time range)   sodium chloride 0.9% infusion ( Intravenous New Bag 9/1/22 1054)   lidocaine 1 % 0.5-5 mL (has no administration in time range)   lidocaine (LMX4) cream (has no administration in time range)   sodium chloride (PF) 0.9% PF flush 10-20 mL (has no administration in time range)   sodium chloride (PF) 0.9% PF flush 10 mL (has no administration in time range)   heparin lock flush 10 UNIT/ML injection 5-10 mL (has no administration in time range)   heparin lock flush 10 UNIT/ML injection 5-10 mL (has no administration in time range)   norepinephrine (LEVOPHED) 4 mg in  mL PERIPHERAL infusion (has no administration in time range)   lidocaine 1 % 0.1-5 mL (has no administration in time range)  "  lidocaine (LMX4) cream (has no administration in time range)   sodium chloride (PF) 0.9% PF flush 10-40 mL (has no administration in time range)   piperacillin-tazobactam (ZOSYN) 4.5 g vial to attach to  mL bag (0 g Intravenous Stopped 9/1/22 0928)   0.9% sodium chloride BOLUS (0 mLs Intravenous Stopped 9/1/22 1042)     Disposition:  The patient was admitted to the hospital under the care of Dr. Posadas.     Impression & Plan    CMS Diagnoses:   The patient has signs of Septic Shock  The patient has signs of septic shock as evidenced by:  1. Presence of Sepsis, AND  2. Lactic Acidosis with value greater than or equal to 4    Time septic shock diagnosis confirmed = 0837 09/01/22   as this was the time when Lactate was resulted and the level was greater than or equal to 4    3 Hour Septic Shock Bundle Completion:  1. Initial Lactic Acid Result:   Recent Labs   Lab Test 09/01/22  0957 09/01/22  0831 04/03/21  2232   LACT 4.3* 5.8* 1.7     2. Blood Cultures before Antibiotics: Yes  3. Broad Spectrum Antibiotics Administered:  yes at 8:54 AM       Anti-infectives (From admission through now)    Start     Dose/Rate Route Frequency Ordered Stop    09/01/22 0840  piperacillin-tazobactam (ZOSYN) 4.5 g vial to attach to  mL bag        Note to Pharmacy: For N, [facility] and [facility]: For Zosyn-naive patients, use the \"Zosyn initial dose + extended infusion\" order panel.    4.5 g  over 30 Minutes Intravenous ONCE 09/01/22 0839 09/01/22 0928          4. IF 30 mL/kg bolus criteria met based on:  -Lactate > 4  OR  -Initial Hypotension:  Definition:  2 low BP readings (SBP <90, MAP <65, or decrease > 40 from baseline due to infection) within 3 hrs of each other during the time period of 6 hrs before and 3 hrs  after time zero  THEN: Fluid volume administered in ED:  Full 30 mL/kg bolus given (see amount below).    BMI Readings from Last 1 Encounters:   07/14/22 35.79 kg/m      30 mL/kg fluids based on weight: 3,740 mL  30 " mL/kg fluids based on IBW (must be >= 60 inches tall): 2,430 mL    Septic Shock reassessment:  1. Repeat Lactic Acid Level: 4.3  2. Vasopressors started for Persistent Hypotension defined by the last 2 BP readings within the ONE HOUR following completion of the 30mL/kg bolus being low (SBP <90 or MAP <65).  Medical Decision Making:  Austen Fowler is a very pleasant 87 year old year old patient who presents to the emergency department with concern of generalized weakness.  The patient woke up this morning feeling more weak.  He has had cellulitis before.  He was concerned about a rash to his right leg consistent with his prior diagnosis of cellulitis.  The patient was found to be hypotensive.  A lactate was markedly elevated concerning for septic shock.  He was provided the full 30 cc/kg fluid bolus.  2 blood cultures were obtained and he was provided broad-spectrum antibiotics.  Urine shows no signs of infection.  Chest x-ray shows no infiltrate to suggest pneumonia.  He has no headache or meningeal signs to suggest meningitis.  He has a completely benign abdominal exam and no tenderness to suggest intra-abdominal pathology such as cholecystitis, appendicitis, colitis, diverticulitis, etc.  The right lower extremity does have an area of erythema and warmth consistent with cellulitis.  There is no crepitus or abscess and no signs of necrotizing fasciitis.  It is only over the mid shin area and osteomyelitis is unlikely.  The patient's blood pressure did slowly improve with IV fluids.  Initial disposition was for the ICU and norepinephrine was going to be initiated through a PICC line.  Fortunately, the patient's blood pressure responded well to the fluids and after consultation with the intensivist who had accepted the patient, they determined there are no beds available in the ICU at this time and since the patient's blood pressure is improved, he will be dispositioned to the IMC, the ICU stepdown.  A PICC line  was placed in case he does need the pressors to be initiated.  I spoke to the hospitalist, , who has agreed to admit the patient for continued evaluation and treatment.    >>>Critical Care time:  Total critical care time exclusive of procedures was 40 minutes for this patient.<<<    Diagnosis:    ICD-10-CM    1. Septic shock (H)  A41.9     R65.21    2. Cellulitis of right lower leg  L03.115    3. Atrial fibrillation with RVR (H)  I48.91         Discharge Medications:  New Prescriptions    No medications on file        9/1/2022   Octaviano Dempsey, Octaviano Haney DO  09/01/22 1155

## 2022-09-01 NOTE — ED NOTES
Argenis Nery  : 1965  Primary: [facility]  Secondary:  [facility] [address]  [phone]   [phone]      OUTPATIENT PHYSICAL THERAPY: Daily Treatment Note 2021  Visit Count:  2    ICD-10: Treatment Diagnosis: Cervicalgia (M54.2)   ICD-10: Treatment Diagnosis: Pain in thoracic spine (M54.6)     Precautions/Allergies:   Penicillin, Aciphex [rabeprazole], and Augmentin [amoxicillin-pot clavulanate]   TREATMENT PLAN:  Effective Dates: 2021 TO 3/1/2022 (90 days). Frequency/Duration: 2 times a week for 90 Day(s) MEDICAL/REFERRING DIAGNOSIS:  Numbness of left hand [R20.0]  Numbness of right hand [R20.0]   DATE OF ONSET: chronic, 1 year, progressing  REFERRING PHYSICIAN: Dr. Tena Joyner MD MD Orders: evaluate and treat  Return MD Appointment       Pre-treatment Symptoms/Complaints: Patient notes continues to have numbness in bilateral hands each night. Challenged with sleeping positions. Pain: Initial: Pain Intensity 1: 3  Pain Location 1: Hand, Spine, cervical  Pain Orientation 1: Left, Right /10 Post Session:  2/10   Medications Last Reviewed:  2021  Updated Objective Findings:  None Today  TREATMENT:     THERAPEUTIC EXERCISE: (15 minutes):  Exercises per grid below to improve mobility, strength, balance and coordination. Required moderate verbal, manual and tactile cues to promote proper body alignment, promote proper body posture, promote proper body mechanics and promote proper body breathing techniques. Progressed resistance, range, repetitions and complexity of movement as indicated.    Date:  2021     Activity/Exercise Parameters   Review sleep positions X 5 minutes review   Median nerve neural glides X 10 reps, BUE                           MANUAL THERAPY: (40 minutes): Joint mobilization and Soft tissue mobilization was utilized and necessary because of the patient's restricted Patient off unit to IR   joint motion, painful spasm, loss of articular motion and restricted motion of soft tissue. (Used abbreviations: MET - muscle energy technique; PNF - proprioceptive neuromuscular facilitation; NMR - neuromuscular re-education; a/p - anterior to posterior; p/a - posterior to anterior, FMP - functional movement patterns, SF - Superficial Fascia; BC - Bony contours; MP - muscle play; IASTM - instrument-assisted soft tissue mobilization)  · Supine soft tissue mobilization through anterior chest with breathing techniques  · Supine bilateral UE tracing and isolating neural glides with FMP of wrist flexion/extension  · Prone soft tissue mobilization through thoracic spine paraspinals and superficial fascia    [employer] Portal  Treatment/Session Summary:    · Response to Treatment:  improved mobility noted in bilateral UE, significant restrictions noted in anterior chest.  · Communication/Consultation:  None today  · Equipment provided today:  None today  · Recommendations/Intent for next treatment session: Next visit will focus on soft tissue mobilization of anterior chest, neural glides, postural stability strength, sleep positions.     Total Treatment Billable Duration:  55 minutes  PT Patient Time In/Time Out  Time In: 1630  Time Out: MARYAM Rao    Future Appointments   Date Time Provider Jackie Mckinney   12/8/2021  4:30 PM Matt Judge., PT [facility]   12/14/2021  9:30 AM Joey GARZA, PT [facility]   12/15/2021  9:30 AM Matt Judge., PT [facility]   12/20/2021  4:30 PM Matt Judge., PT [facility]   12/22/2021  9:30 AM Matt Judge., PT [facility] no

## 2022-09-01 NOTE — IR NOTE
Patient Name: Austen Fowler  Medical Record Number: [id number]  Today's Date: 9/1/2022    Procedure: PICC placement  Proceduralist: Dr. Sams  Pathology present: no    Procedure Start: 1418  Procedure end: 1423  Sedation medications administered: none     Report given to: CORA Valencia RN  : no    Other Notes: Pt arrived to IR room 1 from ED19. Consent reviewed. Pt denies any questions or concerns regarding procedure. Pt positioned supine and monitored per protocol. Pt tolerated procedure without any noted complications.

## 2022-09-01 NOTE — PHARMACY-ADMISSION MEDICATION HISTORY
Pharmacy Medication History  Admission medication history interview status for the 9/1/2022  admission is complete. See EPIC admission navigator for prior to admission medications     Location of Interview: Patient room  Medication history sources: Patient, Patient's family/friend (wife) and Surescripts    Additional medication history information:    Patient finds it difficult to take midday doses so TID meds generally get taken BID.   Patient's wife states patient isn't reacting well to the buspar (newish start).    Medication reconciliation completed by provider prior to medication history? No    Time spent in this activity: 20 minutes     Prior to Admission medications    Medication Sig Last Dose Taking? Auth Provider Long Term End Date   acetaminophen (ACETAMIN) 500 MG tablet Take 1,000 mg by mouth 2 times daily 8/31/2022 at Unknown time Yes Reported, Patient     amLODIPine (NORVASC) 2.5 MG tablet Take 1 tablet (2.5 mg) by mouth daily 8/31/2022 at Unknown time Yes Hamzah Hodge MD Yes    aspirin 81 MG EC tablet Take 81 mg by mouth daily  8/31/2022 at Unknown time Yes Unknown, Entered By History     busPIRone (BUSPAR) 5 MG tablet Take 1 tablet (5 mg) by mouth 3 times daily 8/31/2022 at Unknown time Yes Hamzah Hodge MD Yes    Cholecalciferol (VITAMIN D) 2000 UNIT tablet Take 2,000 Units by mouth daily. 8/31/2022 at Unknown time Yes Hamzah Hodge MD     finasteride (PROSCAR) 5 MG tablet TAKE 1 TABLET BY MOUTH  DAILY 8/31/2022 at Unknown time Yes Hamzah Hodge MD Yes    furosemide (LASIX) 40 MG tablet Take 1 tablet (40 mg) by mouth daily 8/31/2022 at Unknown time Yes Lino Hernandez MD Yes    gabapentin (NEURONTIN) 300 MG capsule Take 3 capsules (900 mg) by mouth 3 times daily - Fasting physical due for further refills  Patient taking differently: Take 900 mg by mouth 2 times daily - Fasting physical due for further refills 8/31/2022 at Unknown time Yes Hamzah Hodge MD Yes    lisinopril (ZESTRIL)  10 MG tablet Take 1 tablet (10 mg) by mouth daily 8/31/2022 at Unknown time Yes Lino Hernandez MD Yes    metFORMIN (GLUCOPHAGE XR) 500 MG 24 hr tablet TAKE 2 TABLETS BY MOUTH  TWICE DAILY WITH MEALS 8/31/2022 at Unknown time Yes Hamzah Hodge MD Yes    multivitamin (OCUVITE) TABS tablet Take 1 tablet by mouth daily 8/31/2022 at Unknown time Yes Reported, Patient     omeprazole (PRILOSEC) 20 MG DR capsule Take 1 capsule (20 mg) by mouth daily 8/31/2022 at Unknown time Yes Lino Hernandez MD     potassium chloride (KLOR-CON) 20 MEQ packet Take 20 mEq by mouth 3 times daily  Patient taking differently: Take 20 mEq by mouth 2 times daily 8/31/2022 at Unknown time Yes Hamzah Hodge MD     pravastatin (PRAVACHOL) 20 MG tablet Take 1 tablet (20 mg) by mouth daily - due for fasting physical with Dr Hodge for refills  Patient taking differently: Take 20 mg by mouth every evening - due for fasting physical with Dr Hodge for refills 8/31/2022 at Unknown time Yes Lino Hernandez MD Yes    Probiotic Product (PROBIOTIC PO) Take 1 capsule by mouth daily 8/31/2022 at Unknown time Yes Unknown, Entered By History     tamsulosin (FLOMAX) 0.4 MG capsule TAKE 1 CAPSULE BY MOUTH  DAILY  Patient taking differently: every evening 8/31/2022 at Unknown time Yes Hamzah Hodge MD     vitamin B-12 (CYANOCOBALAMIN) 1000 MCG tablet Take 1,000 mcg by mouth daily  8/31/2022 at Unknown time Yes Reported, Patient     blood glucose (ONETOUCH ULTRA) test strip USE TO TEST BLOOD SUGAR  TWICE DAILY OR AS DIRECTED. DUE FOR APPOINTMENT. PLEASE SCHEDULE: [phone]   Hamzah Hodge MD     blood glucose monitoring (ONE TOUCH ULTRASOFT) lancets USE TO TEST BLOOD SUGAR 2  TIMES DAILY OR AS DIRECTED.   Hamzah Hodge MD     ORDER FOR DME Uses Cpap machine for sleep apnea   Dominga Aguirre PA-C         The information provided in this note is only as accurate as the sources available at the time of update(s)     Rachel  Janette, PharmD, BCCCP

## 2022-09-01 NOTE — ED TRIAGE NOTES
Pt BIBA from home where he lives with his wife. Patient presents with weakness, SOB, dry cough and fever. Pt also reports increase to swelling to BLE. Redness noted to right lower leg.     Triage Assessment     Row Name 09/01/22 0813       Triage Assessment (Adult)    Airway WDL WDL       Respiratory WDL    Respiratory WDL X  dry cough, SOB       Skin Circulation/Temperature WDL    Skin Circulation/Temperature WDL X;all  skin hot to touch, redness noted to right lower leg       Cardiac WDL    Cardiac WDL X;rhythm    Cardiac Rhythm tachycardic  A-fib RVR       Peripheral/Neurovascular WDL    Peripheral Neurovascular WDL WDL       Cognitive/Neuro/Behavioral WDL    Cognitive/Neuro/Behavioral WDL WDL

## 2022-09-01 NOTE — PROCEDURES
[facility]    Triple Lumen PICC Placement    Date/Time: 9/1/2022 12:15 PM  Performed by: Malou Castillo RN  Authorized by: Octaviano Dempsey DO   Indications: vascular access      UNIVERSAL PROTOCOL   Site Marked: Yes  Prior Images Obtained and Reviewed:  Yes  Required items: Required blood products, implants, devices and special equipment available    Patient identity confirmed:  Verbally with patient  NA - No sedation, light sedation, or local anesthesia  Confirmation Checklist:  Patient's identity using two indicators, relevant allergies, procedure was appropriate and matched the consent or emergent situation and correct equipment/implants were available  Time out: Immediately prior to the procedure a time out was called    Universal Protocol: the Joint Commission Universal Protocol was followed    Preparation: Patient was prepped and draped in usual sterile fashion    ESBL (mL):  0.5     ANESTHESIA    Local Anesthetic: Lidocaine 1% without epinephrine  Anesthetic Total (mL):  1      SEDATION    Patient Sedated: No        Preparation: skin prepped with ChloraPrep  Skin prep agent: skin prep agent completely dried prior to procedure  Sterile barriers: maximum sterile barriers were used: cap, mask, sterile gown, sterile gloves, and large sterile sheet  Hand hygiene: hand hygiene performed prior to central venous catheter insertion  Type of line used: Power PICC  Catheter type: triple lumen  Catheter size: 5 Fr  Brand: Bard  Placement method: MST and ultrasound  Number of attempts: 1  Difficulty threading catheter: yes  Successful placement: no  Comment: unable to advance, provider notified and other (pt will go to IR to attempt advancement,line remains in place  with 16 cm internal catheter)  Orientation: right    Location: basilic vein  Arm circumference: adults 10 cm  Extremity circumference: 37  Visible catheter length: 33  Internal length: 16 cm  Total catheter length:  49  Dressing and securement: chlorhexidine disc applied, occlusive dressing applied, securement device, site cleansed, statlock and transparent dressing  Post procedure assessment: blood return through all ports  PROCEDURE   Patient Tolerance:  Patient tolerated the procedure well with no immediate complicationsDescribe Procedure: PICC line would not advance past 16cm internal length. Notified ER MD and IR. Released order for pt to go to IR for PICC to be advanced into correct position. Notified bedside RN.

## 2022-09-02 ENCOUNTER — APPOINTMENT (OUTPATIENT)
Dept: GENERAL RADIOLOGY | Facility: CLINIC | Age: 87
DRG: 871 | End: 2022-09-02
Attending: HOSPITALIST
Payer: COMMERCIAL

## 2022-09-02 ENCOUNTER — APPOINTMENT (OUTPATIENT)
Dept: CARDIOLOGY | Facility: CLINIC | Age: 87
DRG: 871 | End: 2022-09-02
Attending: HOSPITALIST
Payer: COMMERCIAL

## 2022-09-02 ENCOUNTER — APPOINTMENT (OUTPATIENT)
Dept: CT IMAGING | Facility: CLINIC | Age: 87
DRG: 871 | End: 2022-09-02
Attending: HOSPITALIST
Payer: COMMERCIAL

## 2022-09-02 LAB
ANION GAP SERPL CALCULATED.3IONS-SCNC: 6 MMOL/L (ref 3–14)
BUN SERPL-MCNC: 8 MG/DL (ref 7–30)
CALCIUM SERPL-MCNC: 7.1 MG/DL (ref 8.5–10.1)
CHLORIDE BLD-SCNC: 110 MMOL/L (ref 94–109)
CO2 SERPL-SCNC: 22 MMOL/L (ref 20–32)
CREAT SERPL-MCNC: 0.53 MG/DL (ref 0.66–1.25)
D DIMER PPP FEU-MCNC: 5.65 UG/ML FEU (ref 0–0.5)
ERYTHROCYTE [DISTWIDTH] IN BLOOD BY AUTOMATED COUNT: 14.6 % (ref 10–15)
GFR SERPL CREATININE-BSD FRML MDRD: >90 ML/MIN/1.73M2
GLUCOSE BLD-MCNC: 214 MG/DL (ref 70–99)
GLUCOSE BLDC GLUCOMTR-MCNC: 207 MG/DL (ref 70–99)
GLUCOSE BLDC GLUCOMTR-MCNC: 225 MG/DL (ref 70–99)
GLUCOSE BLDC GLUCOMTR-MCNC: 227 MG/DL (ref 70–99)
GLUCOSE BLDC GLUCOMTR-MCNC: 238 MG/DL (ref 70–99)
HBA1C MFR BLD: 7.8 % (ref 0–5.6)
HCT VFR BLD AUTO: 32 % (ref 40–53)
HGB BLD-MCNC: 10.2 G/DL (ref 13.3–17.7)
LACTATE SERPL-SCNC: 2.2 MMOL/L (ref 0.7–2)
LVEF ECHO: NORMAL
MCH RBC QN AUTO: 28.6 PG (ref 26.5–33)
MCHC RBC AUTO-ENTMCNC: 31.9 G/DL (ref 31.5–36.5)
MCV RBC AUTO: 90 FL (ref 78–100)
PLATELET # BLD AUTO: 131 10E3/UL (ref 150–450)
POTASSIUM BLD-SCNC: 2.9 MMOL/L (ref 3.4–5.3)
POTASSIUM BLD-SCNC: 3.6 MMOL/L (ref 3.4–5.3)
RBC # BLD AUTO: 3.57 10E6/UL (ref 4.4–5.9)
SODIUM SERPL-SCNC: 138 MMOL/L (ref 133–144)
TROPONIN I SERPL HS-MCNC: 559 NG/L
WBC # BLD AUTO: 11.8 10E3/UL (ref 4–11)

## 2022-09-02 PROCEDURE — 99223 1ST HOSP IP/OBS HIGH 75: CPT | Performed by: INTERNAL MEDICINE

## 2022-09-02 PROCEDURE — 250N000011 HC RX IP 250 OP 636: Performed by: INTERNAL MEDICINE

## 2022-09-02 PROCEDURE — 250N000011 HC RX IP 250 OP 636: Performed by: PHYSICIAN ASSISTANT

## 2022-09-02 PROCEDURE — 84484 ASSAY OF TROPONIN QUANT: CPT | Performed by: INTERNAL MEDICINE

## 2022-09-02 PROCEDURE — 250N000013 HC RX MED GY IP 250 OP 250 PS 637: Performed by: INTERNAL MEDICINE

## 2022-09-02 PROCEDURE — 71046 X-RAY EXAM CHEST 2 VIEWS: CPT

## 2022-09-02 PROCEDURE — 250N000013 HC RX MED GY IP 250 OP 250 PS 637: Performed by: HOSPITALIST

## 2022-09-02 PROCEDURE — 85027 COMPLETE CBC AUTOMATED: CPT | Performed by: INTERNAL MEDICINE

## 2022-09-02 PROCEDURE — 94660 CPAP INITIATION&MGMT: CPT

## 2022-09-02 PROCEDURE — 85379 FIBRIN DEGRADATION QUANT: CPT | Performed by: HOSPITALIST

## 2022-09-02 PROCEDURE — 999N000157 HC STATISTIC RCP TIME EA 10 MIN

## 2022-09-02 PROCEDURE — 255N000002 HC RX 255 OP 636: Performed by: INTERNAL MEDICINE

## 2022-09-02 PROCEDURE — 84132 ASSAY OF SERUM POTASSIUM: CPT | Performed by: HOSPITALIST

## 2022-09-02 PROCEDURE — 99233 SBSQ HOSP IP/OBS HIGH 50: CPT | Performed by: HOSPITALIST

## 2022-09-02 PROCEDURE — 94640 AIRWAY INHALATION TREATMENT: CPT

## 2022-09-02 PROCEDURE — 93306 TTE W/DOPPLER COMPLETE: CPT | Mod: 26 | Performed by: INTERNAL MEDICINE

## 2022-09-02 PROCEDURE — 999N000190 HC STATISTIC VAT ROUNDS

## 2022-09-02 PROCEDURE — 83036 HEMOGLOBIN GLYCOSYLATED A1C: CPT | Performed by: HOSPITALIST

## 2022-09-02 PROCEDURE — 82310 ASSAY OF CALCIUM: CPT | Performed by: INTERNAL MEDICINE

## 2022-09-02 PROCEDURE — 250N000009 HC RX 250: Performed by: INTERNAL MEDICINE

## 2022-09-02 PROCEDURE — 71275 CT ANGIOGRAPHY CHEST: CPT

## 2022-09-02 PROCEDURE — 258N000003 HC RX IP 258 OP 636: Performed by: INTERNAL MEDICINE

## 2022-09-02 PROCEDURE — 250N000012 HC RX MED GY IP 250 OP 636 PS 637: Performed by: HOSPITALIST

## 2022-09-02 PROCEDURE — 999N000208 ECHOCARDIOGRAM COMPLETE

## 2022-09-02 PROCEDURE — 83605 ASSAY OF LACTIC ACID: CPT | Performed by: HOSPITALIST

## 2022-09-02 PROCEDURE — 250N000009 HC RX 250: Performed by: HOSPITALIST

## 2022-09-02 PROCEDURE — 210N000002 HC R&B HEART CARE

## 2022-09-02 RX ORDER — FUROSEMIDE 10 MG/ML
20 INJECTION INTRAMUSCULAR; INTRAVENOUS ONCE
Status: COMPLETED | OUTPATIENT
Start: 2022-09-02 | End: 2022-09-02

## 2022-09-02 RX ORDER — POTASSIUM CHLORIDE 1500 MG/1
20 TABLET, EXTENDED RELEASE ORAL ONCE
Status: COMPLETED | OUTPATIENT
Start: 2022-09-02 | End: 2022-09-02

## 2022-09-02 RX ORDER — IPRATROPIUM BROMIDE AND ALBUTEROL SULFATE 2.5; .5 MG/3ML; MG/3ML
3 SOLUTION RESPIRATORY (INHALATION)
Status: DISCONTINUED | OUTPATIENT
Start: 2022-09-02 | End: 2022-09-05

## 2022-09-02 RX ORDER — METOPROLOL SUCCINATE 25 MG/1
25 TABLET, EXTENDED RELEASE ORAL DAILY
Status: DISCONTINUED | OUTPATIENT
Start: 2022-09-02 | End: 2022-09-06 | Stop reason: HOSPADM

## 2022-09-02 RX ORDER — PIPERACILLIN SODIUM, TAZOBACTAM SODIUM 4; .5 G/20ML; G/20ML
4.5 INJECTION, POWDER, LYOPHILIZED, FOR SOLUTION INTRAVENOUS EVERY 6 HOURS
Status: DISCONTINUED | OUTPATIENT
Start: 2022-09-02 | End: 2022-09-03

## 2022-09-02 RX ORDER — POTASSIUM CHLORIDE 1.5 G/1.58G
20 POWDER, FOR SOLUTION ORAL ONCE
Status: COMPLETED | OUTPATIENT
Start: 2022-09-02 | End: 2022-09-02

## 2022-09-02 RX ORDER — POTASSIUM CHLORIDE 1.5 G/1.58G
40 POWDER, FOR SOLUTION ORAL ONCE
Status: COMPLETED | OUTPATIENT
Start: 2022-09-02 | End: 2022-09-02

## 2022-09-02 RX ORDER — IOPAMIDOL 755 MG/ML
83 INJECTION, SOLUTION INTRAVASCULAR ONCE
Status: COMPLETED | OUTPATIENT
Start: 2022-09-02 | End: 2022-09-02

## 2022-09-02 RX ADMIN — PANTOPRAZOLE SODIUM 20 MG: 20 TABLET, DELAYED RELEASE ORAL at 10:07

## 2022-09-02 RX ADMIN — ACETAMINOPHEN 650 MG: 325 TABLET ORAL at 17:23

## 2022-09-02 RX ADMIN — PIPERACILLIN AND TAZOBACTAM 3.38 G: 3; .375 INJECTION, POWDER, FOR SOLUTION INTRAVENOUS at 02:35

## 2022-09-02 RX ADMIN — POTASSIUM CHLORIDE 20 MEQ: 1500 TABLET, EXTENDED RELEASE ORAL at 19:59

## 2022-09-02 RX ADMIN — METOPROLOL SUCCINATE 25 MG: 25 TABLET, EXTENDED RELEASE ORAL at 13:51

## 2022-09-02 RX ADMIN — PRAVASTATIN SODIUM 20 MG: 20 TABLET ORAL at 19:59

## 2022-09-02 RX ADMIN — POTASSIUM CHLORIDE 40 MEQ: 1.5 POWDER, FOR SOLUTION ORAL at 10:07

## 2022-09-02 RX ADMIN — INSULIN GLARGINE 15 UNITS: 100 INJECTION, SOLUTION SUBCUTANEOUS at 22:26

## 2022-09-02 RX ADMIN — BUSPIRONE HYDROCHLORIDE 5 MG: 5 TABLET ORAL at 17:23

## 2022-09-02 RX ADMIN — PIPERACILLIN AND TAZOBACTAM 4.5 G: 4; .5 INJECTION, POWDER, FOR SOLUTION INTRAVENOUS at 21:09

## 2022-09-02 RX ADMIN — ASPIRIN 81 MG: 81 TABLET, COATED ORAL at 10:07

## 2022-09-02 RX ADMIN — BUSPIRONE HYDROCHLORIDE 5 MG: 5 TABLET ORAL at 10:07

## 2022-09-02 RX ADMIN — BUSPIRONE HYDROCHLORIDE 5 MG: 5 TABLET ORAL at 21:09

## 2022-09-02 RX ADMIN — FUROSEMIDE 20 MG: 10 INJECTION, SOLUTION INTRAMUSCULAR; INTRAVENOUS at 21:08

## 2022-09-02 RX ADMIN — GABAPENTIN 900 MG: 300 CAPSULE ORAL at 19:59

## 2022-09-02 RX ADMIN — THERA TABS 1 TABLET: TAB at 10:09

## 2022-09-02 RX ADMIN — IPRATROPIUM BROMIDE AND ALBUTEROL SULFATE 3 ML: .5; 3 SOLUTION RESPIRATORY (INHALATION) at 20:01

## 2022-09-02 RX ADMIN — PIPERACILLIN AND TAZOBACTAM 4.5 G: 4; .5 INJECTION, POWDER, FOR SOLUTION INTRAVENOUS at 13:19

## 2022-09-02 RX ADMIN — FINASTERIDE 5 MG: 5 TABLET, FILM COATED ORAL at 10:07

## 2022-09-02 RX ADMIN — POTASSIUM CHLORIDE 20 MEQ: 1.5 POWDER, FOR SOLUTION ORAL at 12:42

## 2022-09-02 RX ADMIN — HUMAN ALBUMIN MICROSPHERES AND PERFLUTREN 6 ML: 10; .22 INJECTION, SOLUTION INTRAVENOUS at 14:22

## 2022-09-02 RX ADMIN — SODIUM CHLORIDE: 9 INJECTION, SOLUTION INTRAVENOUS at 05:34

## 2022-09-02 RX ADMIN — IOPAMIDOL 83 ML: 755 INJECTION, SOLUTION INTRAVENOUS at 18:48

## 2022-09-02 RX ADMIN — INSULIN ASPART 1 UNITS: 100 INJECTION, SOLUTION INTRAVENOUS; SUBCUTANEOUS at 22:26

## 2022-09-02 RX ADMIN — TAMSULOSIN HYDROCHLORIDE 0.4 MG: 0.4 CAPSULE ORAL at 19:59

## 2022-09-02 RX ADMIN — GABAPENTIN 900 MG: 300 CAPSULE ORAL at 10:06

## 2022-09-02 RX ADMIN — SODIUM CHLORIDE 100 ML: 900 INJECTION INTRAVENOUS at 18:48

## 2022-09-02 ASSESSMENT — ACTIVITIES OF DAILY LIVING (ADL)
ADLS_ACUITY_SCORE: 26

## 2022-09-02 NOTE — PROGRESS NOTES
[facility]    Hospitalist Progress Note    Interval History   Patient is awake and alert.  Very hard of hearing.  Quite tachypneic with some wheezing although he keeps denying shortness of breath with chest pain.  Does have some pain with right lower extremity.  Denies any other acute complaints.    -Data reviewed today: I reviewed all new labs and imaging results over the last 24 hours. I personally reviewed the EKG tracing showing A. fib yesterday.    Physical Exam   Temp: 99.5  F (37.5  C) Temp src: Oral BP: (!) 149/77 Pulse: 76   Resp: 20 SpO2: 98 % O2 Device: Nasal cannula Oxygen Delivery: 4 LPM  Vitals:    09/01/22 1640 09/02/22 0431   Weight: 129.5 kg (285 lb 8 oz) 129.4 kg (285 lb 4.8 oz)     Vital Signs with Ranges  Temp:  [98  F (36.7  C)-100.8  F (38.2  C)] 99.5  F (37.5  C)  Pulse:  [] 76  Resp:  [0-32] 20  BP: ()/(49-97) 149/77  SpO2:  [91 %-98 %] 98 %  I/O last 3 completed shifts:  In: 720 [P.O.:720]  Out: 2150 [Urine:2150]    Physical Exam  Constitutional:       Appearance: Normal appearance. He is ill-appearing.   HENT:      Head: Normocephalic.   Eyes:      Pupils: Pupils are equal, round, and reactive to light.   Cardiovascular:      Rate and Rhythm: Normal rate and regular rhythm.      Pulses: Normal pulses.      Heart sounds: Normal heart sounds.   Pulmonary:      Effort: Respiratory distress present.      Breath sounds: Wheezing present.      Comments: tachypneic with wheezing   Abdominal:      General: Abdomen is flat. Bowel sounds are normal. There is no distension.      Tenderness: There is no abdominal tenderness. There is no guarding.   Musculoskeletal:         General: Normal range of motion.      Cervical back: Normal range of motion.      Right lower leg: Edema present.      Left lower leg: Edema present.   Skin:     General: Skin is warm and dry.      Comments: Right lower leg with erythema and tenderness. Warm to touch . Swollen    Neurological:       General: No focal deficit present.   Psychiatric:         Mood and Affect: Mood normal.           Medications     sodium chloride 100 mL/hr at 09/02/22 0534       aspirin  81 mg Oral Daily     busPIRone  5 mg Oral TID     finasteride  5 mg Oral Daily     gabapentin  900 mg Oral BID     heparin lock flush  5-10 mL Intracatheter Q24H     insulin aspart  1-7 Units Subcutaneous TID AC     insulin aspart  1-5 Units Subcutaneous At Bedtime     insulin glargine  10 Units Subcutaneous At Bedtime     multivitamin, therapeutic  1 tablet Oral Daily     pantoprazole  20 mg Oral Daily     piperacillin-tazobactam  4.5 g Intravenous Q6H     potassium chloride  20 mEq Oral or Feeding Tube Once     pravastatin  20 mg Oral QPM     sodium chloride (PF)  3 mL Intracatheter Q8H     tamsulosin  0.4 mg Oral QPM       Data   Recent Labs   Lab 09/02/22  0826 09/02/22  0549 09/01/22  2147 09/01/22  0829   WBC  --  11.8*  --  17.8*   HGB  --  10.2*  --  12.7*   MCV  --  90  --  89   PLT  --  131*  --  157   NA  --  138  --  136   POTASSIUM  --  2.9*  --  3.8   CHLORIDE  --  110*  --  103   CO2  --  22  --  21   BUN  --  8  --  16   CR  --  0.53*  --  0.69   ANIONGAP  --  6  --  12   BRANDON  --  7.1*  --  9.0   * 214* 204* 265*       Recent Results (from the past 24 hour(s))   IR PICC Reposition Right    Narrative    IR PICC REPOSITION RIGHT 9/1/2022 2:36 PM    HISTORY: 87-year-old patient with sepsis, need for long-term IV access  for antibiotic administration.    TECHNIQUE: Patient was brought to the interventional radiology  department with PICC part way inserted in the right upper extremity.  Informed consent was reiterated. Maximal Sterile Barrier Technique  Utilized: Cap AND mask AND sterile gown AND sterile gloves AND sterile  full body drape AND hand hygiene AND skin preparation 2% chlorhexidine  for cutaneous antisepsis (or acceptable alternative antiseptics).   Sterile Ultrasound Technique Utilized ?Sterile gel AND sterile  probe  covers. Venogram was performed demonstrating the PICC in a branch of  the the vein it was administered into. The PICC was pulled back and  redirected into the main channel, uncertain if this is basilic or  brachial vein. Over a Nitrex wire, the PICC was advanced until the tip  at the right atrial/SVC junction. The 3 lumens were aspirated and  flushed with normal saline. 5 cm remained external. The existing PICC  is what was used, therefore, overall length is documented by PICC  nurse.    Sedation: None  Fluoroscopy time: 0.6 minutes  Air (Kerma): 9.6 mGy  Contrast: 10 mL of Isovue 300 administered intravenously without  complication.  Local anesthetic: None    FINDINGS: Three spot fluoroscopic images and venogram sequences  obtained. Initial venogram with PICC in existing position  demonstrating it was in a branch of the vein. It was pulled back into  the main channel and venogram repeated. PICC was then redirected until  tip at the right atrial/SVC junction.      Impression    IMPRESSION:  1. Reposition of PICC in right upper extremity. The PICC is now ready  for use.    PRISCILA SMITH MD         SYSTEM ID:  [id number]         Assessment & Plan      1.  Sepsis, likely secondary to cellulitis.  The patient has no other focal areas of infection.  His white count is elevated.  Lactic acid is elevated.  Chest x-ray is negative.  Blood cultures x 2 were obtained.  The patient will be continued on Zosyn.  He responded to fluids.    -Leukocytosis improved today.  We will continue IV Zosyn until final culture results are available.  Blood culture shows no growth so far.  Can consider transitioning to IV cefazolin if blood cultures remain negative.  -Blood pressure improved significantly and patient is currently tachypneic with some wheezing.  We will reduce IV fluids to 50 cc an hour and repeat chest x-ray.  -Repeat lactate this morning pending.  Yesterday evening it was a 3.6.    2.  ASCVD with NSTEMI  Paroxysmal  "atrial fibrillation  -  troponin did go significantly elevated at 722 and trended down to 559.  Patient is currently completely asymptomatic and reverted back to sinus rhythm.    He was in A. fib yesterday which is new for him. Could be stimulated secondary to sepsis.  We will hold off on anticoagulation for now until cardiology evaluates him.  Echocardiogram ordered.  He has history of CABG.  Continue on aspirin 81 daily, pravastatin 20 mg daily.  Lisinopril was held due to sepsis.  Can restart tomorrow if his blood pressure remains stable.    Will start on low-dose metoprolol at 25 mg daily.    3.  History of diastolic heart failure:  The patient's Lasix will be on hold due to septic shock.    4.  History of BPH.  -On Flomax and finasteride    5.  History of depression and anxiety:  The patient will be continued on his BuSpar.    6.  History of reflux disease.  On omeprazole.    7.  History of diabetes.  We will hold the patient's metformin and do Accu-Cheks and cover with sliding scale insulin.    8.  Severe hypokalemia-high potassium replacement protocol    9.  Hypertension-holding home lisinopril, amlodipine and Lasix.  Started on metoprolol 25 mg daily for paroxysmal A. fib    10.  Hyperlipidemia-on pravastatin 20 mg daily.    11.  Deep venous thrombosis prophylaxis:  The patient will have boots.     CODE STATUS:  Full.    Clinically Significant Risk Factors Present on Admission        # Hypokalemia: K = 2.9 mmol/L (Ref range: 3.4 - 5.3 mmol/L) on admission, will replace as needed   # Hypocalcemia: Ca = 7.1 mg/dL (Ref range: 8.5 - 10.1 mg/dL) and/or iCa = N/A on admission, will replace as needed        # Hypertension: home medication list includes antihypertensive(s)    # DMII: A1C = N/A within past 3 months  # Obesity: Estimated body mass index is 34.73 kg/m  as calculated from the following:    Height as of this encounter: 1.93 m (6' 4\").    Weight as of this encounter: 129.4 kg (285 lb 4.8 oz).         DVT " Prophylaxis: Pneumatic Compression Devices  Code Status: Full Code  Disposition: Expected discharge when medically stable      Arianna Chen MD, MD  [phone](p)  [phone]( c)

## 2022-09-02 NOTE — PROGRESS NOTES
Pt is alert and oriented, can be forgetful, on 4L NC, can make needs known. Tele: SR. Rt arm PICC in place. Rt shin and calf is reddened and warm. Blanchable redness on coccyx, mepilex in place. Troponin is going back down. NS fluids running at 100mL/hr. Uses bedside urinal. Up with 1 assist. VSS  Hard of hearing, and doesn't have his hearing aids here.   Pt was awake and up this morning around 4am searching for his wedding band. Pt is thinking when he got cold during the night, it slipped off while using the urinal, or reaching for something on his bedside table. We searched all over his room, took apart his bed, shook out all the pillows, sheets, blankets, searched under the bed and under the mattress; did not see it anywhere.     Sleeping between cares.  Will continue to monitor and follow plan of care.

## 2022-09-02 NOTE — H&P
"[facility]    History and Physical - Hospitalist Service       Date of Admission:  9/1/2022  Dictation #: [id number]  Brief Summary (see dictation for more details): 87 year old admitted with sepsis and septic shock with hypotension, tachycardia and stabilized after IVF and currently on zosyn, trop elevation without chest pain, with serial troponin.    Clinically Significant Risk Factors Present on Admission                 # Hypertension: home medication list includes antihypertensive(s)    # DMII: A1C = N/A within past 3 months  # Obesity: Estimated body mass index is 34.75 kg/m  as calculated from the following:    Height as of this encounter: 1.93 m (6' 4\").    Weight as of this encounter: 129.5 kg (285 lb 8 oz).          Bladimir Banks MD  Hospitalist Service  [facility]  Securely message with the Vocera Web Console (learn more here)  Text page via [employer] Paging/Directory       "

## 2022-09-02 NOTE — H&P
Admitted: 09/01/2022    PRIMARY CARE PHYSICIAN:  Hamzah Hodge MD    CHIEF COMPLAINT:  Fever, weakness, hypotension.    HISTORY OF PRESENT ILLNESS:  Austen Fowler is a very pleasant 87-year-old gentleman who presents to [facility] with weakness, fever, hypotension.  The patient went to bed feeling well last night, but woke up this morning with the above complaints.  Prior to his arrival via EMS he took a gram of Tylenol.  The patient had a fever of 102 when evaluated by EMS.  He has chronic lower extremity edema.  He has had prior history of cellulitis as well.  The patient denies any urinary complaints.  He denies any cough, sputum production or shortness of breath.  Denies any nausea, vomiting, or diarrhea.  He has been COVID vaccinated and boosted.  He also denies any headache or sore throat.  The patient was noted to be in AFib with RVR.  He is not known to have any atrial fibrillation and not on anticoagulation.  The patient came in to [facility] Emergency Department for further assessment.    In the Emergency Room, the patient was seen by Dr. Octaviano Dempsey.  He was tachycardic with heart rates in the 130s.  Blood pressures were very soft, 75/48.  He was flushed and appeared to be in septic shock.  The patient's BMP was normal.  Troponin was mildly elevated.  Lactic acid was 5.8, coming down to 4.3.  White count was elevated at 17,800 with hemoglobin 12.7, platelets 150.  Urinalysis at 300 glucose, 40 ketones, 1 white and red cell.  Chest x-ray was negative.  Blood cultures were obtained.  Glucose was 265.  The patient was given Zosyn.  He received over 3-1/2 liters of fluid before his blood pressure did improve.  His heart rate came down and now he is in normal sinus rhythm.  He was started on Zosyn and is being admitted for possible cellulitis.    PAST MEDICAL HISTORY:  1.  Anxiety.  2.  Aortic root dilatation.  3.  Arthritis.  4.  Basal cell cancer.    5.  Bunion.  6.   Coronary artery disease.  7.  Contact dermatitis.  8.  Esophageal reflux/GERD.  9.  Hypertension.  10.  Biliary disease.  11.  Hyperlipidemia.  12.  History of diastolic dysfunction.  13.  Obesity.  14.  Osteoarthritis.  15.  Hammertoe.  16.  Sleep apnea.   17.  Spinal stenosis.  18.  Type 2 diabetes.  19.  Urge incontinence.    PAST SURGICAL HISTORY:  1.  Cholecystectomy.  2.  Biopsy.   3.  Colonoscopy.  4.  Left heart catheterization.  5.  Sinus surgery.  6.  EGD with dilatation.  7.  Cataract removal,.  8.  Lumbar epidural steroid injection.  9.  Lumbar laminectomy at 2 levels.  10.  Coronary artery bypass surgery, LIMA to LAD and SVG to first and second OM as well as right mammary to right coronary artery.    FAMILY HISTORY:  Breast cancer in his brother.  Stroke as well as heart disease in brother.  Diabetes in father and mother.    SOCIAL HISTORY:  Quit smoking about 30 years ago.  Has a 60-pack-year smoking history.  Positive for alcohol.    ALLERGIES:  NO KNOWN DRUG ALLERGIES.    CURRENT MEDICATION LIST:  1.  Amlodipine.  2.  Buspirone.  3.  Proscar.  4.  Lasix.  5.  Gabapentin  6.  Lisinopril.  7.  Metformin.  8.  Omeprazole.  9.  Potassium chloride.  10.  Pravachol.  11.  Tamsulosin.  12.  Tylenol.  13.  Aspirin.  14.  Vitamin D.  15.  Ocuvite.  16. Probiotic.  17.  Vitamin B12.    REVIEW OF SYSTEMS:  Ten points reviewed.  Positive for erythema and discomfort of his right lower leg, otherwise as dictated in history of present illness.    PHYSICAL EXAMINATION:    VITAL SIGNS:  Temperature 100.2, heart rate 79, respirations 17, blood pressure initially 75/48, currently 115/63, sats are 96% on room air.  GENERAL:  The patient is an 87-year-old gentleman who is resting comfortably, in no distress.  HEENT:  Exam is unremarkable.  Pupils equal.  Sclerae are anicteric.  Mucous membranes are moist.  NECK:  JVP is not elevated.  PULMONARY:  Lungs are clear to auscultation.  CARDIOVASCULAR:  S1, S2, regular rate and  rhythm.  GASTROINTESTINAL:  Abdomen is obese, soft, nontender.  MUSCULOSKELETAL:  He has got 2+ pitting of the lower extremities.  He has got erythema, warmth and redness over his right lower leg, consistent with cellulitis.  NEUROLOGIC:  He does move all 4 extremities.  Cranial nerves are grossly intact.  SKIN:  Warm, dry, otherwise well perfused.  PSYCHIATRIC:  Mood and affect normal.    LABORATORY AND IMAGING STUDIES:  As dictated in history of present illness.    ASSESSMENT:  Griffin Fowler is 87 with prior history of cellulitis x 2, presents with fever, hypotension, tachycardia, elevated lactic acidosis, septic shock, being admitted for sepsis secondary to cellulitis.    PLAN:  1.  Sepsis, likely secondary to cellulitis.  The patient has no other focal areas of infection.  His white count is elevated.  Lactic acid is elevated.  Chest x-ray is negative.  Blood cultures x 2 were obtained.  The patient will be continued on Zosyn.  He responded to fluids.  We will continue the patient on IV fluids.  He only has just started making some urine of about 200 mL.  The patient should be on IV antibiotics for at least 48 hours, pending cultures.  We will follow his lactic acid and urine output.  2.  ASCVD with mild troponin elevation.  The patient's troponin is mildly elevated at 233.  The patient denies any chest pain.  We will continue the patient on his aspirin and statin.  We will hold the patient's lisinopril and amlodipine due to low blood pressures.  3.  History of diastolic heart failure:  The patient's Lasix will be on hold due to septic shock.  4.  History of BPH.  The patient will be continued on Flomax if his blood pressure improves.  The patient will be continued on his finasteride as well.  5.  History of depression and anxiety:  The patient will be continued on his BuSpar.  6.  History of reflux disease.  The patient will be continued on omeprazole.  7.  History of diabetes.  We will hold the patient's  metformin and do Accu-Cheks and cover with sliding scale insulin.  8.  Deep venous thrombosis prophylaxis:  The patient will have boots.    CODE STATUS:  Full.      Bladimir Banks MD        D: 2022   T: 2022   MT: [id number]    Name:     NAYELI RAI  MRN:      [id number]        Account:     [id number]   :      1935           Admitted:    2022       Document: [id number]    cc:  Hamzah Hodge MD

## 2022-09-02 NOTE — CONSULTS
"Cardiology Consultation      Austen Fowler MRN# [id number]   YOB: 1935 Age: 87 year old   Date of Admission: 9/1/2022     Reason for consult:  Paroxysmal atrial fibrillation           Assessment and Plan:     1. New onset paroxysmal atrial fibrillation in the setting of sepsis with mild demand ischemia, now resolved    Continue conservative management at this time.      Discussed with patient and he had documented normal heart rates the day prior to presentation so the atrial fibrillation is likely less than 48 hours.  Do not strictly need to initiate anticoagulation at this time.    Discharge home on 30-day monitor.    Follow-up in cardiology clinic 2 to 4 weeks.  Please call with questions.      2. Coronary artery disease status post CABG 1992    No recent ischemic symptoms.  Anterolateral ST depression in the setting of rapid atrial fibrillation heart rate 136 bpm.    Continue medical management.      3. Fevers chills and sepsis    Treatment as per hospital team.      80 minutes spent today in review of past medical record, discussion with patient and postvisit charting.        Clinically Significant Risk Factors Present on Admission            # DMII: A1C = N/A within past 3 months  # Obesity: Estimated body mass index is 34.73 kg/m  as calculated from the following:    Height as of this encounter: 1.93 m (6' 4\").    Weight as of this encounter: 129.4 kg (285 lb 4.8 oz).      Cardiovascular: Cardiac Arrhythmia: Atrial fibrillation: Paroxysmal    Fluid & Electrolyte Disorders: Hypokalemia    Systemic: Chronic Fatigue and Other Debilities: Age-related physical debility              Chief Complaint:   Fever and Generalized Weakness           History of Present Illness:   This patient is a 87 year old male who follows in cardiology clinic with Dr. Hernandez and has a history of diastolic congestive heart failure as well as previous CABG 1992.  Comes in with first diagnosis of rapid atrial " "fibrillation in the setting of fevers and chills and presumed infection.    Patient converted back to normal rhythm.  He had mild troponin elevation that temporally correlates with his tachycardia.    He did not feel the atrial fibrillation but felt unwell and tired.  He did document his heart rate and blood pressure has been normal the day prior to presentation.         Physical Exam:   Vitals were reviewed  Blood pressure (!) 149/77, pulse 76, temperature 99.5  F (37.5  C), temperature source Oral, resp. rate 20, height 1.93 m (6' 4\"), weight 129.4 kg (285 lb 4.8 oz), SpO2 98 %.  Temperatures:  Current - Temp: 99.5  F (37.5  C); Max - Temp  Av.5  F (37.5  C)  Min: 98.3  F (36.8  C)  Max: 100.8  F (38.2  C)  Respiration range: Resp  Av.7  Min: 0  Max: 32  Pulse range: Pulse  Av.1  Min: 70  Max: 113  Blood pressure range: Systolic (24hrs), Av , Min:97 , Max:162   ; Diastolic (24hrs), Av, Min:49, Max:97    Pulse oximetry range: SpO2  Av.2 %  Min: 94 %  Max: 98 %    Intake/Output Summary (Last 24 hours) at 2022 1208  Last data filed at 2022 1122  Gross per 24 hour   Intake 720 ml   Output 2400 ml   Net -1680 ml     Constitutional:   awake, alert, cooperative, no apparent distress, and appears stated age     Eyes:   Lids and lashes normal, pupils equal, round and reactive to light, extra ocular muscles intact, sclera clear, conjunctiva normal     Neck:   supple, symmetrical, trachea midline,      Back:   symmetric     Lungs:        Cardiovascular:        Abdomen:   non-tender     Musculoskeletal:   motor strength is 5 out of 5 all extremities bilaterally     Neurologic:   Grossly nonfocal     Skin:   normal skin color, texture, turgor     Additional findings:                  Past Medical History:   I have reviewed this patient's past medical history  Past Medical History:   Diagnosis Date     Anxiety state, unspecified      Aortic root dilatation (H)      Arthritis      Ascending " aorta dilatation (H)      Basal cell cancer     s/p Mohs nose and bridge of nose on RTerrence Ortiz      CAD (coronary artery disease)     CABG 1992: LIMA to LAD, SANDI to RCA, SVG to OM1 and OM2     Contact dermatitis and other eczema, due to unspecified cause     eczema - has light treatments with Dr. Rivera     Disorders of bursae and tendons in shoulder region, unspecified      Esophageal reflux      Essential hypertension, benign      Gallbladder disease      GERD (gastroesophageal reflux disease)      Heart attack (H)      Hyperlipidemia LDL goal <70      Left ventricular diastolic dysfunction      Low HDL (under 40) 3/28/2014     Nasal/sinus dis NEC     s/p surgery in 1995     Obesity      Osteoarthrosis, unspecified whether generalized or localized, shoulder region      Other hammer toe (acquired)      Sleep apnea     USES CPAP     Spinal stenosis, unspecified region other than cervical     MRI done 2006     Type 2 diabetes, HbA1c goal < 7% (H) 3/12/2015     Urge incontinence              Past Surgical History:   I have reviewed this patient's past surgical history  Past Surgical History:   Procedure Laterality Date     ABDOMEN SURGERY  1994    gall bladder     BIOPSY      arms     CHOLECYSTECTOMY  6/1994     COLONOSCOPY N/A 4/11/2017    Procedure: COMBINED COLONOSCOPY, SINGLE OR MULTIPLE BIOPSY/POLYPECTOMY BY BIOPSY;  Surgeon: Bladimir Garner MD;  Location:  GI     CV LEFT HEART CATH  5-92, 6-92    Angioplasty     ENT SURGERY  5/1995    sinus surgery     ESOPHAGOSCOPY, GASTROSCOPY, DUODENOSCOPY (EGD), DILATATION, COMBINED  7/17/2014    Procedure: COMBINED ESOPHAGOSCOPY, GASTROSCOPY, DUODENOSCOPY (EGD), DILATATION;  Surgeon: Bladimir Garner MD;  Location:  GI     EYE SURGERY      cataracts removed both eyes     INJECT EPIDURAL LUMBAR       IR PICC REPOSITION RIGHT  9/1/2022     LAMINECTOMY LUMBAR TWO LEVELS N/A 4/29/2015    Procedure: LAMINECTOMY LUMBAR TWO LEVELS;  Surgeon: Bladimir Keenan MD;   Location: [facility] OR     THORACIC SURGERY  1992    bypass     [facility] CABG, ARTERY-VEIN, FOUR  1992    CABG - LIMA to left anterior descending and saphenous vein bypass graft to the first and second obtuse marginal branch of the circumflex and the right mammary to the right coronary artery     [facility] NONSPECIFIC PROCEDURE      Gail lap     [facility] NONSPECIFIC PROCEDURE      sinus surgery     [facility] NONSPECIFIC PROCEDURE      bilateral cataract surgery     [facility] COLONOSCOPY THRU STOMA W BIOPSY/CAUTERY TUMOR/POLYP/LESION  2007               Social History:   I have reviewed this patient's social history  Social History     Tobacco Use     Smoking status: Former Smoker     Packs/day: 2.00     Years: 30.00     Pack years: 60.00     Types: Cigarettes, Cigars, Pipe     Start date: 1954     Quit date: 3/1/1992     Years since quittin.5     Smokeless tobacco: Never Used     Tobacco comment: quit in    Substance Use Topics     Alcohol use: Yes     Comment: minimal less than 1/week             Family History:   I have reviewed this patient's family history  Family History   Problem Relation Age of Onset     Cancer Brother         MELANOMA     Diabetes Mother         AODM     Thyroid Disease Mother      Diabetes Father         AODM     Myocardial Infarction Father      Cerebrovascular Disease Brother      Parkinsonism Sister      Family History Negative Son      Family History Negative Son      Family History Negative Son      Family History Negative Daughter      Coronary Artery Disease Brother         dod 2019     Cerebrovascular Disease Brother         dod 2019     Other Cancer Brother         dod 2000/melanoma     Breast Cancer Other         in remission reg chkups             Allergies:     Allergies   Allergen Reactions     No Known Allergies              Medications:   I have reviewed this patient's current medications  Medications Prior to Admission   Medication Sig Dispense Refill Last Dose     acetaminophen  (ACETAMIN) 500 MG tablet Take 1,000 mg by mouth 2 times daily   8/31/2022 at Unknown time     amLODIPine (NORVASC) 2.5 MG tablet Take 1 tablet (2.5 mg) by mouth daily 90 tablet 3 8/31/2022 at Unknown time     aspirin 81 MG EC tablet Take 81 mg by mouth daily    8/31/2022 at Unknown time     busPIRone (BUSPAR) 5 MG tablet Take 1 tablet (5 mg) by mouth 3 times daily 90 tablet 4 8/31/2022 at Unknown time     Cholecalciferol (VITAMIN D) 2000 UNIT tablet Take 2,000 Units by mouth daily. 90 tablet 3 8/31/2022 at Unknown time     finasteride (PROSCAR) 5 MG tablet TAKE 1 TABLET BY MOUTH  DAILY 90 tablet 2 8/31/2022 at Unknown time     furosemide (LASIX) 40 MG tablet Take 1 tablet (40 mg) by mouth daily 90 tablet 3 8/31/2022 at Unknown time     gabapentin (NEURONTIN) 300 MG capsule Take 3 capsules (900 mg) by mouth 3 times daily - Fasting physical due for further refills (Patient taking differently: Take 900 mg by mouth 2 times daily - Fasting physical due for further refills) 270 capsule 4 8/31/2022 at Unknown time     lisinopril (ZESTRIL) 10 MG tablet Take 1 tablet (10 mg) by mouth daily 90 tablet 3 8/31/2022 at Unknown time     metFORMIN (GLUCOPHAGE XR) 500 MG 24 hr tablet TAKE 2 TABLETS BY MOUTH  TWICE DAILY WITH MEALS 360 tablet 3 8/31/2022 at Unknown time     multivitamin (OCUVITE) TABS tablet Take 1 tablet by mouth daily   8/31/2022 at Unknown time     omeprazole (PRILOSEC) 20 MG DR capsule Take 1 capsule (20 mg) by mouth daily 90 capsule 3 8/31/2022 at Unknown time     potassium chloride (KLOR-CON) 20 MEQ packet Take 20 mEq by mouth 3 times daily (Patient taking differently: Take 20 mEq by mouth 2 times daily) 180 packet 3 8/31/2022 at Unknown time     pravastatin (PRAVACHOL) 20 MG tablet Take 1 tablet (20 mg) by mouth daily - due for fasting physical with Dr Hodge for refills (Patient taking differently: Take 20 mg by mouth every evening - due for fasting physical with Dr Hodge for refills) 90 tablet 3 8/31/2022 at  Unknown time     Probiotic Product (PROBIOTIC PO) Take 1 capsule by mouth daily   8/31/2022 at Unknown time     tamsulosin (FLOMAX) 0.4 MG capsule TAKE 1 CAPSULE BY MOUTH  DAILY (Patient taking differently: every evening) 90 capsule 2 8/31/2022 at Unknown time     vitamin B-12 (CYANOCOBALAMIN) 1000 MCG tablet Take 1,000 mcg by mouth daily    8/31/2022 at Unknown time     blood glucose (ONETOUCH ULTRA) test strip USE TO TEST BLOOD SUGAR  TWICE DAILY OR AS DIRECTED. DUE FOR APPOINTMENT. PLEASE SCHEDULE: [phone] 200 strip 3      blood glucose monitoring (ONE TOUCH ULTRASOFT) lancets USE TO TEST BLOOD SUGAR 2  TIMES DAILY OR AS DIRECTED. 200 each 1      ORDER FOR DME Uses Cpap machine for sleep apnea        Current Facility-Administered Medications Ordered in Epic   Medication Dose Route Frequency Last Rate Last Admin     acetaminophen (TYLENOL) tablet 650 mg  650 mg Oral Q4H PRN   650 mg at 09/01/22 1906    Or     acetaminophen (TYLENOL) Suppository 650 mg  650 mg Rectal Q4H PRN         aspirin EC tablet 81 mg  81 mg Oral Daily   81 mg at 09/02/22 1007     busPIRone (BUSPAR) tablet 5 mg  5 mg Oral TID   5 mg at 09/02/22 1007     glucose gel 15-30 g  15-30 g Oral Q15 Min PRN        Or     dextrose 50 % injection 25-50 mL  25-50 mL Intravenous Q15 Min PRN        Or     glucagon injection 1 mg  1 mg Subcutaneous Q15 Min PRN         finasteride (PROSCAR) tablet 5 mg  5 mg Oral Daily   5 mg at 09/02/22 1007     gabapentin (NEURONTIN) capsule 900 mg  900 mg Oral BID   900 mg at 09/02/22 1006     heparin lock flush 10 UNIT/ML injection 5-10 mL  5-10 mL Intracatheter Q24H         heparin lock flush 10 UNIT/ML injection 5-10 mL  5-10 mL Intracatheter Q1H PRN         insulin aspart (NovoLOG) injection (RAPID ACTING)  1-7 Units Subcutaneous TID AC         insulin aspart (NovoLOG) injection (RAPID ACTING)  1-5 Units Subcutaneous At Bedtime   1 Units at 09/01/22 4889     insulin glargine (LANTUS PEN) injection 10 Units  10  Units Subcutaneous At Bedtime         lidocaine (LMX4) cream   Topical Q1H PRN         lidocaine (LMX4) cream   Topical Q1H PRN         lidocaine 1 % 0.1-1 mL  0.1-1 mL Other Q1H PRN         lidocaine 1 % 0.1-5 mL  0.1-5 mL Other Q1H PRN   2 mL at 09/01/22 1242     melatonin tablet 1 mg  1 mg Oral At Bedtime PRN         multivitamin, therapeutic (THERA-VIT) tablet 1 tablet  1 tablet Oral Daily   1 tablet at 09/02/22 1009     nitroGLYcerin (NITROSTAT) sublingual tablet 0.4 mg  0.4 mg Sublingual Q5 Min PRN         ondansetron (ZOFRAN ODT) ODT tab 4 mg  4 mg Oral Q6H PRN        Or     ondansetron (ZOFRAN) injection 4 mg  4 mg Intravenous Q6H PRN         pantoprazole (PROTONIX) EC tablet 20 mg  20 mg Oral Daily   20 mg at 09/02/22 1007     piperacillin-tazobactam (ZOSYN) 4.5 g vial to attach to  mL bag  4.5 g Intravenous Q6H         potassium chloride (KLOR-CON) Packet 20 mEq  20 mEq Oral or Feeding Tube Once         pravastatin (PRAVACHOL) tablet 20 mg  20 mg Oral QPM   20 mg at 09/01/22 2008     sodium chloride (PF) 0.9% PF flush 10-40 mL  10-40 mL Intracatheter Once PRN   40 mL at 09/01/22 1243     sodium chloride (PF) 0.9% PF flush 3 mL  3 mL Intracatheter Q8H   3 mL at 09/01/22 2120     sodium chloride (PF) 0.9% PF flush 3 mL  3 mL Intracatheter q1 min prn         sodium chloride 0.9% infusion   Intravenous Continuous 50 mL/hr at 09/02/22 1030 Rate Change at 09/02/22 1030     tamsulosin (FLOMAX) capsule 0.4 mg  0.4 mg Oral QPM   0.4 mg at 09/01/22 2008     No current Epic-ordered outpatient medications on file.             Review of Systems:   The 10 point Review of Systems is negative other than noted in the HPI            Data:   All laboratory data reviewed  Results for orders placed or performed during the hospital encounter of 09/01/22 (from the past 24 hour(s))   Triple Lumen PICC Placement    Malou Stockton RN     9/1/2022  1:22 PM  [facility]    Triple  Lumen PICC Placement    Date/Time: 9/1/2022 12:15 PM  Performed by: Malou Castillo RN  Authorized by: Octaviano Dempsey DO   Indications: vascular access      UNIVERSAL PROTOCOL   Site Marked: Yes  Prior Images Obtained and Reviewed:  Yes  Required items: Required blood products, implants, devices and special   equipment available    Patient identity confirmed:  Verbally with patient  NA - No sedation, light sedation, or local anesthesia  Confirmation Checklist:  Patient's identity using two indicators, relevant   allergies, procedure was appropriate and matched the consent or emergent   situation and correct equipment/implants were available  Time out: Immediately prior to the procedure a time out was called    Universal Protocol: the Joint Commission Universal Protocol was followed    Preparation: Patient was prepped and draped in usual sterile fashion    ESBL (mL):  0.5     ANESTHESIA    Local Anesthetic: Lidocaine 1% without epinephrine  Anesthetic Total (mL):  1      SEDATION    Patient Sedated: No        Preparation: skin prepped with ChloraPrep  Skin prep agent: skin prep agent completely dried prior to procedure  Sterile barriers: maximum sterile barriers were used: cap, mask, sterile   gown, sterile gloves, and large sterile sheet  Hand hygiene: hand hygiene performed prior to central venous catheter   insertion  Type of line used: Power PICC  Catheter type: triple lumen  Catheter size: 5 Fr  Brand: Bard  Placement method: MST and ultrasound  Number of attempts: 1  Difficulty threading catheter: yes  Successful placement: no  Comment: unable to advance, provider notified and other (pt will go to IR   to attempt advancement,line remains in place  with 16 cm internal   catheter)  Orientation: right    Location: basilic vein  Arm circumference: adults 10 cm  Extremity circumference: 37  Visible catheter length: 33  Internal length: 16 cm  Total catheter length: 49  Dressing and securement:  chlorhexidine disc applied, occlusive dressing   applied, securement device, site cleansed, statlock and transparent   dressing  Post procedure assessment: blood return through all ports  PROCEDURE   Patient Tolerance:  Patient tolerated the procedure well with no immediate   complicationsDescribe Procedure: PICC line would not advance past 16cm   internal length. Notified ER MD and IR. Released order for pt to go to IR   for PICC to be advanced into correct position. Notified bedside RN.   IR PICC Reposition Right    Narrative    IR PICC REPOSITION RIGHT 9/1/2022 2:36 PM    HISTORY: 87-year-old patient with sepsis, need for long-term IV access  for antibiotic administration.    TECHNIQUE: Patient was brought to the interventional radiology  department with PICC part way inserted in the right upper extremity.  Informed consent was reiterated. Maximal Sterile Barrier Technique  Utilized: Cap AND mask AND sterile gown AND sterile gloves AND sterile  full body drape AND hand hygiene AND skin preparation 2% chlorhexidine  for cutaneous antisepsis (or acceptable alternative antiseptics).   Sterile Ultrasound Technique Utilized ?Sterile gel AND sterile probe  covers. Venogram was performed demonstrating the PICC in a branch of  the the vein it was administered into. The PICC was pulled back and  redirected into the main channel, uncertain if this is basilic or  brachial vein. Over a Nitrex wire, the PICC was advanced until the tip  at the right atrial/SVC junction. The 3 lumens were aspirated and  flushed with normal saline. 5 cm remained external. The existing PICC  is what was used, therefore, overall length is documented by PICC  nurse.    Sedation: None  Fluoroscopy time: 0.6 minutes  Air (Kerma): 9.6 mGy  Contrast: 10 mL of Isovue 300 administered intravenously without  complication.  Local anesthetic: None    FINDINGS: Three spot fluoroscopic images and venogram sequences  obtained. Initial venogram with PICC in  existing position  demonstrating it was in a branch of the vein. It was pulled back into  the main channel and venogram repeated. PICC was then redirected until  tip at the right atrial/SVC junction.      Impression    IMPRESSION:  1. Reposition of PICC in right upper extremity. The PICC is now ready  for use.    PRISCILA SMITH MD         SYSTEM ID:  [id number]   Lactic acid whole blood   Result Value Ref Range    Lactic Acid 3.6 (H) 0.7 - 2.0 mmol/L   Troponin I (STAT q4h x3)   Result Value Ref Range    Troponin I High Sensitivity 722 (HH) <79 ng/L   Glucose by meter   Result Value Ref Range    GLUCOSE BY METER POCT 204 (H) 70 - 99 mg/dL   Troponin I (STAT q4h x3)   Result Value Ref Range    Troponin I High Sensitivity 559 (HH) <79 ng/L   Basic metabolic panel   Result Value Ref Range    Sodium 138 133 - 144 mmol/L    Potassium 2.9 (L) 3.4 - 5.3 mmol/L    Chloride 110 (H) 94 - 109 mmol/L    Carbon Dioxide (CO2) 22 20 - 32 mmol/L    Anion Gap 6 3 - 14 mmol/L    Urea Nitrogen 8 7 - 30 mg/dL    Creatinine 0.53 (L) 0.66 - 1.25 mg/dL    Calcium 7.1 (L) 8.5 - 10.1 mg/dL    Glucose 214 (H) 70 - 99 mg/dL    GFR Estimate >90 >60 mL/min/1.73m2   CBC with platelets   Result Value Ref Range    WBC Count 11.8 (H) 4.0 - 11.0 10e3/uL    RBC Count 3.57 (L) 4.40 - 5.90 10e6/uL    Hemoglobin 10.2 (L) 13.3 - 17.7 g/dL    Hematocrit 32.0 (L) 40.0 - 53.0 %    MCV 90 78 - 100 fL    MCH 28.6 26.5 - 33.0 pg    MCHC 31.9 31.5 - 36.5 g/dL    RDW 14.6 10.0 - 15.0 %    Platelet Count 131 (L) 150 - 450 10e3/uL   Glucose by meter   Result Value Ref Range    GLUCOSE BY METER POCT 225 (H) 70 - 99 mg/dL     *Note: Due to a large number of results and/or encounters for the requested time period, some results have not been displayed. A complete set of results can be found in Results Review.       Lab Results   Component Value Date    CHOL 127 07/14/2022    CHOL 116 06/25/2021     Lab Results   Component Value Date    HDL 38 07/14/2022    HDL 38  06/25/2021     Lab Results   Component Value Date    LDL 54 07/14/2022    LDL 46 06/25/2021     Lab Results   Component Value Date    TRIG 173 07/14/2022    TRIG 158 06/25/2021     Lab Results   Component Value Date    CHOLHDLRATIO 3.6 11/12/2015     TSH   Date Value Ref Range Status   01/20/2020 1.02 0.40 - 4.00 mU/L Final

## 2022-09-02 NOTE — PLAN OF CARE
Goal Outcome Evaluation:    Monitor remains Sinus rhythm. Pt. Denies pain. Pt. Is alert and oriented. Forgetful. Right arm PICC in place. Warm pack applied. Right shin and calf reddened and warm. Coccyx with blanchable redness. Sacral mepilex applied. Continue to monitor. Trending troponins.

## 2022-09-02 NOTE — PROGRESS NOTES
"Text-paged Hospitalist: \": Update: Chest XR done. Pt still/if not more SOB. Temp is 100.5. LA was 2.2 Please advise. Thanks, Donna [phone]\"  "

## 2022-09-03 LAB
ANION GAP SERPL CALCULATED.3IONS-SCNC: 6 MMOL/L (ref 3–14)
BUN SERPL-MCNC: 8 MG/DL (ref 7–30)
CALCIUM SERPL-MCNC: 8.8 MG/DL (ref 8.5–10.1)
CHLORIDE BLD-SCNC: 102 MMOL/L (ref 94–109)
CO2 SERPL-SCNC: 27 MMOL/L (ref 20–32)
CREAT SERPL-MCNC: 0.58 MG/DL (ref 0.66–1.25)
ERYTHROCYTE [DISTWIDTH] IN BLOOD BY AUTOMATED COUNT: 14.5 % (ref 10–15)
GFR SERPL CREATININE-BSD FRML MDRD: >90 ML/MIN/1.73M2
GLUCOSE BLD-MCNC: 199 MG/DL (ref 70–99)
GLUCOSE BLDC GLUCOMTR-MCNC: 164 MG/DL (ref 70–99)
GLUCOSE BLDC GLUCOMTR-MCNC: 186 MG/DL (ref 70–99)
GLUCOSE BLDC GLUCOMTR-MCNC: 190 MG/DL (ref 70–99)
GLUCOSE BLDC GLUCOMTR-MCNC: 203 MG/DL (ref 70–99)
GLUCOSE BLDC GLUCOMTR-MCNC: 271 MG/DL (ref 70–99)
HCT VFR BLD AUTO: 36 % (ref 40–53)
HGB BLD-MCNC: 11.7 G/DL (ref 13.3–17.7)
MCH RBC QN AUTO: 28.5 PG (ref 26.5–33)
MCHC RBC AUTO-ENTMCNC: 32.5 G/DL (ref 31.5–36.5)
MCV RBC AUTO: 88 FL (ref 78–100)
PLATELET # BLD AUTO: 163 10E3/UL (ref 150–450)
POTASSIUM BLD-SCNC: 3.6 MMOL/L (ref 3.4–5.3)
POTASSIUM BLD-SCNC: 3.7 MMOL/L (ref 3.4–5.3)
RBC # BLD AUTO: 4.11 10E6/UL (ref 4.4–5.9)
SODIUM SERPL-SCNC: 135 MMOL/L (ref 133–144)
WBC # BLD AUTO: 11.1 10E3/UL (ref 4–11)

## 2022-09-03 PROCEDURE — 250N000013 HC RX MED GY IP 250 OP 250 PS 637: Performed by: HOSPITALIST

## 2022-09-03 PROCEDURE — 210N000002 HC R&B HEART CARE

## 2022-09-03 PROCEDURE — 99233 SBSQ HOSP IP/OBS HIGH 50: CPT | Performed by: HOSPITALIST

## 2022-09-03 PROCEDURE — 85027 COMPLETE CBC AUTOMATED: CPT | Performed by: HOSPITALIST

## 2022-09-03 PROCEDURE — 94640 AIRWAY INHALATION TREATMENT: CPT

## 2022-09-03 PROCEDURE — 999N000157 HC STATISTIC RCP TIME EA 10 MIN

## 2022-09-03 PROCEDURE — 250N000011 HC RX IP 250 OP 636: Performed by: HOSPITALIST

## 2022-09-03 PROCEDURE — 250N000009 HC RX 250: Performed by: HOSPITALIST

## 2022-09-03 PROCEDURE — 250N000011 HC RX IP 250 OP 636: Performed by: INTERNAL MEDICINE

## 2022-09-03 PROCEDURE — 94640 AIRWAY INHALATION TREATMENT: CPT | Mod: 76

## 2022-09-03 PROCEDURE — 250N000011 HC RX IP 250 OP 636: Performed by: EMERGENCY MEDICINE

## 2022-09-03 PROCEDURE — 84132 ASSAY OF SERUM POTASSIUM: CPT | Performed by: HOSPITALIST

## 2022-09-03 PROCEDURE — 250N000013 HC RX MED GY IP 250 OP 250 PS 637: Performed by: INTERNAL MEDICINE

## 2022-09-03 PROCEDURE — 80048 BASIC METABOLIC PNL TOTAL CA: CPT | Performed by: HOSPITALIST

## 2022-09-03 RX ORDER — BISACODYL 10 MG
10 SUPPOSITORY, RECTAL RECTAL DAILY PRN
Status: DISCONTINUED | OUTPATIENT
Start: 2022-09-03 | End: 2022-09-06 | Stop reason: HOSPADM

## 2022-09-03 RX ORDER — AZITHROMYCIN 500 MG/1
500 INJECTION, POWDER, LYOPHILIZED, FOR SOLUTION INTRAVENOUS ONCE
Status: COMPLETED | OUTPATIENT
Start: 2022-09-03 | End: 2022-09-03

## 2022-09-03 RX ORDER — POTASSIUM CHLORIDE 1.5 G/1.58G
20 POWDER, FOR SOLUTION ORAL ONCE
Status: COMPLETED | OUTPATIENT
Start: 2022-09-03 | End: 2022-09-03

## 2022-09-03 RX ORDER — AZITHROMYCIN 250 MG/1
250 TABLET, FILM COATED ORAL DAILY
Status: DISCONTINUED | OUTPATIENT
Start: 2022-09-04 | End: 2022-09-06 | Stop reason: HOSPADM

## 2022-09-03 RX ORDER — POLYETHYLENE GLYCOL 3350 17 G/17G
17 POWDER, FOR SOLUTION ORAL 2 TIMES DAILY PRN
Status: DISCONTINUED | OUTPATIENT
Start: 2022-09-03 | End: 2022-09-06 | Stop reason: HOSPADM

## 2022-09-03 RX ORDER — CEFTRIAXONE 2 G/1
2 INJECTION, POWDER, FOR SOLUTION INTRAMUSCULAR; INTRAVENOUS EVERY 24 HOURS
Status: DISCONTINUED | OUTPATIENT
Start: 2022-09-03 | End: 2022-09-05

## 2022-09-03 RX ORDER — FUROSEMIDE 10 MG/ML
40 INJECTION INTRAMUSCULAR; INTRAVENOUS DAILY
Status: DISCONTINUED | OUTPATIENT
Start: 2022-09-03 | End: 2022-09-06 | Stop reason: HOSPADM

## 2022-09-03 RX ORDER — LISINOPRIL 5 MG/1
5 TABLET ORAL DAILY
Status: DISCONTINUED | OUTPATIENT
Start: 2022-09-03 | End: 2022-09-05

## 2022-09-03 RX ORDER — AMOXICILLIN 250 MG
1-2 CAPSULE ORAL 2 TIMES DAILY PRN
Status: DISCONTINUED | OUTPATIENT
Start: 2022-09-03 | End: 2022-09-06 | Stop reason: HOSPADM

## 2022-09-03 RX ORDER — POTASSIUM CHLORIDE 1.5 G/1.58G
20 POWDER, FOR SOLUTION ORAL 2 TIMES DAILY
Status: DISCONTINUED | OUTPATIENT
Start: 2022-09-03 | End: 2022-09-06 | Stop reason: HOSPADM

## 2022-09-03 RX ADMIN — IPRATROPIUM BROMIDE AND ALBUTEROL SULFATE 3 ML: .5; 3 SOLUTION RESPIRATORY (INHALATION) at 11:14

## 2022-09-03 RX ADMIN — BUSPIRONE HYDROCHLORIDE 5 MG: 5 TABLET ORAL at 20:32

## 2022-09-03 RX ADMIN — PANTOPRAZOLE SODIUM 20 MG: 20 TABLET, DELAYED RELEASE ORAL at 10:18

## 2022-09-03 RX ADMIN — LISINOPRIL 5 MG: 5 TABLET ORAL at 10:17

## 2022-09-03 RX ADMIN — Medication 5 ML: at 06:41

## 2022-09-03 RX ADMIN — FUROSEMIDE 40 MG: 10 INJECTION, SOLUTION INTRAVENOUS at 10:19

## 2022-09-03 RX ADMIN — POTASSIUM CHLORIDE 20 MEQ: 1.5 POWDER, FOR SOLUTION ORAL at 10:19

## 2022-09-03 RX ADMIN — Medication 5 ML: at 06:42

## 2022-09-03 RX ADMIN — PRAVASTATIN SODIUM 20 MG: 20 TABLET ORAL at 20:32

## 2022-09-03 RX ADMIN — GABAPENTIN 900 MG: 300 CAPSULE ORAL at 10:16

## 2022-09-03 RX ADMIN — POTASSIUM CHLORIDE 20 MEQ: 1.5 POWDER, FOR SOLUTION ORAL at 20:32

## 2022-09-03 RX ADMIN — Medication 5 ML: at 06:38

## 2022-09-03 RX ADMIN — IPRATROPIUM BROMIDE AND ALBUTEROL SULFATE 3 ML: .5; 3 SOLUTION RESPIRATORY (INHALATION) at 19:18

## 2022-09-03 RX ADMIN — FINASTERIDE 5 MG: 5 TABLET, FILM COATED ORAL at 10:17

## 2022-09-03 RX ADMIN — PIPERACILLIN AND TAZOBACTAM 4.5 G: 4; .5 INJECTION, POWDER, FOR SOLUTION INTRAVENOUS at 04:37

## 2022-09-03 RX ADMIN — THERA TABS 1 TABLET: TAB at 10:18

## 2022-09-03 RX ADMIN — CEFTRIAXONE SODIUM 2 G: 2 INJECTION, POWDER, FOR SOLUTION INTRAMUSCULAR; INTRAVENOUS at 10:42

## 2022-09-03 RX ADMIN — AZITHROMYCIN MONOHYDRATE 500 MG: 500 INJECTION, POWDER, LYOPHILIZED, FOR SOLUTION INTRAVENOUS at 12:33

## 2022-09-03 RX ADMIN — BUSPIRONE HYDROCHLORIDE 5 MG: 5 TABLET ORAL at 10:18

## 2022-09-03 RX ADMIN — ASPIRIN 81 MG: 81 TABLET, COATED ORAL at 10:16

## 2022-09-03 RX ADMIN — METOPROLOL SUCCINATE 25 MG: 25 TABLET, EXTENDED RELEASE ORAL at 10:18

## 2022-09-03 RX ADMIN — INSULIN GLARGINE 15 UNITS: 100 INJECTION, SOLUTION SUBCUTANEOUS at 22:10

## 2022-09-03 RX ADMIN — GABAPENTIN 900 MG: 300 CAPSULE ORAL at 20:32

## 2022-09-03 RX ADMIN — SENNOSIDES AND DOCUSATE SODIUM 2 TABLET: 50; 8.6 TABLET ORAL at 12:32

## 2022-09-03 RX ADMIN — BUSPIRONE HYDROCHLORIDE 5 MG: 5 TABLET ORAL at 16:40

## 2022-09-03 RX ADMIN — IPRATROPIUM BROMIDE AND ALBUTEROL SULFATE 3 ML: .5; 3 SOLUTION RESPIRATORY (INHALATION) at 07:35

## 2022-09-03 RX ADMIN — Medication 10 ML: at 22:11

## 2022-09-03 RX ADMIN — TAMSULOSIN HYDROCHLORIDE 0.4 MG: 0.4 CAPSULE ORAL at 20:32

## 2022-09-03 ASSESSMENT — ACTIVITIES OF DAILY LIVING (ADL)
ADLS_ACUITY_SCORE: 26
ADLS_ACUITY_SCORE: 27
ADLS_ACUITY_SCORE: 27
ADLS_ACUITY_SCORE: 26
ADLS_ACUITY_SCORE: 27
ADLS_ACUITY_SCORE: 26
ADLS_ACUITY_SCORE: 27
ADLS_ACUITY_SCORE: 27

## 2022-09-03 NOTE — PROGRESS NOTES
Pt is A&O x 4, calm and cooperative for cares. VSS on 4 litters via oxymask. Refused CPAP at bedtime. Denied pain. Right leg warm to touch, slightly redness, borders joycelyn. Tele SR. FINN, Lung sound diminished, at times.  expiratory wheezes, improved with Lasix IV once and neb treatment.  and 186. K 3.6. voiding well. Continue plan of cares.

## 2022-09-03 NOTE — PROGRESS NOTES
[facility]    Medicine Progress Note - Hospitalist Service    Date of Admission:  9/1/2022    Assessment & Plan            Austen Fowler is a 87 year old male admitted on 9/1/2022.  Past medical history of CAD s/p CABG, diastolic CHF, HTN, HLD, DM II, BPH who presents with fever, weakness and hypotension, found to have severe sepsis likely due to RLE cellulitis.       Severe sepsis, likely due to RLE cellulitis vs pneumonia  Presents with fever, tachycardia, hypotension, with leukocytosis and lactic acid of 5.8.  Right calf erythematous, warm and tender.  UA, CXR, Covid/Flu/RSV negative.  CT with finding of lower lobe consolidation (see below).   - blood cultures ngtd  - leukocytosis and fever curve improving  - stop zosyn, start ceftriaxone to cover cellulitis and PNA, start azithromycin  - mildly productive cough, attempt sputum collection  - repeat CBC tomorrow  - discontinue IMC monitoring    Acute hypoxic respiratory failure likely due to pneumonia and acute on chronic diastolic CHF  Requiring 4L oxygen shortly after admission.    * CT pulm angio 9/2 negative for PE but showing diffuse pulm edema and bilateral effusions, patchy areas of bilateral lower lobe consolidation with areas of mucous plugging.   * received 20 mg IV lasix 9/2  - wean oxygen as able  - abx as above  - start lasix 40 mg IV daily  - follow I/O's and daily weights  - Duonebs qid    Paroxysmal A-fib with RVR, resolved  Type II NSTEMI due to sepsis, CHF and A-fib  Hx CAD s/p CABG  HTN  HLD  Presented in A-fib with RVR, spontaneously converted to NSR shortly after arrival, no known prior hx of A-fib.    * Serial trop with peak of 722.  TTE 9/2 with EF 55-60% without WMA, mild aortic stenosis and mild mitral stenosis.    * Cardiology consulted, suspect type II NSTEMI; with duration of A-fib likely <48 hours in setting of sepsis not currently recommending anticoagulation.     - holter monitor at discharge, follow up  "with Cards clinic in 2-4 weeks  - initiated on metoprolol 9/2  - continue PTA aspirin, pravastatin  - lasix as above  - hold amlodipine  - resume lisinopril at reduced dose of 5 mg daily  - continue tele    DM II with polyneuropathy  Managed on metformin.   - hold metformin  - lantus 15 units at bedtime   - add aspart 1u/10g carb with meals and increase to high ssi  - continue PTA gabapentin    Hypokalemia  - continue PTA K supplement and replacement protocol    BPH  - continue PTA Flomax and finasteride    GERD  - continue PTA omeprazole    Depression  - continue PTA Buspar         Diet: Moderate Consistent Carb (60 g CHO per Meal) Diet    DVT Prophylaxis: Heparin SQ  Cosme Catheter: Not present  Central Lines: PRESENT  PICC Triple Lumen 09/01/22 Right Basilic access and pressors-Site Assessment: WDL  Cardiac Monitoring: ACTIVE order. Indication: sepsis  Code Status: Full Code      Disposition Plan     Expected Discharge Date: 09/05/2022                The patient's care was discussed with the Bedside Nurse and Patient.    Bladimir Thorne MD  Hospitalist Service  [facility]  Securely message with the Vocera Web Console (learn more here)  Text page via [employer] Paging/Directory         Clinically Significant Risk Factors Present on Admission               # DMII: A1C = 7.8 % (Ref range: 0.0 - 5.6 %) within past 3 months  # Obesity: Estimated body mass index is 33.95 kg/m  as calculated from the following:    Height as of this encounter: 1.93 m (6' 4\").    Weight as of this encounter: 126.5 kg (278 lb 14.4 oz).        ______________________________________________________________________    Interval History   Reports no dyspnea but remains on oxygen.  Appears to have some degree of orthopnea.  No chest pain/pressure or lightheadedness.  Mild subjective chills.  Mildly productive cough.  Feels nebs are helpful.  No other complaints.     Data reviewed today: I reviewed all medications, new labs and " imaging results over the last 24 hours. I personally reviewed no images or EKG's today.    Physical Exam   Vital Signs: Temp: 98.8  F (37.1  C) Temp src: Oral BP: 137/74 Pulse: 73   Resp: 22 SpO2: 96 % O2 Device: Nasal cannula Oxygen Delivery: 4 LPM  Weight: 278 lbs 14.4 oz  General Appearance: Obese male in NAD  Respiratory: lungs rather clear bilaterally without significant wheezing or crackles, no tachypnea   Cardiovascular: RRR, normal s1/s2 without murmur  GI: abdomen soft, nontender  Skin: RLE erythema has without significant recession from outline   Other: Alert and appropriate, cranial nerves grossly intact     Data   Recent Labs   Lab 22  0754 22  0638 22  0309 22  1950 22  1735 22  0826 22  0549 22  2147 22  0829   WBC  --   --   --   --   --   --  11.8*  --  17.8*   HGB  --   --   --   --   --   --  10.2*  --  12.7*   MCV  --   --   --   --   --   --  90  --  89   PLT  --   --   --   --   --   --  131*  --  157   NA  --  135  --   --   --   --  138  --  136   POTASSIUM  --  3.7  3.6  --   --  3.6  --  2.9*  --  3.8   CHLORIDE  --  102  --   --   --   --  110*  --  103   CO2  --  27  --   --   --   --  22  --  21   BUN  --  8  --   --   --   --  8  --  16   CR  --  0.58*  --   --   --   --  0.53*  --  0.69   ANIONGAP  --  6  --   --   --   --  6  --  12   BRANDON  --  8.8  --   --   --   --  7.1*  --  9.0   * 199* 186*   < >  --    < > 214*   < > 265*    < > = values in this interval not displayed.     Recent Results (from the past 24 hour(s))   Echocardiogram Complete   Result Value    LVEF  55-60%    Narrative    [id number]  [id number]  [id number]  [id number]^CHRIS^ANATOLIY^P     [facility]  Echocardiography Laboratory  [facility] [place] [address]     Name: NAYELI RAI ALLIE  MRN: [id number]  : 1935  Study Date: 2022 02:08 PM  Age: 87 yrs  Gender: Male  Patient Location: [facility]  Reason For Study: CAD  Ordering  Physician: ANATOLIY PEREZ  Referring Physician: ANATOLIY PEREZ  Performed By: Kimmie Nichoel     BSA: 2.5 m2  Height: 74 in  Weight: 285 lb  HR: 81  ______________________________________________________________________________  Procedure  Complete Echo Adult. Optison (NDC #[id number]) given intravenously.  ______________________________________________________________________________  Interpretation Summary     1. Left ventricular systolic function is normal. The visual ejection fraction  is 55-60%.  2. No regional wall motion abnormalities noted.  3. The right ventricle is normal in structure, function and size.  4. Mild valvular aortic stenosis; mean gradient 13 mmHg, peak velocity 2.3  m/sec.  5. Mild to moderate mitral annular calcification resulting in mild mitral  stenosis; mean gradient 4 mHg at HR 81 bpm.  ______________________________________________________________________________  Left Ventricle  The left ventricle is normal in size. There is normal left ventricular wall  thickness. Left ventricular systolic function is normal. The visual ejection  fraction is 55-60%. No regional wall motion abnormalities noted.     Right Ventricle  The right ventricle is normal in structure, function and size.     Atria  Normal left atrial size. Right atrial size is normal. There is no color  Doppler evidence of an atrial shunt.     Mitral Valve  There is mild mitral annular calcification. There is mild mitral stenosis.     Tricuspid Valve  The tricuspid valve is normal in structure and function. There is mild (1+)  tricuspid regurgitation.     Aortic Valve  The aortic valve is trileaflet with aortic valve sclerosis. Mild valvular  aortic stenosis.     Pulmonic Valve  The pulmonic valve is not well seen, but is grossly normal.     Vessels  The ascending aorta is Mildly dilated. IVC diameter <2.1 cm collapsing >50%  with sniff suggests a normal RA pressure of 3 mmHg.     Pericardium  There is no pericardial  effusion.     Rhythm  The rhythm was atrial fibrillation.  ______________________________________________________________________________  MMode/2D Measurements & Calculations     IVSd: 1.1 cm  LVIDd: 6.2 cm  LVIDs: 4.2 cm  LVPWd: 1.0 cm  FS: 32.0 %  LV mass(C)d: 287.8 grams  LV mass(C)dI: 113.9 grams/m2  Ao root diam: 3.9 cm  asc Aorta Diam: 3.7 cm  LVOT diam: 2.2 cm  LVOT area: 3.9 cm2  LA Volume (BP): 108.0 ml  LA Volume Index (BP): 42.7 ml/m2  RWT: 0.33     Doppler Measurements & Calculations  MV E max migel: 149.0 cm/sec  MV A max migel: 67.3 cm/sec  MV E/A: 2.2  MV max P.2 mmHg  MV mean PG: 3.8 mmHg  MV V2 VTI: 41.1 cm  MVA(VTI): 2.3 cm2  MV dec slope: 877.0 cm/sec2  Ao V2 max: 230.9 cm/sec  Ao max P.0 mmHg  Ao V2 mean: 169.1 cm/sec  Ao mean P.2 mmHg  Ao V2 VTI: 54.8 cm  ROMY(I,D): 1.7 cm2  ROMY(V,D): 2.3 cm2  LV V1 max P.4 mmHg  LV V1 max: 135.6 cm/sec  LV V1 VTI: 24.8 cm  SV(LVOT): 95.8 ml  SI(LVOT): 37.9 ml/m2  PA V2 max: 130.3 cm/sec  PA max P.8 mmHg  PA acc time: 0.07 sec  AV Migel Ratio (DI): 0.59  ROMY Index (cm2/m2): 0.69  Lateral E/e': 16.5     ______________________________________________________________________________  Report approved by: Adam Romero 2022 03:40 PM         XR Chest 2 Views    Narrative    EXAM: XR CHEST 2 VIEWS  LOCATION: [facility]  DATE/TIME: 2022 4:43 PM    INDICATION: worsening shortness of breath  COMPARISON: Chest x-ray 2022      Impression    IMPRESSION: Post open-heart surgery. Right PICC line with the tip located at the cavoatrial junction. No definite pneumothorax. Mild bibasilar pulmonary opacities likely reflect atelectasis. Mild interstitial edema. Normal heart size. Stable central   vascular prominence.   CT Chest Pulmonary Embolism w Contrast    Narrative    EXAM: CT CHEST PULMONARY EMBOLISM W CONTRAST  LOCATION: [facility]  DATE/TIME: 2022 7:05 PM    INDICATION:  Tachypnea. Sepsis.  COMPARISON: CXR earlier today 09/02/2022, CT chest 01/11/2020 reviewed.  TECHNIQUE: CT chest pulmonary angiogram during arterial phase injection of IV contrast. Multiplanar reformats and MIP reconstructions were performed. Dose reduction techniques were used.   CONTRAST: 83 mL Isovue 370    FINDINGS:  ANGIOGRAM CHEST: Pulmonary arteries are normal caliber and negative for pulmonary emboli. Thoracic aorta is negative for dissection.     LUNGS AND PLEURA: Small bilateral pleural effusions. Moderate severity scattered smooth interlobular septal thickening, greatest in the lower lobes suggesting some interstitial edema. Few patchy irregular consolidative opacities in both lower lobes,   likely infectious/inflammatory. Moderate to marked bronchial wall thickening, greatest in the lower lobes. Some areas of mucous plugging are present. No pneumothorax.    MEDIASTINUM/AXILLAE: Sternotomy with CABG. No adenopathy. No pericardial effusion. Right PICC with tip in the low SVC.    CORONARY ARTERY CALCIFICATION: Moderate.    UPPER ABDOMEN: Cholecystectomy.    MUSCULOSKELETAL: Mild scattered degenerative changes in the spine.      Impression    IMPRESSION:  1. Negative for pulmonary embolism.  2. Findings of CHF with diffuse interstitial pulmonary edema and small bilateral pleural effusions.  3. Superimposed patchy areas of consolidation in both lower lobes, likely infectious/inflammatory.  4. Moderate to severe areas of bronchial wall thickening, more so in the lower lobes, with some areas of mucous plugging, possibly related to bronchitis and/or aspiration pneumonitis.

## 2022-09-03 NOTE — PLAN OF CARE
0700-1930: A&Ox4; Forgetful at times. 4L O2 via NC. FINN & SOB. Echo, chest XR, & chest CT completed today. Potassium replaced.

## 2022-09-03 NOTE — PROVIDER NOTIFICATION
MD Notification    Notified Person: MD    Notified Person Name: Velma JOSE A Ochoa    Notification Date/Time: 9/2/2022 at 2025    Notification Interaction: paged via on call answering service    Purpose of Notification: D- Dimer is elevated 5.65, chest CT scan negative for PE. Lung sound wheezing despite neb treatment. Please advice?     Orders Received: IV Lasix 20 mg x 1 dose ordered    Comments:

## 2022-09-03 NOTE — PROGRESS NOTES
Cross Cover Note    Called re elevated d-dimer. CT PE negative, but evidence of volume overload.  IV fluids stopped earlier today. Will give Lasix 20 mg x1 dose now.    Velma Alfaro PA-C

## 2022-09-03 NOTE — PROVIDER NOTIFICATION
MD Notification    Notified Person: MD    Notified Person Name: Dr Mariscal,     Notification Date/Time:  9/2/22 at 23:45    Notification Interaction: Text paged and spoke with Dr Mariscal via phone    Purpose of Notification: Indwelling catheter order in placed. Pt voided  x 2 since 1900  today, Bladder scan shows 15 mL. Calling for Clarification of Lyons catheter order. Please advice?     Orders Received: Discontinue lyons order if patient is not retaining per MD recommendation.     Comments:

## 2022-09-03 NOTE — PLAN OF CARE
OT: Orders received for lymph wraps. Chart reviewed and discussed with RN that we will need a Lymphedema therapy order to complete lymph assessment. RN to discuss with MD and place proper order if indicated. Per RN, patient at his baseline with mobility and ADLS so regular OT order not needed at this time.  Defer discharge recommendations to medical team.  Will complete orders.

## 2022-09-03 NOTE — PROGRESS NOTES
Patient is on a 3L NC with SpO2 in the mid 90's. Skin intact under oxygen device. BS clear and diminished. All nebs were given as ordered.  Will cont to follow.  9/3/2022  Malou Camp, RT

## 2022-09-03 NOTE — PLAN OF CARE
A&Ox4. Forgetful. Tele is SR/SB. Breathing is much improved from yesterday. Nebs, Lasix given. IV abx. Potassium replaced, recheck in AM. BG in 200's. Wife/daughter visited today.

## 2022-09-04 LAB
ANION GAP SERPL CALCULATED.3IONS-SCNC: 4 MMOL/L (ref 3–14)
BUN SERPL-MCNC: 12 MG/DL (ref 7–30)
CALCIUM SERPL-MCNC: 9 MG/DL (ref 8.5–10.1)
CHLORIDE BLD-SCNC: 104 MMOL/L (ref 94–109)
CO2 SERPL-SCNC: 28 MMOL/L (ref 20–32)
CREAT SERPL-MCNC: 0.63 MG/DL (ref 0.66–1.25)
ERYTHROCYTE [DISTWIDTH] IN BLOOD BY AUTOMATED COUNT: 14.4 % (ref 10–15)
GFR SERPL CREATININE-BSD FRML MDRD: >90 ML/MIN/1.73M2
GLUCOSE BLD-MCNC: 183 MG/DL (ref 70–99)
GLUCOSE BLDC GLUCOMTR-MCNC: 162 MG/DL (ref 70–99)
GLUCOSE BLDC GLUCOMTR-MCNC: 174 MG/DL (ref 70–99)
GLUCOSE BLDC GLUCOMTR-MCNC: 184 MG/DL (ref 70–99)
GLUCOSE BLDC GLUCOMTR-MCNC: 197 MG/DL (ref 70–99)
GLUCOSE BLDC GLUCOMTR-MCNC: 235 MG/DL (ref 70–99)
HCT VFR BLD AUTO: 33.2 % (ref 40–53)
HGB BLD-MCNC: 10.7 G/DL (ref 13.3–17.7)
MCH RBC QN AUTO: 28.2 PG (ref 26.5–33)
MCHC RBC AUTO-ENTMCNC: 32.2 G/DL (ref 31.5–36.5)
MCV RBC AUTO: 88 FL (ref 78–100)
PLATELET # BLD AUTO: 153 10E3/UL (ref 150–450)
POTASSIUM BLD-SCNC: 3.6 MMOL/L (ref 3.4–5.3)
RBC # BLD AUTO: 3.79 10E6/UL (ref 4.4–5.9)
SODIUM SERPL-SCNC: 136 MMOL/L (ref 133–144)
WBC # BLD AUTO: 8.4 10E3/UL (ref 4–11)

## 2022-09-04 PROCEDURE — 94640 AIRWAY INHALATION TREATMENT: CPT | Mod: 76

## 2022-09-04 PROCEDURE — 210N000002 HC R&B HEART CARE

## 2022-09-04 PROCEDURE — 250N000011 HC RX IP 250 OP 636: Performed by: HOSPITALIST

## 2022-09-04 PROCEDURE — 250N000013 HC RX MED GY IP 250 OP 250 PS 637: Performed by: HOSPITALIST

## 2022-09-04 PROCEDURE — 94640 AIRWAY INHALATION TREATMENT: CPT

## 2022-09-04 PROCEDURE — 85027 COMPLETE CBC AUTOMATED: CPT | Performed by: HOSPITALIST

## 2022-09-04 PROCEDURE — 250N000009 HC RX 250: Performed by: HOSPITALIST

## 2022-09-04 PROCEDURE — 82374 ASSAY BLOOD CARBON DIOXIDE: CPT | Performed by: HOSPITALIST

## 2022-09-04 PROCEDURE — 250N000013 HC RX MED GY IP 250 OP 250 PS 637: Performed by: INTERNAL MEDICINE

## 2022-09-04 PROCEDURE — 999N000190 HC STATISTIC VAT ROUNDS

## 2022-09-04 PROCEDURE — 999N000157 HC STATISTIC RCP TIME EA 10 MIN

## 2022-09-04 PROCEDURE — 250N000011 HC RX IP 250 OP 636: Performed by: EMERGENCY MEDICINE

## 2022-09-04 PROCEDURE — 99232 SBSQ HOSP IP/OBS MODERATE 35: CPT | Performed by: HOSPITALIST

## 2022-09-04 RX ORDER — METFORMIN HCL 500 MG
1000 TABLET, EXTENDED RELEASE 24 HR ORAL 2 TIMES DAILY WITH MEALS
Status: DISCONTINUED | OUTPATIENT
Start: 2022-09-04 | End: 2022-09-06 | Stop reason: HOSPADM

## 2022-09-04 RX ORDER — POTASSIUM CHLORIDE 1.5 G/1.58G
20 POWDER, FOR SOLUTION ORAL ONCE
Status: COMPLETED | OUTPATIENT
Start: 2022-09-04 | End: 2022-09-04

## 2022-09-04 RX ADMIN — FUROSEMIDE 40 MG: 10 INJECTION, SOLUTION INTRAVENOUS at 08:46

## 2022-09-04 RX ADMIN — BUSPIRONE HYDROCHLORIDE 5 MG: 5 TABLET ORAL at 15:53

## 2022-09-04 RX ADMIN — Medication 5 ML: at 05:08

## 2022-09-04 RX ADMIN — BUSPIRONE HYDROCHLORIDE 5 MG: 5 TABLET ORAL at 08:46

## 2022-09-04 RX ADMIN — METFORMIN ER 500 MG 1000 MG: 500 TABLET ORAL at 18:13

## 2022-09-04 RX ADMIN — GABAPENTIN 900 MG: 300 CAPSULE ORAL at 08:45

## 2022-09-04 RX ADMIN — FINASTERIDE 5 MG: 5 TABLET, FILM COATED ORAL at 08:46

## 2022-09-04 RX ADMIN — TAMSULOSIN HYDROCHLORIDE 0.4 MG: 0.4 CAPSULE ORAL at 21:47

## 2022-09-04 RX ADMIN — CEFTRIAXONE SODIUM 2 G: 2 INJECTION, POWDER, FOR SOLUTION INTRAMUSCULAR; INTRAVENOUS at 08:56

## 2022-09-04 RX ADMIN — ASPIRIN 81 MG: 81 TABLET, COATED ORAL at 08:45

## 2022-09-04 RX ADMIN — BUSPIRONE HYDROCHLORIDE 5 MG: 5 TABLET ORAL at 21:47

## 2022-09-04 RX ADMIN — GABAPENTIN 900 MG: 300 CAPSULE ORAL at 21:47

## 2022-09-04 RX ADMIN — POTASSIUM CHLORIDE 20 MEQ: 1.5 POWDER, FOR SOLUTION ORAL at 08:47

## 2022-09-04 RX ADMIN — POTASSIUM CHLORIDE 20 MEQ: 1.5 POWDER, FOR SOLUTION ORAL at 21:47

## 2022-09-04 RX ADMIN — IPRATROPIUM BROMIDE AND ALBUTEROL SULFATE 3 ML: .5; 3 SOLUTION RESPIRATORY (INHALATION) at 15:16

## 2022-09-04 RX ADMIN — LISINOPRIL 5 MG: 5 TABLET ORAL at 08:45

## 2022-09-04 RX ADMIN — PRAVASTATIN SODIUM 20 MG: 20 TABLET ORAL at 21:47

## 2022-09-04 RX ADMIN — IPRATROPIUM BROMIDE AND ALBUTEROL SULFATE 3 ML: .5; 3 SOLUTION RESPIRATORY (INHALATION) at 11:10

## 2022-09-04 RX ADMIN — AZITHROMYCIN MONOHYDRATE 250 MG: 250 TABLET ORAL at 08:46

## 2022-09-04 RX ADMIN — IPRATROPIUM BROMIDE AND ALBUTEROL SULFATE 3 ML: .5; 3 SOLUTION RESPIRATORY (INHALATION) at 19:34

## 2022-09-04 RX ADMIN — IPRATROPIUM BROMIDE AND ALBUTEROL SULFATE 3 ML: .5; 3 SOLUTION RESPIRATORY (INHALATION) at 07:09

## 2022-09-04 RX ADMIN — METOPROLOL SUCCINATE 25 MG: 25 TABLET, EXTENDED RELEASE ORAL at 08:46

## 2022-09-04 RX ADMIN — PANTOPRAZOLE SODIUM 20 MG: 20 TABLET, DELAYED RELEASE ORAL at 08:46

## 2022-09-04 RX ADMIN — INSULIN GLARGINE 15 UNITS: 100 INJECTION, SOLUTION SUBCUTANEOUS at 21:48

## 2022-09-04 RX ADMIN — THERA TABS 1 TABLET: TAB at 08:45

## 2022-09-04 ASSESSMENT — ACTIVITIES OF DAILY LIVING (ADL)
ADLS_ACUITY_SCORE: 27

## 2022-09-04 NOTE — PROGRESS NOTES
Pt is A&O x 4, pleasant. VSS on RA, denied pain. Lung sound diminished. Tele SR. Up x SBA, voiding well. Potassium 3.6, replacement done.   & 174, Mod CHO diet. PICC line on left AC. Site CDI. 3+ edema to BLE, right LE slightly red, elevated with pillows. Continue plan of care.

## 2022-09-04 NOTE — PROGRESS NOTES
Patient is on a 1L NC with SpO2 in the mid 90's. Skin intact under oxygen device. BS clear and diminished. All nebs were given as ordered.  Will cont to follow.  9/4/2022  Malou Camp, RT

## 2022-09-04 NOTE — PROGRESS NOTES
[facility]    Medicine Progress Note - Hospitalist Service    Date of Admission:  9/1/2022    Assessment & Plan            Austen Fowler is a 87 year old male admitted on 9/1/2022.  Past medical history of CAD s/p CABG, diastolic CHF, HTN, HLD, DM II, BPH who presents with fever, weakness and hypotension, found to have severe sepsis likely due to RLE cellulitis.       Severe sepsis, likely due to RLE cellulitis vs pneumonia, resolving  Presents with fever, tachycardia, hypotension, with leukocytosis and lactic acid of 5.8.  Right calf erythematous, warm and tender.  UA, CXR, Covid/Flu/RSV negative.  CT with finding of lower lobe consolidation (see below).   * switched from zosyn to ceft+azithro 9/3  - blood cultures ngtd  - afebrile, leukocytosis resolved 9/4  - continue ceftriaxone and azithromycin    Acute hypoxic respiratory failure likely due to pneumonia and acute on chronic diastolic CHF, resolving  Requiring 4L oxygen shortly after admission.    * CT pulm angio 9/2 negative for PE but showing diffuse pulm edema and bilateral effusions, patchy areas of bilateral lower lobe consolidation with areas of mucous plugging.   * received 20 mg IV lasix 9/2  - wean oxygen as able, down to 1L  - abx as above  - continue lasix 40 mg IV daily  - follow I/O's and daily weights  - Duonebs qid    Paroxysmal A-fib with RVR, resolved  Type II NSTEMI due to sepsis, CHF and A-fib  Hx CAD s/p CABG  HTN  HLD  Presented in A-fib with RVR, spontaneously converted to NSR shortly after arrival, no known prior hx of A-fib.    * Serial trop with peak of 722.  TTE 9/2 with EF 55-60% without WMA, mild aortic stenosis and mild mitral stenosis.    * Cardiology consulted, suspect type II NSTEMI; with duration of A-fib likely <48 hours in setting of sepsis not currently recommending anticoagulation.     - holter monitor at discharge, follow up with Cards clinic in 2-4 weeks  - initiated on metoprolol 9/2  -  "continue PTA aspirin, pravastatin  - lasix as above  - hold amlodipine as normotensive  - resumed lisinopril at reduced dose of 5 mg daily 9/3  - continue tele    DM II with polyneuropathy  Managed on metformin.   - resume PTA metformin 9/4  - lantus 15 units at bedtime   - hold aspart 1u/10g carb with meals   - high ssi  - continue PTA gabapentin    Hypokalemia  - continue PTA K supplement and replacement protocol    BPH  - continue PTA Flomax and finasteride    GERD  - continue PTA omeprazole    Depression  - continue PTA Buspar         Diet: Moderate Consistent Carb (60 g CHO per Meal) Diet    DVT Prophylaxis: Heparin SQ  Cosme Catheter: Not present  Central Lines: PRESENT       Cardiac Monitoring: ACTIVE order. Indication: sepsis  Code Status: Full Code      Disposition Plan      Expected Discharge Date: 09/06/2022                The patient's care was discussed with the Bedside Nurse and Patient.    Bladimir Thorne MD  Hospitalist Service  [facility]  Securely message with the [employer]ocera Web Console (learn more here)  Text page via [employer] Paging/Directory         Clinically Significant Risk Factors Present on Admission               # DMII: A1C = 7.8 % (Ref range: 0.0 - 5.6 %) within past 3 months  # Obesity: Estimated body mass index is 33.77 kg/m  as calculated from the following:    Height as of this encounter: 1.93 m (6' 4\").    Weight as of this encounter: 125.8 kg (277 lb 6.4 oz).        ______________________________________________________________________    Interval History   Reports breathing improved today, less dyspnea and less wheezing.  Mild lightheadedness this AM which has now resolved.  No fever/chills.  No other complaints.      Data reviewed today: I reviewed all medications, new labs and imaging results over the last 24 hours. I personally reviewed no images or EKG's today.    Physical Exam   Vital Signs: Temp: 98.1  F (36.7  C) Temp src: Oral BP: 114/76 Pulse: 62   Resp: " 20 SpO2: 96 % O2 Device: Nasal cannula Oxygen Delivery: 1 LPM  Weight: 277 lbs 6.4 oz  General Appearance: Obese male in NAD  Respiratory: few bibasilar crackles no wheezing or tachypnea  Cardiovascular: RRR, normal s1/s2 without murmur  GI: abdomen soft, nontender  Skin: RLE erythema much improved today  Other: Alert and appropriate, cranial nerves grossly intact     Data   Recent Labs   Lab 09/04/22  0951 09/04/22  0729 09/04/22  0506 09/03/22  1146 09/03/22  1111 09/03/22  0754 09/03/22  0638 09/02/22  1950 09/02/22  1735 09/02/22  0826 09/02/22  0549   WBC  --   --  8.4  --  11.1*  --   --   --   --   --  11.8*   HGB  --   --  10.7*  --  11.7*  --   --   --   --   --  10.2*   MCV  --   --  88  --  88  --   --   --   --   --  90   PLT  --   --  153  --  163  --   --   --   --   --  131*   NA  --   --  136  --   --   --  135  --   --   --  138   POTASSIUM  --   --  3.6  --   --   --  3.7  3.6  --  3.6  --  2.9*   CHLORIDE  --   --  104  --   --   --  102  --   --   --  110*   CO2  --   --  28  --   --   --  27  --   --   --  22   BUN  --   --  12  --   --   --  8  --   --   --  8   CR  --   --  0.63*  --   --   --  0.58*  --   --   --  0.53*   ANIONGAP  --   --  4  --   --   --  6  --   --   --  6   BRANDON  --   --  9.0  --   --   --  8.8  --   --   --  7.1*   * 197* 183*   < >  --    < > 199*   < >  --    < > 214*    < > = values in this interval not displayed.     No results found for this or any previous visit (from the past 24 hour(s)).

## 2022-09-04 NOTE — PLAN OF CARE
A&Ox4. VSS. 1L O2 via NC. Denies pain. Feeling better today. Metformin restarted. Plan for possible discharge in next day or so.

## 2022-09-05 LAB
ANION GAP SERPL CALCULATED.3IONS-SCNC: 7 MMOL/L (ref 3–14)
BUN SERPL-MCNC: 11 MG/DL (ref 7–30)
CALCIUM SERPL-MCNC: 8.6 MG/DL (ref 8.5–10.1)
CHLORIDE BLD-SCNC: 106 MMOL/L (ref 94–109)
CO2 SERPL-SCNC: 27 MMOL/L (ref 20–32)
CREAT SERPL-MCNC: 0.58 MG/DL (ref 0.66–1.25)
GFR SERPL CREATININE-BSD FRML MDRD: >90 ML/MIN/1.73M2
GLUCOSE BLD-MCNC: 151 MG/DL (ref 70–99)
GLUCOSE BLDC GLUCOMTR-MCNC: 136 MG/DL (ref 70–99)
GLUCOSE BLDC GLUCOMTR-MCNC: 167 MG/DL (ref 70–99)
GLUCOSE BLDC GLUCOMTR-MCNC: 169 MG/DL (ref 70–99)
GLUCOSE BLDC GLUCOMTR-MCNC: 170 MG/DL (ref 70–99)
GLUCOSE BLDC GLUCOMTR-MCNC: 189 MG/DL (ref 70–99)
GLUCOSE BLDC GLUCOMTR-MCNC: 199 MG/DL (ref 70–99)
POTASSIUM BLD-SCNC: 3.6 MMOL/L (ref 3.4–5.3)
SODIUM SERPL-SCNC: 140 MMOL/L (ref 133–144)

## 2022-09-05 PROCEDURE — 250N000013 HC RX MED GY IP 250 OP 250 PS 637: Performed by: INTERNAL MEDICINE

## 2022-09-05 PROCEDURE — 250N000011 HC RX IP 250 OP 636: Performed by: HOSPITALIST

## 2022-09-05 PROCEDURE — 250N000009 HC RX 250: Performed by: HOSPITALIST

## 2022-09-05 PROCEDURE — 999N000157 HC STATISTIC RCP TIME EA 10 MIN

## 2022-09-05 PROCEDURE — 94640 AIRWAY INHALATION TREATMENT: CPT

## 2022-09-05 PROCEDURE — 250N000013 HC RX MED GY IP 250 OP 250 PS 637: Performed by: HOSPITALIST

## 2022-09-05 PROCEDURE — 999N000190 HC STATISTIC VAT ROUNDS

## 2022-09-05 PROCEDURE — 250N000011 HC RX IP 250 OP 636: Performed by: EMERGENCY MEDICINE

## 2022-09-05 PROCEDURE — 82310 ASSAY OF CALCIUM: CPT | Performed by: HOSPITALIST

## 2022-09-05 PROCEDURE — 999N000111 HC STATISTIC OT IP EVAL DEFER

## 2022-09-05 PROCEDURE — 99233 SBSQ HOSP IP/OBS HIGH 50: CPT | Performed by: HOSPITALIST

## 2022-09-05 PROCEDURE — 210N000002 HC R&B HEART CARE

## 2022-09-05 RX ORDER — LISINOPRIL 10 MG/1
10 TABLET ORAL DAILY
Status: DISCONTINUED | OUTPATIENT
Start: 2022-09-05 | End: 2022-09-06 | Stop reason: HOSPADM

## 2022-09-05 RX ORDER — HEPARIN SODIUM 5000 [USP'U]/.5ML
5000 INJECTION, SOLUTION INTRAVENOUS; SUBCUTANEOUS EVERY 12 HOURS
Status: DISCONTINUED | OUTPATIENT
Start: 2022-09-05 | End: 2022-09-06 | Stop reason: HOSPADM

## 2022-09-05 RX ORDER — METOPROLOL SUCCINATE 25 MG/1
25 TABLET, EXTENDED RELEASE ORAL DAILY
Qty: 30 TABLET | Refills: 1 | Status: SHIPPED | OUTPATIENT
Start: 2022-09-06 | End: 2022-09-12

## 2022-09-05 RX ORDER — GABAPENTIN 300 MG/1
300 CAPSULE ORAL DAILY PRN
Status: DISCONTINUED | OUTPATIENT
Start: 2022-09-05 | End: 2022-09-06 | Stop reason: HOSPADM

## 2022-09-05 RX ORDER — CEPHALEXIN 500 MG/1
500 CAPSULE ORAL EVERY 8 HOURS SCHEDULED
Status: DISCONTINUED | OUTPATIENT
Start: 2022-09-05 | End: 2022-09-06 | Stop reason: HOSPADM

## 2022-09-05 RX ORDER — CEPHALEXIN 500 MG/1
500 CAPSULE ORAL EVERY 8 HOURS
Qty: 5 CAPSULE | Refills: 0 | Status: SHIPPED | OUTPATIENT
Start: 2022-09-06 | End: 2022-09-08

## 2022-09-05 RX ORDER — POTASSIUM CHLORIDE 1.5 G/1.58G
20 POWDER, FOR SOLUTION ORAL ONCE
Status: COMPLETED | OUTPATIENT
Start: 2022-09-05 | End: 2022-09-05

## 2022-09-05 RX ORDER — AZITHROMYCIN 250 MG/1
250 TABLET, FILM COATED ORAL DAILY
Qty: 1 TABLET | Refills: 0 | Status: SHIPPED | OUTPATIENT
Start: 2022-09-07 | End: 2022-09-08

## 2022-09-05 RX ORDER — IPRATROPIUM BROMIDE AND ALBUTEROL SULFATE 2.5; .5 MG/3ML; MG/3ML
3 SOLUTION RESPIRATORY (INHALATION) EVERY 4 HOURS PRN
Status: DISCONTINUED | OUTPATIENT
Start: 2022-09-05 | End: 2022-09-06 | Stop reason: HOSPADM

## 2022-09-05 RX ADMIN — POTASSIUM CHLORIDE 20 MEQ: 1.5 POWDER, FOR SOLUTION ORAL at 09:56

## 2022-09-05 RX ADMIN — TAMSULOSIN HYDROCHLORIDE 0.4 MG: 0.4 CAPSULE ORAL at 21:37

## 2022-09-05 RX ADMIN — ASPIRIN 81 MG: 81 TABLET, COATED ORAL at 09:49

## 2022-09-05 RX ADMIN — Medication 1 MG: at 21:37

## 2022-09-05 RX ADMIN — Medication 10 ML: at 04:43

## 2022-09-05 RX ADMIN — FUROSEMIDE 40 MG: 10 INJECTION, SOLUTION INTRAVENOUS at 09:52

## 2022-09-05 RX ADMIN — PANTOPRAZOLE SODIUM 20 MG: 20 TABLET, DELAYED RELEASE ORAL at 09:50

## 2022-09-05 RX ADMIN — METFORMIN ER 500 MG 1000 MG: 500 TABLET ORAL at 09:49

## 2022-09-05 RX ADMIN — GABAPENTIN 900 MG: 300 CAPSULE ORAL at 06:11

## 2022-09-05 RX ADMIN — INSULIN GLARGINE 15 UNITS: 100 INJECTION, SOLUTION SUBCUTANEOUS at 21:38

## 2022-09-05 RX ADMIN — THERA TABS 1 TABLET: TAB at 09:50

## 2022-09-05 RX ADMIN — BUSPIRONE HYDROCHLORIDE 5 MG: 5 TABLET ORAL at 21:37

## 2022-09-05 RX ADMIN — CEPHALEXIN 500 MG: 500 CAPSULE ORAL at 16:20

## 2022-09-05 RX ADMIN — BUSPIRONE HYDROCHLORIDE 5 MG: 5 TABLET ORAL at 16:20

## 2022-09-05 RX ADMIN — POTASSIUM CHLORIDE 20 MEQ: 1.5 POWDER, FOR SOLUTION ORAL at 06:07

## 2022-09-05 RX ADMIN — POTASSIUM CHLORIDE 20 MEQ: 1.5 POWDER, FOR SOLUTION ORAL at 21:37

## 2022-09-05 RX ADMIN — FINASTERIDE 5 MG: 5 TABLET, FILM COATED ORAL at 09:50

## 2022-09-05 RX ADMIN — GABAPENTIN 900 MG: 300 CAPSULE ORAL at 21:37

## 2022-09-05 RX ADMIN — IPRATROPIUM BROMIDE AND ALBUTEROL SULFATE 3 ML: .5; 3 SOLUTION RESPIRATORY (INHALATION) at 07:18

## 2022-09-05 RX ADMIN — PRAVASTATIN SODIUM 20 MG: 20 TABLET ORAL at 21:37

## 2022-09-05 RX ADMIN — METOPROLOL SUCCINATE 25 MG: 25 TABLET, EXTENDED RELEASE ORAL at 09:49

## 2022-09-05 RX ADMIN — HEPARIN SODIUM 5000 UNITS: 5000 INJECTION, SOLUTION INTRAVENOUS; SUBCUTANEOUS at 09:42

## 2022-09-05 RX ADMIN — LISINOPRIL 10 MG: 10 TABLET ORAL at 09:49

## 2022-09-05 RX ADMIN — AZITHROMYCIN MONOHYDRATE 250 MG: 250 TABLET ORAL at 09:50

## 2022-09-05 RX ADMIN — ACETAMINOPHEN 650 MG: 325 TABLET ORAL at 02:00

## 2022-09-05 RX ADMIN — CEPHALEXIN 500 MG: 500 CAPSULE ORAL at 09:49

## 2022-09-05 RX ADMIN — CEPHALEXIN 500 MG: 500 CAPSULE ORAL at 21:37

## 2022-09-05 RX ADMIN — HEPARIN SODIUM 5000 UNITS: 5000 INJECTION, SOLUTION INTRAVENOUS; SUBCUTANEOUS at 21:38

## 2022-09-05 RX ADMIN — METFORMIN ER 500 MG 1000 MG: 500 TABLET ORAL at 18:00

## 2022-09-05 RX ADMIN — BUSPIRONE HYDROCHLORIDE 5 MG: 5 TABLET ORAL at 09:50

## 2022-09-05 ASSESSMENT — ACTIVITIES OF DAILY LIVING (ADL)
ADLS_ACUITY_SCORE: 27

## 2022-09-05 NOTE — PLAN OF CARE
Occupational Therapy: Orders received. Chart reviewed and discussed with care team, including RN and patient. Patient and chart indicates plans for discharge to home tomorrow. Patient needs are best met with OP Lymphedema (as discharge is planned for tomorrow) to assess and treat edema. Will defer lymph to next level of care. Will complete orders.

## 2022-09-05 NOTE — PLAN OF CARE
Goal Outcome Evaluation:   Patient alert, calls for SBA. Up to void every 2-3 hours, 300ml out each void. FINN with minimal activity. Sats remain in 90's on RA even with activity. LS clear, occ cough. BP elevated. LE edema 2 +, redness in left shin appears to be getting smaller from tracing. C/o of chronic pain in neck and left shoulder. Medicated with tylenol and hot pain with some improvement requested prn gabapentin, none ordered prn but gave am dose. Now resting. Gave addl K+ dose per protocol.    Plan of Care Reviewed With: patient

## 2022-09-05 NOTE — PROGRESS NOTES
[facility]    Medicine Progress Note - Hospitalist Service    Date of Admission:  9/1/2022    Assessment & Plan            Austen Fowler is a 87 year old male admitted on 9/1/2022.  Past medical history of CAD s/p CABG, diastolic CHF, HTN, HLD, DM II, BPH who presents with fever, weakness and hypotension, found to have severe sepsis likely due to RLE cellulitis.       Severe sepsis, likely due to RLE cellulitis vs pneumonia, resolving  Presents with fever, tachycardia, hypotension, with leukocytosis and lactic acid of 5.8.  Right calf erythematous, warm and tender.  UA, CXR, Covid/Flu/RSV negative.  CT with finding of lower lobe consolidation (see below).   * switched from zosyn to ceft+azithro 9/3  * lower suspicion for pneumonia as denying any dyspnea prior to admission, suspect resp failure a result of 4L fluids given for sepsis  - blood cultures ngtd  - afebrile, leukocytosis resolved 9/4  - stop ceftriaxone, will switch to keflex (wife not comfortable bringing him home today due to ongoing leg symptoms despite discussion that this is improving - she is agreeable to taking him home tomorrow if we switch to oral meds today and monitor through tomorrow for continued improvement)  - will continue azithromycin to complete course    Acute hypoxic respiratory failure likely due to pneumonia and acute on chronic diastolic CHF, resolving  Requiring 4L oxygen shortly after admission.    * CT pulm angio 9/2 negative for PE but showing diffuse pulm edema and bilateral effusions, patchy areas of bilateral lower lobe consolidation with areas of mucous plugging.   * received 20 mg IV lasix 9/2  -weaned to room air overnight  - abx as above  - continue lasix 40 mg IV daily  - follow I/O's and daily weights  - switch nebs to prn    Acute metabolic encephalopathy, mild  Likely due to issues above.  Has been having intermittent disorientation regarding the time of day.  - delirium  "precautions    Paroxysmal A-fib with RVR, resolved  Type II NSTEMI due to sepsis, CHF and A-fib  Hx CAD s/p CABG  HTN  HLD  Presented in A-fib with RVR, spontaneously converted to NSR shortly after arrival, no known prior hx of A-fib.    * Serial trop with peak of 722.  TTE 9/2 with EF 55-60% without WMA, mild aortic stenosis and mild mitral stenosis.    * Cardiology consulted, suspect type II NSTEMI; with duration of A-fib likely <48 hours in setting of sepsis not currently recommending anticoagulation.     - holter monitor at discharge, follow up with Cards clinic in 2-4 weeks  - initiated on metoprolol 9/2 (he reports being on this previously but cannot recall why it was stopped; review of Cards notes indicates he stopped this on his own in 9/2018 due to \"side effects\" which are not further described; as he has tolerated this without issue thus far, will plan to continue at discharge - wife cannot provide any history about this either)  - increase lisinopril to PTA dose of 10 mg daily  - continue PTA aspirin, pravastatin  - lasix as above, resume PTA dose at discharge  - hold amlodipine, unclear if will need as now on metoprolol  - continue tele    DM II with polyneuropathy  Managed on metformin.   - resumed PTA metformin 9/4  - lantus 15 units at bedtime   - hold aspart 1u/10g carb with meals   - high ssi  - continue PTA gabapentin    Hypokalemia  - continue PTA K supplement and replacement protocol    BPH  - continue PTA Flomax and finasteride    GERD  - continue PTA omeprazole    Depression  - continue PTA Buspar         Diet: Moderate Consistent Carb (60 g CHO per Meal) Diet    DVT Prophylaxis: Heparin SQ  Cosme Catheter: Not present  Central Lines: PRESENT  PICC Triple Lumen 09/01/22 Right Basilic access and pressors-Site Assessment: WDL    Cardiac Monitoring: ACTIVE order. Indication: sepsis  Code Status: Full Code      Disposition Plan     Expected Discharge Date: 09/06/2022                The patient's " "care was discussed with the Bedside Nurse and Patient.    Bladimir Thorne MD  Hospitalist Service  [facility]  Securely message with the [employer] Web Console (learn more here)  Text page via [employer] Paging/Directory         Clinically Significant Risk Factors Present on Admission               # DMII: A1C = 7.8 % (Ref range: 0.0 - 5.6 %) within past 3 months  # Obesity: Estimated body mass index is 33.27 kg/m  as calculated from the following:    Height as of this encounter: 1.93 m (6' 4\").    Weight as of this encounter: 124 kg (273 lb 4.8 oz).        ______________________________________________________________________    Interval History   Denies any dyspnea, fever/chills.  Single loose stool this morning, no abdominal pain.  Disoriented to time this morning, asking if it is morning or evening.  Reports he fell asleep hard overnight and woke not knowing if he had a short nap or slept all night.      Data reviewed today: I reviewed all medications, new labs and imaging results over the last 24 hours. I personally reviewed no images or EKG's today.    Physical Exam   Vital Signs: Temp: 97.9  F (36.6  C) Temp src: Oral BP: (!) 152/74 Pulse: 64   Resp: 29 SpO2: 94 % O2 Device: None (Room air) Oxygen Delivery: 1 LPM  Weight: 273 lbs 4.8 oz  General Appearance: Obese male in NAD, sitting up in chair  Respiratory: few bibasilar crackles no wheezing or tachypnea  Cardiovascular: RRR, normal s1/s2 without murmur  GI: abdomen soft, nontender, normal bowel sounds, nondisnteded  Skin: RLE erythema improving (reports has some chronic erythema of this area following some trauma a few years ago)  Other: Alert and appropriate, cranial nerves grossly intact     Data   Recent Labs   Lab 09/05/22  0725 09/05/22  0439 09/05/22  0156 09/04/22  0729 09/04/22  0506 09/03/22  1146 09/03/22  1111 09/03/22  0754 09/03/22  0638 09/02/22  0826 09/02/22  0549   WBC  --   --   --   --  8.4  --  11.1*  --   --   --  11.8* "   HGB  --   --   --   --  10.7*  --  11.7*  --   --   --  10.2*   MCV  --   --   --   --  88  --  88  --   --   --  90   PLT  --   --   --   --  153  --  163  --   --   --  131*   NA  --  140  --   --  136  --   --   --  135  --  138   POTASSIUM  --  3.6  --   --  3.6  --   --   --  3.7  3.6   < > 2.9*   CHLORIDE  --  106  --   --  104  --   --   --  102  --  110*   CO2  --  27  --   --  28  --   --   --  27  --  22   BUN  --  11  --   --  12  --   --   --  8  --  8   CR  --  0.58*  --   --  0.63*  --   --   --  0.58*  --  0.53*   ANIONGAP  --  7  --   --  4  --   --   --  6  --  6   BRANDON  --  8.6  --   --  9.0  --   --   --  8.8  --  7.1*   * 151* 170*   < > 183*   < >  --    < > 199*   < > 214*    < > = values in this interval not displayed.     No results found for this or any previous visit (from the past 24 hour(s)).

## 2022-09-05 NOTE — PLAN OF CARE
[id number]: A&Ox4. Forgetful at times. [place]. VSS. Tele is SB. PO abx. RLE redness improving. Plan to discharge tomorrow, as Wife was not comfortable with a discharge today.

## 2022-09-06 ENCOUNTER — APPOINTMENT (OUTPATIENT)
Dept: CARDIOLOGY | Facility: CLINIC | Age: 87
DRG: 871 | End: 2022-09-06
Attending: HOSPITALIST
Payer: COMMERCIAL

## 2022-09-06 VITALS
TEMPERATURE: 97.9 F | OXYGEN SATURATION: 94 % | BODY MASS INDEX: 32.51 KG/M2 | RESPIRATION RATE: 20 BRPM | DIASTOLIC BLOOD PRESSURE: 71 MMHG | HEART RATE: 57 BPM | SYSTOLIC BLOOD PRESSURE: 146 MMHG | WEIGHT: 267 LBS | HEIGHT: 76 IN

## 2022-09-06 LAB
ANION GAP SERPL CALCULATED.3IONS-SCNC: 6 MMOL/L (ref 3–14)
BACTERIA BLD CULT: NO GROWTH
BACTERIA BLD CULT: NO GROWTH
BUN SERPL-MCNC: 12 MG/DL (ref 7–30)
CALCIUM SERPL-MCNC: 9.3 MG/DL (ref 8.5–10.1)
CHLORIDE BLD-SCNC: 104 MMOL/L (ref 94–109)
CO2 SERPL-SCNC: 27 MMOL/L (ref 20–32)
CREAT SERPL-MCNC: 0.59 MG/DL (ref 0.66–1.25)
ERYTHROCYTE [DISTWIDTH] IN BLOOD BY AUTOMATED COUNT: 14.2 % (ref 10–15)
GFR SERPL CREATININE-BSD FRML MDRD: >90 ML/MIN/1.73M2
GLUCOSE BLD-MCNC: 147 MG/DL (ref 70–99)
GLUCOSE BLDC GLUCOMTR-MCNC: 172 MG/DL (ref 70–99)
GLUCOSE BLDC GLUCOMTR-MCNC: 181 MG/DL (ref 70–99)
HCT VFR BLD AUTO: 37.6 % (ref 40–53)
HGB BLD-MCNC: 11.9 G/DL (ref 13.3–17.7)
MCH RBC QN AUTO: 28 PG (ref 26.5–33)
MCHC RBC AUTO-ENTMCNC: 31.6 G/DL (ref 31.5–36.5)
MCV RBC AUTO: 89 FL (ref 78–100)
PLATELET # BLD AUTO: 219 10E3/UL (ref 150–450)
POTASSIUM BLD-SCNC: 3.8 MMOL/L (ref 3.4–5.3)
RBC # BLD AUTO: 4.25 10E6/UL (ref 4.4–5.9)
SODIUM SERPL-SCNC: 137 MMOL/L (ref 133–144)
WBC # BLD AUTO: 10 10E3/UL (ref 4–11)

## 2022-09-06 PROCEDURE — 250N000011 HC RX IP 250 OP 636: Performed by: EMERGENCY MEDICINE

## 2022-09-06 PROCEDURE — 250N000011 HC RX IP 250 OP 636: Performed by: HOSPITALIST

## 2022-09-06 PROCEDURE — 99239 HOSP IP/OBS DSCHRG MGMT >30: CPT | Performed by: HOSPITALIST

## 2022-09-06 PROCEDURE — G0463 HOSPITAL OUTPT CLINIC VISIT: HCPCS

## 2022-09-06 PROCEDURE — 93270 REMOTE 30 DAY ECG REV/REPORT: CPT

## 2022-09-06 PROCEDURE — 82310 ASSAY OF CALCIUM: CPT | Performed by: HOSPITALIST

## 2022-09-06 PROCEDURE — 250N000013 HC RX MED GY IP 250 OP 250 PS 637: Performed by: INTERNAL MEDICINE

## 2022-09-06 PROCEDURE — 250N000013 HC RX MED GY IP 250 OP 250 PS 637: Performed by: HOSPITALIST

## 2022-09-06 PROCEDURE — 85027 COMPLETE CBC AUTOMATED: CPT | Performed by: HOSPITALIST

## 2022-09-06 PROCEDURE — 93272 ECG/REVIEW INTERPRET ONLY: CPT | Performed by: INTERNAL MEDICINE

## 2022-09-06 RX ORDER — POTASSIUM CHLORIDE 1.5 G/1.58G
20 POWDER, FOR SOLUTION ORAL ONCE
Status: COMPLETED | OUTPATIENT
Start: 2022-09-06 | End: 2022-09-06

## 2022-09-06 RX ADMIN — CEPHALEXIN 500 MG: 500 CAPSULE ORAL at 06:12

## 2022-09-06 RX ADMIN — POTASSIUM CHLORIDE 20 MEQ: 1.5 POWDER, FOR SOLUTION ORAL at 06:12

## 2022-09-06 RX ADMIN — FUROSEMIDE 40 MG: 10 INJECTION, SOLUTION INTRAVENOUS at 08:46

## 2022-09-06 RX ADMIN — THERA TABS 1 TABLET: TAB at 08:45

## 2022-09-06 RX ADMIN — GABAPENTIN 900 MG: 300 CAPSULE ORAL at 08:44

## 2022-09-06 RX ADMIN — LISINOPRIL 10 MG: 10 TABLET ORAL at 08:46

## 2022-09-06 RX ADMIN — METFORMIN ER 500 MG 1000 MG: 500 TABLET ORAL at 08:46

## 2022-09-06 RX ADMIN — FINASTERIDE 5 MG: 5 TABLET, FILM COATED ORAL at 08:47

## 2022-09-06 RX ADMIN — BUSPIRONE HYDROCHLORIDE 5 MG: 5 TABLET ORAL at 08:45

## 2022-09-06 RX ADMIN — HEPARIN SODIUM 5000 UNITS: 5000 INJECTION, SOLUTION INTRAVENOUS; SUBCUTANEOUS at 08:46

## 2022-09-06 RX ADMIN — ASPIRIN 81 MG: 81 TABLET, COATED ORAL at 08:46

## 2022-09-06 RX ADMIN — AZITHROMYCIN MONOHYDRATE 250 MG: 250 TABLET ORAL at 08:46

## 2022-09-06 RX ADMIN — PANTOPRAZOLE SODIUM 20 MG: 20 TABLET, DELAYED RELEASE ORAL at 08:44

## 2022-09-06 RX ADMIN — POTASSIUM CHLORIDE 20 MEQ: 1.5 POWDER, FOR SOLUTION ORAL at 08:46

## 2022-09-06 RX ADMIN — METOPROLOL SUCCINATE 25 MG: 25 TABLET, EXTENDED RELEASE ORAL at 08:46

## 2022-09-06 RX ADMIN — Medication 5 ML: at 04:36

## 2022-09-06 ASSESSMENT — ACTIVITIES OF DAILY LIVING (ADL)
ADLS_ACUITY_SCORE: 27
ADLS_ACUITY_SCORE: 25
ADLS_ACUITY_SCORE: 27
ADLS_ACUITY_SCORE: 27

## 2022-09-06 NOTE — DISCHARGE SUMMARY
[facility]    Discharge Summary  Hospitalist    Date of Admission:  9/1/2022  Date of Discharge:  9/6/2022    Discharge Diagnoses      Septic shock (H)  Cellulitis of right lower leg  Atrial fibrillation with RVR (H)  Cellulitis of right lower extremity  Community acquired pneumonia, unspecified laterality    History of Present Illness   Austen Fowler is an 87 year old male who presented with     Hospital Course   Austen Fowler was admitted on 9/1/2022.  The following problems were addressed during his hospitalization:    Austen Fowler is a 87 year old male admitted on 9/1/2022.  Past medical history of CAD s/p CABG, diastolic CHF, HTN, HLD, DM II, BPH who presents with fever, weakness and hypotension, found to have severe sepsis likely due to RLE cellulitis.         Severe sepsis, likely due to RLE cellulitis vs pneumonia, resolving  Presents with fever, tachycardia, hypotension, with leukocytosis and lactic acid of 5.8.  Right calf erythematous, warm and tender.  UA, CXR, Covid/Flu/RSV negative.  CT with finding of lower lobe consolidation (see below).   * switched from zosyn to ceft+azithro 9/3  * lower suspicion for pneumonia as denying any dyspnea prior to admission, suspect resp failure a result of 4L fluids given for sepsis  - blood cultures ngtd  - afebrile, leukocytosis resolved 9/4  - stop ceftriaxone, will switch to keflex   - will continue azithromycin to complete course     Acute hypoxic respiratory failure likely due to pneumonia and acute on chronic diastolic CHF, resolving  Requiring 4L oxygen shortly after admission.    * CT pulm angio 9/2 negative for PE but showing diffuse pulm edema and bilateral effusions, patchy areas of bilateral lower lobe consolidation with areas of mucous plugging.   * received 20 mg IV lasix 9/2  -weaned to room air overnight  - abx as above  - continue lasix 40 mg IV daily-transition to oral Lasix 40 daily at the time of discharge.  -  "follow I/O's and daily weights  - switch nebs to prn     Acute metabolic encephalopathy, mild  Likely due to issues above.  Has been having intermittent disorientation regarding the time of day.  - delirium precautions  -Resolved at the time of discharge.     Paroxysmal A-fib with RVR, resolved  Type II NSTEMI due to sepsis, CHF and A-fib  Hx CAD s/p CABG  HTN  HLD  Presented in A-fib with RVR, spontaneously converted to NSR shortly after arrival, no known prior hx of A-fib.    * Serial trop with peak of 722.  TTE 9/2 with EF 55-60% without WMA, mild aortic stenosis and mild mitral stenosis.    * Cardiology consulted, suspect type II NSTEMI; with duration of A-fib likely <48 hours in setting of sepsis not currently recommending anticoagulation.      - holter monitor at discharge, follow up with Cards clinic in 2-4 weeks  - initiated on metoprolol 9/2 (he reports being on this previously but cannot recall why it was stopped; review of Cards notes indicates he stopped this on his own in 9/2018 due to \"side effects\" which are not further described; as he has tolerated this without issue thus far, will plan to continue at discharge - wife cannot provide any history about this either)  - increase lisinopril to PTA dose of 10 mg daily  - continue PTA aspirin, pravastatin  - lasix as above, resume PTA dose at discharge  -Discontinued amlodipine and started on metoprolol for his paroxysmal atrial fibrillation.  - continue tele     DM II with polyneuropathy  Managed on metformin.   - resumed PTA metformin 9/4  - lantus 15 units at bedtime   - hold aspart 1u/10g carb with meals   - high ssi  - continue PTA gabapentin     Hypokalemia  - continue PTA K supplement and replacement protocol     BPH  - continue PTA Flomax and finasteride     GERD  - continue PTA omeprazole     Depression  - continue PTA Buspar     Arianna Chen MD, MD    Code Status   Full Code       Primary Care Physician   Hamzah Hodge    Physical Exam   Temp: " 97.6  F (36.4  C) Temp src: Oral BP: (!) 158/83 Pulse: 67   Resp: 20 SpO2: 97 % O2 Device: None (Room air)    Vitals:    09/04/22 0500 09/05/22 0433 09/06/22 0430   Weight: 125.8 kg (277 lb 6.4 oz) 124 kg (273 lb 4.8 oz) 121.1 kg (267 lb)     Vital Signs with Ranges  Temp:  [97.6  F (36.4  C)-98.4  F (36.9  C)] 97.6  F (36.4  C)  Pulse:  [66-68] 67  Resp:  [20] 20  BP: (149-164)/(77-86) 158/83  SpO2:  [91 %-97 %] 97 %  I/O last 3 completed shifts:  In: 840 [P.O.:840]  Out: 1970 [Urine:1970]    Physical Exam  Constitutional:       Appearance: Normal appearance.   HENT:      Head: Normocephalic.   Eyes:      Pupils: Pupils are equal, round, and reactive to light.   Cardiovascular:      Rate and Rhythm: Normal rate. Rhythm irregular.      Pulses: Normal pulses.      Heart sounds: Normal heart sounds.   Pulmonary:      Effort: Pulmonary effort is normal. No respiratory distress.      Breath sounds: Normal breath sounds.      Comments: Mildly tachypneic.  Abdominal:      General: Abdomen is flat. Bowel sounds are normal. There is no distension.      Tenderness: There is no abdominal tenderness. There is no guarding.   Musculoskeletal:         General: Normal range of motion.      Cervical back: Normal range of motion.   Skin:     General: Skin is warm and dry.      Comments: Erythema and swelling over right lower extremity improved significantly.   Neurological:      General: No focal deficit present.   Psychiatric:         Mood and Affect: Mood normal.           Discharge Disposition   Discharged to home  Condition at discharge: Stable    Consultations This Hospital Stay   VASCULAR ACCESS ADULT IP CONSULT  VASCULAR ACCESS ADULT IP CONSULT  CARDIOLOGY IP CONSULT  OCCUPATIONAL THERAPY ADULT IP CONSULT  LYMPHEDEMA THERAPY IP CONSULT    Time Spent on this Encounter   Arianna HERNANDEZ MD, personally saw the patient today and spent greater than 30 minutes discharging this patient.    Discharge Orders      Reason for your  hospital stay    Cellulitis with sepsis     Follow-up and recommended labs and tests     Follow up with primary care provider, Hamzah Hodge, within 7 days for hospital follow- up.  The following labs/tests are recommended: cbc, bmp in 1 week.     Activity    Your activity upon discharge: activity as tolerated     Diet    Follow this diet upon discharge: Orders Placed This Encounter      Moderate Consistent Carb (60 g CHO per Meal) Diet       Discharge Medications   Current Discharge Medication List      START taking these medications    Details   azithromycin (ZITHROMAX) 250 MG tablet Take 1 tablet (250 mg) by mouth daily for 1 day  Qty: 1 tablet, Refills: 0    Associated Diagnoses: Community acquired pneumonia, unspecified laterality      cephALEXin (KEFLEX) 500 MG capsule Take 1 capsule (500 mg) by mouth every 8 hours for 5 doses  Qty: 5 capsule, Refills: 0    Associated Diagnoses: Cellulitis of right lower extremity      metoprolol succinate ER (TOPROL XL) 25 MG 24 hr tablet Take 1 tablet (25 mg) by mouth daily  Qty: 30 tablet, Refills: 1    Comments: Future refills by PCP Dr. Hamzah Hodge with phone number [phone].  Associated Diagnoses: Atrial fibrillation with RVR (H)         CONTINUE these medications which have NOT CHANGED    Details   acetaminophen (ACETAMIN) 500 MG tablet Take 1,000 mg by mouth 2 times daily      aspirin 81 MG EC tablet Take 81 mg by mouth daily       busPIRone (BUSPAR) 5 MG tablet Take 1 tablet (5 mg) by mouth 3 times daily  Qty: 90 tablet, Refills: 4    Associated Diagnoses: Anxiety      Cholecalciferol (VITAMIN D) 2000 UNIT tablet Take 2,000 Units by mouth daily.  Qty: 90 tablet, Refills: 3    Comments: OTC      finasteride (PROSCAR) 5 MG tablet TAKE 1 TABLET BY MOUTH  DAILY  Qty: 90 tablet, Refills: 2    Comments: Requesting 1 year supply  Associated Diagnoses: Benign prostatic hyperplasia, unspecified whether lower urinary tract symptoms present      furosemide (LASIX) 40 MG  tablet Take 1 tablet (40 mg) by mouth daily  Qty: 90 tablet, Refills: 3    Associated Diagnoses: Acute diastolic heart failure (H)      gabapentin (NEURONTIN) 300 MG capsule Take 3 capsules (900 mg) by mouth 3 times daily - Fasting physical due for further refills  Qty: 270 capsule, Refills: 4    Associated Diagnoses: Chronic pain syndrome; Sacroiliac joint pain      lisinopril (ZESTRIL) 10 MG tablet Take 1 tablet (10 mg) by mouth daily  Qty: 90 tablet, Refills: 3    Associated Diagnoses: Essential hypertension, benign      metFORMIN (GLUCOPHAGE XR) 500 MG 24 hr tablet TAKE 2 TABLETS BY MOUTH  TWICE DAILY WITH MEALS  Qty: 360 tablet, Refills: 3    Comments: Requesting 1 year supply  Associated Diagnoses: 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)      multivitamin (OCUVITE) TABS tablet Take 1 tablet by mouth daily      omeprazole (PRILOSEC) 20 MG DR capsule Take 1 capsule (20 mg) by mouth daily  Qty: 90 capsule, Refills: 3    Associated Diagnoses: Dysphagia, unspecified type      potassium chloride (KLOR-CON) 20 MEQ packet Take 20 mEq by mouth 3 times daily  Qty: 180 packet, Refills: 3    Comments: Pharmacy---patient's mail order pharmacy ([facility]) didn't have this Rx in stock.  Patient requested your local pharmacy. Please notify patient when ready for .  Associated Diagnoses: Acute diastolic heart failure (H)      pravastatin (PRAVACHOL) 20 MG tablet Take 1 tablet (20 mg) by mouth daily - due for fasting physical with Dr Hodge for refills  Qty: 90 tablet, Refills: 3    Associated Diagnoses: Hyperlipidemia LDL goal <70      Probiotic Product (PROBIOTIC PO) Take 1 capsule by mouth daily      tamsulosin (FLOMAX) 0.4 MG capsule TAKE 1 CAPSULE BY MOUTH  DAILY  Qty: 90 capsule, Refills: 2    Associated Diagnoses: Benign prostatic hyperplasia, unspecified whether lower urinary tract symptoms present      vitamin B-12 (CYANOCOBALAMIN) 1000 MCG tablet Take 1,000 mcg by mouth daily       blood glucose (ONETOUCH  ULTRA) test strip USE TO TEST BLOOD SUGAR  TWICE DAILY OR AS DIRECTED. DUE FOR APPOINTMENT. PLEASE SCHEDULE: [phone]  Qty: 200 strip, Refills: 3    Comments: Requesting 1 year supply  Associated Diagnoses: Other abnormal glucose      blood glucose monitoring (ONE TOUCH ULTRASOFT) lancets USE TO TEST BLOOD SUGAR 2  TIMES DAILY OR AS DIRECTED.  Qty: 200 each, Refills: 1    Associated Diagnoses: 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)      ORDER FOR DME Uses Cpap machine for sleep apnea         STOP taking these medications       amLODIPine (NORVASC) 2.5 MG tablet Comments:   Reason for Stopping:             Allergies   Allergies   Allergen Reactions     No Known Allergies      Data   Recent Labs   Lab Test 09/06/22  0431 09/04/22  0506 09/03/22  1111 04/27/15  1141 08/19/14  1435   WBC 10.0 8.4 11.1*   < >  --    HGB 11.9* 10.7* 11.7*   < >  --    MCV 89 88 88   < >  --     153 163   < >  --    INR  --   --   --   --  1.10    < > = values in this interval not displayed.      Recent Labs   Lab Test 09/06/22  0804 09/06/22  0431 09/05/22  2108 09/05/22  0725 09/05/22  0439 09/04/22  0729 09/04/22  0506   NA  --  137  --   --  140  --  136   POTASSIUM  --  3.8  --   --  3.6  --  3.6   CHLORIDE  --  104  --   --  106  --  104   CO2  --  27  --   --  27  --  28   BUN  --  12  --   --  11  --  12   CR  --  0.59*  --   --  0.58*  --  0.63*   ANIONGAP  --  6  --   --  7  --  4   BRANDON  --  9.3  --   --  8.6  --  9.0   * 147* 167*   < > 151*   < > 183*    < > = values in this interval not displayed.         Results for orders placed or performed during the hospital encounter of 09/01/22   XR Chest 2 Views    Narrative    CHEST TWO VIEWS 9/1/2022 8:52 AM     HISTORY: Cough, fever.    COMPARISON: 4/3/2021.        Impression    IMPRESSION: There are no acute infiltrates. The cardiac silhouette is  not enlarged. Pulmonary vasculature is unremarkable.    RIA PAEZ MD         SYSTEM ID:  [id number]   IR  PICC Reposition Right    Narrative    IR PICC REPOSITION RIGHT 9/1/2022 2:36 PM    HISTORY: 87-year-old patient with sepsis, need for long-term IV access  for antibiotic administration.    TECHNIQUE: Patient was brought to the interventional radiology  department with PICC part way inserted in the right upper extremity.  Informed consent was reiterated. Maximal Sterile Barrier Technique  Utilized: Cap AND mask AND sterile gown AND sterile gloves AND sterile  full body drape AND hand hygiene AND skin preparation 2% chlorhexidine  for cutaneous antisepsis (or acceptable alternative antiseptics).   Sterile Ultrasound Technique Utilized ?Sterile gel AND sterile probe  covers. Venogram was performed demonstrating the PICC in a branch of  the the vein it was administered into. The PICC was pulled back and  redirected into the main channel, uncertain if this is basilic or  brachial vein. Over a Nitrex wire, the PICC was advanced until the tip  at the right atrial/SVC junction. The 3 lumens were aspirated and  flushed with normal saline. 5 cm remained external. The existing PICC  is what was used, therefore, overall length is documented by PICC  nurse.    Sedation: None  Fluoroscopy time: 0.6 minutes  Air (Kerma): 9.6 mGy  Contrast: 10 mL of Isovue 300 administered intravenously without  complication.  Local anesthetic: None    FINDINGS: Three spot fluoroscopic images and venogram sequences  obtained. Initial venogram with PICC in existing position  demonstrating it was in a branch of the vein. It was pulled back into  the main channel and venogram repeated. PICC was then redirected until  tip at the right atrial/SVC junction.      Impression    IMPRESSION:  1. Reposition of PICC in right upper extremity. The PICC is now ready  for use.    PRISCILA SMITH MD         SYSTEM ID:  [id number]   XR Chest 2 Views    Narrative    EXAM: XR CHEST 2 VIEWS  LOCATION: [facility]  DATE/TIME: 9/2/2022 4:43  PM    INDICATION: worsening shortness of breath  COMPARISON: Chest x-ray 09/01/2022      Impression    IMPRESSION: Post open-heart surgery. Right PICC line with the tip located at the cavoatrial junction. No definite pneumothorax. Mild bibasilar pulmonary opacities likely reflect atelectasis. Mild interstitial edema. Normal heart size. Stable central   vascular prominence.   CT Chest Pulmonary Embolism w Contrast    Narrative    EXAM: CT CHEST PULMONARY EMBOLISM W CONTRAST  LOCATION: [facility]  DATE/TIME: 9/2/2022 7:05 PM    INDICATION: Tachypnea. Sepsis.  COMPARISON: CXR earlier today 09/02/2022, CT chest 01/11/2020 reviewed.  TECHNIQUE: CT chest pulmonary angiogram during arterial phase injection of IV contrast. Multiplanar reformats and MIP reconstructions were performed. Dose reduction techniques were used.   CONTRAST: 83 mL Isovue 370    FINDINGS:  ANGIOGRAM CHEST: Pulmonary arteries are normal caliber and negative for pulmonary emboli. Thoracic aorta is negative for dissection.     LUNGS AND PLEURA: Small bilateral pleural effusions. Moderate severity scattered smooth interlobular septal thickening, greatest in the lower lobes suggesting some interstitial edema. Few patchy irregular consolidative opacities in both lower lobes,   likely infectious/inflammatory. Moderate to marked bronchial wall thickening, greatest in the lower lobes. Some areas of mucous plugging are present. No pneumothorax.    MEDIASTINUM/AXILLAE: Sternotomy with CABG. No adenopathy. No pericardial effusion. Right PICC with tip in the low SVC.    CORONARY ARTERY CALCIFICATION: Moderate.    UPPER ABDOMEN: Cholecystectomy.    MUSCULOSKELETAL: Mild scattered degenerative changes in the spine.      Impression    IMPRESSION:  1. Negative for pulmonary embolism.  2. Findings of CHF with diffuse interstitial pulmonary edema and small bilateral pleural effusions.  3. Superimposed patchy areas of consolidation in both lower  lobes, likely infectious/inflammatory.  4. Moderate to severe areas of bronchial wall thickening, more so in the lower lobes, with some areas of mucous plugging, possibly related to bronchitis and/or aspiration pneumonitis.   Echocardiogram Complete     Value    LVEF  55-60%    [facility]    [id number]  [id number]  [id number]  [id number]^CHRIS^ANATOLIY^CARMELO     [facility]  Echocardiography Laboratory  [address] [place] [address]     Name: NAYELI RAIN  MRN: [id number]  : 1935  Study Date: 2022 02:08 PM  Age: 87 yrs  Gender: Male  Patient Location: [facility]  Reason For Study: CAD  Ordering Physician: ANATOLIY PEREZ  Referring Physician: ANATOLIY PEREZ  Performed By: Kimmie Nichole     BSA: 2.5 m2  Height: 74 in  Weight: 285 lb  HR: 81  ______________________________________________________________________________  Procedure  Complete Echo Adult. [facility] (NDC #[id number]) given intravenously.  ______________________________________________________________________________  Interpretation Summary     1. Left ventricular systolic function is normal. The visual ejection fraction  is 55-60%.  2. No regional wall motion abnormalities noted.  3. The right ventricle is normal in structure, function and size.  4. Mild valvular aortic stenosis; mean gradient 13 mmHg, peak velocity 2.3  m/sec.  5. Mild to moderate mitral annular calcification resulting in mild mitral  stenosis; mean gradient 4 mHg at HR 81 bpm.  ______________________________________________________________________________  Left Ventricle  The left ventricle is normal in size. There is normal left ventricular wall  thickness. Left ventricular systolic function is normal. The visual ejection  fraction is 55-60%. No regional wall motion abnormalities noted.     Right Ventricle  The right ventricle is normal in structure, function and size.     Atria  Normal left atrial size. Right atrial size is normal. There is no  color  Doppler evidence of an atrial shunt.     Mitral Valve  There is mild mitral annular calcification. There is mild mitral stenosis.     Tricuspid Valve  The tricuspid valve is normal in structure and function. There is mild (1+)  tricuspid regurgitation.     Aortic Valve  The aortic valve is trileaflet with aortic valve sclerosis. Mild valvular  aortic stenosis.     Pulmonic Valve  The pulmonic valve is not well seen, but is grossly normal.     Vessels  The ascending aorta is Mildly dilated. IVC diameter <2.1 cm collapsing >50%  with sniff suggests a normal RA pressure of 3 mmHg.     Pericardium  There is no pericardial effusion.     Rhythm  The rhythm was atrial fibrillation.  ______________________________________________________________________________  MMode/2D Measurements & Calculations     IVSd: 1.1 cm  LVIDd: 6.2 cm  LVIDs: 4.2 cm  LVPWd: 1.0 cm  FS: 32.0 %  LV mass(C)d: 287.8 grams  LV mass(C)dI: 113.9 grams/m2  Ao root diam: 3.9 cm  asc Aorta Diam: 3.7 cm  LVOT diam: 2.2 cm  LVOT area: 3.9 cm2  LA Volume (BP): 108.0 ml  LA Volume Index (BP): 42.7 ml/m2  RWT: 0.33     Doppler Measurements & Calculations  MV E max migel: 149.0 cm/sec  MV A max migel: 67.3 cm/sec  MV E/A: 2.2  MV max P.2 mmHg  MV mean PG: 3.8 mmHg  MV V2 VTI: 41.1 cm  MVA(VTI): 2.3 cm2  MV dec slope: 877.0 cm/sec2  Ao V2 max: 230.9 cm/sec  Ao max P.0 mmHg  Ao V2 mean: 169.1 cm/sec  Ao mean P.2 mmHg  Ao V2 VTI: 54.8 cm  ROMY(I,D): 1.7 cm2  ROMY(V,D): 2.3 cm2  LV V1 max P.4 mmHg  LV V1 max: 135.6 cm/sec  LV V1 VTI: 24.8 cm  SV(LVOT): 95.8 ml  SI(LVOT): 37.9 ml/m2  PA V2 max: 130.3 cm/sec  PA max P.8 mmHg  PA acc time: 0.07 sec  AV Migel Ratio (DI): 0.59  ROMY Index (cm2/m2): 0.69  Lateral E/e': 16.5     ______________________________________________________________________________  Report approved by: Adam Romero 2022 03:40 PM           *Note: Due to a large number of results and/or encounters for the  requested time period, some results have not been displayed. A complete set of results can be found in Results Review.

## 2022-09-06 NOTE — PLAN OF CARE
Goal Outcome Evaluation:  Patient a/o occ sl forgetful, SBA. Calls for assist, occ set off alarm but waits for staff, Up to BR every 2-3 hours to void, FINN but sats 92-95% while ambulating. LS clear, occ dry cough, sl wheezy at times. BP elevated. Possible discharge home today , possibly with holter monitor. Has been SR/SB 50's all night  Plan of Care Reviewed With: patient

## 2022-09-06 NOTE — PLAN OF CARE
Goal Outcome Evaluation:    VSS. Monitor remains Sinus rhythm. Pt. Denies pain. Continue to monitor. Plan for probable discharge to home tomorrow.

## 2022-09-06 NOTE — PLAN OF CARE
Goal Outcome Evaluation:  Pt A/O x 4. Chronic pain in neck, covered with scheduled meds. Discharge instructions received, questions answered. All belongings with pt. PICC removed. Holter monitor given. Pt wheeled out to where wife took pt home.

## 2022-09-07 ENCOUNTER — PATIENT OUTREACH (OUTPATIENT)
Dept: CARE COORDINATION | Facility: CLINIC | Age: 87
End: 2022-09-07

## 2022-09-07 ENCOUNTER — TELEPHONE (OUTPATIENT)
Dept: FAMILY MEDICINE | Facility: CLINIC | Age: 87
End: 2022-09-07

## 2022-09-07 ENCOUNTER — TELEPHONE (OUTPATIENT)
Dept: CARDIOLOGY | Facility: CLINIC | Age: 87
End: 2022-09-07

## 2022-09-07 NOTE — TELEPHONE ENCOUNTER
Patient was admitted to [facility] on 9/1/22 with fever, chills, weakness and hypotension, found to have severe sepsis likely due to RLE cellulitis and new onset PAF with RVR-spontaneously converted to NSR. Mild troponin elevation-tachy related.    PMH: CAD with CABG in 1992, DM2, obesity, diastolic HF, HTN, HLD, BPH.    Echo showed EF of 55% without RWMA's.    Pt was started on Zithromax, Keflex and Metoprolol succinate at time of discharge. No OAC at this time. Pt was discharged wearing a 30 day cardiac event monitor.    Called patient to discuss any post hospital d/c questions he may have, review medication changes, and confirm f/u appts. Patient denied any questions regarding new medications or changes to PTA medications.     Patient denied any increased SOB, any chest pain, palpitations or light headedness.    RN confirmed with patient that he has an OV scheduled on 10/14/22 at 1025 with ALIRIO Lyndsey Terry at our [facility].    Patient advised to call clinic with any cardiac related questions or concerns prior to this alirio't. Patient verbalized understanding and agreed with plan. SHELIA Dupont RN.

## 2022-09-07 NOTE — TELEPHONE ENCOUNTER
Reason for Call:  Same Day Appointment, Requested Provider:      PCP: Hamzah Hodge    Reason for visit: hospital f/u    Duration of symptoms:     Have you been treated for this in the past?     Additional comments:     Can we leave a detailed message on this number? NO  - they will answer.     Phone number patient can be reached at: Home number on file [phone] (home)    Best Time:after mid-morning    Call taken on 9/7/2022 at 2:09 PM by Grace Ballard

## 2022-09-07 NOTE — PROGRESS NOTES
[facility]    Background: Transitional Care Management program auto-identified and prompting a chart review by [facility] team.    Assessment: Upon chart review, CCR Team member will cancel/close this episode of Transitional Care Management program due to reason below:    Patient has active communication with a nurse, provider or care team for reason of post-hospital follow up plan.  Outreach call by CCR[facility] team not indicated to minimize duplicative efforts.     Plan: Transitional Care Management episode closed per reason above.      Nataly Andersen MA  [facility]    *Connected Care Resource Team does NOT follow patient ongoing. Referrals are identified based on internal discharge reports and the outreach is to ensure patient has an understanding of their discharge instructions.

## 2022-09-08 ENCOUNTER — TELEPHONE (OUTPATIENT)
Dept: CARDIOLOGY | Facility: CLINIC | Age: 87
End: 2022-09-08

## 2022-09-08 NOTE — TELEPHONE ENCOUNTER
[facility] Call Center    Phone Message    May a detailed message be left on voicemail: yes     Reason for Call: Other: Patient's wife calling to report that [facility] event monitor keeps beeping and they do not know what it means or what to do. Please call them back to discuss.     Action Taken: Other: Cardiology    Travel Screening: Not Applicable

## 2022-09-12 ENCOUNTER — OFFICE VISIT (OUTPATIENT)
Dept: FAMILY MEDICINE | Facility: CLINIC | Age: 87
End: 2022-09-12
Payer: COMMERCIAL

## 2022-09-12 VITALS
TEMPERATURE: 97.1 F | SYSTOLIC BLOOD PRESSURE: 127 MMHG | HEIGHT: 76 IN | RESPIRATION RATE: 24 BRPM | WEIGHT: 271 LBS | DIASTOLIC BLOOD PRESSURE: 79 MMHG | OXYGEN SATURATION: 96 % | BODY MASS INDEX: 33 KG/M2 | HEART RATE: 71 BPM

## 2022-09-12 DIAGNOSIS — I48.0 PAROXYSMAL ATRIAL FIBRILLATION (H): ICD-10-CM

## 2022-09-12 DIAGNOSIS — Z23 NEED FOR PROPHYLACTIC VACCINATION AND INOCULATION AGAINST INFLUENZA: ICD-10-CM

## 2022-09-12 DIAGNOSIS — A41.9 SEPSIS, DUE TO UNSPECIFIED ORGANISM, UNSPECIFIED WHETHER ACUTE ORGAN DYSFUNCTION PRESENT (H): Primary | ICD-10-CM

## 2022-09-12 DIAGNOSIS — I48.91 ATRIAL FIBRILLATION WITH RVR (H): ICD-10-CM

## 2022-09-12 DIAGNOSIS — L03.115 CELLULITIS OF RIGHT LOWER EXTREMITY: ICD-10-CM

## 2022-09-12 DIAGNOSIS — I10 ESSENTIAL HYPERTENSION, BENIGN: ICD-10-CM

## 2022-09-12 LAB
ERYTHROCYTE [DISTWIDTH] IN BLOOD BY AUTOMATED COUNT: 14 % (ref 10–15)
HCT VFR BLD AUTO: 39.1 % (ref 40–53)
HGB BLD-MCNC: 12.6 G/DL (ref 13.3–17.7)
MCH RBC QN AUTO: 28.3 PG (ref 26.5–33)
MCHC RBC AUTO-ENTMCNC: 32.2 G/DL (ref 31.5–36.5)
MCV RBC AUTO: 88 FL (ref 78–100)
PLATELET # BLD AUTO: 281 10E3/UL (ref 150–450)
RBC # BLD AUTO: 4.46 10E6/UL (ref 4.4–5.9)
WBC # BLD AUTO: 9.6 10E3/UL (ref 4–11)

## 2022-09-12 PROCEDURE — 85027 COMPLETE CBC AUTOMATED: CPT | Performed by: INTERNAL MEDICINE

## 2022-09-12 PROCEDURE — 80048 BASIC METABOLIC PNL TOTAL CA: CPT | Performed by: INTERNAL MEDICINE

## 2022-09-12 PROCEDURE — 90662 IIV NO PRSV INCREASED AG IM: CPT | Performed by: INTERNAL MEDICINE

## 2022-09-12 PROCEDURE — 99495 TRANSJ CARE MGMT MOD F2F 14D: CPT | Mod: 25 | Performed by: INTERNAL MEDICINE

## 2022-09-12 PROCEDURE — G0008 ADMIN INFLUENZA VIRUS VAC: HCPCS | Performed by: INTERNAL MEDICINE

## 2022-09-12 PROCEDURE — 36415 COLL VENOUS BLD VENIPUNCTURE: CPT | Performed by: INTERNAL MEDICINE

## 2022-09-12 RX ORDER — METOPROLOL SUCCINATE 25 MG/1
25 TABLET, EXTENDED RELEASE ORAL DAILY
Qty: 90 TABLET | Refills: 1 | Status: SHIPPED | OUTPATIENT
Start: 2022-09-12 | End: 2022-09-22

## 2022-09-12 ASSESSMENT — PAIN SCALES - GENERAL: PAINLEVEL: NO PAIN (0)

## 2022-09-12 NOTE — PROGRESS NOTES
"  Assessment & Plan     Sepsis, due to unspecified organism, unspecified whether acute organ dysfunction present (H)    Hospital course reviewed. Clinically improving.      Paroxysmal atrial fibrillation (H)  1 episode. Has monitor.  Tolerating beta blocker.    - Basic metabolic panel  (Ca, Cl, CO2, Creat, Gluc, K, Na, BUN)    BENIGN HYPERTENSION  At goal off norvasc.  As above, tolerating metoprolol.  Rx sent to mail order per request.    - Basic metabolic panel  (Ca, Cl, CO2, Creat, Gluc, K, Na, BUN)       BMI:   Estimated body mass index is 32.99 kg/m  as calculated from the following:    Height as of this encounter: 1.93 m (6' 4\").    Weight as of this encounter: 122.9 kg (271 lb).           No follow-ups on file.    Rebel Knapp MD  [facility] ASHLEY Moya is a 87 year old accompanied by his spouse, presenting for the following health issues:  Hospital F/U      HPI   Pt is new to me. Was in hospital for 5 days earlier this month.  Sepsis with cellulitis and/or pneumonia as the source.    Pt also had episode of A fib with RVR while in hospital.  Notes reviewed including medication changes.     He has completed abx.  BP is stable off Amlodipine and tolerating Metoprolol well.           Hospital Follow-up Visit:    Hospital/Nursing Home/IP Rehab Facility: [facility]  Date of Admission: 9/1/22  Date of Discharge: 9/6/22  Reason(s) for Admission:   Septic shock (H)  Cellulitis of right lower leg  Atrial fibrillation with RVR (H)  Cellulitis of right lower extremity  Community acquired pneumonia, unspecified laterality    Was your hospitalization related to COVID-19? No   Problems taking medications regularly:  None  Medication changes since discharge: None  Problems adhering to non-medication therapy:  None    Summary of hospitalization:  [facility] discharge summary reviewed  Diagnostic Tests/Treatments reviewed.  Follow up needed: " "none  Other Healthcare Providers Involved in Patient s Care:         Cardiology  Update since discharge: improved.   Post Medication Reconciliation Status:        Plan of care communicated with patient       Review of Systems         Objective    /79 (BP Location: Right arm, Patient Position: Sitting, Cuff Size: Adult Regular)   Pulse 71   Temp 97.1  F (36.2  C)   Resp 24   Ht 1.93 m (6' 4\")   Wt 122.9 kg (271 lb)   SpO2 96%   BMI 32.99 kg/m    Body mass index is 32.99 kg/m .  Physical Exam   GEN NAD  CV RRR  CHEST CTA B  EXT:  Mild bipedal pitting edema.  RLE demarcation line from hospital visualized. No sign of persistent cellulitis.                      "

## 2022-09-13 LAB
ANION GAP SERPL CALCULATED.3IONS-SCNC: 9 MMOL/L (ref 3–14)
BUN SERPL-MCNC: 17 MG/DL (ref 7–30)
CALCIUM SERPL-MCNC: 9.6 MG/DL (ref 8.5–10.1)
CHLORIDE BLD-SCNC: 105 MMOL/L (ref 94–109)
CO2 SERPL-SCNC: 24 MMOL/L (ref 20–32)
CREAT SERPL-MCNC: 0.71 MG/DL (ref 0.66–1.25)
GFR SERPL CREATININE-BSD FRML MDRD: 89 ML/MIN/1.73M2
GLUCOSE BLD-MCNC: 186 MG/DL (ref 70–99)
POTASSIUM BLD-SCNC: 4.2 MMOL/L (ref 3.4–5.3)
SODIUM SERPL-SCNC: 138 MMOL/L (ref 133–144)

## 2022-09-19 DIAGNOSIS — G89.4 CHRONIC PAIN SYNDROME: ICD-10-CM

## 2022-09-19 DIAGNOSIS — M53.3 SACROILIAC JOINT PAIN: ICD-10-CM

## 2022-09-19 RX ORDER — GABAPENTIN 300 MG/1
900 CAPSULE ORAL 2 TIMES DAILY
Qty: 180 CAPSULE | Refills: 11 | Status: SHIPPED | OUTPATIENT
Start: 2022-09-19 | End: 2022-11-02

## 2022-10-03 DIAGNOSIS — E11.59 TYPE 2 DIABETES MELLITUS WITH VASCULAR DISEASE (H): ICD-10-CM

## 2022-10-03 RX ORDER — LANCETS
EACH MISCELLANEOUS
Qty: 100 EACH | Refills: 3 | Status: SHIPPED | OUTPATIENT
Start: 2022-10-03 | End: 2023-12-26

## 2022-10-13 NOTE — PROGRESS NOTES
Primary Cardiologist: Dr. Hernandez    Reason for Visit: 6 month follow up with labs (BMP, lipid panel, and ALT)    History of Present Illness:   Griffin is a pleasant 87 year old male who with past medical history is notable CAD (CABG in 1991 LIMA to LAD, SVG to OM1 and OM2, SANDI to RCA), brief PAF (in the setting of sepsis), recent admission for sepsis, hypertension, mixed hyperlipidemia, PENELOPE (uses CPAP regularly), chronic HFpEF, mild aortic root dilatation/borderline aortic dilatation, mild aortic stenosis, Type 2 diabetes mellitus, and osteoarthritis.     Griffin was admitted recently with sepsis and cardiology was consulted for new onset of atrial fibrillation. He converted spontaneously to NSR. Dr. Maya who saw him in consultation recommended conservative management with discharge on 30-day event monitor. His recent echocardiogram showed preserved biventricular function with no significant valve issues with the exception of mild aortic stenosis. His left and right atrial sizes were normal and aortic dimensions were stable. He had trivial troponin elevation but this was felt to be secondary to demand ischemia.     Griffin presents today with his spouse stating he is doing well. He denies palpitations, peripheral edema, orthopnea, PND, SOB, or CP. His cellulitis has improved and he has no fever/chills.     Assessment and Plan:  Griffin is a pleasant 87 year old male who with past medical history is notable CAD (CABG in 1991 LIMA to LAD, SVG to OM1 and OM2, SANDI to RCA), brief PAF (in the setting of sepsis), recent admission for sepsis, hypertension, mixed hyperlipidemia, PENELOPE (uses CPAP regularly), chronic HFpEF, mild aortic root dilatation/borderline aortic dilatation, mild aortic stenosis, Type 2 diabetes mellitus, and osteoarthritis.     Griffin's event monitor shows no evidence of recurrent atrial fibrillation. He is tolerating metoprolol succinate with no side effects. His BP is controlled.     His recent BMP, lipid  panel, and ALT are all unremarkable.     We will have Griffin return to our clinic in 6 month for follow up.     50 minutes spent on the date of the encounter with chart review, patient visit, care coordination, and documentation.    This note was completed in part using Dragon voice recognition software. Although reviewed after completion, some word and grammatical errors may occur.    Orders this Visit:  No orders of the defined types were placed in this encounter.    No orders of the defined types were placed in this encounter.    There are no discontinued medications.      No diagnosis found.    CURRENT MEDICATIONS:  Current Outpatient Medications   Medication Sig Dispense Refill     acetaminophen (ACETAMIN) 500 MG tablet Take 1,000 mg by mouth 2 times daily       aspirin 81 MG EC tablet Take 81 mg by mouth daily        blood glucose (ONETOUCH ULTRA) test strip USE TO TEST BLOOD SUGAR  TWICE DAILY OR AS DIRECTED. DUE FOR APPOINTMENT. PLEASE SCHEDULE: [phone] 200 strip 3     blood glucose monitoring (ONE TOUCH ULTRASOFT) lancets USE TO TEST BLOOD SUGAR  TWICE DAILY OR AS DIRECTED 100 each 3     busPIRone (BUSPAR) 5 MG tablet Take 1 tablet (5 mg) by mouth 3 times daily 90 tablet 4     Cholecalciferol (VITAMIN D) 2000 UNIT tablet Take 2,000 Units by mouth daily. 90 tablet 3     finasteride (PROSCAR) 5 MG tablet TAKE 1 TABLET BY MOUTH  DAILY 90 tablet 2     furosemide (LASIX) 40 MG tablet Take 1 tablet (40 mg) by mouth daily 90 tablet 3     gabapentin (NEURONTIN) 300 MG capsule Take 3 capsules (900 mg) by mouth 2 times daily - Fasting physical due for further refills 180 capsule 11     lisinopril (ZESTRIL) 10 MG tablet Take 1 tablet (10 mg) by mouth daily 90 tablet 3     metFORMIN (GLUCOPHAGE XR) 500 MG 24 hr tablet TAKE 2 TABLETS BY MOUTH  TWICE DAILY WITH MEALS 360 tablet 3     metoprolol succinate ER (TOPROL XL) 25 MG 24 hr tablet Take 1 tablet (25 mg) by mouth daily 90 tablet 3     multivitamin (OCUVITE) TABS  tablet Take 1 tablet by mouth daily       omeprazole (PRILOSEC) 20 MG DR capsule Take 1 capsule (20 mg) by mouth daily 90 capsule 3     ORDER FOR DME Uses Cpap machine for sleep apnea       potassium chloride (KLOR-CON) 20 MEQ packet Take 20 mEq by mouth 3 times daily (Patient taking differently: Take 20 mEq by mouth 2 times daily) 180 packet 3     pravastatin (PRAVACHOL) 20 MG tablet Take 1 tablet (20 mg) by mouth daily - due for fasting physical with Dr Hodge for refills (Patient taking differently: Take 20 mg by mouth every evening - due for fasting physical with Dr Hodge for refills) 90 tablet 3     Probiotic Product (PROBIOTIC PO) Take 1 capsule by mouth daily       tamsulosin (FLOMAX) 0.4 MG capsule TAKE 1 CAPSULE BY MOUTH  DAILY (Patient taking differently: every evening) 90 capsule 2     vitamin B-12 (CYANOCOBALAMIN) 1000 MCG tablet Take 1,000 mcg by mouth daily          ALLERGIES     Allergies   Allergen Reactions     No Known Allergies        PAST MEDICAL HISTORY:  Past Medical History:   Diagnosis Date     Anxiety state, unspecified      Aortic root dilatation (H)      Arthritis      Ascending aorta dilatation (H)      Basal cell cancer     s/p Mohs nose and bridge of nose on R.     Bunion      CAD (coronary artery disease)     CABG 1992: LIMA to LAD, SANDI to RCA, SVG to OM1 and OM2     Contact dermatitis and other eczema, due to unspecified cause     eczema - has light treatments with Dr. Rivera     Disorders of bursae and tendons in shoulder region, unspecified      Esophageal reflux      Essential hypertension, benign      Gallbladder disease      GERD (gastroesophageal reflux disease)      Heart attack (H)      Hyperlipidemia LDL goal <70      Left ventricular diastolic dysfunction      Low HDL (under 40) 3/28/2014     Nasal/sinus dis NEC     s/p surgery in 1995     Obesity      Osteoarthrosis, unspecified whether generalized or localized, shoulder region      Other hammer toe (acquired)      Sleep  apnea     USES CPAP     Spinal stenosis, unspecified region other than cervical     MRI done 2006     Type 2 diabetes, HbA1c goal < 7% (H) 3/12/2015     Urge incontinence        PAST SURGICAL HISTORY:  Past Surgical History:   Procedure Laterality Date     ABDOMEN SURGERY  1994    gall bladder     BIOPSY      arms     CHOLECYSTECTOMY  6/1994     COLONOSCOPY N/A 4/11/2017    Procedure: COMBINED COLONOSCOPY, SINGLE OR MULTIPLE BIOPSY/POLYPECTOMY BY BIOPSY;  Surgeon: Bladimir Garner MD;  Location:  GI     CV LEFT HEART CATH  5-92, 6-92    Angioplasty     ENT SURGERY  5/1995    sinus surgery     ESOPHAGOSCOPY, GASTROSCOPY, DUODENOSCOPY (EGD), DILATATION, COMBINED  7/17/2014    Procedure: COMBINED ESOPHAGOSCOPY, GASTROSCOPY, DUODENOSCOPY (EGD), DILATATION;  Surgeon: Bladimir Garner MD;  Location:  GI     EYE SURGERY      cataracts removed both eyes     INJECT EPIDURAL LUMBAR       IR PICC REPOSITION RIGHT  9/1/2022     LAMINECTOMY LUMBAR TWO LEVELS N/A 4/29/2015    Procedure: LAMINECTOMY LUMBAR TWO LEVELS;  Surgeon: Bladimir Keenan MD;  Location:  OR     THORACIC SURGERY  11/1992    bypass     Z CABG, ARTERY-VEIN, FOUR  11/1992    CABG - LIMA to left anterior descending and saphenous vein bypass graft to the first and second obtuse marginal branch of the circumflex and the right mammary to the right coronary artery     [facility] NONSPECIFIC PROCEDURE  6-94    Gail lap     [facility] NONSPECIFIC PROCEDURE  1995    sinus surgery     [facility] NONSPECIFIC PROCEDURE      bilateral cataract surgery     [facility] COLONOSCOPY THRU STOMA W BIOPSY/CAUTERY TUMOR/POLYP/LESION  5/2007       FAMILY HISTORY:  Family History   Problem Relation Age of Onset     Cancer Brother         MELANOMA     Diabetes Mother         AODM     Thyroid Disease Mother      Diabetes Father         AODM     Myocardial Infarction Father      Cerebrovascular Disease Brother      Parkinsonism Sister      Family History Negative Son      Family History Negative Son       Family History Negative Son      Family History Negative Daughter      Coronary Artery Disease Brother         dod 2019     Cerebrovascular Disease Brother         dod 2019     Other Cancer Brother         dod 2000/melanoma     Breast Cancer Other         in remission reg chkups       SOCIAL HISTORY:  Social History     Socioeconomic History     Marital status:      Spouse name: Anastasia Caballero     Number of children: 4     Years of education: 14   Occupational History     Occupation: retired     Comment:    Tobacco Use     Smoking status: Former     Packs/day: 2.00     Years: 30.00     Pack years: 60.00     Types: Cigarettes, Cigars, Pipe     Start date: 1954     Quit date: 3/1/1992     Years since quittin.6     Smokeless tobacco: Never     Tobacco comments:     quit in    Substance and Sexual Activity     Alcohol use: Yes     Comment: minimal less than 1/week     Drug use: No     Sexual activity: Not Currently     Partners: Female     Birth control/protection: Abstinence, Pull-out method, Condom, Post-menopausal, Natural Family Planning, None   Other Topics Concern      Service Yes     Comment: [employer], served in [employer]     Blood Transfusions Yes     Comment: Unsure if he had one with heart surgery     Caffeine Concern Yes     Comment: occ cofee     Occupational Exposure No     Hobby Hazards No     Sleep Concern Yes     Comment: CPAP     Stress Concern No     Special Diet No     Exercise Yes     Comment: [employer] 3 times a week, biking walking.      Bike Helmet Yes     Seat Belt Yes     Self-Exams Yes     Comment: does HIRAL about once a month.     Parent/sibling w/ CABG, MI or angioplasty before 65F 55M? No   Social History Narrative        4 kids       Review of Systems:  Skin:        Eyes:       ENT:       Respiratory:       Cardiovascular:       Gastroenterology:      Genitourinary:       Musculoskeletal:       Neurologic:       Psychiatric:       Heme/Lymph/Imm:        Endocrine:         Physical Exam:  Vitals: There were no vitals taken for this visit.     GEN:  NAD  NECK: No JVD  C/V:  Regular rate and rhythm, no murmur, rub or gallop.  RESP: Clear to auscultation bilaterally without wheezing, rales, or rhonchi.  GI: Abdomen soft, nontender, nondistended.   EXTREM: No pitting LE edema.   NEURO: Alert and oriented, cooperative. No obvious focal deficits.   PSYCH: Normal affect.  SKIN: Warm and dry.       Recent Lab Results:  LIPID RESULTS:  Lab Results   Component Value Date    CHOL 127 07/14/2022    CHOL 116 06/25/2021    HDL 38 (L) 07/14/2022    HDL 38 (L) 06/25/2021    LDL 54 07/14/2022    LDL 46 06/25/2021    TRIG 173 (H) 07/14/2022    TRIG 158 (H) 06/25/2021    CHOLHDLRATIO 3.6 11/12/2015       LIVER ENZYME RESULTS:  Lab Results   Component Value Date    AST 31 07/14/2022    AST 32 06/25/2021    ALT 19 07/14/2022    ALT 26 06/25/2021       CBC RESULTS:  Lab Results   Component Value Date    WBC 9.6 09/12/2022    WBC 7.2 06/25/2021    RBC 4.46 09/12/2022    RBC 4.48 06/25/2021    HGB 12.6 (L) 09/12/2022    HGB 13.0 (L) 06/25/2021    HCT 39.1 (L) 09/12/2022    HCT 38.9 (L) 06/25/2021    MCV 88 09/12/2022    MCV 87 06/25/2021    MCH 28.3 09/12/2022    MCH 29.0 06/25/2021    MCHC 32.2 09/12/2022    MCHC 33.4 06/25/2021    RDW 14.0 09/12/2022    RDW 15.0 06/25/2021     09/12/2022     06/25/2021       BMP RESULTS:  Lab Results   Component Value Date     09/12/2022     06/25/2021    POTASSIUM 4.2 09/12/2022    POTASSIUM 3.7 06/25/2021    CHLORIDE 105 09/12/2022    CHLORIDE 108 06/25/2021    CO2 24 09/12/2022    CO2 28 06/25/2021    ANIONGAP 9 09/12/2022    ANIONGAP 5 06/25/2021     (H) 09/12/2022     (H) 06/25/2021    BUN 17 09/12/2022    BUN 15 06/25/2021    CR 0.71 09/12/2022    CR 0.72 06/25/2021    GFRESTIMATED 89 09/12/2022    GFRESTIMATED 85 06/25/2021    GFRESTBLACK >90 06/25/2021    BRANDON 9.6 09/12/2022    BRANDON 9.1 06/25/2021         A1C RESULTS:  Lab Results   Component Value Date    A1C 7.8 (H) 09/02/2022    A1C 6.6 (H) 06/25/2021       INR RESULTS:  Lab Results   Component Value Date    INR 1.10 08/19/2014           Lyndsey Cameron PA-C  October 13, 2022

## 2022-10-14 ENCOUNTER — OFFICE VISIT (OUTPATIENT)
Dept: CARDIOLOGY | Facility: CLINIC | Age: 87
End: 2022-10-14
Payer: COMMERCIAL

## 2022-10-14 ENCOUNTER — LAB (OUTPATIENT)
Dept: LAB | Facility: CLINIC | Age: 87
End: 2022-10-14
Payer: COMMERCIAL

## 2022-10-14 VITALS
BODY MASS INDEX: 33.42 KG/M2 | HEIGHT: 76 IN | DIASTOLIC BLOOD PRESSURE: 62 MMHG | OXYGEN SATURATION: 95 % | WEIGHT: 274.4 LBS | HEART RATE: 67 BPM | SYSTOLIC BLOOD PRESSURE: 124 MMHG

## 2022-10-14 DIAGNOSIS — Z95.1 S/P CABG (CORONARY ARTERY BYPASS GRAFT): ICD-10-CM

## 2022-10-14 DIAGNOSIS — I50.31 ACUTE DIASTOLIC HEART FAILURE (H): ICD-10-CM

## 2022-10-14 DIAGNOSIS — I25.10 CORONARY ARTERY DISEASE INVOLVING NATIVE CORONARY ARTERY OF NATIVE HEART WITHOUT ANGINA PECTORIS: ICD-10-CM

## 2022-10-14 DIAGNOSIS — I10 BENIGN ESSENTIAL HYPERTENSION: ICD-10-CM

## 2022-10-14 DIAGNOSIS — Z86.79 HISTORY OF ATRIAL FIBRILLATION: Primary | ICD-10-CM

## 2022-10-14 DIAGNOSIS — I77.810 AORTIC ROOT DILATATION (H): ICD-10-CM

## 2022-10-14 DIAGNOSIS — E78.6 HDL DEFICIENCY: ICD-10-CM

## 2022-10-14 DIAGNOSIS — I10 ESSENTIAL HYPERTENSION, BENIGN: ICD-10-CM

## 2022-10-14 LAB
ALT SERPL W P-5'-P-CCNC: 16 U/L (ref 0–70)
ANION GAP SERPL CALCULATED.3IONS-SCNC: 7 MMOL/L (ref 3–14)
BUN SERPL-MCNC: 13 MG/DL (ref 7–30)
CALCIUM SERPL-MCNC: 9.7 MG/DL (ref 8.5–10.1)
CHLORIDE BLD-SCNC: 103 MMOL/L (ref 94–109)
CHOLEST SERPL-MCNC: 119 MG/DL
CO2 SERPL-SCNC: 29 MMOL/L (ref 20–32)
CREAT SERPL-MCNC: 0.71 MG/DL (ref 0.66–1.25)
FASTING STATUS PATIENT QL REPORTED: YES
GFR SERPL CREATININE-BSD FRML MDRD: 89 ML/MIN/1.73M2
GLUCOSE BLD-MCNC: 195 MG/DL (ref 70–99)
HDLC SERPL-MCNC: 38 MG/DL
LDLC SERPL CALC-MCNC: 40 MG/DL
NONHDLC SERPL-MCNC: 81 MG/DL
POTASSIUM BLD-SCNC: 3.9 MMOL/L (ref 3.4–5.3)
SODIUM SERPL-SCNC: 139 MMOL/L (ref 133–144)
TRIGL SERPL-MCNC: 205 MG/DL

## 2022-10-14 PROCEDURE — 80048 BASIC METABOLIC PNL TOTAL CA: CPT | Performed by: INTERNAL MEDICINE

## 2022-10-14 PROCEDURE — 84460 ALANINE AMINO (ALT) (SGPT): CPT | Performed by: INTERNAL MEDICINE

## 2022-10-14 PROCEDURE — 36415 COLL VENOUS BLD VENIPUNCTURE: CPT | Performed by: INTERNAL MEDICINE

## 2022-10-14 PROCEDURE — 99215 OFFICE O/P EST HI 40 MIN: CPT | Performed by: PHYSICIAN ASSISTANT

## 2022-10-14 PROCEDURE — 80061 LIPID PANEL: CPT | Performed by: INTERNAL MEDICINE

## 2022-10-14 NOTE — LETTER
10/14/2022    Hamzah Hodge MD  [address]  [place] [address]    RE: Austen Fowler       Dear Colleague,     I had the pleasure of seeing Austen Fowler in the [facility].  Primary Cardiologist: Dr. Hernandez    Reason for Visit: 6 month follow up with labs (BMP, lipid panel, and ALT)    History of Present Illness:   Griffin is a pleasant 87 year old male who with past medical history is notable CAD (CABG in 1991 LIMA to LAD, SVG to OM1 and OM2, SANDI to RCA), brief PAF (in the setting of sepsis), recent admission for sepsis, hypertension, mixed hyperlipidemia, PENELOPE (uses CPAP regularly), chronic HFpEF, mild aortic root dilatation/borderline aortic dilatation, mild aortic stenosis, Type 2 diabetes mellitus, and osteoarthritis.     Griffin was admitted recently with sepsis and cardiology was consulted for new onset of atrial fibrillation. He converted spontaneously to NSR. Dr. Maya who saw him in consultation recommended conservative management with discharge on 30-day event monitor. His recent echocardiogram showed preserved biventricular function with no significant valve issues with the exception of mild aortic stenosis. His left and right atrial sizes were normal and aortic dimensions were stable. He had trivial troponin elevation but this was felt to be secondary to demand ischemia.     Griffin presents today with his spouse stating he is doing well. He denies palpitations, peripheral edema, orthopnea, PND, SOB, or CP. His cellulitis has improved and he has no fever/chills.     Assessment and Plan:  Griffin is a pleasant 87 year old male who with past medical history is notable CAD (CABG in 1991 LIMA to LAD, SVG to OM1 and OM2, SANDI to RCA), brief PAF (in the setting of sepsis), recent admission for sepsis, hypertension, mixed hyperlipidemia, PENELOPE (uses CPAP regularly), chronic HFpEF, mild aortic root dilatation/borderline aortic dilatation, mild aortic stenosis, Type 2 diabetes  mellitus, and osteoarthritis.     Griffin's event monitor shows no evidence of recurrent atrial fibrillation. He is tolerating metoprolol succinate with no side effects. His BP is controlled.     His recent BMP, lipid panel, and ALT are all unremarkable.     We will have Griffin return to our clinic in 6 month for follow up.     50 minutes spent on the date of the encounter with chart review, patient visit, care coordination, and documentation.    This note was completed in part using Dragon voice recognition software. Although reviewed after completion, some word and grammatical errors may occur.    Orders this Visit:  No orders of the defined types were placed in this encounter.    No orders of the defined types were placed in this encounter.    There are no discontinued medications.      No diagnosis found.    CURRENT MEDICATIONS:  Current Outpatient Medications   Medication Sig Dispense Refill     acetaminophen (ACETAMIN) 500 MG tablet Take 1,000 mg by mouth 2 times daily       aspirin 81 MG EC tablet Take 81 mg by mouth daily        blood glucose (ONETOUCH ULTRA) test strip USE TO TEST BLOOD SUGAR  TWICE DAILY OR AS DIRECTED. DUE FOR APPOINTMENT. PLEASE SCHEDULE: [phone] 200 strip 3     blood glucose monitoring (ONE TOUCH ULTRASOFT) lancets USE TO TEST BLOOD SUGAR  TWICE DAILY OR AS DIRECTED 100 each 3     busPIRone (BUSPAR) 5 MG tablet Take 1 tablet (5 mg) by mouth 3 times daily 90 tablet 4     Cholecalciferol (VITAMIN D) 2000 UNIT tablet Take 2,000 Units by mouth daily. 90 tablet 3     finasteride (PROSCAR) 5 MG tablet TAKE 1 TABLET BY MOUTH  DAILY 90 tablet 2     furosemide (LASIX) 40 MG tablet Take 1 tablet (40 mg) by mouth daily 90 tablet 3     gabapentin (NEURONTIN) 300 MG capsule Take 3 capsules (900 mg) by mouth 2 times daily - Fasting physical due for further refills 180 capsule 11     lisinopril (ZESTRIL) 10 MG tablet Take 1 tablet (10 mg) by mouth daily 90 tablet 3     metFORMIN (GLUCOPHAGE XR) 500  MG 24 hr tablet TAKE 2 TABLETS BY MOUTH  TWICE DAILY WITH MEALS 360 tablet 3     metoprolol succinate ER (TOPROL XL) 25 MG 24 hr tablet Take 1 tablet (25 mg) by mouth daily 90 tablet 3     multivitamin (OCUVITE) TABS tablet Take 1 tablet by mouth daily       omeprazole (PRILOSEC) 20 MG DR capsule Take 1 capsule (20 mg) by mouth daily 90 capsule 3     ORDER FOR DME Uses Cpap machine for sleep apnea       potassium chloride (KLOR-CON) 20 MEQ packet Take 20 mEq by mouth 3 times daily (Patient taking differently: Take 20 mEq by mouth 2 times daily) 180 packet 3     pravastatin (PRAVACHOL) 20 MG tablet Take 1 tablet (20 mg) by mouth daily - due for fasting physical with Dr Hodge for refills (Patient taking differently: Take 20 mg by mouth every evening - due for fasting physical with Dr Hodge for refills) 90 tablet 3     Probiotic Product (PROBIOTIC PO) Take 1 capsule by mouth daily       tamsulosin (FLOMAX) 0.4 MG capsule TAKE 1 CAPSULE BY MOUTH  DAILY (Patient taking differently: every evening) 90 capsule 2     vitamin B-12 (CYANOCOBALAMIN) 1000 MCG tablet Take 1,000 mcg by mouth daily          ALLERGIES     Allergies   Allergen Reactions     No Known Allergies        PAST MEDICAL HISTORY:  Past Medical History:   Diagnosis Date     Anxiety state, unspecified      Aortic root dilatation (H)      Arthritis      Ascending aorta dilatation (H)      Basal cell cancer     s/p Mohs nose and bridge of nose on R.     Bunion      CAD (coronary artery disease)     CABG 1992: LIMA to LAD, SANDI to RCA, SVG to OM1 and OM2     Contact dermatitis and other eczema, due to unspecified cause     eczema - has light treatments with Dr. Rivera     Disorders of bursae and tendons in shoulder region, unspecified      Esophageal reflux      Essential hypertension, benign      Gallbladder disease      GERD (gastroesophageal reflux disease)      Heart attack (H)      Hyperlipidemia LDL goal <70      Left ventricular diastolic dysfunction       Low HDL (under 40) 3/28/2014     Nasal/sinus dis NEC     s/p surgery in 1995     Obesity      Osteoarthrosis, unspecified whether generalized or localized, shoulder region      Other hammer toe (acquired)      Sleep apnea     USES CPAP     Spinal stenosis, unspecified region other than cervical     MRI done 2006     Type 2 diabetes, HbA1c goal < 7% (H) 3/12/2015     Urge incontinence        PAST SURGICAL HISTORY:  Past Surgical History:   Procedure Laterality Date     ABDOMEN SURGERY  1994    gall bladder     BIOPSY      arms     CHOLECYSTECTOMY  6/1994     COLONOSCOPY N/A 4/11/2017    Procedure: COMBINED COLONOSCOPY, SINGLE OR MULTIPLE BIOPSY/POLYPECTOMY BY BIOPSY;  Surgeon: Bladimir Garner MD;  Location:  GI     CV LEFT HEART CATH  5-92, 6-92    Angioplasty     ENT SURGERY  5/1995    sinus surgery     ESOPHAGOSCOPY, GASTROSCOPY, DUODENOSCOPY (EGD), DILATATION, COMBINED  7/17/2014    Procedure: COMBINED ESOPHAGOSCOPY, GASTROSCOPY, DUODENOSCOPY (EGD), DILATATION;  Surgeon: Bladimir Garner MD;  Location:  GI     EYE SURGERY      cataracts removed both eyes     INJECT EPIDURAL LUMBAR       IR PICC REPOSITION RIGHT  9/1/2022     LAMINECTOMY LUMBAR TWO LEVELS N/A 4/29/2015    Procedure: LAMINECTOMY LUMBAR TWO LEVELS;  Surgeon: Bladimir Keenan MD;  Location:  OR     THORACIC SURGERY  11/1992    bypass     RUST CABG, ARTERY-VEIN, FOUR  11/1992    CABG - LIMA to left anterior descending and saphenous vein bypass graft to the first and second obtuse marginal branch of the circumflex and the right mammary to the right coronary artery     RUST NONSPECIFIC PROCEDURE  6-94    Gail lap     RUST NONSPECIFIC PROCEDURE  1995    sinus surgery     RUST NONSPECIFIC PROCEDURE      bilateral cataract surgery     [facility]CAUTERY TUMOR/POLYP/LESION  5/2007       FAMILY HISTORY:  Family History   Problem Relation Age of Onset     Cancer Brother         MELANOMA     Diabetes Mother         AODM      Thyroid Disease Mother      Diabetes Father         AODM     Myocardial Infarction Father      Cerebrovascular Disease Brother      Parkinsonism Sister      Family History Negative Son      Family History Negative Son      Family History Negative Son      Family History Negative Daughter      Coronary Artery Disease Brother         dod 2019     Cerebrovascular Disease Brother         dod 2019     Other Cancer Brother         dod 2000/melanoma     Breast Cancer Other         in remission reg chkups       SOCIAL HISTORY:  Social History     Socioeconomic History     Marital status:      Spouse name: Anastasia Caballero     Number of children: 4     Years of education: 14   Occupational History     Occupation: retired     Comment:    Tobacco Use     Smoking status: Former     Packs/day: 2.00     Years: 30.00     Pack years: 60.00     Types: Cigarettes, Cigars, Pipe     Start date: 1954     Quit date: 3/1/1992     Years since quittin.6     Smokeless tobacco: Never     Tobacco comments:     quit in    Substance and Sexual Activity     Alcohol use: Yes     Comment: minimal less than 1/week     Drug use: No     Sexual activity: Not Currently     Partners: Female     Birth control/protection: Abstinence, Pull-out method, Condom, Post-menopausal, Natural Family Planning, None   Other Topics Concern      Service Yes     Comment: [employer], served in [employer]     Blood Transfusions Yes     Comment: Unsure if he had one with heart surgery     Caffeine Concern Yes     Comment: occ cofee     Occupational Exposure No     Hobby Hazards No     Sleep Concern Yes     Comment: CPAP     Stress Concern No     Special Diet No     Exercise Yes     Comment: [employer] 3 times a week, biking walking.      Bike Helmet Yes     Seat Belt Yes     Self-Exams Yes     Comment: does HIRAL about once a month.     Parent/sibling w/ CABG, MI or angioplasty before 65F 55M? No   Social History Narrative        4 kids        Review of Systems:  Skin:        Eyes:       ENT:       Respiratory:       Cardiovascular:       Gastroenterology:      Genitourinary:       Musculoskeletal:       Neurologic:       Psychiatric:       Heme/Lymph/Imm:       Endocrine:         Physical Exam:  Vitals: There were no vitals taken for this visit.     GEN:  NAD  NECK: No JVD  C/V:  Regular rate and rhythm, no murmur, rub or gallop.  RESP: Clear to auscultation bilaterally without wheezing, rales, or rhonchi.  GI: Abdomen soft, nontender, nondistended.   EXTREM: No pitting LE edema.   NEURO: Alert and oriented, cooperative. No obvious focal deficits.   PSYCH: Normal affect.  SKIN: Warm and dry.       Recent Lab Results:  LIPID RESULTS:  Lab Results   Component Value Date    CHOL 127 07/14/2022    CHOL 116 06/25/2021    HDL 38 (L) 07/14/2022    HDL 38 (L) 06/25/2021    LDL 54 07/14/2022    LDL 46 06/25/2021    TRIG 173 (H) 07/14/2022    TRIG 158 (H) 06/25/2021    CHOLHDLRATIO 3.6 11/12/2015       LIVER ENZYME RESULTS:  Lab Results   Component Value Date    AST 31 07/14/2022    AST 32 06/25/2021    ALT 19 07/14/2022    ALT 26 06/25/2021       CBC RESULTS:  Lab Results   Component Value Date    WBC 9.6 09/12/2022    WBC 7.2 06/25/2021    RBC 4.46 09/12/2022    RBC 4.48 06/25/2021    HGB 12.6 (L) 09/12/2022    HGB 13.0 (L) 06/25/2021    HCT 39.1 (L) 09/12/2022    HCT 38.9 (L) 06/25/2021    MCV 88 09/12/2022    MCV 87 06/25/2021    MCH 28.3 09/12/2022    MCH 29.0 06/25/2021    MCHC 32.2 09/12/2022    MCHC 33.4 06/25/2021    RDW 14.0 09/12/2022    RDW 15.0 06/25/2021     09/12/2022     06/25/2021       BMP RESULTS:  Lab Results   Component Value Date     09/12/2022     06/25/2021    POTASSIUM 4.2 09/12/2022    POTASSIUM 3.7 06/25/2021    CHLORIDE 105 09/12/2022    CHLORIDE 108 06/25/2021    CO2 24 09/12/2022    CO2 28 06/25/2021    ANIONGAP 9 09/12/2022    ANIONGAP 5 06/25/2021     (H) 09/12/2022     (H) 06/25/2021     BUN 17 09/12/2022    BUN 15 06/25/2021    CR 0.71 09/12/2022    CR 0.72 06/25/2021    GFRESTIMATED 89 09/12/2022    GFRESTIMATED 85 06/25/2021    GFRESTBLACK >90 06/25/2021    BRANDON 9.6 09/12/2022    BRANDON 9.1 06/25/2021        A1C RESULTS:  Lab Results   Component Value Date    A1C 7.8 (H) 09/02/2022    A1C 6.6 (H) 06/25/2021       INR RESULTS:  Lab Results   Component Value Date    INR 1.10 08/19/2014       Lyndsey Cameron PA-C  October 13, 2022     Thank you for allowing me to participate in the care of your patient.      Sincerely,     Lyndsey Cameron PA-C     [facility]  cc:   Arianna Chen MD  [address] [id number]

## 2022-11-01 DIAGNOSIS — G89.4 CHRONIC PAIN SYNDROME: ICD-10-CM

## 2022-11-01 DIAGNOSIS — M53.3 SACROILIAC JOINT PAIN: ICD-10-CM

## 2022-11-02 RX ORDER — GABAPENTIN 300 MG/1
900 CAPSULE ORAL 2 TIMES DAILY
Qty: 180 CAPSULE | Refills: 11 | Status: SHIPPED | OUTPATIENT
Start: 2022-11-02 | End: 2023-05-09

## 2022-11-30 ENCOUNTER — TELEPHONE (OUTPATIENT)
Dept: PHARMACY | Facility: CLINIC | Age: 87
End: 2022-11-30

## 2022-11-30 NOTE — TELEPHONE ENCOUNTER
We have been unable to reach this patient for MTM follow-up after several attempts. We will stop reaching out to the patient at this time. Please let us know if we can assist in this patient's care in the future.    Routing to PCP as Andrey AnguianoD, [facility]  Medication Therapy Management Provider  Pager: [phone]

## 2022-12-09 ASSESSMENT — ANXIETY QUESTIONNAIRES
3. WORRYING TOO MUCH ABOUT DIFFERENT THINGS: SEVERAL DAYS
7. FEELING AFRAID AS IF SOMETHING AWFUL MIGHT HAPPEN: NOT AT ALL
6. BECOMING EASILY ANNOYED OR IRRITABLE: SEVERAL DAYS
IF YOU CHECKED OFF ANY PROBLEMS ON THIS QUESTIONNAIRE, HOW DIFFICULT HAVE THESE PROBLEMS MADE IT FOR YOU TO DO YOUR WORK, TAKE CARE OF THINGS AT HOME, OR GET ALONG WITH OTHER PEOPLE: SOMEWHAT DIFFICULT
4. TROUBLE RELAXING: SEVERAL DAYS
5. BEING SO RESTLESS THAT IT IS HARD TO SIT STILL: NOT AT ALL
1. FEELING NERVOUS, ANXIOUS, OR ON EDGE: SEVERAL DAYS
2. NOT BEING ABLE TO STOP OR CONTROL WORRYING: SEVERAL DAYS
GAD7 TOTAL SCORE: 5

## 2023-01-01 NOTE — PLAN OF CARE
Cognitive Concerns/ Orientation : A&Ox4. Forgetful at times.   BEHAVIOR & AGGRESSION TOOL COLOR: Green  CIWA SCORE: NA   ABNL VS/O2: VSS on RA, FINN  MOBILITY: up with assist of 2, repositions self in bed.  PAIN MANAGMENT: Chronic back and hip pain. Denies pain : on Scheduled Tylenol.   DIET: MOD CHO.   BOWEL/BLADDER: Continent, using urinal with assistance. Good urine output.   ABNL LAB/BG: , 140 Blood cultures positive for gram positive cocci in clusters, MD notified, new order for Vancomycin q12hrs  DRAIN/DEVICES: PIV in L hand to IVF. Intermittent Zosyn, Vanco  TELEMETRY RHYTHM: SR with BBB  SKIN: Red/warm on RLE. Wound on  L elbow and L forearm. Perineum reddended/moist in between folds. Miconazole applied  TESTS/PROCEDURES: none pending  D/C DAY/GOALS/PLACE: Pending improvement  OTHER IMPORTANT INFO: Elevate RLE as tolerated. Q4H CMS checks; intact. +2 edema on LLE, +3 edema on RLE.    Routine  care and anticipatory guidance

## 2023-01-09 ASSESSMENT — ANXIETY QUESTIONNAIRES
6. BECOMING EASILY ANNOYED OR IRRITABLE: SEVERAL DAYS
GAD7 TOTAL SCORE: 5
5. BEING SO RESTLESS THAT IT IS HARD TO SIT STILL: NOT AT ALL
7. FEELING AFRAID AS IF SOMETHING AWFUL MIGHT HAPPEN: NOT AT ALL
GAD7 TOTAL SCORE: 5
GAD7 TOTAL SCORE: 5
4. TROUBLE RELAXING: SEVERAL DAYS
2. NOT BEING ABLE TO STOP OR CONTROL WORRYING: SEVERAL DAYS
8. IF YOU CHECKED OFF ANY PROBLEMS, HOW DIFFICULT HAVE THESE MADE IT FOR YOU TO DO YOUR WORK, TAKE CARE OF THINGS AT HOME, OR GET ALONG WITH OTHER PEOPLE?: SOMEWHAT DIFFICULT
7. FEELING AFRAID AS IF SOMETHING AWFUL MIGHT HAPPEN: NOT AT ALL
3. WORRYING TOO MUCH ABOUT DIFFERENT THINGS: SEVERAL DAYS
IF YOU CHECKED OFF ANY PROBLEMS ON THIS QUESTIONNAIRE, HOW DIFFICULT HAVE THESE PROBLEMS MADE IT FOR YOU TO DO YOUR WORK, TAKE CARE OF THINGS AT HOME, OR GET ALONG WITH OTHER PEOPLE: SOMEWHAT DIFFICULT
1. FEELING NERVOUS, ANXIOUS, OR ON EDGE: SEVERAL DAYS

## 2023-01-09 NOTE — PROGRESS NOTES
"  Assessment & Plan     Lot of issues today, we talked about adding medications to Metformin  Perhaps GLP agonists or Jardiance  Discussed side effects also  Continue to check legs every day  Avoid cellulitis  Also, tremor is stable but we might have to reduce BuSpar  In addition his coronary disease is stable and anxiety is stable  Lymphedema in the legs is stable  Blood pressure is excellent  I have discussed with him about pain management  referral for local injections    ICD-10-CM    1. 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)  E11.59 Hemoglobin A1c     Albumin Random Urine Quantitative with Creat Ratio     Hemoglobin A1c     Albumin Random Urine Quantitative with Creat Ratio      2. Essential hypertension, benign  I10 BASIC METABOLIC PANEL     BASIC METABOLIC PANEL      3. Anxiety  F41.9       4. Coronary artery disease involving native coronary artery of native heart without angina pectoris  I25.10       5. Lymphedema  I89.0       6. Tremor  R25.1       7. SI (sacroiliac) joint dysfunction  M53.3 Pain Management  Referral                   BMI:   Estimated body mass index is 33.22 kg/m  as calculated from the following:    Height as of this encounter: 1.93 m (6' 4\").    Weight as of this encounter: 123.8 kg (272 lb 14.4 oz).         Return in about 4 months (around 5/10/2023) for DM, chronic pain.    Hamzah Hodge MD  [facility] ASHLEY Moya is a 87 year old, presenting for the following health issues:  Musculoskeletal Problem  patient is dealing with lot of back issues  Blood glucose is less than 190 on check each morning fasting  No recent cellulitis  No chest pain or shortness of breath     History of Present Illness       Back Pain:  He presents for follow up of back pain. Patient's back pain is a chronic problem.  Location of back pain:  Right lower back, left lower back, left side of neck, left buttock, left hip, right side of waist and left side " of waist  Description of back pain: dull ache and gnawing  Back pain spreads: right buttocks, left buttocks and left side of neck    Since patient first noticed back pain, pain is: always present, but gets better and worse  Does back pain interfere with his job:  Not applicable      Mental Health Follow-up:  Patient presents to follow-up on Anxiety.    Patient's anxiety since last visit has been:  No change  The patient is not having other symptoms associated with anxiety.  Any significant life events: health concerns  Patient is not feeling anxious or having panic attacks.  Patient has no concerns about alcohol or drug use.    Vascular Disease:  He presents for follow up of vascular disease.  He never takes nitroglycerin. He takes daily aspirin.    He eats 2-3 servings of fruits and vegetables daily.He consumes 3 sweetened beverage(s) daily.He exercises with enough effort to increase his heart rate 10 to 19 minutes per day.  He exercises with enough effort to increase his heart rate 3 or less days per week.   He is taking medications regularly.  Today's MARLA-7 Score: 5       Diabetes Follow-up    How often are you checking your blood sugar? One time daily  What time of day are you checking your blood sugars (select all that apply)?  Before meals  Have you had any blood sugars above 200?  No  Have you had any blood sugars below 70?  No    What symptoms do you notice when your blood sugar is low?  None    What concerns do you have today about your diabetes? None     Do you have any of these symptoms? (Select all that apply)  No numbness or tingling in feet.  No redness, sores or blisters on feet.  No complaints of excessive thirst.  No reports of blurry vision.  No significant changes to weight.      BP Readings from Last 2 Encounters:   01/10/23 134/81   10/14/22 124/62     Hemoglobin A1C (%)   Date Value   01/10/2023 7.6 (H)   09/02/2022 7.8 (H)   06/25/2021 6.6 (H)   04/01/2021 6.4 (H)     LDL Cholesterol  "Calculated (mg/dL)   Date Value   10/14/2022 40   07/14/2022 54   06/25/2021 46   03/02/2021 42             Review of Systems   10 point ROS of systems including Constitutional, Eyes, Respiratory, Cardiovascular, Gastroenterology, Genitourinary, Integumentary, Muscularskeletal, Psychiatric were all negative except for pertinent positives noted in my HPI.        Objective    /81 (BP Location: Right arm, Patient Position: Sitting, Cuff Size: Adult Large)   Pulse 60   Temp 96.9  F (36.1  C) (Temporal)   Resp 18   Ht 1.93 m (6' 4\")   Wt 123.8 kg (272 lb 14.4 oz)   SpO2 97%   BMI 33.22 kg/m    Body mass index is 33.22 kg/m .  Physical Exam   GENERAL: healthy, alert and no distress  NECK: no adenopathy, no asymmetry, masses, or scars and thyroid normal to palpation  RESP: lungs clear to auscultation - no rales, rhonchi or wheezes  CV: regular rate and rhythm, normal S1 S2, no S3 or S4, no murmur, click or rub, no peripheral edema and peripheral pulses strong  ABDOMEN: soft, nontender, no hepatosplenomegaly, no masses and bowel sounds normal  MS: no gross musculoskeletal defects noted, no edema  On neurological exam mild tremor of the hands  Lab Results   Component Value Date    A1C 7.8 09/02/2022    A1C 8.0 07/14/2022    A1C 7.1 04/18/2022    A1C 6.8 11/09/2021    A1C 6.6 06/25/2021    A1C 6.4 04/01/2021    A1C 6.3 12/07/2020    A1C 5.8 01/20/2020    A1C 6.5 10/08/2019     .  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     Cervicalgia     Benign neoplasm of skin of other and unspecified parts of face     Impacted cerumen     Infected sebaceous cyst     Anxiety     Chronic low back pain     Advanced directives, counseling/discussion     Lumbosacral radiculitis     PENELOPE (obstructive sleep apnea)     Low HDL (under 40)     Hyperlipidemia LDL goal " <70     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)     S/P lumbar laminectomy     Health Care Home     Coronary artery disease involving native coronary artery of native heart without angina pectoris     Left ventricular diastolic dysfunction     Non morbid obesity, unspecified obesity type     Neck pain     Cellulitis of right lower extremity     Hypoxia     Shortness of breath     Cellulitis     Gastroesophageal reflux disease with esophagitis     Sacroiliac joint pain     Dysphagia, unspecified type     Benign prostatic hyperplasia, unspecified whether lower urinary tract symptoms present     Cellulitis of right lower leg     Septic shock (H)     Current Outpatient Medications   Medication     acetaminophen (ACETAMIN) 500 MG tablet     aspirin 81 MG EC tablet     blood glucose (ONETOUCH ULTRA) test strip     blood glucose monitoring (ONE TOUCH ULTRASOFT) lancets     busPIRone (BUSPAR) 5 MG tablet     Cholecalciferol (VITAMIN D) 2000 UNIT tablet     finasteride (PROSCAR) 5 MG tablet     furosemide (LASIX) 40 MG tablet     gabapentin (NEURONTIN) 300 MG capsule     lisinopril (ZESTRIL) 10 MG tablet     metFORMIN (GLUCOPHAGE XR) 500 MG 24 hr tablet     metoprolol succinate ER (TOPROL XL) 25 MG 24 hr tablet     multivitamin (OCUVITE) TABS tablet     omeprazole (PRILOSEC) 20 MG DR capsule     ORDER FOR DME     potassium chloride (KLOR-CON) 20 MEQ packet     pravastatin (PRAVACHOL) 20 MG tablet     Probiotic Product (PROBIOTIC PO)     tamsulosin (FLOMAX) 0.4 MG capsule     vitamin B-12 (CYANOCOBALAMIN) 1000 MCG tablet     No current facility-administered medications for this visit.

## 2023-01-10 ENCOUNTER — OFFICE VISIT (OUTPATIENT)
Dept: FAMILY MEDICINE | Facility: CLINIC | Age: 88
End: 2023-01-10
Payer: COMMERCIAL

## 2023-01-10 VITALS
HEART RATE: 60 BPM | BODY MASS INDEX: 33.23 KG/M2 | DIASTOLIC BLOOD PRESSURE: 81 MMHG | RESPIRATION RATE: 18 BRPM | WEIGHT: 272.9 LBS | SYSTOLIC BLOOD PRESSURE: 134 MMHG | TEMPERATURE: 96.9 F | HEIGHT: 76 IN | OXYGEN SATURATION: 97 %

## 2023-01-10 DIAGNOSIS — I25.10 CORONARY ARTERY DISEASE INVOLVING NATIVE CORONARY ARTERY OF NATIVE HEART WITHOUT ANGINA PECTORIS: ICD-10-CM

## 2023-01-10 DIAGNOSIS — E11.59 TYPE 2 DIABETES MELLITUS WITH VASCULAR DISEASE (H): Primary | ICD-10-CM

## 2023-01-10 DIAGNOSIS — I89.0 LYMPHEDEMA: ICD-10-CM

## 2023-01-10 DIAGNOSIS — I10 ESSENTIAL HYPERTENSION, BENIGN: ICD-10-CM

## 2023-01-10 DIAGNOSIS — R25.1 TREMOR: ICD-10-CM

## 2023-01-10 DIAGNOSIS — F41.9 ANXIETY: ICD-10-CM

## 2023-01-10 DIAGNOSIS — M53.3 SI (SACROILIAC) JOINT DYSFUNCTION: ICD-10-CM

## 2023-01-10 PROBLEM — E66.01 MORBID OBESITY (H): Status: RESOLVED | Noted: 2021-05-14 | Resolved: 2023-01-10

## 2023-01-10 PROBLEM — I48.91 ATRIAL FIBRILLATION WITH RVR (H): Status: RESOLVED | Noted: 2022-09-01 | Resolved: 2023-01-10

## 2023-01-10 LAB
ANION GAP SERPL CALCULATED.3IONS-SCNC: 13 MMOL/L (ref 7–15)
BUN SERPL-MCNC: 12.7 MG/DL (ref 8–23)
CALCIUM SERPL-MCNC: 9.8 MG/DL (ref 8.8–10.2)
CHLORIDE SERPL-SCNC: 105 MMOL/L (ref 98–107)
CREAT SERPL-MCNC: 0.72 MG/DL (ref 0.67–1.17)
CREAT UR-MCNC: 80.8 MG/DL
DEPRECATED HCO3 PLAS-SCNC: 27 MMOL/L (ref 22–29)
GFR SERPL CREATININE-BSD FRML MDRD: 89 ML/MIN/1.73M2
GLUCOSE SERPL-MCNC: 187 MG/DL (ref 70–99)
HBA1C MFR BLD: 7.6 % (ref 0–5.6)
MICROALBUMIN UR-MCNC: <12 MG/L
MICROALBUMIN/CREAT UR: NORMAL MG/G{CREAT}
POTASSIUM SERPL-SCNC: 4.2 MMOL/L (ref 3.4–5.3)
SODIUM SERPL-SCNC: 145 MMOL/L (ref 136–145)

## 2023-01-10 PROCEDURE — 82570 ASSAY OF URINE CREATININE: CPT | Performed by: INTERNAL MEDICINE

## 2023-01-10 PROCEDURE — 83036 HEMOGLOBIN GLYCOSYLATED A1C: CPT | Performed by: INTERNAL MEDICINE

## 2023-01-10 PROCEDURE — 82043 UR ALBUMIN QUANTITATIVE: CPT | Performed by: INTERNAL MEDICINE

## 2023-01-10 PROCEDURE — 99214 OFFICE O/P EST MOD 30 MIN: CPT | Performed by: INTERNAL MEDICINE

## 2023-01-10 PROCEDURE — 36415 COLL VENOUS BLD VENIPUNCTURE: CPT | Performed by: INTERNAL MEDICINE

## 2023-01-10 PROCEDURE — 80048 BASIC METABOLIC PNL TOTAL CA: CPT | Performed by: INTERNAL MEDICINE

## 2023-01-10 ASSESSMENT — PAIN SCALES - GENERAL: PAINLEVEL: MILD PAIN (3)

## 2023-02-16 DIAGNOSIS — F41.9 ANXIETY: ICD-10-CM

## 2023-02-17 RX ORDER — BUSPIRONE HYDROCHLORIDE 5 MG/1
TABLET ORAL
Qty: 270 TABLET | Refills: 1 | Status: SHIPPED | OUTPATIENT
Start: 2023-02-17 | End: 2023-08-07

## 2023-02-20 ENCOUNTER — HOSPITAL ENCOUNTER (EMERGENCY)
Facility: CLINIC | Age: 88
Discharge: HOME | End: 2023-02-20
Attending: EMERGENCY MEDICINE | Admitting: EMERGENCY MEDICINE
Payer: COMMERCIAL

## 2023-02-20 ENCOUNTER — APPOINTMENT (OUTPATIENT)
Dept: GENERAL RADIOLOGY | Facility: CLINIC | Age: 88
End: 2023-02-20
Attending: EMERGENCY MEDICINE
Payer: COMMERCIAL

## 2023-02-20 VITALS
DIASTOLIC BLOOD PRESSURE: 74 MMHG | SYSTOLIC BLOOD PRESSURE: 120 MMHG | RESPIRATION RATE: 16 BRPM | OXYGEN SATURATION: 98 % | TEMPERATURE: 98.4 F | HEART RATE: 68 BPM

## 2023-02-20 DIAGNOSIS — R25.1 TREMOR: ICD-10-CM

## 2023-02-20 DIAGNOSIS — Z76.0 ENCOUNTER FOR MEDICATION REFILL: ICD-10-CM

## 2023-02-20 DIAGNOSIS — E87.20 LACTIC ACIDOSIS: ICD-10-CM

## 2023-02-20 LAB
ALBUMIN SERPL BCG-MCNC: 3.8 G/DL (ref 3.5–5.2)
ALBUMIN UR-MCNC: NEGATIVE MG/DL
ALP SERPL-CCNC: 63 U/L (ref 40–129)
ALT SERPL W P-5'-P-CCNC: 13 U/L (ref 10–50)
ANION GAP SERPL CALCULATED.3IONS-SCNC: 15 MMOL/L (ref 7–15)
APPEARANCE UR: CLEAR
AST SERPL W P-5'-P-CCNC: 29 U/L (ref 10–50)
ATRIAL RATE - MUSE: 64 BPM
BASE EXCESS BLDV CALC-SCNC: 2 MMOL/L (ref -7.7–1.9)
BASOPHILS # BLD AUTO: 0 10E3/UL (ref 0–0.2)
BASOPHILS NFR BLD AUTO: 1 %
BILIRUB SERPL-MCNC: 0.8 MG/DL
BILIRUB UR QL STRIP: NEGATIVE
BUN SERPL-MCNC: 12.5 MG/DL (ref 8–23)
CALCIUM SERPL-MCNC: 9.8 MG/DL (ref 8.8–10.2)
CHLORIDE SERPL-SCNC: 102 MMOL/L (ref 98–107)
COLOR UR AUTO: ABNORMAL
CREAT SERPL-MCNC: 0.66 MG/DL (ref 0.67–1.17)
DEPRECATED HCO3 PLAS-SCNC: 24 MMOL/L (ref 22–29)
DIASTOLIC BLOOD PRESSURE - MUSE: NORMAL MMHG
EOSINOPHIL # BLD AUTO: 0.1 10E3/UL (ref 0–0.7)
EOSINOPHIL NFR BLD AUTO: 2 %
ERYTHROCYTE [DISTWIDTH] IN BLOOD BY AUTOMATED COUNT: 14.4 % (ref 10–15)
FLUAV RNA SPEC QL NAA+PROBE: NEGATIVE
FLUBV RNA RESP QL NAA+PROBE: NEGATIVE
GFR SERPL CREATININE-BSD FRML MDRD: >90 ML/MIN/1.73M2
GLUCOSE SERPL-MCNC: 160 MG/DL (ref 70–99)
GLUCOSE UR STRIP-MCNC: >=1000 MG/DL
HCO3 BLDV-SCNC: 23 MMOL/L (ref 21–28)
HCT VFR BLD AUTO: 35.5 % (ref 40–53)
HGB BLD-MCNC: 11.3 G/DL (ref 13.3–17.7)
HGB UR QL STRIP: NEGATIVE
IMM GRANULOCYTES # BLD: 0 10E3/UL
IMM GRANULOCYTES NFR BLD: 1 %
INTERPRETATION ECG - MUSE: NORMAL
KETONES UR STRIP-MCNC: 20 MG/DL
LACTATE SERPL-SCNC: 1.9 MMOL/L (ref 0.7–2)
LACTATE SERPL-SCNC: 2.7 MMOL/L (ref 0.7–2)
LEUKOCYTE ESTERASE UR QL STRIP: NEGATIVE
LYMPHOCYTES # BLD AUTO: 1.7 10E3/UL (ref 0.8–5.3)
LYMPHOCYTES NFR BLD AUTO: 30 %
MCH RBC QN AUTO: 28.3 PG (ref 26.5–33)
MCHC RBC AUTO-ENTMCNC: 31.8 G/DL (ref 31.5–36.5)
MCV RBC AUTO: 89 FL (ref 78–100)
MONOCYTES # BLD AUTO: 0.3 10E3/UL (ref 0–1.3)
MONOCYTES NFR BLD AUTO: 6 %
NEUTROPHILS # BLD AUTO: 3.6 10E3/UL (ref 1.6–8.3)
NEUTROPHILS NFR BLD AUTO: 60 %
NITRATE UR QL: NEGATIVE
NRBC # BLD AUTO: 0 10E3/UL
NRBC BLD AUTO-RTO: 0 /100
O2/TOTAL GAS SETTING VFR VENT: ABNORMAL %
P AXIS - MUSE: 84 DEGREES
PCO2 BLDV: 27 MM HG (ref 40–50)
PH BLDV: 7.54 [PH] (ref 7.32–7.43)
PH UR STRIP: 7.5 [PH] (ref 5–7)
PLATELET # BLD AUTO: 153 10E3/UL (ref 150–450)
PO2 BLDV: 55 MM HG (ref 25–47)
POTASSIUM SERPL-SCNC: 3.7 MMOL/L (ref 3.4–5.3)
PR INTERVAL - MUSE: 168 MS
PROCALCITONIN SERPL IA-MCNC: 0.04 NG/ML
PROT SERPL-MCNC: 7.2 G/DL (ref 6.4–8.3)
QRS DURATION - MUSE: 98 MS
QT - MUSE: 404 MS
QTC - MUSE: 416 MS
R AXIS - MUSE: 67 DEGREES
RBC # BLD AUTO: 4 10E6/UL (ref 4.4–5.9)
RBC URINE: <1 /HPF
RSV RNA SPEC NAA+PROBE: NEGATIVE
SARS-COV-2 RNA RESP QL NAA+PROBE: NEGATIVE
SODIUM SERPL-SCNC: 141 MMOL/L (ref 136–145)
SP GR UR STRIP: 1.02 (ref 1–1.03)
SQUAMOUS EPITHELIAL: 1 /HPF
SYSTOLIC BLOOD PRESSURE - MUSE: NORMAL MMHG
T AXIS - MUSE: 24 DEGREES
TROPONIN T SERPL HS-MCNC: 16 NG/L
UROBILINOGEN UR STRIP-MCNC: NORMAL MG/DL
VENTRICULAR RATE- MUSE: 64 BPM
WBC # BLD AUTO: 5.8 10E3/UL (ref 4–11)
WBC URINE: <1 /HPF

## 2023-02-20 PROCEDURE — 84484 ASSAY OF TROPONIN QUANT: CPT | Performed by: EMERGENCY MEDICINE

## 2023-02-20 PROCEDURE — 250N000013 HC RX MED GY IP 250 OP 250 PS 637: Performed by: EMERGENCY MEDICINE

## 2023-02-20 PROCEDURE — 258N000003 HC RX IP 258 OP 636: Performed by: EMERGENCY MEDICINE

## 2023-02-20 PROCEDURE — 83605 ASSAY OF LACTIC ACID: CPT | Performed by: EMERGENCY MEDICINE

## 2023-02-20 PROCEDURE — 96361 HYDRATE IV INFUSION ADD-ON: CPT

## 2023-02-20 PROCEDURE — 71046 X-RAY EXAM CHEST 2 VIEWS: CPT

## 2023-02-20 PROCEDURE — 82803 BLOOD GASES ANY COMBINATION: CPT | Performed by: EMERGENCY MEDICINE

## 2023-02-20 PROCEDURE — 87637 SARSCOV2&INF A&B&RSV AMP PRB: CPT | Performed by: EMERGENCY MEDICINE

## 2023-02-20 PROCEDURE — 36415 COLL VENOUS BLD VENIPUNCTURE: CPT | Performed by: EMERGENCY MEDICINE

## 2023-02-20 PROCEDURE — 84145 PROCALCITONIN (PCT): CPT | Performed by: EMERGENCY MEDICINE

## 2023-02-20 PROCEDURE — 93005 ELECTROCARDIOGRAM TRACING: CPT

## 2023-02-20 PROCEDURE — 99285 EMERGENCY DEPT VISIT HI MDM: CPT | Mod: CS,25

## 2023-02-20 PROCEDURE — C9803 HOPD COVID-19 SPEC COLLECT: HCPCS

## 2023-02-20 PROCEDURE — 80053 COMPREHEN METABOLIC PANEL: CPT | Performed by: EMERGENCY MEDICINE

## 2023-02-20 PROCEDURE — 81001 URINALYSIS AUTO W/SCOPE: CPT | Performed by: EMERGENCY MEDICINE

## 2023-02-20 PROCEDURE — 85025 COMPLETE CBC W/AUTO DIFF WBC: CPT | Performed by: EMERGENCY MEDICINE

## 2023-02-20 PROCEDURE — 96360 HYDRATION IV INFUSION INIT: CPT

## 2023-02-20 RX ORDER — BUSPIRONE HYDROCHLORIDE 5 MG/1
5 TABLET ORAL ONCE
Status: COMPLETED | OUTPATIENT
Start: 2023-02-20 | End: 2023-02-20

## 2023-02-20 RX ORDER — FUROSEMIDE 40 MG
40 TABLET ORAL ONCE
Status: COMPLETED | OUTPATIENT
Start: 2023-02-20 | End: 2023-02-20

## 2023-02-20 RX ORDER — GABAPENTIN 300 MG/1
900 CAPSULE ORAL 3 TIMES DAILY
Qty: 63 CAPSULE | Refills: 0 | Status: SHIPPED | OUTPATIENT
Start: 2023-02-20 | End: 2023-06-12

## 2023-02-20 RX ORDER — METFORMIN HCL 500 MG
500 TABLET, EXTENDED RELEASE 24 HR ORAL ONCE
Status: COMPLETED | OUTPATIENT
Start: 2023-02-20 | End: 2023-02-20

## 2023-02-20 RX ORDER — PRAVASTATIN SODIUM 20 MG
20 TABLET ORAL ONCE
Status: COMPLETED | OUTPATIENT
Start: 2023-02-20 | End: 2023-02-20

## 2023-02-20 RX ORDER — GABAPENTIN 300 MG/1
300 CAPSULE ORAL ONCE
Status: COMPLETED | OUTPATIENT
Start: 2023-02-20 | End: 2023-02-20

## 2023-02-20 RX ORDER — TAMSULOSIN HYDROCHLORIDE 0.4 MG/1
0.4 CAPSULE ORAL ONCE
Status: COMPLETED | OUTPATIENT
Start: 2023-02-20 | End: 2023-02-20

## 2023-02-20 RX ORDER — FINASTERIDE 5 MG/1
5 TABLET, FILM COATED ORAL ONCE
Status: COMPLETED | OUTPATIENT
Start: 2023-02-20 | End: 2023-02-20

## 2023-02-20 RX ORDER — METOPROLOL SUCCINATE 25 MG/1
25 TABLET, EXTENDED RELEASE ORAL ONCE
Status: COMPLETED | OUTPATIENT
Start: 2023-02-20 | End: 2023-02-20

## 2023-02-20 RX ORDER — LISINOPRIL 10 MG/1
10 TABLET ORAL ONCE
Status: COMPLETED | OUTPATIENT
Start: 2023-02-20 | End: 2023-02-20

## 2023-02-20 RX ADMIN — TAMSULOSIN HYDROCHLORIDE 0.4 MG: 0.4 CAPSULE ORAL at 13:27

## 2023-02-20 RX ADMIN — METFORMIN ER 500 MG 500 MG: 500 TABLET ORAL at 13:23

## 2023-02-20 RX ADMIN — BUSPIRONE HYDROCHLORIDE 5 MG: 5 TABLET ORAL at 13:23

## 2023-02-20 RX ADMIN — FUROSEMIDE 40 MG: 40 TABLET ORAL at 13:23

## 2023-02-20 RX ADMIN — LISINOPRIL 10 MG: 10 TABLET ORAL at 13:27

## 2023-02-20 RX ADMIN — SODIUM CHLORIDE 500 ML: 9 INJECTION, SOLUTION INTRAVENOUS at 11:33

## 2023-02-20 RX ADMIN — FINASTERIDE 5 MG: 5 TABLET, FILM COATED ORAL at 13:23

## 2023-02-20 RX ADMIN — GABAPENTIN 300 MG: 300 CAPSULE ORAL at 13:27

## 2023-02-20 RX ADMIN — PRAVASTATIN SODIUM 20 MG: 20 TABLET ORAL at 13:23

## 2023-02-20 RX ADMIN — METOPROLOL SUCCINATE 25 MG: 25 TABLET, EXTENDED RELEASE ORAL at 13:23

## 2023-02-20 ASSESSMENT — ACTIVITIES OF DAILY LIVING (ADL)
ADLS_ACUITY_SCORE: 35

## 2023-02-20 ASSESSMENT — ENCOUNTER SYMPTOMS
VOMITING: 0
SHORTNESS OF BREATH: 0
DIARRHEA: 1
COUGH: 1
FEVER: 0
SLEEP DISTURBANCE: 1
SPEECH DIFFICULTY: 1
DIFFICULTY URINATING: 1
TREMORS: 1

## 2023-02-20 NOTE — ED NOTES
Bed: ED08  Expected date: 2/20/23  Expected time: 8:48 AM  Means of arrival: Ambulance  Comments:  [age]m tremors, congestion ETA 0802

## 2023-02-20 NOTE — ED PROVIDER NOTES
History     Chief Complaint:  Tremors    The history is provided by the patient.      Austen Fowler is an 87 year old male with a history of diabetes, hypertension, and hyperlipidemia who presents with tremors.  He was feeling well yesterday but had great difficulty sleeping last night.  He can not express (or is unsure) why. This morning when he got up for the day, he was very shaky and told his wife to call for EMS. As he presents to the ED, he notes difficulty finding words. He also endorses a productive cough, diarrhea, and some mild leg swelling for the past 3-4 days. He had some difficulty starting urination this morning, which is not normal for him. He denies fever, chest pain, vomiting, shortness of breath, or abdominal pain.  He has no known sick contacts.  He has not yet eaten today.    Independent Historian: as above.    Review of External Notes: Primary care note from January 2023. He had complaints of a tremor at that visit as well.     ROS:  Review of Systems   Constitutional: Negative for fever.   Respiratory: Positive for cough. Negative for shortness of breath.    Cardiovascular: Positive for leg swelling. Negative for chest pain.   Gastrointestinal: Positive for diarrhea. Negative for abdominal pain and vomiting.   Genitourinary: Positive for difficulty urinating.   Neurological: Positive for tremors and speech difficulty.   Psychiatric/Behavioral: Positive for sleep disturbance.   All other systems reviewed and are negative.    Allergies:  No Known Allergies     Medications:    Aspirin 81 mg  Acetaminophen  Buspar  Jardiance  Proscar  Lasix  Neurontin  Zestril  Glucophage  Toprol  Flomax  Pravachol  Potassium chloride  Prilosec  Ocuvite    Past Medical History:    Anxiety   Aortic root dilation  Arthritis  Ascending aorta dilation  BCC  CAD  GERD  Gallbladder disease  Hypertension  MI  Hyperlipidemia  Type II diabetes  Spinal stenosis  Osteoarthrosis  Obesity  Left ventricular diastolic  dysfunction     Past Surgical History:    Cholecystectomy  colonoscopy  Left heart cath, angioplasty  Sinus surgery  EGD, dilation, combined  Cataract removal, bilateral  Inject epidural lumbar  PICC reposition, right   Laminectomy lumbar two levels  Colonoscopy thru stoma with biopsy/cautery tumor polyp lesion  CABG, artery-vein, four    Family History:    Breast Cancer in an other family member; Cancer in his brother; Cerebrovascular Disease in his brother and brother; Coronary Artery Disease in his brother; Diabetes in his father and mother; Family History Negative in his daughter, son, son, and son; Myocardial Infarction in his father; Other Cancer in his brother; Parkinsonism in his sister; Thyroid Disease in his mother    Social History:  Lives at home with wife.   Presents to the ED alone initially. Wife and daughter later at bedside.    Physical Exam     Patient Vitals for the past 24 hrs:   BP Temp Pulse Resp SpO2   02/20/23 1137 -- -- -- 16 --   02/20/23 1100 120/74 -- -- -- --   02/20/23 0900 (!) 115/102 98.4  F (36.9  C) 68 16 98 %      Physical Exam  General: Well-developed and well-nourished. Well appearing elderly  man. Cooperative.  Head:  Atraumatic.  Eyes:  Conjunctivae, lids, and sclerae are normal.  ENT:    Normal nose. Moist mucous membranes.  Neck:  Supple. Normal range of motion.  CV:  Regular rate and rhythm. Normal heart sounds with no murmurs, rubs, or gallops detected.  Resp:  No respiratory distress. Clear to auscultation bilaterally without decreased breath sounds, wheezing, rales, or rhonchi.  GI:  Soft. Non-distended. Non-tender.    MS:  Normal ROM. Trace-1+ bilateral lower extremity edema primarily at the ankle.  Skin:  Warm. Non-diaphoretic. No pallor.  Neuro: Awake. A&Ox3.     Strength 5/5 bilateral upper and lower extremities.    No pronator drift.    Sensation intact to light touch.    No facial droop. No dysarthria.    Occasional word finding difficulty.    Bilateral  hand tremor.  Psych:  Normal mood and affect. Normal speech.  Vitals reviewed.    Emergency Department Course   EKG  Indication: Weakness  Time: 1050  Rate 64 bpm. KS interval 168. QRS duration 98. QT/QTc 404/416.   Normal sinus rhythm. Nonspecific ST abnormality.   No acute ST changes.  Sinus rhythm replaced atrial fibrillation with RVR as compared to prior, dated 9/1/22.    Imaging:  XR Chest 2 Views   Final Result   IMPRESSION: AP and lateral views of the chest were obtained.   Postsurgical changes of cardiac surgery with median sternotomy wires   and surgical clips. Cardiomediastinal silhouette is within normal   limits. No suspicious focal pulmonary opacities. No significant   pleural effusion or pneumothorax.      JOSE C CHEEMA MD            SYSTEM ID:  [id number]         Report per radiology    Laboratory:  Labs Ordered and Resulted from Time of ED Arrival to Time of ED Departure   COMPREHENSIVE METABOLIC PANEL - Abnormal       Result Value    Sodium 141      Potassium 3.7      Chloride 102      Carbon Dioxide (CO2) 24      Anion Gap 15      Urea Nitrogen 12.5      Creatinine 0.66 (*)     Calcium 9.8      Glucose 160 (*)     Alkaline Phosphatase 63      AST 29      ALT 13      Protein Total 7.2      Albumin 3.8      Bilirubin Total 0.8      GFR Estimate >90     LACTIC ACID WHOLE BLOOD - Abnormal    Lactic Acid 2.7 (*)    BLOOD GAS VENOUS - Abnormal    pH Venous 7.54 (*)     pCO2 Venous 27 (*)     pO2 Venous 55 (*)     Bicarbonate Venous 23      Base Excess/Deficit (+/-) 2.0 (*)     FIO2 333,325     ROUTINE UA WITH MICROSCOPIC REFLEX TO CULTURE - Abnormal    Color Urine Straw      Appearance Urine Clear      Glucose Urine >=1000 (*)     Bilirubin Urine Negative      Ketones Urine 20 (*)     Specific Gravity Urine 1.019      Blood Urine Negative      pH Urine 7.5 (*)     Protein Albumin Urine Negative      Urobilinogen Urine Normal      Nitrite Urine Negative      Leukocyte Esterase Urine Negative      RBC  Urine <1      WBC Urine <1      Squamous Epithelials Urine 1     CBC WITH PLATELETS AND DIFFERENTIAL - Abnormal    WBC Count 5.8      RBC Count 4.00 (*)     Hemoglobin 11.3 (*)     Hematocrit 35.5 (*)     MCV 89      MCH 28.3      MCHC 31.8      RDW 14.4      Platelet Count 153      % Neutrophils 60      % Lymphocytes 30      % Monocytes 6      % Eosinophils 2      % Basophils 1      % Immature Granulocytes 1      NRBCs per 100 WBC 0      Absolute Neutrophils 3.6      Absolute Lymphocytes 1.7      Absolute Monocytes 0.3      Absolute Eosinophils 0.1      Absolute Basophils 0.0      Absolute Immature Granulocytes 0.0      Absolute NRBCs 0.0     TROPONIN T, HIGH SENSITIVITY - Normal    Troponin T, High Sensitivity 16     INFLUENZA A/B & SARS-COV2 PCR MULTIPLEX - Normal    Influenza A PCR Negative      Influenza B PCR Negative      RSV PCR Negative      SARS CoV2 PCR Negative     PROCALCITONIN - Normal    Procalcitonin 0.04     LACTIC ACID WHOLE BLOOD - Normal    Lactic Acid 1.9       Emergency Department Course & Assessments:  Interventions:  Medications   0.9% sodium chloride BOLUS (500 mLs Intravenous New Bag 2/20/23 1133)   finasteride (PROSCAR) tablet 5 mg (5 mg Oral Given 2/20/23 1323)   furosemide (LASIX) tablet 40 mg (40 mg Oral Given 2/20/23 1323)   gabapentin (NEURONTIN) capsule 300 mg (300 mg Oral Given 2/20/23 1327)   lisinopril (ZESTRIL) tablet 10 mg (10 mg Oral Given 2/20/23 1327)   metoprolol succinate ER (TOPROL XL) 24 hr tablet 25 mg (25 mg Oral Given 2/20/23 1323)   pravastatin (PRAVACHOL) tablet 20 mg (20 mg Oral Given 2/20/23 1323)   tamsulosin (FLOMAX) capsule 0.4 mg (0.4 mg Oral Given 2/20/23 1327)   busPIRone (BUSPAR) tablet 5 mg (5 mg Oral Given 2/20/23 1323)   metFORMIN (GLUCOPHAGE XR) 24 hr tablet 500 mg (500 mg Oral Given 2/20/23 1323)      Independent Interpretation (X-rays, CTs, rhythm strip):  1058 Chest XR     Social Determinants of Health affecting care:   Supportive wife and daughter      Assessments:  0930 I obtained history and examined the patient as noted above.   1120 I rechecked the patient and explained findings. His wife and daughter are now at bedside. He seems somewhat confused. Patient's wife was unable to provide a direct answer when asked whether this is the patient's baseline.  1238 I rechecked the patient and explained findings.   1443 I rechecked the patient. He had been up, feeling well, and used the bathroom, and is wondering if his symptoms are due to him running out of his gabapentin. He discussed plan for discharge.     Disposition:  The patient was discharged.     Impression & Plan    Medical Decision Making:  Griffin is an 87 year old man presenting because he is feeling shaky.  When asked he has several other mild symptoms but really he focuses on the tremors and difficulty sleeping last night as his primary complaints.  He appears well on exam with no focal findings outside of some occasional word finding difficulty and bilateral hand tremor.  Tremor was documented on primary care visit a month ago.    Given his difficulty describing what is concerning him and his advanced age, a very broad work-up was undertaken.  Fortunately, it is overall reassuring.  EKG is without acute ST changes or arrhythmias and troponin is normal.  He does not describe chest pain.  He has initial lactic acidosis of 2.7 but I appreciate no evidence of infection and this resolved after IV fluids.  Specifically, there is no pneumonia, UTI, COVID/RSV/influenza, leukocytosis, or procalcitonin elevation.  He has no kidney injury, electrolyte derangements, hepatitis, biliary obstruction, or hypercarbia.  He was given 500 mL of IV fluids and on repeat evaluation is feeling about the same.  He has no focal neurologic deficits to suggest a CNS pathology.  His family is wondering if his symptoms may be related to not having his medications.  He was given his home medications including gabapentin (he was out  of this) and was monitored in the emergency department for 5 hours in total.  He had no worsening in symptoms and seems improved.  He was able to get up and use the restroom, eat lunch.  As such, there is no indication for hospitalization.  He and his family are comfortable with plan for discharge.  I recommended he take all of his medications as prescribed and follow-up with primary care to ensure he is improving as expected.  I provided 7 days of gabapentin but further prescription will be provided by primary care on follow-up.  They understand he should return if he has worsening or new symptoms.  All questions answered.     Diagnosis:    ICD-10-CM    1. Tremor  R25.1       2. Lactic acidosis  E87.20       3. Encounter for medication refill  Z76.0            Discharge Medications:  Discharge Medication List as of 2/20/2023  3:10 PM      START taking these medications    Details   !! gabapentin (NEURONTIN) 300 MG capsule Take 3 capsules (900 mg) by mouth 3 times daily for 7 days, Disp-63 capsule, R-0, Local Print       !! - Potential duplicate medications found. Please discuss with provider.         Scribe Disclosure:  I, Nilesh Ram, am serving as a scribe at 9:34 AM on 2/20/2023 to document services personally performed by Anabel Bui MD based on my observations and the provider's statements to me.   2/20/2023   Anabel Bui MD Dixson, Kylie S, MD  03/20/23 2004

## 2023-02-20 NOTE — DISCHARGE INSTRUCTIONS
Take all of your medications as prescribed.  Please follow with your primary care provider to ensure you are improving as expected.  If you have worsening or any new concerns you need to return immediately to the emergency department.

## 2023-03-20 ASSESSMENT — ENCOUNTER SYMPTOMS: ABDOMINAL PAIN: 0

## 2023-03-22 DIAGNOSIS — I10 ESSENTIAL HYPERTENSION, BENIGN: ICD-10-CM

## 2023-03-22 RX ORDER — LISINOPRIL 10 MG/1
10 TABLET ORAL DAILY
Qty: 90 TABLET | Refills: 0 | Status: SHIPPED | OUTPATIENT
Start: 2023-03-22 | End: 2023-05-12

## 2023-04-01 DIAGNOSIS — N40.0 BENIGN PROSTATIC HYPERPLASIA, UNSPECIFIED WHETHER LOWER URINARY TRACT SYMPTOMS PRESENT: ICD-10-CM

## 2023-04-01 DIAGNOSIS — R13.10 DYSPHAGIA, UNSPECIFIED TYPE: ICD-10-CM

## 2023-04-03 RX ORDER — TAMSULOSIN HYDROCHLORIDE 0.4 MG/1
CAPSULE ORAL
Qty: 90 CAPSULE | Refills: 1 | Status: SHIPPED | OUTPATIENT
Start: 2023-04-03 | End: 2023-05-09

## 2023-04-04 ENCOUNTER — MYC MEDICAL ADVICE (OUTPATIENT)
Dept: FAMILY MEDICINE | Facility: CLINIC | Age: 88
End: 2023-04-04
Payer: COMMERCIAL

## 2023-04-04 DIAGNOSIS — N40.0 BENIGN PROSTATIC HYPERPLASIA, UNSPECIFIED WHETHER LOWER URINARY TRACT SYMPTOMS PRESENT: Primary | ICD-10-CM

## 2023-04-05 ENCOUNTER — TELEPHONE (OUTPATIENT)
Dept: CARDIOLOGY | Facility: CLINIC | Age: 88
End: 2023-04-05
Payer: COMMERCIAL

## 2023-04-05 RX ORDER — DUTASTERIDE 0.5 MG/1
0.5 CAPSULE, LIQUID FILLED ORAL DAILY
Qty: 93 CAPSULE | Refills: 3 | Status: SHIPPED | OUTPATIENT
Start: 2023-04-05 | End: 2023-08-07

## 2023-04-05 NOTE — TELEPHONE ENCOUNTER
PCP see below, pt reporting Finasteride is out of stock, pt requesting alternative script - or should he try to get from a local pharmacy instead?     finasteride (PROSCAR) 5 MG tablet 90 tablet 2 3/22/2023  No   Sig: TAKE 1 TABLET BY MOUTH  DAILY   Sent to pharmacy as: Finasteride 5 MG Oral Tablet (PROSCAR)   Class: E-Prescribe   Notes to Pharmacy: Requesting 1 year supply   Order: [id number]   E-Prescribing Status: Receipt confirmed by pharmacy (3/22/2023  2:09 PM CDT)     Printout Tracking    External Result Report     Pharmacy    [facility] - [place], [place] - [address] [place] [address]

## 2023-04-05 NOTE — TELEPHONE ENCOUNTER
[facility] Call Center    Phone Message    May a detailed message be left on voicemail: yes     Reason for Call: Other: Patient is looking for a late morning appt for echo - no availability at [facility] before echo order expires. is someone able to extend this a bit so I can find a time that works best for the patient?     Action Taken: Message routed to:  Other: [facility]    Travel Screening: Not Applicable                                                                    Thank you!  [facility]

## 2023-05-04 ENCOUNTER — HOSPITAL ENCOUNTER (OUTPATIENT)
Dept: CARDIOLOGY | Facility: CLINIC | Age: 88
Discharge: HOME | End: 2023-05-04
Attending: INTERNAL MEDICINE | Admitting: INTERNAL MEDICINE
Payer: COMMERCIAL

## 2023-05-04 DIAGNOSIS — I25.10 CORONARY ARTERY DISEASE INVOLVING NATIVE CORONARY ARTERY OF NATIVE HEART WITHOUT ANGINA PECTORIS: Primary | ICD-10-CM

## 2023-05-04 DIAGNOSIS — I50.31 ACUTE DIASTOLIC HEART FAILURE (H): ICD-10-CM

## 2023-05-04 LAB — LVEF ECHO: NORMAL

## 2023-05-04 PROCEDURE — 93306 TTE W/DOPPLER COMPLETE: CPT | Mod: 26 | Performed by: INTERNAL MEDICINE

## 2023-05-04 PROCEDURE — 999N000208 ECHOCARDIOGRAM COMPLETE

## 2023-05-04 PROCEDURE — 255N000002 HC RX 255 OP 636: Performed by: INTERNAL MEDICINE

## 2023-05-04 RX ADMIN — HUMAN ALBUMIN MICROSPHERES AND PERFLUTREN 9 ML: 10; .22 INJECTION, SOLUTION INTRAVENOUS at 15:45

## 2023-05-09 ENCOUNTER — OFFICE VISIT (OUTPATIENT)
Dept: FAMILY MEDICINE | Facility: CLINIC | Age: 88
End: 2023-05-09
Payer: COMMERCIAL

## 2023-05-09 VITALS
OXYGEN SATURATION: 95 % | HEIGHT: 76 IN | BODY MASS INDEX: 32.05 KG/M2 | SYSTOLIC BLOOD PRESSURE: 123 MMHG | HEART RATE: 63 BPM | RESPIRATION RATE: 16 BRPM | DIASTOLIC BLOOD PRESSURE: 82 MMHG | TEMPERATURE: 96.4 F | WEIGHT: 263.2 LBS

## 2023-05-09 DIAGNOSIS — R13.10 DYSPHAGIA, UNSPECIFIED TYPE: ICD-10-CM

## 2023-05-09 DIAGNOSIS — G47.33 OSA (OBSTRUCTIVE SLEEP APNEA): ICD-10-CM

## 2023-05-09 DIAGNOSIS — E11.59 TYPE 2 DIABETES MELLITUS WITH VASCULAR DISEASE (H): ICD-10-CM

## 2023-05-09 DIAGNOSIS — N40.0 BENIGN PROSTATIC HYPERPLASIA, UNSPECIFIED WHETHER LOWER URINARY TRACT SYMPTOMS PRESENT: ICD-10-CM

## 2023-05-09 DIAGNOSIS — M53.3 SACROILIAC JOINT PAIN: ICD-10-CM

## 2023-05-09 DIAGNOSIS — I48.0 PAF (PAROXYSMAL ATRIAL FIBRILLATION) (H): ICD-10-CM

## 2023-05-09 DIAGNOSIS — R41.3 MEMORY CHANGE: Primary | ICD-10-CM

## 2023-05-09 DIAGNOSIS — I25.10 CORONARY ARTERY DISEASE INVOLVING NATIVE CORONARY ARTERY OF NATIVE HEART WITHOUT ANGINA PECTORIS: ICD-10-CM

## 2023-05-09 DIAGNOSIS — G89.4 CHRONIC PAIN SYNDROME: ICD-10-CM

## 2023-05-09 DIAGNOSIS — L03.90 RECURRENT CELLULITIS: ICD-10-CM

## 2023-05-09 DIAGNOSIS — R25.1 TREMOR: ICD-10-CM

## 2023-05-09 DIAGNOSIS — I10 BENIGN ESSENTIAL HYPERTENSION: ICD-10-CM

## 2023-05-09 LAB
ERYTHROCYTE [DISTWIDTH] IN BLOOD BY AUTOMATED COUNT: 14.4 % (ref 10–15)
HBA1C MFR BLD: 7.3 % (ref 0–5.6)
HCT VFR BLD AUTO: 46 % (ref 40–53)
HGB BLD-MCNC: 14.3 G/DL (ref 13.3–17.7)
MCH RBC QN AUTO: 27.8 PG (ref 26.5–33)
MCHC RBC AUTO-ENTMCNC: 31.1 G/DL (ref 31.5–36.5)
MCV RBC AUTO: 90 FL (ref 78–100)
PLATELET # BLD AUTO: 197 10E3/UL (ref 150–450)
RBC # BLD AUTO: 5.14 10E6/UL (ref 4.4–5.9)
WBC # BLD AUTO: 8.9 10E3/UL (ref 4–11)

## 2023-05-09 PROCEDURE — 36415 COLL VENOUS BLD VENIPUNCTURE: CPT | Performed by: INTERNAL MEDICINE

## 2023-05-09 PROCEDURE — 83036 HEMOGLOBIN GLYCOSYLATED A1C: CPT | Performed by: INTERNAL MEDICINE

## 2023-05-09 PROCEDURE — 99214 OFFICE O/P EST MOD 30 MIN: CPT | Performed by: INTERNAL MEDICINE

## 2023-05-09 PROCEDURE — 80061 LIPID PANEL: CPT | Performed by: INTERNAL MEDICINE

## 2023-05-09 PROCEDURE — 80053 COMPREHEN METABOLIC PANEL: CPT | Performed by: INTERNAL MEDICINE

## 2023-05-09 PROCEDURE — 84443 ASSAY THYROID STIM HORMONE: CPT | Performed by: INTERNAL MEDICINE

## 2023-05-09 PROCEDURE — 85027 COMPLETE CBC AUTOMATED: CPT | Performed by: INTERNAL MEDICINE

## 2023-05-09 PROCEDURE — 82607 VITAMIN B-12: CPT | Performed by: INTERNAL MEDICINE

## 2023-05-09 RX ORDER — GABAPENTIN 300 MG/1
CAPSULE ORAL
Qty: 360 CAPSULE | Refills: 3 | Status: SHIPPED | OUTPATIENT
Start: 2023-05-09 | End: 2023-12-08

## 2023-05-09 RX ORDER — TAMSULOSIN HYDROCHLORIDE 0.4 MG/1
0.4 CAPSULE ORAL DAILY
Qty: 90 CAPSULE | Refills: 3 | Status: ON HOLD | OUTPATIENT
Start: 2023-05-09 | End: 2023-06-17

## 2023-05-09 ASSESSMENT — PAIN SCALES - PAIN ENJOYMENT GENERAL ACTIVITY SCALE (PEG)
INTERFERED_ENJOYMENT_LIFE: 0
INTERFERED_GENERAL_ACTIVITY: 1
INTERFERED_ENJOYMENT_LIFE: 0 - DOES NOT INTERFERE
AVG_PAIN_PASTWEEK: 3
PEG_TOTALSCORE: 1.33
PEG_TOTALSCORE: 1.33
AVG_PAIN_PASTWEEK: 3
INTERFERED_GENERAL_ACTIVITY: 1

## 2023-05-09 ASSESSMENT — ANXIETY QUESTIONNAIRES
3. WORRYING TOO MUCH ABOUT DIFFERENT THINGS: SEVERAL DAYS
7. FEELING AFRAID AS IF SOMETHING AWFUL MIGHT HAPPEN: NOT AT ALL
7. FEELING AFRAID AS IF SOMETHING AWFUL MIGHT HAPPEN: NOT AT ALL
5. BEING SO RESTLESS THAT IT IS HARD TO SIT STILL: NOT AT ALL
2. NOT BEING ABLE TO STOP OR CONTROL WORRYING: SEVERAL DAYS
IF YOU CHECKED OFF ANY PROBLEMS ON THIS QUESTIONNAIRE, HOW DIFFICULT HAVE THESE PROBLEMS MADE IT FOR YOU TO DO YOUR WORK, TAKE CARE OF THINGS AT HOME, OR GET ALONG WITH OTHER PEOPLE: NOT DIFFICULT AT ALL
4. TROUBLE RELAXING: SEVERAL DAYS
1. FEELING NERVOUS, ANXIOUS, OR ON EDGE: SEVERAL DAYS
6. BECOMING EASILY ANNOYED OR IRRITABLE: NOT AT ALL
GAD7 TOTAL SCORE: 4
GAD7 TOTAL SCORE: 4
8. IF YOU CHECKED OFF ANY PROBLEMS, HOW DIFFICULT HAVE THESE MADE IT FOR YOU TO DO YOUR WORK, TAKE CARE OF THINGS AT HOME, OR GET ALONG WITH OTHER PEOPLE?: NOT DIFFICULT AT ALL

## 2023-05-09 ASSESSMENT — PAIN SCALES - GENERAL: PAINLEVEL: WORST PAIN (10)

## 2023-05-09 NOTE — PROGRESS NOTES
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)  We will continue metformin and stop Jardiance and may have to use Januvia for cost reasons  We will check her A1c  - TSH WITH FREE T4 REFLEX  - Hemoglobin A1c  - Hemoglobin A1c  - TSH WITH FREE T4 REFLEX    Tremor  Tremor may get worse as we cut down on medication  - CBC with Platelets    - CBC with Platelets      PAF (paroxysmal atrial fibrillation) (H)  Patient was in emergency room with infection and then he was noticed to be in normal sinus rhythm  When previous EKG was compared he has been in atrial fibrillation  He is seeing his cardiologist Friday  He might need a Zio patch    Coronary artery disease involving native coronary artery of native heart without angina pectoris  Asymptomatic's since surgery  Continue lisinopril, aspirin, beta-blockers and pravastatin  - Lipid panel reflex to direct LDL Fasting  - Comprehensive metabolic panel  - Lipid panel reflex to direct LDL Fasting  - Comprehensive metabolic panel    Benign prostatic hyperplasia, unspecified whether lower urinary tract symptoms present  No symptoms  - tamsulosin (FLOMAX) 0.4 MG capsule  Dispense: 90 capsule; Refill: 3  Also on Proscar  PENELOPE (obstructive sleep apnea)  Using CPAP    Chronic pain syndrome  As above pain and tremor may get worse as we cut down  - gabapentin (NEURONTIN) 300 MG capsule  Dispense: 360 capsule; Refill: 3    Dysphagia, unspecified type  No symptoms  - omeprazole (PRILOSEC) 20 MG DR capsule  Dispense: 90 capsule; Refill: 3    Sacroiliac joint pain  Local injections can be considered  - gabapentin (NEURONTIN) 300 MG capsule  Dispense: 360 capsule; Refill: 3    Recurrent cellulitis  Skin care is  "discussed    Benign essential hypertension  Continue lisinopril, also*On metoprolol and Lasix which also helps with edema                 BMI:   Estimated body mass index is 32.04 kg/m  as calculated from the following:    Height as of this encounter: 1.93 m (6' 4\").    Weight as of this encounter: 119.4 kg (263 lb 3.2 oz).           Hamzah Hodge MD  [facility] ASHLEY Moya is a 88 year old, presenting for the following health issues:  Patient is doing well  Pain is stable  Tremor is stable  But he notices memory issues  His anxiety is stable  His wife also states that Jardiance is unaffordable  He had echocardiography done this week which is as below  Follow Up (DM; chronic pain)  1. The left ventricle is normal in size. There is normal left ventricular wall  thickness. Left ventricular systolic function is normal. The visual ejection  fraction is 55-60%. Left ventricular diastolic function is indeterminate. No  regional wall motion abnormalities noted. There is no thrombus seen in the  left ventricle.  2. The right ventricle is normal size. The right ventricular systolic function  is normal.  3. Trace mitral and tricuspid regurgitation.  4. Mild aortic stenosis.  6. No pericardial effusion.  7. In comparison to the previous report dated 09/02/2022, the findings are  similar.    History of Present Illness       Back Pain:  He presents for follow up of back pain. Patient's back pain is a chronic problem.  Location of back pain:  Left side of neck, left buttock, left hip and left side of waist  Description of back pain: dull ache  Back pain spreads: right buttocks, left buttocks, right thigh, left thigh and left side of neck    Since patient first noticed back pain, pain is: always present, but gets better and worse  Does back pain interfere with his job:  Not applicable      Reason for visit:  Follow-up to previous visit    He eats 2-3 servings of fruits and vegetables daily.He consumes " "2 sweetened beverage(s) daily.He exercises with enough effort to increase his heart rate 20 to 29 minutes per day.  He exercises with enough effort to increase his heart rate 3 or less days per week.   He is taking medications regularly.               Review of Systems         Objective    /82 (BP Location: Left arm, Patient Position: Sitting, Cuff Size: Adult Large)   Pulse 63   Temp (!) 96.4  F (35.8  C) (Temporal)   Resp 16   Ht 1.93 m (6' 4\")   Wt 119.4 kg (263 lb 3.2 oz)   SpO2 95%   BMI 32.04 kg/m    Body mass index is 32.04 kg/m .  Physical Exam                       "

## 2023-05-10 LAB
ALBUMIN SERPL BCG-MCNC: 4.4 G/DL (ref 3.5–5.2)
ALP SERPL-CCNC: 57 U/L (ref 40–129)
ALT SERPL W P-5'-P-CCNC: 12 U/L (ref 10–50)
ANION GAP SERPL CALCULATED.3IONS-SCNC: 17 MMOL/L (ref 7–15)
AST SERPL W P-5'-P-CCNC: 27 U/L (ref 10–50)
BILIRUB SERPL-MCNC: 0.6 MG/DL
BUN SERPL-MCNC: 15.7 MG/DL (ref 8–23)
CALCIUM SERPL-MCNC: 10.2 MG/DL (ref 8.8–10.2)
CHLORIDE SERPL-SCNC: 104 MMOL/L (ref 98–107)
CHOLEST SERPL-MCNC: 129 MG/DL
CREAT SERPL-MCNC: 1.01 MG/DL (ref 0.67–1.17)
DEPRECATED HCO3 PLAS-SCNC: 25 MMOL/L (ref 22–29)
GFR SERPL CREATININE-BSD FRML MDRD: 72 ML/MIN/1.73M2
GLUCOSE SERPL-MCNC: 166 MG/DL (ref 70–99)
HDLC SERPL-MCNC: 39 MG/DL
LDLC SERPL CALC-MCNC: 40 MG/DL
NONHDLC SERPL-MCNC: 90 MG/DL
POTASSIUM SERPL-SCNC: 4.5 MMOL/L (ref 3.4–5.3)
PROT SERPL-MCNC: 7.5 G/DL (ref 6.4–8.3)
SODIUM SERPL-SCNC: 146 MMOL/L (ref 136–145)
TRIGL SERPL-MCNC: 248 MG/DL
TSH SERPL DL<=0.005 MIU/L-ACNC: 1.24 UIU/ML (ref 0.3–4.2)
VIT B12 SERPL-MCNC: 2417 PG/ML (ref 232–1245)

## 2023-05-12 ENCOUNTER — OFFICE VISIT (OUTPATIENT)
Dept: CARDIOLOGY | Facility: CLINIC | Age: 88
End: 2023-05-12
Attending: INTERNAL MEDICINE
Payer: COMMERCIAL

## 2023-05-12 ENCOUNTER — HOSPITAL ENCOUNTER (OUTPATIENT)
Dept: CARDIOLOGY | Facility: CLINIC | Age: 88
Discharge: HOME | End: 2023-05-12
Attending: INTERNAL MEDICINE | Admitting: INTERNAL MEDICINE
Payer: COMMERCIAL

## 2023-05-12 VITALS
OXYGEN SATURATION: 94 % | SYSTOLIC BLOOD PRESSURE: 106 MMHG | BODY MASS INDEX: 32.01 KG/M2 | HEIGHT: 76 IN | HEART RATE: 70 BPM | WEIGHT: 262.9 LBS | DIASTOLIC BLOOD PRESSURE: 60 MMHG

## 2023-05-12 DIAGNOSIS — I77.810 AORTIC ROOT DILATATION (H): ICD-10-CM

## 2023-05-12 DIAGNOSIS — I35.0 NONRHEUMATIC AORTIC VALVE STENOSIS: ICD-10-CM

## 2023-05-12 DIAGNOSIS — Z95.1 S/P CABG (CORONARY ARTERY BYPASS GRAFT): ICD-10-CM

## 2023-05-12 DIAGNOSIS — I50.31 ACUTE DIASTOLIC HEART FAILURE (H): ICD-10-CM

## 2023-05-12 DIAGNOSIS — I10 ESSENTIAL HYPERTENSION, BENIGN: ICD-10-CM

## 2023-05-12 DIAGNOSIS — I10 BENIGN ESSENTIAL HYPERTENSION: ICD-10-CM

## 2023-05-12 DIAGNOSIS — E78.6 HDL DEFICIENCY: ICD-10-CM

## 2023-05-12 DIAGNOSIS — R13.10 DYSPHAGIA, UNSPECIFIED TYPE: ICD-10-CM

## 2023-05-12 DIAGNOSIS — E78.5 HYPERLIPIDEMIA LDL GOAL <70: ICD-10-CM

## 2023-05-12 DIAGNOSIS — Z86.79 HISTORY OF ATRIAL FIBRILLATION: Primary | ICD-10-CM

## 2023-05-12 DIAGNOSIS — I25.10 CORONARY ARTERY DISEASE INVOLVING NATIVE CORONARY ARTERY OF NATIVE HEART WITHOUT ANGINA PECTORIS: ICD-10-CM

## 2023-05-12 DIAGNOSIS — Z86.79 HISTORY OF ATRIAL FIBRILLATION: ICD-10-CM

## 2023-05-12 PROCEDURE — 93248 EXT ECG>7D<15D REV&INTERPJ: CPT | Performed by: INTERNAL MEDICINE

## 2023-05-12 PROCEDURE — 93246 EXT ECG>7D<15D RECORDING: CPT

## 2023-05-12 PROCEDURE — 99214 OFFICE O/P EST MOD 30 MIN: CPT | Performed by: INTERNAL MEDICINE

## 2023-05-12 RX ORDER — FUROSEMIDE 40 MG
40 TABLET ORAL DAILY
Qty: 90 TABLET | Refills: 3 | Status: ON HOLD | OUTPATIENT
Start: 2023-05-12 | End: 2023-06-17

## 2023-05-12 RX ORDER — LISINOPRIL 10 MG/1
10 TABLET ORAL DAILY
Qty: 90 TABLET | Refills: 3 | Status: ON HOLD | OUTPATIENT
Start: 2023-05-12 | End: 2023-06-17

## 2023-05-12 RX ORDER — PRAVASTATIN SODIUM 20 MG
20 TABLET ORAL EVERY EVENING
Qty: 90 TABLET | Refills: 3 | Status: SHIPPED | OUTPATIENT
Start: 2023-05-12 | End: 2023-05-12

## 2023-05-12 RX ORDER — PRAVASTATIN SODIUM 20 MG
20 TABLET ORAL EVERY EVENING
Qty: 90 TABLET | Refills: 3 | Status: SHIPPED | OUTPATIENT
Start: 2023-05-12 | End: 2023-06-23

## 2023-05-12 NOTE — LETTER
5/12/2023    Hamzah Hodge MD  [address]  [place] [address]    RE: Austen Fowler       Dear Colleague,     I had the pleasure of seeing Austen Fowler in the [facility].  HPI and Plan:   See dictation          Orders Placed This Encounter   Procedures    Basic metabolic panel    Lipid Profile    ALT    Basic metabolic panel    Lipid Profile    ALT    Follow-Up with Cardiology    Follow-Up with Cardiology    EKG 12-lead complete w/read - Clinics (to be scheduled)    Leadless EKG Monitor 8 to 14 Days    Echocardiogram Complete       Orders Placed This Encounter   Medications    furosemide (LASIX) 40 MG tablet     Sig: Take 1 tablet (40 mg) by mouth daily     Dispense:  90 tablet     Refill:  3    lisinopril (ZESTRIL) 10 MG tablet     Sig: Take 1 tablet (10 mg) by mouth daily     Dispense:  90 tablet     Refill:  3    DISCONTD: pravastatin (PRAVACHOL) 20 MG tablet     Sig: Take 1 tablet (20 mg) by mouth every evening - due for fasting physical with Dr Hodge for refills     Dispense:  90 tablet     Refill:  3    pravastatin (PRAVACHOL) 20 MG tablet     Sig: Take 1 tablet (20 mg) by mouth every evening - due for fasting physical with Dr Hodge for refills     Dispense:  90 tablet     Refill:  3       Medications Discontinued During This Encounter   Medication Reason    furosemide (LASIX) 40 MG tablet Reorder (No AVS / No eCancel)    pravastatin (PRAVACHOL) 20 MG tablet Reorder (No AVS / No eCancel)    lisinopril (ZESTRIL) 10 MG tablet Reorder (No AVS / No eCancel)    pravastatin (PRAVACHOL) 20 MG tablet Reorder (No AVS / No eCancel)         Encounter Diagnoses   Name Primary?    Coronary artery disease involving native coronary artery of native heart without angina pectoris     Hyperlipidemia LDL goal <70     Benign essential hypertension     Acute diastolic heart failure (H)     BENIGN HYPERTENSION     Dysphagia, unspecified type     S/P CABG (coronary artery bypass graft)     HDL  deficiency     History of atrial fibrillation Yes    Aortic root dilatation (H)     Nonrheumatic aortic valve stenosis        CURRENT MEDICATIONS:  Current Outpatient Medications   Medication Sig Dispense Refill    acetaminophen (ACETAMIN) 500 MG tablet Take 1,000 mg by mouth 2 times daily      aspirin 81 MG EC tablet Take 81 mg by mouth daily       blood glucose monitoring (ONE TOUCH ULTRASOFT) lancets USE TO TEST BLOOD SUGAR  TWICE DAILY OR AS DIRECTED 100 each 3    busPIRone (BUSPAR) 5 MG tablet TAKE 1 TABLET BY MOUTH 3  TIMES DAILY 270 tablet 1    Cholecalciferol (VITAMIN D) 2000 UNIT tablet Take 2,000 Units by mouth daily. 90 tablet 3    dutasteride (AVODART) 0.5 MG capsule Take 1 capsule (0.5 mg) by mouth daily 93 capsule 3    finasteride (PROSCAR) 5 MG tablet TAKE 1 TABLET BY MOUTH  DAILY 90 tablet 2    furosemide (LASIX) 40 MG tablet Take 1 tablet (40 mg) by mouth daily 90 tablet 3    gabapentin (NEURONTIN) 300 MG capsule 300 mg in AM, 900 mg in  capsule 3    lisinopril (ZESTRIL) 10 MG tablet Take 1 tablet (10 mg) by mouth daily 90 tablet 3    metFORMIN (GLUCOPHAGE XR) 500 MG 24 hr tablet TAKE 2 TABLETS BY MOUTH  TWICE DAILY WITH MEALS 360 tablet 3    metoprolol succinate ER (TOPROL XL) 25 MG 24 hr tablet Take 1 tablet (25 mg) by mouth daily 90 tablet 3    multivitamin (OCUVITE) TABS tablet Take 1 tablet by mouth daily      omeprazole (PRILOSEC) 20 MG DR capsule Take 1 capsule (20 mg) by mouth daily 90 capsule 3    ONETOUCH ULTRA test strip USE TO TEST BLOOD SUGAR TWICE  DAILY OR AS DIRECTED 200 strip 1    ORDER FOR DME Uses Cpap machine for sleep apnea      potassium chloride (KLOR-CON) 20 MEQ packet DISSOLVE 1 PACKET IN 4 OZ  WATER OR JUICE AND DRINK 3  TIMES DAILY 270 each 1    pravastatin (PRAVACHOL) 20 MG tablet Take 1 tablet (20 mg) by mouth every evening - due for fasting physical with Dr Hodge for refills 90 tablet 3    Probiotic Product (PROBIOTIC PO) Take 1 capsule by mouth daily       tamsulosin (FLOMAX) 0.4 MG capsule Take 1 capsule (0.4 mg) by mouth daily 90 capsule 3    vitamin B-12 (CYANOCOBALAMIN) 1000 MCG tablet Take 1,000 mcg by mouth daily       gabapentin (NEURONTIN) 300 MG capsule Take 3 capsules (900 mg) by mouth 3 times daily for 7 days 63 capsule 0       ALLERGIES     Allergies   Allergen Reactions    No Known Allergies        PAST MEDICAL HISTORY:  Past Medical History:   Diagnosis Date    Anxiety state, unspecified     Aortic root dilatation (H)     Arthritis     Ascending aorta dilatation (H)     Basal cell cancer     s/p Mohs nose and bridge of nose on R.    Bunion     CAD (coronary artery disease)     CABG 1992: LIMA to LAD, SANDI to RCA, SVG to OM1 and OM2    Contact dermatitis and other eczema, due to unspecified cause     eczema - has light treatments with Dr. Rivera    Disorders of bursae and tendons in shoulder region, unspecified     Esophageal reflux     Essential hypertension, benign     Gallbladder disease     GERD (gastroesophageal reflux disease)     Heart attack (H)     Hyperlipidemia LDL goal <70     Left ventricular diastolic dysfunction     Low HDL (under 40) 3/28/2014    Nasal/sinus dis NEC     s/p surgery in 1995    Obesity     Osteoarthrosis, unspecified whether generalized or localized, shoulder region     Other hammer toe (acquired)     Sleep apnea     USES CPAP    Spinal stenosis, unspecified region other than cervical     MRI done 2006    Type 2 diabetes, HbA1c goal < 7% (H) 3/12/2015    Urge incontinence        PAST SURGICAL HISTORY:  Past Surgical History:   Procedure Laterality Date    ABDOMEN SURGERY  1994    gall bladder    BIOPSY      arms    CHOLECYSTECTOMY  6/1994    COLONOSCOPY N/A 4/11/2017    Procedure: COMBINED COLONOSCOPY, SINGLE OR MULTIPLE BIOPSY/POLYPECTOMY BY BIOPSY;  Surgeon: Bladimir Garner MD;  Location:  GI    CV LEFT HEART CATH  5-92, 6-92    Angioplasty    ENT SURGERY  5/1995    sinus surgery    ESOPHAGOSCOPY, GASTROSCOPY,  DUODENOSCOPY (EGD), DILATATION, COMBINED  7/17/2014    Procedure: COMBINED ESOPHAGOSCOPY, GASTROSCOPY, DUODENOSCOPY (EGD), DILATATION;  Surgeon: Bladimir Garner MD;  Location:  GI    EYE SURGERY      cataracts removed both eyes    INJECT EPIDURAL LUMBAR      IR PICC REPOSITION RIGHT  9/1/2022    LAMINECTOMY LUMBAR TWO LEVELS N/A 4/29/2015    Procedure: LAMINECTOMY LUMBAR TWO LEVELS;  Surgeon: Bladimir Keenan MD;  Location:  OR    THORACIC SURGERY  11/1992    bypass    ZZC CABG, ARTERY-VEIN, FOUR  11/1992    CABG - LIMA to left anterior descending and saphenous vein bypass graft to the first and second obtuse marginal branch of the circumflex and the right mammary to the right coronary artery    Z NONSPECIFIC PROCEDURE  6-94    Gail lap    Z NONSPECIFIC PROCEDURE  1995    sinus surgery    [facility] NONSPECIFIC PROCEDURE      bilateral cataract [facility]/CAUTERY TUMOR/POLYP/LESION  5/2007       FAMILY HISTORY:  Family History   Problem Relation Age of Onset    Cancer Brother         MELANOMA    Diabetes Mother         AODM    Thyroid Disease Mother     Diabetes Father         AODM    Myocardial Infarction Father     Cerebrovascular Disease Brother     Parkinsonism Sister     Family History Negative Son     Family History Negative Son     Family History Negative Son     Family History Negative Daughter     Coronary Artery Disease Brother         dod 2019    Cerebrovascular Disease Brother         dod 2019    Other Cancer Brother         dod 2000/melanoma    Breast Cancer Other         in remission reg chkups       SOCIAL HISTORY:  Social History     Socioeconomic History    Marital status:      Spouse name: Anastasia Caballero    Number of children: 4    Years of education: 14    Highest education level: None   Occupational History    Occupation: retired     Comment:    Tobacco Use    Smoking status: Former     Packs/day: 2.00     Years: 30.00     Pack years:  "60.00     Types: Cigarettes, Cigars, Pipe     Start date: 1954     Quit date: 3/1/1992     Years since quittin.2    Smokeless tobacco: Never    Tobacco comments:     quit in    Substance and Sexual Activity    Alcohol use: Yes     Comment: minimal less than 1/week    Drug use: No    Sexual activity: Not Currently     Partners: Female     Birth control/protection: Abstinence, Pull-out method, Condom, Post-menopausal, Natural Family Planning, None   Other Topics Concern     Service Yes     Comment: [employer], served in [place]    Blood Transfusions Yes     Comment: Unsure if he had one with heart surgery    Caffeine Concern Yes     Comment: occ cofee    Occupational Exposure No    Hobby Hazards No    Sleep Concern Yes     Comment: CPAP    Stress Concern No    Special Diet No    Exercise Yes     Comment: [employer] 3 times a week, biking walking.     Bike Helmet Yes    Seat Belt Yes    Self-Exams Yes     Comment: does HIRAL about once a month.    Parent/sibling w/ CABG, MI or angioplasty before 65F 55M? No   Social History Narrative        4 kids       Review of Systems:  Skin:          Eyes:         ENT:         Respiratory:          Cardiovascular:         Gastroenterology:        Genitourinary:         Musculoskeletal:         Neurologic:         Psychiatric:         Heme/Lymph/Imm:         Endocrine:           Physical Exam:  Vitals: /60   Pulse 70   Ht 1.93 m (6' 4\")   Wt 119.3 kg (262 lb 14.4 oz)   SpO2 94%   BMI 32.00 kg/m      Constitutional:  cooperative, alert and oriented, well developed, well nourished, in no acute distress obese      Skin:  warm and dry to the touch, no apparent skin lesions or masses noted          Head:  normocephalic        Eyes:  pupils equal and round        Lymph:No Cervical lymphadenopathy present     ENT:  no pallor or cyanosis, dentition good        Neck:  carotid pulses are full and equal bilaterally;JVP normal        Respiratory:  normal breath " sounds, clear to auscultation, normal A-P diameter, normal symmetry, normal respiratory excursion, no use of accessory muscles         Cardiac: regular rhythm;normal S1 and S2   S4   LUSB;grade 1;systolic ejection murmur;RUSB        pulses full and equal                                        GI:    obese      Extremities and Muscular Skeletal:  no deformities, clubbing, cyanosis, erythema observed;no edema              Neurological:  no gross motor deficits;affect appropriate        Psych:  Alert and Oriented x 3        CC  Lino Hernandez MD  [address]  [place] [address]                Service Date: 05/12/2023    REFERRING PHYSICIAN:  Hamzah Hodge MD    HISTORY OF PRESENT ILLNESS:  It is my pleasure to see your patient, Austen Fowler.  Mr. Fowler is a very pleasant 88-year-old gentleman. This is a patient who was previously followed by Dr. John Paul Moreno and Lyndsey Cameron.  This patient previously had coronary artery bypass grafting with a LIMA to the LAD, right internal mammary artery graft to the right coronary artery and saphenous vein graft to both the first and second obtuse marginal arteries.  He has a known history of a dilated ascending aorta.  He has mild aortic stenosis.  He has type 2 diabetes mellitus and he had cellulitis of the lower extremity and developed acute diastolic heart failure at that time.  Echo at that time showed a class 3 diastolic dysfunction.  With diuresis, his echocardiogram significantly improved.  Also in this patient's history since I last saw him was when he was admitted to hospital, he developed a brief episode of paroxysmal atrial fibrillation in the setting of sepsis.  He spontaneously converted back to sinus rhythm.  He was seen by my partner, Dr. Jason Maya, and it was recommended that the patient would not be started on anticoagulation and he was placed on the 30-day monitor, which did not show any evidence of recurrent atrial  fibrillation.    With that background in mind, the patient is feeling well.  He has no shortness of breath, no orthopnea or PND.  Echocardiography, which was performed 8 days ago, shows that the patient has an EF of 55%-60%.  He has mild aortic stenosis with a calculated aortic valve area of 1.6 cm2 and a mean gradient of 11 mmHg.  The aortic root is borderline dilated at 3.8 cm.  The ascending aorta is borderline dilated at 3.5 cm.  His atria are normal in size, indicating that he does not have an obvious substrate for atrial fibrillation.  The patient does not feel any palpitations at all.    His blood pressure is well controlled at 106/60.  He does not have dizziness or lightheadedness.  His basic metabolic profile is also normal with the exception of borderline sodium at 146, potassium is 4.5, BUN is normal at 15.7 and his creatinine is 1.01 with a GFR of 72.  He is taking furosemide 40 mg per day.  With respect to lipid management, he falls within secondary prevention guidelines.  His LDL was excellent at 40 and his HDL has risen from 38 to 39 and his triglycerides are mildly raised at 248.  In the past, he used to work out at the , but since he developed cellulitis in his leg, he was not able to do that, but now the cellulitis is healed and I have encouraged him to go back to the .  Exercise would significantly diminish his triglycerides.  There was no evidence of liver dysfunction with an ALT of 12, indicating no hepatic toxicity from his statin therapy.    IMPRESSION:    1.  Coronary artery disease and status post coronary artery bypass grafting.  The patient is asymptomatic with respect to coronary artery disease with no symptoms suggestive of angina pectoris.  2.  Excellent lipid profile with the exception of borderline HDL deficiency and mild hypertriglyceridemia, probably related to inactivity.  3.  Mild aortic stenosis but not much change from previously.  4.  Borderline dilated ascending aorta and  aortic root.  5.  History of single episode of atrial fibrillation in the setting of sepsis with no recurrence and no recurrence on 30-day monitoring.  Patient falls within the gray zone for anticoagulation.  It may well have been a once-off episode in the setting of sepsis with no recurrent episodes. Dr. Maya I am sure made the decision to treat the patient conservatively because of the 1 episode of atrial fibrillation in the setting of stress, mainly sepsis, and he probably weighed up the risks and benefits of anticoagulation and decided to treat him with aspirin alone.    PLAN:    1.  I will have the patient wear a Zio Patch monitor for 14 days to see if there is any asymptomatic episodes of atrial fibrillation over this period of time.  2.  I will have the patient return to see me in 1 year's time.  I will repeat his echocardiogram to follow his aortic stenosis.  We will also recheck his lipids and basic metabolic profile given that he is on chronic diuretic therapy and lisinopril.  3.  Finally, I have encouraged him to try and lose weight and to exercise [facility]  I think that will help him a lot.    It is my pleasure to be involved in the care of this nice patient.    Lino Hernandez MD    cc:  Hamzah Hodge MD  [facility]  [address]  [place]  [id number]      Lino Vaughan MD, [facility]        D: 2023   T: 2023   MT: juan f    Name:     NAYELI RAI  MRN:      [id number]        Account:      [id number]   :      1935           Service Date: 2023       Document: [id number]     Thank you for allowing me to participate in the care of your patient.      Sincerely,     Lino Hernandez MD, MD     [facility]  cc:   Lino Hernandez MD  [address]  [place] [id number]

## 2023-05-12 NOTE — PROGRESS NOTES
Service Date: 05/12/2023    REFERRING PHYSICIAN:  Hamzah Hodge MD    HISTORY OF PRESENT ILLNESS:  It is my pleasure to see your patient, Austen Fowler.  Mr. Fowler is a very pleasant 88-year-old gentleman. This is a patient who was previously followed by Dr. John Paul Moreno and Lyndsey Cameron.  This patient previously had coronary artery bypass grafting with a LIMA to the LAD, right internal mammary artery graft to the right coronary artery and saphenous vein graft to both the first and second obtuse marginal arteries.  He has a known history of a dilated ascending aorta.  He has mild aortic stenosis.  He has type 2 diabetes mellitus and he had cellulitis of the lower extremity and developed acute diastolic heart failure at that time.  Echo at that time showed a class 3 diastolic dysfunction.  With diuresis, his echocardiogram significantly improved.  Also in this patient's history since I last saw him was when he was admitted to hospital, he developed a brief episode of paroxysmal atrial fibrillation in the setting of sepsis.  He spontaneously converted back to sinus rhythm.  He was seen by my partner, Dr. Jason Maya, and it was recommended that the patient would not be started on anticoagulation and he was placed on the 30-day monitor, which did not show any evidence of recurrent atrial fibrillation.    With that background in mind, the patient is feeling well.  He has no shortness of breath, no orthopnea or PND.  Echocardiography, which was performed 8 days ago, shows that the patient has an EF of 55%-60%.  He has mild aortic stenosis with a calculated aortic valve area of 1.6 cm2 and a mean gradient of 11 mmHg.  The aortic root is borderline dilated at 3.8 cm.  The ascending aorta is borderline dilated at 3.5 cm.  His atria are normal in size, indicating that he does not have an obvious substrate for atrial fibrillation.  The patient does not feel any palpitations at all.    His blood pressure is well  controlled at 106/60.  He does not have dizziness or lightheadedness.  His basic metabolic profile is also normal with the exception of borderline sodium at 146, potassium is 4.5, BUN is normal at 15.7 and his creatinine is 1.01 with a GFR of 72.  He is taking furosemide 40 mg per day.  With respect to lipid management, he falls within secondary prevention guidelines.  His LDL was excellent at 40 and his HDL has risen from 38 to 39 and his triglycerides are mildly raised at 248.  In the past, [facility], but since he developed cellulitis in his leg, he was not able to do that, but now the cellulitis is healed and I have encouraged him to go back to the .  Exercise would significantly diminish his triglycerides.  There was no evidence of liver dysfunction with an ALT of 12, indicating no hepatic toxicity from his statin therapy.    IMPRESSION:    1.  Coronary artery disease and status post coronary artery bypass grafting.  The patient is asymptomatic with respect to coronary artery disease with no symptoms suggestive of angina pectoris.  2.  Excellent lipid profile with the exception of borderline HDL deficiency and mild hypertriglyceridemia, probably related to inactivity.  3.  Mild aortic stenosis but not much change from previously.  4.  Borderline dilated ascending aorta and aortic root.  5.  History of single episode of atrial fibrillation in the setting of sepsis with no recurrence and no recurrence on 30-day monitoring.  Patient falls within the gray zone for anticoagulation.  It may well have been a once-off episode in the setting of sepsis with no recurrent episodes. Dr. Maya I am sure made the decision to treat the patient conservatively because of the 1 episode of atrial fibrillation in the setting of stress, mainly sepsis, and he probably weighed up the risks and benefits of anticoagulation and decided to treat him with aspirin alone.    PLAN:    1.  I will have the patient wear a Zio  Patch monitor for 14 days to see if there is any asymptomatic episodes of atrial fibrillation over this period of time.  2.  I will have the patient return to see me in 1 year's time.  I will repeat his echocardiogram to follow his aortic stenosis.  We will also recheck his lipids and basic metabolic profile given that he is on chronic diuretic therapy and lisinopril.  3.  Finally, I have encouraged him to try and lose weight and to exercise [facility].  I think that will help him a lot.    It is my pleasure to be involved in the care of this nice patient.    Lino Hernandez MD    cc:  Hamzah Hodge MD  [address] [facility] [address]  [place]  [address]      Lino Vaughan MD, [facility]        D: 2023   T: 2023   MT: juan f    Name:     NAYELI RAI SHELIA  MRN:      [id number]        Account:      [id number]   :      1935           Service Date: 2023       Document: [id number]

## 2023-05-12 NOTE — PROGRESS NOTES
HPI and Plan:   See dictation          Orders Placed This Encounter   Procedures     Basic metabolic panel     Lipid Profile     ALT     Basic metabolic panel     Lipid Profile     ALT     Follow-Up with Cardiology     Follow-Up with Cardiology     EKG 12-lead complete w/read - Clinics (to be scheduled)     Leadless EKG Monitor 8 to 14 Days     Echocardiogram Complete       Orders Placed This Encounter   Medications     furosemide (LASIX) 40 MG tablet     Sig: Take 1 tablet (40 mg) by mouth daily     Dispense:  90 tablet     Refill:  3     lisinopril (ZESTRIL) 10 MG tablet     Sig: Take 1 tablet (10 mg) by mouth daily     Dispense:  90 tablet     Refill:  3     DISCONTD: pravastatin (PRAVACHOL) 20 MG tablet     Sig: Take 1 tablet (20 mg) by mouth every evening - due for fasting physical with Dr Hodge for refills     Dispense:  90 tablet     Refill:  3     pravastatin (PRAVACHOL) 20 MG tablet     Sig: Take 1 tablet (20 mg) by mouth every evening - due for fasting physical with Dr Hodge for refills     Dispense:  90 tablet     Refill:  3       Medications Discontinued During This Encounter   Medication Reason     furosemide (LASIX) 40 MG tablet Reorder (No AVS / No eCancel)     pravastatin (PRAVACHOL) 20 MG tablet Reorder (No AVS / No eCancel)     lisinopril (ZESTRIL) 10 MG tablet Reorder (No AVS / No eCancel)     pravastatin (PRAVACHOL) 20 MG tablet Reorder (No AVS / No eCancel)         Encounter Diagnoses   Name Primary?     Coronary artery disease involving native coronary artery of native heart without angina pectoris      Hyperlipidemia LDL goal <70      Benign essential hypertension      Acute diastolic heart failure (H)      BENIGN HYPERTENSION      Dysphagia, unspecified type      S/P CABG (coronary artery bypass graft)      HDL deficiency      History of atrial fibrillation Yes     Aortic root dilatation (H)      Nonrheumatic aortic valve stenosis        CURRENT MEDICATIONS:  Current Outpatient Medications    Medication Sig Dispense Refill     acetaminophen (ACETAMIN) 500 MG tablet Take 1,000 mg by mouth 2 times daily       aspirin 81 MG EC tablet Take 81 mg by mouth daily        blood glucose monitoring (ONE TOUCH ULTRASOFT) lancets USE TO TEST BLOOD SUGAR  TWICE DAILY OR AS DIRECTED 100 each 3     busPIRone (BUSPAR) 5 MG tablet TAKE 1 TABLET BY MOUTH 3  TIMES DAILY 270 tablet 1     Cholecalciferol (VITAMIN D) 2000 UNIT tablet Take 2,000 Units by mouth daily. 90 tablet 3     dutasteride (AVODART) 0.5 MG capsule Take 1 capsule (0.5 mg) by mouth daily 93 capsule 3     finasteride (PROSCAR) 5 MG tablet TAKE 1 TABLET BY MOUTH  DAILY 90 tablet 2     furosemide (LASIX) 40 MG tablet Take 1 tablet (40 mg) by mouth daily 90 tablet 3     gabapentin (NEURONTIN) 300 MG capsule 300 mg in AM, 900 mg in  capsule 3     lisinopril (ZESTRIL) 10 MG tablet Take 1 tablet (10 mg) by mouth daily 90 tablet 3     metFORMIN (GLUCOPHAGE XR) 500 MG 24 hr tablet TAKE 2 TABLETS BY MOUTH  TWICE DAILY WITH MEALS 360 tablet 3     metoprolol succinate ER (TOPROL XL) 25 MG 24 hr tablet Take 1 tablet (25 mg) by mouth daily 90 tablet 3     multivitamin (OCUVITE) TABS tablet Take 1 tablet by mouth daily       omeprazole (PRILOSEC) 20 MG DR capsule Take 1 capsule (20 mg) by mouth daily 90 capsule 3     ONETOUCH ULTRA test strip USE TO TEST BLOOD SUGAR TWICE  DAILY OR AS DIRECTED 200 strip 1     ORDER FOR DME Uses Cpap machine for sleep apnea       potassium chloride (KLOR-CON) 20 MEQ packet DISSOLVE 1 PACKET IN 4 OZ  WATER OR JUICE AND DRINK 3  TIMES DAILY 270 each 1     pravastatin (PRAVACHOL) 20 MG tablet Take 1 tablet (20 mg) by mouth every evening - due for fasting physical with Dr Hodge for refills 90 tablet 3     Probiotic Product (PROBIOTIC PO) Take 1 capsule by mouth daily       tamsulosin (FLOMAX) 0.4 MG capsule Take 1 capsule (0.4 mg) by mouth daily 90 capsule 3     vitamin B-12 (CYANOCOBALAMIN) 1000 MCG tablet Take 1,000 mcg by mouth daily         gabapentin (NEURONTIN) 300 MG capsule Take 3 capsules (900 mg) by mouth 3 times daily for 7 days 63 capsule 0       ALLERGIES     Allergies   Allergen Reactions     No Known Allergies        PAST MEDICAL HISTORY:  Past Medical History:   Diagnosis Date     Anxiety state, unspecified      Aortic root dilatation (H)      Arthritis      Ascending aorta dilatation (H)      Basal cell cancer     s/p Mohs nose and bridge of nose on R.     Bunion      CAD (coronary artery disease)     CABG 1992: LIMA to LAD, SANDI to RCA, SVG to OM1 and OM2     Contact dermatitis and other eczema, due to unspecified cause     eczema - has light treatments with Dr. Rivera     Disorders of bursae and tendons in shoulder region, unspecified      Esophageal reflux      Essential hypertension, benign      Gallbladder disease      GERD (gastroesophageal reflux disease)      Heart attack (H)      Hyperlipidemia LDL goal <70      Left ventricular diastolic dysfunction      Low HDL (under 40) 3/28/2014     Nasal/sinus dis NEC     s/p surgery in 1995     Obesity      Osteoarthrosis, unspecified whether generalized or localized, shoulder region      Other hammer toe (acquired)      Sleep apnea     USES CPAP     Spinal stenosis, unspecified region other than cervical     MRI done 2006     Type 2 diabetes, HbA1c goal < 7% (H) 3/12/2015     Urge incontinence        PAST SURGICAL HISTORY:  Past Surgical History:   Procedure Laterality Date     ABDOMEN SURGERY  1994    gall bladder     BIOPSY      arms     CHOLECYSTECTOMY  6/1994     COLONOSCOPY N/A 4/11/2017    Procedure: COMBINED COLONOSCOPY, SINGLE OR MULTIPLE BIOPSY/POLYPECTOMY BY BIOPSY;  Surgeon: Bladimir Garner MD;  Location:  GI     CV LEFT HEART CATH  5-92, 6-92    Angioplasty     ENT SURGERY  5/1995    sinus surgery     ESOPHAGOSCOPY, GASTROSCOPY, DUODENOSCOPY (EGD), DILATATION, COMBINED  7/17/2014    Procedure: COMBINED ESOPHAGOSCOPY, GASTROSCOPY, DUODENOSCOPY (EGD), DILATATION;   Surgeon: Bladimir Garner MD;  Location:  GI     EYE SURGERY      cataracts removed both eyes     INJECT EPIDURAL LUMBAR       IR PICC REPOSITION RIGHT  9/1/2022     LAMINECTOMY LUMBAR TWO LEVELS N/A 4/29/2015    Procedure: LAMINECTOMY LUMBAR TWO LEVELS;  Surgeon: Bladimir Keenan MD;  Location:  OR     THORACIC SURGERY  11/1992    bypass     Z CABG, ARTERY-VEIN, FOUR  11/1992    CABG - LIMA to left anterior descending and saphenous vein bypass graft to the first and second obtuse marginal branch of the circumflex and the right mammary to the right coronary artery     [facility] NONSPECIFIC PROCEDURE  6-94    Gail lap     [facility] NONSPECIFIC PROCEDURE  1995    sinus surgery     [facility] NONSPECIFIC PROCEDURE      bilateral cataract surgery     [facility] COLONOSCOPY THRU STOMA W BIOPSY/CAUTERY TUMOR/POLYP/LESION  5/2007       FAMILY HISTORY:  Family History   Problem Relation Age of Onset     Cancer Brother         MELANOMA     Diabetes Mother         AODM     Thyroid Disease Mother      Diabetes Father         AODM     Myocardial Infarction Father      Cerebrovascular Disease Brother      Parkinsonism Sister      Family History Negative Son      Family History Negative Son      Family History Negative Son      Family History Negative Daughter      Coronary Artery Disease Brother         dod 2019     Cerebrovascular Disease Brother         dod 2019     Other Cancer Brother         dod 2000/melanoma     Breast Cancer Other         in remission reg chkups       SOCIAL HISTORY:  Social History     Socioeconomic History     Marital status:      Spouse name: Anastasia Caballero     Number of children: 4     Years of education: 14     Highest education level: None   Occupational History     Occupation: retired     Comment:    Tobacco Use     Smoking status: Former     Packs/day: 2.00     Years: 30.00     Pack years: 60.00     Types: Cigarettes, Cigars, Pipe     Start date: 1/1/1954     Quit date: 3/1/1992     Years  "since quittin.2     Smokeless tobacco: Never     Tobacco comments:     quit in    Substance and Sexual Activity     Alcohol use: Yes     Comment: minimal less than 1/week     Drug use: No     Sexual activity: Not Currently     Partners: Female     Birth control/protection: Abstinence, Pull-out method, Condom, Post-menopausal, Natural Family Planning, None   Other Topics Concern      Service Yes     Comment: [employer], served in [place]     Blood Transfusions Yes     Comment: Unsure if he had one with heart surgery     Caffeine Concern Yes     Comment: occ cofee     Occupational Exposure No     Hobby Hazards No     Sleep Concern Yes     Comment: CPAP     Stress Concern No     Special Diet No     Exercise Yes     Comment: [employer] 3 times a week, biking walking.      Bike Helmet Yes     Seat Belt Yes     Self-Exams Yes     Comment: does HIRAL about once a month.     Parent/sibling w/ CABG, MI or angioplasty before 65F 55M? No   Social History Narrative        4 kids       Review of Systems:  Skin:          Eyes:         ENT:         Respiratory:          Cardiovascular:         Gastroenterology:        Genitourinary:         Musculoskeletal:         Neurologic:         Psychiatric:         Heme/Lymph/Imm:         Endocrine:           Physical Exam:  Vitals: /60   Pulse 70   Ht 1.93 m (6' 4\")   Wt 119.3 kg (262 lb 14.4 oz)   SpO2 94%   BMI 32.00 kg/m      Constitutional:  cooperative, alert and oriented, well developed, well nourished, in no acute distress obese      Skin:  warm and dry to the touch, no apparent skin lesions or masses noted          Head:  normocephalic        Eyes:  pupils equal and round        Lymph:No Cervical lymphadenopathy present     ENT:  no pallor or cyanosis, dentition good        Neck:  carotid pulses are full and equal bilaterally;JVP normal        Respiratory:  normal breath sounds, clear to auscultation, normal A-P diameter, normal symmetry, normal " respiratory excursion, no use of accessory muscles         Cardiac: regular rhythm;normal S1 and S2   S4   LUSB;grade 1;systolic ejection murmur;RUSB        pulses full and equal                                        GI:    obese      Extremities and Muscular Skeletal:  no deformities, clubbing, cyanosis, erythema observed;no edema              Neurological:  no gross motor deficits;affect appropriate        Psych:  Alert and Oriented x 3        CC  Lino Hernandez MD  [id number]  [place] [id number]

## 2023-05-16 ENCOUNTER — OFFICE VISIT (OUTPATIENT)
Dept: PALLIATIVE MEDICINE | Facility: CLINIC | Age: 88
End: 2023-05-16
Attending: INTERNAL MEDICINE
Payer: COMMERCIAL

## 2023-05-16 VITALS — SYSTOLIC BLOOD PRESSURE: 127 MMHG | DIASTOLIC BLOOD PRESSURE: 78 MMHG | HEART RATE: 65 BPM

## 2023-05-16 DIAGNOSIS — M53.3 SI (SACROILIAC) JOINT DYSFUNCTION: ICD-10-CM

## 2023-05-16 DIAGNOSIS — M54.16 LUMBAR RADICULOPATHY: Primary | ICD-10-CM

## 2023-05-16 PROCEDURE — 99205 OFFICE O/P NEW HI 60 MIN: CPT | Performed by: ANESTHESIOLOGY

## 2023-05-16 ASSESSMENT — PAIN SCALES - GENERAL: PAINLEVEL: MODERATE PAIN (5)

## 2023-05-16 NOTE — PROGRESS NOTES
"[facility] Consultation    Date of visit: 5/16/2023    Reason for consultation:    Austen Fowler is a 88 year old male who is seen in consultation today at the request of his primary care physician, Hamzah Hodge.     Consultation and Evaluation for: \"SI joint dysfunction\"    Review of [place] Prescription Monitoring Program (): Today I have also reviewed the patient's history of controlled substance use, as provided by [place] licensed pharmacies and prescriber dispensers. YES  Gabapentin 300mg #360 5/12/2023    Review of Pain Questionnaire: Please see the [facility] health questionnaire, which the patient completed and reviewed with me in detail.    Review of Electronic Chart: Today I have also reviewed available medical information in the patient's medical record at [place] ([facility]), including relevant provider notes, laboratory work, and imaging.       Chief Complaint:    Chief Complaint   Patient presents with     Pain     Lower back (along belt line, left side worse than the right side       Pain history:  Austen Fowler is a 88 year old male who presents for initial evaluation of the following chronic pain complaints.     -Patient is struggling with pain.  He presents to clinic with his wife and daughter today.  -The patient reports chronic bilateral low back and buttock pain.  This began many years ago and has progressively worsened.  -He describes his pain as below the belt line and across the back.  It starts on the left side and radiates to the right side.  -He does not get pain in his legs when walking which is relieved with leaning over.  He does not use a gait aid.  -He can tolerate sitting for long periods of time his pain worsens when he is up and active.  -When standing the pain radiates down the posterior aspect of both of his legs to the level of the knee.  -He previously had multiple SI joint injections on the " left side but does not remember whether or not they were helpful or not.  -He also previously had multiple lumbar L2-3 epidural steroid injections and does not remember if those were helpful either.  -He was previously a patient of Dr. Nuñez in the pain clinic.  -Cardiology recommended that he wear a Zio patch monitor for the next 2 weeks to monitor for atrial fibrillation.  However he cannot tolerate the patch so he is no longer wearing it.  -Has had difficulty increasing his gabapentin secondary to bilateral hand intention tremor.  He has been on gabapentin for many years and his wife wonders if that may be causing his memory problems.    Red Flags: The patient denies bowel or bladder incontinence, parasthesias, weakness, saddle anesthesia, unintentional weight loss, or fever/chills/sweats.     PAIN QUESTIONAIRE:   Patient reported symptoms:  Patient Supplied Answers To the  Pain Questionnaire      5/9/2023     4:59 PM    Pain -  Patient Entered Questionnaire/Answers   What number best describes your pain right now:  0 = No pain  to  10 = Worst pain imaginable 2   How would you describe the pain dull, aching   Which of the following worsen your pain lying down    sitting    walking    exercise   Which of the following improve or reduce your pain lying down    sitting    relaxation   What number best describes your average pain for the past week:  0 = No pain  to  10 = Worst pain imaginable 4   What number best describes your LOWEST pain in past 24 hours:  0 = No pain  to  10 = Worst pain imaginable 1   What number best describes your WORST pain in past 24 hours:  0 = No pain  to  10 = Worst pain imaginable 8   When is your pain worst Constant   What non-medicine treatments have you already had for your pain chiropractic care    exercise    other                MEDICATIONS FOR PAIN:   Tylenol 1000 mg twice daily  Gabapentin 300 mg in the morning and 900 mg at night    BuSpar 5 mg tablet 3 times daily      Past  pain treatments:   Pain Clinic:  Yes     PT:  Yes - [facility]  Psychologist:  No  Self-care:  Yes       INJECTIONS:   L2/3 interlaminar epidural steroid injection 8/10/2020 & 5/18/2020 & 10/2019  LEFT SI joint injection - multiple last 9/20/2017  Cervical medial branch block 8/20/2018    SURGICAL HISTORY:   S/P previous lumbar laminectomy 2015    IMAGING:  LUMBAR MRI 9/18/2019:   FINDINGS: There are 5 lumbar-type vertebral bodies assumed for the  purposes of this dictation. The tip of the conus terminates at the  level of L1.     There is evidence of previous postsurgical changes posteriorly at the  L3-L4 and L4-L5 areas with scarring at those levels. No fluid  collection within the posterior paraspinous tissues.     Mild grade 1 anterolisthesis of L4 on L5. No loss of vertebral body  height or suspicious osseous lesion. Moderate loss of intervertebral  disc height with disc desiccation and degenerative endplate changes at  L1-2, L2-L3, L3-L4, and L4-L5. Mild degenerative changes and loss of  disc height at L5-S1. No STIR hyperintense endplate edema at any  level.     Level by level as follows:      T12-L1: No significant disc herniation. No spinal canal or neural  foraminal narrowing.      L1-L2: Mild circumferential disc bulge with mild endplate osteophytic  spurring. No spinal canal narrowing. No significant neural foraminal  narrowing.      L2-L3: Circumferential disc bulge with mild endplate osteophytic  spurring. Bilateral facet hypertrophy and ligamentum flavum infolding.  Moderate spinal canal narrowing particularly in an anterior posterior  dimension. Moderate right neural foraminal narrowing. Moderate left  neural foraminal narrowing.      L3-L4: Circumferential disc bulge and endplate osteophytic spurring.  Marked bilateral facet hypertrophy and ligamentum flavum infolding.  Moderate to severe central spinal canal narrowing. Moderate to severe  right neural foraminal narrowing. Moderate left neural  foraminal  narrowing.      L4-L5: Previous surgical changes at this level. Mild grade 1  anterolisthesis with uncovering of the disc and bilateral uncinate  spurring. Marked bilateral facet of atrophy and ligamentum flavum  thickening. Severe central spinal canal narrowing. Moderate to severe  right neural foraminal narrowing. Severe left neural foraminal  narrowing.      L5-S1: Mild posterior disc bulge asymmetric in the right subarticular  region with mild uncinate spurring. Mild bilateral facet hypertrophy.  No spinal canal narrowing. Mild right neural foraminal narrowing. No  significant left neural foraminal narrowing.      Paraspinous soft tissues: Unremarkable.                                                                    IMPRESSION:  Marked multilevel degenerative changes throughout the  lumbar spine most present at L2-L3, L3-L4, and L4-L5 levels as  detailed above. Spinal canal narrowings have increased since previous  MR, particularly at the L2-L3 and L3-L4 levels. Neural foraminal  narrowings are also increased since that time.      Social History:  Home situation: [facility]   Occupation/Schooling: Retired      Past Medical History:  Past Medical History:   Diagnosis Date     Anxiety state, unspecified      Aortic root dilatation (H)      Arthritis      Ascending aorta dilatation (H)      Basal cell cancer     s/p Mohs nose and bridge of nose on R.     Bunion      CAD (coronary artery disease)     CABG 1992: LIMA to LAD, SANDI to RCA, SVG to OM1 and OM2     Contact dermatitis and other eczema, due to unspecified cause     eczema - has light treatments with Dr. Rivera     Disorders of bursae and tendons in shoulder region, unspecified      Esophageal reflux      Essential hypertension, benign      Gallbladder disease      GERD (gastroesophageal reflux disease)      Heart attack (H)      Hyperlipidemia LDL goal <70      Left ventricular diastolic dysfunction      Low HDL (under 40) 3/28/2014      Nasal/sinus dis NEC     s/p surgery in 1995     Obesity      Osteoarthrosis, unspecified whether generalized or localized, shoulder region      Other hammer toe (acquired)      Sleep apnea     USES CPAP     Spinal stenosis, unspecified region other than cervical     MRI done 2006     Type 2 diabetes, HbA1c goal < 7% (H) 3/12/2015     Urge incontinence        Past Surgical History:  Past Surgical History:   Procedure Laterality Date     ABDOMEN SURGERY  1994    gall bladder     BIOPSY      arms     CHOLECYSTECTOMY  6/1994     COLONOSCOPY N/A 4/11/2017    Procedure: COMBINED COLONOSCOPY, SINGLE OR MULTIPLE BIOPSY/POLYPECTOMY BY BIOPSY;  Surgeon: Bladimir Garner MD;  Location:  GI     CV LEFT HEART CATH  5-92, 6-92    Angioplasty     ENT SURGERY  5/1995    sinus surgery     ESOPHAGOSCOPY, GASTROSCOPY, DUODENOSCOPY (EGD), DILATATION, COMBINED  7/17/2014    Procedure: COMBINED ESOPHAGOSCOPY, GASTROSCOPY, DUODENOSCOPY (EGD), DILATATION;  Surgeon: Bladimir Garner MD;  Location:  GI     EYE SURGERY      cataracts removed both eyes     INJECT EPIDURAL LUMBAR       IR PICC REPOSITION RIGHT  9/1/2022     LAMINECTOMY LUMBAR TWO LEVELS N/A 4/29/2015    Procedure: LAMINECTOMY LUMBAR TWO LEVELS;  Surgeon: Bladimir Keenan MD;  Location:  OR     THORACIC SURGERY  11/1992    bypass     [facility] CABG, ARTERY-VEIN, FOUR  11/1992    CABG - LIMA to left anterior descending and saphenous vein bypass graft to the first and second obtuse marginal branch of the circumflex and the right mammary to the right coronary artery     [facility] NONSPECIFIC PROCEDURE  6-94    Gail lap     [facility] NONSPECIFIC PROCEDURE  1995    sinus surgery     [facility] NONSPECIFIC PROCEDURE      bilateral cataract surgery     [facility] COLONOSCOPY THRU STOMA W BIOPSY/CAUTERY TUMOR/POLYP/LESION  5/2007       Medications:  Current Outpatient Medications   Medication Sig Dispense Refill     acetaminophen (ACETAMIN) 500 MG tablet Take 1,000 mg by mouth 2 times daily       aspirin  81 MG EC tablet Take 81 mg by mouth daily        blood glucose monitoring (ONE TOUCH ULTRASOFT) lancets USE TO TEST BLOOD SUGAR  TWICE DAILY OR AS DIRECTED 100 each 3     busPIRone (BUSPAR) 5 MG tablet TAKE 1 TABLET BY MOUTH 3  TIMES DAILY 270 tablet 1     Cholecalciferol (VITAMIN D) 2000 UNIT tablet Take 2,000 Units by mouth daily. 90 tablet 3     dutasteride (AVODART) 0.5 MG capsule Take 1 capsule (0.5 mg) by mouth daily 93 capsule 3     finasteride (PROSCAR) 5 MG tablet TAKE 1 TABLET BY MOUTH  DAILY 90 tablet 2     furosemide (LASIX) 40 MG tablet Take 1 tablet (40 mg) by mouth daily 90 tablet 3     gabapentin (NEURONTIN) 300 MG capsule 300 mg in AM, 900 mg in  capsule 3     lisinopril (ZESTRIL) 10 MG tablet Take 1 tablet (10 mg) by mouth daily 90 tablet 3     metFORMIN (GLUCOPHAGE XR) 500 MG 24 hr tablet TAKE 2 TABLETS BY MOUTH  TWICE DAILY WITH MEALS 360 tablet 3     metoprolol succinate ER (TOPROL XL) 25 MG 24 hr tablet Take 1 tablet (25 mg) by mouth daily 90 tablet 3     multivitamin (OCUVITE) TABS tablet Take 1 tablet by mouth daily       omeprazole (PRILOSEC) 20 MG DR capsule Take 1 capsule (20 mg) by mouth daily 90 capsule 3     ONETOUCH ULTRA test strip USE TO TEST BLOOD SUGAR TWICE  DAILY OR AS DIRECTED 200 strip 1     ORDER FOR DME Uses Cpap machine for sleep apnea       potassium chloride (KLOR-CON) 20 MEQ packet DISSOLVE 1 PACKET IN 4 OZ  WATER OR JUICE AND DRINK 3  TIMES DAILY 270 each 1     pravastatin (PRAVACHOL) 20 MG tablet Take 1 tablet (20 mg) by mouth every evening - due for fasting physical with Dr Hodge for refills 90 tablet 3     Probiotic Product (PROBIOTIC PO) Take 1 capsule by mouth daily       tamsulosin (FLOMAX) 0.4 MG capsule Take 1 capsule (0.4 mg) by mouth daily 90 capsule 3     vitamin B-12 (CYANOCOBALAMIN) 1000 MCG tablet Take 1,000 mcg by mouth daily        gabapentin (NEURONTIN) 300 MG capsule Take 3 capsules (900 mg) by mouth 3 times daily for 7 days 63 capsule 0        Allergies:     Allergies   Allergen Reactions     No Known Allergies        Family history:  Family History   Problem Relation Age of Onset     Cancer Brother         MELANOMA     Diabetes Mother         AODM     Thyroid Disease Mother      Diabetes Father         AODM     Myocardial Infarction Father      Cerebrovascular Disease Brother      Parkinsonism Sister      Family History Negative Son      Family History Negative Son      Family History Negative Son      Family History Negative Daughter      Coronary Artery Disease Brother         dod 2019     Cerebrovascular Disease Brother         dod 2019     Other Cancer Brother         dod 2000/melanoma     Breast Cancer Other         in remission reg chkups       Physical Exam:  Vitals:    05/16/23 1337   BP: 127/78   Pulse: 65       GENERAL: Healthy, alert and no distress  EYES: Eyes grossly normal to inspection.  No discharge or erythema, or obvious scleral/conjunctival abnormalities.  RESP: No audible wheeze, cough, or visible cyanosis.  No visible retractions or increased work of breathing.    SKIN: Visible skin clear. No significant rash, abnormal pigmentation or lesions.  NEURO: Cranial nerves grossly intact.  Mentation and speech appropriate for age.  PSYCH: Mentation appears normal, affect normal/bright, judgement and insight intact, normal speech and appearance well-groomed.      Musculoskeletal exam:  Gait/Station/Posture: He walks without the use of a gait aid.  Gait is normal  Normal stance, arm swing, and stride; no antalgia or Trendelenburg  Normal bulk and tone. Unremarkable spinal curvature. ASIS heights even.     Lumbar spine:  Range of motion within normal limits    Rotation/ext to right: painful    Rotation/ext to left: painful   Myofascial tenderness: None  Focal tenderness: No SI joint, gluteal, piriformis, GT, or IT tenderness  SLR: Positive bilaterally  VIBHA: Positive bilaterally  Patient has tenderness to palpation on the left over the  posterior superior iliac spine    Neurologic exam:  CN:  Cranial nerves 2-12 are grossly intact  Motor Strength:  5/5 symmetric LE strength        ASSESSMENT/PLAN:  The following recommendations were given to the patient. Diagnosis, treatment options, risks, benefits, and alternatives were discussed, and all questions were answered. The patient expressed understanding of the plan for management.     Griffin is an 84-year-old gentleman with a past medical history significant for hypertension, type 2 diabetes, hyperlipidemia, obstructive sleep apnea, obesity and coronary artery disease and is here to discuss his chronic low back pain.      1. Lumbar radiculopathy  He was previously a patient of Dr. Jc in the pain clinic and was treated with multiple lumbar epidural steroid injections and left SI joint injections however he does not remember whether or not any of these injections were helpful.  His primary pain complaint is left-sided low back and buttock pain that radiates over to the right side.  He also occasionally gets radiating leg pain into his posterior thigh bilaterally.  Possible etiologies for his pain include neuroforaminal stenosis, central stenosis although he does not have any signs of neurogenic claudication, facet arthropathy and SI joint dysfunction.  He also probably has overlying myofascial pain.  Lumbar MRI shows multilevel degeneration throughout the lumbar spine.  He has severe central spinal canal narrowing at multiple levels most pronounced at the L2-3 and L3-4.  He also has neuroforaminal narrowing severe on the left at the L4-5 level.  He has only mild facet hypertrophy at the bottom 2 levels.  Physical exam is significant for positive straight leg bilaterally and a positive Bertin on the left.    Recommending CAUDAL ANSELMO as a first step.  If this is not helpful recommend LEFT SI joint injection and if that is not helpful possibly a bilateral L3,4,5 medial branch block to RFA.     I do not  wish to make medication changes at this time he has been on the same dose of gabapentin for a long time and is 88 years old.    He has tried physical therapy in the past and it has not been helpful.  We could consider repeating this in the future.    - PAIN INJECTION EVAL/TREAT/FOLLOW UP    2. SI (sacroiliac) joint dysfunction    - Pain Management  Referral        MEDICATIONS:   No orders of the defined types were placed in this encounter.         - Continue other medications without change           FOLLOW UP: After injections      BILLING TIME DOCUMENTATION:   The total TIME spent on this patient on the date of the encounter/appointment was 68 minutes.      TOTAL TIME includes:   Time spent preparing to see the patient (reviewing records and tests) - 8 min  Time spent face to face (or over the phone) with the patient - 46 min  Time spent ordering tests, medications, procedures and referrals - 2 min  Time spent Referring and communicating with other healthcare professionals - 0 min  Time spent documenting clinical information in Epic - 12 min       KANDI GAUTAM MD   Pain Management         Answers for HPI/ROS submitted by the patient on 5/9/2023  MARLA 7 TOTAL SCORE: 4

## 2023-05-16 NOTE — PATIENT INSTRUCTIONS
I am recommending we start with a caudal epidural steroid injection.  This is at the very bottom of the spine and different from the other epidural steroid injection he had in the past which was higher at the L2 level.  I am hoping that this could help with his predominantly left-sided low back pain and radiating leg pain.  Plan to see how he does after this injection and if is not helpful neck step would be to do bilateral SI joint injections.    I have placed an order for the epidural and once we have a prior authorization you will receive a phone call to schedule.  I did hold a injection spot for you at 3:15 PM this Friday.    Irene Burnett MD

## 2023-05-17 ENCOUNTER — TELEPHONE (OUTPATIENT)
Dept: PALLIATIVE MEDICINE | Facility: CLINIC | Age: 88
End: 2023-05-17
Payer: COMMERCIAL

## 2023-05-17 NOTE — TELEPHONE ENCOUNTER
Screening Questions for Radiology Injections:    Injection to be done at which interventional clinic site? [facility]    Procedure ordered by Dr. Burnett     Procedure ordered? CAUDAL ANSELMO     Transforaminal Cervical ANSELMO - Send to [facility] - No [facility] providers perform this procedure    What insurance would patient like us to bill for this procedure? [facility]     IF SCHEDULING IN [place] PAIN OR SPINE PLEASE SCHEDULE AT LEAST 7-10 BUSINESS DAYS OUT SO A PA CAN BE OBTAINED    Worker's comp or MVA (motor vehicle accident) -Any injection DO NOT SCHEDULE and route to Sarah Gudino.      Oversi insurance - For SI joint injections, DO NOT SCHEDULE and route to Ayla Kasi.       ALL BCBS, [employer] and [employer] - DO NOT SCHEDULE and route to Ayla Villaseñor    MEDICA- facet joint injections, route to Ayla Villaseñor    Is patient scheduled at [facility]?    If YES, route every encounter to [facility] SPINE CENTER CARE NAVIGATION POOL [[phone]]    Is an  needed? No     Patient has a  home? (Review Grid) YES: Informed     Any chance of pregnancy? Not Applicable   If YES, do NOT schedule and route to RN pool  - Dr. Hogue route to Lauren Menendez and PM&R Nurse  [[id number]]      Is patient actively being treated for cancer or immunocompromised? No  If YES, do NOT schedule and route to RN pool/ Dr. Hogue's Team    Does the patient have a bleeding or clotting disorder? No     If YES, okay to schedule AND route to RN nurse pool/ Dr. Hogue's Team     (For any patients with platelet count <100, RN must forward to provider)    Is patient taking any Blood Thinners OR Antiplatelet medication?  No   If hold needed, do NOT schedule, route to RN pool/ Dr. Hogue's Team    Examples:   o Blood Thinners: (Coumadin, Warfarin, Jantoven, Pradaxa, Xarelto, Eliquis, Edoxaban, Enoxaparin, Lovenox, Heparin, Arixtra, Fondaparinux or Fragmin)  o Antiplatelet Medications: (Plavix,  Brilinta or Effient)     Is patient taking any aspirin products (includes Excedrin and Fiorinal)? Yes - Pt takes 81 mg daily; instructed to hold 0 day(s) prior to procedure.      If more than 325mg/day, OK to schedule; Instruct Pt to decrease to less than 325 mg for 7 days AND route to RN pool/ Dr. Hogue's Team     For CERVICAL procedures, hold all aspirin products for 6 days.     Tell Pt that if aspirin product is not held for 6 days, the procedure WILL BE cancelled.     Any allergies to contrast dye, iodine, shellfish, or numbing and steroid medications? No    If YES, schedule and add allergy information to appointment notes AND route to the RN pool/ Dr. Hogue's Team    If ANSELMO and Contrast Dye / Iodine Allergy? DO NOT SCHEDULE, route to RN pool/ Dr. Hogue's Team    Allergies: No known allergies     Does patient have an active infection or treated for one within the past week? No    Is patient currently taking any antibiotics or steroid medications?  No     For patients on chronic, preventative, or prophylactic antibiotics, procedures may be scheduled.     For patients on antibiotics for active or recent infection, schedule 4 days after completed.    For patients on steroid medications, schedule 4 days after completed.     Has the patient had a flu shot or any other vaccinations within the past 7 days? No  If yes, explain that for the vaccine to work best they need to:       wait 1 week before and 1 week after getting any Vaccine    wait 1 week before and 2 weeks after getting Covid Vaccine #2 or BOOSTER    If patient has concerns about the timing, send to RN pool/ Dr. Hogue's Team    Does patient have an MRI/CT?  YES: 09/18/2019 Include Date and Check Procedure Scheduling Grid to see if required.    Was the MRI/CT done within the last 3 years?  Yes     If no route to RN Pool/ Dr. Hogue's Team    If yes, where was the MRI/CT done? [place]      Refer to PACS Transmissions list for approved external locations  and route to RN Pool High Priority/ Dr. Hogue's Team    If MRI was not done at approved external location do NOT schedule and route to RN pool/ Dr. Hogue's Team      If patient has an imaging disc, the injection MAY be scheduled but patient must bring disc to appt or appt will be cancelled.    Is patient able to transfer to a procedure table with minimal or no assistance? Yes     If no, do NOT schedule and route to RN Pool/ Dr. Hogue's Team    Procedure Specific Instructions:    If celiac plexus block, informed patient NPO for 6 hours and that it is okay to take medications with sips of water, especially blood pressure medications Not Applicable         If this is for a cervical procedure, informed patient that aspirin needs to be held for 6 days.   Not Applicable      Sedation, If Sedation is ordered for any procedure, patient must be NPO for 6 hours prior to procedure Not Applicable      If IV needed:    Do not schedule procedures requiring IV placement in the first appointment of the day or first appointment after lunch. Do NOT schedule at 0745, 0815 or 1245.       Instructed patient to arrive 30 minutes early for IV start if required. (Check Procedure Scheduling Grid)  Not Applicable    Reminders:    If you are started on any steroids or antibiotics between now and your appointment, you must contact us because the procedure may need to be cancelled.  Yes      As a reminder, receiving steroids can decrease your body's ability to fight infection.   Would you still like to move forward with scheduling the injection?  Yes      IV Sedation is not provided for procedures. If oral anti-anxiety medication is needed, the patient should request this from their referring provider.      Instruct patient to arrive as directed prior to the scheduled appointment time:  If IV needed 30 minutes before appointment time       For patients 85 or older we recommend having an adult stay w/ them for the remainder of the day.        If the patient is Diabetic, remind them to bring their glucometer.      Does the patient have any questions?  NO  Laura England  [facility]

## 2023-05-18 NOTE — PROGRESS NOTES
[facility] - Procedure Note    Date of Service: 5/19/2023    Procedure performed: Caudal epidural steroid injection with fluoroscopic guidance  Diagnosis: Lumbar spondylosis; Lumbar radiculitis/radiculopathy  : Irene Burnett MD  Anesthesia: none    Indications: Austen Fowler is a 88 year old male who is seen at the request of myself for a caudal epidural steroid injection. The patient describes chronic low back and buttock pain with occasional radiation into his legs. The patient has been exhibiting symptoms consistent with lumbar intraspinal inflammation and radiculopathy. Symptoms have been persistent, disabling, and intermittently severe. The patient reports minimal improvement with conservative treatment, including previous SI joint injections and ANSELMO's.    LUMBAR MRI was done on 9/18/2019 which showed:                                                                   IMPRESSION:  Marked multilevel degenerative changes throughout the  lumbar spine most present at L2-L3, L3-L4, and L4-L5 levels as  detailed above. Spinal canal narrowings have increased since previous  MR, particularly at the L2-L3 and L3-L4 levels. Neural foraminal  narrowings are also increased since that time.    Allergies:      Allergies   Allergen Reactions     No Known Allergies         Vitals:  /69   Pulse 64   SpO2 96%     Review of Systems: The patient denies recent fever, chills, illness, use of antibiotics or anticoagulants. All other 10-point review of systems negative.       Procedure: The procedure and risks were explained, and informed written consent was obtained from the patient. Risks include but are not limited to: infection, bleeding, increased pain, and damage to soft tissue, nerve, muscle, and vasculature structures. After getting informed consent, patient was brought into the procedure suite and was placed in a prone position on the procedure table. A Pause for the Cause was  performed. Patient was prepped and draped in sterile fashion.     The sacral hiatus and cornua were palpated.  Under lateral fluoroscopic guidance sacral hiatus was identified.  A total of 4.5 mL of Lidocaine 1% with 0.5 mL 8.4% sodium bicarbonate was used to anesthetize the skin and the needle track at a skin entry site.  A 22 gauge 5 inch spinal needle was advanced through the sacral hiatus using intermittent fluoroscopy. The needle angle was decreased to allow entrance into the sacral canal and epidural space.  This was verified in both the AP and lateral view for correct alignment.       The position was then inspected from anteroposterior and lateral views, and the needle adjusted appropriately.  After negative aspiration for heme and CSF, a total of 1 mL of Omnipaque-300 was injected using static and continuous fluoroscopy confirming appropriate position, into the epidural space, with no intravascular or intrathecal uptake. 0 mL of Omnipaque-300 was wasted.    2 mL of 1% lidocaine, 3 mL of preservative free saline, with 80 mg of triamcinolone was injected.  The needle was removed. Hemostasis was achieved, the area was cleaned, and bandaids were placed when appropriate. Images were saved to PACS.    The patient tolerated the procedure well, and was taken to the recovery room, and there was no evidence of procedural complications. No new sensory or motor deficits were noted following the procedure. The patient was stable and able to ambulate on discharge home. Post-procedure instructions were provided.     Pre-procedure pain score: 0/10 in the back, 7/10 in the leg  Post-procedure pain score: 0/10 in the back, 0/10 in the leg    Assessment/Plan: Austen Fowler is a 88 year old male s/p caudal epidural steroid injection today for lumbar spondylosis, radiculitis/radiculopathy.     1. Following today's procedure, the patient was advised to contact the Pain Management Center for any of the following:   Fever,  chills, or night sweats   New onset of pain, numbness, or weakness   Any questions/concerns regarding the procedure  If unable to contact the Pain Center, the patient was instructed to go to a local Emergency Room for any complications.   2. The patient should follow-up with the referring provider in 2 weeks for post-procedure evaluation.    KANDI GAUTAM MD   Pain Management

## 2023-05-19 ENCOUNTER — RADIOLOGY INJECTION OFFICE VISIT (OUTPATIENT)
Dept: PALLIATIVE MEDICINE | Facility: CLINIC | Age: 88
End: 2023-05-19
Attending: ANESTHESIOLOGY
Payer: COMMERCIAL

## 2023-05-19 VITALS — SYSTOLIC BLOOD PRESSURE: 134 MMHG | HEART RATE: 60 BPM | OXYGEN SATURATION: 98 % | DIASTOLIC BLOOD PRESSURE: 78 MMHG

## 2023-05-19 DIAGNOSIS — M54.16 LUMBAR RADICULOPATHY: ICD-10-CM

## 2023-05-19 PROCEDURE — 62323 NJX INTERLAMINAR LMBR/SAC: CPT | Performed by: ANESTHESIOLOGY

## 2023-05-19 RX ORDER — TRIAMCINOLONE ACETONIDE 40 MG/ML
40 INJECTION, SUSPENSION INTRA-ARTICULAR; INTRAMUSCULAR ONCE
Status: COMPLETED | OUTPATIENT
Start: 2023-05-19 | End: 2023-05-19

## 2023-05-19 RX ADMIN — TRIAMCINOLONE ACETONIDE 40 MG: 40 INJECTION, SUSPENSION INTRA-ARTICULAR; INTRAMUSCULAR at 15:33

## 2023-05-19 RX ADMIN — TRIAMCINOLONE ACETONIDE 40 MG: 40 INJECTION, SUSPENSION INTRA-ARTICULAR; INTRAMUSCULAR at 15:34

## 2023-05-19 NOTE — NURSING NOTE
Discharge Information    IV Discontiued Time:  NA    Amount of Fluid Infused:  NA    Discharge Criteria = When patient returns to baseline or as per MD order    Consciousness:  Pt is fully awake    Circulation:  BP +/- 20% of pre-procedure level    Respiration:  Patient is able to breathe deeply    O2 Sat:  Patient is able to maintain O2 Sat >92% on room air    Activity:  Moves 4 extremities on command    Ambulation:  Patient is able to stand and walk or stand and pivot into wheelchair    Dressing:  Clean/dry or No Dressing    Notes:   Discharge instructions and AVS given to patient    Patient meets criteria for discharge?  YES    Admitted to PCU?  No    Responsible adult present to accompany patient home?  Yes    Signature/Title:    Emely Jiménez RN  RN Care Coordinator  [facility]

## 2023-05-19 NOTE — NURSING NOTE
Anesthesia Post Evaluation    Patient: Radha Alfred    Procedure(s) Performed: Procedure(s) (LRB):  COLONOSCOPY (N/A)    Final Anesthesia Type: general      Patient location during evaluation: PACU  Patient participation: Yes- Able to Participate  Level of consciousness: awake and alert  Post-procedure vital signs: reviewed and stable  Pain management: adequate  Airway patency: patent    PONV status at discharge: No PONV  Anesthetic complications: no      Cardiovascular status: blood pressure returned to baseline  Respiratory status: unassisted  Hydration status: euvolemic  Follow-up not needed.          Vitals Value Taken Time   /65 03/10/22 1115   Temp 36.5 °C (97.7 °F) 03/10/22 1100   Pulse 70 03/10/22 1115   Resp 19 03/10/22 1115   SpO2 97 % 03/10/22 1115         Event Time   Out of Recovery 11:34:55         Pain/Izabella Score: Izabella Score: 10 (3/10/2022 11:00 AM)         Pre-procedure Intake    If YES to any questions or NO to having a   Please complete laminated checklist and leave on the computer keyboard for Provider, verbally inform provider if able.    For SCS Trial, RFA's or any sedation procedure: NA    If yes, for how long?         Are you taking any any blood thinners such as Coumadin, Warfarin, Jantoven, Pradaxa Xarelto, Eliquis, Edoxaban, Enoxaparin, Lovenox, Heparin, Arixtra, Fondaparinux, or Fragmin? OR Antiplatelet medication such as Plavix, Brilinta, or Effient?  NO     If yes, when did you take your last dose?        Do you take aspirin?   Yes 81 mg    If cervical procedure, have you held aspirin for 6 days?   NA    Do you have any allergies to contrast dye, iodine, steroid and/or numbing medications?  NO     Are you currently taking antibiotics or have an active infection?  NO     Have you had a fever/elevated temperature within the past week? NO     Are you currently taking oral steroids? NO     Do you have a ? Yes     Are you pregnant or breastfeeding? NA     Have you received any vaccines in the past 2 weeks? NO       Notify provider and RNs if systolic BP >170, diastolic BP >100, P >100 or O2 sats < 90%

## 2023-06-12 ENCOUNTER — APPOINTMENT (OUTPATIENT)
Dept: GENERAL RADIOLOGY | Facility: CLINIC | Age: 88
DRG: 309 | End: 2023-06-12
Attending: EMERGENCY MEDICINE
Payer: COMMERCIAL

## 2023-06-12 ENCOUNTER — HOSPITAL ENCOUNTER (INPATIENT)
Facility: CLINIC | Age: 88
LOS: 5 days | Discharge: HOME HEALTH | DRG: 309 | End: 2023-06-17
Attending: EMERGENCY MEDICINE | Admitting: HOSPITALIST
Payer: COMMERCIAL

## 2023-06-12 DIAGNOSIS — I95.9 HYPOTENSION, UNSPECIFIED HYPOTENSION TYPE: ICD-10-CM

## 2023-06-12 DIAGNOSIS — R42 DIZZINESS: ICD-10-CM

## 2023-06-12 DIAGNOSIS — I48.91 ATRIAL FIBRILLATION WITH RVR (H): ICD-10-CM

## 2023-06-12 DIAGNOSIS — I48.91 ATRIAL FIBRILLATION, UNSPECIFIED TYPE (H): Primary | ICD-10-CM

## 2023-06-12 DIAGNOSIS — R29.6 FALLS FREQUENTLY: ICD-10-CM

## 2023-06-12 DIAGNOSIS — I50.31 ACUTE DIASTOLIC HEART FAILURE (H): ICD-10-CM

## 2023-06-12 DIAGNOSIS — N40.0 BENIGN PROSTATIC HYPERPLASIA, UNSPECIFIED WHETHER LOWER URINARY TRACT SYMPTOMS PRESENT: ICD-10-CM

## 2023-06-12 DIAGNOSIS — I50.9 ACUTE ON CHRONIC CONGESTIVE HEART FAILURE, UNSPECIFIED HEART FAILURE TYPE (H): ICD-10-CM

## 2023-06-12 DIAGNOSIS — I95.1 ORTHOSTATIC HYPOTENSION: ICD-10-CM

## 2023-06-12 LAB
ANION GAP SERPL CALCULATED.3IONS-SCNC: 16 MMOL/L (ref 7–15)
ATRIAL RATE - MUSE: NORMAL BPM
BASOPHILS # BLD AUTO: 0 10E3/UL (ref 0–0.2)
BASOPHILS NFR BLD AUTO: 0 %
BUN SERPL-MCNC: 21 MG/DL (ref 8–23)
CALCIUM SERPL-MCNC: 9.7 MG/DL (ref 8.8–10.2)
CHLORIDE SERPL-SCNC: 100 MMOL/L (ref 98–107)
CREAT SERPL-MCNC: 0.87 MG/DL (ref 0.67–1.17)
DEPRECATED HCO3 PLAS-SCNC: 23 MMOL/L (ref 22–29)
DIASTOLIC BLOOD PRESSURE - MUSE: NORMAL MMHG
EOSINOPHIL # BLD AUTO: 0.2 10E3/UL (ref 0–0.7)
EOSINOPHIL NFR BLD AUTO: 3 %
ERYTHROCYTE [DISTWIDTH] IN BLOOD BY AUTOMATED COUNT: 15.4 % (ref 10–15)
GFR SERPL CREATININE-BSD FRML MDRD: 84 ML/MIN/1.73M2
GLUCOSE BLDC GLUCOMTR-MCNC: 151 MG/DL (ref 70–99)
GLUCOSE BLDC GLUCOMTR-MCNC: 196 MG/DL (ref 70–99)
GLUCOSE SERPL-MCNC: 220 MG/DL (ref 70–99)
HCT VFR BLD AUTO: 45.1 % (ref 40–53)
HGB BLD-MCNC: 14.7 G/DL (ref 13.3–17.7)
IMM GRANULOCYTES # BLD: 0.1 10E3/UL
IMM GRANULOCYTES NFR BLD: 1 %
INTERPRETATION ECG - MUSE: NORMAL
LYMPHOCYTES # BLD AUTO: 2.7 10E3/UL (ref 0.8–5.3)
LYMPHOCYTES NFR BLD AUTO: 29 %
MCH RBC QN AUTO: 28.2 PG (ref 26.5–33)
MCHC RBC AUTO-ENTMCNC: 32.6 G/DL (ref 31.5–36.5)
MCV RBC AUTO: 87 FL (ref 78–100)
MONOCYTES # BLD AUTO: 0.6 10E3/UL (ref 0–1.3)
MONOCYTES NFR BLD AUTO: 6 %
NEUTROPHILS # BLD AUTO: 5.8 10E3/UL (ref 1.6–8.3)
NEUTROPHILS NFR BLD AUTO: 61 %
NRBC # BLD AUTO: 0 10E3/UL
NRBC BLD AUTO-RTO: 0 /100
NT-PROBNP SERPL-MCNC: 3290 PG/ML (ref 0–1800)
P AXIS - MUSE: NORMAL DEGREES
PLATELET # BLD AUTO: 183 10E3/UL (ref 150–450)
POTASSIUM SERPL-SCNC: 4.3 MMOL/L (ref 3.4–5.3)
PR INTERVAL - MUSE: NORMAL MS
QRS DURATION - MUSE: 98 MS
QT - MUSE: 310 MS
QTC - MUSE: 428 MS
R AXIS - MUSE: 56 DEGREES
RBC # BLD AUTO: 5.21 10E6/UL (ref 4.4–5.9)
SODIUM SERPL-SCNC: 139 MMOL/L (ref 136–145)
SYSTOLIC BLOOD PRESSURE - MUSE: NORMAL MMHG
T AXIS - MUSE: 6 DEGREES
TROPONIN T SERPL HS-MCNC: 20 NG/L
TROPONIN T SERPL HS-MCNC: 22 NG/L
VENTRICULAR RATE- MUSE: 115 BPM
WBC # BLD AUTO: 9.4 10E3/UL (ref 4–11)

## 2023-06-12 PROCEDURE — 258N000003 HC RX IP 258 OP 636: Performed by: EMERGENCY MEDICINE

## 2023-06-12 PROCEDURE — 84484 ASSAY OF TROPONIN QUANT: CPT | Performed by: EMERGENCY MEDICINE

## 2023-06-12 PROCEDURE — 258N000003 HC RX IP 258 OP 636: Performed by: HOSPITALIST

## 2023-06-12 PROCEDURE — 250N000013 HC RX MED GY IP 250 OP 250 PS 637: Performed by: HOSPITALIST

## 2023-06-12 PROCEDURE — 93005 ELECTROCARDIOGRAM TRACING: CPT | Mod: 76

## 2023-06-12 PROCEDURE — 250N000011 HC RX IP 250 OP 636: Performed by: EMERGENCY MEDICINE

## 2023-06-12 PROCEDURE — 250N000013 HC RX MED GY IP 250 OP 250 PS 637: Performed by: EMERGENCY MEDICINE

## 2023-06-12 PROCEDURE — 99223 1ST HOSP IP/OBS HIGH 75: CPT | Performed by: HOSPITALIST

## 2023-06-12 PROCEDURE — 250N000009 HC RX 250: Performed by: HOSPITALIST

## 2023-06-12 PROCEDURE — 96374 THER/PROPH/DIAG INJ IV PUSH: CPT

## 2023-06-12 PROCEDURE — 210N000002 HC R&B HEART CARE

## 2023-06-12 PROCEDURE — 93005 ELECTROCARDIOGRAM TRACING: CPT

## 2023-06-12 PROCEDURE — 99285 EMERGENCY DEPT VISIT HI MDM: CPT | Mod: 25

## 2023-06-12 PROCEDURE — 36415 COLL VENOUS BLD VENIPUNCTURE: CPT | Performed by: EMERGENCY MEDICINE

## 2023-06-12 PROCEDURE — 71046 X-RAY EXAM CHEST 2 VIEWS: CPT

## 2023-06-12 PROCEDURE — 99222 1ST HOSP IP/OBS MODERATE 55: CPT | Performed by: INTERNAL MEDICINE

## 2023-06-12 PROCEDURE — 250N000009 HC RX 250: Performed by: EMERGENCY MEDICINE

## 2023-06-12 PROCEDURE — 82374 ASSAY BLOOD CARBON DIOXIDE: CPT | Performed by: EMERGENCY MEDICINE

## 2023-06-12 PROCEDURE — 82310 ASSAY OF CALCIUM: CPT | Performed by: EMERGENCY MEDICINE

## 2023-06-12 PROCEDURE — 83880 ASSAY OF NATRIURETIC PEPTIDE: CPT | Performed by: EMERGENCY MEDICINE

## 2023-06-12 PROCEDURE — 85014 HEMATOCRIT: CPT | Performed by: EMERGENCY MEDICINE

## 2023-06-12 RX ORDER — NICOTINE POLACRILEX 4 MG
15-30 LOZENGE BUCCAL
Status: DISCONTINUED | OUTPATIENT
Start: 2023-06-12 | End: 2023-06-17 | Stop reason: HOSPADM

## 2023-06-12 RX ORDER — LIDOCAINE 40 MG/G
CREAM TOPICAL
Status: DISCONTINUED | OUTPATIENT
Start: 2023-06-12 | End: 2023-06-17 | Stop reason: HOSPADM

## 2023-06-12 RX ORDER — AMOXICILLIN 250 MG
2 CAPSULE ORAL 2 TIMES DAILY PRN
Status: DISCONTINUED | OUTPATIENT
Start: 2023-06-12 | End: 2023-06-17 | Stop reason: HOSPADM

## 2023-06-12 RX ORDER — PRAVASTATIN SODIUM 10 MG
20 TABLET ORAL EVERY EVENING
Status: DISCONTINUED | OUTPATIENT
Start: 2023-06-12 | End: 2023-06-17 | Stop reason: HOSPADM

## 2023-06-12 RX ORDER — DEXTROSE MONOHYDRATE 25 G/50ML
25-50 INJECTION, SOLUTION INTRAVENOUS
Status: DISCONTINUED | OUTPATIENT
Start: 2023-06-12 | End: 2023-06-17 | Stop reason: HOSPADM

## 2023-06-12 RX ORDER — ONDANSETRON 4 MG/1
4 TABLET, ORALLY DISINTEGRATING ORAL EVERY 6 HOURS PRN
Status: DISCONTINUED | OUTPATIENT
Start: 2023-06-12 | End: 2023-06-17 | Stop reason: HOSPADM

## 2023-06-12 RX ORDER — METOPROLOL SUCCINATE 25 MG/1
25 TABLET, EXTENDED RELEASE ORAL 2 TIMES DAILY
Status: DISCONTINUED | OUTPATIENT
Start: 2023-06-12 | End: 2023-06-13

## 2023-06-12 RX ORDER — TAMSULOSIN HYDROCHLORIDE 0.4 MG/1
0.4 CAPSULE ORAL DAILY
Status: DISCONTINUED | OUTPATIENT
Start: 2023-06-13 | End: 2023-06-17 | Stop reason: HOSPADM

## 2023-06-12 RX ORDER — DUTASTERIDE 0.5 MG/1
0.5 CAPSULE, LIQUID FILLED ORAL DAILY
Status: DISCONTINUED | OUTPATIENT
Start: 2023-06-13 | End: 2023-06-12

## 2023-06-12 RX ORDER — ONDANSETRON 2 MG/ML
4 INJECTION INTRAMUSCULAR; INTRAVENOUS EVERY 6 HOURS PRN
Status: DISCONTINUED | OUTPATIENT
Start: 2023-06-12 | End: 2023-06-17 | Stop reason: HOSPADM

## 2023-06-12 RX ORDER — FINASTERIDE 5 MG/1
5 TABLET, FILM COATED ORAL DAILY
Status: DISCONTINUED | OUTPATIENT
Start: 2023-06-13 | End: 2023-06-17 | Stop reason: HOSPADM

## 2023-06-12 RX ORDER — AMOXICILLIN 250 MG
1 CAPSULE ORAL 2 TIMES DAILY PRN
Status: DISCONTINUED | OUTPATIENT
Start: 2023-06-12 | End: 2023-06-17 | Stop reason: HOSPADM

## 2023-06-12 RX ORDER — PANTOPRAZOLE SODIUM 40 MG/1
40 TABLET, DELAYED RELEASE ORAL DAILY
Status: DISCONTINUED | OUTPATIENT
Start: 2023-06-13 | End: 2023-06-17 | Stop reason: HOSPADM

## 2023-06-12 RX ORDER — BUSPIRONE HYDROCHLORIDE 5 MG/1
5 TABLET ORAL 2 TIMES DAILY
Status: DISCONTINUED | OUTPATIENT
Start: 2023-06-12 | End: 2023-06-17 | Stop reason: HOSPADM

## 2023-06-12 RX ORDER — GABAPENTIN 300 MG/1
300 CAPSULE ORAL DAILY
Status: DISCONTINUED | OUTPATIENT
Start: 2023-06-13 | End: 2023-06-17 | Stop reason: HOSPADM

## 2023-06-12 RX ORDER — ASPIRIN 81 MG/1
324 TABLET, CHEWABLE ORAL ONCE
Status: CANCELLED | OUTPATIENT
Start: 2023-06-12 | End: 2023-06-12

## 2023-06-12 RX ORDER — DILTIAZEM HYDROCHLORIDE 5 MG/ML
25 INJECTION INTRAVENOUS ONCE
Status: COMPLETED | OUTPATIENT
Start: 2023-06-12 | End: 2023-06-12

## 2023-06-12 RX ADMIN — BUSPIRONE HYDROCHLORIDE 5 MG: 5 TABLET ORAL at 21:46

## 2023-06-12 RX ADMIN — METOPROLOL SUCCINATE 25 MG: 25 TABLET, EXTENDED RELEASE ORAL at 21:46

## 2023-06-12 RX ADMIN — PRAVASTATIN SODIUM 20 MG: 10 TABLET ORAL at 21:46

## 2023-06-12 RX ADMIN — APIXABAN 5 MG: 5 TABLET, FILM COATED ORAL at 12:32

## 2023-06-12 RX ADMIN — DILTIAZEM HYDROCHLORIDE 10 MG/HR: 5 INJECTION INTRAVENOUS at 16:31

## 2023-06-12 RX ADMIN — DILTIAZEM HYDROCHLORIDE 5 MG/HR: 5 INJECTION INTRAVENOUS at 14:20

## 2023-06-12 RX ADMIN — DILTIAZEM HYDROCHLORIDE 25 MG: 5 INJECTION INTRAVENOUS at 11:20

## 2023-06-12 ASSESSMENT — ACTIVITIES OF DAILY LIVING (ADL)
CONCENTRATING,_REMEMBERING_OR_MAKING_DECISIONS_DIFFICULTY: NO
DRESSING/BATHING_DIFFICULTY: NO
WEAR_GLASSES_OR_BLIND: NO
WALKING_OR_CLIMBING_STAIRS: AMBULATION DIFFICULTY, ASSISTANCE 1 PERSON
ADLS_ACUITY_SCORE: 35
ADLS_ACUITY_SCORE: 35
DOING_ERRANDS_INDEPENDENTLY_DIFFICULTY: NO
FALL_HISTORY_WITHIN_LAST_SIX_MONTHS: YES
CHANGE_IN_FUNCTIONAL_STATUS_SINCE_ONSET_OF_CURRENT_ILLNESS/INJURY: NO
TRANSFERRING: 0-->ASSISTANCE NEEDED (DEVELOPMENTALLY APPROPRIATE)
ADLS_ACUITY_SCORE: 23
TRANSFERRING: 0-->INDEPENDENT
ADLS_ACUITY_SCORE: 23
NUMBER_OF_TIMES_PATIENT_HAS_FALLEN_WITHIN_LAST_SIX_MONTHS: 2
WALKING_OR_CLIMBING_STAIRS_DIFFICULTY: YES
ADLS_ACUITY_SCORE: 35
DIFFICULTY_EATING/SWALLOWING: NO
TOILETING_ISSUES: NO

## 2023-06-12 NOTE — H&P
[facility]    History and Physical - Hospitalist Service       Date of Admission:  6/12/2023    Assessment & Plan      Austen Fowler is a 88 year old male admitted on 6/12/2023 with dizziness and lightheadedness and found to be in atrial fibrillation with rapid ventricular response    Dizziness and lightheadedness with likely due to atrial fibrillation with rapid ventricular response  Previous cardiology notes indicate an episode atrial fibrillation in the setting of sepsis so anticoagulation was not pursued  Now presents with clear recurrence of atrial fibrillation which improved with IV diltiazem  Anticipate he will need medication adjustments.  His blood pressure has been soft and so suspect this will complicate his medication adjustments  Plan  - Consult cardiology  - Defer echocardiogram as he just had one 1 month ago  - diltiazem gtt  - Hold lisinopril given hypotension reported.  This can be added and later as blood pressure tolerates  - Increase metoprolol to 25 mg of the XL twice daily  - Continue Eliquis  - PT and OT consultations  - As needed IV metoprolol as needed for heart rates greater than 120    Hypotension with likely orthostas  Patient with episodes of lightheadedness after standing  On Sunday, blood pressure systolic of 70s and he felt weak and lightheaded.  - Hold home lisinopril and lasix  - q8 hour orthostatic hypotension eval. If positive, place compression stockings    Chronic diastolic congestive heart failure  Echocardiogram from May 2023 with sudden left ventricle normal in size with ejection fraction of 55 to 60%.  No regional wall motion abnormalities.  BNP elevated however chest x-ray relatively clear so we will focus on rate control rather than aggressive diuresis  Plan  - holding home Lasix at 40 mg orally    Type 2 diabetes with polyneuropathy  Plan  - Hold metformin and do sliding scale insulin with a moderate carbohydrate diet    Chronic medical  conditions  BPH: Resume Flomax and finasteride once verified by pharmacy  GERD: Resume omeprazole once verified by pharmacy  Depression: Resume BuSpar once verified by pharmacy     Diet:  Moderate carb  DVT Prophylaxis: Pneumatic Compression Devices  Cosme Catheter: Not present  Lines: None     Cardiac Monitoring: None  Code Status:  Full code    Clinically Significant Risk Factors Present on Admission                # Drug Induced Platelet Defect: home medication list includes an antiplatelet medication   # Hypertension: Noted on problem list     # DMII: A1C = 7.3 % (Ref range: 0.0 - 5.6 %) within past 6 months             Disposition Plan      Expected Discharge Date: 06/14/2023                  Pj Ga DO  Hospitalist Service  [facility]  Securely message with [employer] (more info)  Text page via [email or web address] Paging/Directory     ______________________________________________________________________    Chief Complaint   Dizziness and lightheadedness    History is obtained from the patient    History of Present Illness   Austen Fowler is a 88 year old male with past medical history of coronary artery disease status post CABG in 1992, paroxysmal atrial fibrillation, type 2 diabetes, hypertension, hyperlipidemia, BPH, GERD who presents with dizziness and lightheadedness.  The patient reports dizziness and difficulty balancing over the past week and increased shaking.  He has been having increasing falls.  He is having episodes of lightheadedness especially with changes in position.  He denies any abnormal sensations of movement.      Past Medical History    Past Medical History:   Diagnosis Date     Acute on chronic congestive heart failure, unspecified heart failure type (H) 6/12/2023     Anxiety state, unspecified      Aortic root dilatation (H)      Arthritis      Ascending aorta dilatation (H)      Basal cell cancer     s/p Mohs nose and bridge of nose on R.     Bunion      CAD  (coronary artery disease)     CABG 1992: LIMA to LAD, SANDI to RCA, SVG to OM1 and OM2     Contact dermatitis and other eczema, due to unspecified cause     eczema - has light treatments with Dr. Rivera     Disorders of bursae and tendons in shoulder region, unspecified      Esophageal reflux      Essential hypertension, benign      Gallbladder disease      GERD (gastroesophageal reflux disease)      Heart attack (H)      Hyperlipidemia LDL goal <70      Left ventricular diastolic dysfunction      Low HDL (under 40) 3/28/2014     Nasal/sinus dis NEC     s/p surgery in 1995     Obesity      Osteoarthrosis, unspecified whether generalized or localized, shoulder region      Other hammer toe (acquired)      Sleep apnea     USES CPAP     Spinal stenosis, unspecified region other than cervical     MRI done 2006     Type 2 diabetes, HbA1c goal < 7% (H) 3/12/2015     Urge incontinence        Past Surgical History   Past Surgical History:   Procedure Laterality Date     ABDOMEN SURGERY  1994    gall bladder     BIOPSY      arms     CHOLECYSTECTOMY  6/1994     COLONOSCOPY N/A 4/11/2017    Procedure: COMBINED COLONOSCOPY, SINGLE OR MULTIPLE BIOPSY/POLYPECTOMY BY BIOPSY;  Surgeon: Bladimir Garner MD;  Location:  GI     CV LEFT HEART CATH  5-92, 6-92    Angioplasty     ENT SURGERY  5/1995    sinus surgery     ESOPHAGOSCOPY, GASTROSCOPY, DUODENOSCOPY (EGD), DILATATION, COMBINED  7/17/2014    Procedure: COMBINED ESOPHAGOSCOPY, GASTROSCOPY, DUODENOSCOPY (EGD), DILATATION;  Surgeon: Bladimir Garner MD;  Location:  GI     EYE SURGERY      cataracts removed both eyes     INJECT EPIDURAL LUMBAR       IR PICC REPOSITION RIGHT  9/1/2022     LAMINECTOMY LUMBAR TWO LEVELS N/A 4/29/2015    Procedure: LAMINECTOMY LUMBAR TWO LEVELS;  Surgeon: Bladimir Keenan MD;  Location:  OR     THORACIC SURGERY  11/1992    bypass     ZZC CABG, ARTERY-VEIN, FOUR  11/1992    CABG - LIMA to left anterior descending and saphenous vein bypass  graft to the first and second obtuse marginal branch of the circumflex and the right mammary to the right coronary artery     [facility] NONSPECIFIC PROCEDURE      Gail lap     [facility] NONSPECIFIC PROCEDURE      sinus surgery     [facility] NONSPECIFIC PROCEDURE      bilateral cataract surgery     [facility] COLONOSCOPY THRU STOMA W BIOPSY/CAUTERY TUMOR/POLYP/LESION  2007       Prior to Admission Medications   Prior to Admission Medications   Prescriptions Last Dose Informant Patient Reported? Taking?   Cholecalciferol (VITAMIN D) 2000 UNIT tablet 2023 Self Yes Yes   Sig: Take 2,000 Units by mouth daily.   ONETOUCH ULTRA test strip   No No   Sig: USE TO TEST BLOOD SUGAR TWICE  DAILY OR AS DIRECTED   ORDER FOR DME   Yes No   Sig: Uses Cpap machine for sleep apnea   Probiotic Product (PROBIOTIC PO) 2023 Self Yes Yes   Sig: Take 1 capsule by mouth daily   acetaminophen (ACETAMIN) 500 MG tablet 2023 at PM Self Yes Yes   Sig: Take 1,000 mg by mouth 2 times daily   aspirin 81 MG EC tablet 2023 Self Yes Yes   Sig: Take 81 mg by mouth daily    blood glucose monitoring (ONE TOUCH ULTRASOFT) lancets  Self No No   Sig: USE TO TEST BLOOD SUGAR  TWICE DAILY OR AS DIRECTED   busPIRone (BUSPAR) 5 MG tablet 2023 at PM Self No Yes   Sig: TAKE 1 TABLET BY MOUTH 3  TIMES DAILY   Patient taking differently: Take 5 mg by mouth 2 times daily   dutasteride (AVODART) 0.5 MG capsule NOT TAKING YET Self No No   Sig: Take 1 capsule (0.5 mg) by mouth daily   finasteride (PROSCAR) 5 MG tablet 2023 Self No Yes   Sig: TAKE 1 TABLET BY MOUTH  DAILY   furosemide (LASIX) 40 MG tablet 2023 Self No Yes   Sig: Take 1 tablet (40 mg) by mouth daily   gabapentin (NEURONTIN) 300 MG capsule 2023 at PM Self No Yes   Si mg in AM, 900 mg in PM   lisinopril (ZESTRIL) 10 MG tablet 2023 Self No Yes   Sig: Take 1 tablet (10 mg) by mouth daily   metFORMIN (GLUCOPHAGE XR) 500 MG 24 hr tablet 2023 at PM Self No Yes   Sig:  TAKE 2 TABLETS BY MOUTH  TWICE DAILY WITH MEALS   metoprolol succinate ER (TOPROL XL) 25 MG 24 hr tablet 2023 Self No Yes   Sig: Take 1 tablet (25 mg) by mouth daily   multivitamin (OCUVITE) TABS tablet 2023 Self Yes Yes   Sig: Take 1 tablet by mouth daily   omeprazole (PRILOSEC) 20 MG DR capsule 2023 Self No Yes   Sig: Take 1 capsule (20 mg) by mouth daily   potassium chloride (KLOR-CON) 20 MEQ packet 2023 Self No Yes   Sig: DISSOLVE 1 PACKET IN 4 OZ  WATER OR JUICE AND DRINK 3  TIMES DAILY   Patient taking differently: DISSOLVE 1 PACKET IN 4 OZ  WATER OR JUICE AND DRINK 2 TIMES DAILY   pravastatin (PRAVACHOL) 20 MG tablet 2023 Self No Yes   Sig: Take 1 tablet (20 mg) by mouth every evening - due for fasting physical with Dr Hodge for refills   tamsulosin (FLOMAX) 0.4 MG capsule 2023 Self No Yes   Sig: Take 1 capsule (0.4 mg) by mouth daily   vitamin B-12 (CYANOCOBALAMIN) 1000 MCG tablet 2023 Self Yes Yes   Sig: Take 1,000 mcg by mouth daily       Facility-Administered Medications: None        Review of Systems    The 10 point Review of Systems is negative other than noted in the HPI or here.     Social History   I have reviewed this patient's social history and updated it with pertinent information if needed.  Social History     Tobacco Use     Smoking status: Former     Packs/day: 2.00     Years: 30.00     Pack years: 60.00     Types: Cigarettes, Cigars, Pipe     Start date: 1954     Quit date: 3/1/1992     Years since quittin.3     Smokeless tobacco: Never     Tobacco comments:     quit in    Substance Use Topics     Alcohol use: Yes     Comment: minimal less than 1/week     Drug use: No       Family History   I have reviewed this patient's family history and updated it with pertinent information if needed.  Family History   Problem Relation Age of Onset     Cancer Brother         MELANOMA     Diabetes Mother         AODM     Thyroid Disease Mother      Diabetes  Father         AODM     Myocardial Infarction Father      Cerebrovascular Disease Brother      Parkinsonism Sister      Family History Negative Son      Family History Negative Son      Family History Negative Son      Family History Negative Daughter      Coronary Artery Disease Brother         dod 2019     Cerebrovascular Disease Brother         dod 2019     Other Cancer Brother         dod 2000/melanoma     Breast Cancer Other         in remission reg chkups       Allergies   Allergies   Allergen Reactions     No Known Allergies         Physical Exam   Vital Signs: Temp: 98.1  F (36.7  C) Temp src: Oral BP: 114/79 Pulse: 96   Resp: 15 SpO2: 96 % O2 Device: None (Room air)    Weight: 0 lbs 0 oz    Constitutional: Vital signs reviewed as above  General: Alert, pleasant  Eyes: Pupils are equal, round, and reactive to light.   Cardiovascular: Tachycardiac irregularly irregular. No MRG  Pulmonary/Chest: Effort normal and breath sounds normal. No respiratory distress. Patient has no wheezes. Patient has no rales.   Gastrointestinal: Soft. Positive bowel sounds. No MRG.  Musculoskeletal/Extremities: Full ROM.Trace edema to knees bilaterally.   Endo: No pitting edema  Neurological: Alert, no focal deficits.  Skin: Skin is warm and dry.   Psychiatric: Pleasant    Medical Decision Making       75 MINUTES SPENT BY ME on the date of service doing chart review, history, exam, documentation & further activities per the note.      Data     I have personally reviewed the following data over the past 24 hrs:    9.4  \   14.7   / 183     139 100 21.0 /  220 (H)   4.3 23 0.87 \       Trop: 20 BNP: 3,290 (H)       Imaging results reviewed over the past 24 hrs:   Recent Results (from the past 24 hour(s))   XR Chest 2 Views    Narrative    CHEST TWO VIEWS   6/12/2023 11:18 AM     HISTORY: Shortness of breath.    COMPARISON: Chest x-ray on 2/20/2023      Impression    IMPRESSION: PA and lateral views of the chest were  obtained.  Postsurgical changes of cardiac surgery with median sternotomy wires  and surgical clips. Cardiomediastinal silhouette is within normal  limits. Mild basilar pulmonary opacities, likely atelectasis. No  significant pleural effusion or pneumothorax.    JOSE C CHEEMA MD         SYSTEM ID:  [id number]

## 2023-06-12 NOTE — PHARMACY-ADMISSION MEDICATION HISTORY
Pharmacist Admission Medication History    Admission medication history is complete. The information provided in this note is only as accurate as the sources available at the time of the update.    Medication reconciliation/reorder completed by provider prior to medication history? No    Information Source(s): Patient, Family member and CareEverywhere/SureScripts via in-person    Pertinent Information:   -Jardiance was filled 23 for a 90-day supply but was not refilled due to cost issues  -dutasteride: not yet started as patient is finishing bottle of finasteride before switching to this; reports he has a 1-4 week supply left of finasteride    Changes made to PTA medication list:    Added: None    Deleted: gabapentin ( Rx, has active Rx on med list)    Changed: buspirone (TID --> BID), potassium (TID --> BID, spouse reports pt does not eat at lunchtime so does not take the midday dose)    Medication Affordability:  Not including over the counter (OTC) medications, was there a time in the past 3 months when you did not take your medications as prescribed because of cost?: Yes (Jardiance - not refilled due to cost)    Allergies reviewed with patient and updates made in EHR: yes; no changes made    Medication History Completed By: Maricel Vaughan RPH 2023 11:45 AM    Prior to Admission medications    Medication Sig Last Dose Taking? Auth Provider Long Term End Date   acetaminophen (ACETAMIN) 500 MG tablet Take 1,000 mg by mouth 2 times daily 2023 at PM Yes Reported, Patient     aspirin 81 MG EC tablet Take 81 mg by mouth daily  2023 Yes Unknown, Entered By History     busPIRone (BUSPAR) 5 MG tablet TAKE 1 TABLET BY MOUTH 3  TIMES DAILY  Patient taking differently: Take 5 mg by mouth 2 times daily 2023 at PM Yes Hamzah Hodge MD Yes    Cholecalciferol (VITAMIN D) 2000 UNIT tablet Take 2,000 Units by mouth daily. 2023 Yes Hamzah Hodge MD     finasteride (PROSCAR) 5 MG tablet TAKE 1  TABLET BY MOUTH  DAILY 6/11/2023 Yes Hamzah Hodge MD Yes    furosemide (LASIX) 40 MG tablet Take 1 tablet (40 mg) by mouth daily 6/11/2023 Yes Lino Hernandez MD Yes    gabapentin (NEURONTIN) 300 MG capsule 300 mg in AM, 900 mg in PM 6/11/2023 at PM Yes Hamzah Hodge MD Yes    lisinopril (ZESTRIL) 10 MG tablet Take 1 tablet (10 mg) by mouth daily 6/11/2023 Yes Lino Hernandez MD Yes    metFORMIN (GLUCOPHAGE XR) 500 MG 24 hr tablet TAKE 2 TABLETS BY MOUTH  TWICE DAILY WITH MEALS 6/11/2023 at PM Yes Hamzah Hodge MD Yes    metoprolol succinate ER (TOPROL XL) 25 MG 24 hr tablet Take 1 tablet (25 mg) by mouth daily 6/11/2023 Yes Hamzah Hodge MD Yes    multivitamin (OCUVITE) TABS tablet Take 1 tablet by mouth daily 6/11/2023 Yes Reported, Patient     omeprazole (PRILOSEC) 20 MG DR capsule Take 1 capsule (20 mg) by mouth daily 6/11/2023 Yes Hamzah Hodge MD     potassium chloride (KLOR-CON) 20 MEQ packet DISSOLVE 1 PACKET IN 4 OZ  WATER OR JUICE AND DRINK 3  TIMES DAILY  Patient taking differently: DISSOLVE 1 PACKET IN 4 OZ  WATER OR JUICE AND DRINK 2 TIMES DAILY 6/11/2023 Yes Hamzah Hodge MD     pravastatin (PRAVACHOL) 20 MG tablet Take 1 tablet (20 mg) by mouth every evening - due for fasting physical with Dr Hodge for refills 6/11/2023 Yes Lino Hernandez MD Yes    Probiotic Product (PROBIOTIC PO) Take 1 capsule by mouth daily 6/11/2023 Yes Unknown, Entered By History     tamsulosin (FLOMAX) 0.4 MG capsule Take 1 capsule (0.4 mg) by mouth daily 6/11/2023 Yes Hamzah Hodge MD     vitamin B-12 (CYANOCOBALAMIN) 1000 MCG tablet Take 1,000 mcg by mouth daily  6/11/2023 Yes Reported, Patient     blood glucose monitoring (ONE TOUCH ULTRASOFT) lancets USE TO TEST BLOOD SUGAR  TWICE DAILY OR AS DIRECTED   Hamzah Hodge MD     dutasteride (AVODART) 0.5 MG capsule Take 1 capsule (0.5 mg) by mouth daily NOT TAKING YET  Hamzah Hodge MD     [email or web address] ULTRA test strip USE TO TEST BLOOD  SUGAR TWICE  DAILY OR AS DIRECTED   Hamzah Hodge MD     ORDER FOR DME Uses Cpap machine for sleep apnea   Dominga Aguirre PA-C

## 2023-06-12 NOTE — ED NOTES
Wife reports pt's BP was 71/58 Sunday AM before medications. Improved to 106/69 after taking medications in early afternoon.

## 2023-06-12 NOTE — CONSULTS
[facility]    Cardiology Consultation     Date of Admission:  6/12/2023    Assessment & Plan     88 year old male with past medical history of coronary artery disease status post CABG in 1992, paroxysmal atrial fibrillation, type 2 diabetes, hypertension, hyperlipidemia, BPH, GERD admitted with rapid AFIB, started on diltiazem infusion. He reports improved symptoms upon evaluation. Rates still slightly elevated on Toprol XL 25 mg twice a day but responding nicely to rate control. BP stable and patient remains hemodynamically stable.    1. AFIB: EF by echocardiogram was recently normal thus OK to proceed with diltiazem infusion   -Increase toprol 50 mg twice a day and wean off diltiazem   -continue eliquis   -given age and chronicity would favor rate control   -holding lasix and lisinopril given orthostatics, encourage po intake   -can resume PTA medications at discharge once off diltiazem infusion     2. HFpEF:   -holding lasix, can resume PTA     Once rate controlled off dilitazem can resume PTA medications and likely discharge pending any additional items per hospitalist team.     Can sign off. Please page with questions.    Patricia Coffey MD  [facility]    Code Status    Full Code    Reason for Consult   Palpitations    Primary Care Physician   *Hamzah Hodge    Chief Complaint   Rapid AF    History of Present Illness     88 year old male with past medical history of coronary artery disease status post CABG in 1992, paroxysmal atrial fibrillation, type 2 diabetes, hypertension, hyperlipidemia, BPH, GERD who is admitted with dizziness, found to be in rapid AFIB. Denies syncope. Chest pain Sob. He was admitted with rapid AFIB, started on diltiazem infusion. He reports improved symptoms upon evaluation. Toprol increased. Cardiology consulted for management.    Past Medical History   I have reviewed this patient's medical history and updated it with pertinent information if needed.    Past Medical History:   Diagnosis Date     Acute on chronic congestive heart failure, unspecified heart failure type (H) 6/12/2023     Anxiety state, unspecified      Aortic root dilatation (H)      Arthritis      Ascending aorta dilatation (H)      Basal cell cancer     s/p Mohs nose and bridge of nose on R.     Bunion      CAD (coronary artery disease)     CABG 1992: LIMA to LAD, SANDI to RCA, SVG to OM1 and OM2     Contact dermatitis and other eczema, due to unspecified cause     eczema - has light treatments with Dr. Rivera     Disorders of bursae and tendons in shoulder region, unspecified      Esophageal reflux      Essential hypertension, benign      Gallbladder disease      GERD (gastroesophageal reflux disease)      Heart attack (H)      Hyperlipidemia LDL goal <70      Left ventricular diastolic dysfunction      Low HDL (under 40) 3/28/2014     Nasal/sinus dis NEC     s/p surgery in 1995     Obesity      Osteoarthrosis, unspecified whether generalized or localized, shoulder region      Other hammer toe (acquired)      Sleep apnea     USES CPAP     Spinal stenosis, unspecified region other than cervical     MRI done 2006     Type 2 diabetes, HbA1c goal < 7% (H) 3/12/2015     Urge incontinence        Past Surgical History   I have reviewed this patient's surgical history and updated it with pertinent information if needed.  Past Surgical History:   Procedure Laterality Date     ABDOMEN SURGERY  1994    gall bladder     BIOPSY      arms     CHOLECYSTECTOMY  6/1994     COLONOSCOPY N/A 4/11/2017    Procedure: COMBINED COLONOSCOPY, SINGLE OR MULTIPLE BIOPSY/POLYPECTOMY BY BIOPSY;  Surgeon: Bladimir Garner MD;  Location:  GI     CV LEFT HEART CATH  5-92, 6-92    Angioplasty     ENT SURGERY  5/1995    sinus surgery     ESOPHAGOSCOPY, GASTROSCOPY, DUODENOSCOPY (EGD), DILATATION, COMBINED  7/17/2014    Procedure: COMBINED ESOPHAGOSCOPY, GASTROSCOPY, DUODENOSCOPY (EGD), DILATATION;  Surgeon: Bladimir Garner MD;   Location:  GI     EYE SURGERY      cataracts removed both eyes     INJECT EPIDURAL LUMBAR       IR PICC REPOSITION RIGHT  9/1/2022     LAMINECTOMY LUMBAR TWO LEVELS N/A 4/29/2015    Procedure: LAMINECTOMY LUMBAR TWO LEVELS;  Surgeon: Bladimir Keenan MD;  Location:  OR     THORACIC SURGERY  11/1992    bypass     [facility] CABG, ARTERY-VEIN, FOUR  11/1992    CABG - LIMA to left anterior descending and saphenous vein bypass graft to the first and second obtuse marginal branch of the circumflex and the right mammary to the right coronary artery     [facility] NONSPECIFIC PROCEDURE  6-94    Gail lap     [facility] NONSPECIFIC PROCEDURE  1995    sinus surgery     [facility] NONSPECIFIC PROCEDURE      bilateral cataract surgery     [facility] COLONOSCOPY THRU STOMA W BIOPSY/CAUTERY TUMOR/POLYP/LESION  5/2007       Prior to Admission Medications   Prior to Admission Medications   Prescriptions Last Dose Informant Patient Reported? Taking?   Cholecalciferol (VITAMIN D) 2000 UNIT tablet 6/11/2023 Self Yes Yes   Sig: Take 2,000 Units by mouth daily.   ONETOUCH ULTRA test strip   No No   Sig: USE TO TEST BLOOD SUGAR TWICE  DAILY OR AS DIRECTED   ORDER FOR DME   Yes No   Sig: Uses Cpap machine for sleep apnea   Probiotic Product (PROBIOTIC PO) 6/11/2023 Self Yes Yes   Sig: Take 1 capsule by mouth daily   acetaminophen (ACETAMIN) 500 MG tablet 6/11/2023 at PM Self Yes Yes   Sig: Take 1,000 mg by mouth 2 times daily   aspirin 81 MG EC tablet 6/11/2023 Self Yes Yes   Sig: Take 81 mg by mouth daily    blood glucose monitoring (ONE TOUCH ULTRASOFT) lancets  Self No No   Sig: USE TO TEST BLOOD SUGAR  TWICE DAILY OR AS DIRECTED   busPIRone (BUSPAR) 5 MG tablet 6/11/2023 at PM Self No Yes   Sig: TAKE 1 TABLET BY MOUTH 3  TIMES DAILY   Patient taking differently: Take 5 mg by mouth 2 times daily   dutasteride (AVODART) 0.5 MG capsule NOT TAKING YET Self No No   Sig: Take 1 capsule (0.5 mg) by mouth daily   finasteride (PROSCAR) 5 MG tablet 6/11/2023 Self No  Yes   Sig: TAKE 1 TABLET BY MOUTH  DAILY   furosemide (LASIX) 40 MG tablet 2023 Self No Yes   Sig: Take 1 tablet (40 mg) by mouth daily   gabapentin (NEURONTIN) 300 MG capsule 2023 at PM Self No Yes   Si mg in AM, 900 mg in PM   lisinopril (ZESTRIL) 10 MG tablet 2023 Self No Yes   Sig: Take 1 tablet (10 mg) by mouth daily   metFORMIN (GLUCOPHAGE XR) 500 MG 24 hr tablet 2023 at PM Self No Yes   Sig: TAKE 2 TABLETS BY MOUTH  TWICE DAILY WITH MEALS   metoprolol succinate ER (TOPROL XL) 25 MG 24 hr tablet 2023 Self No Yes   Sig: Take 1 tablet (25 mg) by mouth daily   multivitamin (OCUVITE) TABS tablet 2023 Self Yes Yes   Sig: Take 1 tablet by mouth daily   omeprazole (PRILOSEC) 20 MG DR capsule 2023 Self No Yes   Sig: Take 1 capsule (20 mg) by mouth daily   potassium chloride (KLOR-CON) 20 MEQ packet 2023 Self No Yes   Sig: DISSOLVE 1 PACKET IN 4 OZ  WATER OR JUICE AND DRINK 3  TIMES DAILY   Patient taking differently: DISSOLVE 1 PACKET IN 4 OZ  WATER OR JUICE AND DRINK 2 TIMES DAILY   pravastatin (PRAVACHOL) 20 MG tablet 2023 Self No Yes   Sig: Take 1 tablet (20 mg) by mouth every evening - due for fasting physical with Dr Hodge for refills   tamsulosin (FLOMAX) 0.4 MG capsule 2023 Self No Yes   Sig: Take 1 capsule (0.4 mg) by mouth daily   vitamin B-12 (CYANOCOBALAMIN) 1000 MCG tablet 2023 Self Yes Yes   Sig: Take 1,000 mcg by mouth daily       Facility-Administered Medications: None     Allergies   Allergies   Allergen Reactions     No Known Allergies        Social History   I have reviewed this patient's social history and updated it with pertinent information if needed. Austen Fowler  reports that he quit smoking about 31 years ago. His smoking use included cigarettes, cigars, and pipe. He started smoking about 69 years ago. He has a 60.00 pack-year smoking history. He has never used smokeless tobacco. He reports current alcohol use. He reports that he  does not use drugs.    Family History   I have reviewed this patient's family history and updated it with pertinent information if needed.   Family History   Problem Relation Age of Onset     Cancer Brother         MELANOMA     Diabetes Mother         AODM     Thyroid Disease Mother      Diabetes Father         AODM     Myocardial Infarction Father      Cerebrovascular Disease Brother      Parkinsonism Sister      Family History Negative Son      Family History Negative Son      Family History Negative Son      Family History Negative Daughter      Coronary Artery Disease Brother         dod 2019     Cerebrovascular Disease Brother         dod 2019     Other Cancer Brother         dod 2000/melanoma     Breast Cancer Other         in remission reg chkups       Review of Systems   The 10 point Review of Systems is negative other than noted in the HPI or here.     Physical Exam   Temp: 98.1  F (36.7  C) Temp src: Oral BP: 114/79 Pulse: 96   Resp: 15 SpO2: 96 % O2 Device: None (Room air)    Vital Signs with Ranges  Temp:  [98.1  F (36.7  C)] 98.1  F (36.7  C)  Pulse:  [] 96  Resp:  [11-30] 15  BP: (100-128)/() 114/79  SpO2:  [93 %-98 %] 96 %  0 lbs 0 oz    Constitutional: Awake, alert, cooperative, no apparent distress.  Eyes: Conjunctiva and pupils examined and normal.  HEENT: Moist mucous membranes, normal dentition.  Respiratory: Clear to auscultation bilaterally, no crackles or wheezing.  Cardiovascular: Regular rate and rhythm, normal S1 and S2, and no murmur noted.  GI: Soft, non-distended, non-tender, normal bowel sounds.  Lymph/Hematologic: No anterior cervical or supraclavicular adenopathy.  Skin: No rashes, no cyanosis, no edema.  Musculoskeletal: No joint swelling, erythema or tenderness.  Neurologic: Cranial nerves 2-12 intact, normal strength and sensation.  Psychiatric: Alert, oriented to person, place and time, no obvious anxiety or depression.     Data   Results for orders placed or performed  during the hospital encounter of 06/12/23 (from the past 24 hour(s))   Glucose by meter   Result Value Ref Range    GLUCOSE BY METER POCT 151 (H) 70 - 99 mg/dL   Glucose by meter   Result Value Ref Range    GLUCOSE BY METER POCT 196 (H) 70 - 99 mg/dL   Glucose by meter   Result Value Ref Range    GLUCOSE BY METER POCT 170 (H) 70 - 99 mg/dL   CBC with platelets differential    Narrative    The following orders were created for panel order CBC with platelets differential.  Procedure                               Abnormality         Status                     ---------                               -----------         ------                     CBC with platelets and d...[[id number]]  Abnormal            Final result                 Please view results for these tests on the individual orders.   Comprehensive metabolic panel   Result Value Ref Range    Sodium 137 136 - 145 mmol/L    Potassium 4.0 3.4 - 5.3 mmol/L    Chloride 103 98 - 107 mmol/L    Carbon Dioxide (CO2) 21 (L) 22 - 29 mmol/L    Anion Gap 13 7 - 15 mmol/L    Urea Nitrogen 17.9 8.0 - 23.0 mg/dL    Creatinine 0.81 0.67 - 1.17 mg/dL    Calcium 9.5 8.8 - 10.2 mg/dL    Glucose 203 (H) 70 - 99 mg/dL    Alkaline Phosphatase 53 40 - 129 U/L    AST 18 10 - 50 U/L    ALT 11 10 - 50 U/L    Protein Total 6.4 6.4 - 8.3 g/dL    Albumin 3.3 (L) 3.5 - 5.2 g/dL    Bilirubin Total 0.6 <=1.2 mg/dL    GFR Estimate 85 >60 mL/min/1.73m2   CBC with platelets and differential   Result Value Ref Range    WBC Count 11.6 (H) 4.0 - 11.0 10e3/uL    RBC Count 5.09 4.40 - 5.90 10e6/uL    Hemoglobin 14.4 13.3 - 17.7 g/dL    Hematocrit 43.1 40.0 - 53.0 %    MCV 85 78 - 100 fL    MCH 28.3 26.5 - 33.0 pg    MCHC 33.4 31.5 - 36.5 g/dL    RDW 15.2 (H) 10.0 - 15.0 %    Platelet Count 186 150 - 450 10e3/uL    % Neutrophils 70 %    % Lymphocytes 21 %    % Monocytes 7 %    % Eosinophils 2 %    % Basophils 0 %    % Immature Granulocytes 0 %    NRBCs per 100 WBC 0 <1 /100    Absolute Neutrophils  8.0 1.6 - 8.3 10e3/uL    Absolute Lymphocytes 2.5 0.8 - 5.3 10e3/uL    Absolute Monocytes 0.8 0.0 - 1.3 10e3/uL    Absolute Eosinophils 0.3 0.0 - 0.7 10e3/uL    Absolute Basophils 0.0 0.0 - 0.2 10e3/uL    Absolute Immature Granulocytes 0.1 <=0.4 10e3/uL    Absolute NRBCs 0.0 10e3/uL   Glucose by meter   Result Value Ref Range    GLUCOSE BY METER POCT 214 (H) 70 - 99 mg/dL   Glucose by meter   Result Value Ref Range    GLUCOSE BY METER POCT 202 (H) 70 - 99 mg/dL     *Note: Due to a large number of results and/or encounters for the requested time period, some results have not been displayed. A complete set of results can be found in Results Review.

## 2023-06-12 NOTE — ED NOTES
Bed: ED23  Expected date:   Expected time:   Means of arrival:   Comments:  451  88 M a-fib rvr pulse 110-160  5088

## 2023-06-12 NOTE — ED PROVIDER NOTES
History     Chief Complaint:  Irregular Heart Beat       The history is provided by the patient and the spouse.      Austen Fowler is a 88 year old male with prior history of CAD, MI, hypertension, hyperlipidemia, and aortic root dilatation who presents with irregular heart beat. Patient states that he has been having difficulty with foot coordination over the past week and his body tremors have increased frequency. Patient has had more reported falls over yesterday and today. He also reports that when he goes from sitting to standing he has to take a second before he walks. Patient denies dizziness, room spinning, signs of weakness, numbness, tingling sensation, loss of strength. He has stopped taking his Jardiance last week.     Independent Historian:   Spouse/Partner - They report some of the history provided    Review of External Notes:   I reviewed his most recent clinic visit as well as his recent Holter monitor report which showed a run of SVT but no signs of A-fib.    Medications:    Aspirin 81 mg  Buspar  Vitamin D  Avodart  Proscar  Lasix  Gabapentin  Zestril  Metformin  Toprol XL  Ocuvite  Prilosec  Potassium Chloride  Pravastatin  Flomax  Cyanocobalamin    Past Medical History:    Anxiety state  Aortic root dilation  Arthritis  Ascending aorta dilatation  Basal cell cancer  bunion  CAD  Disorders of bursae and tendons in shoulder  Esophageal reflux  Essential hypertension  Gallbladder disease  GERD  Heart attack  Hyperlipidemia  Left ventricular diastolic dysfunction  Obesity  Osteoarthrosis  Hammer toe  Type II diabetes    Past Surgical History:    Gall bladder surgery  Biopsy of arm  Cholecystectomy x 2  Colonoscopy x 2  Angioplasty  Sinus surgery  EGD  Cataract removal bilateral  Inject epidural lumbar  PICC reposition right   Laminectomy lumbar two levels  Bypass surgery  CABG    Physical Exam     Patient Vitals for the past 24 hrs:   BP Temp Temp src Pulse Resp SpO2   06/12/23 1215 114/79 --  -- 96 15 96 %   06/12/23 1200 -- -- -- 78 12 95 %   06/12/23 1145 109/82 -- -- 80 22 96 %   06/12/23 1130 100/78 -- -- 75 19 96 %   06/12/23 1128 104/72 -- -- -- 30 --   06/12/23 1100 (!) 128/106 -- -- 117 29 93 %   06/12/23 1045 (!) 106/90 -- -- (!) 123 11 97 %   06/12/23 1024 (!) 117/90 -- -- (!) 124 17 97 %   06/12/23 1000 116/78 98.1  F (36.7  C) Oral (!) 130 13 98 %      Physical Exam  Constitutional: Vital signs reviewed as above  General: Alert, pleasant  HEENT: Moist mucous membranes  Eyes: Pupils are equal, round, and reactive to light.   Neck: Normal range of motion  Cardiovascular: Tachycardiac irregularly irregular. No MRG  Pulmonary/Chest: Effort normal and breath sounds normal. No respiratory distress. Patient has no wheezes. Patient has no rales.   Gastrointestinal: Soft. Positive bowel sounds. No MRG.  Musculoskeletal/Extremities: Full ROM.Trace edema to knees bilaterally.   Endo: No pitting edema  Neurological: Alert, no focal deficits.  Skin: Skin is warm and dry.   Psychiatric: Pleasant    Emergency Department Course   ECG #1  ECG taken at 1008, ECG read at 1016  Atrial fibrillation with RVR  Nonspecific ST abnormality   New atrial fibrillation as compared to prior, dated 02/20/2023.  Rate 130 bpm. NH interval * ms. QRS duration 96 ms. QT/QTc 304/447 ms. P-R-T axes * 55 7.   ECG #2  ECG obtained at 1036, ECG read at 1040  Atrial fibrillation with RVR  Nonspecific ST abnormality   Abnormal ECG  No significant changes from prior ECG dated 06/12/2023.  Rate 115 bpm. NH interval * ms. QRS duration 98 ms. QT/QTc 310/428 ms. P-R-T axes * 56 6.    Imaging:  XR Chest 2 Views   Final Result   IMPRESSION: PA and lateral views of the chest were obtained.   Postsurgical changes of cardiac surgery with median sternotomy wires   and surgical clips. Cardiomediastinal silhouette is within normal   limits. Mild basilar pulmonary opacities, likely atelectasis. No   significant pleural effusion or pneumothorax.       JOSE C CHEEMA MD            SYSTEM ID:  [id number]         Report per radiology    Laboratory:  Labs Ordered and Resulted from Time of ED Arrival to Time of ED Departure   BASIC METABOLIC PANEL - Abnormal       Result Value    Sodium 139      Potassium 4.3      Chloride 100      Carbon Dioxide (CO2) 23      Anion Gap 16 (*)     Urea Nitrogen 21.0      Creatinine 0.87      Calcium 9.7      Glucose 220 (*)     GFR Estimate 84     CBC WITH PLATELETS AND DIFFERENTIAL - Abnormal    WBC Count 9.4      RBC Count 5.21      Hemoglobin 14.7      Hematocrit 45.1      MCV 87      MCH 28.2      MCHC 32.6      RDW 15.4 (*)     Platelet Count 183      % Neutrophils 61      % Lymphocytes 29      % Monocytes 6      % Eosinophils 3      % Basophils 0      % Immature Granulocytes 1      NRBCs per 100 WBC 0      Absolute Neutrophils 5.8      Absolute Lymphocytes 2.7      Absolute Monocytes 0.6      Absolute Eosinophils 0.2      Absolute Basophils 0.0      Absolute Immature Granulocytes 0.1      Absolute NRBCs 0.0     NT PROBNP INPATIENT - Abnormal    N terminal Pro BNP Inpatient 3,290 (*)    TROPONIN T, HIGH SENSITIVITY - Normal    Troponin T, High Sensitivity 20     TROPONIN T, HIGH SENSITIVITY      Emergency Department Course & Assessments:     Interventions:  Medications   diltiazem (CARDIZEM) 125 mg in sodium chloride 0.9 % 125 mL infusion (5 mg/hr Intravenous Not Given 6/12/23 1219)   diltiazem (CARDIZEM) injection 25 mg (25 mg Intravenous $Given 6/12/23 1120)   apixaban ANTICOAGULANT (ELIQUIS) tablet 5 mg (5 mg Oral $Given 6/12/23 1232)        Assessments:  1034 I obtained the history noted above from the patient.  1223 I rechecked the patient and explained findings.    Independent Interpretation (X-rays, CTs, rhythm strip):  None    Consulations/Discussion of Management or Tests:  1309 I consulted with Dr. Ga of the hospitalist service and discussed patient admission. They accepted care of the patient.     Social  Determinants of Health affecting care:   None    Disposition:  The patient was admitted to the hospital under the care of Dr. Ga.     Impression & Plan    Medical Decision Making:  Patient presents emerged part with concerns over dizziness, falls, low blood pressure and heart racing.  He recently did have Holter monitor placed which showed run run of SVT but no other arrhythmias.  He has 1 short episode of A-fib in the past and is not currently anticoagulated.  His EKG here does show A-fib with RVR with a rate of 130.  There is no obvious ischemic changes.  He does have a history of CHF as well as acute coronary syndrome and is status post bypass grafting in the early 90s.  His troponin here was initially 20.  A delta troponin has been ordered.  His BNP is elevated to over 3000 however his chest x-ray does show cardiomegaly but no florid volume overload.  He only has trace edema to the ankles bilaterally.  He was initially given a loading dose of diltiazem which has him rate controlled.  He did drop his pressures into the 80s and since he was rate controlled we did not proceed with the drip.  I gave him a dose of Eliquis here to as he will need anticoagulation.  He is not a candidate for cardioversion here as his symptoms been going on for approximately a week.  The family is concerned that his blood pressures have been low and he is on lisinopril, Lasix and metoprolol.  It turns out his blood pressure was 70 the other day in the morning and they still gave him his medications.  He still feels quite dizzy and is concerned about going home.  I think it is reasonable to bring in the hospital for continued cardiac monitoring.he will likely need more IV medication to help with rate control in the meantime.  Again we initiated anticoagulation here in the emergency department.  With the falls he had he did not hit his head or lose consciousness he landed more on his side.  He has no discomfort from those.   Patient will be admitted to the hospitalist service for continued monitoring and treatment.      Diagnosis:    ICD-10-CM    1. Atrial fibrillation with RVR (H)  I48.91       2. Hypotension, unspecified hypotension type  I95.9       3. Dizziness  R42       4. Acute on chronic congestive heart failure, unspecified heart failure type (H)  I50.9       5. Falls frequently  R29.6            Discharge Medications:  New Prescriptions    No medications on file     Scribe Disclosure:  James HERNANDEZ am serving as a scribe at 10:55 AM on 6/12/2023 to document services personally performed by Tobi Hernandez MD based on my observations and the provider's statements to me.   6/12/2023   Tobi Hernandez MD Walters, Brent Aaron, MD  06/12/23 5771

## 2023-06-12 NOTE — ED NOTES
"[facility]  ED Nurse Handoff Report    ED Chief complaint: Irregular Heart Beat      ED Diagnosis:   Final diagnoses:   Atrial fibrillation with RVR (H)   Hypotension, unspecified hypotension type   Dizziness   Acute on chronic congestive heart failure, unspecified heart failure type (H)   Falls frequently       Code Status: To be addressed by admitting provider    Allergies:   Allergies   Allergen Reactions     No Known Allergies        Patient Story: BIBA, pt reports feeling dizzy, worsening tremors (has tremors at BL), and off-balance since Friday. Stated he \"tripped\" over feet Friday and had additional fall Saturday d/t inbalance. Denies hitting head. Pt's wife also reports hypotension yesterday, with BP in 70's. Found to be Afib RVR on arrival.  Focused Assessment:  Alert and oriented X 4, mild dizziness. HR in initially 120-150's, Afib RVR. Improved with Diltiazem IV push to Afib CVR. Diltiazem gtt held. Xarelto started. BP's soft, but stable. Pt has not had home medications today.    Treatments and/or interventions provided: Labs, meds (see MAR), CXR, EKG  Patient's response to treatments and/or interventions: TBD    To be done/followed up on inpatient unit:  Admission orders    Does this patient have any cognitive concerns?: Alert and oriented X 4    Activity level - Baseline/Home:  Independent  Activity Level - Current:   Stand with Assist    Patient's Preferred language: English   Needed?: No    Isolation: None  Infection: Not Applicable  Patient tested for COVID 19 prior to admission: NO  Bariatric?: No    Vital Signs:   Vitals:    06/12/23 1130 06/12/23 1145 06/12/23 1200 06/12/23 1215   BP: 100/78 109/82  114/79   BP Location:       Pulse: 75 80 78 96   Resp: 19 22 12 15   Temp:       TempSrc:       SpO2: 96% 96% 95% 96%       Cardiac Rhythm:Cardiac Rhythm: Atrial fibrillation (RVR)    Was the PSS-3 completed:   Yes  What interventions are required if any?             "   Family Comments: Pt notified family  OBS brochure/video discussed/provided to patient/family: N/A              Name of person given brochure if not patient: na              Relationship to patient: na    For the majority of the shift this patient's behavior was Green.   Behavioral interventions performed were na.    ED NURSE PHONE NUMBER: [phone]

## 2023-06-12 NOTE — ED TRIAGE NOTES
Per EMS: Felt dizzy, worsening tremors, off-balance since Friday. Had difficulty coordinating feet Friday and fell. Denies hitting head. Tsaile worse this AM with brief episode of chest pain the relieved without intervention. EMS called. 's-170's en route. . Afebrile.

## 2023-06-12 NOTE — PROGRESS NOTES
RECEIVING UNIT ED HANDOFF REVIEW    ED Nurse Handoff Report was reviewed by: Ashlyn Hart RN on June 12, 2023 at 3:30 PM

## 2023-06-13 ENCOUNTER — APPOINTMENT (OUTPATIENT)
Dept: PHYSICAL THERAPY | Facility: CLINIC | Age: 88
DRG: 309 | End: 2023-06-13
Attending: HOSPITALIST
Payer: COMMERCIAL

## 2023-06-13 LAB
ALBUMIN SERPL BCG-MCNC: 3.3 G/DL (ref 3.5–5.2)
ALP SERPL-CCNC: 53 U/L (ref 40–129)
ALT SERPL W P-5'-P-CCNC: 11 U/L (ref 10–50)
ANION GAP SERPL CALCULATED.3IONS-SCNC: 13 MMOL/L (ref 7–15)
AST SERPL W P-5'-P-CCNC: 18 U/L (ref 10–50)
BASOPHILS # BLD AUTO: 0 10E3/UL (ref 0–0.2)
BASOPHILS NFR BLD AUTO: 0 %
BILIRUB SERPL-MCNC: 0.6 MG/DL
BUN SERPL-MCNC: 17.9 MG/DL (ref 8–23)
CALCIUM SERPL-MCNC: 9.5 MG/DL (ref 8.8–10.2)
CHLORIDE SERPL-SCNC: 103 MMOL/L (ref 98–107)
CREAT SERPL-MCNC: 0.81 MG/DL (ref 0.67–1.17)
DEPRECATED HCO3 PLAS-SCNC: 21 MMOL/L (ref 22–29)
EOSINOPHIL # BLD AUTO: 0.3 10E3/UL (ref 0–0.7)
EOSINOPHIL NFR BLD AUTO: 2 %
ERYTHROCYTE [DISTWIDTH] IN BLOOD BY AUTOMATED COUNT: 15.2 % (ref 10–15)
GFR SERPL CREATININE-BSD FRML MDRD: 85 ML/MIN/1.73M2
GLUCOSE BLDC GLUCOMTR-MCNC: 170 MG/DL (ref 70–99)
GLUCOSE BLDC GLUCOMTR-MCNC: 202 MG/DL (ref 70–99)
GLUCOSE BLDC GLUCOMTR-MCNC: 211 MG/DL (ref 70–99)
GLUCOSE BLDC GLUCOMTR-MCNC: 214 MG/DL (ref 70–99)
GLUCOSE BLDC GLUCOMTR-MCNC: 227 MG/DL (ref 70–99)
GLUCOSE SERPL-MCNC: 203 MG/DL (ref 70–99)
HCT VFR BLD AUTO: 43.1 % (ref 40–53)
HGB BLD-MCNC: 14.4 G/DL (ref 13.3–17.7)
IMM GRANULOCYTES # BLD: 0.1 10E3/UL
IMM GRANULOCYTES NFR BLD: 0 %
LYMPHOCYTES # BLD AUTO: 2.5 10E3/UL (ref 0.8–5.3)
LYMPHOCYTES NFR BLD AUTO: 21 %
MCH RBC QN AUTO: 28.3 PG (ref 26.5–33)
MCHC RBC AUTO-ENTMCNC: 33.4 G/DL (ref 31.5–36.5)
MCV RBC AUTO: 85 FL (ref 78–100)
MONOCYTES # BLD AUTO: 0.8 10E3/UL (ref 0–1.3)
MONOCYTES NFR BLD AUTO: 7 %
NEUTROPHILS # BLD AUTO: 8 10E3/UL (ref 1.6–8.3)
NEUTROPHILS NFR BLD AUTO: 70 %
NRBC # BLD AUTO: 0 10E3/UL
NRBC BLD AUTO-RTO: 0 /100
PLATELET # BLD AUTO: 186 10E3/UL (ref 150–450)
POTASSIUM SERPL-SCNC: 4 MMOL/L (ref 3.4–5.3)
PROT SERPL-MCNC: 6.4 G/DL (ref 6.4–8.3)
RBC # BLD AUTO: 5.09 10E6/UL (ref 4.4–5.9)
SODIUM SERPL-SCNC: 137 MMOL/L (ref 136–145)
WBC # BLD AUTO: 11.6 10E3/UL (ref 4–11)

## 2023-06-13 PROCEDURE — 258N000003 HC RX IP 258 OP 636: Performed by: HOSPITALIST

## 2023-06-13 PROCEDURE — 250N000013 HC RX MED GY IP 250 OP 250 PS 637: Performed by: HOSPITALIST

## 2023-06-13 PROCEDURE — 85025 COMPLETE CBC W/AUTO DIFF WBC: CPT | Performed by: HOSPITALIST

## 2023-06-13 PROCEDURE — 80053 COMPREHEN METABOLIC PANEL: CPT | Performed by: HOSPITALIST

## 2023-06-13 PROCEDURE — G0378 HOSPITAL OBSERVATION PER HR: HCPCS

## 2023-06-13 PROCEDURE — 250N000013 HC RX MED GY IP 250 OP 250 PS 637: Performed by: STUDENT IN AN ORGANIZED HEALTH CARE EDUCATION/TRAINING PROGRAM

## 2023-06-13 PROCEDURE — 82962 GLUCOSE BLOOD TEST: CPT

## 2023-06-13 PROCEDURE — 36415 COLL VENOUS BLD VENIPUNCTURE: CPT | Performed by: HOSPITALIST

## 2023-06-13 PROCEDURE — 250N000009 HC RX 250: Performed by: HOSPITALIST

## 2023-06-13 PROCEDURE — 97110 THERAPEUTIC EXERCISES: CPT | Mod: GP | Performed by: PHYSICAL THERAPIST

## 2023-06-13 PROCEDURE — 210N000002 HC R&B HEART CARE

## 2023-06-13 PROCEDURE — 97161 PT EVAL LOW COMPLEX 20 MIN: CPT | Mod: GP | Performed by: PHYSICAL THERAPIST

## 2023-06-13 PROCEDURE — 250N000013 HC RX MED GY IP 250 OP 250 PS 637: Performed by: NURSE PRACTITIONER

## 2023-06-13 PROCEDURE — 99233 SBSQ HOSP IP/OBS HIGH 50: CPT | Performed by: STUDENT IN AN ORGANIZED HEALTH CARE EDUCATION/TRAINING PROGRAM

## 2023-06-13 PROCEDURE — 250N000012 HC RX MED GY IP 250 OP 636 PS 637: Performed by: HOSPITALIST

## 2023-06-13 PROCEDURE — 97116 GAIT TRAINING THERAPY: CPT | Mod: GP | Performed by: PHYSICAL THERAPIST

## 2023-06-13 RX ORDER — METOPROLOL SUCCINATE 25 MG/1
25 TABLET, EXTENDED RELEASE ORAL ONCE
Status: COMPLETED | OUTPATIENT
Start: 2023-06-13 | End: 2023-06-13

## 2023-06-13 RX ORDER — METOPROLOL SUCCINATE 50 MG/1
50 TABLET, EXTENDED RELEASE ORAL DAILY
Status: DISCONTINUED | OUTPATIENT
Start: 2023-06-14 | End: 2023-06-15

## 2023-06-13 RX ORDER — GABAPENTIN 300 MG/1
900 CAPSULE ORAL AT BEDTIME
Status: DISCONTINUED | OUTPATIENT
Start: 2023-06-13 | End: 2023-06-17 | Stop reason: HOSPADM

## 2023-06-13 RX ADMIN — BUSPIRONE HYDROCHLORIDE 5 MG: 5 TABLET ORAL at 21:29

## 2023-06-13 RX ADMIN — METOPROLOL SUCCINATE 25 MG: 25 TABLET, EXTENDED RELEASE ORAL at 09:11

## 2023-06-13 RX ADMIN — DILTIAZEM HYDROCHLORIDE 15 MG/HR: 5 INJECTION INTRAVENOUS at 00:05

## 2023-06-13 RX ADMIN — METOPROLOL SUCCINATE 25 MG: 25 TABLET, EXTENDED RELEASE ORAL at 10:52

## 2023-06-13 RX ADMIN — PANTOPRAZOLE SODIUM 40 MG: 40 TABLET, DELAYED RELEASE ORAL at 09:11

## 2023-06-13 RX ADMIN — INSULIN ASPART 2 UNITS: 100 INJECTION, SOLUTION INTRAVENOUS; SUBCUTANEOUS at 09:18

## 2023-06-13 RX ADMIN — APIXABAN 5 MG: 5 TABLET, FILM COATED ORAL at 21:29

## 2023-06-13 RX ADMIN — FINASTERIDE 5 MG: 5 TABLET, FILM COATED ORAL at 09:11

## 2023-06-13 RX ADMIN — APIXABAN 5 MG: 5 TABLET, FILM COATED ORAL at 09:11

## 2023-06-13 RX ADMIN — INSULIN ASPART 2 UNITS: 100 INJECTION, SOLUTION INTRAVENOUS; SUBCUTANEOUS at 13:15

## 2023-06-13 RX ADMIN — INSULIN ASPART 2 UNITS: 100 INJECTION, SOLUTION INTRAVENOUS; SUBCUTANEOUS at 17:57

## 2023-06-13 RX ADMIN — GABAPENTIN 300 MG: 300 CAPSULE ORAL at 09:11

## 2023-06-13 RX ADMIN — GABAPENTIN 900 MG: 300 CAPSULE ORAL at 21:29

## 2023-06-13 RX ADMIN — TAMSULOSIN HYDROCHLORIDE 0.4 MG: 0.4 CAPSULE ORAL at 09:11

## 2023-06-13 RX ADMIN — PRAVASTATIN SODIUM 20 MG: 10 TABLET ORAL at 21:29

## 2023-06-13 ASSESSMENT — ACTIVITIES OF DAILY LIVING (ADL)
ADLS_ACUITY_SCORE: 23

## 2023-06-13 NOTE — UTILIZATION REVIEW
"  Admission Status; Secondary Review Determination         Under the authority of the Utilization Management Committee, the utilization review process indicated a secondary review on the above patient.  The review outcome is based on review of the medical records, discussions with staff, and applying clinical experience noted on the date of the review.          (x) Observation Status Appropriate - This patient does not meet hospital inpatient criteria and is placed in observation status. If this patient's primary payer is Medicare and was admitted as an inpatient, Condition Code 44 should be used and patient status changed to \"observation\".     RATIONALE FOR DETERMINATION   88-year-old male was admitted on 6/12/2023 due to dizziness and lightheadedness, and was found to be in atrial fibrillation with rapid ventricular response. Previous cardiology notes indicated a prior episode of atrial fibrillation in the setting of sepsis, and anticoagulation was not pursued. However, the patient presented with a clear recurrence of atrial fibrillation, which improved with intravenous diltiazem. Medication adjustments are anticipated, considering the patient's soft blood pressure readings, which may complicate the management. A cardiology consult is pending, and the patient has been taken off the diltiazem drip while remaining in atrial fibrillation with controlled rates. Blood pressure medications have been temporarily held, except for home metoprolol, which has been increased to 25 mg XL twice daily. The patient was initiated on eliquis in the ED and will continue with this anticoagulant. PT and OT consultations have been requested.   The severity of illness, intensity of service provided, expected LOS and risk for adverse outcome make the care appropriate for further observation; however, doesn't meet criteria for hospital inpatient admission. Dr Pop  notified of this determination.    This document was produced using " voice recognition software.      The information on this document is developed by the utilization review team in order for the business office to ensure compliance.  This only denotes the appropriateness of proper admission status and does not reflect the quality of care rendered.         The definitions of Inpatient Status and Observation Status used in making the determination above are those provided in the CMS Coverage Manual, Chapter 1 and Chapter 6, section 70.4.      Sincerely,     CECILY PARSONS MD    System Medical Director  Utilization Management  [facility].

## 2023-06-13 NOTE — PROGRESS NOTES
06/13/23 1602   Appointment Info   Signing Clinician's Name / Credentials (PT) Bhakti Morgan, PT   Quick Adds   Quick Adds Certification   Living Environment   People in Home spouse   Current Living Arrangements house   Home Accessibility stairs to enter home;stairs within home   Number of Stairs, Main Entrance 4   Stair Railings, Main Entrance railings safe and in good condition;railings on both sides of stairs   Number of Stairs, Within Home, Primary   (8+8 to bedrooms.)   Stair Railings, Within Home, Primary railings safe and in good condition;railing on left side (ascending)   Transportation Anticipated family or friend will provide  (Paitent and wife both drive.)   Living Environment Comments Patient is I with ADL's and IAD's   Self-Care   Usual Activity Tolerance good   Current Activity Tolerance moderate   Regular Exercise Yes  (Has not been as active as he was prior to the pandemic.)   Fall history within last six months yes   Number of times patient has fallen within last six months 2  (Last week had 2.)   Activity/Exercise/Self-Care Comment Patient reports it is unusual for him to fall. Reports his feet got tangled.   General Information   Onset of Illness/Injury or Date of Surgery 06/13/23   Referring Physician Pj Ga, DO   Patient/Family Therapy Goals Statement (PT) To go home.   Pertinent History of Current Problem (include personal factors and/or comorbidities that impact the POC) Austen Fowler is a 88 year old male admitted on 6/12/2023 with dizziness and lightheadedness and found to be in atrial fibrillation with rapid ventricular response   Existing Precautions/Restrictions fall   Cognition   Affect/Mental Status (Cognition) WNL   Orientation Status (Cognition) oriented x 4   Follows Commands (Cognition) follows two-step commands   Pain Assessment   Patient Currently in Pain No   Integumentary/Edema   Integumentary/Edema no deficits were identifed   Posture    Posture Forward  head position   Range of Motion (ROM)   Range of Motion ROM is WNL   Strength (Manual Muscle Testing)   Strength Comments Bilateral DF 3/5 in standing, PF 2-/5 as tested in standing, bilateral hip flex 3-/5, quads 5/5. Noted weakness in hip extensors functionally as patient needs UE's to transfer sit to stand.   Bed Mobility   Comment, (Bed Mobility) Did not assess. Up in chair.   Transfers   Comment, (Transfers) Sit to stand with use of arms from recliner with supervision.   Gait/Stairs (Locomotion)   Distance in Feet (Gait) 50+150   Comment, (Gait/Stairs) Gait wihtout AD 40 feet. Pateint deviates from path frequently with occassional scissoring. Head turns right and left exacerbate deviation but patient denies dizziness.   Balance   Balance Comments Can obtain and maintain rhomberg even with perturbations, loses balance when attempting tandem stand, LOB with attempt at SLS on either LE.   Sensory Examination   Sensory Perception WNL   Coordination   Coordination Comments Able to do heel to shin accurately but limited range due to weakness.   Muscle Tone   Muscle Tone no deficits were identified   Clinical Impression   Criteria for Skilled Therapeutic Intervention Yes, treatment indicated   PT Diagnosis (PT) Impaired balance   Influenced by the following impairments Decreased strength bilateral LE's.   Functional limitations due to impairments Needs to use arms for sit to stand. Deviates from path and loses balance with amb.   Clinical Presentation (PT Evaluation Complexity) Stable/Uncomplicated   Clinical Presentation Rationale Clinical judgement.   Clinical Decision Making (Complexity) low complexity   Planned Therapy Interventions (PT) balance training;gait training   Anticipated Equipment Needs at Discharge (PT)   (He reports he has a ww.)   Risk & Benefits of therapy have been explained evaluation/treatment results reviewed;care plan/treatment goals reviewed;risks/benefits reviewed;current/potential barriers  reviewed;participants voiced agreement with care plan;participants included;patient   PT Total Evaluation Time   PT Eval, Low Complexity Minutes (95507) 14   Therapy Certification   Start of care date 06/13/23   Certification date from 06/13/23   Certification date to 06/20/23   Medical Diagnosis afib with RVR   Physical Therapy Goals   PT Frequency Daily   PT Predicted Duration/Target Date for Goal Attainment 06/17/23   PT Goals Transfers;Gait;Stairs;PT Goal 1   PT: Transfers Independent;Sit to/from stand;Bed to/from chair  (Without use of arms to demonstrate increased strength.)   PT: Gait Modified independent;Rolling walker;Greater than 200 feet   PT: Stairs 4 stairs;Rail on left   PT: Goal 1 Patient will demonstrate independence with standing HEP to improve LE strength and balance.   Interventions   Interventions Quick Adds Gait Training;Therapeutic Procedure   Therapeutic Procedure/Exercise   Ther. Procedure: strength, endurance, ROM, flexibillity Minutes (23396) 12   Symptoms Noted During/After Treatment fatigue;increased pain   Treatment Detail/Skilled Intervention In standing with UE support on therapists arms, patient performed 10 reps DF, PF (limited range), hip abd, knee flex, attempted hip ext but this hurts his back and 3 reps sit to/from stand without use of arms with min assist.   Gait Training   Gait Training Minutes (98903) 15   Symptoms Noted During/After Treatment (Gait Training) fatigue   Treatment Detail/Skilled Intervention Patient denied dizziness throughout session. Noted significant deviation with gait during eval. Therapist educated patient on maintaining balnace and introduced a walker. Educated on how to use the walker and how to adjust the one at home to his height. Educated patient to use ww with nursing and asked nurses aide to amb with patient again this pm. Up and down 5 steps total with use of rails with supervision.   Physical Assist/Nonphysical Assist (Stairs) supervision   Level  of [place] (Stairs) stand-by assist   Assistive Device (Stairs)   (Use of rails.)   PT Discharge Planning   PT Plan Provide standing HEP to increase balance and LE strength, gait with and without AD. Did not use PTA.   PT Discharge Recommendation (DC Rec) home with outpatient physical therapy   PT Rationale for DC Rec Patient demonstrates decreased LE strength and balance putting him at increased risk of falling. He would benefit from use of ww now to prevent falls and continued OPPT to address strength and balance deficits.   PT Brief overview of current status Gait with ww in halls.   Total Session Time   Timed Code Treatment Minutes 27   Total Session Time (sum of timed and untimed services) 41   [facility]  OUTPATIENT PHYSICAL THERAPY EVALUATION  PLAN OF TREATMENT FOR OUTPATIENT REHABILITATION  (COMPLETE FOR INITIAL CLAIMS ONLY)  Patient's Last Name, First Name, M.I.  YOB: 1935  Austen Fowler                        Provider's Name  [facility] Medical Record No.  [id number]                             Onset Date:  06/13/23   Start of Care Date:  06/13/23   Type:     _X_PT   ___OT   ___SLP Medical Diagnosis:  afib with RVR              PT Diagnosis:  Impaired balance Visits from SOC:  1     See note for plan of treatment, functional goals and certification details    I CERTIFY THE NEED FOR THESE SERVICES FURNISHED UNDER        THIS PLAN OF TREATMENT AND WHILE UNDER MY CARE     (Physician co-signature of this document indicates review and certification of the therapy plan).

## 2023-06-13 NOTE — PLAN OF CARE
Neuro: A&Ox4. PERRL.   CV: Afib controlled rate 60-70s. BP in 110s. Dilt titrated down.   Resp: RA. LS diminished. Dyspnea on exertion.   GI: Up to toilet, 1 loose BM overnight.   : Voiding via urinal. Good uop.   Pain: Denies.   Skin: L forearm abrasion. Scattered bruising.  Plan: Continue to titrate dilt as able.

## 2023-06-13 NOTE — PROGRESS NOTES
[facility]    Medicine Progress Note - Hospitalist Service    Date of Admission:  6/12/2023    Assessment & Plan   Austen Fowler is a 88 year old male admitted on 6/12/2023 with dizziness and lightheadedness and found to be in atrial fibrillation with rapid ventricular response     Atrial fibrillation with rapid ventricular response  Previous cardiology notes indicate an episode atrial fibrillation in the setting of sepsis so anticoagulation was not pursued  Now presented with clear recurrence of atrial fibrillation which improved with IV diltiazem  Anticipate he will need medication adjustments.  His blood pressure has been soft and so suspect this will complicate his medication adjustments    - cardiology consult pending  - off dilt drip this morning in Afib with controlled rates however BP now soft into 90s  - echocardiogram deferred on admission, as he just had one 1 month ago  - holding BP meds however continued on home metoprolol to 25 mg XL increased to twice daily, for now  - initiated on eliquis in the ED, will continue  - PT and OT consultations    Cardiology increased BB and signed off however I have concerns because he remains grossly orthostatic and unable to participate with PT today. Will continue with the recommendations made by cardiology and continue to hold his home lisinopril and lasix and follow orthostatics symptoms. Add compression stockings. Ultimately, he may not be able to tolerate higher dosed metoprolol and need alterative options but will give him more time.     Dizziness and feeling lightheaded  Hypotension with likely orthostas  Patient with episodes of lightheadedness after standing  On Sunday, blood pressure systolic of 70s and he felt weak and lightheaded.  - orthostatics are positive here  - will continue to hold home BP meds and follow treatment for afib above, if he remains dizzy and unbalanced may need to consider Brain imaging.      Chronic  diastolic congestive heart failure  Echocardiogram from May 2023 with sudden left ventricle normal in size with ejection fraction of 55 to 60%.  No regional wall motion abnormalities.  BNP elevated however chest x-ray relatively clear so we will focus on rate control rather than aggressive diuresis    - holding home Lasix at 40 mg orally  - monitor volume status closely     Type 2 diabetes with polyneuropathy  A1C 7.3   - Hold metformin and start sliding scale insulin with a moderate carbohydrate diet     BPH  - Resume Flomax and finasteride     GERD  - Resume omeprazole     Depression  - Resume BuSpar        Diet: Low Saturated Fat Na <2400 mg    DVT Prophylaxis: DOAC  Cosme Catheter: Not present  Lines: None     Cardiac Monitoring: ACTIVE order. Indication: Tachyarrhythmias, acute (48 hours)  Code Status: Full Code      Clinically Significant Risk Factors Present on Admission           # Hypercalcemia: corrected calcium is >10.1, will monitor as appropriate    # Hypoalbuminemia: Lowest albumin = 3.3 g/dL at 6/13/2023  5:48 AM, will monitor as appropriate   # Drug Induced Platelet Defect: home medication list includes an antiplatelet medication   # Hypertension: Noted on problem list     # DMII: A1C = 7.3 % (Ref range: 0.0 - 5.6 %) within past 6 months             Disposition Plan      Expected Discharge Date: 06/15/2023                  Shahnaz Pop DO  Hospitalist Service  [facility]  Securely message with [employer] (more info)  Text page via [employer] Paging/Directory   ______________________________________________________________________    Interval History   Patient lying in bed. He described coming in for dizziness and he wants changes to his medications. NO actual syncope or passing out. He was is unaware of any palpitations or heart changes. He denies chest pain. No SOB, no cough    Physical Exam   Vital Signs: Temp: 98.3  F (36.8  C) Temp src: Oral BP: (!) 120/93 Pulse: 88   Resp:  20 SpO2: 95 % O2 Device: None (Room air)    Weight: 244 lbs 4.8 oz     Constitutional: Awake, alert, cooperative, no apparent distress  Respiratory: Clear to auscultation bilaterally, no crackles or wheezing  Cardiovascular :iregular rate and rhythm, no obvious murmur noted  GI: Normal bowel sounds, soft, non-distended, non-tender  Skin/Integumen: No rashes, no cyanosis, no edema  Other: Tremor noted throughout    Medical Decision Making       50 MINUTES SPENT BY ME on the date of service doing chart review, history, exam, documentation & further activities per the note.      Data     I have personally reviewed the following data over the past 24 hrs:    11.6 (H)  \   14.4   / 186     137 103 17.9 /  214 (H)   4.0 21 (L) 0.81 \       ALT: 11 AST: 18 AP: 53 TBILI: 0.6   ALB: 3.3 (L) TOT PROTEIN: 6.4 LIPASE: N/A       Trop: 22 BNP: 3,290 (H)       Imaging results reviewed over the past 24 hrs:   Recent Results (from the past 24 hour(s))   XR Chest 2 Views    Narrative    CHEST TWO VIEWS   6/12/2023 11:18 AM     HISTORY: Shortness of breath.    COMPARISON: Chest x-ray on 2/20/2023      Impression    IMPRESSION: PA and lateral views of the chest were obtained.  Postsurgical changes of cardiac surgery with median sternotomy wires  and surgical clips. Cardiomediastinal silhouette is within normal  limits. Mild basilar pulmonary opacities, likely atelectasis. No  significant pleural effusion or pneumothorax.    JOSE C CHEEMA MD         SYSTEM ID:  [id number]

## 2023-06-13 NOTE — PROVIDER NOTIFICATION
MD Notification    Notified Person: MD    Notified Person Name: Dr. Billsjasper     Notification Date/Time: 6/13 6755    Notification Interaction: text page    Purpose of Notification: Orthostatic BP's have been positive twice, but pt does not complain of any dizziness.    Orders Received: no new orders. MD will assess pt soon    Comments:

## 2023-06-13 NOTE — PLAN OF CARE
Goal Outcome Evaluation:    Received Pt at 0700. Hospital problems: Afib w/ RVR, lightheadedness, dizziness, hypotension. Alert and oriented x4, noted to have sundowning behavior this afternoon. No reported pain, Ortho BP positive x2 this shift but denies lightheadedness/dizziness. Tele controlled afib. Diltiazem paused this morning. Started on eliquis, metoprolol dose increased. BG on the 200's, received sliding scale insulin. Assist of 1 G/W. Was working w/ PT. Compression stockings ordered from [facility] [place]. Continue to monitor BP and HR overnight. If stable, might discharge tomorrow w/ home therapy.

## 2023-06-13 NOTE — PLAN OF CARE
Goal Outcome Evaluation:  Dx: A fib RVR  Neuro: A&Ox4. Neuros Intact.   Cardiac: WDL- X - irregular pluse- Afib, VSS.   GI/: WDL. Noted use fo miralax at home for intermitted constipation.   Diet: Low saturated fats  Musculoskeletal: WDL x- gen weakness  Skin: Scattered bruising, Abrasions L arm  Pain: Denies  Lab/ tests: Elevated BNP  IV: L PIV SL. R PIV infusing Dilt  Consults:   Other: BG ACHS: 166, 220. Pt reports dizziness upon standing up, negative orthos. Uses Glasses and bilateral HAs. 1A with GB   Plan:  Get HR under control.

## 2023-06-14 ENCOUNTER — APPOINTMENT (OUTPATIENT)
Dept: OCCUPATIONAL THERAPY | Facility: CLINIC | Age: 88
DRG: 309 | End: 2023-06-14
Attending: HOSPITALIST
Payer: COMMERCIAL

## 2023-06-14 LAB
ANION GAP SERPL CALCULATED.3IONS-SCNC: 11 MMOL/L (ref 7–15)
BASOPHILS # BLD AUTO: 0.1 10E3/UL (ref 0–0.2)
BASOPHILS NFR BLD AUTO: 1 %
BUN SERPL-MCNC: 13.7 MG/DL (ref 8–23)
CALCIUM SERPL-MCNC: 9.2 MG/DL (ref 8.8–10.2)
CHLORIDE SERPL-SCNC: 104 MMOL/L (ref 98–107)
CORTIS SERPL-MCNC: 6.7 UG/DL
CREAT SERPL-MCNC: 0.9 MG/DL (ref 0.67–1.17)
DEPRECATED HCO3 PLAS-SCNC: 24 MMOL/L (ref 22–29)
EOSINOPHIL # BLD AUTO: 0.2 10E3/UL (ref 0–0.7)
EOSINOPHIL NFR BLD AUTO: 3 %
ERYTHROCYTE [DISTWIDTH] IN BLOOD BY AUTOMATED COUNT: 15.3 % (ref 10–15)
GFR SERPL CREATININE-BSD FRML MDRD: 83 ML/MIN/1.73M2
GLUCOSE BLDC GLUCOMTR-MCNC: 191 MG/DL (ref 70–99)
GLUCOSE BLDC GLUCOMTR-MCNC: 208 MG/DL (ref 70–99)
GLUCOSE BLDC GLUCOMTR-MCNC: 232 MG/DL (ref 70–99)
GLUCOSE BLDC GLUCOMTR-MCNC: 245 MG/DL (ref 70–99)
GLUCOSE BLDC GLUCOMTR-MCNC: 265 MG/DL (ref 70–99)
GLUCOSE SERPL-MCNC: 205 MG/DL (ref 70–99)
HCT VFR BLD AUTO: 44.4 % (ref 40–53)
HGB BLD-MCNC: 14.2 G/DL (ref 13.3–17.7)
IMM GRANULOCYTES # BLD: 0.1 10E3/UL
IMM GRANULOCYTES NFR BLD: 1 %
LYMPHOCYTES # BLD AUTO: 2.3 10E3/UL (ref 0.8–5.3)
LYMPHOCYTES NFR BLD AUTO: 24 %
MCH RBC QN AUTO: 27.8 PG (ref 26.5–33)
MCHC RBC AUTO-ENTMCNC: 32 G/DL (ref 31.5–36.5)
MCV RBC AUTO: 87 FL (ref 78–100)
MONOCYTES # BLD AUTO: 0.7 10E3/UL (ref 0–1.3)
MONOCYTES NFR BLD AUTO: 8 %
NEUTROPHILS # BLD AUTO: 6.1 10E3/UL (ref 1.6–8.3)
NEUTROPHILS NFR BLD AUTO: 63 %
NRBC # BLD AUTO: 0 10E3/UL
NRBC BLD AUTO-RTO: 0 /100
PLATELET # BLD AUTO: 199 10E3/UL (ref 150–450)
POTASSIUM SERPL-SCNC: 4.2 MMOL/L (ref 3.4–5.3)
RBC # BLD AUTO: 5.1 10E6/UL (ref 4.4–5.9)
SODIUM SERPL-SCNC: 139 MMOL/L (ref 136–145)
WBC # BLD AUTO: 9.5 10E3/UL (ref 4–11)

## 2023-06-14 PROCEDURE — 210N000002 HC R&B HEART CARE

## 2023-06-14 PROCEDURE — 99232 SBSQ HOSP IP/OBS MODERATE 35: CPT | Performed by: HOSPITALIST

## 2023-06-14 PROCEDURE — 80048 BASIC METABOLIC PNL TOTAL CA: CPT | Performed by: STUDENT IN AN ORGANIZED HEALTH CARE EDUCATION/TRAINING PROGRAM

## 2023-06-14 PROCEDURE — 250N000013 HC RX MED GY IP 250 OP 250 PS 637: Performed by: STUDENT IN AN ORGANIZED HEALTH CARE EDUCATION/TRAINING PROGRAM

## 2023-06-14 PROCEDURE — 82533 TOTAL CORTISOL: CPT | Performed by: HOSPITALIST

## 2023-06-14 PROCEDURE — G0378 HOSPITAL OBSERVATION PER HR: HCPCS

## 2023-06-14 PROCEDURE — 85004 AUTOMATED DIFF WBC COUNT: CPT | Performed by: STUDENT IN AN ORGANIZED HEALTH CARE EDUCATION/TRAINING PROGRAM

## 2023-06-14 PROCEDURE — 250N000013 HC RX MED GY IP 250 OP 250 PS 637: Performed by: HOSPITALIST

## 2023-06-14 PROCEDURE — 36415 COLL VENOUS BLD VENIPUNCTURE: CPT | Performed by: STUDENT IN AN ORGANIZED HEALTH CARE EDUCATION/TRAINING PROGRAM

## 2023-06-14 PROCEDURE — 97535 SELF CARE MNGMENT TRAINING: CPT | Mod: GO

## 2023-06-14 PROCEDURE — 97165 OT EVAL LOW COMPLEX 30 MIN: CPT | Mod: GO

## 2023-06-14 PROCEDURE — 82962 GLUCOSE BLOOD TEST: CPT

## 2023-06-14 RX ADMIN — TAMSULOSIN HYDROCHLORIDE 0.4 MG: 0.4 CAPSULE ORAL at 08:30

## 2023-06-14 RX ADMIN — METOPROLOL SUCCINATE 50 MG: 50 TABLET, EXTENDED RELEASE ORAL at 08:30

## 2023-06-14 RX ADMIN — BUSPIRONE HYDROCHLORIDE 5 MG: 5 TABLET ORAL at 20:37

## 2023-06-14 RX ADMIN — GABAPENTIN 300 MG: 300 CAPSULE ORAL at 08:30

## 2023-06-14 RX ADMIN — APIXABAN 5 MG: 5 TABLET, FILM COATED ORAL at 08:30

## 2023-06-14 RX ADMIN — INSULIN ASPART 3 UNITS: 100 INJECTION, SOLUTION INTRAVENOUS; SUBCUTANEOUS at 17:28

## 2023-06-14 RX ADMIN — APIXABAN 5 MG: 5 TABLET, FILM COATED ORAL at 20:37

## 2023-06-14 RX ADMIN — INSULIN ASPART 2 UNITS: 100 INJECTION, SOLUTION INTRAVENOUS; SUBCUTANEOUS at 12:41

## 2023-06-14 RX ADMIN — PRAVASTATIN SODIUM 20 MG: 10 TABLET ORAL at 20:37

## 2023-06-14 RX ADMIN — INSULIN ASPART 3 UNITS: 100 INJECTION, SOLUTION INTRAVENOUS; SUBCUTANEOUS at 09:18

## 2023-06-14 RX ADMIN — PANTOPRAZOLE SODIUM 40 MG: 40 TABLET, DELAYED RELEASE ORAL at 08:30

## 2023-06-14 RX ADMIN — FINASTERIDE 5 MG: 5 TABLET, FILM COATED ORAL at 08:30

## 2023-06-14 RX ADMIN — BUSPIRONE HYDROCHLORIDE 5 MG: 5 TABLET ORAL at 08:30

## 2023-06-14 RX ADMIN — GABAPENTIN 900 MG: 300 CAPSULE ORAL at 21:01

## 2023-06-14 ASSESSMENT — ACTIVITIES OF DAILY LIVING (ADL)
ADLS_ACUITY_SCORE: 23
PREVIOUS_RESPONSIBILITIES: MEAL PREP;HOUSEKEEPING;LAUNDRY;YARDWORK;MEDICATION MANAGEMENT;FINANCES;DRIVING
ADLS_ACUITY_SCORE: 23

## 2023-06-14 NOTE — PROGRESS NOTES
[facility]    Medicine Progress Note - Hospitalist Service    Date of Admission:  6/12/2023    Assessment & Plan     Austen Fowler is a 88 year old male admitted on 6/12/2023 with dizziness and lightheadedness and found to be in atrial fibrillation with rapid ventricular response     Atrial fibrillation with rapid ventricular response  -Previous cardiology notes indicate an episode atrial fibrillation in the setting of sepsis so anticoagulation was not pursued  - presented with clear recurrence of atrial fibrillation which improved with IV diltiazem  - seen by cardiology and he was started on eliquis and cardizem drip and then transitioned to metoprolol  to 50 XL daily . cardizem off since yesterday am   - he continues to has on and off tachycardia and continues to have orthostatic and will cards see him again .     Dizziness and feeling lightheaded  orthostatic hypotension  -Patient with episodes of lightheadedness after standing  On Sunday, blood pressure systolic of 70s and he felt weak and lightheaded.  - orthostatics continues  To be positive here  - will continue to hold home BP meds including lasix and lisinopril    - compression stocking on     Chronic diastolic congestive heart failure  Echocardiogram from May 2023 with sudden left ventricle normal in size with ejection fraction of 55 to 60%.  No regional wall motion abnormalities.  -BNP elevated on admission  however chest x-ray relatively clear   -  home Lasix at 40 mg orally held   - monitor volume status closely     Type 2 diabetes with polyneuropathy  -A1C 7.3   - metformin  Held and continue sliding scale insulin with a moderate carbohydrate diet     BPH  - continue Flomax and finasteride      GERD  - Resumed omeprazole     Depression  - Resumed BuSpar        Diet: Low Saturated Fat Na <2400 mg    DVT Prophylaxis: DOAC  Cosme Catheter: Not present  Lines: None     Cardiac Monitoring: ACTIVE order. Indication:  Tachyarrhythmias, acute (48 hours)  Code Status: Full Code      Clinically Significant Risk Factors Present on Admission              # Hypoalbuminemia: Lowest albumin = 3.3 g/dL at 6/13/2023  5:48 AM, will monitor as appropriate   # Drug Induced Platelet Defect: home medication list includes an antiplatelet medication   # Hypertension: Noted on problem list     # DMII: A1C = 7.3 % (Ref range: 0.0 - 5.6 %) within past 6 months             Disposition Plan      Expected Discharge Date: 06/15/2023                  Rosendo Webb MD  Hospitalist Service  [facility]  Securely message with [employer] (more info)  Text page via [employer] Paging/Directory   ______________________________________________________________________    Interval History     Seen today and still feels dizzy, has chronic mild sob  HR at times is 130s; but not persistently high  Wearing compression socks  Orthostatic positive this am and still feels off at times   Spoke with RN       Physical Exam   Vital Signs: Temp: 97.8  F (36.6  C) Temp src: Oral BP: (!) 110/94 Pulse: 98   Resp: 20 SpO2: 97 % O2 Device: None (Room air)    Weight: 243 lbs 12.8 oz        General: Patient appears comfortable and in no acute distress.  HEENT: Head is atraumatic, normocephalic.   Neck: Neck is supple  Respiratory: Lungs are clear to auscultation bilaterally with no wheeze or crackles   Cardiovascular: Regular rate , S1 and S2 normal with no murmer or rubs or gallops  Abdomen:   soft , non tender , non distended and bowel sound present   Skin: No skin rashes   Neurologic:  No facial droop  Musculoskeletal: Normal Range of motion over upper and lower extremities bilaterally   Psychiatric: cooperative     Medical Decision Making             Data     I have personally reviewed the following data over the past 24 hrs:    9.5  \   14.2   / 199     139 104 13.7 /  232 (H)   4.2 24 0.90 \       Imaging results reviewed over the past 24 hrs:   No results  found for this or any previous visit (from the past 24 hour(s)).

## 2023-06-14 NOTE — PROGRESS NOTES
06/14/23 0837   Appointment Info   Signing Clinician's Name / Credentials (OT) Anita Montes De Oca OTR/L   Quick Adds   Quick Adds Certification   Living Environment   People in Home spouse   Current Living Arrangements house   Transportation Anticipated family or friend will provide  (Pt typically drives)   Living Environment Comments Spouse able to assist upon discharge.   Self-Care   Usual Activity Tolerance good   Current Activity Tolerance moderate   Fall history within last six months yes   Number of times patient has fallen within last six months 2   Activity/Exercise/Self-Care Comment Independent in all ADLs and mobility at baseline.   Instrumental Activities of Daily Living (IADL)   Previous Responsibilities meal prep;housekeeping;laundry;yardwork;medication management;finances;driving   General Information   Onset of Illness/Injury or Date of Surgery 06/12/23   Referring Physician Pj Ga, DO   Patient/Family Therapy Goal Statement (OT) Home   Additional Occupational Profile Info/Pertinent History of Current Problem Per chart: Austen Fowler is a 88 year old male admitted on 6/12/2023 with dizziness and lightheadedness and found to be in atrial fibrillation with rapid ventricular response. See chart for details.   Cognitive Status Examination   Orientation Status orientation to person, place and time   Safety Deficit impulsivity;at risk behavior observed   Executive Function Deficit judgment;insight/awareness of deficits   Transfers   Transfers bed-chair transfer;sit-stand transfer;toilet transfer   Transfer Skill: Bed to Chair/Chair to Bed   Bed-Chair [place] (Transfers) set up;verbal cues;contact guard   Sit-Stand Transfer   Sit-Stand [place] (Transfers) set up;verbal cues  (SBA)   Toilet Transfer   Type (Toilet Transfer) sit-stand;stand-sit   [place] Level (Toilet Transfer) set up;verbal cues;contact guard   Activities of Daily Living   BADL Assessment/Intervention  lower body dressing   Lower Body Dressing Assessment/Training   [place] Level (Lower Body Dressing) set up;verbal cues;contact guard assist   Clinical Impression   Criteria for Skilled Therapeutic Interventions Met (OT) Yes, treatment indicated   OT Diagnosis Decreased independence in I/ADLs   Influenced by the following impairments Decreased independence in I/ADLs   OT Problem List-Impairments impacting ADL problems related to;activity tolerance impaired;cognition;balance   Assessment of Occupational Performance 1-3 Performance Deficits   Identified Performance Deficits Decreased independence in I/ADLs (Dressing, bathing, toileting)   Planned Therapy Interventions (OT) ADL retraining;IADL retraining;transfer training;cognition   Clinical Decision Making Complexity (OT) low complexity   Risk & Benefits of therapy have been explained evaluation/treatment results reviewed;care plan/treatment goals reviewed;risks/benefits reviewed;patient   OT Total Evaluation Time   OT Eval, Low Complexity Minutes (75335) 7   Therapy Certification   Medical Diagnosis afib with RVR   Start of Care Date 06/14/23   OT Goals   Therapy Frequency (OT) 3 times/wk   OT Predicted Duration/Target Date for Goal Attainment 06/20/23   OT Goals Hygiene/Grooming;Lower Body Dressing;Toilet Transfer/Toileting;Cognition;OT Goal 1   OT: Hygiene/Grooming modified independent;while standing   OT: Lower Body Dressing Modified independent;including set-up/clothing retrieval   OT: Toilet Transfer/Toileting Modified independent;toilet transfer   OT: Cognitive Patient/caregiver will verbalize understanding of cognitive assessment results/recommendations as needed for safe discharge planning   OT: Goal 1 Pt will verbalize understanding of 2 shower transfer techniques, in order to increase independence in I/ADLs.   Interventions   Interventions Quick Adds Self-Care/Home Management   Self-Care/Home Management   Self-Care/Home Mgmt/ADL, Compensatory, Meal  Prep Minutes (83679) 23   Treatment Detail/Skilled Intervention Educated on LE dressing with compensatory techniques. While seated/standing pt completed LE dressing (don/doff pants) with CGA and set up. Pt ambulated to the bathroom for toilet transfer with SBA/CGA and walker. TRansfer to/from the toilet with CGA. Educated on walker safety and safe hand placement, as pt demonstrated difficulty throughout session. Educated on recommendations for shower chair and where to attain. Pt reported that he has a shower chair and mildly receptive to using it. Alarm on.   OT Discharge Planning   OT Plan Screen cognition; TB Dress with retrieval   OT Discharge Recommendation (DC Rec) home with assist;home with outpatient occupational therapy   OT Rationale for DC Rec Assist in I/ADLs (including shower transfers, dressing, home management, yardwork). OP OT to address safety and IND in I/ADLs.   OT Brief overview of current status CGA LE dressing   Total Session Time   Timed Code Treatment Minutes 23   Total Session Time (sum of timed and untimed services) 30      [facility]  OUTPATIENT OCCUPATIONAL THERAPY  EVALUATION  PLAN OF TREATMENT FOR OUTPATIENT REHABILITATION  (COMPLETE FOR INITIAL CLAIMS ONLY)  Patient's Last Name, First Name, M.I.  YOB: 1935  Austen Fowler                          Provider's Name  [facility] Medical Record No.  [id number]                             Onset Date:  06/12/23   Start of Care Date:  06/14/23   Type:     ___PT   _X_OT   ___SLP Medical Diagnosis:  afib with RVR                    OT Diagnosis:  Decreased independence in I/ADLs Visits from SOC:  1     See note for plan of treatment, functional goals and certification details    I CERTIFY THE NEED FOR THESE SERVICES FURNISHED UNDER        THIS PLAN OF TREATMENT AND WHILE UNDER MY CARE     (Physician co-signature of this document indicates review and certification of  the therapy plan).

## 2023-06-14 NOTE — UTILIZATION REVIEW
"Admission Status; Secondary Review Determination     Admission Date: 6/12/2023  9:40 AM       Under the authority of the Utilization Management Committee, the utilization review process indicated a secondary review on the above patient.  The review outcome is based on review of the medical records, discussions with staff, and applying clinical experience noted on the date of the review.          (x) Observation Status Appropriate - This patient does not meet hospital inpatient criteria and is placed in observation status. If this patient's primary payer is Medicare and was admitted as an inpatient, Condition Code 44 should be used and patient status changed to \"observation\".       RATIONALE FOR DETERMINATION      Brief clinical presentation, information copied from the chart, abbreviated and edited for relevant content:       See yesterday's review.     Austen Fowler is a 88 year old male admitted on 6/12/2023 with dizziness and lightheadedness and found to be in atrial fibrillation with rapid ventricular response. Responded well to IV diltiazem. Was seen by cardiology and he was started on eliquis and cardizem drip and then transitioned to metoprolol  to 50 XL daily. iV cardizem off since yesterday am   However, he continues to has on and off mild tachycardia and continues to have some orthostasis with that. Plan for another Cardiac consult.  If unable to discharge by tomorrow, consider another review. Other than LOS, no signficant IP criteria noted.        The severity of illness, intensity of cares provided, risk for adverse outcome, and expected LOS make the care appropriate for observation.       The information on this document is developed by the utilization review team in order for the business office to ensure compliance.  This only denotes the appropriateness of proper admission status and does not reflect the quality of care rendered.         The definitions of Inpatient Status and Observation Status " used in making the determination above are those provided in the CMS Coverage Manual, Chapter 1 and Chapter 6, section 70.4.      Sincerely,     Mansi Gonzalez MD   Utilization Review/ Case Management  [facility].

## 2023-06-14 NOTE — PROGRESS NOTES
Shift Summary      Neuro: Alert to person and time. Cooperative with cares and able to follow commands. He has been attempting to get out of bed alone, however, he is slightly unsteady when up. A bed alarm has been on for his safety.     Cardiac: AF rate's 60's-90's with rest. His HR does go up to 110's-120's with activity.     Respiratory: LS diminished. Denied shortness of breath.     GI: Continent of bowel and bladder. Denied nausea.     Musculoskeletal: Moves independently in bed. Assist of 1 with the walker and gait belt for activity.     Pain: Denied pain when asked.     Skin: No new skin concerns.     Lines: PIV to the right forearm.     Discharge: Possible discharge to home today with outpatient PT.

## 2023-06-15 LAB
GLUCOSE BLDC GLUCOMTR-MCNC: 191 MG/DL (ref 70–99)
GLUCOSE BLDC GLUCOMTR-MCNC: 203 MG/DL (ref 70–99)
GLUCOSE BLDC GLUCOMTR-MCNC: 230 MG/DL (ref 70–99)
GLUCOSE BLDC GLUCOMTR-MCNC: 232 MG/DL (ref 70–99)
GLUCOSE BLDC GLUCOMTR-MCNC: 330 MG/DL (ref 70–99)

## 2023-06-15 PROCEDURE — 250N000013 HC RX MED GY IP 250 OP 250 PS 637: Performed by: STUDENT IN AN ORGANIZED HEALTH CARE EDUCATION/TRAINING PROGRAM

## 2023-06-15 PROCEDURE — 82962 GLUCOSE BLOOD TEST: CPT

## 2023-06-15 PROCEDURE — 99232 SBSQ HOSP IP/OBS MODERATE 35: CPT | Performed by: HOSPITALIST

## 2023-06-15 PROCEDURE — 250N000013 HC RX MED GY IP 250 OP 250 PS 637: Performed by: INTERNAL MEDICINE

## 2023-06-15 PROCEDURE — 250N000013 HC RX MED GY IP 250 OP 250 PS 637: Performed by: HOSPITALIST

## 2023-06-15 PROCEDURE — 99233 SBSQ HOSP IP/OBS HIGH 50: CPT | Mod: FS | Performed by: NURSE PRACTITIONER

## 2023-06-15 PROCEDURE — 210N000002 HC R&B HEART CARE

## 2023-06-15 RX ORDER — METOPROLOL TARTRATE 50 MG
50 TABLET ORAL 2 TIMES DAILY
Status: DISCONTINUED | OUTPATIENT
Start: 2023-06-15 | End: 2023-06-15

## 2023-06-15 RX ORDER — METOPROLOL TARTRATE 50 MG
50 TABLET ORAL 2 TIMES DAILY
Status: DISCONTINUED | OUTPATIENT
Start: 2023-06-15 | End: 2023-06-16

## 2023-06-15 RX ADMIN — METOPROLOL TARTRATE 50 MG: 50 TABLET, FILM COATED ORAL at 21:31

## 2023-06-15 RX ADMIN — GABAPENTIN 300 MG: 300 CAPSULE ORAL at 07:29

## 2023-06-15 RX ADMIN — TAMSULOSIN HYDROCHLORIDE 0.4 MG: 0.4 CAPSULE ORAL at 07:29

## 2023-06-15 RX ADMIN — APIXABAN 5 MG: 5 TABLET, FILM COATED ORAL at 07:29

## 2023-06-15 RX ADMIN — APIXABAN 5 MG: 5 TABLET, FILM COATED ORAL at 21:31

## 2023-06-15 RX ADMIN — PRAVASTATIN SODIUM 20 MG: 10 TABLET ORAL at 21:31

## 2023-06-15 RX ADMIN — METOPROLOL SUCCINATE 50 MG: 50 TABLET, EXTENDED RELEASE ORAL at 07:29

## 2023-06-15 RX ADMIN — INSULIN ASPART 4 UNITS: 100 INJECTION, SOLUTION INTRAVENOUS; SUBCUTANEOUS at 14:31

## 2023-06-15 RX ADMIN — INSULIN ASPART 2 UNITS: 100 INJECTION, SOLUTION INTRAVENOUS; SUBCUTANEOUS at 18:37

## 2023-06-15 RX ADMIN — PANTOPRAZOLE SODIUM 40 MG: 40 TABLET, DELAYED RELEASE ORAL at 07:29

## 2023-06-15 RX ADMIN — INSULIN ASPART 2 UNITS: 100 INJECTION, SOLUTION INTRAVENOUS; SUBCUTANEOUS at 08:05

## 2023-06-15 RX ADMIN — BUSPIRONE HYDROCHLORIDE 5 MG: 5 TABLET ORAL at 21:30

## 2023-06-15 RX ADMIN — METOPROLOL TARTRATE 50 MG: 50 TABLET, FILM COATED ORAL at 16:05

## 2023-06-15 RX ADMIN — BUSPIRONE HYDROCHLORIDE 5 MG: 5 TABLET ORAL at 07:30

## 2023-06-15 RX ADMIN — FINASTERIDE 5 MG: 5 TABLET, FILM COATED ORAL at 07:30

## 2023-06-15 RX ADMIN — GABAPENTIN 900 MG: 300 CAPSULE ORAL at 21:30

## 2023-06-15 ASSESSMENT — ACTIVITIES OF DAILY LIVING (ADL)
ADLS_ACUITY_SCORE: 23

## 2023-06-15 NOTE — PROGRESS NOTES
Received Pt at 0700. Hospital problems: Afib w/ RVR, lightheadedness, dizziness, hypotension. Alert and oriented x4. No reported pain, Ortho BP positive from sitting to standing but denies lightheadedness/dizziness. Tele controlled afib for the most part of the day w/ HR above 110 at times. BG on the 200's, received sliding scale insulin. Assist of 1 G/W. Pt transitioned to Obs this shift. Continue to monitor.

## 2023-06-15 NOTE — PROGRESS NOTES
[facility]  Cardiology Progress Note  Date of Service: 06/15/2023      Assessment & Plan    Austen Fowler is a 88 year old male jbradmitted on 6/12/2023 with atrial fibrillation with RVR    Assessment:  1.  Atrial fibrillation with RVR - on rate control strategy given age/chronicity.   2. Chronic HFpEF - LVEF 55-60%, appears compensated and euvolemic.   3. Orthostatic hypotension    Patient and wife seen at bedside, he has been on metoprolol XL 50mg daily and off diltiazem gtt since morning on 6/13. Heart rates have again increased with occasional RVR episodes into the 130s. During my visit in the room heart rates 80-100s. Positive orthostatic hypotension. Blood pressures stable today. Recommend changing metoprolol XL to metoprolol tartrate this will hopefully allow some increase in blood pressures and better heart rate control.     Plan:   1. STOP metoprolol succinate  2. START metoprolol tartrate 50mg BID, (already received metoprolol XL 50mg this morning), will further titrate as able.  3. If heart rates remain elevated and blood pressures limit further titration of BB, could consider digoxin and/or midodrine   4. Continue apixaban 5mg BID  5. Continue to hold PTA medications of furosemide and lisinopril   6. Continue compression stockings  7. Continue telemetry    CRISTIANO Mar[facility]  Pager: [phone]          Interval History   Patient and wife are frustrated with being in the hospital and feel nothing is being done. Denies chest pain. Denies shortness of breath. Denies dizziness/lightheadedness. Denies palpitations.     Physical Exam   Temp: 97.6  F (36.4  C) Temp src: Oral BP: 110/78 Pulse: 90   Resp: 20 SpO2: 95 % O2 Device: None (Room air)    Vitals:    06/13/23 0612 06/14/23 0600 06/15/23 0600   Weight: 110.8 kg (244 lb 4.8 oz) 110.6 kg (243 lb 12.8 oz) 111.1 kg (245 lb)     GEN: well nourished, in no acute distress.  HEENT:  Pupils equal, round.  Sclerae nonicteric.   NECK: Supple, no masses appreciated.   C/V:  Irregularly irregular, tachycardic, no murmur, rub or gallop  RESP: Respirations are unlabored. Clear to auscultation bilaterally without wheezing, rales, or rhonchi.  GI: Abdomen soft, nontender.  EXTREM: No LE edema.  NEURO: Alert and oriented, cooperative.  SKIN: Warm and dry    Medications     dilTIAZem Stopped (06/13/23 0730)       apixaban ANTICOAGULANT  5 mg Oral BID     busPIRone  5 mg Oral BID     finasteride  5 mg Oral Daily     gabapentin  300 mg Oral Daily     gabapentin  900 mg Oral At Bedtime     insulin aspart  1-7 Units Subcutaneous TID AC     insulin aspart  1-5 Units Subcutaneous At Bedtime     metoprolol succinate ER  50 mg Oral Daily     pantoprazole  40 mg Oral Daily     pravastatin  20 mg Oral QPM     sodium chloride (PF)  3 mL Intracatheter Q8H     tamsulosin  0.4 mg Oral Daily       Data   Last 24 hours labs reviewed     Echo: 5/4/23  1. The left ventricle is normal in size. There is normal left ventricular wall  thickness. Left ventricular systolic function is normal. The visual ejection  fraction is 55-60%. Left ventricular diastolic function is indeterminate. No  regional wall motion abnormalities noted. There is no thrombus seen in the  left ventricle.  2. The right ventricle is normal size. The right ventricular systolic function  is normal.  3. Trace mitral and tricuspid regurgitation.  4. Mild aortic stenosis.  6. No pericardial effusion.  7. In comparison to the previous report dated 09/02/2022, the findings are  similar.

## 2023-06-15 NOTE — PLAN OF CARE
Goal Outcome Evaluation:  Disoriented to time & situations -reoriented. Follows command. VSS ex tachy HR in the 150's with ambulation to bathroom. HR  at rest. Up with SBA/GB/W. Denies SOB/CP. Plans for home discharge with outpatient PT.

## 2023-06-15 NOTE — PROGRESS NOTES
MD Notification    Notified Person: MD    Notified Person Name: Dr. Coffey     Notification Date/Time: 6/15 2598    Notification Interaction: MD at nursing desk     Purpose of Notification: Pt HR still 150's when ambulating. Order for metoprolol tartrate not till 2100. Do you want to give something early?    Orders Received: Give 2100 dose of metoprolol now. Then walk patient and see how HR responds. Could trial an extra one time dose of metoprolol at 2100 if HR remains elevated and SBP stays stable.    Comments:

## 2023-06-15 NOTE — PROVIDER NOTIFICATION
MD Notification    Notified Person: MD    Notified Person Name: Dr. Coffey     Notification Date/Time: 6/15 0919    Notification Interaction: text page    Purpose of Notification: Pt HR's uncontrolled w/ intermittent low BP's. Positive orthostatics. This morning -130's. Overnight even into the 150's. Let me know if you need a new consult but hospitalist told me yesterday you would see this pt again today. thanks     Orders Received: Plan to give evening dose of metoprolol early.     Comments:

## 2023-06-15 NOTE — PLAN OF CARE
Goal Outcome Evaluation:    [id number]      Neuro: Alert to person and time. Disoriented to place. Cooperative with cares and able to follow commands.      Cardiac: Afib CVR. RVR with activity.       Respiratory: LS diminished. Denied shortness of breath.      GI: Continent of bowel and bladder. Denied nausea.      Musculoskeletal: Moves independently in bed. Assist of 1 with the walker and gait belt for activity.      Pain: Denies     Skin: No new skin concerns.      Lines: PIV to the right forearm.      Discharge: Possible discharge to home tomorrow with outpatient PT.

## 2023-06-15 NOTE — PROGRESS NOTES
[facility]    Medicine Progress Note - Hospitalist Service    Date of Admission:  6/12/2023    Assessment & Plan     Austen Fowler is a 88 year old male admitted on 6/12/2023 with dizziness and lightheadedness and found to be in atrial fibrillation with rapid ventricular response     Atrial fibrillation with rapid ventricular response  -Previous cardiology notes indicate an episode atrial fibrillation in the setting of sepsis so anticoagulation was not pursued  - presented with clear recurrence of atrial fibrillation which improved with IV diltiazem  - seen by cardiology and he was started on eliquis and cardizem drip which was later stopped and then transitioned to metoprolol  to 50 XL daily .   -He continues to have orthostatic hypotension and was seen by cardiology team again today.  And metoprolol succinate was stopped and he was started on metoprolol tartrate twice daily and plan to further titrate as per cardiology and may need addition of digoxin on midodrine     Dizziness and feeling lightheaded  orthostatic hypotension  -Patient with episodes of lightheadedness after standing  On Sunday, blood pressure systolic of 70s and he felt weak and lightheaded.  - orthostatics continues  To be positive here  - will continue to hold home BP meds including lasix and lisinopril    - compression stocking on  -Random cortisol was checked and it is normal  -He does not seem to be volume depleted     Chronic diastolic congestive heart failure  Echocardiogram from May 2023 with sudden left ventricle normal in size with ejection fraction of 55 to 60%.  No regional wall motion abnormalities.  -BNP elevated on admission  however chest x-ray relatively clear   -  home Lasix at 40 mg orally held   - monitor volume status closely     Type 2 diabetes with polyneuropathy  -A1C 7.3   - metformin  Held and continue sliding scale insulin with a moderate carbohydrate diet     BPH  - continue Flomax and  finasteride      GERD  - Resumed omeprazole     Depression  - Resumed BuSpar        Diet: Low Saturated Fat Na <2400 mg    DVT Prophylaxis: DOAC  Cosme Catheter: Not present  Lines: None     Cardiac Monitoring: ACTIVE order. Indication: Tachyarrhythmias, acute (48 hours)  Code Status: Full Code      Clinically Significant Risk Factors Present on Admission              # Hypoalbuminemia: Lowest albumin = 3.3 g/dL at 6/13/2023  5:48 AM, will monitor as appropriate   # Drug Induced Platelet Defect: home medication list includes an antiplatelet medication   # Hypertension: Noted on problem list     # DMII: A1C = 7.3 % (Ref range: 0.0 - 5.6 %) within past 6 months             Disposition Plan      Expected Discharge Date: 06/16/2023                  Rosendo Webb MD  Hospitalist Service  [facility]  Securely message with [employer] (more info)  Text page via [facility] Paging/Directory   ______________________________________________________________________    Interval History     I saw the patient today and his wife was present and she was initially upset about his observation status.  Anastasia was educated on how the observation status works and she understands.  I also talked with the case management team and given that patient needs more monitoring we will send his case for review to utilization management team.    Patient continues to be orthostatic and did mention that when he gets from the bed to the chair he does feel little lightheaded and he has chronic shortness of breath.  His wife had a lot of questions which were answered to satisfaction.    Discussed plan of care with patient's nurse, reviewed cardiology note and discussed with patient's wife    Physical Exam   Vital Signs: Temp: 97.6  F (36.4  C) Temp src: Oral BP: 110/78 Pulse: 90   Resp: 20 SpO2: 95 % O2 Device: None (Room air)    Weight: 245 lbs 0 oz        General: Patient appears comfortable and in no acute distress.  Respiratory: Lungs  are clear to auscultation bilaterally with no wheeze or crackles   Cardiovascular: Regular rate , S1 and S2 normal with no murmer or rubs or gallops  Abdomen:   soft , non tender , non distended and bowel sound present   Skin: No skin rashes   Neurologic:  No facial droop  Musculoskeletal: Normal Range of motion over upper and lower extremities bilaterally   Psychiatric: cooperative     Medical Decision Making             Data         Imaging results reviewed over the past 24 hrs:   No results found for this or any previous visit (from the past 24 hour(s)).

## 2023-06-16 ENCOUNTER — APPOINTMENT (OUTPATIENT)
Dept: PHYSICAL THERAPY | Facility: CLINIC | Age: 88
DRG: 309 | End: 2023-06-16
Payer: COMMERCIAL

## 2023-06-16 LAB
ANION GAP SERPL CALCULATED.3IONS-SCNC: 11 MMOL/L (ref 7–15)
BUN SERPL-MCNC: 15.2 MG/DL (ref 8–23)
CALCIUM SERPL-MCNC: 9.4 MG/DL (ref 8.8–10.2)
CHLORIDE SERPL-SCNC: 105 MMOL/L (ref 98–107)
CREAT SERPL-MCNC: 0.78 MG/DL (ref 0.67–1.17)
DEPRECATED HCO3 PLAS-SCNC: 24 MMOL/L (ref 22–29)
GFR SERPL CREATININE-BSD FRML MDRD: 86 ML/MIN/1.73M2
GLUCOSE BLDC GLUCOMTR-MCNC: 192 MG/DL (ref 70–99)
GLUCOSE BLDC GLUCOMTR-MCNC: 199 MG/DL (ref 70–99)
GLUCOSE BLDC GLUCOMTR-MCNC: 204 MG/DL (ref 70–99)
GLUCOSE BLDC GLUCOMTR-MCNC: 218 MG/DL (ref 70–99)
GLUCOSE BLDC GLUCOMTR-MCNC: 235 MG/DL (ref 70–99)
GLUCOSE BLDC GLUCOMTR-MCNC: 291 MG/DL (ref 70–99)
GLUCOSE SERPL-MCNC: 221 MG/DL (ref 70–99)
MAGNESIUM SERPL-MCNC: 1.7 MG/DL (ref 1.7–2.3)
POTASSIUM SERPL-SCNC: 4.1 MMOL/L (ref 3.4–5.3)
SODIUM SERPL-SCNC: 140 MMOL/L (ref 136–145)

## 2023-06-16 PROCEDURE — 250N000013 HC RX MED GY IP 250 OP 250 PS 637: Performed by: NURSE PRACTITIONER

## 2023-06-16 PROCEDURE — 36415 COLL VENOUS BLD VENIPUNCTURE: CPT | Performed by: HOSPITALIST

## 2023-06-16 PROCEDURE — 210N000002 HC R&B HEART CARE

## 2023-06-16 PROCEDURE — 83735 ASSAY OF MAGNESIUM: CPT | Performed by: HOSPITALIST

## 2023-06-16 PROCEDURE — 97110 THERAPEUTIC EXERCISES: CPT | Mod: GP

## 2023-06-16 PROCEDURE — 250N000013 HC RX MED GY IP 250 OP 250 PS 637: Performed by: STUDENT IN AN ORGANIZED HEALTH CARE EDUCATION/TRAINING PROGRAM

## 2023-06-16 PROCEDURE — 99233 SBSQ HOSP IP/OBS HIGH 50: CPT | Mod: FS | Performed by: NURSE PRACTITIONER

## 2023-06-16 PROCEDURE — 82310 ASSAY OF CALCIUM: CPT | Performed by: HOSPITALIST

## 2023-06-16 PROCEDURE — 250N000013 HC RX MED GY IP 250 OP 250 PS 637: Performed by: HOSPITALIST

## 2023-06-16 PROCEDURE — 97530 THERAPEUTIC ACTIVITIES: CPT | Mod: GP

## 2023-06-16 PROCEDURE — 250N000013 HC RX MED GY IP 250 OP 250 PS 637: Performed by: INTERNAL MEDICINE

## 2023-06-16 PROCEDURE — 99232 SBSQ HOSP IP/OBS MODERATE 35: CPT | Performed by: HOSPITALIST

## 2023-06-16 RX ORDER — MIDODRINE HYDROCHLORIDE 2.5 MG/1
2.5 TABLET ORAL 2 TIMES DAILY
Status: DISCONTINUED | OUTPATIENT
Start: 2023-06-16 | End: 2023-06-17 | Stop reason: HOSPADM

## 2023-06-16 RX ORDER — METOPROLOL TARTRATE 25 MG/1
25 TABLET, FILM COATED ORAL ONCE
Status: COMPLETED | OUTPATIENT
Start: 2023-06-16 | End: 2023-06-16

## 2023-06-16 RX ADMIN — GABAPENTIN 300 MG: 300 CAPSULE ORAL at 08:37

## 2023-06-16 RX ADMIN — APIXABAN 5 MG: 5 TABLET, FILM COATED ORAL at 21:40

## 2023-06-16 RX ADMIN — METOPROLOL TARTRATE 25 MG: 25 TABLET, FILM COATED ORAL at 09:59

## 2023-06-16 RX ADMIN — PRAVASTATIN SODIUM 20 MG: 10 TABLET ORAL at 21:40

## 2023-06-16 RX ADMIN — FINASTERIDE 5 MG: 5 TABLET, FILM COATED ORAL at 08:37

## 2023-06-16 RX ADMIN — PANTOPRAZOLE SODIUM 40 MG: 40 TABLET, DELAYED RELEASE ORAL at 08:38

## 2023-06-16 RX ADMIN — MIDODRINE HYDROCHLORIDE 2.5 MG: 2.5 TABLET ORAL at 12:35

## 2023-06-16 RX ADMIN — APIXABAN 5 MG: 5 TABLET, FILM COATED ORAL at 08:37

## 2023-06-16 RX ADMIN — INSULIN ASPART 2 UNITS: 100 INJECTION, SOLUTION INTRAVENOUS; SUBCUTANEOUS at 17:28

## 2023-06-16 RX ADMIN — TAMSULOSIN HYDROCHLORIDE 0.4 MG: 0.4 CAPSULE ORAL at 08:37

## 2023-06-16 RX ADMIN — INSULIN ASPART 2 UNITS: 100 INJECTION, SOLUTION INTRAVENOUS; SUBCUTANEOUS at 08:39

## 2023-06-16 RX ADMIN — INSULIN ASPART 4 UNITS: 100 INJECTION, SOLUTION INTRAVENOUS; SUBCUTANEOUS at 13:00

## 2023-06-16 RX ADMIN — BUSPIRONE HYDROCHLORIDE 5 MG: 5 TABLET ORAL at 21:40

## 2023-06-16 RX ADMIN — METOPROLOL TARTRATE 50 MG: 50 TABLET, FILM COATED ORAL at 08:37

## 2023-06-16 RX ADMIN — BUSPIRONE HYDROCHLORIDE 5 MG: 5 TABLET ORAL at 08:37

## 2023-06-16 RX ADMIN — METOPROLOL TARTRATE 75 MG: 50 TABLET, FILM COATED ORAL at 21:40

## 2023-06-16 RX ADMIN — GABAPENTIN 900 MG: 300 CAPSULE ORAL at 21:39

## 2023-06-16 ASSESSMENT — ACTIVITIES OF DAILY LIVING (ADL)
ADLS_ACUITY_SCORE: 23

## 2023-06-16 NOTE — PROGRESS NOTES
[facility]  Cardiology Progress Note  Date of Service: 06/16/2023      Assessment & Plan    Austen Fowler is a 88 year old male jbradmitted on 6/12/2023 with atrial fibrillation with RVR    Assessment:  1.  Atrial fibrillation with RVR - on rate control strategy given age/chronicity.   2. Chronic HFpEF - LVEF 55-60%, appears compensated and euvolemic.   3. Orthostatic hypotension    Patient seen at bedside, heart rates remain borderline elevated and continued orthostatic hypotension. Yesterday metoprolol XL was changed to metoprolol tartrate. At this time recommend starting low dose midodrine and further titrating metoprolol tartrate. If able to get control of heart rates and orthostatic hypotension would be reasonable to discharge with outpatient EP consultation for AV suzanne ablation consideration, as ultimately ablation may be the best long term solution. If continued struggles while in the hospital will need to consider EP consultation inpatient.     Plan:   1. INCREASE metoprolol tartrate to 75mg BID, will further titrate as able.  2. START midodrine 2.5mg BID  3. Continue apixaban 5mg BID  4. Continue to hold PTA medications of furosemide and lisinopril   5. Continue compression stockings  6. Continue telemetry  7. Further recommendations depending on heart rate control and orthostatic hypotension/symptoms.     CRISTIANO Mar[facility]  Pager: [phone]          Interval History   Denies chest pain. Feels shortness of breath is at baseline. Denies dizziness/lightheadedness. Denies palpitations.     Physical Exam   Temp: 98.2  F (36.8  C) Temp src: Oral BP: (!) 137/104 Pulse: 97   Resp: 19 SpO2: 98 % O2 Device: None (Room air)    Vitals:    06/14/23 0600 06/15/23 0600 06/16/23 0405   Weight: 110.6 kg (243 lb 12.8 oz) 111.1 kg (245 lb) 111.4 kg (245 lb 8 oz)     GEN: well nourished, in no acute distress.  HEENT:  Pupils equal, round. Sclerae nonicteric.   NECK:  Supple, no masses appreciated.   C/V:  Irregularly irregular, tachycardic, no murmur, rub or gallop  RESP: Respirations are unlabored. Clear to auscultation bilaterally without wheezing, rales, or rhonchi.  GI: Abdomen soft, nontender.  EXTREM: No LE edema.  NEURO: Alert and oriented, cooperative.  SKIN: Warm and dry    Medications     dilTIAZem Stopped (06/13/23 0730)       apixaban ANTICOAGULANT  5 mg Oral BID     busPIRone  5 mg Oral BID     finasteride  5 mg Oral Daily     gabapentin  300 mg Oral Daily     gabapentin  900 mg Oral At Bedtime     insulin aspart  1-7 Units Subcutaneous TID AC     insulin aspart  1-5 Units Subcutaneous At Bedtime     metoprolol tartrate  75 mg Oral BID     midodrine  2.5 mg Oral BID 09 12     pantoprazole  40 mg Oral Daily     pravastatin  20 mg Oral QPM     sodium chloride (PF)  3 mL Intracatheter Q8H     tamsulosin  0.4 mg Oral Daily       Data   Last 24 hours labs reviewed     Echo: 5/4/23  1. The left ventricle is normal in size. There is normal left ventricular wall  thickness. Left ventricular systolic function is normal. The visual ejection  fraction is 55-60%. Left ventricular diastolic function is indeterminate. No  regional wall motion abnormalities noted. There is no thrombus seen in the  left ventricle.  2. The right ventricle is normal size. The right ventricular systolic function  is normal.  3. Trace mitral and tricuspid regurgitation.  4. Mild aortic stenosis.  6. No pericardial effusion.  7. In comparison to the previous report dated 09/02/2022, the findings are  similar.

## 2023-06-16 NOTE — CONSULTS
"BRIEF NUTRITION ASSESSMENT    REASON FOR ASSESSMENT:  Austen Fowler is a 88 year old male seen by Registered Dietitian for Nutrition Admission Risk Screen Received -   Have you recently lost weight without trying ? - \"unsure\"  Have you been eating poorly due to a decreased appetite ?- \"no\"  (pt flipped from OBS --> Inpt status in the last 24 hours)    NUTRITION HISTORY:  Patient reported typically good appetite, no weight loss. There is a discrepancy however, as he reports weights trending in the 260-270s (as seen in weight history), however here in the hospital he has consistently measured in the 240s.     He denies any changes to his appetite, intake, or even how he fits in his clothing. He does not shop for new clothes often, and hasn't had to on account of weight change.     CURRENT DIET AND INTAKE:  Diet: Low Sat Fat, Na <2400 mg             Intake: 100% of meals consumed this admission. Pt consistently ordering TID, he states that the meals he selects are about the same size as he would eat at home. Notes that there seems to be many good options on the menu, but that his appetite is just a little less due to reduced activity.     ANTHROPOMETRICS:  Height: 6' 4\"  Weight:  245 lbs 8 oz (111.4 kg)   Body mass index is 29.88 kg/m .   Weight Status: Overweight BMI 25-29.9  IBW:  91.8 kg   %IBW: 121%  Weight History: up to 7.5% wt loss this month?  Wt Readings from Last 10 Encounters:   06/16/23 111.4 kg (245 lb 8 oz)   05/12/23 119.3 kg (262 lb 14.4 oz)   05/09/23 119.4 kg (263 lb 3.2 oz)   01/10/23 123.8 kg (272 lb 14.4 oz)   10/14/22 124.5 kg (274 lb 6.4 oz)   09/12/22 122.9 kg (271 lb)   09/06/22 121.1 kg (267 lb)   07/14/22 124.7 kg (275 lb)   04/27/22 125.6 kg (276 lb 12.8 oz)   03/21/22 124.7 kg (275 lb)     Date/Time Weight   06/16/23 0405 111.4 kg (245 lb 8 oz)   06/15/23 0600 111.1 kg (245 lb)   06/14/23 0600 110.6 kg (243 lb 12.8 oz)   06/13/23 0612 110.8 kg (244 lb 4.8 oz)   06/12/23 1600 111.8 kg " (246 lb 6.4 oz)       LABS:  Reviewed  Recent Labs   Lab 06/16/23  0738 06/16/23  0616 06/16/23  0155 06/15/23  2058 06/15/23  1801 06/15/23  1146   * 221* 218* 203* 191* 330*     Lab Results   Component Value Date    A1C 7.3 05/09/2023    A1C 7.6 01/10/2023    A1C 7.8 09/02/2022    A1C 8.0 07/14/2022     MALNUTRITION:  Visual Nutrition Focused Physical Assessment (NFPA) completed.   Patient does not meet two of the following criteria necessary for diagnosing malnutrition.     % Weight Loss:  > 5% in 1 month (severe malnutrition)  % Intake:  No decreased intake noted  Subcutaneous Fat Loss:  None observed  Muscle Loss:  None observed  Fluid Retention:  None noted    NUTRITION INTERVENTION:  Nutrition Diagnosis:  No nutrition diagnosis at this time.    Implementation:  Nutrition Education: Discussed appropriate nutrition for inpatient, continue to eat meals TID to prevent strength losses.     FOLLOW UP/MONITORING:   Will re-evaluate in 7 - 10 days, or sooner, if re-consulted.    Andie Carballo RD, LD  Pager: [phone]  Weekend Pager: [phone]

## 2023-06-16 NOTE — PLAN OF CARE
Goal Outcome Evaluation: [id number]    Patient A&Ox4, forgetful at times. VSS, on RA. Orthostatics Negative Tele Afib/ CVR. Denied pain On Low sat fat diet. PIV SL.  Up x1GB/W.No changes noted no this shift. Possible discharge Tomorrow. Will continue to monitor.

## 2023-06-16 NOTE — PROVIDER NOTIFICATION
MD notified: SALIH, MOHSIN MD  Reason for notification: 258-AO  FYI- Dose of metoprolol given at this time. Previous not to give at 1500 not seen or reported by previous RN. VSS. Do you want anything additional done?   Thanks, Shy RN *[phone]  Orders received: No further action

## 2023-06-16 NOTE — PROGRESS NOTES
Received Pt at 0700. Hospital problems: Afib w/ RVR, lightheadedness, dizziness, hypotension. Alert and oriented x4. No reported pain, Ortho BP positive from sitting to standing but denies lightheadedness/dizziness. Tele controlled afib when at rest. HR jumps to 130's w/ activity. Metoprolol increased to BID. Hospitalists explained to Pt wife in more detail of why Pt had transitioned to obs from inpatient. BG on the 200's, received sliding scale insulin. Assist of 1 G/W. Cont tx plan.

## 2023-06-16 NOTE — PROGRESS NOTES
[facility]    Medicine Progress Note - Hospitalist Service    Date of Admission:  6/12/2023    Assessment & Plan     Austen Fowler is a 88 year old male admitted on 6/12/2023 with dizziness and lightheadedness and found to be in atrial fibrillation with rapid ventricular response     Atrial fibrillation with rapid ventricular response  -Previous cardiology notes indicate an episode atrial fibrillation in the setting of sepsis so anticoagulation was not pursued  - presented with clear recurrence of atrial fibrillation which improved with IV diltiazem  - seen by cardiology and he was started on eliquis and cardizem drip which was later stopped and then transitioned to metoprolol  to 50 XL daily .   -Given that patient continued to have significant tachycardia and orthostatic hypotension metoprolol succinate was switched to metoprolol tartrate 50 twice daily on 6/15  -Patient continues to have orthostatic hypotension and was evaluated by cardiology team today and dose of metoprolol was increased to 75 twice daily and midodrine was also started  -We will continue to monitor     Dizziness and feeling lightheaded  orthostatic hypotension  -Patient with episodes of lightheadedness after standing  On Sunday, blood pressure systolic of 70s and he felt weak and lightheaded.  - orthostatics continues  To be positive here  - will continue to hold home BP meds including lasix and lisinopril    - compression stocking on  -Random cortisol was checked and it is normal  -He does not seem to be volume depleted  -Midodrine started today     Chronic diastolic congestive heart failure  Hypertension  Echocardiogram from May 2023 with sudden left ventricle normal in size with ejection fraction of 55 to 60%.  No regional wall motion abnormalities.  -BNP elevated on admission  however chest x-ray relatively clear   -  home Lasix at 40 mg orally and lisinopril held  - monitor volume status closely     Type 2  diabetes with polyneuropathy  -A1C 7.3   - metformin  Held and continue sliding scale insulin with a moderate carbohydrate diet     BPH  - continue Flomax and finasteride      GERD  - Continue with omeprazole     Depression  -Continue with BuSpar        Diet: Low Saturated Fat Na <2400 mg    DVT Prophylaxis: DOAC  Cosme Catheter: Not present  Lines: None     Cardiac Monitoring: ACTIVE order. Indication: Tachyarrhythmias, acute (48 hours)  Code Status: Full Code      Clinically Significant Risk Factors              # Hypoalbuminemia: Lowest albumin = 3.3 g/dL at 6/13/2023  5:48 AM, will monitor as appropriate     # Hypertension: Noted on problem list       # DMII: A1C = 7.3 % (Ref range: 0.0 - 5.6 %) within past 6 months            Disposition Plan      Expected Discharge Date: 06/17/2023                  Rosendo Webb MD  Hospitalist Service  [facility]  Securely message with [employer] (more info)  Text page via [employer] Paging/Directory   _  I am off service from tomorrow morning and his care will be taken over by hospital medicine team  _____________________________________________________________________    Interval History     I saw the patient today and reviewed events of overnight and this morning he was orthostatic.  Patient was seen by cardiology and meds were changed.  Patient denies any chest pain, shortness of breath, palpitation or dizziness.    No family was present today    Discussed plan of care with patient's nurse, reviewed notes from cardiology and I called his wife Anastasia at 2:01 PM and she did not pick and we will call her again tomorrow    Physical Exam   Vital Signs: Temp: 98.2  F (36.8  C) Temp src: Oral BP: 113/81 Pulse: 92   Resp: 18 SpO2: 97 % O2 Device: None (Room air)    Weight: 245 lbs 8 oz        General: Patient appears comfortable and in no acute distress.  Respiratory: Clear to auscultation and he is on room air and is able to speak in full  sentences  Cardiovascular: Irregular rate  Musculoskeletal: Normal Range of motion over upper and lower extremities bilaterally   Psychiatric: cooperative     Medical Decision Making             Data     I have personally reviewed the following data over the past 24 hrs:    N/A  \   N/A   / N/A     140 105 15.2 /  291 (H)   4.1 24 0.78 \       Imaging results reviewed over the past 24 hrs:   No results found for this or any previous visit (from the past 24 hour(s)).

## 2023-06-16 NOTE — PROVIDER NOTIFICATION
MD notified: Pj Kent  Purpose for notification: 258-AO  Pt. diastolic BP elevated while doing orthostatic BP's. Do you want any interventions?  Thanks, Shy RN *[phone]

## 2023-06-16 NOTE — PLAN OF CARE
Pt here with Afib RVR. A&Ox4. Neuros including some forgetfulness. VSS. Tele Afib CVR. Low sat fat diet. Up with AGBW. Denies pain. Orthostatics positive this morning, MD notified. Pt scoring green on the Aggression Stop Light Tool. Plan to monitor BP and HR. Discharge pending.

## 2023-06-17 ENCOUNTER — APPOINTMENT (OUTPATIENT)
Dept: PHYSICAL THERAPY | Facility: CLINIC | Age: 88
DRG: 309 | End: 2023-06-17
Payer: COMMERCIAL

## 2023-06-17 VITALS
SYSTOLIC BLOOD PRESSURE: 103 MMHG | TEMPERATURE: 97.7 F | HEART RATE: 72 BPM | RESPIRATION RATE: 18 BRPM | OXYGEN SATURATION: 97 % | WEIGHT: 246.2 LBS | BODY MASS INDEX: 29.97 KG/M2 | DIASTOLIC BLOOD PRESSURE: 81 MMHG

## 2023-06-17 LAB
GLUCOSE BLDC GLUCOMTR-MCNC: 221 MG/DL (ref 70–99)
GLUCOSE BLDC GLUCOMTR-MCNC: 326 MG/DL (ref 70–99)

## 2023-06-17 PROCEDURE — 250N000013 HC RX MED GY IP 250 OP 250 PS 637: Performed by: HOSPITALIST

## 2023-06-17 PROCEDURE — 97116 GAIT TRAINING THERAPY: CPT | Mod: GP

## 2023-06-17 PROCEDURE — 99239 HOSP IP/OBS DSCHRG MGMT >30: CPT | Performed by: INTERNAL MEDICINE

## 2023-06-17 PROCEDURE — 250N000013 HC RX MED GY IP 250 OP 250 PS 637: Performed by: NURSE PRACTITIONER

## 2023-06-17 PROCEDURE — 97110 THERAPEUTIC EXERCISES: CPT | Mod: GP

## 2023-06-17 RX ORDER — FUROSEMIDE 40 MG
40 TABLET ORAL DAILY
Qty: 90 TABLET | Refills: 3
Start: 2023-06-24 | End: 2023-08-07

## 2023-06-17 RX ORDER — METOPROLOL TARTRATE 75 MG/1
75 TABLET, FILM COATED ORAL 2 TIMES DAILY
Qty: 60 TABLET | Refills: 0 | Status: SHIPPED | OUTPATIENT
Start: 2023-06-17 | End: 2023-06-23

## 2023-06-17 RX ORDER — TAMSULOSIN HYDROCHLORIDE 0.4 MG/1
0.4 CAPSULE ORAL EVERY EVENING
Qty: 90 CAPSULE | Refills: 3
Start: 2023-06-17 | End: 2023-12-08

## 2023-06-17 RX ORDER — MIDODRINE HYDROCHLORIDE 2.5 MG/1
2.5 TABLET ORAL 2 TIMES DAILY
Qty: 60 TABLET | Refills: 0 | Status: SHIPPED | OUTPATIENT
Start: 2023-06-17 | End: 2023-06-23

## 2023-06-17 RX ADMIN — BUSPIRONE HYDROCHLORIDE 5 MG: 5 TABLET ORAL at 08:35

## 2023-06-17 RX ADMIN — PANTOPRAZOLE SODIUM 40 MG: 40 TABLET, DELAYED RELEASE ORAL at 08:35

## 2023-06-17 RX ADMIN — METOPROLOL TARTRATE 75 MG: 50 TABLET, FILM COATED ORAL at 08:35

## 2023-06-17 RX ADMIN — FINASTERIDE 5 MG: 5 TABLET, FILM COATED ORAL at 08:35

## 2023-06-17 RX ADMIN — TAMSULOSIN HYDROCHLORIDE 0.4 MG: 0.4 CAPSULE ORAL at 08:35

## 2023-06-17 RX ADMIN — INSULIN ASPART 4 UNITS: 100 INJECTION, SOLUTION INTRAVENOUS; SUBCUTANEOUS at 12:28

## 2023-06-17 RX ADMIN — MIDODRINE HYDROCHLORIDE 2.5 MG: 2.5 TABLET ORAL at 08:35

## 2023-06-17 RX ADMIN — GABAPENTIN 300 MG: 300 CAPSULE ORAL at 08:35

## 2023-06-17 RX ADMIN — INSULIN ASPART 2 UNITS: 100 INJECTION, SOLUTION INTRAVENOUS; SUBCUTANEOUS at 08:38

## 2023-06-17 RX ADMIN — MIDODRINE HYDROCHLORIDE 2.5 MG: 2.5 TABLET ORAL at 12:28

## 2023-06-17 RX ADMIN — APIXABAN 5 MG: 5 TABLET, FILM COATED ORAL at 08:35

## 2023-06-17 ASSESSMENT — ACTIVITIES OF DAILY LIVING (ADL)
ADLS_ACUITY_SCORE: 23
ADLS_ACUITY_SCORE: 24
ADLS_ACUITY_SCORE: 24
DEPENDENT_IADLS:: TRANSPORTATION
ADLS_ACUITY_SCORE: 24
ADLS_ACUITY_SCORE: 23

## 2023-06-17 NOTE — PLAN OF CARE
Goal Outcome Evaluation:    Pt. Discharged per order to home. Discharge instructions including medications, follow-up, AVS reviewed with pt. ,spouse and daughter at bedside. Discharge medications sent with pt.

## 2023-06-17 NOTE — PROGRESS NOTES
[id number] Transfers A1 & walker.  A/O.  BP elevated at times.  Tele afib RVR with BBB.  HR up to 150s when up to the bathroom, but does not sustain.  Pt is asymptomatic during this time.  Denies pain.  1+ edema to BLE.  Lasix currently on hold.  Discharge when HR is more controlled.

## 2023-06-17 NOTE — DISCHARGE SUMMARY
[facility]    Discharge Summary  Hospitalist    Date of Admission:  6/12/2023  Date of Discharge:  No discharge date for patient encounter.  Discharging Provider: Juan Tapia MD, MD  Date of Service (when I saw the patient): 06/17/23    Discharge Diagnoses   Atrial fibrillation with RVR  Orthostatic hypotension    History of Present Illness   Austen Fowler is an 88 year old male who presented with dizziness and lightheadedness    Hospital Course   Austen Fowler is a 88 year old male admitted on 6/12/2023 with dizziness and lightheadedness and found to be in atrial fibrillation with rapid ventricular response    Final discharge diagnoses and hospital course     Atrial fibrillation with rapid ventricular response  -Previous cardiology notes indicate an episode atrial fibrillation in the setting of sepsis so no anticoagulation was recommended at that time.  -Now presented with clear recurrence of atrial fibrillation which improved with IV diltiazem  - seen by cardiology and he was started on eliquis and cardizem drip which was later stopped and then transitioned to metoprolol  to 50 XL daily .   -Given that patient continued to have significant tachycardia and orthostatic hypotension metoprolol succinate was switched to metoprolol tartrate 50 twice daily on 6/15 and increased to 75 mg twice daily.  -Patient continues to have orthostatic hypotension and was evaluated by cardiology team started him on low-dose midodrine 2.5 mg twice daily.    With increased dose of metoprolol his heart rate is now very well controlled, with low-dose of midodrine 2.5 mg twice daily his orthostatic hypotension significantly improved.    At the time of discharge has been feeling well, his heart rate is well controlled at this time ambulating well and wanted to go home..  Denies any headache dizziness lightheadedness no chest pain shortness of breath orthopnea PND palpitation no dysuria hematuria  constipation diarrhea.  He is ambulating well at this time, PT has evaluated him and recommend home with home physical therapy.    At this time he will be discharged home on metoprolol 75 mg twice daily, discontinue aspirin and start Eliquis 5 mg twice daily.  Follow-up with cardiology and primary care physician in 1 week.    Dizziness and feeling lightheaded: Multifactorial secondary to A-fib with RVR and orthostatic hypotension  orthostatic hypotension  -Patient with episodes of lightheadedness after standing  On Sunday, blood pressure systolic of 70s and he felt weak and lightheaded.  - orthostatics continues  To be positive here  -Because of his significant hypotension which was symptomatic, we will discontinue the lisinopril, hold the Lasix.  We will DC the lisinopril on discharge, will hold the Lasix for at least 1 week daily seen by his PCP and the cardiologist.  Started on low-dose midodrine 2.5 mg twice daily  His symptoms and orthostatic hypotension improved with low-dose midodrine.  He will continue on higher dose of metoprolol 75 mg twice daily on discharge.    Chronic diastolic congestive heart failure: Compensated  Hypertension  Echocardiogram from May 2023 with sudden left ventricle normal in size with ejection fraction of 55 to 60%.  No regional wall motion abnormalities.  -BNP elevated on admission  however chest x-ray relatively clear   -  home Lasix at 40 mg orally and lisinopril held  -Advised him to hold Lasix for 1 week, and then resume if remains stable, earlier if evaluated by the PCP or cardiologist in the meantime.     Type 2 diabetes with polyneuropathy  -A1C 7.3   - Resume metformin     BPH  - continue Flomax and finasteride      GERD  - Continue with omeprazole     Depression  -Continue with BuSpar            Diet: Low Saturated Fat Na <2400 mg      Code Status: Full Code    Juan Tapia MD, MD    Significant Results and Procedures       Pending Results   These results will be followed  up by PCP  Unresulted Labs Ordered in the Past 30 Days of this Admission     No orders found from 5/13/2023 to 6/13/2023.          Code Status   Full Code       Primary Care Physician   Hamzah Hodge    Physical Exam   Temp: 97.7  F (36.5  C) Temp src: Oral BP: 103/81 Pulse: 72   Resp: 18 SpO2: 97 % O2 Device: None (Room air)    Vitals:    06/15/23 0600 06/16/23 0405 06/17/23 0401   Weight: 111.1 kg (245 lb) 111.4 kg (245 lb 8 oz) 111.7 kg (246 lb 3.2 oz)     Vital Signs with Ranges  Temp:  [97.5  F (36.4  C)-98.6  F (37  C)] 97.7  F (36.5  C)  Pulse:  [] 72  Resp:  [16-19] 18  BP: (103-142)/() 103/81  SpO2:  [92 %-98 %] 97 %  I/O last 3 completed shifts:  In: 480 [P.O.:480]  Out: -     Constitutional: awake, alert, cooperative, no apparent distress, and appears stated age  Eyes: Lids and lashes normal, pupils equal, round and reactive to light, extra ocular muscles intact, sclera clear, conjunctiva normal  Respiratory: No increased work of breathing, good air exchange, clear to auscultation bilaterally, no crackles or wheezing  Cardiovascular: Normal apical impulse, regular rate and rhythm, normal S1 and S2, no S3 or S4, and no murmur noted  GI: No scars, normal bowel sounds, soft, non-distended, non-tender, no masses palpated, no hepatosplenomegally  Musculoskeletal: no lower extremity pitting edema present  Neurologic: No focal deficit    Discharge Disposition   Discharged to home  Condition at discharge: Stable    Consultations This Hospital Stay   CARDIOLOGY IP CONSULT  PHYSICAL THERAPY ADULT IP CONSULT  OCCUPATIONAL THERAPY ADULT IP CONSULT  CARE MANAGEMENT / SOCIAL WORK IP CONSULT    Time Spent on this Encounter   Juan HERNANDEZ MD, personally saw the patient today and spent greater than 30 minutes discharging this patient.    Discharge Orders      Home Care Referral      Follow-Up with Cardiology L[facility]      Follow-Up with Cardiology      Reason for your hospital stay    Afib with RVR  Orthostatic  hypotension     Follow-up and recommended labs and tests     Follow up with primary care provider, Hamzah Hodge, within 7 days for hospital follow- up.  The following labs/tests are recommended: cbc, bmp  Follow up with Cardiology in 1-2 weeks .     Activity    Your activity upon discharge: activity as tolerated     Follow-up and recommended labs and tests     Please call [facility] to check on your home care agency at [phone]     Diet    Follow this diet upon discharge: Orders Placed This Encounter      Low Saturated Fat Na <2400 mg     Discharge Medications   Current Discharge Medication List      START taking these medications    Details   apixaban ANTICOAGULANT (ELIQUIS) 5 MG tablet Take 1 tablet (5 mg) by mouth 2 times daily  Qty: 60 tablet, Refills: 0    Associated Diagnoses: Atrial fibrillation, unspecified type (H)      metoprolol tartrate 75 MG TABS Take 75 mg by mouth 2 times daily  Qty: 60 tablet, Refills: 0    Associated Diagnoses: Atrial fibrillation, unspecified type (H)      midodrine (PROAMATINE) 2.5 MG tablet Take 1 tablet (2.5 mg) by mouth 2 times daily  Qty: 60 tablet, Refills: 0    Associated Diagnoses: Orthostatic hypotension         CONTINUE these medications which have CHANGED    Details   furosemide (LASIX) 40 MG tablet Take 1 tablet (40 mg) by mouth daily  Qty: 90 tablet, Refills: 3    Associated Diagnoses: Acute diastolic heart failure (H)      tamsulosin (FLOMAX) 0.4 MG capsule Take 1 capsule (0.4 mg) by mouth every evening  Qty: 90 capsule, Refills: 3    Associated Diagnoses: Benign prostatic hyperplasia, unspecified whether lower urinary tract symptoms present         CONTINUE these medications which have NOT CHANGED    Details   acetaminophen (ACETAMIN) 500 MG tablet Take 1,000 mg by mouth 2 times daily      busPIRone (BUSPAR) 5 MG tablet TAKE 1 TABLET BY MOUTH 3  TIMES DAILY  Qty: 270 tablet, Refills: 1    Associated Diagnoses: Anxiety      Cholecalciferol (VITAMIN  D) 2000 UNIT tablet Take 2,000 Units by mouth daily.  Qty: 90 tablet, Refills: 3    Comments: OTC      finasteride (PROSCAR) 5 MG tablet TAKE 1 TABLET BY MOUTH  DAILY  Qty: 90 tablet, Refills: 2    Comments: Requesting 1 year supply  Associated Diagnoses: Benign prostatic hyperplasia, unspecified whether lower urinary tract symptoms present      gabapentin (NEURONTIN) 300 MG capsule 300 mg in AM, 900 mg in PM  Qty: 360 capsule, Refills: 3    Associated Diagnoses: Chronic pain syndrome; Sacroiliac joint pain      metFORMIN (GLUCOPHAGE XR) 500 MG 24 hr tablet TAKE 2 TABLETS BY MOUTH  TWICE DAILY WITH MEALS  Qty: 360 tablet, Refills: 3    Comments: Requesting 1 year supply  Associated Diagnoses: 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)      multivitamin (OCUVITE) TABS tablet Take 1 tablet by mouth daily      omeprazole (PRILOSEC) 20 MG DR capsule Take 1 capsule (20 mg) by mouth daily  Qty: 90 capsule, Refills: 3    Associated Diagnoses: Dysphagia, unspecified type      potassium chloride (KLOR-CON) 20 MEQ packet DISSOLVE 1 PACKET IN 4 OZ  WATER OR JUICE AND DRINK 3  TIMES DAILY  Qty: 270 each, Refills: 1    Comments: Requesting 1 year supply  Associated Diagnoses: Acute diastolic heart failure (H)      pravastatin (PRAVACHOL) 20 MG tablet Take 1 tablet (20 mg) by mouth every evening - due for fasting physical with Dr Hodge for refills  Qty: 90 tablet, Refills: 3    Associated Diagnoses: Hyperlipidemia LDL goal <70      Probiotic Product (PROBIOTIC PO) Take 1 capsule by mouth daily      vitamin B-12 (CYANOCOBALAMIN) 1000 MCG tablet Take 1,000 mcg by mouth daily       blood glucose monitoring (ONE TOUCH ULTRASOFT) lancets USE TO TEST BLOOD SUGAR  TWICE DAILY OR AS DIRECTED  Qty: 100 each, Refills: 3    Comments: Requesting 1 year supply  Associated Diagnoses: 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)      dutasteride (AVODART) 0.5 MG capsule Take 1 capsule (0.5 mg) by mouth daily  Qty: 93 capsule,  Refills: 3    Associated Diagnoses: Benign prostatic hyperplasia, unspecified whether lower urinary tract symptoms present      ONETOUCH ULTRA test strip USE TO TEST BLOOD SUGAR TWICE  DAILY OR AS DIRECTED  Qty: 200 strip, Refills: 1    Comments: Requesting 1 year supply  Associated Diagnoses: Other abnormal glucose      ORDER FOR DME Uses Cpap machine for sleep apnea         STOP taking these medications       aspirin 81 MG EC tablet Comments:   Reason for Stopping:         lisinopril (ZESTRIL) 10 MG tablet Comments:   Reason for Stopping:         metoprolol succinate ER (TOPROL XL) 25 MG 24 hr tablet Comments:   Reason for Stopping:             Allergies   Allergies   Allergen Reactions     No Known Allergies      Data   Most Recent 3 CBC's:Recent Labs   Lab Test 06/14/23  0604 06/13/23  0548 06/12/23  1014   WBC 9.5 11.6* 9.4   HGB 14.2 14.4 14.7   MCV 87 85 87    186 183      Most Recent 3 BMP's:  Recent Labs   Lab Test 06/17/23  1209 06/17/23  0821 06/16/23  2205 06/16/23  0738 06/16/23  0616 06/14/23  0810 06/14/23  0604 06/13/23  0808 06/13/23  0548   NA  --   --   --   --  140  --  139  --  137   POTASSIUM  --   --   --   --  4.1  --  4.2  --  4.0   CHLORIDE  --   --   --   --  105  --  104  --  103   CO2  --   --   --   --  24  --  24  --  21*   BUN  --   --   --   --  15.2  --  13.7  --  17.9   CR  --   --   --   --  0.78  --  0.90  --  0.81   ANIONGAP  --   --   --   --  11  --  11  --  13   BRANDON  --   --   --   --  9.4  --  9.2  --  9.5   * 221* 199*   < > 221*   < > 205*   < > 203*    < > = values in this interval not displayed.     Most Recent 2 LFT's:  Recent Labs   Lab Test 06/13/23  0548 05/09/23  1131   AST 18 27   ALT 11 12   ALKPHOS 53 57   BILITOTAL 0.6 0.6     Most Recent INR's and Anticoagulation Dosing History:  Anticoagulation Dose History         Latest Ref Rng & Units 8/19/2014   Recent Dosing and Labs   INR 0.86 - 1.14 1.10                Most Recent 3 Troponin's:  Recent  Labs   Lab Test 01/11/20  1641   TROPI <0.015     Most Recent Cholesterol Panel:  Recent Labs   Lab Test 05/09/23  1131   CHOL 129   LDL 40   HDL 39*   TRIG 248*     Most Recent 6 Bacteria Isolates From Any Culture (See EPIC Reports for Culture Details):  Recent Labs   Lab Test 04/03/21  2231 04/03/21  2227 04/01/21  1658 04/01/21  1631 04/01/21  1615 01/11/20  2030   CULT No growth No growth Cultured on the 2nd day of incubation:  Staphylococcus pettenkoferi  *  Critical Value/Significant Value, preliminary result only, called to and read back by  MILY LINTON RN  04.03.21 CF    (Note)  POSITIVE for Staphylococci other than S.aureus, S.epidermidis and  S.lugdunensis, by [employer] multiplex nucleic acid test.  Coagulase-negative staphylococci are the most common venipuncture or  collection associated skin CONTAMINANTS grown in blood cultures.  Final identification and antimicrobial susceptibility testing will be  verified by standard methods.    Specimen tested with [employer] multiplex, gram-positive blood culture  nucleic acid test for the following targets: Staph aureus, Staph  epidermidis, Staph lugdunensis, other Staph species, Enterococcus  faecalis, Enterococcus faecium, Streptococcus species, S. agalactiae,  S. anginosus grp., S. pneumoniae, S. pyogenes, Listeria sp., mecA  (methicillin resistance) and Aaron/B (vancomycin resistance).    Critical Value/Significant Value called to and read back by Refugio Mccall RN, 807 4/3/21 JT     No growth No growth No growth     Most Recent TSH, T4 and A1c Labs:  Recent Labs   Lab Test 05/09/23  1131   TSH 1.24   A1C 7.3*     Results for orders placed or performed during the hospital encounter of 06/12/23   XR Chest 2 Views    Narrative    CHEST TWO VIEWS   6/12/2023 11:18 AM     HISTORY: Shortness of breath.    COMPARISON: Chest x-ray on 2/20/2023      Impression    IMPRESSION: PA and lateral views of the chest were obtained.  Postsurgical changes of cardiac  surgery with median sternotomy wires  and surgical clips. Cardiomediastinal silhouette is within normal  limits. Mild basilar pulmonary opacities, likely atelectasis. No  significant pleural effusion or pneumothorax.    JOSE C CHEEMA MD         SYSTEM ID:  [id number]     *Note: Due to a large number of results and/or encounters for the requested time period, some results have not been displayed. A complete set of results can be found in Results Review.     Most Recent 3 CBC's:Recent Labs   Lab Test 06/14/23  0604 06/13/23  0548 06/12/23  1014   WBC 9.5 11.6* 9.4   HGB 14.2 14.4 14.7   MCV 87 85 87    186 183     Most Recent 3 BMP's:Recent Labs   Lab Test 06/17/23  1209 06/17/23  0821 06/16/23  2205 06/16/23  0738 06/16/23  0616 06/14/23  0810 06/14/23  0604 06/13/23  0808 06/13/23  0548   NA  --   --   --   --  140  --  139  --  137   POTASSIUM  --   --   --   --  4.1  --  4.2  --  4.0   CHLORIDE  --   --   --   --  105  --  104  --  103   CO2  --   --   --   --  24  --  24  --  21*   BUN  --   --   --   --  15.2  --  13.7  --  17.9   CR  --   --   --   --  0.78  --  0.90  --  0.81   ANIONGAP  --   --   --   --  11  --  11  --  13   BRANDON  --   --   --   --  9.4  --  9.2  --  9.5   * 221* 199*   < > 221*   < > 205*   < > 203*    < > = values in this interval not displayed.     Most Recent 2 LFT's:Recent Labs   Lab Test 06/13/23  0548 05/09/23  1131   AST 18 27   ALT 11 12   ALKPHOS 53 57   BILITOTAL 0.6 0.6

## 2023-06-17 NOTE — CONSULTS
Care Management Initial Consult    General Information  Assessment completed with: [facility]  Type of CM/SW Visit: Initial Assessment    Primary Care Provider verified and updated as needed: Yes   Readmission within the last 30 days: no previous admission in last 30 days      Reason for Consult: discharge planning  Advance Care Planning:            Communication Assessment  Patient's communication style: spoken language (English or Bilingual)    Hearing Difficulty or Deaf: yes   Wear Glasses or Blind: no    Cognitive  Cognitive/Neuro/Behavioral: WDL  Level of Consciousness: alert  Arousal Level: opens eyes spontaneously  Orientation: oriented x 4  Mood/Behavior: calm, cooperative  Best Language: 0 - No aphasia  Speech: clear, spontaneous    Living Environment:   People in home: spouse     Current living Arrangements: house      Able to return to prior arrangements: yes       Family/Social Support:  Care provided by: self  Provides care for: no one  Marital Status:              Description of Support System:           Current Resources:   Patient receiving home care services: No     Community Resources: None  Equipment currently used at home: none  Supplies currently used at home:      Employment/Financial:  Employment Status: retired        Financial Concerns: No concerns identified           Does the patient's insurance plan have a 3 day qualifying hospital stay waiver?  No    Lifestyle & Psychosocial Needs:  Social Determinants of Health     Tobacco Use: Medium Risk (6/12/2023)    Patient History      Smoking Tobacco Use: Former      Smokeless Tobacco Use: Never      Passive Exposure: Not on file   Alcohol Use: Not on file   Financial Resource Strain: Not on file   Food Insecurity: Not on file   Transportation Needs: Not on file   Physical Activity: Not on file   Stress: Not on file   Social Connections: Not on file   Intimate Partner Violence: Not on file   Depression: Not at risk (5/9/2023)    PHQ-2       PHQ-2 Score: 0   Housing Stability: Not on file       Functional Status:  Prior to admission patient needed assistance:   Dependent ADLs:: Ambulation-no assistive device, Independent  Dependent IADLs:: Transportation       Mental Health Status:  Mental Health Status: No Current Concerns       Chemical Dependency Status:      unknown          Values/Beliefs:  Spiritual, Cultural Beliefs, [employer] Practices, Values that affect care: no               Additional Information:  Met with patient. He has home care orders in for discharge. Writer spoke with patient about this. He is agreeable. Writer sent referral to [facility] for consideration and then if unable to accept, they will find an agency.    Sherice Lord RN

## 2023-06-19 ENCOUNTER — TELEPHONE (OUTPATIENT)
Dept: CARDIOLOGY | Facility: CLINIC | Age: 88
End: 2023-06-19

## 2023-06-19 ENCOUNTER — PATIENT OUTREACH (OUTPATIENT)
Dept: CARE COORDINATION | Facility: CLINIC | Age: 88
End: 2023-06-19
Payer: COMMERCIAL

## 2023-06-19 ENCOUNTER — TELEPHONE (OUTPATIENT)
Dept: CARDIOLOGY | Facility: CLINIC | Age: 88
End: 2023-06-19
Payer: COMMERCIAL

## 2023-06-19 NOTE — TELEPHONE ENCOUNTER
Hi,    SAC this msg over for sooner appt but there is nothing avail sooner to offer w/any LILY or [facility] how would team like to proceed?    Thank you,  Ede

## 2023-06-19 NOTE — PROGRESS NOTES
"CHW routed the following message to patient's PCP, Hamzah Hodge MD:     CHW talked to patient and patient's wife, Anastasia Caballero, today for ED/Hospital discharge and follow-up. Anastasia Caballero had questions regarding patient's dutasteride 0.5 MG capsule (also known as: AVODART) and finasteride 5 MG tablet (also known as: PROSCAR) medications. She mentioned these medications are for the treatment of the same thing and is wondering if the patient should be taking both medications at this time. CHW reviewed patient's AVS and both of these medications are listed under \"continue taking as.\" Anastasia Caballero mentioned that previously she was unable to get the dutasteride medication and believes that is why patient was then prescribed finasteride. Please contact patient at: [phone] (Home). Please advise.     CHW offered Clinic Care Coordination to an established [facility] eligible patient and patient declined CCC at this time.    Clinic Care Coordination Contact  [facility]: Post-Discharge Note  SITUATION                                                      Admission:    Admission Date: 06/12/23   Reason for Admission: Dizziness, Lightheadedness  Discharge:   Discharge Date: 06/17/23  Discharge Diagnosis: Atrial fibrillation with RVR, Orthostatic hypotension    BACKGROUND                                                      Per hospital discharge summary and inpatient provider notes:    Austen Fowler is a 88 year old male with past medical history of coronary artery disease status post CABG in 1992, paroxysmal atrial fibrillation, type 2 diabetes, hypertension, hyperlipidemia, BPH, GERD who presents with dizziness and lightheadedness.  The patient reports dizziness and difficulty balancing over the past week and increased shaking.  He has been having increasing falls.  He is having episodes of lightheadedness especially with changes in position.  He denies any abnormal sensations of movement.     ASSESSMENT      Discharge " "Assessment  How are you doing now that you are home?: \"Well that's debatable. It feels great to be back at home...feel top heavy at home. I'm in the process of eating breakfast now so things are looking up.\"  How are your symptoms? (Red Flag symptoms escalate to triage hotline per guidelines): Improved  Do you feel your condition is stable enough to be safe at home until your provider visit?: Yes  Does the patient have their discharge instructions? : Yes  Does the patient have questions regarding their discharge instructions? : No  Were you started on any new medications or were there changes to any of your previous medications? : Yes  Does the patient have all of their medications?: Yes  Do you have questions regarding any of your medications? : Yes (see comment) (Pt's wife had questions regarding pt's dutasteride and finasteride medications. See CHW's routed message to pt's PCP.)  Do you have all of your needed medical supplies or equipment (DME)?  (i.e. oxygen tank, CPAP, cane, etc.): Yes  Discharge follow-up appointment scheduled within 14 calendar days? : Yes  Discharge Follow Up Appointment Date: 06/27/23 (CHW schedule pt an ED/Hospital follow-up appt.)  Discharge Follow Up Appointment Scheduled with?: Primary Care Provider    Post-op (CHW CTA Only)  If the patient had a surgery or procedure, do they have any questions for a nurse?: No    PLAN                                                      Outpatient Plan:      Follow-up and recommended labs and tests    Follow up with primary care provider, Hamzah Hodge, within 7 days for  hospital follow- up. The following labs/tests are recommended: cbc,  Bmp    Follow up with Cardiology in 1-2 weeks.     Follow-up and recommended labs and tests  Please call [facility] to check on your home care  agency at [phone]    Additional Services:    Follow-Up with Cardiology  [facility] will call you to coordinate your care as prescribed by your " provider. If you have concerns about scheduling, please call [phone].  Cardiovascular Disease  Follow-up with: Self  Reason for follow-up: EP  Scheduling Instructions: [facility] will call you to coordinate your care as prescribed by your provider. If you  have concerns about scheduling, please call [phone].    Follow-Up with Cardiology LILY  [facility] will call you to coordinate your care as prescribed by your provider. If you have concerns about scheduling, please call [phone].    Cardiovascular Disease  Follow-up with: LILY  Scheduling Instructions: [facility] will call you to coordinate your care as prescribed by your provider. If you  have concerns about scheduling, please call [phone].    Home Care Referral  Your provider has ordered home health services. If you have not    Future Appointments   Date Time Provider Department Center   6/27/2023  2:30 PM Tish Reich PA-C [facility]   8/21/2023  1:00 PM Hamzah Hodge MD [facility]         For any urgent concerns, please contact our 24 hour nurse triage line: [phone] ([phone])         RAHEEM Hallman  [phone]  [facility]  [facility]

## 2023-06-19 NOTE — TELEPHONE ENCOUNTER
Patient was admitted to [facility] on 6/12/2023 with dizziness and lightheadedness and found to be in new onset atrial fibrillation with rapid ventricular response, hypotension, improved with IV Diltiazem. Cardiology consulted and he was started on Eliquis and transitioned to Metoprolol  to 50 XL daily.     PMH: BPH, GERD, HTN, DM with poly neuropathy.    5/2023: Echo with EF of 55 to 60%.  No regional wall motion abnormalities.    Given that patient continued to have significant tachycardia and orthostatic hypotension on Metoprolol succinate was switched to Metoprolol tartrate 50 twice daily on 6/15/23 and increased to 75 mg twice daily. Patient continued to have orthostatic hypotension, so was started on low-dose Midodrine 2.5 mg twice daily. HR and BP improved at time of discharge.    Pt was started on Metoprolol tartrate, Midodrine and Eliquis. PTA Lasix on hold x one week until PMD follow up. ASA and Lisinopril were discontinued at time of discharge.    Called patient and spoke to his wife, to discuss any post hospital d/c questions she may have, review pt's medication changes, and confirm f/u appts. She denied any questions regarding new medications or changes to PTA medications.     Patient has had no c/o SOB, chest pain, light headedness, dizziness or falls since being home per wife.    RN confirmed with wife that pt needs to be scheduled for cardiology ALIRIO and cardiologist follow up appts as ordered. Scheduling and Dr. Lawrence Vaughan's Team RN phone numbers were provided.    Pt was discharged to home in care of wife with [facility] services.    Patient advised to call clinic with any cardiac related questions or concerns prior to this alirio't. Patient verbalized understanding and agreed with plan. SHELIA Dupont RN.

## 2023-06-19 NOTE — PLAN OF CARE
Occupational Therapy Discharge Summary    Reason for therapy discharge:    Discharged to home with home therapy.    Progress towards therapy goal(s). See goals on Care Plan in [facility] electronic health record for goal details.  Goals partially met.  Barriers to achieving goals:   discharge from facility.    Therapy recommendation(s):    Continued therapy is recommended.  Rationale/Recommendations:  Assist in I/ADLs (including shower transfers, dressing, home management, yardwork). OP OT to address safety and IND in I/ADLs..     **Pt not seen by discharging therapist on this date, note written based on previous treating therapist's notes and recommendations

## 2023-06-19 NOTE — TELEPHONE ENCOUNTER
[facility] Call Center    Phone Message    May a detailed message be left on voicemail: yes     Reason for Call: Other: Please reachout to pt spouse and assisit in getting scheduled the LILY also has no avail until Aug so someone would need to work with them to get them in sooner.     Action Taken: Other: cardio    Travel Screening: Not Applicable   Thank you!  [facility]

## 2023-06-20 ENCOUNTER — MEDICAL CORRESPONDENCE (OUTPATIENT)
Dept: HEALTH INFORMATION MANAGEMENT | Facility: CLINIC | Age: 88
End: 2023-06-20

## 2023-06-20 ENCOUNTER — TELEPHONE (OUTPATIENT)
Dept: CARDIOLOGY | Facility: CLINIC | Age: 88
End: 2023-06-20
Payer: COMMERCIAL

## 2023-06-20 NOTE — TELEPHONE ENCOUNTER
Called patient's wife as requested and offered 9 AM tomorrow morning with Jess Baker in the [place], wife declined stating that it is too early and wanted the 10 AM appointment in [place], wife advised to call scheduling to arrange.  Time of 9 AM with Jess Baker has been put on hold if wife changes her mind.  NEERU Guy RN

## 2023-06-20 NOTE — TELEPHONE ENCOUNTER
Called pt's wife to offer appt w/ Jess Baker NP 6/23 at 10:30 AM. Wife states she will take this appt, will message scheduling to add to schedule. Salas GALE

## 2023-06-20 NOTE — TELEPHONE ENCOUNTER
6/20/23 - called to offer 6/21/23  appointment in [place] at 10 am with Lyndsey to patient for Hospital follow up, patient declined, stating he wanted an afternoon.  I offered 6/22/23 in [place] at 1,2 or 3 pm with Lyndsey, patient declined, stating he wanted [place].

## 2023-06-20 NOTE — TELEPHONE ENCOUNTER
[facility] Call Center    Phone Message    May a detailed message be left on voicemail: yes     Reason for Call: Other: Wife would like a call back as she stated they would like to take an appt tomorrow in [place] now and would like to discuss     Action Taken: Message routed to:  [facility] Cardio    Travel Screening: Not Applicable

## 2023-06-22 NOTE — PROGRESS NOTES
Cardiology Clinic Progress Note  Austen Fowler MRN# [id number]   YOB: 1935 Age: 88 year old   Primary Cardiologist: Dr. Lawrence Vaughan Reason for visit: Post-hospital follow up             Assessment and Plan:     1.  Atrial fibrillation with rapid ventricular response  -EKG today with continued atrial fibrillation   -Continue metoprolol tartrate 75mg BID although rate appears uncontrolled, unable to uptitrate given significant orthostasis  -Continue Eliquis 5mg BID     Addendum @3:40pm 6/23/23:Reviewed plan with Dr. Hernandez and will have patient undergo ZENIA/CV and start amioderone 200mg TID prior to increase the chance of patient staying in sinus rhythm post procedure. Called and reviewed recommendations, procedure, risks and benefits with patient via phone.     All indications, risks and benefits for transesophageal echocardiogram have been explained to the patient.  This includes but is not limited to esophageal irritation, damage to the oral cavity, esophageal perforation, GI bleeding, pharyngeal hematoma, transient bronchospasm, transient hypoxia, arrhthymias (NSVT, transient atrial fibrillation), vomiting, hemoptysis, and complications from anesthesia. Patient understands and wishes to proceed with it. Patient has no history of esophageal stricture, odynphagia, dysphagia, airway problems, or medication allergies. A formal consent form will be signed by the procedural physician.    I have reviewed the risks of a cardioversion, including possible reactions to conscious sedation, skin irritation, stroke, and bradycardia. He understands and wishes to proceed.     2.  Hyperlipidemia  -5/2023 total cholesterol 129, LDL 40, at goal  -Continue pravachol     3.  Orthostatic hypotension  -Symptoms improved since start of midodrine and orthostatic readings today still significantly positive, will increase midodrine to 5mg BID, patient does not think he will be compliant with TID dosing   -Continue  compression stockings  -Will monitor for continued orthostasis and need for midodrine following restoration of sinus rhythm    4.  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  Coronary artery disease, s/p CABG x4  -No anginal symptoms   -Continue pravachol     6.  Chronic HFpEF  -5/2023 TTE with LVEF 55 to 60%  -Lasix held at discharge, would prefer to have patient take on a sliding scale based on weight however he is unable to stand on a scale due to unsteadiness  -Appears well compensated and euvolemic  -Given significant orthostasis will continue to hold furosemide and potassium as well as lisinopril    7. Mild aortic stenosis, stable  -TTE 5/2023 mean gradient 11mmHg, peak 21mmHg, ROMY 1.6cm2    8.  Type 2 diabetes with polyneuropathy  -5/2023 HgbA1c 7.3%  -Continue metformin    Changes today: Continue to hold furosemide and potassium, Increase midodrine to 5mg twice a day    Follow up plan: Electrophysiology consult ordered, will review with Dr. Hernandez and plan for ZENIA-CV        History of Presenting Illness:    Austen Fowler is a very pleasant 88 year old male with a history of coronary artery disease, s/p CABG (LIMA-LAD, right IM-RCA, SVG-first OM, SVG- OM 2), dilated ascending aorta, mild aortic stenosis, type 2 diabetes, accessible atrial fibrillation.    She was seen by Dr. Lawrence Vaughan in May 2023.  He recommended a 14-day Zio patch monitor to look for asymptomatic episodes of atrial fibrillation.    Unfortunately prior to patient being seen in the clinic for follow up he developed dizziness and lightheadedness and presented to the emergency room and found to be in atrial fibrillation with rapid ventricular response and was hospitalized 6/12/23.  He was initiated on diltiazem infusion and his Toprol was increased to 50 mg twice daily.  He was having issues with orthostasis while hospitalized low-dose midodrine was added and  his metoprolol XL was changed to metoprolol tartrate 50 mg twice daily.  Subsequently was increased to 75 mg twice a day.  His lisinopril was discontinued at discharge given his continued orthostasis and Lasix was held x1 week.  His aspirin was discontinued at discharge with start of Eliquis.    Labs from 23 sodium 140, potassium 4.1, BUN 15.2, creatinine 0.70, GFR 86, hemoglobin 14.2, platelet 199, ALT 11    Patient is here today for follow-up after his recent hospitalization. His wife Anastasia Caballero is present with him today. Patient reports feeling good. He continues to feel dizzy upon standing however this has improved. Denies pre-syncope or lightheadedness. Denies any changes in his chronic level of shortness of breath since being off his lasix. Denies orthopnea or PND. Denies any edema. Weight is down 11 lbs since last month. He has been eating less but no other dietary changes.     Patient denies chest pain or chest tightness. Denies tachycardia or palpitations.     Blood pressure 141/86 and HR 95 in clinic today.    Denies any bleeding issues with eliquis.         Recent Hospitalizations   As above           Social History      Social History     Socioeconomic History     Marital status:      Spouse name: Anastasia Caballero     Number of children: 4     Years of education: 14     Highest education level: Not on file   Occupational History     Occupation: retired     Comment:    Tobacco Use     Smoking status: Former     Packs/day: 2.00     Years: 30.00     Pack years: 60.00     Types: Cigarettes, Cigars, Pipe     Start date: 1954     Quit date: 3/1/1992     Years since quittin.3     Smokeless tobacco: Never     Tobacco comments:     quit in    Substance and Sexual Activity     Alcohol use: Yes     Comment: minimal less than 1/week     Drug use: No     Sexual activity: Not Currently     Partners: Female     Birth control/protection: Abstinence, Pull-out method, Condom,  "Post-menopausal, Natural Family Planning, None   Other Topics Concern      Service Yes     Comment: [employer], served in [employer]     Blood Transfusions Yes     Comment: Unsure if he had one with heart surgery     Caffeine Concern Yes     Comment: occ cofee     Occupational Exposure No     Hobby Hazards No     Sleep Concern Yes     Comment: CPAP     Stress Concern No     Weight Concern Not Asked     Special Diet No     Back Care Not Asked     Exercise Yes     Comment: [employer] 3 times a week, biking walking.      Bike Helmet Yes     Seat Belt Yes     Self-Exams Yes     Comment: does HIRAL about once a month.     Parent/sibling w/ CABG, MI or angioplasty before 65F 55M? No   Social History Narrative        4 kids     Social Determinants of Health     Financial Resource Strain: Not on file   Food Insecurity: Not on file   Transportation Needs: Not on file   Physical Activity: Not on file   Stress: Not on file   Social Connections: Not on file   Intimate Partner Violence: Not on file   Housing Stability: Not on file            Review of Systems:   Skin:        Eyes:       ENT:       Respiratory:  Positive for shortness of breath;sleep apnea;CPAP  Cardiovascular:  Negative for;palpitations;chest pain Positive for;edema;lightheadedness;dizziness  Gastroenterology:      Genitourinary:       Musculoskeletal:       Neurologic:       Psychiatric:       Heme/Lymph/Imm:       Endocrine:  Positive for diabetes         Physical Exam:   Vitals: BP (!) 141/86 (BP Location: Left arm, Patient Position: Sitting)   Pulse 95   Ht 1.93 m (6' 4\")   Wt 114.2 kg (251 lb 12.8 oz)   SpO2 96%   BMI 30.65 kg/m     Wt Readings from Last 4 Encounters:   06/23/23 114.2 kg (251 lb 12.8 oz)   06/17/23 111.7 kg (246 lb 3.2 oz)   05/12/23 119.3 kg (262 lb 14.4 oz)   05/09/23 119.4 kg (263 lb 3.2 oz)     GEN: well nourished, in no acute distress.  HEENT:  Pupils equal, round. Sclerae nonicteric.   NECK: Supple, no masses appreciated. No " JVD with patient supine.  C/V:  Irregularly irregular rate and rhythm, no murmur, rub or gallop.    RESP: Respirations are unlabored. Clear to auscultation bilaterally without wheezing, rales, or rhonchi.  GI: Abdomen soft, nontender.  EXTREM: No LE edema.  NEURO: Alert and oriented, cooperative.  SKIN: Warm and dry.        Data:       LIPID RESULTS:  Lab Results   Component Value Date    CHOL 129 05/09/2023    CHOL 116 06/25/2021    HDL 39 (L) 05/09/2023    HDL 38 (L) 06/25/2021    LDL 40 05/09/2023    LDL 46 06/25/2021    TRIG 248 (H) 05/09/2023    TRIG 158 (H) 06/25/2021    CHOLHDLRATIO 3.6 11/12/2015     LIVER ENZYME RESULTS:  Lab Results   Component Value Date    AST 18 06/13/2023    AST 32 06/25/2021    ALT 11 06/13/2023    ALT 26 06/25/2021     CBC RESULTS:  Lab Results   Component Value Date    WBC 9.5 06/14/2023    WBC 7.2 06/25/2021    RBC 5.10 06/14/2023    RBC 4.48 06/25/2021    HGB 14.2 06/14/2023    HGB 13.0 (L) 06/25/2021    HCT 44.4 06/14/2023    HCT 38.9 (L) 06/25/2021    MCV 87 06/14/2023    MCV 87 06/25/2021    MCH 27.8 06/14/2023    MCH 29.0 06/25/2021    MCHC 32.0 06/14/2023    MCHC 33.4 06/25/2021    RDW 15.3 (H) 06/14/2023    RDW 15.0 06/25/2021     06/14/2023     06/25/2021     BMP RESULTS:  Lab Results   Component Value Date     06/16/2023     06/25/2021    POTASSIUM 4.1 06/16/2023    POTASSIUM 3.9 10/14/2022    POTASSIUM 3.7 06/25/2021    CHLORIDE 105 06/16/2023    CHLORIDE 103 10/14/2022    CHLORIDE 108 06/25/2021    CO2 24 06/16/2023    CO2 29 10/14/2022    CO2 28 06/25/2021    ANIONGAP 11 06/16/2023    ANIONGAP 7 10/14/2022    ANIONGAP 5 06/25/2021     (H) 06/17/2023     (H) 10/14/2022     (H) 06/25/2021    BUN 15.2 06/16/2023    BUN 13 10/14/2022    BUN 15 06/25/2021    CR 0.78 06/16/2023    CR 0.72 06/25/2021    GFRESTIMATED 86 06/16/2023    GFRESTIMATED 85 06/25/2021    GFRESTBLACK >90 06/25/2021    BRANDON 9.4 06/16/2023    BRANDON 9.1  06/25/2021      A1C RESULTS:  Lab Results   Component Value Date    A1C 7.3 (H) 05/09/2023    A1C 6.6 (H) 06/25/2021     INR RESULTS:  Lab Results   Component Value Date    INR 1.10 08/19/2014            Medications     Current Outpatient Medications   Medication Sig Dispense Refill     acetaminophen (ACETAMIN) 500 MG tablet Take 1,000 mg by mouth 2 times daily       amiodarone (PACERONE) 200 MG tablet Take 1 tablet (200 mg) by mouth 3 times daily 90 tablet 3     apixaban ANTICOAGULANT (ELIQUIS) 5 MG tablet Take 1 tablet (5 mg) by mouth 2 times daily 180 tablet 3     blood glucose monitoring (ONE TOUCH ULTRASOFT) lancets USE TO TEST BLOOD SUGAR  TWICE DAILY OR AS DIRECTED 100 each 3     busPIRone (BUSPAR) 5 MG tablet TAKE 1 TABLET BY MOUTH 3  TIMES DAILY (Patient taking differently: Take 5 mg by mouth 2 times daily) 270 tablet 1     Cholecalciferol (VITAMIN D) 2000 UNIT tablet Take 2,000 Units by mouth daily. 90 tablet 3     finasteride (PROSCAR) 5 MG tablet TAKE 1 TABLET BY MOUTH  DAILY 90 tablet 2     [START ON 6/24/2023] furosemide (LASIX) 40 MG tablet Take 1 tablet (40 mg) by mouth daily 90 tablet 3     gabapentin (NEURONTIN) 300 MG capsule 300 mg in AM, 900 mg in  capsule 3     metFORMIN (GLUCOPHAGE XR) 500 MG 24 hr tablet TAKE 2 TABLETS BY MOUTH  TWICE DAILY WITH MEALS 360 tablet 3     Metoprolol Tartrate 75 MG TABS Take 75 mg by mouth 2 times daily 180 tablet 3     midodrine (PROAMATINE) 5 MG tablet Take 1 tablet (5 mg) by mouth 2 times daily 60 tablet 3     multivitamin (OCUVITE) TABS tablet Take 1 tablet by mouth daily       omeprazole (PRILOSEC) 20 MG DR capsule Take 1 capsule (20 mg) by mouth daily 90 capsule 3     ONETOUCH ULTRA test strip USE TO TEST BLOOD SUGAR TWICE  DAILY OR AS DIRECTED 200 strip 1     ORDER FOR DME Uses Cpap machine for sleep apnea       potassium chloride (KLOR-CON) 20 MEQ packet DISSOLVE 1 PACKET IN 4 OZ  WATER OR JUICE AND DRINK 3  TIMES DAILY (Patient taking differently:  DISSOLVE 1 PACKET IN 4 OZ  WATER OR JUICE AND DRINK 2 TIMES DAILY) 270 each 1     pravastatin (PRAVACHOL) 20 MG tablet Take 1 tablet (20 mg) by mouth every evening - due for fasting physical with Dr Hodge for refills 90 tablet 3     Probiotic Product (PROBIOTIC PO) Take 1 capsule by mouth daily       tamsulosin (FLOMAX) 0.4 MG capsule Take 1 capsule (0.4 mg) by mouth every evening 90 capsule 3     vitamin B-12 (CYANOCOBALAMIN) 1000 MCG tablet Take 1,000 mcg by mouth daily        dutasteride (AVODART) 0.5 MG capsule Take 1 capsule (0.5 mg) by mouth daily (Patient not taking: Reported on 6/23/2023) 93 capsule 3          Past Medical History     Past Medical History:   Diagnosis Date     Acute on chronic congestive heart failure, unspecified heart failure type (H) 6/12/2023     Anxiety state, unspecified      Aortic root dilatation (H)      Arthritis      Ascending aorta dilatation (H)      Basal cell cancer     s/p Mohs nose and bridge of nose on R.     Bunion      CAD (coronary artery disease)     CABG 1992: LIMA to LAD, SANDI to RCA, SVG to OM1 and OM2     Contact dermatitis and other eczema, due to unspecified cause     eczema - has light treatments with Dr. Rivera     Disorders of bursae and tendons in shoulder region, unspecified      Esophageal reflux      Essential hypertension, benign      Gallbladder disease      GERD (gastroesophageal reflux disease)      Heart attack (H)      Hyperlipidemia LDL goal <70      Left ventricular diastolic dysfunction      Low HDL (under 40) 3/28/2014     Nasal/sinus dis NEC     s/p surgery in 1995     Obesity      Osteoarthrosis, unspecified whether generalized or localized, shoulder region      Other hammer toe (acquired)      Sleep apnea     USES CPAP     Spinal stenosis, unspecified region other than cervical     MRI done 2006     Type 2 diabetes, HbA1c goal < 7% (H) 3/12/2015     Urge incontinence      Past Surgical History:   Procedure Laterality Date     ABDOMEN SURGERY   1994    gall bladder     BIOPSY      arms     CHOLECYSTECTOMY  6/1994     COLONOSCOPY N/A 4/11/2017    Procedure: COMBINED COLONOSCOPY, SINGLE OR MULTIPLE BIOPSY/POLYPECTOMY BY BIOPSY;  Surgeon: Bladimir Garner MD;  Location:  GI     CV LEFT HEART CATH  5-92, 6-92    Angioplasty     ENT SURGERY  5/1995    sinus surgery     ESOPHAGOSCOPY, GASTROSCOPY, DUODENOSCOPY (EGD), DILATATION, COMBINED  7/17/2014    Procedure: COMBINED ESOPHAGOSCOPY, GASTROSCOPY, DUODENOSCOPY (EGD), DILATATION;  Surgeon: Bladimir Garner MD;  Location:  GI     EYE SURGERY      cataracts removed both eyes     INJECT EPIDURAL LUMBAR       IR PICC REPOSITION RIGHT  9/1/2022     LAMINECTOMY LUMBAR TWO LEVELS N/A 4/29/2015    Procedure: LAMINECTOMY LUMBAR TWO LEVELS;  Surgeon: Bladimir Keenan MD;  Location:  OR     THORACIC SURGERY  11/1992    bypass     ZZC CABG, ARTERY-VEIN, FOUR  11/1992    CABG - LIMA to left anterior descending and saphenous vein bypass graft to the first and second obtuse marginal branch of the circumflex and the right mammary to the right coronary artery     ZZC NONSPECIFIC PROCEDURE  6-94    Gail lap     ZZC NONSPECIFIC PROCEDURE  1995    sinus surgery     ZZC NONSPECIFIC PROCEDURE      bilateral cataract surgery     ZZ COLONOSCOPY THRU STOMA W BIOPSY/CAUTERY TUMOR/POLYP/LESION  5/2007     Family History   Problem Relation Age of Onset     Cancer Brother         MELANOMA     Diabetes Mother         AODM     Thyroid Disease Mother      Diabetes Father         AODM     Myocardial Infarction Father      Cerebrovascular Disease Brother      Parkinsonism Sister      Family History Negative Son      Family History Negative Son      Family History Negative Son      Family History Negative Daughter      Coronary Artery Disease Brother         dod 2019     Cerebrovascular Disease Brother         dod 2019     Other Cancer Brother         dod 2000/melanoma     Breast Cancer Other         in remission reg chkups             Allergies   No known allergies        Bebe Baker NP  [facility]  Pager: [phone]

## 2023-06-23 ENCOUNTER — TELEPHONE (OUTPATIENT)
Dept: CARDIOLOGY | Facility: CLINIC | Age: 88
End: 2023-06-23

## 2023-06-23 ENCOUNTER — OFFICE VISIT (OUTPATIENT)
Dept: CARDIOLOGY | Facility: CLINIC | Age: 88
End: 2023-06-23
Payer: COMMERCIAL

## 2023-06-23 VITALS
HEART RATE: 95 BPM | OXYGEN SATURATION: 96 % | WEIGHT: 251.8 LBS | SYSTOLIC BLOOD PRESSURE: 141 MMHG | HEIGHT: 76 IN | BODY MASS INDEX: 30.66 KG/M2 | DIASTOLIC BLOOD PRESSURE: 86 MMHG

## 2023-06-23 DIAGNOSIS — I50.31 ACUTE DIASTOLIC HEART FAILURE (H): ICD-10-CM

## 2023-06-23 DIAGNOSIS — E78.5 HYPERLIPIDEMIA LDL GOAL <70: ICD-10-CM

## 2023-06-23 DIAGNOSIS — I95.1 ORTHOSTATIC HYPOTENSION: ICD-10-CM

## 2023-06-23 DIAGNOSIS — I48.91 ATRIAL FIBRILLATION, UNSPECIFIED TYPE (H): ICD-10-CM

## 2023-06-23 PROCEDURE — 99214 OFFICE O/P EST MOD 30 MIN: CPT | Performed by: NURSE PRACTITIONER

## 2023-06-23 PROCEDURE — 93000 ELECTROCARDIOGRAM COMPLETE: CPT | Performed by: NURSE PRACTITIONER

## 2023-06-23 RX ORDER — PRAVASTATIN SODIUM 20 MG
20 TABLET ORAL EVERY EVENING
Qty: 90 TABLET | Refills: 3 | Status: SHIPPED | OUTPATIENT
Start: 2023-06-23 | End: 2023-11-28

## 2023-06-23 RX ORDER — AMIODARONE HYDROCHLORIDE 200 MG/1
200 TABLET ORAL 3 TIMES DAILY
Qty: 90 TABLET | Refills: 3 | Status: SHIPPED | OUTPATIENT
Start: 2023-06-23 | End: 2023-07-18 | Stop reason: SINTOL

## 2023-06-23 RX ORDER — MIDODRINE HYDROCHLORIDE 5 MG/1
5 TABLET ORAL 2 TIMES DAILY
Qty: 60 TABLET | Refills: 3 | Status: SHIPPED | OUTPATIENT
Start: 2023-06-23 | End: 2023-08-07

## 2023-06-23 RX ORDER — POTASSIUM CHLORIDE 1.5 G/1.58G
POWDER, FOR SOLUTION ORAL
Qty: 270 EACH | Refills: 1 | Status: CANCELLED | OUTPATIENT
Start: 2023-06-23

## 2023-06-23 RX ORDER — METOPROLOL TARTRATE 75 MG/1
75 TABLET, FILM COATED ORAL 2 TIMES DAILY
Qty: 180 TABLET | Refills: 3 | Status: SHIPPED | OUTPATIENT
Start: 2023-06-23 | End: 2023-08-07

## 2023-06-23 NOTE — LETTER
6/23/2023    Hamzah Hodge MD  [address]  [place] [address]    RE: Austen Michael Malcolm       Dear Colleague,     I had the pleasure of seeing Austen Fowler in the [facility].  Cardiology Clinic Progress Note  Austen Fowler MRN# [id number]   YOB: 1935 Age: 88 year old   Primary Cardiologist: Dr. Lawrence Vaughan Reason for visit: Post-hospital follow up             Assessment and Plan:     1.  Atrial fibrillation with rapid ventricular response  -EKG today with continued atrial fibrillation   -Continue metoprolol tartrate 75mg BID although rate appears uncontrolled, unable to uptitrate given significant orthostasis  -Continue Eliquis 5mg BID     Addendum @3:40pm 6/23/23:Reviewed plan with Dr. Hernandez and will have patient undergo ZENIA/CV and start amioderone 200mg TID prior to increase the chance of patient staying in sinus rhythm post procedure. Called and reviewed recommendations, procedure, risks and benefits with patient via phone.     All indications, risks and benefits for transesophageal echocardiogram have been explained to the patient.  This includes but is not limited to esophageal irritation, damage to the oral cavity, esophageal perforation, GI bleeding, pharyngeal hematoma, transient bronchospasm, transient hypoxia, arrhthymias (NSVT, transient atrial fibrillation), vomiting, hemoptysis, and complications from anesthesia. Patient understands and wishes to proceed with it. Patient has no history of esophageal stricture, odynphagia, dysphagia, airway problems, or medication allergies. A formal consent form will be signed by the procedural physician.    I have reviewed the risks of a cardioversion, including possible reactions to conscious sedation, skin irritation, stroke, and bradycardia. He understands and wishes to proceed.     2.  Hyperlipidemia  -5/2023 total cholesterol 129, LDL 40, at goal  -Continue pravachol     3.  Orthostatic  hypotension  -Symptoms improved since start of midodrine and orthostatic readings today still significantly positive, will increase midodrine to 5mg BID, patient does not think he will be compliant with TID dosing   -Continue compression stockings  -Will monitor for continued orthostasis and need for midodrine following restoration of sinus rhythm    4.  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  Coronary artery disease, s/p CABG x4  -No anginal symptoms   -Continue pravachol     6.  Chronic HFpEF  -5/2023 TTE with LVEF 55 to 60%  -Lasix held at discharge, would prefer to have patient take on a sliding scale based on weight however he is unable to stand on a scale due to unsteadiness  -Appears well compensated and euvolemic  -Given significant orthostasis will continue to hold furosemide and potassium as well as lisinopril    7. Mild aortic stenosis, stable  -TTE 5/2023 mean gradient 11mmHg, peak 21mmHg, ROMY 1.6cm2    8.  Type 2 diabetes with polyneuropathy  -5/2023 HgbA1c 7.3%  -Continue metformin    Changes today: Continue to hold furosemide and potassium, Increase midodrine to 5mg twice a day    Follow up plan: Electrophysiology consult ordered, will review with Dr. Hernandez and plan for ZENIA-CV        History of Presenting Illness:    Austen Fowler is a very pleasant 88 year old male with a history of coronary artery disease, s/p CABG (LIMA-LAD, right IM-RCA, SVG-first OM, SVG- OM 2), dilated ascending aorta, mild aortic stenosis, type 2 diabetes, accessible atrial fibrillation.    She was seen by Dr. Lawrence Vaughan in May 2023.  He recommended a 14-day Zio patch monitor to look for asymptomatic episodes of atrial fibrillation.    Unfortunately prior to patient being seen in the clinic for follow up he developed dizziness and lightheadedness and presented to the emergency room and found to be in atrial fibrillation with rapid  ventricular response and was hospitalized 23.  He was initiated on diltiazem infusion and his Toprol was increased to 50 mg twice daily.  He was having issues with orthostasis while hospitalized low-dose midodrine was added and his metoprolol XL was changed to metoprolol tartrate 50 mg twice daily.  Subsequently was increased to 75 mg twice a day.  His lisinopril was discontinued at discharge given his continued orthostasis and Lasix was held x1 week.  His aspirin was discontinued at discharge with start of Eliquis.    Labs from 23 sodium 140, potassium 4.1, BUN 15.2, creatinine 0.70, GFR 86, hemoglobin 14.2, platelet 199, ALT 11    Patient is here today for follow-up after his recent hospitalization. His wife Anastasia Caballero is present with him today. Patient reports feeling good. He continues to feel dizzy upon standing however this has improved. Denies pre-syncope or lightheadedness. Denies any changes in his chronic level of shortness of breath since being off his lasix. Denies orthopnea or PND. Denies any edema. Weight is down 11 lbs since last month. He has been eating less but no other dietary changes.     Patient denies chest pain or chest tightness. Denies tachycardia or palpitations.     Blood pressure 141/86 and HR 95 in clinic today.    Denies any bleeding issues with eliquis.         Recent Hospitalizations   As above           Social History      Social History     Socioeconomic History    Marital status:      Spouse name: Anastasia Caballero    Number of children: 4    Years of education: 14    Highest education level: Not on file   Occupational History    Occupation: retired     Comment:    Tobacco Use    Smoking status: Former     Packs/day: 2.00     Years: 30.00     Pack years: 60.00     Types: Cigarettes, Cigars, Pipe     Start date: 1954     Quit date: 3/1/1992     Years since quittin.3    Smokeless tobacco: Never    Tobacco comments:     quit in    Substance and  "Sexual Activity    Alcohol use: Yes     Comment: minimal less than 1/week    Drug use: No    Sexual activity: Not Currently     Partners: Female     Birth control/protection: Abstinence, Pull-out method, Condom, Post-menopausal, Natural Family Planning, None   Other Topics Concern     Service Yes     Comment: [employer], served in [employer]    Blood Transfusions Yes     Comment: Unsure if he had one with heart surgery    Caffeine Concern Yes     Comment: occ cofee    Occupational Exposure No    Hobby Hazards No    Sleep Concern Yes     Comment: CPAP    Stress Concern No    Weight Concern Not Asked    Special Diet No    Back Care Not Asked    Exercise Yes     Comment: [employer] 3 times a week, biking walking.     Bike Helmet Yes    Seat Belt Yes    Self-Exams Yes     Comment: does HIRAL about once a month.    Parent/sibling w/ CABG, MI or angioplasty before 65F 55M? No   Social History Narrative        4 kids     Social Determinants of Health     Financial Resource Strain: Not on file   Food Insecurity: Not on file   Transportation Needs: Not on file   Physical Activity: Not on file   Stress: Not on file   Social Connections: Not on file   Intimate Partner Violence: Not on file   Housing Stability: Not on file            Review of Systems:   Skin:        Eyes:       ENT:       Respiratory:  Positive for shortness of breath;sleep apnea;CPAP  Cardiovascular:  Negative for;palpitations;chest pain Positive for;edema;lightheadedness;dizziness  Gastroenterology:      Genitourinary:       Musculoskeletal:       Neurologic:       Psychiatric:       Heme/Lymph/Imm:       Endocrine:  Positive for diabetes         Physical Exam:   Vitals: BP (!) 141/86 (BP Location: Left arm, Patient Position: Sitting)   Pulse 95   Ht 1.93 m (6' 4\")   Wt 114.2 kg (251 lb 12.8 oz)   SpO2 96%   BMI 30.65 kg/m     Wt Readings from Last 4 Encounters:   06/23/23 114.2 kg (251 lb 12.8 oz)   06/17/23 111.7 kg (246 lb 3.2 oz)   05/12/23 119.3 " kg (262 lb 14.4 oz)   05/09/23 119.4 kg (263 lb 3.2 oz)     GEN: well nourished, in no acute distress.  HEENT:  Pupils equal, round. Sclerae nonicteric.   NECK: Supple, no masses appreciated. No JVD with patient supine.  C/V:  Irregularly irregular rate and rhythm, no murmur, rub or gallop.    RESP: Respirations are unlabored. Clear to auscultation bilaterally without wheezing, rales, or rhonchi.  GI: Abdomen soft, nontender.  EXTREM: No LE edema.  NEURO: Alert and oriented, cooperative.  SKIN: Warm and dry.        Data:       LIPID RESULTS:  Lab Results   Component Value Date    CHOL 129 05/09/2023    CHOL 116 06/25/2021    HDL 39 (L) 05/09/2023    HDL 38 (L) 06/25/2021    LDL 40 05/09/2023    LDL 46 06/25/2021    TRIG 248 (H) 05/09/2023    TRIG 158 (H) 06/25/2021    CHOLHDLRATIO 3.6 11/12/2015     LIVER ENZYME RESULTS:  Lab Results   Component Value Date    AST 18 06/13/2023    AST 32 06/25/2021    ALT 11 06/13/2023    ALT 26 06/25/2021     CBC RESULTS:  Lab Results   Component Value Date    WBC 9.5 06/14/2023    WBC 7.2 06/25/2021    RBC 5.10 06/14/2023    RBC 4.48 06/25/2021    HGB 14.2 06/14/2023    HGB 13.0 (L) 06/25/2021    HCT 44.4 06/14/2023    HCT 38.9 (L) 06/25/2021    MCV 87 06/14/2023    MCV 87 06/25/2021    MCH 27.8 06/14/2023    MCH 29.0 06/25/2021    MCHC 32.0 06/14/2023    MCHC 33.4 06/25/2021    RDW 15.3 (H) 06/14/2023    RDW 15.0 06/25/2021     06/14/2023     06/25/2021     BMP RESULTS:  Lab Results   Component Value Date     06/16/2023     06/25/2021    POTASSIUM 4.1 06/16/2023    POTASSIUM 3.9 10/14/2022    POTASSIUM 3.7 06/25/2021    CHLORIDE 105 06/16/2023    CHLORIDE 103 10/14/2022    CHLORIDE 108 06/25/2021    CO2 24 06/16/2023    CO2 29 10/14/2022    CO2 28 06/25/2021    ANIONGAP 11 06/16/2023    ANIONGAP 7 10/14/2022    ANIONGAP 5 06/25/2021     (H) 06/17/2023     (H) 10/14/2022     (H) 06/25/2021    BUN 15.2 06/16/2023    BUN 13 10/14/2022     BUN 15 06/25/2021    CR 0.78 06/16/2023    CR 0.72 06/25/2021    GFRESTIMATED 86 06/16/2023    GFRESTIMATED 85 06/25/2021    GFRESTBLACK >90 06/25/2021    BRANDON 9.4 06/16/2023    BRANDON 9.1 06/25/2021      A1C RESULTS:  Lab Results   Component Value Date    A1C 7.3 (H) 05/09/2023    A1C 6.6 (H) 06/25/2021     INR RESULTS:  Lab Results   Component Value Date    INR 1.10 08/19/2014            Medications     Current Outpatient Medications   Medication Sig Dispense Refill    acetaminophen (ACETAMIN) 500 MG tablet Take 1,000 mg by mouth 2 times daily      amiodarone (PACERONE) 200 MG tablet Take 1 tablet (200 mg) by mouth 3 times daily 90 tablet 3    apixaban ANTICOAGULANT (ELIQUIS) 5 MG tablet Take 1 tablet (5 mg) by mouth 2 times daily 180 tablet 3    blood glucose monitoring (ONE TOUCH ULTRASOFT) lancets USE TO TEST BLOOD SUGAR  TWICE DAILY OR AS DIRECTED 100 each 3    busPIRone (BUSPAR) 5 MG tablet TAKE 1 TABLET BY MOUTH 3  TIMES DAILY (Patient taking differently: Take 5 mg by mouth 2 times daily) 270 tablet 1    Cholecalciferol (VITAMIN D) 2000 UNIT tablet Take 2,000 Units by mouth daily. 90 tablet 3    finasteride (PROSCAR) 5 MG tablet TAKE 1 TABLET BY MOUTH  DAILY 90 tablet 2    [START ON 6/24/2023] furosemide (LASIX) 40 MG tablet Take 1 tablet (40 mg) by mouth daily 90 tablet 3    gabapentin (NEURONTIN) 300 MG capsule 300 mg in AM, 900 mg in  capsule 3    metFORMIN (GLUCOPHAGE XR) 500 MG 24 hr tablet TAKE 2 TABLETS BY MOUTH  TWICE DAILY WITH MEALS 360 tablet 3    Metoprolol Tartrate 75 MG TABS Take 75 mg by mouth 2 times daily 180 tablet 3    midodrine (PROAMATINE) 5 MG tablet Take 1 tablet (5 mg) by mouth 2 times daily 60 tablet 3    multivitamin (OCUVITE) TABS tablet Take 1 tablet by mouth daily      omeprazole (PRILOSEC) 20 MG DR capsule Take 1 capsule (20 mg) by mouth daily 90 capsule 3    ONETOUCH ULTRA test strip USE TO TEST BLOOD SUGAR TWICE  DAILY OR AS DIRECTED 200 strip 1    ORDER FOR DME Uses Cpap  machine for sleep apnea      potassium chloride (KLOR-CON) 20 MEQ packet DISSOLVE 1 PACKET IN 4 OZ  WATER OR JUICE AND DRINK 3  TIMES DAILY (Patient taking differently: DISSOLVE 1 PACKET IN 4 OZ  WATER OR JUICE AND DRINK 2 TIMES DAILY) 270 each 1    pravastatin (PRAVACHOL) 20 MG tablet Take 1 tablet (20 mg) by mouth every evening - due for fasting physical with Dr Hodge for refills 90 tablet 3    Probiotic Product (PROBIOTIC PO) Take 1 capsule by mouth daily      tamsulosin (FLOMAX) 0.4 MG capsule Take 1 capsule (0.4 mg) by mouth every evening 90 capsule 3    vitamin B-12 (CYANOCOBALAMIN) 1000 MCG tablet Take 1,000 mcg by mouth daily       dutasteride (AVODART) 0.5 MG capsule Take 1 capsule (0.5 mg) by mouth daily (Patient not taking: Reported on 6/23/2023) 93 capsule 3          Past Medical History     Past Medical History:   Diagnosis Date    Acute on chronic congestive heart failure, unspecified heart failure type (H) 6/12/2023    Anxiety state, unspecified     Aortic root dilatation (H)     Arthritis     Ascending aorta dilatation (H)     Basal cell cancer     s/p Mohs nose and bridge of nose on R.    Bunion     CAD (coronary artery disease)     CABG 1992: LIMA to LAD, SANDI to RCA, SVG to OM1 and OM2    Contact dermatitis and other eczema, due to unspecified cause     eczema - has light treatments with Dr. Rivera    Disorders of bursae and tendons in shoulder region, unspecified     Esophageal reflux     Essential hypertension, benign     Gallbladder disease     GERD (gastroesophageal reflux disease)     Heart attack (H)     Hyperlipidemia LDL goal <70     Left ventricular diastolic dysfunction     Low HDL (under 40) 3/28/2014    Nasal/sinus dis NEC     s/p surgery in 1995    Obesity     Osteoarthrosis, unspecified whether generalized or localized, shoulder region     Other hammer toe (acquired)     Sleep apnea     USES CPAP    Spinal stenosis, unspecified region other than cervical     MRI done 2006    Type 2  diabetes, HbA1c goal < 7% (H) 3/12/2015    Urge incontinence      Past Surgical History:   Procedure Laterality Date    ABDOMEN SURGERY  1994    gall bladder    BIOPSY      arms    CHOLECYSTECTOMY  6/1994    COLONOSCOPY N/A 4/11/2017    Procedure: COMBINED COLONOSCOPY, SINGLE OR MULTIPLE BIOPSY/POLYPECTOMY BY BIOPSY;  Surgeon: Bladimir Garner MD;  Location:  GI    CV LEFT HEART CATH  5-92, 6-92    Angioplasty    ENT SURGERY  5/1995    sinus surgery    ESOPHAGOSCOPY, GASTROSCOPY, DUODENOSCOPY (EGD), DILATATION, COMBINED  7/17/2014    Procedure: COMBINED ESOPHAGOSCOPY, GASTROSCOPY, DUODENOSCOPY (EGD), DILATATION;  Surgeon: Bladimir Garner MD;  Location:  GI    EYE SURGERY      cataracts removed both eyes    INJECT EPIDURAL LUMBAR      IR PICC REPOSITION RIGHT  9/1/2022    LAMINECTOMY LUMBAR TWO LEVELS N/A 4/29/2015    Procedure: LAMINECTOMY LUMBAR TWO LEVELS;  Surgeon: Bladimir Keenan MD;  Location:  OR    THORACIC SURGERY  11/1992    bypass    ZZC CABG, ARTERY-VEIN, FOUR  11/1992    CABG - LIMA to left anterior descending and saphenous vein bypass graft to the first and second obtuse marginal branch of the circumflex and the right mammary to the right coronary artery    ZZC NONSPECIFIC PROCEDURE  6-94    Gail lap    ZZC NONSPECIFIC PROCEDURE  1995    sinus surgery    ZZC NONSPECIFIC PROCEDURE      bilateral cataract surgery    ZZ COLONOSCOPY THRU STOMA W BIOPSY/CAUTERY TUMOR/POLYP/LESION  5/2007     Family History   Problem Relation Age of Onset    Cancer Brother         MELANOMA    Diabetes Mother         AODM    Thyroid Disease Mother     Diabetes Father         AODM    Myocardial Infarction Father     Cerebrovascular Disease Brother     Parkinsonism Sister     Family History Negative Son     Family History Negative Son     Family History Negative Son     Family History Negative Daughter     Coronary Artery Disease Brother         dod 2019    Cerebrovascular Disease Brother         dod 2019    Other  Cancer Brother         dod 2000/melanoma    Breast Cancer Other         in remission reg chkups            Allergies   No known allergies        Bebe Baker NP  [facility]  Pager: [phone]        Thank you for allowing me to participate in the care of your patient.      Sincerely,     Bebe Baker NP     [facility]  cc:   Rosendo Webb MD  [address] [id number]

## 2023-06-23 NOTE — PATIENT INSTRUCTIONS
Today's Recommendations    Your EKG showed you are still in atrial fibrillation today   Increase your midodrine to 5mg twice a day   Keep holding your lasix and potassium for now  Please follow up with electrophysiology in the next 2 weeks.  I will talk to Dr. Hernandez regarding a cardioversion     Please send a [employer] message or call [phone] to the RN team with questions or concerns.     Scheduling number [phone]  CRISTIANO Joyner, CNP

## 2023-06-23 NOTE — TELEPHONE ENCOUNTER
Called and reviewed risks and benefits of transesophageal echocardiogram and cardioversion with patient.  He is aware he will need a  and someone to stay with him for 24 hours.  Discussed he should start taking amiodarone 200 mg 3 times a day as well as continue his Eliquis and increase his dose of midodrine as we discussed at visit today.    Orders placed for ZENIA and cardioversion.  He should follow-up with me approximately 2 to 3 weeks following his cardioversion.  Orders have been placed.  follow-up visit with electrophysiology in July can be canceled at this point.

## 2023-06-26 ENCOUNTER — TELEPHONE (OUTPATIENT)
Dept: CARDIOLOGY | Facility: CLINIC | Age: 88
End: 2023-06-26
Payer: COMMERCIAL

## 2023-06-26 NOTE — TELEPHONE ENCOUNTER
[facility] Call Center    Phone Message    May a detailed message be left on voicemail: yes     Reason for Call: Other: Please reach out to discuss upcoming appts.     Action Taken: Other: Cardiology    Travel Screening: Not Applicable     Thank you!  [facility]

## 2023-06-27 ENCOUNTER — OFFICE VISIT (OUTPATIENT)
Dept: FAMILY MEDICINE | Facility: CLINIC | Age: 88
End: 2023-06-27
Payer: COMMERCIAL

## 2023-06-27 VITALS
TEMPERATURE: 97.3 F | WEIGHT: 252.3 LBS | DIASTOLIC BLOOD PRESSURE: 81 MMHG | HEART RATE: 65 BPM | OXYGEN SATURATION: 97 % | SYSTOLIC BLOOD PRESSURE: 115 MMHG | BODY MASS INDEX: 32.38 KG/M2 | HEIGHT: 74 IN

## 2023-06-27 DIAGNOSIS — Z09 HOSPITAL DISCHARGE FOLLOW-UP: Primary | ICD-10-CM

## 2023-06-27 DIAGNOSIS — I48.0 PAF (PAROXYSMAL ATRIAL FIBRILLATION) (H): ICD-10-CM

## 2023-06-27 DIAGNOSIS — I95.1 ORTHOSTATIC HYPOTENSION: ICD-10-CM

## 2023-06-27 DIAGNOSIS — I50.9 CONGESTIVE HEART FAILURE, UNSPECIFIED HF CHRONICITY, UNSPECIFIED HEART FAILURE TYPE (H): ICD-10-CM

## 2023-06-27 DIAGNOSIS — E11.59 TYPE 2 DIABETES MELLITUS WITH VASCULAR DISEASE (H): ICD-10-CM

## 2023-06-27 PROCEDURE — 99495 TRANSJ CARE MGMT MOD F2F 14D: CPT | Performed by: PHYSICIAN ASSISTANT

## 2023-06-27 ASSESSMENT — PAIN SCALES - GENERAL: PAINLEVEL: NO PAIN (0)

## 2023-06-27 NOTE — Clinical Note
Just an FYI: Griffin appears to be doing well, has ZENIA and DCCV scheduled early July for afib, orthostasis is stable, no e/o volume overload, lasix is currently held due to dizziness/orthostatis, he sees you in August thanks

## 2023-06-27 NOTE — PROGRESS NOTES
"Assessment and Plan:     (Z09) Hospital discharge follow-up  (primary encounter diagnosis)  Comment: 6/12/23-6/17/23, presented with dizziness and was found to be in afib with RVR  Plan: follow-up with cardiology as scheduled in July  See pcp in August    (I48.0) PAF (paroxysmal atrial fibrillation) (H)  Comment: has had in the past with sepsis, not anticoagulated at that time, seen by cardiology in hospital and started on eliquis and metoprolol, has ZENIA and DCCV scheduled in early July  Plan: keep July appointments    (I95.1) Orthostatic hypotension  Comment: improved, on midodrine, lisinopril and amlodipine stopped, lasix is also on hold  Plan: cont above    (I50.9) Congestive heart failure, unspecified HF chronicity, unspecified heart failure type (H)  Comment: weight has been stable, appears euvolemic, no edema  Plan: continue to hold lasix for now    (E11.59) 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      Cleopatra Moya is a 88 year old, presenting for the following health issues:  Hospital F/U         No data to display              HPI         6/19/2023    10:09 AM   Post Discharge Outreach   Admission Date 6/12/2023   Reason for Admission Dizziness, Lightheadedness   Discharge Date 6/17/2023   Discharge Diagnosis Atrial fibrillation with RVR, Orthostatic hypotension   How are you doing now that you are home? \"Well that's debatable. It feels great to be back at home...feel top heavy at home. I'm in the process of eating breakfast now so things are looking up.\"   How are your symptoms? (Red Flag symptoms escalate to triage hotline per guidelines) Improved   Do you feel your condition is stable enough to be safe at home until your provider visit? Yes   Does the patient have their discharge instructions?  Yes   Does " the patient have questions regarding their discharge instructions?  No   Were you started on any new medications or were there changes to any of your previous medications?  Yes   Does the patient have all of their medications? Yes   Do you have questions regarding any of your medications?  Yes (see comment)   Do you have all of your needed medical supplies or equipment (DME)?  (i.e. oxygen tank, CPAP, cane, etc.) Yes   Discharge follow-up appointment scheduled within 14 calendar days?  Yes   Discharge Follow Up Appointment Date 6/27/2023   Discharge Follow Up Appointment Scheduled with? Primary Care Provider     Hospital Follow-up Visit:    Hospital/Nursing Home/IP Rehab Facility: [facility]  Date of Admission: 6/12/23  Date of Discharge: 6/17/23  Reason(s) for Admission:   Atrial fibrillation with RVR  Orthostatic hypotension    Was your hospitalization related to COVID-19? No   Problems taking medications regularly:  None  Medication changes since discharge: None  Problems adhering to non-medication therapy:  None    Summary of hospitalization:  [facility] discharge summary reviewed  Diagnostic Tests/Treatments reviewed.  Follow up needed: see below  Other Healthcare Providers Involved in Patient s Care:         see below  Update since discharge: improved.         Plan of care communicated with patient and wife              Griffin is here for hospital follow-up.  He was admitted on 6/12/23 and discharged on 6/17/23 when he presented with dizziness/lightheadedness and was found to be in afib with RVR.      Final discharge diagnoses and hospital course     Atrial fibrillation with rapid ventricular response  -Previous cardiology notes indicate an episode atrial fibrillation in the setting of sepsis so no anticoagulation was recommended at that time.  -Now presented with clear recurrence of atrial fibrillation which improved with IV diltiazem  - seen by cardiology and he was  started on eliquis and cardizem drip which was later stopped and then transitioned to metoprolol  to 50 XL daily .   -Given that patient continued to have significant tachycardia and orthostatic hypotension metoprolol succinate was switched to metoprolol tartrate 50 twice daily on 6/15 and increased to 75 mg twice daily.  -Patient continues to have orthostatic hypotension and was evaluated by cardiology team started him on low-dose midodrine 2.5 mg twice daily.     With increased dose of metoprolol his heart rate is now very well controlled, with low-dose of midodrine 2.5 mg twice daily his orthostatic hypotension significantly improved.     At the time of discharge has been feeling well, his heart rate is well controlled at this time ambulating well and wanted to go home..  Denies any headache dizziness lightheadedness no chest pain shortness of breath orthopnea PND palpitation no dysuria hematuria constipation diarrhea.  He is ambulating well at this time, PT has evaluated him and recommend home with home physical therapy.     At this time he will be discharged home on metoprolol 75 mg twice daily, discontinue aspirin and start Eliquis 5 mg twice daily.  Follow-up with cardiology and primary care physician in 1 week.     Dizziness and feeling lightheaded: Multifactorial secondary to A-fib with RVR and orthostatic hypotension  orthostatic hypotension  -Patient with episodes of lightheadedness after standing  On Sunday, blood pressure systolic of 70s and he felt weak and lightheaded.  - orthostatics continues  To be positive here  -Because of his significant hypotension which was symptomatic, we will discontinue the lisinopril, hold the Lasix.  We will DC the lisinopril on discharge, will hold the Lasix for at least 1 week daily seen by his PCP and the cardiologist.  Started on low-dose midodrine 2.5 mg twice daily  His symptoms and orthostatic hypotension improved with low-dose midodrine.  He will continue on  "higher dose of metoprolol 75 mg twice daily on discharge.     Chronic diastolic congestive heart failure: Compensated  Hypertension  Echocardiogram from May 2023 with sudden left ventricle normal in size with ejection fraction of 55 to 60%.  No regional wall motion abnormalities.  -BNP elevated on admission  however chest x-ray relatively clear   -  home Lasix at 40 mg orally and lisinopril held  -Advised him to hold Lasix for 1 week, and then resume if remains stable, earlier if evaluated by the PCP or cardiologist in the meantime.     Type 2 diabetes with polyneuropathy  -A1C 7.3   - Resume metformin     BPH  - continue Flomax and finasteride      GERD  - Continue with omeprazole     Depression  -Continue with BuSpar     Overall, Griffin has been doing well at home  He continues to have issues with his balance and is doing PT  He has been eating and drinking well  He denies chest pain, sob, orthopnea   He does have occasional dizziness mostly with position change  He has ZENIA and DCCV scheduled for 7/7/23    His amlodipine and lisinopril have been discontinued due to orthostasis    His furosemide is currently being held due to dizziness/orthostasis     He does not weigh himself daily due to balance issues    He sees his pcp in August     Review of Systems   See above      Objective      /81 (BP Location: Left arm, Patient Position: Sitting, Cuff Size: Adult Large)   Pulse 65   Temp 97.3  F (36.3  C) (Temporal)   Ht 1.88 m (6' 2\")   Wt 114.4 kg (252 lb 4.8 oz)   SpO2 97%   BMI 32.39 kg/m        Physical Exam     GENERAL: in NAD, very pleasant, wife is here and supportive   ENT: mmm, op clear   RESP: lungs clear to auscultation - no rales, no rhonchi, no wheezes  CV: irregularly irregular, no audible murmur  MS: extremities- no gross deformities noted, no edema                "

## 2023-06-27 NOTE — PATIENT INSTRUCTIONS
Continue to not take lasix (furosemide) and potassium    Keep follow-up with cardiology     Watch salt intake    Call if worsening sob, leg swelling

## 2023-06-28 NOTE — TELEPHONE ENCOUNTER
Patient's wife called back, reviewed appointment for ZENIA and cardioversion scheduled 7/606/23.  Appointment for 7/13/2023 with Dr. Coronado was canceled but per Jess Baker NP last office note patient does need to see EP MD.  Will message scheduling to call patient/wife to arrange.  NEERU Guy RN

## 2023-07-05 ENCOUNTER — TELEPHONE (OUTPATIENT)
Dept: CARDIOLOGY | Facility: CLINIC | Age: 88
End: 2023-07-05
Payer: COMMERCIAL

## 2023-07-05 NOTE — TELEPHONE ENCOUNTER
DCCV/ZENIA prep instructions    Patient is scheduled for a ZENIA with Cardioversion at [facility] - [address], [place] [address] - Main Entrance of the Hospital, on 7/06/23.  Check in time is at 11 AM and procedure to follow.    Patient instructed to remain NPO for solid foods 8 hours prior to arrival and may have clear liquids up to 2 hours prior to arrival.    Patient is taking Pradaxa/Xarelto/Eliquis and has been taking daily uninterrupted for at least 21days.     Patient is on oral diabetic medications and has been instructed to reach out to primary care provider for recommendations.     Patient is not taking Digoxin.    Patient is taking taking furosemide and has been instructed to hold it the morning of procedure. and has been advised to hold this the morning of the procedure.    Pt is not on a SGLT2 inhibitor.    Patient advised to take their other daily medications the morning of the procedure with small sips of water.     Verified patient has someone available to drive them home from the hospital and can stay with them for 24 hours after the procedure.     Patient advised to notify care team with any new COVID like symptoms prior to procedure.    Patient will check their temperature the morning of procedure and call [facility] at [phone] if temp is >100.0.    Patient is aware of visitor policy.    Patient expresses understanding of above instructions and denies further questions at this time.      Salas GALE   [facility]   ----------------------------------------------------------------------------------------------  ----- Message from Nahomi Nath sent at 6/27/2023 10:51 AM CDT -----  Regarding: Revised Time ZENIA/DCCV 7/6/23  ZENAI & DCCV     Location: [facility]     Procedure: ZENIA with Cardioversion     Diagnosis: Atrial fibrillation.     Procedure Date: 7/6/2023        Procedure time: 1:00 p.m.     Patient Arrival Time: 11:00 a.m.       Ordering  Cardiologist: Jess Baker NP/Dr. Hernandez     Performing Cardiologist: Dr. Lord     Cardiac Assessment Completed: Yes   Date: 6/23/2023   Provider: Jess Baker NP     Patient on Coumadin/Warfarin:  No   Patient on Eliquis:  Yes   Patient on Invokana/Farxiga/Jardiance/Steglatro:  No     Appointment was scheduled: Over the phone     If pt is having a Cardioverion:   Does the pt have a device: No

## 2023-07-06 ENCOUNTER — HOSPITAL ENCOUNTER (OUTPATIENT)
Dept: CARDIOLOGY | Facility: CLINIC | Age: 88
Discharge: HOME | End: 2023-07-06
Attending: NURSE PRACTITIONER | Admitting: NURSE PRACTITIONER
Payer: COMMERCIAL

## 2023-07-06 ENCOUNTER — HOSPITAL ENCOUNTER (OUTPATIENT)
Facility: CLINIC | Age: 88
Discharge: HOME | End: 2023-07-06
Admitting: NURSE PRACTITIONER
Payer: COMMERCIAL

## 2023-07-06 ENCOUNTER — ANESTHESIA EVENT (OUTPATIENT)
Dept: MEDSURG UNIT | Facility: CLINIC | Age: 88
End: 2023-07-06

## 2023-07-06 ENCOUNTER — ANESTHESIA (OUTPATIENT)
Dept: MEDSURG UNIT | Facility: CLINIC | Age: 88
End: 2023-07-06

## 2023-07-06 ENCOUNTER — TELEPHONE (OUTPATIENT)
Dept: CARDIOLOGY | Facility: CLINIC | Age: 88
End: 2023-07-06
Payer: COMMERCIAL

## 2023-07-06 VITALS
SYSTOLIC BLOOD PRESSURE: 126 MMHG | HEIGHT: 74 IN | TEMPERATURE: 98.2 F | OXYGEN SATURATION: 95 % | BODY MASS INDEX: 32.67 KG/M2 | WEIGHT: 254.6 LBS | HEART RATE: 60 BPM | RESPIRATION RATE: 18 BRPM | DIASTOLIC BLOOD PRESSURE: 66 MMHG

## 2023-07-06 DIAGNOSIS — I48.91 ATRIAL FIBRILLATION, UNSPECIFIED TYPE (H): ICD-10-CM

## 2023-07-06 PROCEDURE — 93010 ELECTROCARDIOGRAM REPORT: CPT | Performed by: INTERNAL MEDICINE

## 2023-07-06 PROCEDURE — 93005 ELECTROCARDIOGRAM TRACING: CPT

## 2023-07-06 PROCEDURE — 999N000183 HC STATISTIC TEE INCLUDES SEDATION

## 2023-07-06 PROCEDURE — 92960 CARDIOVERSION ELECTRIC EXT: CPT

## 2023-07-06 PROCEDURE — 999N000184 HC STATISTIC TELEMETRY

## 2023-07-06 PROCEDURE — 36591 DRAW BLOOD OFF VENOUS DEVICE: CPT

## 2023-07-06 RX ORDER — NALOXONE HYDROCHLORIDE 0.4 MG/ML
0.4 INJECTION, SOLUTION INTRAMUSCULAR; INTRAVENOUS; SUBCUTANEOUS
Status: DISCONTINUED | OUTPATIENT
Start: 2023-07-06 | End: 2023-07-06 | Stop reason: HOSPADM

## 2023-07-06 RX ORDER — ATROPINE SULFATE 0.1 MG/ML
.5-1 INJECTION INTRAVENOUS
Status: DISCONTINUED | OUTPATIENT
Start: 2023-07-06 | End: 2023-07-06 | Stop reason: HOSPADM

## 2023-07-06 RX ORDER — SODIUM CHLORIDE 9 MG/ML
1000 INJECTION, SOLUTION INTRAVENOUS CONTINUOUS
Status: DISCONTINUED | OUTPATIENT
Start: 2023-07-06 | End: 2023-07-06 | Stop reason: HOSPADM

## 2023-07-06 RX ORDER — LIDOCAINE 50 MG/G
OINTMENT TOPICAL ONCE
Status: DISCONTINUED | OUTPATIENT
Start: 2023-07-06 | End: 2023-07-06 | Stop reason: HOSPADM

## 2023-07-06 RX ORDER — FENTANYL CITRATE 50 UG/ML
25 INJECTION, SOLUTION INTRAMUSCULAR; INTRAVENOUS
Status: DISCONTINUED | OUTPATIENT
Start: 2023-07-06 | End: 2023-07-06 | Stop reason: HOSPADM

## 2023-07-06 RX ORDER — GLYCOPYRROLATE 0.2 MG/ML
0.1 INJECTION, SOLUTION INTRAMUSCULAR; INTRAVENOUS ONCE
Status: DISCONTINUED | OUTPATIENT
Start: 2023-07-06 | End: 2023-07-06 | Stop reason: HOSPADM

## 2023-07-06 RX ORDER — NALOXONE HYDROCHLORIDE 0.4 MG/ML
0.2 INJECTION, SOLUTION INTRAMUSCULAR; INTRAVENOUS; SUBCUTANEOUS
Status: DISCONTINUED | OUTPATIENT
Start: 2023-07-06 | End: 2023-07-06 | Stop reason: HOSPADM

## 2023-07-06 RX ORDER — FLUMAZENIL 0.1 MG/ML
0.2 INJECTION, SOLUTION INTRAVENOUS
Status: DISCONTINUED | OUTPATIENT
Start: 2023-07-06 | End: 2023-07-06 | Stop reason: HOSPADM

## 2023-07-06 RX ORDER — POTASSIUM CHLORIDE 1500 MG/1
40 TABLET, EXTENDED RELEASE ORAL
Status: DISCONTINUED | OUTPATIENT
Start: 2023-07-06 | End: 2023-07-06 | Stop reason: HOSPADM

## 2023-07-06 RX ORDER — MAGNESIUM SULFATE HEPTAHYDRATE 40 MG/ML
2 INJECTION, SOLUTION INTRAVENOUS
Status: DISCONTINUED | OUTPATIENT
Start: 2023-07-06 | End: 2023-07-06 | Stop reason: HOSPADM

## 2023-07-06 RX ORDER — DEXTROSE MONOHYDRATE 25 G/50ML
9.5 INJECTION, SOLUTION INTRAVENOUS
Status: DISCONTINUED | OUTPATIENT
Start: 2023-07-06 | End: 2023-07-06 | Stop reason: HOSPADM

## 2023-07-06 RX ORDER — POTASSIUM CHLORIDE 1500 MG/1
20 TABLET, EXTENDED RELEASE ORAL
Status: DISCONTINUED | OUTPATIENT
Start: 2023-07-06 | End: 2023-07-06 | Stop reason: HOSPADM

## 2023-07-06 RX ORDER — LIDOCAINE HYDROCHLORIDE 40 MG/ML
1.5 SOLUTION TOPICAL ONCE
Status: DISCONTINUED | OUTPATIENT
Start: 2023-07-06 | End: 2023-07-06 | Stop reason: HOSPADM

## 2023-07-06 RX ORDER — METOPROLOL TARTRATE 1 MG/ML
2.5-5 INJECTION, SOLUTION INTRAVENOUS
Status: DISCONTINUED | OUTPATIENT
Start: 2023-07-06 | End: 2023-07-06 | Stop reason: HOSPADM

## 2023-07-06 NOTE — TELEPHONE ENCOUNTER
[facility] Call Center    Phone Message    May a detailed message be left on voicemail: yes     Reason for Call: Other: Anastasia Caballero called requesting to speak with a member of Griffin's care team about his upcoming appt with Maria Guadalupe on 8/7. Specifiically, Anastasia Barrientos wanted to know if this appt is necessary because it was determined that Griffin did not need his procedure today 7/6. Please reach out to Anastasia Caballero to discuss. Thank you!     Action Taken: Other: Cardiology    Travel Screening: Not Applicable     Thank you!  [facility]

## 2023-07-06 NOTE — PROVIDER NOTIFICATION
EKG performed on patient right after rooming them into [facility] room. Two EKGs performed. One stated Normal Sinus and another came back as Sinus Bear with PAC. Dr. Lord notified.     1153 - Provider MD. Pop, notified after EKGs performed on patient  Per Pop, patient is able to go home, keep medications the same, and continue with follow-up on August 7th. RN relayed this information to patient and patient's spouse.

## 2023-07-07 NOTE — TELEPHONE ENCOUNTER
Called patient, advised to keep upcoming appointment in August as scheduled per recommendations of Dr. Lord and patient advised as he still has paroxysmal A-fib, advised he should keep this appointment.  Patient verbalized understanding. Salas GALE

## 2023-07-10 LAB
ATRIAL RATE - MUSE: 56 BPM
DIASTOLIC BLOOD PRESSURE - MUSE: NORMAL MMHG
INTERPRETATION ECG - MUSE: NORMAL
P AXIS - MUSE: 44 DEGREES
PR INTERVAL - MUSE: 166 MS
QRS DURATION - MUSE: 94 MS
QT - MUSE: 434 MS
QTC - MUSE: 418 MS
R AXIS - MUSE: 43 DEGREES
SYSTOLIC BLOOD PRESSURE - MUSE: NORMAL MMHG
T AXIS - MUSE: 52 DEGREES
VENTRICULAR RATE- MUSE: 56 BPM

## 2023-07-12 DIAGNOSIS — E11.59 TYPE 2 DIABETES MELLITUS WITH VASCULAR DISEASE (H): ICD-10-CM

## 2023-07-12 RX ORDER — METFORMIN HCL 500 MG
TABLET, EXTENDED RELEASE 24 HR ORAL
Qty: 360 TABLET | Refills: 3 | Status: SHIPPED | OUTPATIENT
Start: 2023-07-12 | End: 2023-09-13

## 2023-07-17 ENCOUNTER — TELEPHONE (OUTPATIENT)
Dept: CARDIOLOGY | Facility: CLINIC | Age: 88
End: 2023-07-17
Payer: COMMERCIAL

## 2023-07-17 DIAGNOSIS — I48.91 ATRIAL FIBRILLATION WITH RVR (H): ICD-10-CM

## 2023-07-17 DIAGNOSIS — I48.91 ATRIAL FIBRILLATION, UNSPECIFIED TYPE (H): Primary | ICD-10-CM

## 2023-07-17 NOTE — TELEPHONE ENCOUNTER
Called patient's wife as requested, wife states patient should never have been started on this medication given the side effects of this and his current condition already having shaking, constipation and being off balance.  Wife states that patient's shaking has gotten so bad that he cannot lift a glass with one hand anymore and states that, his constipation has gotten much worse as well.  Wife states both of these have worsened significantly since starting the amiodarone.  Wife also states that he is been off balance, feels like he cannot be left alone stating that he could fall at any time.  Wife has surgery scheduled for Wednesday and is very concerned about leaving him alone for her surgery in this condition.  Will message Dr. Lawrence Vaughan & Jess Baker NP to review.  NEERU Guy RN

## 2023-07-17 NOTE — TELEPHONE ENCOUNTER
Stop amiodarone. May be neurological side effects from this drug. Very unusual to have that severity of symptoms which may suggest that it is notall due to this med. Should see EP . Marciex

## 2023-07-17 NOTE — TELEPHONE ENCOUNTER
[facility] Call Center    Phone Message    May a detailed message be left on voicemail: yes     Reason for Call: Other: Pt wife would like a call back asap to discuss pt mediation as she needs to ask a few questions before she goes into the hospital on Wednesday   , She would like to talk to the  not np    Action Taken: Message routed to:  [facility] Cardio    Travel Screening: Not Applicable

## 2023-07-18 NOTE — TELEPHONE ENCOUNTER
Wife called back, informed of recommendations from Dr. Hernandez to stop amiodarone & follow up with EP. Order placed & will message scheduling to call pt to arrange. Salas GALE

## 2023-07-29 ENCOUNTER — APPOINTMENT (OUTPATIENT)
Dept: GENERAL RADIOLOGY | Facility: CLINIC | Age: 88
End: 2023-07-29
Attending: EMERGENCY MEDICINE
Payer: COMMERCIAL

## 2023-07-29 ENCOUNTER — HOSPITAL ENCOUNTER (EMERGENCY)
Facility: CLINIC | Age: 88
Discharge: HOME | End: 2023-07-29
Attending: EMERGENCY MEDICINE | Admitting: EMERGENCY MEDICINE
Payer: COMMERCIAL

## 2023-07-29 VITALS
HEART RATE: 50 BPM | BODY MASS INDEX: 32.27 KG/M2 | RESPIRATION RATE: 15 BRPM | SYSTOLIC BLOOD PRESSURE: 197 MMHG | OXYGEN SATURATION: 96 % | DIASTOLIC BLOOD PRESSURE: 98 MMHG | HEIGHT: 76 IN | TEMPERATURE: 97.3 F | WEIGHT: 265 LBS

## 2023-07-29 DIAGNOSIS — R00.1 BRADYCARDIA: ICD-10-CM

## 2023-07-29 DIAGNOSIS — R06.02 SHORTNESS OF BREATH: ICD-10-CM

## 2023-07-29 LAB
ANION GAP SERPL CALCULATED.3IONS-SCNC: 10 MMOL/L (ref 7–15)
ATRIAL RATE - MUSE: 50 BPM
BASOPHILS # BLD AUTO: 0.1 10E3/UL (ref 0–0.2)
BASOPHILS NFR BLD AUTO: 1 %
BUN SERPL-MCNC: 12.2 MG/DL (ref 8–23)
CALCIUM SERPL-MCNC: 9.1 MG/DL (ref 8.8–10.2)
CHLORIDE SERPL-SCNC: 103 MMOL/L (ref 98–107)
CREAT SERPL-MCNC: 0.62 MG/DL (ref 0.67–1.17)
DEPRECATED HCO3 PLAS-SCNC: 29 MMOL/L (ref 22–29)
DIASTOLIC BLOOD PRESSURE - MUSE: NORMAL MMHG
EOSINOPHIL # BLD AUTO: 0.3 10E3/UL (ref 0–0.7)
EOSINOPHIL NFR BLD AUTO: 3 %
ERYTHROCYTE [DISTWIDTH] IN BLOOD BY AUTOMATED COUNT: 15.3 % (ref 10–15)
GFR SERPL CREATININE-BSD FRML MDRD: >90 ML/MIN/1.73M2
GLUCOSE SERPL-MCNC: 181 MG/DL (ref 70–99)
HCT VFR BLD AUTO: 41.5 % (ref 40–53)
HGB BLD-MCNC: 13.3 G/DL (ref 13.3–17.7)
HOLD SPECIMEN: NORMAL
IMM GRANULOCYTES # BLD: 0.1 10E3/UL
IMM GRANULOCYTES NFR BLD: 1 %
INTERPRETATION ECG - MUSE: NORMAL
LYMPHOCYTES # BLD AUTO: 2.2 10E3/UL (ref 0.8–5.3)
LYMPHOCYTES NFR BLD AUTO: 29 %
MCH RBC QN AUTO: 28.7 PG (ref 26.5–33)
MCHC RBC AUTO-ENTMCNC: 32 G/DL (ref 31.5–36.5)
MCV RBC AUTO: 90 FL (ref 78–100)
MONOCYTES # BLD AUTO: 0.6 10E3/UL (ref 0–1.3)
MONOCYTES NFR BLD AUTO: 8 %
NEUTROPHILS # BLD AUTO: 4.5 10E3/UL (ref 1.6–8.3)
NEUTROPHILS NFR BLD AUTO: 58 %
NRBC # BLD AUTO: 0 10E3/UL
NRBC BLD AUTO-RTO: 0 /100
NT-PROBNP SERPL-MCNC: 611 PG/ML (ref 0–1800)
P AXIS - MUSE: 98 DEGREES
PLATELET # BLD AUTO: 163 10E3/UL (ref 150–450)
POTASSIUM SERPL-SCNC: 3.7 MMOL/L (ref 3.4–5.3)
PR INTERVAL - MUSE: 162 MS
QRS DURATION - MUSE: 92 MS
QT - MUSE: 450 MS
QTC - MUSE: 410 MS
R AXIS - MUSE: 45 DEGREES
RBC # BLD AUTO: 4.63 10E6/UL (ref 4.4–5.9)
SODIUM SERPL-SCNC: 142 MMOL/L (ref 136–145)
SYSTOLIC BLOOD PRESSURE - MUSE: NORMAL MMHG
T AXIS - MUSE: 29 DEGREES
TROPONIN T SERPL HS-MCNC: 15 NG/L
VENTRICULAR RATE- MUSE: 50 BPM
WBC # BLD AUTO: 7.6 10E3/UL (ref 4–11)

## 2023-07-29 PROCEDURE — 250N000013 HC RX MED GY IP 250 OP 250 PS 637: Performed by: EMERGENCY MEDICINE

## 2023-07-29 PROCEDURE — 36415 COLL VENOUS BLD VENIPUNCTURE: CPT | Performed by: EMERGENCY MEDICINE

## 2023-07-29 PROCEDURE — 80048 BASIC METABOLIC PNL TOTAL CA: CPT | Performed by: EMERGENCY MEDICINE

## 2023-07-29 PROCEDURE — 84484 ASSAY OF TROPONIN QUANT: CPT | Performed by: EMERGENCY MEDICINE

## 2023-07-29 PROCEDURE — 83880 ASSAY OF NATRIURETIC PEPTIDE: CPT | Performed by: EMERGENCY MEDICINE

## 2023-07-29 PROCEDURE — 99285 EMERGENCY DEPT VISIT HI MDM: CPT | Mod: 25

## 2023-07-29 PROCEDURE — 85025 COMPLETE CBC W/AUTO DIFF WBC: CPT | Performed by: EMERGENCY MEDICINE

## 2023-07-29 PROCEDURE — 71046 X-RAY EXAM CHEST 2 VIEWS: CPT

## 2023-07-29 PROCEDURE — 93005 ELECTROCARDIOGRAM TRACING: CPT

## 2023-07-29 RX ORDER — METOPROLOL TARTRATE 25 MG/1
75 TABLET, FILM COATED ORAL 2 TIMES DAILY
Status: DISCONTINUED | OUTPATIENT
Start: 2023-07-29 | End: 2023-07-29

## 2023-07-29 RX ADMIN — APIXABAN 5 MG: 5 TABLET, FILM COATED ORAL at 09:08

## 2023-07-29 ASSESSMENT — ACTIVITIES OF DAILY LIVING (ADL)
ADLS_ACUITY_SCORE: 35
ADLS_ACUITY_SCORE: 35

## 2023-07-29 NOTE — PROGRESS NOTES
Road tested patient at 10:15am. Walked down ng with gait belt and walker. Patient did not report any increase in shortness of breath, however vitals signs showed respiration rate increased to 31. Patient still denies and pain. See flowsheet for vital signs at 10:15am.

## 2023-07-29 NOTE — ED NOTES
Bed: ED10  Expected date: 7/29/23  Expected time: 7:27 AM  Means of arrival: Ambulance  Comments:  313 [id number] marichuy llanes 1950

## 2023-07-29 NOTE — ED PROVIDER NOTES
History     Chief Complaint:  Shortness of breath     HPI   Austen Fowler is a 88 year old male history of paroxysmal atrial fibrillation on Eliquis, orthostatic hypotension, congestive heart failure, type 2 diabetes, aortic stenosis who presents today with shortness of breath.  Presents via EMS, after calling for increased shortness of breath and increased work of breathing.  He noticed the symptoms this morning, and felt that certain changes in position helped alleviate the breathing difficulty.  However, he states that he is able to lay flat without any problems.  At baseline, he has mild dyspnea on exertion.  This is not changed significantly.  About a month ago, he had a new diagnosis of A-fib with RVR.  He was started on Eliquis, midodrine for orthostatic hypotension, and amiodarone for rhythm control.  About 12 days ago, he was discontinued from the amiodarone because it was thought to be causing tremors that made it focal to eat or drink.  Otherwise, he denies cough, fevers or chills, peripheral edema, change in weight, chest pressure, or chest pain.  He has been taking his medications as directed, but has not yet taken his doses this morning.        Independent Historian:   Daughter - They report recent congestion.    Review of External Notes:   Patient had recent exchange with Dr. Lawrence Vaughan regarding side effects from amiodarone, and was told to discontinue the medication.    I reviewed the nurse triage note.      Medications:    acetaminophen (ACETAMIN) 500 MG tablet  apixaban ANTICOAGULANT (ELIQUIS) 5 MG tablet  blood glucose monitoring (ONE TOUCH ULTRASOFT) lancets  busPIRone (BUSPAR) 5 MG tablet  Cholecalciferol (VITAMIN D) 2000 UNIT tablet  dutasteride (AVODART) 0.5 MG capsule  finasteride (PROSCAR) 5 MG tablet  furosemide (LASIX) 40 MG tablet  gabapentin (NEURONTIN) 300 MG capsule  metFORMIN (GLUCOPHAGE XR) 500 MG 24 hr tablet  Metoprolol Tartrate 75 MG TABS  midodrine (PROAMATINE) 5 MG  tablet  multivitamin (OCUVITE) TABS tablet  omeprazole (PRILOSEC) 20 MG DR capsule  ONETOUCH ULTRA test strip  ORDER FOR DME  potassium chloride (KLOR-CON) 20 MEQ packet  pravastatin (PRAVACHOL) 20 MG tablet  Probiotic Product (PROBIOTIC PO)  tamsulosin (FLOMAX) 0.4 MG capsule  vitamin B-12 (CYANOCOBALAMIN) 1000 MCG tablet        Past Medical History:    Past Medical History:   Diagnosis Date     Acute on chronic congestive heart failure, unspecified heart failure type (H) 6/12/2023     Anxiety state, unspecified      Aortic root dilatation (H)      Arthritis      Ascending aorta dilatation (H)      Basal cell cancer      Bunion      CAD (coronary artery disease)      Contact dermatitis and other eczema, due to unspecified cause      Disorders of bursae and tendons in shoulder region, unspecified      Esophageal reflux      Essential hypertension, benign      Gallbladder disease      GERD (gastroesophageal reflux disease)      Heart attack (H)      Hyperlipidemia LDL goal <70      Left ventricular diastolic dysfunction      Low HDL (under 40) 3/28/2014     Nasal/sinus dis NEC      Obesity      Osteoarthrosis, unspecified whether generalized or localized, shoulder region      Other hammer toe (acquired)      Sleep apnea      Spinal stenosis, unspecified region other than cervical      Type 2 diabetes, HbA1c goal < 7% (H) 3/12/2015     Urge incontinence        Past Surgical History:    Past Surgical History:   Procedure Laterality Date     ABDOMEN SURGERY  1994    gall bladder     BIOPSY      arms     CHOLECYSTECTOMY  6/1994     COLONOSCOPY N/A 4/11/2017    Procedure: COMBINED COLONOSCOPY, SINGLE OR MULTIPLE BIOPSY/POLYPECTOMY BY BIOPSY;  Surgeon: Bladimir Garner MD;  Location:  GI     CV LEFT HEART CATH  5-92, 6-92    Angioplasty     ENT SURGERY  5/1995    sinus surgery     ESOPHAGOSCOPY, GASTROSCOPY, DUODENOSCOPY (EGD), DILATATION, COMBINED  7/17/2014    Procedure: COMBINED ESOPHAGOSCOPY, GASTROSCOPY, DUODENOSCOPY  "(EGD), DILATATION;  Surgeon: Bladimir Garner MD;  Location:  GI     EYE SURGERY      cataracts removed both eyes     INJECT EPIDURAL LUMBAR       IR PICC REPOSITION RIGHT  9/1/2022     LAMINECTOMY LUMBAR TWO LEVELS N/A 4/29/2015    Procedure: LAMINECTOMY LUMBAR TWO LEVELS;  Surgeon: Bladimir Keenan MD;  Location:  OR     THORACIC SURGERY  11/1992    bypass     [facility] CABG, ARTERY-VEIN, FOUR  11/1992    CABG - LIMA to left anterior descending and saphenous vein bypass graft to the first and second obtuse marginal branch of the circumflex and the right mammary to the right coronary artery     [facility] NONSPECIFIC PROCEDURE  6-94    Gail lap     [facility] NONSPECIFIC PROCEDURE  1995    sinus surgery     [facility] NONSPECIFIC PROCEDURE      bilateral cataract surgery     [facility] COLONOSCOPY THRU STOMA W BIOPSY/CAUTERY TUMOR/POLYP/LESION  5/2007        Physical Exam   Patient Vitals for the past 24 hrs:   BP Temp Temp src Pulse Resp SpO2 Height Weight   07/29/23 0800 (!) 173/92 -- -- (!) 49 -- 93 % -- --   07/29/23 0742 (!) 195/91 97.3  F (36.3  C) Temporal (!) 48 19 93 % 1.93 m (6' 4\") 120.2 kg (265 lb)        Physical Exam  General: alert, sitting upright in the gurney, accompanied by daughter.  HENT: mucous membranes moist  CV: Bradycardic rate, regular rhythm  Resp: normal effort, clear throughout, no crackles or wheezing  GI: abdomen soft and nontender, no guarding  MSK: no bony tenderness  Skin: appropriately warm and dry  Extremities: no edema, calves non-tender  Neuro: alert, clear speech, oriented  Psych: normal mood and affect      Emergency Department Course   ECG  ECG results from 07/29/23   EKG 12-lead, tracing only     Value    Systolic Blood Pressure     Diastolic Blood Pressure     Ventricular Rate 50    Atrial Rate 50    AL Interval 162    QRS Duration 92        QTc 410    P Axis 98    R AXIS 45    T Axis 29    Interpretation ECG      Sinus bradycardia  Otherwise normal ECG  When compared with ECG of " 06-JUL-2023 11:53,  Premature atrial complexes are no longer Present  Confirmed by GENERATED REPORT, COMPUTER ([id number]),  Alessia Crockett ([id number]) on 7/29/2023 1:25:33 PM       *Note: Due to a large number of results and/or encounters for the requested time period, some results have not been displayed. A complete set of results can be found in Results Review.         Imaging:  Chest XR,  PA & LAT   Final Result   IMPRESSION: Lungs are clear. No pleural effusion or pneumothorax. Normal heart size. Sternotomy and CABG.            Report per radiology    Laboratory:  Labs Ordered and Resulted from Time of ED Arrival to Time of ED Departure   BASIC METABOLIC PANEL - Abnormal       Result Value    Sodium 142      Potassium 3.7      Chloride 103      Carbon Dioxide (CO2) 29      Anion Gap 10      Urea Nitrogen 12.2      Creatinine 0.62 (*)     Calcium 9.1      Glucose 181 (*)     GFR Estimate >90     CBC WITH PLATELETS AND DIFFERENTIAL - Abnormal    WBC Count 7.6      RBC Count 4.63      Hemoglobin 13.3      Hematocrit 41.5      MCV 90      MCH 28.7      MCHC 32.0      RDW 15.3 (*)     Platelet Count 163      % Neutrophils 58      % Lymphocytes 29      % Monocytes 8      % Eosinophils 3      % Basophils 1      % Immature Granulocytes 1      NRBCs per 100 WBC 0      Absolute Neutrophils 4.5      Absolute Lymphocytes 2.2      Absolute Monocytes 0.6      Absolute Eosinophils 0.3      Absolute Basophils 0.1      Absolute Immature Granulocytes 0.1      Absolute NRBCs 0.0     TROPONIN T, HIGH SENSITIVITY - Normal    Troponin T, High Sensitivity 15     NT PROBNP INPATIENT - Normal    N terminal Pro BNP Inpatient 611        Emergency Department Course & Assessments:    Interventions:  Medications   apixaban ANTICOAGULANT (ELIQUIS) tablet 5 mg (5 mg Oral $Given 7/29/23 0908)        Assessments:  1015: I reevaluated the patient.  He is able to ambulate with a walker, per his baseline.  10:36 AM  I reevaluated the patient.  He  continues to be breathing without difficulty.  Feels that he is at his baseline.  He is hypertensive, but has not taken his home medications yet.      Independent Interpretation (X-rays, CTs, rhythm strip):  I personally reviewed the patient's chest radiograph.  No infiltrate or pneumothorax on my interpretation.    Consultations/Discussion of Management or Tests:  None        Social Determinants of Health affecting care:   None    Disposition:  The patient was discharged to home.     Impression & Plan    CMS Diagnoses: None      Medical Decision Making:  Austen Fowler is a 88 year old male history of paroxysmal atrial fibrillation on Eliquis, orthostatic hypotension, congestive heart failure, type 2 diabetes, aortic stenosis who presents today with shortness of breath.  On exam, the patient has normal oxygen saturation.  He is slightly tachypneic, but otherwise does not have increased work of breathing.  No evidence for volume overload.  EKG shows a sinus bradycardia, no signs of ischemia.  Troponin is negative.  BN peptide is in the normal range.  He is anticoagulated, therefore do not suspect PE, in addition, symptoms are atypical.  He is not severely anemic.  Chest x-ray without signs of pulmonary edema, cardiomegaly, or pneumonia.  Recommended continuing his home medications.  I noted that he is in sinus bradycardia, though seems to have been tolerating this quite well.  He has follow-up scheduled with cardiology in about a week.  Return to the ED right away for increasing shortness of breath, chest pain, passing out, fevers, cough, or other concerns.      Diagnosis:    ICD-10-CM    1. Shortness of breath  R06.02       2. Bradycardia  R00.1          7/29/2023   Alana Gallegos MD Pepper, Tracy Lynn, MD  07/31/23 0715

## 2023-07-29 NOTE — ED TRIAGE NOTES
Came from home via EMS. Patient was experiencing shortness of breath and chest discomfort. He now denies any pain. Patient was recently diagnosed with new afib and had medication changes.      Triage Assessment       Row Name 07/29/23 0812       Triage Assessment (Adult)    Airway WDL WDL       Respiratory WDL    Respiratory WDL WDL       Skin Circulation/Temperature WDL    Skin Circulation/Temperature WDL WDL       Cardiac WDL    Cardiac WDL X  bradycardic       Peripheral/Neurovascular WDL    Peripheral Neurovascular WDL WDL       Cognitive/Neuro/Behavioral WDL    Cognitive/Neuro/Behavioral WDL WDL

## 2023-08-01 ENCOUNTER — PATIENT OUTREACH (OUTPATIENT)
Dept: CARE COORDINATION | Facility: CLINIC | Age: 88
End: 2023-08-01
Payer: COMMERCIAL

## 2023-08-01 NOTE — PROGRESS NOTES
Clinic Care Coordination Contact  Community Health Worker Initial Outreach    CHW Initial Information Gathering:  Referral Source: ED Follow-Up  CHW Additional Questions  MyChart active?: Yes    Patient accepts CC: No, patient declined at this time. Patient will be sent Care Coordination introduction letter for future reference.     Milvia Fowler  Community Health Worker  [facility] Care Resource [facility]

## 2023-08-01 NOTE — LETTER
Austen Fowler  [address] [facility] [address]    Dear Austen Fowler,      I am a team member within the [facility] with [facility]. I recently contacted you to ensure you were doing well following a recent visit within our health system. I also wanted to take this chance to introduce Clinic Care Coordination in case you have any interest in this program in the future.     Below is a description of Clinic Care Coordination and how this team can further assist you:       The Clinic Care Coordination team is made up of a Registered Nurse, , Financial Resource Worker, and a Community Health Worker who understand and can help navigate the health care system. The goal of clinic care coordination is to help you manage your health, improve access to care, and achieve optimal health outcomes. They work alongside your provider to assist you in determining your health and social needs, obtain health care and community resources, and provide you with necessary information and education. Clinic Care Coordination can work with you through any barriers and develop a care plan that helps coordinate and strengthen the relationship between you and your care team.    If you wish to connect with the Clinic Care Coordination Team, please let your [facility] Primary Care Provider or Clinic Care Team know and they can place a referral. The Clinic Care Coordination team will then reach out by phone to further support you.    We are focused on providing you with the highest-quality healthcare experience possible.    Sincerely,   Your care team with [facility]

## 2023-08-07 ENCOUNTER — OFFICE VISIT (OUTPATIENT)
Dept: CARDIOLOGY | Facility: CLINIC | Age: 88
End: 2023-08-07
Payer: COMMERCIAL

## 2023-08-07 VITALS
WEIGHT: 259 LBS | HEART RATE: 77 BPM | HEIGHT: 75 IN | SYSTOLIC BLOOD PRESSURE: 137 MMHG | BODY MASS INDEX: 32.2 KG/M2 | DIASTOLIC BLOOD PRESSURE: 84 MMHG | OXYGEN SATURATION: 93 %

## 2023-08-07 DIAGNOSIS — I48.91 ATRIAL FIBRILLATION, UNSPECIFIED TYPE (H): Primary | ICD-10-CM

## 2023-08-07 DIAGNOSIS — Z86.79 HISTORY OF ATRIAL FIBRILLATION: ICD-10-CM

## 2023-08-07 DIAGNOSIS — I50.31 ACUTE DIASTOLIC HEART FAILURE (H): ICD-10-CM

## 2023-08-07 DIAGNOSIS — R00.1 BRADYCARDIA: ICD-10-CM

## 2023-08-07 DIAGNOSIS — F41.9 ANXIETY: ICD-10-CM

## 2023-08-07 DIAGNOSIS — I95.1 ORTHOSTATIC HYPOTENSION: ICD-10-CM

## 2023-08-07 PROCEDURE — 99215 OFFICE O/P EST HI 40 MIN: CPT | Performed by: NURSE PRACTITIONER

## 2023-08-07 PROCEDURE — 93000 ELECTROCARDIOGRAM COMPLETE: CPT | Mod: 77 | Performed by: NURSE PRACTITIONER

## 2023-08-07 RX ORDER — POTASSIUM CHLORIDE 1.5 G/1.58G
20 POWDER, FOR SOLUTION ORAL DAILY
Qty: 270 EACH | Refills: 1 | Status: SHIPPED | OUTPATIENT
Start: 2023-08-07 | End: 2023-08-15

## 2023-08-07 RX ORDER — METOPROLOL TARTRATE 50 MG
50 TABLET ORAL 2 TIMES DAILY
Qty: 180 TABLET | Refills: 0 | Status: SHIPPED | OUTPATIENT
Start: 2023-08-07 | End: 2023-08-21

## 2023-08-07 RX ORDER — MIDODRINE HYDROCHLORIDE 5 MG/1
5 TABLET ORAL 2 TIMES DAILY
Qty: 60 TABLET | Refills: 3 | Status: SHIPPED | OUTPATIENT
Start: 2023-08-07 | End: 2023-08-21

## 2023-08-07 RX ORDER — FUROSEMIDE 20 MG
20 TABLET ORAL DAILY
Qty: 90 TABLET | Refills: 3 | Status: SHIPPED | OUTPATIENT
Start: 2023-08-07 | End: 2024-05-21

## 2023-08-07 RX ORDER — BUSPIRONE HYDROCHLORIDE 5 MG/1
5 TABLET ORAL 2 TIMES DAILY
COMMUNITY
Start: 2023-08-07 | End: 2023-08-21

## 2023-08-07 NOTE — PATIENT INSTRUCTIONS
Today's Plan:     - Start lasix 20 mg daily.  - Restart your potassium supplement.   - Decrease your metoprolol to 50 mg twice daily.   - Repeat labs at your next visit with Dr. Hodge (BMP to check kidney function and potassium).   - Follow- up with Dr. Ricks in October.     If you have questions or concerns please call my nurse team at [phone].       Scheduling phone number: [phone]    Reminder: Please bring in all current medications, over the counter supplements and vitamin bottles to your next appointment.-    It was a pleasure seeing you today!     Maria Guadalupe Martin, JACKI  8/7/2023    -

## 2023-08-07 NOTE — PROGRESS NOTES
Cardiology Clinic Progress Note  Austen Fowler MRN# [id number]   YOB: 1935 Age: 88 year old     Primary cardiologist: Dr. Hernandez    Reason for visit: follow-up     History of presenting illness:    Austen Fowler is a very pleasant 88 year old male with a history of coronary artery disease s/p CABG (LIMA-LAD, SANDI-RCA, SVG-OM1, SVG- OM2), dilated ascending aorta, mild aortic stenosis, type 2 diabetes, and paroxysmal atrial fibrillation.     He was seen by Dr. Lawrence Vaughan in May 2023.  He recommended a 14-day Zio patch monitor to look for asymptomatic episodes of atrial fibrillation.    The patient was then hospitalized in June of this year for A-fib RVR prior to ziopatch results.  His Toprol-XL was increased to 50 mg twice daily.  He was then having issues with orthostasis and low-dose midodrine was added.  His metoprolol XL was changed to tartrate, this was subsequently increased to 75 mg twice daily.  Both his lisinopril and Lasix were discontinued due to orthostasis.  Additionally, he was started on Eliquis for stroke prophylaxis.    He was then seen by Jess Baker CNP, on 6/23/2023.  EKG at that time showed atrial fibrillation with suboptimally controlled rates.  Given his orthostasis, up titration of his rate control regimen was limited.  Nonetheless, he was started on amiodarone and was referred for ZENIA guided cardioversion.  A referral for EP was also recommended.    The patient presented for his ZENIA/DCCV on 7/6/2023 at which time he was in sinus rhythm, so his procedure was aborted.    He then developed tremors and unsteady gait, his amiodarone was subsequently discontinued.  His symptoms have since improved.    Today he reports doing well from a cardiovascular standpoint.  He denies any chest pain, shortness of breath, syncope or near syncope, or palpitation  He endorses ongoing tremors, but this has significantly improved since the discontinuation of his amiodarone.  He does  report some PND, though feels his breathing is at baseline.    His blood pressure is well controlled.  EKG shows sinus bradycardia with a heart rate of 49 bpm.         Assessment and Plan:     ASSESSMENT:    Paroxysmal atrial fibrillation.  He remains in normal sinus rhythm today with heart rate of 49.  He is on Lopressor 75 mg twice daily and Eliquis 5 mg twice daily for stroke prophylaxis.  Sinus bradycardia.  EKG today shows sinus with a heart rate of 49, he is asymptomatic.  Chronic diastolic heart failure, NYHA class II.  Reports worsening PND, not currently on diuretic therapy.  Orthostatic hypotension.  He denies any significant orthostatic symptoms, on midodrine 5 mg twice daily  Borderline ascending aorta and aortic root.  Per TTE (5/2023), aortic root measuring 3.8 cm and ascending aorta 3.5 cm .  CAD s/p CABG x 4.  He has no anginal symptoms.      PLAN:     Given his marked bradycardia today, although asymptomatic, will decrease his Lopressor to 50 mg BID.  He does not appear to be overtly volume overloaded, though given his PND, will resume Lasix at a reduced dose of 20 mg daily and uptitrate as needed.  Restart potassium supplementation 20 mEq daily.   Repeat BMP at his office visit with Dr. Hodge.   Follow-up with EP in October, as previously arranged.         Maria Guadalupe Martin, BERT, APRN, CNP  Page: [phone] (8a-5p M-F)    Orders this Visit:  Orders Placed This Encounter   Procedures    Basic metabolic panel    EKG 12-lead complete w/read - Clinics (performed today)     Orders Placed This Encounter   Medications    furosemide (LASIX) 20 MG tablet     Sig: Take 1 tablet (20 mg) by mouth daily     Dispense:  90 tablet     Refill:  3    midodrine (PROAMATINE) 5 MG tablet     Sig: Take 1 tablet (5 mg) by mouth 2 times daily     Dispense:  60 tablet     Refill:  3    potassium chloride (KLOR-CON) 20 MEQ packet     Sig: Take 20 mEq by mouth daily     Dispense:  270 each     Refill:  1    busPIRone (BUSPAR)  "5 MG tablet     Sig: Take 1 tablet (5 mg) by mouth 2 times daily    metoprolol tartrate (LOPRESSOR) 50 MG tablet     Sig: Take 1 tablet (50 mg) by mouth 2 times daily     Dispense:  180 tablet     Refill:  0     Medications Discontinued During This Encounter   Medication Reason    dutasteride (AVODART) 0.5 MG capsule Therapy completed (No AVS)    furosemide (LASIX) 40 MG tablet Reorder (No AVS)    midodrine (PROAMATINE) 5 MG tablet Reorder (No AVS)    potassium chloride (KLOR-CON) 20 MEQ packet Reorder (No AVS)    busPIRone (BUSPAR) 5 MG tablet Reorder (No AVS)    Metoprolol Tartrate 75 MG TABS        Today's clinic visit entailed:  Review of the result(s) of each unique test - echo, labs  Ordering of each unique test  Prescription drug management  40 minutes spent by me on the date of the encounter doing chart review, interpretation of tests, patient visit, documentation, and discussion with family   Provider  Link to [facility] Help Grid     The level of medical decision making during this visit was of moderate complexity.           Review of Systems:     Review of Systems:  Skin:        Eyes:       ENT:       Respiratory:  Positive for shortness of breath;sleep apnea;CPAP  Cardiovascular:  Negative for;palpitations;chest pain;lightheadedness Positive for;edema  Gastroenterology:      Genitourinary:       Musculoskeletal:       Neurologic:       Psychiatric:       Heme/Lymph/Imm:       Endocrine:  Positive for diabetes            Physical Exam:   Vitals: /84   Pulse 77   Ht 1.905 m (6' 3\")   Wt 117.5 kg (259 lb)   SpO2 93%   BMI 32.37 kg/m    Constitutional:  cooperative        Skin:  warm and dry to the touch        Head:           Eyes:  pupils equal and round        ENT:  no pallor or cyanosis        Neck:  JVP normal        Chest:  normal breath sounds, clear to auscultation, normal A-P diameter, normal symmetry, normal respiratory excursion, no use of accessory muscles        Cardiac: regular " rhythm;normal S1 and S2;no murmurs, gallops or rubs detected                  Abdomen:  abdomen soft        Vascular: pulses full and equal                                      Extremities and Back:           Neurological:  no gross motor deficits;affect appropriate             Medications:     Current Outpatient Medications   Medication Sig Dispense Refill    acetaminophen (ACETAMIN) 500 MG tablet Take 1,000 mg by mouth 2 times daily      apixaban ANTICOAGULANT (ELIQUIS) 5 MG tablet Take 1 tablet (5 mg) by mouth 2 times daily 180 tablet 3    blood glucose monitoring (ONE TOUCH ULTRASOFT) lancets USE TO TEST BLOOD SUGAR  TWICE DAILY OR AS DIRECTED 100 each 3    busPIRone (BUSPAR) 5 MG tablet Take 1 tablet (5 mg) by mouth 2 times daily      Cholecalciferol (VITAMIN D) 2000 UNIT tablet Take 2,000 Units by mouth daily. 90 tablet 3    finasteride (PROSCAR) 5 MG tablet TAKE 1 TABLET BY MOUTH  DAILY 90 tablet 2    furosemide (LASIX) 20 MG tablet Take 1 tablet (20 mg) by mouth daily 90 tablet 3    gabapentin (NEURONTIN) 300 MG capsule 300 mg in AM, 900 mg in  capsule 3    metFORMIN (GLUCOPHAGE XR) 500 MG 24 hr tablet TAKE 2 TABLETS BY MOUTH  TWICE DAILY WITH MEALS 360 tablet 3    metoprolol tartrate (LOPRESSOR) 50 MG tablet Take 1 tablet (50 mg) by mouth 2 times daily 180 tablet 0    midodrine (PROAMATINE) 5 MG tablet Take 1 tablet (5 mg) by mouth 2 times daily 60 tablet 3    multivitamin (OCUVITE) TABS tablet Take 1 tablet by mouth daily      omeprazole (PRILOSEC) 20 MG DR capsule Take 1 capsule (20 mg) by mouth daily 90 capsule 3    ONETOUCH ULTRA test strip USE TO TEST BLOOD SUGAR TWICE  DAILY OR AS DIRECTED 200 strip 1    ORDER FOR DME Uses Cpap machine for sleep apnea      potassium chloride (KLOR-CON) 20 MEQ packet Take 20 mEq by mouth daily 270 each 1    pravastatin (PRAVACHOL) 20 MG tablet Take 1 tablet (20 mg) by mouth every evening - due for fasting physical with Dr Hodge for refills 90 tablet 3    Probiotic  Product (PROBIOTIC PO) Take 1 capsule by mouth daily      tamsulosin (FLOMAX) 0.4 MG capsule Take 1 capsule (0.4 mg) by mouth every evening 90 capsule 3    vitamin B-12 (CYANOCOBALAMIN) 1000 MCG tablet Take 1,000 mcg by mouth daily          Family History   Problem Relation Age of Onset    Cancer Brother         MELANOMA    Diabetes Mother         AODM    Thyroid Disease Mother     Diabetes Father         AODM    Myocardial Infarction Father     Cerebrovascular Disease Brother     Parkinsonism Sister     Family History Negative Son     Family History Negative Son     Family History Negative Son     Family History Negative Daughter     Coronary Artery Disease Brother         dod 2019    Cerebrovascular Disease Brother         dod 2019    Other Cancer Brother         dod 2000/melanoma    Breast Cancer Other         in remission reg chkups       Social History     Socioeconomic History    Marital status:      Spouse name: Anastasia Caballero    Number of children: 4    Years of education: 14    Highest education level: Not on file   Occupational History    Occupation: retired     Comment:    Tobacco Use    Smoking status: Former     Packs/day: 2.00     Years: 30.00     Pack years: 60.00     Types: Cigarettes, Cigars, Pipe     Start date: 1954     Quit date: 3/1/1992     Years since quittin.4    Smokeless tobacco: Never    Tobacco comments:     quit in    Substance and Sexual Activity    Alcohol use: Yes     Comment: minimal less than 1/week    Drug use: No    Sexual activity: Not Currently     Partners: Female     Birth control/protection: Abstinence, Pull-out method, Condom, Post-menopausal, Natural Family Planning, None   Other Topics Concern     Service Yes     Comment: [employer], served in [place]    Blood Transfusions Yes     Comment: Unsure if he had one with heart surgery    Caffeine Concern Yes     Comment: occ [employer]    Occupational Exposure No    Hobby Hazards No    Sleep  Concern Yes     Comment: CPAP    Stress Concern No    Weight Concern Not Asked    Special Diet No    Back Care Not Asked    Exercise Yes     Comment: [employer] 3 times a week, biking walking.     Bike Helmet Yes    Seat Belt Yes    Self-Exams Yes     Comment: does HIRAL about once a month.    Parent/sibling w/ CABG, MI or angioplasty before 65F 55M? No   Social History Narrative        4 kids     Social Determinants of Health     Financial Resource Strain: Not on file   Food Insecurity: Not on file   Transportation Needs: Not on file   Physical Activity: Not on file   Stress: Not on file   Social Connections: Not on file   Intimate Partner Violence: Not on file   Housing Stability: Not on file            Past Medical History:     Past Medical History:   Diagnosis Date    Acute on chronic congestive heart failure, unspecified heart failure type (H) 6/12/2023    Anxiety state, unspecified     Aortic root dilatation (H)     Arthritis     Ascending aorta dilatation (H)     Basal cell cancer     s/p Mohs nose and bridge of nose on R.    Bunion     CAD (coronary artery disease)     CABG 1992: LIMA to LAD, SANDI to RCA, SVG to OM1 and OM2    Contact dermatitis and other eczema, due to unspecified cause     eczema - has light treatments with Dr. Rivera    Disorders of bursae and tendons in shoulder region, unspecified     Esophageal reflux     Essential hypertension, benign     Gallbladder disease     GERD (gastroesophageal reflux disease)     Heart attack (H)     Hyperlipidemia LDL goal <70     Left ventricular diastolic dysfunction     Low HDL (under 40) 3/28/2014    Nasal/sinus dis NEC     s/p surgery in 1995    Obesity     Osteoarthrosis, unspecified whether generalized or localized, shoulder region     Other hammer toe (acquired)     Sleep apnea     USES CPAP    Spinal stenosis, unspecified region other than cervical     MRI done 2006    Type 2 diabetes, HbA1c goal < 7% (H) 3/12/2015    Urge incontinence                Past Surgical History:     Past Surgical History:   Procedure Laterality Date    ABDOMEN SURGERY  1994    gall bladder    BIOPSY      arms    CHOLECYSTECTOMY  6/1994    COLONOSCOPY N/A 4/11/2017    Procedure: COMBINED COLONOSCOPY, SINGLE OR MULTIPLE BIOPSY/POLYPECTOMY BY BIOPSY;  Surgeon: Bladimir Garner MD;  Location:  GI    CV LEFT HEART CATH  5-92, 6-92    Angioplasty    ENT SURGERY  5/1995    sinus surgery    ESOPHAGOSCOPY, GASTROSCOPY, DUODENOSCOPY (EGD), DILATATION, COMBINED  7/17/2014    Procedure: COMBINED ESOPHAGOSCOPY, GASTROSCOPY, DUODENOSCOPY (EGD), DILATATION;  Surgeon: Bladimir Garner MD;  Location:  GI    EYE SURGERY      cataracts removed both eyes    INJECT EPIDURAL LUMBAR      IR PICC REPOSITION RIGHT  9/1/2022    LAMINECTOMY LUMBAR TWO LEVELS N/A 4/29/2015    Procedure: LAMINECTOMY LUMBAR TWO LEVELS;  Surgeon: Bladimir Keenan MD;  Location:  OR    THORACIC SURGERY  11/1992    bypass    ZZC CABG, ARTERY-VEIN, FOUR  11/1992    CABG - LIMA to left anterior descending and saphenous vein bypass graft to the first and second obtuse marginal branch of the circumflex and the right mammary to the right coronary artery    ZZ NONSPECIFIC PROCEDURE  6-94    Gail lap    ZZC NONSPECIFIC PROCEDURE  1995    sinus surgery    ZZC NONSPECIFIC PROCEDURE      bilateral cataract surgery    ZZ COLONOSCOPY THRU STOMA W BIOPSY/CAUTERY TUMOR/POLYP/LESION  5/2007              Allergies:   No known allergies       Data:   All laboratory data reviewed:    Recent Labs   Lab Test 05/09/23  1131 10/14/22  0916 07/14/22  1156 12/07/20  1510 01/20/20  1435 06/20/18  0946 04/09/18  0853   LDL 40 40 54   < >  --    < >  --    HDL 39* 38* 38*   < >  --    < >  --    NHDL 90 81 89   < >  --    < >  --    CHOL 129 119 127   < >  --    < >  --    TRIG 248* 205* 173*   < >  --    < >  --    TSH 1.24  --   --   --  1.02  --  1.14    < > = values in this interval not displayed.       Lab Results   Component Value Date     WBC 7.6 07/29/2023    WBC 7.2 06/25/2021    RBC 4.63 07/29/2023    RBC 4.48 06/25/2021    HGB 13.3 07/29/2023    HGB 13.0 (L) 06/25/2021    HCT 41.5 07/29/2023    HCT 38.9 (L) 06/25/2021    MCV 90 07/29/2023    MCV 87 06/25/2021    MCH 28.7 07/29/2023    MCH 29.0 06/25/2021    MCHC 32.0 07/29/2023    MCHC 33.4 06/25/2021    RDW 15.3 (H) 07/29/2023    RDW 15.0 06/25/2021     07/29/2023     06/25/2021       Lab Results   Component Value Date     07/29/2023     06/25/2021    POTASSIUM 3.7 07/29/2023    POTASSIUM 3.9 10/14/2022    POTASSIUM 3.7 06/25/2021    CHLORIDE 103 07/29/2023    CHLORIDE 103 10/14/2022    CHLORIDE 108 06/25/2021    CO2 29 07/29/2023    CO2 29 10/14/2022    CO2 28 06/25/2021    ANIONGAP 10 07/29/2023    ANIONGAP 7 10/14/2022    ANIONGAP 5 06/25/2021     (H) 07/29/2023     (H) 06/17/2023     (H) 10/14/2022     (H) 06/25/2021    BUN 12.2 07/29/2023    BUN 13 10/14/2022    BUN 15 06/25/2021    CR 0.62 (L) 07/29/2023    CR 0.72 06/25/2021    GFRESTIMATED >90 07/29/2023    GFRESTIMATED 85 06/25/2021    GFRESTBLACK >90 06/25/2021    BRNADON 9.1 07/29/2023    BRANDON 9.1 06/25/2021      Lab Results   Component Value Date    AST 18 06/13/2023    AST 32 06/25/2021    ALT 11 06/13/2023    ALT 26 06/25/2021       Lab Results   Component Value Date    A1C 7.3 (H) 05/09/2023    A1C 6.6 (H) 06/25/2021       Lab Results   Component Value Date    INR 1.10 08/19/2014

## 2023-08-07 NOTE — LETTER
8/7/2023    Hamzah Hodge MD  [address]  [facility] [address]    RE: Austen Fowler       Dear Colleague,     I had the pleasure of seeing Austen Fowler in the [facility].  Cardiology Clinic Progress Note  Austen Fowler MRN# [id number]   YOB: 1935 Age: 88 year old     Primary cardiologist: Dr. Hernandez    Reason for visit: follow-up     History of presenting illness:    Austen Fowler is a very pleasant 88 year old male with a history of coronary artery disease s/p CABG (LIMA-LAD, SANDI-RCA, SVG-OM1, SVG- OM2), dilated ascending aorta, mild aortic stenosis, type 2 diabetes, and paroxysmal atrial fibrillation.     He was seen by Dr. Lawrence Vaughan in May 2023.  He recommended a 14-day Zio patch monitor to look for asymptomatic episodes of atrial fibrillation.    The patient was then hospitalized in June of this year for A-fib RVR prior to ziopatch results.  His Toprol-XL was increased to 50 mg twice daily.  He was then having issues with orthostasis and low-dose midodrine was added.  His metoprolol XL was changed to tartrate, this was subsequently increased to 75 mg twice daily.  Both his lisinopril and Lasix were discontinued due to orthostasis.  Additionally, he was started on Eliquis for stroke prophylaxis.    He was then seen by Jess Baker CNP, on 6/23/2023.  EKG at that time showed atrial fibrillation with suboptimally controlled rates.  Given his orthostasis, up titration of his rate control regimen was limited.  Nonetheless, he was started on amiodarone and was referred for ZENIA guided cardioversion.  A referral for EP was also recommended.    The patient presented for his ZENIA/DCCV on 7/6/2023 at which time he was in sinus rhythm, so his procedure was aborted.    He then developed tremors and unsteady gait, his amiodarone was subsequently discontinued.  His symptoms have since improved.    Today he reports doing well from a cardiovascular standpoint.   He denies any chest pain, shortness of breath, syncope or near syncope, or palpitation  He endorses ongoing tremors, but this has significantly improved since the discontinuation of his amiodarone.  He does report some PND, though feels his breathing is at baseline.    His blood pressure is well controlled.  EKG shows sinus bradycardia with a heart rate of 49 bpm.         Assessment and Plan:     ASSESSMENT:    Paroxysmal atrial fibrillation.  He remains in normal sinus rhythm today with heart rate of 49.  He is on Lopressor 75 mg twice daily and Eliquis 5 mg twice daily for stroke prophylaxis.  Sinus bradycardia.  EKG today shows sinus with a heart rate of 49, he is asymptomatic.  Chronic diastolic heart failure, NYHA class II.  Reports worsening PND, not currently on diuretic therapy.  Orthostatic hypotension.  He denies any significant orthostatic symptoms, on midodrine 5 mg twice daily  Borderline ascending aorta and aortic root.  Per TTE (5/2023), aortic root measuring 3.8 cm and ascending aorta 3.5 cm .  CAD s/p CABG x 4.  He has no anginal symptoms.      PLAN:     Given his marked bradycardia today, although asymptomatic, will decrease his Lopressor to 50 mg BID.  He does not appear to be overtly volume overloaded, though given his PND, will resume Lasix at a reduced dose of 20 mg daily and uptitrate as needed.  Restart potassium supplementation 20 mEq daily.   Repeat BMP at his office visit with Dr. Hodge.   Follow-up with EP in October, as previously arranged.         Maria Guadalupe Martin, BERT, APRN, CNP  Page: [phone] (8a-5p M-F)    Orders this Visit:  Orders Placed This Encounter   Procedures    Basic metabolic panel    EKG 12-lead complete w/read - Clinics (performed today)     Orders Placed This Encounter   Medications    furosemide (LASIX) 20 MG tablet     Sig: Take 1 tablet (20 mg) by mouth daily     Dispense:  90 tablet     Refill:  3    midodrine (PROAMATINE) 5 MG tablet     Sig: Take 1 tablet (5 mg)  "by mouth 2 times daily     Dispense:  60 tablet     Refill:  3    potassium chloride (KLOR-CON) 20 MEQ packet     Sig: Take 20 mEq by mouth daily     Dispense:  270 each     Refill:  1    busPIRone (BUSPAR) 5 MG tablet     Sig: Take 1 tablet (5 mg) by mouth 2 times daily    metoprolol tartrate (LOPRESSOR) 50 MG tablet     Sig: Take 1 tablet (50 mg) by mouth 2 times daily     Dispense:  180 tablet     Refill:  0     Medications Discontinued During This Encounter   Medication Reason    dutasteride (AVODART) 0.5 MG capsule Therapy completed (No AVS)    furosemide (LASIX) 40 MG tablet Reorder (No AVS)    midodrine (PROAMATINE) 5 MG tablet Reorder (No AVS)    potassium chloride (KLOR-CON) 20 MEQ packet Reorder (No AVS)    busPIRone (BUSPAR) 5 MG tablet Reorder (No AVS)    Metoprolol Tartrate 75 MG TABS        Today's clinic visit entailed:  Review of the result(s) of each unique test - echo, labs  Ordering of each unique test  Prescription drug management  40 minutes spent by me on the date of the encounter doing chart review, interpretation of tests, patient visit, documentation, and discussion with family   Provider  Link to [facility] Help Grid     The level of medical decision making during this visit was of moderate complexity.           Review of Systems:     Review of Systems:  Skin:        Eyes:       ENT:       Respiratory:  Positive for shortness of breath;sleep apnea;CPAP  Cardiovascular:  Negative for;palpitations;chest pain;lightheadedness Positive for;edema  Gastroenterology:      Genitourinary:       Musculoskeletal:       Neurologic:       Psychiatric:       Heme/Lymph/Imm:       Endocrine:  Positive for diabetes            Physical Exam:   Vitals: /84   Pulse 77   Ht 1.905 m (6' 3\")   Wt 117.5 kg (259 lb)   SpO2 93%   BMI 32.37 kg/m    Constitutional:  cooperative        Skin:  warm and dry to the touch        Head:           Eyes:  pupils equal and round        ENT:  no pallor or cyanosis    "     Neck:  JVP normal        Chest:  normal breath sounds, clear to auscultation, normal A-P diameter, normal symmetry, normal respiratory excursion, no use of accessory muscles        Cardiac: regular rhythm;normal S1 and S2;no murmurs, gallops or rubs detected                  Abdomen:  abdomen soft        Vascular: pulses full and equal                                      Extremities and Back:           Neurological:  no gross motor deficits;affect appropriate             Medications:     Current Outpatient Medications   Medication Sig Dispense Refill    acetaminophen (ACETAMIN) 500 MG tablet Take 1,000 mg by mouth 2 times daily      apixaban ANTICOAGULANT (ELIQUIS) 5 MG tablet Take 1 tablet (5 mg) by mouth 2 times daily 180 tablet 3    blood glucose monitoring (ONE TOUCH ULTRASOFT) lancets USE TO TEST BLOOD SUGAR  TWICE DAILY OR AS DIRECTED 100 each 3    busPIRone (BUSPAR) 5 MG tablet Take 1 tablet (5 mg) by mouth 2 times daily      Cholecalciferol (VITAMIN D) 2000 UNIT tablet Take 2,000 Units by mouth daily. 90 tablet 3    finasteride (PROSCAR) 5 MG tablet TAKE 1 TABLET BY MOUTH  DAILY 90 tablet 2    furosemide (LASIX) 20 MG tablet Take 1 tablet (20 mg) by mouth daily 90 tablet 3    gabapentin (NEURONTIN) 300 MG capsule 300 mg in AM, 900 mg in  capsule 3    metFORMIN (GLUCOPHAGE XR) 500 MG 24 hr tablet TAKE 2 TABLETS BY MOUTH  TWICE DAILY WITH MEALS 360 tablet 3    metoprolol tartrate (LOPRESSOR) 50 MG tablet Take 1 tablet (50 mg) by mouth 2 times daily 180 tablet 0    midodrine (PROAMATINE) 5 MG tablet Take 1 tablet (5 mg) by mouth 2 times daily 60 tablet 3    multivitamin (OCUVITE) TABS tablet Take 1 tablet by mouth daily      omeprazole (PRILOSEC) 20 MG DR capsule Take 1 capsule (20 mg) by mouth daily 90 capsule 3    ONETOUCH ULTRA test strip USE TO TEST BLOOD SUGAR TWICE  DAILY OR AS DIRECTED 200 strip 1    ORDER FOR DME Uses Cpap machine for sleep apnea      potassium chloride (KLOR-CON) 20 MEQ  packet Take 20 mEq by mouth daily 270 each 1    pravastatin (PRAVACHOL) 20 MG tablet Take 1 tablet (20 mg) by mouth every evening - due for fasting physical with Dr Hodge for refills 90 tablet 3    Probiotic Product (PROBIOTIC PO) Take 1 capsule by mouth daily      tamsulosin (FLOMAX) 0.4 MG capsule Take 1 capsule (0.4 mg) by mouth every evening 90 capsule 3    vitamin B-12 (CYANOCOBALAMIN) 1000 MCG tablet Take 1,000 mcg by mouth daily          Family History   Problem Relation Age of Onset    Cancer Brother         MELANOMA    Diabetes Mother         AODM    Thyroid Disease Mother     Diabetes Father         AODM    Myocardial Infarction Father     Cerebrovascular Disease Brother     Parkinsonism Sister     Family History Negative Son     Family History Negative Son     Family History Negative Son     Family History Negative Daughter     Coronary Artery Disease Brother         dod 2019    Cerebrovascular Disease Brother         dod 2019    Other Cancer Brother         dod 2000/melanoma    Breast Cancer Other         in remission reg chkups       Social History     Socioeconomic History    Marital status:      Spouse name: Anastasia Caballero    Number of children: 4    Years of education: 14    Highest education level: Not on file   Occupational History    Occupation: retired     Comment:    Tobacco Use    Smoking status: Former     Packs/day: 2.00     Years: 30.00     Pack years: 60.00     Types: Cigarettes, Cigars, Pipe     Start date: 1954     Quit date: 3/1/1992     Years since quittin.4    Smokeless tobacco: Never    Tobacco comments:     quit in    Substance and Sexual Activity    Alcohol use: Yes     Comment: minimal less than 1/week    Drug use: No    Sexual activity: Not Currently     Partners: Female     Birth control/protection: Abstinence, Pull-out method, Condom, Post-menopausal, Natural Family Planning, None   Other Topics Concern     Service Yes     Comment: [employer], served in [place]    Blood Transfusions Yes     Comment: Unsure if he had one with heart surgery    Caffeine Concern Yes     Comment: occ cofee    Occupational Exposure No    Hobby Hazards No    Sleep Concern Yes     Comment: CPAP    Stress Concern No    Weight Concern Not Asked    Special Diet No    Back Care Not Asked    Exercise Yes     Comment: [employer] 3 times a week, biking walking.     Bike Helmet Yes    Seat Belt Yes    Self-Exams Yes     Comment: does HIRAL about once a month.    Parent/sibling w/ CABG, MI or angioplasty before 65F 55M? No   Social History Narrative        4 kids     Social Determinants of Health     Financial Resource Strain: Not on file   Food Insecurity: Not on file   Transportation Needs: Not on file   Physical Activity: Not on file   Stress: Not on file   Social Connections: Not on file   Intimate Partner Violence: Not on file   Housing Stability: Not on file            Past Medical History:     Past Medical History:   Diagnosis Date    Acute on chronic congestive heart failure, unspecified heart failure type (H) 6/12/2023    Anxiety state, unspecified     Aortic root dilatation (H)     Arthritis     Ascending aorta dilatation (H)     Basal cell cancer     s/p Mohs nose and bridge of nose on R.    Bunion     CAD (coronary artery disease)     CABG 1992: LIMA to LAD, SANDI to RCA, SVG to OM1 and OM2    Contact dermatitis and other eczema, due to unspecified cause     eczema - has light treatments with Dr. Rivera    Disorders of bursae and tendons in shoulder region, unspecified     Esophageal reflux     Essential hypertension, benign     Gallbladder disease     GERD (gastroesophageal reflux disease)     Heart attack (H)     Hyperlipidemia LDL goal <70     Left ventricular diastolic dysfunction     Low HDL (under 40) 3/28/2014    Nasal/sinus dis NEC     s/p surgery in 1995    Obesity     Osteoarthrosis, unspecified whether generalized or localized, shoulder region     Other  hammer toe (acquired)     Sleep apnea     USES CPAP    Spinal stenosis, unspecified region other than cervical     MRI done 2006    Type 2 diabetes, HbA1c goal < 7% (H) 3/12/2015    Urge incontinence               Past Surgical History:     Past Surgical History:   Procedure Laterality Date    ABDOMEN SURGERY  1994    gall bladder    BIOPSY      arms    CHOLECYSTECTOMY  6/1994    COLONOSCOPY N/A 4/11/2017    Procedure: COMBINED COLONOSCOPY, SINGLE OR MULTIPLE BIOPSY/POLYPECTOMY BY BIOPSY;  Surgeon: Bladimir Garner MD;  Location:  GI    CV LEFT HEART CATH  5-92, 6-92    Angioplasty    ENT SURGERY  5/1995    sinus surgery    ESOPHAGOSCOPY, GASTROSCOPY, DUODENOSCOPY (EGD), DILATATION, COMBINED  7/17/2014    Procedure: COMBINED ESOPHAGOSCOPY, GASTROSCOPY, DUODENOSCOPY (EGD), DILATATION;  Surgeon: Bladimir Garner MD;  Location:  GI    EYE SURGERY      cataracts removed both eyes    INJECT EPIDURAL LUMBAR      IR PICC REPOSITION RIGHT  9/1/2022    LAMINECTOMY LUMBAR TWO LEVELS N/A 4/29/2015    Procedure: LAMINECTOMY LUMBAR TWO LEVELS;  Surgeon: Bladimir Keenan MD;  Location:  OR    THORACIC SURGERY  11/1992    bypass    [facility] CABG, ARTERY-VEIN, FOUR  11/1992    CABG - LIMA to left anterior descending and saphenous vein bypass graft to the first and second obtuse marginal branch of the circumflex and the right mammary to the right coronary artery    [facility] NONSPECIFIC PROCEDURE  6-94    Gail lap    [facility] NONSPECIFIC PROCEDURE  1995    sinus surgery    [facility] NONSPECIFIC PROCEDURE      bilateral cataract surgery    [facility] COLONOSCOPY THRU STOMA W BIOPSY/CAUTERY TUMOR/POLYP/LESION  5/2007              Allergies:   No known allergies       Data:   All laboratory data reviewed:    Recent Labs   Lab Test 05/09/23  1131 10/14/22  0916 07/14/22  1156 12/07/20  1510 01/20/20  1435 06/20/18  0946 04/09/18  0853   LDL 40 40 54   < >  --    < >  --    HDL 39* 38* 38*   < >  --    < >  --    NHDL 90 81 89   < >  --    < >  --     CHOL 129 119 127   < >  --    < >  --    TRIG 248* 205* 173*   < >  --    < >  --    TSH 1.24  --   --   --  1.02  --  1.14    < > = values in this interval not displayed.       Lab Results   Component Value Date    WBC 7.6 07/29/2023    WBC 7.2 06/25/2021    RBC 4.63 07/29/2023    RBC 4.48 06/25/2021    HGB 13.3 07/29/2023    HGB 13.0 (L) 06/25/2021    HCT 41.5 07/29/2023    HCT 38.9 (L) 06/25/2021    MCV 90 07/29/2023    MCV 87 06/25/2021    MCH 28.7 07/29/2023    MCH 29.0 06/25/2021    MCHC 32.0 07/29/2023    MCHC 33.4 06/25/2021    RDW 15.3 (H) 07/29/2023    RDW 15.0 06/25/2021     07/29/2023     06/25/2021       Lab Results   Component Value Date     07/29/2023     06/25/2021    POTASSIUM 3.7 07/29/2023    POTASSIUM 3.9 10/14/2022    POTASSIUM 3.7 06/25/2021    CHLORIDE 103 07/29/2023    CHLORIDE 103 10/14/2022    CHLORIDE 108 06/25/2021    CO2 29 07/29/2023    CO2 29 10/14/2022    CO2 28 06/25/2021    ANIONGAP 10 07/29/2023    ANIONGAP 7 10/14/2022    ANIONGAP 5 06/25/2021     (H) 07/29/2023     (H) 06/17/2023     (H) 10/14/2022     (H) 06/25/2021    BUN 12.2 07/29/2023    BUN 13 10/14/2022    BUN 15 06/25/2021    CR 0.62 (L) 07/29/2023    CR 0.72 06/25/2021    GFRESTIMATED >90 07/29/2023    GFRESTIMATED 85 06/25/2021    GFRESTBLACK >90 06/25/2021    BRANDON 9.1 07/29/2023    BRANDON 9.1 06/25/2021      Lab Results   Component Value Date    AST 18 06/13/2023    AST 32 06/25/2021    ALT 11 06/13/2023    ALT 26 06/25/2021       Lab Results   Component Value Date    A1C 7.3 (H) 05/09/2023    A1C 6.6 (H) 06/25/2021       Lab Results   Component Value Date    INR 1.10 08/19/2014           Thank you for allowing me to participate in the care of your patient.      Sincerely,     Maria Guadalupe Martin, [facility]  cc:   Bebe Baker NP  [id number] [id number]

## 2023-08-15 ENCOUNTER — TELEPHONE (OUTPATIENT)
Dept: CARDIOLOGY | Facility: CLINIC | Age: 88
End: 2023-08-15
Payer: COMMERCIAL

## 2023-08-15 DIAGNOSIS — I50.31 ACUTE DIASTOLIC HEART FAILURE (H): ICD-10-CM

## 2023-08-15 RX ORDER — POTASSIUM CHLORIDE 1.5 G/1.58G
20 POWDER, FOR SOLUTION ORAL DAILY
Qty: 90 EACH | Refills: 3 | Status: SHIPPED | OUTPATIENT
Start: 2023-08-15 | End: 2024-01-29

## 2023-08-15 NOTE — TELEPHONE ENCOUNTER
Fax received by Team 1 RN in-box from [facility] requesting clarification on potassium chloride dosing.     Per 8/7/23 OV note from Maria Guadalupe Martin: Restart potassium supplementation 20 mEq daily.     Updated prescription for 90 day supply + 3 refills.  [facility] Refill Guideline reviewed.  Medication meets criteria for refill.    Called and spoke with Alicia, Pharmacist, at [facility] to confirm prescription is correct.

## 2023-08-19 ENCOUNTER — HEALTH MAINTENANCE LETTER (OUTPATIENT)
Age: 88
End: 2023-08-19

## 2023-08-20 ASSESSMENT — ENCOUNTER SYMPTOMS
DIZZINESS: 0
MYALGIAS: 1
HEARTBURN: 0
HEMATURIA: 0
ABDOMINAL PAIN: 1
NAUSEA: 0
FEVER: 0
HEMATOCHEZIA: 0
EYE PAIN: 0
SORE THROAT: 0
ARTHRALGIAS: 1
FREQUENCY: 0
DIARRHEA: 0
DYSURIA: 0
WEAKNESS: 0
PARESTHESIAS: 0
CHILLS: 0
CONSTIPATION: 0
HEADACHES: 0
SHORTNESS OF BREATH: 1
COUGH: 0
JOINT SWELLING: 0
PALPITATIONS: 0
NERVOUS/ANXIOUS: 1

## 2023-08-20 ASSESSMENT — ACTIVITIES OF DAILY LIVING (ADL): CURRENT_FUNCTION: NO ASSISTANCE NEEDED

## 2023-08-21 ENCOUNTER — OFFICE VISIT (OUTPATIENT)
Dept: FAMILY MEDICINE | Facility: CLINIC | Age: 88
End: 2023-08-21
Payer: COMMERCIAL

## 2023-08-21 VITALS
HEIGHT: 73 IN | SYSTOLIC BLOOD PRESSURE: 159 MMHG | BODY MASS INDEX: 33.93 KG/M2 | RESPIRATION RATE: 18 BRPM | DIASTOLIC BLOOD PRESSURE: 75 MMHG | HEART RATE: 53 BPM | OXYGEN SATURATION: 95 % | WEIGHT: 256 LBS | TEMPERATURE: 97.3 F

## 2023-08-21 DIAGNOSIS — Z00.00 ENCOUNTER FOR ANNUAL WELLNESS VISIT (AWV) IN MEDICARE PATIENT: Primary | ICD-10-CM

## 2023-08-21 DIAGNOSIS — E11.59 TYPE 2 DIABETES MELLITUS WITH VASCULAR DISEASE (H): ICD-10-CM

## 2023-08-21 DIAGNOSIS — E78.5 HYPERLIPIDEMIA LDL GOAL <70: ICD-10-CM

## 2023-08-21 DIAGNOSIS — L30.8 OTHER ECZEMA: ICD-10-CM

## 2023-08-21 DIAGNOSIS — M53.3 SACROILIAC JOINT PAIN: ICD-10-CM

## 2023-08-21 DIAGNOSIS — I48.0 PAF (PAROXYSMAL ATRIAL FIBRILLATION) (H): ICD-10-CM

## 2023-08-21 DIAGNOSIS — I10 ESSENTIAL HYPERTENSION, BENIGN: ICD-10-CM

## 2023-08-21 DIAGNOSIS — K21.00 GASTROESOPHAGEAL REFLUX DISEASE WITH ESOPHAGITIS WITHOUT HEMORRHAGE: ICD-10-CM

## 2023-08-21 DIAGNOSIS — I95.1 ORTHOSTATIC HYPOTENSION: ICD-10-CM

## 2023-08-21 DIAGNOSIS — R25.1 TREMOR: ICD-10-CM

## 2023-08-21 DIAGNOSIS — N40.0 BENIGN PROSTATIC HYPERPLASIA, UNSPECIFIED WHETHER LOWER URINARY TRACT SYMPTOMS PRESENT: ICD-10-CM

## 2023-08-21 DIAGNOSIS — G47.33 OSA (OBSTRUCTIVE SLEEP APNEA): ICD-10-CM

## 2023-08-21 DIAGNOSIS — R41.3 MEMORY CHANGE: ICD-10-CM

## 2023-08-21 DIAGNOSIS — I25.10 CORONARY ARTERY DISEASE INVOLVING NATIVE CORONARY ARTERY OF NATIVE HEART WITHOUT ANGINA PECTORIS: ICD-10-CM

## 2023-08-21 LAB
ANION GAP SERPL CALCULATED.3IONS-SCNC: 13 MMOL/L (ref 7–15)
BUN SERPL-MCNC: 10.8 MG/DL (ref 8–23)
CALCIUM SERPL-MCNC: 9.4 MG/DL (ref 8.8–10.2)
CHLORIDE SERPL-SCNC: 104 MMOL/L (ref 98–107)
CHOLEST SERPL-MCNC: 100 MG/DL
CREAT SERPL-MCNC: 0.7 MG/DL (ref 0.67–1.17)
DEPRECATED HCO3 PLAS-SCNC: 25 MMOL/L (ref 22–29)
GFR SERPL CREATININE-BSD FRML MDRD: 89 ML/MIN/1.73M2
GLUCOSE SERPL-MCNC: 212 MG/DL (ref 70–99)
HBA1C MFR BLD: 7.1 % (ref 0–5.6)
HDLC SERPL-MCNC: 36 MG/DL
LDLC SERPL CALC-MCNC: 39 MG/DL
NONHDLC SERPL-MCNC: 64 MG/DL
POTASSIUM SERPL-SCNC: 3.9 MMOL/L (ref 3.4–5.3)
SODIUM SERPL-SCNC: 142 MMOL/L (ref 136–145)
TRIGL SERPL-MCNC: 126 MG/DL
TSH SERPL DL<=0.005 MIU/L-ACNC: 1.82 UIU/ML (ref 0.3–4.2)

## 2023-08-21 PROCEDURE — G0439 PPPS, SUBSEQ VISIT: HCPCS | Performed by: INTERNAL MEDICINE

## 2023-08-21 PROCEDURE — 99214 OFFICE O/P EST MOD 30 MIN: CPT | Mod: 25 | Performed by: INTERNAL MEDICINE

## 2023-08-21 PROCEDURE — 83036 HEMOGLOBIN GLYCOSYLATED A1C: CPT | Performed by: INTERNAL MEDICINE

## 2023-08-21 PROCEDURE — 80061 LIPID PANEL: CPT | Performed by: INTERNAL MEDICINE

## 2023-08-21 PROCEDURE — 80048 BASIC METABOLIC PNL TOTAL CA: CPT | Performed by: INTERNAL MEDICINE

## 2023-08-21 PROCEDURE — 90471 IMMUNIZATION ADMIN: CPT | Performed by: INTERNAL MEDICINE

## 2023-08-21 PROCEDURE — 36415 COLL VENOUS BLD VENIPUNCTURE: CPT | Performed by: INTERNAL MEDICINE

## 2023-08-21 PROCEDURE — 90715 TDAP VACCINE 7 YRS/> IM: CPT | Performed by: INTERNAL MEDICINE

## 2023-08-21 PROCEDURE — 84443 ASSAY THYROID STIM HORMONE: CPT | Performed by: INTERNAL MEDICINE

## 2023-08-21 RX ORDER — TRIAMCINOLONE ACETONIDE 1 MG/G
OINTMENT TOPICAL 2 TIMES DAILY
Qty: 30 G | Refills: 3 | Status: SHIPPED | OUTPATIENT
Start: 2023-08-21

## 2023-08-21 RX ORDER — BUSPIRONE HYDROCHLORIDE 5 MG/1
2.5 TABLET ORAL 2 TIMES DAILY
COMMUNITY
Start: 2023-08-21 | End: 2023-12-19

## 2023-08-21 RX ORDER — METOPROLOL TARTRATE 50 MG
50 TABLET ORAL 2 TIMES DAILY
Qty: 180 TABLET | Refills: 3 | Status: SHIPPED | OUTPATIENT
Start: 2023-08-21 | End: 2024-06-17

## 2023-08-21 RX ORDER — MIDODRINE HYDROCHLORIDE 5 MG/1
5 TABLET ORAL 2 TIMES DAILY
Qty: 60 TABLET | Refills: 3 | Status: SHIPPED | OUTPATIENT
Start: 2023-08-21 | End: 2023-12-29

## 2023-08-21 RX ORDER — METOPROLOL TARTRATE 50 MG
50 TABLET ORAL 2 TIMES DAILY
Qty: 180 TABLET | Refills: 3 | Status: SHIPPED | OUTPATIENT
Start: 2023-08-21 | End: 2023-08-21

## 2023-08-21 RX ORDER — MIDODRINE HYDROCHLORIDE 5 MG/1
5 TABLET ORAL 2 TIMES DAILY
Qty: 60 TABLET | Refills: 3 | Status: SHIPPED | OUTPATIENT
Start: 2023-08-21 | End: 2023-08-21

## 2023-08-21 ASSESSMENT — ENCOUNTER SYMPTOMS
DYSURIA: 0
SHORTNESS OF BREATH: 1
SORE THROAT: 0
HEARTBURN: 0
NERVOUS/ANXIOUS: 1
HEADACHES: 0
PALPITATIONS: 0
EYE PAIN: 0
ABDOMINAL PAIN: 1
HEMATOCHEZIA: 0
PARESTHESIAS: 0
JOINT SWELLING: 0
ARTHRALGIAS: 1
DIZZINESS: 0
COUGH: 0
CHILLS: 0
NAUSEA: 0
DIARRHEA: 0
WEAKNESS: 0
FREQUENCY: 0
FEVER: 0
CONSTIPATION: 0
MYALGIAS: 1
HEMATURIA: 0

## 2023-08-21 ASSESSMENT — PAIN SCALES - GENERAL: PAINLEVEL: NO PAIN (0)

## 2023-08-21 ASSESSMENT — ACTIVITIES OF DAILY LIVING (ADL): CURRENT_FUNCTION: NO ASSISTANCE NEEDED

## 2023-08-21 NOTE — PROGRESS NOTES
Prior to immunization administration, verified patients identity using patient s name and date of birth. Please see Immunization Activity for additional information.     Screening Questionnaire for Adult Immunization    Are you sick today?   No   Do you have allergies to medications, food, a vaccine component or latex?   No   Have you ever had a serious reaction after receiving a vaccination?   No   Do you have a long-term health problem with heart, lung, kidney, or metabolic disease (e.g., diabetes), asthma, a blood disorder, no spleen, complement component deficiency, a cochlear implant, or a spinal fluid leak?  Are you on long-term aspirin therapy?   No   Do you have cancer, leukemia, HIV/AIDS, or any other immune system problem?   No   Do you have a parent, brother, or sister with an immune system problem?   No   In the past 3 months, have you taken medications that affect  your immune system, such as prednisone, other steroids, or anticancer drugs; drugs for the treatment of rheumatoid arthritis, Crohn s disease, or psoriasis; or have you had radiation treatments?   No   Have you had a seizure, or a brain or other nervous system problem?   No   During the past year, have you received a transfusion of blood or blood    products, or been given immune (gamma) globulin or antiviral drug?   No   For women: Are you pregnant or is there a chance you could become       pregnant during the next month?   No   Have you received any vaccinations in the past 4 weeks?   No     Immunization questionnaire answers were all negative.      Patient instructed to remain in clinic for 15 minutes afterwards, and to report any adverse reactions.     Screening performed by Candi Roy CMA on 8/21/2023 at 2:23 PM.

## 2023-08-21 NOTE — PROGRESS NOTES
"SUBJECTIVE:   Griffin is a 88 year old who presents for Preventive Visit.    Very pleasant 88 years old who is accompanied by his wife as well as daughter  In end of July he was in emergency room with atrial fibrillation  He converted to normal sinus rhythm on his own    He also has orthostatic hypotension    His blood sugar checks are as below    His anxiety is stable    His memory and tremor seems worse    His wife mentions that he has some lesions on his leg    He is due for a tetanus shot    He is currently getting home physical therapy because of risk of falling  He is using CPAP    He has right SI joint pain  Are you in the first 12 months of your Medicare coverage?  No    Healthy Habits:     In general, how would you rate your overall health?  Good    Frequency of exercise:  2-3 days/week    Duration of exercise:  15-30 minutes    Do you usually eat at least 4 servings of fruit and vegetables a day, include whole grains    & fiber and avoid regularly eating high fat or \"junk\" foods?  No    Barriers to taking medications:  None    Medication side effects:  None    Ability to successfully perform activities of daily living:  No assistance needed    Home Safety:  No safety concerns identified    Hearing Impairment:  Find that men's voices are easier to understand than woman's    In the past 6 months, have you been bothered by leaking of urine?  No    In general, how would you rate your overall mental or emotional health?  Good        Have you ever done Advance Care Planning? (For example, a Health Directive, POLST, or a discussion with a medical provider or your loved ones about your wishes): Yes, advance care planning is on file.       Fall risk  Fallen 2 or more times in the past year?: Yes  Any fall with injury in the past year?: No    Cognitive Screening   1) Repeat 3 items (Leader, Season, Table)    2) Clock draw: NORMAL  3) 3 item recall: Recalls 3 objects  Results: 3 items recalled: COGNITIVE IMPAIRMENT " LESS LIKELY    Mini-CogTM Copyright MACHO Bhagat. Licensed by the author for use in [facility]; reprinted with permission (jesus@.[facility]). All rights reserved.      Do you have sleep apnea, excessive snoring or daytime drowsiness? : yes    Reviewed and updated as needed this visit by clinical staff    Allergies  Meds  Problems             Reviewed and updated as needed this visit by Provider    Allergies  Meds  Problems            Social History     Tobacco Use    Smoking status: Former     Packs/day: 2.00     Years: 30.00     Pack years: 60.00     Types: Cigarettes, Cigars, Pipe     Start date: 1954     Quit date: 3/1/1992     Years since quittin.4    Smokeless tobacco: Never    Tobacco comments:     quit in    Substance Use Topics    Alcohol use: Yes     Comment: minimal less than 1/week             2023     8:06 PM   Alcohol Use   Prescreen: >3 drinks/day or >7 drinks/week? No     Do you have a current opioid prescription? No  Do you use any other controlled substances or medications that are not prescribed by a provider? None          Diabetes Follow-up    How often are you checking your blood sugar? One time daily  What time of day are you checking your blood sugars (select all that apply)?  Before meals  Have you had any blood sugars above 200?  No  Have you had any blood sugars below 70?  No  What symptoms do you notice when your blood sugar is low?  None  What concerns do you have today about your diabetes? None   Do you have any of these symptoms? (Select all that apply)  No numbness or tingling in feet.  No redness, sores or blisters on feet.  No complaints of excessive thirst.  No reports of blurry vision.  No significant changes to weight.  Have you had a diabetic eye exam in the last 12 months?         BP Readings from Last 2 Encounters:   23 (!) 159/75   23 137/84     Hemoglobin A1C (%)   Date Value   2023 7.3 (H)   01/10/2023 7.6 (H)   2021 6.6  (H)   04/01/2021 6.4 (H)     LDL Cholesterol Calculated (mg/dL)   Date Value   05/09/2023 40   10/14/2022 40   06/25/2021 46   03/02/2021 42             Hypertension Follow-up    Do you check your blood pressure regularly outside of the clinic? No   Are you following a low salt diet? Yes  Are your blood pressures ever more than 140 on the top number (systolic) OR more   than 90 on the bottom number (diastolic), for example 140/90? No    Current providers sharing in care for this patient include:   Patient Care Team:  Hamzah Hodge MD as PCP - General  Hamzah Hodge MD as Assigned PCP  Lyndsey Cameron PA-C as Physician Assistant (Cardiovascular Disease)  Marielos Morocho, PharmD as Assigned MTM Pharmacist  Lino Hernandez MD as Assigned Heart and Vascular Provider    The following health maintenance items are reviewed in Epic and correct as of today:  Health Maintenance   Topic Date Due    HF ACTION PLAN  Never done    COVID-19 Vaccine (6 - Moderna series) 01/30/2023    DTAP/TDAP/TD IMMUNIZATION (2 - Td or Tdap) 03/11/2023    EYE EXAM  05/12/2023    DIABETIC FOOT EXAM  07/14/2023    INFLUENZA VACCINE (1) 09/01/2023    A1C  11/09/2023    MICROALBUMIN  01/10/2024    BMP  01/29/2024    LIPID  05/09/2024    ALT  06/13/2024    CMP  06/13/2024    CBC  07/29/2024    MEDICARE ANNUAL WELLNESS VISIT  08/21/2024    ANNUAL REVIEW OF HM ORDERS  08/21/2024    FALL RISK ASSESSMENT  08/21/2024    ADVANCE CARE PLANNING  08/21/2028    TSH W/FREE T4 REFLEX  Completed    PHQ-2 (once per calendar year)  Completed    Pneumococcal Vaccine: 65+ Years  Completed    ZOSTER IMMUNIZATION  Completed    IPV IMMUNIZATION  Aged Out    MENINGITIS IMMUNIZATION  Aged Out    COLORECTAL CANCER SCREENING  Discontinued     BP Readings from Last 3 Encounters:   08/21/23 (!) 159/75   08/07/23 137/84   07/29/23 (!) 197/98    Wt Readings from Last 3 Encounters:   08/21/23 116.1 kg (256 lb)   08/07/23 117.5 kg (259 lb)   07/29/23 120.2 kg  (265 lb)                  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)    S/P lumbar laminectomy    Health Care Home    Coronary artery disease involving native coronary artery of native heart without angina pectoris    Left ventricular diastolic dysfunction    Non morbid obesity, unspecified obesity type    Neck pain    Cellulitis of right lower extremity    Hypoxia    Shortness of breath    Cellulitis    Gastroesophageal reflux disease with esophagitis    Sacroiliac joint pain    Dysphagia, unspecified type    Benign prostatic hyperplasia, unspecified whether lower urinary tract symptoms present    Cellulitis of right lower leg    Septic shock (H)    Dizziness    Falls frequently    Atrial fibrillation with RVR (H)    Hypotension, unspecified hypotension type    Acute on chronic congestive heart failure, unspecified heart failure type (H)    Memory change    Tremor    PAF (paroxysmal atrial fibrillation) (H)     Past Surgical History:   Procedure Laterality Date    ABDOMEN SURGERY  1994    gall bladder    BIOPSY      arms    CHOLECYSTECTOMY  6/1994    COLONOSCOPY N/A 4/11/2017    Procedure: COMBINED COLONOSCOPY, SINGLE OR MULTIPLE BIOPSY/POLYPECTOMY BY BIOPSY;  Surgeon: Bladimir Garner MD;  Location:  GI    CV LEFT HEART CATH  5-92, 6-92    Angioplasty    ENT SURGERY  5/1995    sinus surgery    ESOPHAGOSCOPY, GASTROSCOPY, DUODENOSCOPY (EGD), DILATATION, COMBINED  7/17/2014     Procedure: COMBINED ESOPHAGOSCOPY, GASTROSCOPY, DUODENOSCOPY (EGD), DILATATION;  Surgeon: Bladimir Garner MD;  Location:  GI    EYE SURGERY      cataracts removed both eyes    INJECT EPIDURAL LUMBAR      IR PICC REPOSITION RIGHT  2022    LAMINECTOMY LUMBAR TWO LEVELS N/A 2015    Procedure: LAMINECTOMY LUMBAR TWO LEVELS;  Surgeon: Bladimir Keenan MD;  Location:  OR    THORACIC SURGERY  1992    bypass    ZC CABG, ARTERY-VEIN, FOUR  1992    CABG - LIMA to left anterior descending and saphenous vein bypass graft to the first and second obtuse marginal branch of the circumflex and the right mammary to the right coronary artery    [facility] NONSPECIFIC PROCEDURE      Gail lap    [facility] NONSPECIFIC PROCEDURE      sinus surgery    [facility] NONSPECIFIC PROCEDURE      bilateral cataract surgery    [facility] COLONOSCOPY THRU STOMA W BIOPSY/CAUTERY TUMOR/POLYP/LESION  2007       Social History     Tobacco Use    Smoking status: Former     Packs/day: 2.00     Years: 30.00     Pack years: 60.00     Types: Cigarettes, Cigars, Pipe     Start date: 1954     Quit date: 3/1/1992     Years since quittin.4    Smokeless tobacco: Never    Tobacco comments:     quit in    Substance Use Topics    Alcohol use: Yes     Comment: minimal less than 1/week     Family History   Problem Relation Age of Onset    Cancer Brother         MELANOMA    Diabetes Mother         AODM    Thyroid Disease Mother     Diabetes Father         AODM    Myocardial Infarction Father     Cerebrovascular Disease Brother     Parkinsonism Sister     Family History Negative Son     Family History Negative Son     Family History Negative Son     Family History Negative Daughter     Coronary Artery Disease Brother         dod 2019    Cerebrovascular Disease Brother         dod 2019    Other Cancer Brother         dod 2000/melanoma    Breast Cancer Other         in remission reg chkups         Current Outpatient Medications   Medication Sig  Dispense Refill    acetaminophen (ACETAMIN) 500 MG tablet Take 1,000 mg by mouth 2 times daily      apixaban ANTICOAGULANT (ELIQUIS) 5 MG tablet Take 1 tablet (5 mg) by mouth 2 times daily 180 tablet 3    blood glucose monitoring (ONE TOUCH ULTRASOFT) lancets USE TO TEST BLOOD SUGAR  TWICE DAILY OR AS DIRECTED 100 each 3    busPIRone (BUSPAR) 5 MG tablet Take 0.5 tablets (2.5 mg) by mouth 2 times daily      Cholecalciferol (VITAMIN D) 2000 UNIT tablet Take 2,000 Units by mouth daily. 90 tablet 3    finasteride (PROSCAR) 5 MG tablet TAKE 1 TABLET BY MOUTH  DAILY 90 tablet 2    furosemide (LASIX) 20 MG tablet Take 1 tablet (20 mg) by mouth daily 90 tablet 3    gabapentin (NEURONTIN) 300 MG capsule 300 mg in AM, 900 mg in  capsule 3    metFORMIN (GLUCOPHAGE XR) 500 MG 24 hr tablet TAKE 2 TABLETS BY MOUTH  TWICE DAILY WITH MEALS 360 tablet 3    metoprolol tartrate (LOPRESSOR) 50 MG tablet Take 1 tablet (50 mg) by mouth 2 times daily 180 tablet 3    midodrine (PROAMATINE) 5 MG tablet Take 1 tablet (5 mg) by mouth 2 times daily 60 tablet 3    multivitamin (OCUVITE) TABS tablet Take 1 tablet by mouth daily      omeprazole (PRILOSEC) 20 MG DR capsule Take 1 capsule (20 mg) by mouth daily 90 capsule 3    ONETOUCH ULTRA test strip USE TO TEST BLOOD SUGAR TWICE  DAILY OR AS DIRECTED 200 strip 1    ORDER FOR DME Uses Cpap machine for sleep apnea      potassium chloride (KLOR-CON) 20 MEQ packet Take 20 mEq by mouth daily 90 each 3    pravastatin (PRAVACHOL) 20 MG tablet Take 1 tablet (20 mg) by mouth every evening - due for fasting physical with Dr Hodge for refills 90 tablet 3    Probiotic Product (PROBIOTIC PO) Take 1 capsule by mouth daily      tamsulosin (FLOMAX) 0.4 MG capsule Take 1 capsule (0.4 mg) by mouth every evening 90 capsule 3    triamcinolone (KENALOG) 0.1 % external ointment Apply topically 2 times daily 30 g 3    vitamin B-12 (CYANOCOBALAMIN) 1000 MCG tablet Take 1,000 mcg by mouth daily        Allergies  "  Allergen Reactions    No Known Allergies              Review of Systems   Constitutional:  Negative for chills and fever.   HENT:  Positive for congestion and hearing loss. Negative for ear pain and sore throat.    Eyes:  Negative for pain and visual disturbance.   Respiratory:  Positive for shortness of breath. Negative for cough.    Cardiovascular:  Negative for chest pain, palpitations and peripheral edema.   Gastrointestinal:  Positive for abdominal pain. Negative for constipation, diarrhea, heartburn, hematochezia and nausea.   Genitourinary:  Negative for dysuria, frequency, genital sores, hematuria, impotence, penile discharge and urgency.   Musculoskeletal:  Positive for arthralgias and myalgias. Negative for joint swelling.   Skin:  Negative for rash.   Neurological:  Negative for dizziness, weakness, headaches and paresthesias.   Psychiatric/Behavioral:  Negative for mood changes. The patient is nervous/anxious.          OBJECTIVE:   BP (!) 159/75 (BP Location: Left arm)   Pulse 53   Temp 97.3  F (36.3  C) (Temporal)   Resp 18   Ht 1.854 m (6' 1\")   Wt 116.1 kg (256 lb)   SpO2 95%   BMI 33.78 kg/m   Estimated body mass index is 33.78 kg/m  as calculated from the following:    Height as of this encounter: 1.854 m (6' 1\").    Weight as of this encounter: 116.1 kg (256 lb).  Physical Exam  GENERAL: healthy, alert and no distress  EYES: Eyes grossly normal to inspection, PERRL and conjunctivae and sclerae normal  HENT: ear canals and TM's normal, nose and mouth without ulcers or lesions  NECK: no adenopathy, no asymmetry, masses, or scars and thyroid normal to palpation  RESP: lungs clear to auscultation - no rales, rhonchi or wheezes  CV: regular rate and rhythm, normal S1 S2, no S3 or S4, no murmur, click or rub, no peripheral edema and peripheral pulses strong  ABDOMEN: soft, nontender, no hepatosplenomegaly, no masses and bowel sounds normal  MS: no gross musculoskeletal defects noted, no " edema  SKIN: no suspicious lesions or rashes except for feet  NEURO: Normal strength and tone, mentation intact and speech normal  Tremors noticed    PSYCH: mentation appears normal, affect normal/bright      Foot exam shows no ulceration, no neuropathy, microfilament well felt. Skin on the feet dry eczema  Pulses in the feet well felt.    ASSESSMENT / PLAN:   Austen was seen today for physical.    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)  -     Hemoglobin A1c  -     TSH with free T4 reflex; Future  -     TSH with free T4 reflex  With excessive weight we could consider GLP agonist for this patient to help out because I think that will help with his mobility  Memory change  -     Adult Neurology  Referral; Future  No memory loss found today  Tremor  Patient reduced expression  He also has memory change per family  We will reduce BuSpar and also consult neurology for possible Parkinson's disease which runs in the family  -     Adult Neurology  Referral; Future  -     TSH with free T4 reflex; Future  -     TSH with free T4 reflex    PAF (paroxysmal atrial fibrillation) (H) remains in normal rhythm  -     metoprolol tartrate (LOPRESSOR) 50 MG tablet; Take 1 tablet (50 mg) by mouth 2 times daily  -     apixaban ANTICOAGULANT (ELIQUIS) 5 MG tablet; Take 1 tablet (5 mg) by mouth 2 times daily    Coronary artery disease involving native coronary artery of native heart without angina pectoris  S/p CABG on statins  Hyperlipidemia LDL goal <70  -     Lipid panel reflex to direct LDL Fasting  He can only take Pravachol  Gastroesophageal reflux disease with esophagitis without hemorrhage  No new symptoms  PENELOPE (obstructive sleep apnea)  Using CPAP  Essential hypertension, benign  -     **Basic metabolic panel FUTURE  "14d  Because of orthostatic hypotension he is only on metoprolol  Benign prostatic hyperplasia, unspecified whether lower urinary tract symptoms present  On Proscar and Flomax with no symptoms  Other eczema  -     triamcinolone (KENALOG) 0.1 % external ointment; Apply topically 2 times daily  Applied to the feet because he is at risk of infections  Sacroiliac joint pain  He is on gabapentin  Orthostatic hypotension  -   Markedly orthostatic still today and uses compression socks  -     midodrine (PROAMATINE) 5 MG tablet; Take 1 tablet (5 mg) by mouth 2 times daily    Tdap given today          COUNSELING:  tdap      BMI:   Estimated body mass index is 33.78 kg/m  as calculated from the following:    Height as of this encounter: 1.854 m (6' 1\").    Weight as of this encounter: 116.1 kg (256 lb).         He reports that he quit smoking about 31 years ago. His smoking use included cigarettes, cigars, and pipe. He started smoking about 69 years ago. He has a 60.00 pack-year smoking history. He has never used smokeless tobacco.      Appropriate preventive services were discussed with this patient, including applicable screening as appropriate for cardiovascular disease, diabetes, osteopenia/osteoporosis, and glaucoma.  As appropriate for age/gender, discussed screening for colorectal cancer, prostate cancer, Checklist reviewing preventive services available has been given to the patient.    Reviewed patients plan of care and provided an AVS. The Complex Care Plan (for patients with higher acuity and needing more deliberate coordination of services) for Austen meets the Care Plan requirement. This Care Plan has been established and reviewed with the Patient.          Hamzah Hodge MD  [facility]    Identified Health Risks:    "

## 2023-08-28 DIAGNOSIS — Z53.9 DIAGNOSIS NOT YET DEFINED: Primary | ICD-10-CM

## 2023-08-28 PROCEDURE — G0179 MD RECERTIFICATION HHA PT: HCPCS | Performed by: INTERNAL MEDICINE

## 2023-09-04 DIAGNOSIS — R73.09 OTHER ABNORMAL GLUCOSE: ICD-10-CM

## 2023-09-06 RX ORDER — BLOOD SUGAR DIAGNOSTIC
STRIP MISCELLANEOUS
Qty: 200 STRIP | Refills: 0 | Status: SHIPPED | OUTPATIENT
Start: 2023-09-06

## 2023-09-13 DIAGNOSIS — E11.59 TYPE 2 DIABETES MELLITUS WITH VASCULAR DISEASE (H): ICD-10-CM

## 2023-09-13 RX ORDER — METFORMIN HCL 500 MG
1000 TABLET, EXTENDED RELEASE 24 HR ORAL 2 TIMES DAILY WITH MEALS
Qty: 270 TABLET | Refills: 2 | Status: SHIPPED | OUTPATIENT
Start: 2023-09-13 | End: 2023-11-13

## 2023-09-13 NOTE — TELEPHONE ENCOUNTER
Triage RN unable to fill medication as medication does not pass protocol.         Will route HP to PCP for review.     Wife, Anastasia Caballero, would like a call back once the medication has been sent.     Can we leave a detailed message on this number? YES  Phone number patient can be reached at: Home number on file [phone] (home)    Ligia Najera RN  [facility] Triage

## 2023-09-13 NOTE — TELEPHONE ENCOUNTER
Received a call from the patient's wife stating she is needing a one month supply of Metformin sent to a local pharmacy as the patient is completely out of medication and cannot wait for the mail order.     Metformin pended to the requested pharmacy.    Ligia Najera RN

## 2023-10-05 ENCOUNTER — TELEPHONE (OUTPATIENT)
Dept: FAMILY MEDICINE | Facility: CLINIC | Age: 88
End: 2023-10-05
Payer: COMMERCIAL

## 2023-10-05 NOTE — TELEPHONE ENCOUNTER
Anastasia Caballero called requesting an alternative to Eliquis.     Pt is paying $94 out of pocket for 3 month supply.

## 2023-10-05 NOTE — TELEPHONE ENCOUNTER
Warfarin would likely be the only cheaper option but there is a lot more involved with that. Will leave for PCP

## 2023-10-06 NOTE — TELEPHONE ENCOUNTER
Left voice message asking pt to call triage back. On call back, please relay providers message.       Hamzah Hodge MD B[facility]  Patient should discuss with his cardiologist

## 2023-10-06 NOTE — TELEPHONE ENCOUNTER
Patients wife calling back. Relayed PCP message below. She verbalized understanding.     Kari Lauren RN on 10/6/2023 at 1:43 PM

## 2023-10-24 ENCOUNTER — TELEPHONE (OUTPATIENT)
Dept: CARDIOLOGY | Facility: CLINIC | Age: 88
End: 2023-10-24

## 2023-10-24 NOTE — TELEPHONE ENCOUNTER
Called pt, informed him of recommendations from Dr. Hernandez. Also sent in a My chart message per pt request. Appt canceled. Salas GALE

## 2023-10-24 NOTE — TELEPHONE ENCOUNTER
----- Message from Lino Hernandez MD sent at 10/24/2023  1:02 PM CDT -----  Regarding: RE: EP consult w/ Dr. Ricks  Does not need visit as he is feeling well and no evidence of afib on event monitor. If afib recurs or feeling palpitations would need to see EP. Thx  ----- Message -----  From: Gretta Guy RN  Sent: 10/24/2023  11:37 AM CDT  To: Lino Hernandez MD  Subject: EP consult w/ Dr. Ricks                          Hi Dr. Hernandez,   We are wondering if the EP consult w/ Dr. Ricks is still needed?     When pt saw Maria Guadalupe in August, he was in sinus rhythm.     The patient presented for his ZENIA/DCCV on 7/6/2023 at which time he was in sinus rhythm, so his procedure was aborted.     He then developed tremors and unsteady gait, his amiodarone was subsequently discontinued.  His symptoms have since improved.    I called the pt, states he has been feeling good.     Please advise. Thanksgretta       ----- Message -----  From: Alejandra Verduzco RN  Sent: 10/24/2023   7:46 AM CDT  To: Gretta Guy RN    Did you get a chance to talk to Dr Hernandez about this pt and if he still needs to see Dr Ricks, or we leave him as is since he feels good?  I ask because the visit is next week.     Alejandra

## 2023-11-07 ENCOUNTER — OFFICE VISIT (OUTPATIENT)
Dept: NEUROLOGY | Facility: CLINIC | Age: 88
End: 2023-11-07
Attending: INTERNAL MEDICINE
Payer: COMMERCIAL

## 2023-11-07 VITALS — DIASTOLIC BLOOD PRESSURE: 82 MMHG | HEART RATE: 66 BPM | SYSTOLIC BLOOD PRESSURE: 158 MMHG | OXYGEN SATURATION: 95 %

## 2023-11-07 DIAGNOSIS — R41.3 MEMORY CHANGE: ICD-10-CM

## 2023-11-07 DIAGNOSIS — R25.1 TREMOR: ICD-10-CM

## 2023-11-07 PROCEDURE — 99205 OFFICE O/P NEW HI 60 MIN: CPT | Performed by: PSYCHIATRY & NEUROLOGY

## 2023-11-07 ASSESSMENT — UNIFIED PARKINSONS DISEASE RATING SCALE (UPDRS)
POSTURAL_STABILITY: (0) NORMAL: NO PROBLEMS. RECOVERS WITH ONE OR TWO STEPS.
AMPLITUDE_LLE: (0) NORMAL: NO TREMOR.
LEG_AGILITY_RIGHT: (0) NORMAL: NO PROBLEMS.
TOTAL_SCORE: 27
AMPLITUDE_RUE: (2) MILD: > 1 CM BUT < 3 CM IN MAXIMAL AMPLITUDE.
TOETAPPING_LEFT: (0) NORMAL: NO PROBLEMS.
LEG_AGILITY_LEFT: (0) NORMAL: NO PROBLEMS.
AXIAL_SCORE: 1
TOTAL_SCORE: 11
RIGIDITY_RUE: (0) NORMAL: NO RIGIDITY.
CONSTANCY_TREMOR_ATREST: (2) MILD: TREMOR AT REST IS PRESENT 25-50% OF THE ENTIRE EXAMINATION PERIOD.
DYSKINESIAS_PRESENT: NO
ARISING_CHAIR: (0) NORMAL: NO PROBLEMS. ABLE TO ARISE QUICKLY WITHOUT HESITATION.
AMPLITUDE_LUE: (2) MILD: > 1 CM BUT < 3 CM IN MAXIMAL AMPLITUDE.
AMPLITUDE_RLE: (0) NORMAL: NO TREMOR.
POSTURE: (1) SLIGHT: NOT QUITE ERECT, BUT COULD BE NORMAL FOR OLDER PERSONS.
PRONATION_SUPINATION_LEFT: (1) SLIGHT: ANY OF THE FOLLOWING: A) THE REGULAR RHYTHM IS BROKEN WITH ONE WITH ONE OR TWO INTERRUPTIONS OR HESITATIONS OF THE MOVEMENT  B) SLIGHT SLOWING  C) THE AMPLITUDE DECREMENTS NEAR THE END OF THE 10 MOVEMENTS.
HANDMOVEMENTS_LEFT: (1) SLIGHT: ANY OF THE FOLLOWING: A) THE REGULAR RHYTHM IS BROKEN WITH ONE WITH ONE OR TWO INTERRUPTIONS OR HESITATIONS OF THE MOVEMENT  B) SLIGHT SLOWING  C) THE AMPLITUDE DECREMENTS NEAR THE END OF THE 10 MOVEMENTS.
MOVEMENTS_INTERFERE_WITH_RATINGS: NO
SPEECH: (0) NORMAL: NO SPEECH PROBLEMS.
RIGIDITY_LUE: (0) NORMAL: NO RIGIDITY.
FREEZING_GAIT: (0) NORMAL: NO FREEZING.
FINGER_TAPPING_LEFT: (1) SLIGHT: ANY OF THE FOLLOWING: A) THE REGULAR RHYTHM IS BROKEN WITH ONE WITH ONE OR TWO INTERRUPTIONS OR HESITATIONS OF THE MOVEMENT  B) SLIGHT SLOWING  C) THE AMPLITUDE DECREMENTS NEAR THE END OF THE 10 MOVEMENTS.
RIGIDITY_NECK: (0) NORMAL: NO RIGIDITY.
GAIT: (0) NORMAL: NO PROBLEMS.
RIGIDITY_LLE: (0) NORMAL: NO RIGIDITY.
TOETAPPING_RIGHT: (0) NORMAL: NO PROBLEMS.
TOTAL_SCORE_LEFT: 11
HANDMOVEMENTS_RIGHT: (1) SLIGHT: ANY OF THE FOLLOWING: A) THE REGULAR RHYTHM IS BROKEN WITH ONE WITH ONE OR TWO INTERRUPTIONS OR HESITATIONS OF THE MOVEMENT  B) SLIGHT SLOWING  C) THE AMPLITUDE DECREMENTS NEAR THE END OF THE 10 MOVEMENTS.
FINGER_TAPPING_RIGHT: (1) SLIGHT: ANY OF THE FOLLOWING: A) THE REGULAR RHYTHM IS BROKEN WITH ONE WITH ONE OR TWO INTERRUPTIONS OR HESITATIONS OF THE MOVEMENT  B) SLIGHT SLOWING  C) THE AMPLITUDE DECREMENTS NEAR THE END OF THE 10 MOVEMENTS.
AMPLITUDE_LIP_JAW: (2) MILD: > 1 BUT < 2 CM IN MAXIMAL AMPLITUDE.
RIGIDITY_RLE: (0) NORMAL: NO RIGIDITY.
PRONATION_SUPINATION_RIGHT: (1) SLIGHT: ANY OF THE FOLLOWING: A) THE REGULAR RHYTHM IS BROKEN WITH ONE WITH ONE OR TWO INTERRUPTIONS OR HESITATIONS OF THE MOVEMENT  B) SLIGHT SLOWING  C) THE AMPLITUDE DECREMENTS NEAR THE END OF THE 10 MOVEMENTS.
SPONTANEITY_OF_MOVEMENT: (0) NORMAL: NO PROBLEMS.
FACIAL_EXPRESSION: (0) NORMAL: NORMAL FACIAL EXPRESSION.
PARKINSONS_MEDS: OFF

## 2023-11-07 NOTE — NURSING NOTE
"Austen Fowler is a 88 year old male who presents for:  Chief Complaint   Patient presents with    Tremors     Tremors- recs in epic- sched per Anastasia spouse- ref'd by AUGUSTO REDDY.          Initial Vitals:  BP (!) 158/82   Pulse 66   SpO2 95%  Estimated body mass index is 33.78 kg/m  as calculated from the following:    Height as of 8/21/23: 1.854 m (6' 1\").    Weight as of 8/21/23: 116.1 kg (256 lb).. There is no height or weight on file to calculate BSA. BP completed using cuff size: regular  Data Unavailable    Nursing Comments:     Felipa Martinez MA   "

## 2023-11-07 NOTE — PATIENT INSTRUCTIONS
Dear Griffin,    After the visit today we talked about the following:  We will communicate with your PCP regarding potentially switching you off of gabapentin to something else to try to see if that will alleviate the tremors some  Further, we will talked to their office about switching you from metoprolol to propranolol, which also works in the brain to reduce tremors  We may consider a trial of a Parkinson's medication in the future, but I think the changes above will potentially more effective in controlling your tremors  We also talked about you having neuropathy and potentially needing some physical therapy for your balance in addition to controlling your blood sugars

## 2023-11-07 NOTE — PROGRESS NOTES
Department of Neurology  Movement Disorders Division   New Patient Visit    Patient: Austen Fowler   MRN: [id number]   : 1935   Date of Visit: 2023  PCP: Hamzah Hodge MD   Referring provider: Ayesha    CC:  Evaluation for tremor    HPI:  Mr. Fowler is a 88 year old right-handed-handed male with history significant for longstanding diabetes and other medical problems including significant heart disease status post cardiac bypass.  Who presents to movement disorder clinic for tremors. He is accompanied by his wife, who assists with collateral history.    Seems to have longstanding history of tremors dating at least 10 to 20 years.  Tremors seem to be gradually progressive over the years.  Tremors appear to be exclusively postural and kinetic in nature, they cite examples of him having tremors affecting his ability to feed himself, drink from a cup, getting himself dressed and buttoning shirts.  Handwriting has worsened significantly to the point that his handwriting is mostly not much.    He denies any rigidity or bradykinesia.  He denies any posturing.  This seems to have had some type of change in the character of the tremor for the past 1 to 2 years according to his wife, and they have started to progress a little more which seems to have become a little more the source of disability.    As for other issues, he denies any speech or swallowing changes.  At times his face and head may have a little bit of a tremor but nothing that affects his ability to communicate.  No choking episodes.  He denies any paresthesias affecting lower extremities, however he does have some chronic low back pain, for which has been on gabapentin for a long time.  He seems to have had longstanding balance problems, has been using a walking stick for the past several months.  His wife seems to think that some of the balance issues came secondary to deconditioning after couple hospitalization for cellulitis in his lower  extremities.  However, he seems like he is never recovered completely as far as his ability to walk.    Otherwise he has occasional issues with constipation but not a constant problem.  No bladder control issues.  No change in sense of smell or taste.  No RBD-like symptoms.  No orthostasis.    He seems to have strong family history of movement disorders.  Has 2 older brothers who have , and apparently they carried a diagnosis of Parkinson disease.  He has a younger sibling who also has some tremors, however there is no more details about it.    [place], however denies any occupational exposures.  He is retired from having an office job.  He denies any chronic intake of dopamine blocking medications inclusive of antiemetics, antivertigo medicine, antipsychotics.  He used to smoke but he quit several years ago.  He does not drink any alcohol.  As far as medications, however, he seems like some medicines might have exacerbated his tremors in the recent past.  There is mention of one of the notes in epic from his primary care physician's office that he was started on amiodarone after a diagnosis of atrial fibrillation a few months ago, and he stopped it after a few weeks because it exacerbated his tremor significantly.  Further, since the referral was placed by the primary care physician which was a few months ago and now his tremors seem to have calmed down slightly.  There is some correlation between that and potential reduction of the dose of gabapentin particular the morning dose.  Therefore, they wonder whether there is a correlation between medications exacerbating his tremors.    Given all the above, he was sent here for evaluation opinion on the case.  ROS:  All others negative except as listed above. Pertinent movement disorders-specific ROS listed above.    Past Medical History:   Diagnosis Date    Acute on chronic congestive heart failure, unspecified heart failure type (H) 2023     Anxiety state, unspecified     Aortic root dilatation (H)     Arthritis     Ascending aorta dilatation (H)     Basal cell cancer     s/p Mohs nose and bridge of nose on R.    Miguelion     CAD (coronary artery disease)     CABG 1992: LIMA to LAD, SANDI to RCA, SVG to OM1 and OM2    Contact dermatitis and other eczema, due to unspecified cause     eczema - has light treatments with Dr. Rivera    Disorders of bursae and tendons in shoulder region, unspecified     Esophageal reflux     Essential hypertension, benign     Gallbladder disease     GERD (gastroesophageal reflux disease)     Heart attack (H)     Hyperlipidemia LDL goal <70     Left ventricular diastolic dysfunction     Low HDL (under 40) 3/28/2014    Nasal/sinus dis NEC     s/p surgery in 1995    Obesity     Osteoarthrosis, unspecified whether generalized or localized, shoulder region     Other hammer toe (acquired)     Sleep apnea     USES CPAP    Spinal stenosis, unspecified region other than cervical     MRI done 2006    Type 2 diabetes, HbA1c goal < 7% (H) 3/12/2015    Urge incontinence         Past Surgical History:   Procedure Laterality Date    ABDOMEN SURGERY  1994    gall bladder    BIOPSY      arms    CHOLECYSTECTOMY  6/1994    COLONOSCOPY N/A 4/11/2017    Procedure: COMBINED COLONOSCOPY, SINGLE OR MULTIPLE BIOPSY/POLYPECTOMY BY BIOPSY;  Surgeon: Bladimir Garner MD;  Location:  GI    CV LEFT HEART CATH  5-92, 6-92    Angioplasty    ENT SURGERY  5/1995    sinus surgery    ESOPHAGOSCOPY, GASTROSCOPY, DUODENOSCOPY (EGD), DILATATION, COMBINED  7/17/2014    Procedure: COMBINED ESOPHAGOSCOPY, GASTROSCOPY, DUODENOSCOPY (EGD), DILATATION;  Surgeon: Bladimir Garner MD;  Location:  GI    EYE SURGERY      cataracts removed both eyes    INJECT EPIDURAL LUMBAR      IR PICC REPOSITION RIGHT  9/1/2022    LAMINECTOMY LUMBAR TWO LEVELS N/A 4/29/2015    Procedure: LAMINECTOMY LUMBAR TWO LEVELS;  Surgeon: Bladimir Keenan MD;  Location:  OR    THORACIC  SURGERY  11/1992    bypass    [facility] CABG, ARTERY-VEIN, FOUR  11/1992    CABG - LIMA to left anterior descending and saphenous vein bypass graft to the first and second obtuse marginal branch of the circumflex and the right mammary to the right coronary artery    [facility] NONSPECIFIC PROCEDURE  6-94    Gail lap    [facility] NONSPECIFIC PROCEDURE  1995    sinus surgery    [facility] NONSPECIFIC PROCEDURE      bilateral cataract surgery    [facility] COLONOSCOPY THRU STOMA W BIOPSY/CAUTERY TUMOR/POLYP/LESION  5/2007          Current Outpatient Medications:     acetaminophen (ACETAMIN) 500 MG tablet, Take 1,000 mg by mouth 2 times daily, Disp: , Rfl:     apixaban ANTICOAGULANT (ELIQUIS) 5 MG tablet, Take 1 tablet (5 mg) by mouth 2 times daily, Disp: 180 tablet, Rfl: 3    blood glucose (ONETOUCH ULTRA) test strip, CHECK BLOOD SUGAR TWICE DAILY OR AS DIRECTED, Disp: 200 strip, Rfl: 0    blood glucose monitoring (ONE TOUCH ULTRASOFT) lancets, USE TO TEST BLOOD SUGAR  TWICE DAILY OR AS DIRECTED, Disp: 100 each, Rfl: 3    busPIRone (BUSPAR) 5 MG tablet, Take 0.5 tablets (2.5 mg) by mouth 2 times daily, Disp: , Rfl:     Cholecalciferol (VITAMIN D) 2000 UNIT tablet, Take 2,000 Units by mouth daily., Disp: 90 tablet, Rfl: 3    finasteride (PROSCAR) 5 MG tablet, TAKE 1 TABLET BY MOUTH  DAILY, Disp: 90 tablet, Rfl: 2    furosemide (LASIX) 20 MG tablet, Take 1 tablet (20 mg) by mouth daily, Disp: 90 tablet, Rfl: 3    gabapentin (NEURONTIN) 300 MG capsule, 300 mg in AM, 900 mg in PM, Disp: 360 capsule, Rfl: 3    metFORMIN (GLUCOPHAGE XR) 500 MG 24 hr tablet, Take 2 tablets (1,000 mg) by mouth 2 times daily (with meals), Disp: 270 tablet, Rfl: 2    metoprolol tartrate (LOPRESSOR) 50 MG tablet, Take 1 tablet (50 mg) by mouth 2 times daily, Disp: 180 tablet, Rfl: 3    midodrine (PROAMATINE) 5 MG tablet, Take 1 tablet (5 mg) by mouth 2 times daily, Disp: 60 tablet, Rfl: 3    multivitamin (OCUVITE) TABS tablet, Take 1 tablet by mouth daily, Disp: , Rfl:      omeprazole (PRILOSEC) 20 MG DR capsule, Take 1 capsule (20 mg) by mouth daily, Disp: 90 capsule, Rfl: 3    ORDER FOR DME, Uses Cpap machine for sleep apnea, Disp: , Rfl:     potassium chloride (KLOR-CON) 20 MEQ packet, Take 20 mEq by mouth daily, Disp: 90 each, Rfl: 3    pravastatin (PRAVACHOL) 20 MG tablet, Take 1 tablet (20 mg) by mouth every evening - due for fasting physical with Dr Hodge for refills, Disp: 90 tablet, Rfl: 3    Probiotic Product (PROBIOTIC PO), Take 1 capsule by mouth daily, Disp: , Rfl:     tamsulosin (FLOMAX) 0.4 MG capsule, Take 1 capsule (0.4 mg) by mouth every evening, Disp: 90 capsule, Rfl: 3    triamcinolone (KENALOG) 0.1 % external ointment, Apply topically 2 times daily, Disp: 30 g, Rfl: 3    vitamin B-12 (CYANOCOBALAMIN) 1000 MCG tablet, Take 1,000 mcg by mouth daily , Disp: , Rfl:      Allergies   Allergen Reactions    No Known Allergies         Family History   Problem Relation Age of Onset    Cancer Brother         MELANOMA    Diabetes Mother         AODM    Thyroid Disease Mother     Diabetes Father         AODM    Myocardial Infarction Father     Cerebrovascular Disease Brother     Parkinsonism Sister     Family History Negative Son     Family History Negative Son     Family History Negative Son     Family History Negative Daughter     Coronary Artery Disease Brother         dod 2019    Cerebrovascular Disease Brother         dod 2019    Other Cancer Brother         dod 2000/melanoma    Breast Cancer Other         in remission reg chkups        Social History:  He  reports that he quit smoking about 31 years ago. His smoking use included cigarettes, cigars, and pipe. He started smoking about 69 years ago. He has a 60.00 pack-year smoking history. He has never used smokeless tobacco. He reports current alcohol use. He reports that he does not use drugs.     PHYSICAL EXAM:  BP (!) 158/82   Pulse 66   SpO2 95%      Gen: He is in no acute distress, respirations appear nonlabored on  room air.    NEURO:  MS:  Alert, oriented, appropriate    CN:  PERRLA, EOMI, face symmetric, normal strength and sensation, tongue and palate are midline, SCM 5/5 throughout    Motor:    Normal tone, no fasciculations, strength is 5/5 throughout    Sensation: He has profound sensory loss to temperature in bilateral upper and lower extremities in a stocking glove fashion up to below knees and elbows.  Similarly, he has absent vibration at the toes on both sides and reduced at the ankles bilaterally.    Coordination:  Normal finger-nose    Reflexes: Reflexes are    Gait: Gait is slightly wide-based, with concerns for possible sensory ataxic component to it.    UPDRS  Time:: 1100  Medication: Off  R Brain DBS:: None  L Brain DBS:: None  Dyskinesia (LID): No  Did LID interfere: No  Speech: (0) Normal: No speech problems.  Facial Expression: (0) Normal: Normal facial expression.  Rigidity Neck: (0) Normal: No rigidity.  Rigidity RUE: (0) Normal: No rigidity.  Rigidity LUE: (0) Normal: No rigidity.  Rigidity RLE: (0) Normal: No rigidity.  Rigidity LLE: (0) Normal: No rigidity.  Finger Taps R: (1) Slight: Any of the following: a) the regular rhythm is broken with one with one or two interruptions or hesitations of the movement  b) slight slowing  c) the amplitude decrements near the end of the 10 movements.  Finger Taps L: (1) Slight: Any of the following: a) the regular rhythm is broken with one with one or two interruptions or hesitations of the movement  b) slight slowing  c) the amplitude decrements near the end of the 10 movements.  Hand Mvt R: (1) Slight: Any of the following: a) the regular rhythm is broken with one with one or two interruptions or hesitations of the movement  b) slight slowing  c) the amplitude decrements near the end of the 10 movements.  Hand Mvt L: (1) Slight: Any of the following: a) the regular rhythm is broken with one with one or two interruptions or hesitations of the movement  b) slight  slowing  c) the amplitude decrements near the end of the 10 movements.  Pron-/Supinate R: (1) Slight: Any of the following: a) the regular rhythm is broken with one with one or two interruptions or hesitations of the movement  b) slight slowing  c) the amplitude decrements near the end of the 10 movements.  Pron-/Supinate L: (1) Slight: Any of the following: a) the regular rhythm is broken with one with one or two interruptions or hesitations of the movement  b) slight slowing  c) the amplitude decrements near the end of the 10 movements.  Toe Tap R: (0) Normal: No problems.  Toe Tap L: (0) Normal: No problems.  Leg Agility R: (0) Normal: No problems.  Leg Agility L: (0) Normal: No problems.  Arise From Chair: (0) Normal: No problems. Able to arise quickly without hesitation.  Gait: (0) Normal: No problems.  Gait Freezing: (0) Normal: No freezing.  Postural Stability: (0) Normal: No problems. Recovers with one or two steps.  Posture: (1) Slight: Not quite erect, but could be normal for older persons.  Global Spont Mvt: (0) Normal: No problems.  Postural Tremor RUE: (3) Moderate: Tremor is at least 3 but less than 10 cm in amplitude.  Postural Tremor LUE: (3) Moderate: Tremor is at least 3 but less than 10 cm in amplitude.  Kinetic Tremor RUE: (3) Moderate: Tremor is at least 3 but less than 10 cm in amplitude.  Kinetic Tremor LUE: (3) Moderate: Tremor is at least 3 but less than 10 cm in amplitude.  Rest Tremor RUE: (2) Mild: > 1 cm but < 3 cm in maximal amplitude.  Rest Tremor LUE: (2) Mild: > 1 cm but < 3 cm in maximal amplitude.  Rest Tremor RLE: (0) Normal: No tremor.  Rest Tremor LLE: (0) Normal: No tremor.  Rest Tremor Lip/Jaw: (2) Mild: > 1 but < 2 cm in maximal amplitude.  Rest Tremor Constancy: (2) Mild: Tremor at rest is present 25-50% of the entire examination period.  Total Right: 11  Total Left: 11  Axial Total: 1  Total: 27        11/7/2023    12:00 PM   Tremor Motor Scale   Assessment Time 11:00    Medication None   DBS - Right Brain None   DBS - Left Brain None   Head 0   Face & Jaw 2.5   Voice 0   Outstretched - RIGHT 2   Outstretched - LEFT 2.5   Wingbeating - RIGHT 0   Wingbeating - LEFT 0   Kinetic - RIGHT 2.5   Kinetic - LEFT 2.5   Lower Limb - RIGHT 0   Lower Limb - LEFT 0   Lower Limb (Max) 0   Spiral - RIGHT 1   Spiral - LEFT 1   Handwriting 3.5   Dot approx - RIGHT 1.5   Dot approx - LEFT 1   Trunk (Standing) 0   Total Right 7   Total Left 7   Axial 2.5   TOTAL 20         DATA REVIEWED:  Reviewed pertinent data available in [facility].    ASSESSMENT:  88-year-old right-handed male with a longstanding history of a tremor disorder for at least a couple decades.  That in combination with multiple medical issues and recent different medication attempts.    Examination does show predominantly essential tremor type picture.  However he does have some slight bradykinesia, which makes me wonder whether there is a slight component of parkinsonism that is underlying, which perhaps could account for some of the changes that he is experienced in terms of progression over the past couple of years.    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  Most likely secondary to his diabetes.    PLAN:  -Reviewed impression and plan with patient and wife    - From a tremor standpoint, perhaps the first thing to do would be to have his primary care physician review his medications and see what is necessary or not.  Perhaps, since gabapentin not doing a good job for management of his back pain, maybe he should be tried on a different agent.  If there is a component of gabapentin exacerbating tremors, will be able to see that.  Interestingly, he does say that the level of tremors he has today is nowhere near what it is at home, and 1 major differences that he forgot to take his morning medications which does include gabapentin, which makes me wonder  whether there is actually a clear correlation between the 2.    - From a pharmacologic intervention standpoint, he has been chronically on metoprolol.  We will touch base with primary care physician by copying them on the note, to see whether his propranolol could be switched to propranolol instead.  Propranolol has a much better CNS penetration and should be able to provide also symptom control in addition to heart rate and blood pressure control.  All potential side effects and what to do if any.  Alternatively, we could try primidone, however the fact that he has been on chronic anticoagulation with Eliquis there is a clear interaction between the 2 which could drop the efficiency of the Eliquis.  Therefore, if you want to go down the primidone around, we will have to work with primary care physician and trying to switch him off Eliquis and to warfarin which would allow us to titrate the primidone up keeping track of how efficient his anticoagulation regimen would be.    - We did go over the fact that he has diabetes, however he is not on any oral agents that could potentially cause hypoglycemia, nor he is chronically on insulin.  He is only taking metformin.  We would like to touch base as well with his primary care physician so they can monitor that accordingly.  However, he has been exposed to beta-blockers without any issues pertaining his diabetes, suspect he will be okay.    - We also talked about a trial of levodopa, however I do suspect that the big bulk of his tremors are secondary to probably an essential tremor-like disorder.    - We talked about physical therapy for gait and balance training in the light of significant neuropathy.  He would like to try medication changes first and then regroup with either us or primary care physician to discuss physical therapy in the future.    - We can continue to monitor his balance.  He seems like he is progressing he will probably benefit from neuromuscular  consultation for diagnostic work-up of neuropathy.  For now, we recommend supportive care management of medical issues through his primary care physician including tight glycemic control.    - Given all those changes, we will plan on regrouping in the next 2 to 3 months.  Sooner if needed.  They are encouraged to contact us back in the meantime should there be any questions, concerns, any other issues.    Patient Instructions   Deaalexander Moya,    After the visit today we talked about the following:  We will communicate with your PCP regarding potentially switching you off of gabapentin to something else to try to see if that will alleviate the tremors some  Further, we will talked to their office about switching you from metoprolol to propranolol, which also works in the brain to reduce tremors  We may consider a trial of a Parkinson's medication in the future, but I think the changes above will potentially more effective in controlling your tremors  We also talked about you having neuropathy and potentially needing some physical therapy for your balance in addition to controlling your blood sugars      Bharath Cyr MD (Leo) (he/him/his)   of Neurology  [facility]  Department of Neurology      Thank you for referring Austen Fowler to our [facility] Movement Disorders Program, we appreciate the opportunity of being your partner in healthcare. Please feel free to reach out to us at any point if any questions or concerns about this visit.    Attending attestation: Today I spent a total 60 minutes on this case, with greater than 50% of the time spent in face-to-face, examining, obtaining history, providing education and coordination of care. Additional time was spent in chart review, ancillary data, and documentation as delineated above.

## 2023-11-07 NOTE — LETTER
2023         RE: Austen Fowler  [address]  [facility] [address]        Dear Colleague,    Thank you for referring your patient, Austen Fowler, to the [facility]. Please see a copy of my visit note below.    Department of Neurology  Movement Disorders Division   New Patient Visit    Patient: Austen Fowler   MRN: [id number]   : 1935   Date of Visit: 2023  PCP: Hamzah Hodge MD   Referring provider: Ayesha    CC:  Evaluation for tremor    HPI:  Mr. Fowler is a 88 year old right-handed-handed male with history significant for longstanding diabetes and other medical problems including significant heart disease status post cardiac bypass.  Who presents to movement disorder clinic for tremors. He is accompanied by his wife, who assists with collateral history.    Seems to have longstanding history of tremors dating at least 10 to 20 years.  Tremors seem to be gradually progressive over the years.  Tremors appear to be exclusively postural and kinetic in nature, they cite examples of him having tremors affecting his ability to feed himself, drink from a cup, getting himself dressed and buttoning shirts.  Handwriting has worsened significantly to the point that his handwriting is mostly not much.    He denies any rigidity or bradykinesia.  He denies any posturing.  This seems to have had some type of change in the character of the tremor for the past 1 to 2 years according to his wife, and they have started to progress a little more which seems to have become a little more the source of disability.    As for other issues, he denies any speech or swallowing changes.  At times his face and head may have a little bit of a tremor but nothing that affects his ability to communicate.  No choking episodes.  He denies any paresthesias affecting lower extremities, however he does have some chronic low back pain, for which has been on gabapentin for  a long time.  He seems to have had longstanding balance problems, has been using a walking stick for the past several months.  His wife seems to think that some of the balance issues came secondary to deconditioning after couple hospitalization for cellulitis in his lower extremities.  However, he seems like he is never recovered completely as far as his ability to walk.    Otherwise he has occasional issues with constipation but not a constant problem.  No bladder control issues.  No change in sense of smell or taste.  No RBD-like symptoms.  No orthostasis.    He seems to have strong family history of movement disorders.  Has 2 older brothers who have , and apparently they carried a diagnosis of Parkinson disease.  He has a younger sibling who also has some tremors, however there is no more details about it.    [place], however denies any occupational exposures.  He is retired from having an office job.  He denies any chronic intake of dopamine blocking medications inclusive of antiemetics, antivertigo medicine, antipsychotics.  He used to smoke but he quit several years ago.  He does not drink any alcohol.  As far as medications, however, he seems like some medicines might have exacerbated his tremors in the recent past.  There is mention of one of the notes in epic from his primary care physician's office that he was started on amiodarone after a diagnosis of atrial fibrillation a few months ago, and he stopped it after a few weeks because it exacerbated his tremor significantly.  Further, since the referral was placed by the primary care physician which was a few months ago and now his tremors seem to have calmed down slightly.  There is some correlation between that and potential reduction of the dose of gabapentin particular the morning dose.  Therefore, they wonder whether there is a correlation between medications exacerbating his tremors.    Given all the above, he was sent here for  evaluation opinion on the case.  ROS:  All others negative except as listed above. Pertinent movement disorders-specific ROS listed above.    Past Medical History:   Diagnosis Date     Acute on chronic congestive heart failure, unspecified heart failure type (H) 6/12/2023     Anxiety state, unspecified      Aortic root dilatation (H)      Arthritis      Ascending aorta dilatation (H)      Basal cell cancer     s/p Mohs nose and bridge of nose on R.     Bunion      CAD (coronary artery disease)     CABG 1992: LIMA to LAD, SANDI to RCA, SVG to OM1 and OM2     Contact dermatitis and other eczema, due to unspecified cause     eczema - has light treatments with Dr. Rivera     Disorders of bursae and tendons in shoulder region, unspecified      Esophageal reflux      Essential hypertension, benign      Gallbladder disease      GERD (gastroesophageal reflux disease)      Heart attack (H)      Hyperlipidemia LDL goal <70      Left ventricular diastolic dysfunction      Low HDL (under 40) 3/28/2014     Nasal/sinus dis NEC     s/p surgery in 1995     Obesity      Osteoarthrosis, unspecified whether generalized or localized, shoulder region      Other hammer toe (acquired)      Sleep apnea     USES CPAP     Spinal stenosis, unspecified region other than cervical     MRI done 2006     Type 2 diabetes, HbA1c goal < 7% (H) 3/12/2015     Urge incontinence         Past Surgical History:   Procedure Laterality Date     ABDOMEN SURGERY  1994    gall bladder     BIOPSY      arms     CHOLECYSTECTOMY  6/1994     COLONOSCOPY N/A 4/11/2017    Procedure: COMBINED COLONOSCOPY, SINGLE OR MULTIPLE BIOPSY/POLYPECTOMY BY BIOPSY;  Surgeon: Bladimir Garner MD;  Location:  GI     CV LEFT HEART CATH  5-92, 6-92    Angioplasty     ENT SURGERY  5/1995    sinus surgery     ESOPHAGOSCOPY, GASTROSCOPY, DUODENOSCOPY (EGD), DILATATION, COMBINED  7/17/2014    Procedure: COMBINED ESOPHAGOSCOPY, GASTROSCOPY, DUODENOSCOPY (EGD), DILATATION;  Surgeon:  Bladimir Garner MD;  Location:  GI     EYE SURGERY      cataracts removed both eyes     INJECT EPIDURAL LUMBAR       IR PICC REPOSITION RIGHT  9/1/2022     LAMINECTOMY LUMBAR TWO LEVELS N/A 4/29/2015    Procedure: LAMINECTOMY LUMBAR TWO LEVELS;  Surgeon: Bladimir Keenan MD;  Location:  OR     THORACIC SURGERY  11/1992    bypass     [facility] CABG, ARTERY-VEIN, FOUR  11/1992    CABG - LIMA to left anterior descending and saphenous vein bypass graft to the first and second obtuse marginal branch of the circumflex and the right mammary to the right coronary artery     [facility] NONSPECIFIC PROCEDURE  6-94    Gail lap     [facility] NONSPECIFIC PROCEDURE  1995    sinus surgery     [facility] NONSPECIFIC PROCEDURE      bilateral cataract surgery     [facility] COLONOSCOPY THRU STOMA W BIOPSY/CAUTERY TUMOR/POLYP/LESION  5/2007          Current Outpatient Medications:      acetaminophen (ACETAMIN) 500 MG tablet, Take 1,000 mg by mouth 2 times daily, Disp: , Rfl:      apixaban ANTICOAGULANT (ELIQUIS) 5 MG tablet, Take 1 tablet (5 mg) by mouth 2 times daily, Disp: 180 tablet, Rfl: 3     blood glucose (ONETOUCH ULTRA) test strip, CHECK BLOOD SUGAR TWICE DAILY OR AS DIRECTED, Disp: 200 strip, Rfl: 0     blood glucose monitoring (ONE TOUCH ULTRASOFT) lancets, USE TO TEST BLOOD SUGAR  TWICE DAILY OR AS DIRECTED, Disp: 100 each, Rfl: 3     busPIRone (BUSPAR) 5 MG tablet, Take 0.5 tablets (2.5 mg) by mouth 2 times daily, Disp: , Rfl:      Cholecalciferol (VITAMIN D) 2000 UNIT tablet, Take 2,000 Units by mouth daily., Disp: 90 tablet, Rfl: 3     finasteride (PROSCAR) 5 MG tablet, TAKE 1 TABLET BY MOUTH  DAILY, Disp: 90 tablet, Rfl: 2     furosemide (LASIX) 20 MG tablet, Take 1 tablet (20 mg) by mouth daily, Disp: 90 tablet, Rfl: 3     gabapentin (NEURONTIN) 300 MG capsule, 300 mg in AM, 900 mg in PM, Disp: 360 capsule, Rfl: 3     metFORMIN (GLUCOPHAGE XR) 500 MG 24 hr tablet, Take 2 tablets (1,000 mg) by mouth 2 times daily (with meals), Disp: 270  tablet, Rfl: 2     metoprolol tartrate (LOPRESSOR) 50 MG tablet, Take 1 tablet (50 mg) by mouth 2 times daily, Disp: 180 tablet, Rfl: 3     midodrine (PROAMATINE) 5 MG tablet, Take 1 tablet (5 mg) by mouth 2 times daily, Disp: 60 tablet, Rfl: 3     multivitamin (OCUVITE) TABS tablet, Take 1 tablet by mouth daily, Disp: , Rfl:      omeprazole (PRILOSEC) 20 MG DR capsule, Take 1 capsule (20 mg) by mouth daily, Disp: 90 capsule, Rfl: 3     ORDER FOR DME, Uses Cpap machine for sleep apnea, Disp: , Rfl:      potassium chloride (KLOR-CON) 20 MEQ packet, Take 20 mEq by mouth daily, Disp: 90 each, Rfl: 3     pravastatin (PRAVACHOL) 20 MG tablet, Take 1 tablet (20 mg) by mouth every evening - due for fasting physical with Dr Hodge for refills, Disp: 90 tablet, Rfl: 3     Probiotic Product (PROBIOTIC PO), Take 1 capsule by mouth daily, Disp: , Rfl:      tamsulosin (FLOMAX) 0.4 MG capsule, Take 1 capsule (0.4 mg) by mouth every evening, Disp: 90 capsule, Rfl: 3     triamcinolone (KENALOG) 0.1 % external ointment, Apply topically 2 times daily, Disp: 30 g, Rfl: 3     vitamin B-12 (CYANOCOBALAMIN) 1000 MCG tablet, Take 1,000 mcg by mouth daily , Disp: , Rfl:      Allergies   Allergen Reactions     No Known Allergies         Family History   Problem Relation Age of Onset     Cancer Brother         MELANOMA     Diabetes Mother         AODM     Thyroid Disease Mother      Diabetes Father         AODM     Myocardial Infarction Father      Cerebrovascular Disease Brother      Parkinsonism Sister      Family History Negative Son      Family History Negative Son      Family History Negative Son      Family History Negative Daughter      Coronary Artery Disease Brother         dod 2019     Cerebrovascular Disease Brother         dod 2019     Other Cancer Brother         dod 2000/melanoma     Breast Cancer Other         in remission reg kups        Social History:  He  reports that he quit smoking about 31 years ago. His smoking use  included cigarettes, cigars, and pipe. He started smoking about 69 years ago. He has a 60.00 pack-year smoking history. He has never used smokeless tobacco. He reports current alcohol use. He reports that he does not use drugs.     PHYSICAL EXAM:  BP (!) 158/82   Pulse 66   SpO2 95%      Gen: He is in no acute distress, respirations appear nonlabored on room air.    NEURO:  MS:  Alert, oriented, appropriate    CN:  PERRLA, EOMI, face symmetric, normal strength and sensation, tongue and palate are midline, SCM 5/5 throughout    Motor:    Normal tone, no fasciculations, strength is 5/5 throughout    Sensation: He has profound sensory loss to temperature in bilateral upper and lower extremities in a stocking glove fashion up to below knees and elbows.  Similarly, he has absent vibration at the toes on both sides and reduced at the ankles bilaterally.    Coordination:  Normal finger-nose    Reflexes: Reflexes are    Gait: Gait is slightly wide-based, with concerns for possible sensory ataxic component to it.    UPDRS  Time:: 1100  Medication: Off  R Brain DBS:: None  L Brain DBS:: None  Dyskinesia (LID): No  Did LID interfere: No  Speech: (0) Normal: No speech problems.  Facial Expression: (0) Normal: Normal facial expression.  Rigidity Neck: (0) Normal: No rigidity.  Rigidity RUE: (0) Normal: No rigidity.  Rigidity LUE: (0) Normal: No rigidity.  Rigidity RLE: (0) Normal: No rigidity.  Rigidity LLE: (0) Normal: No rigidity.  Finger Taps R: (1) Slight: Any of the following: a) the regular rhythm is broken with one with one or two interruptions or hesitations of the movement  b) slight slowing  c) the amplitude decrements near the end of the 10 movements.  Finger Taps L: (1) Slight: Any of the following: a) the regular rhythm is broken with one with one or two interruptions or hesitations of the movement  b) slight slowing  c) the amplitude decrements near the end of the 10 movements.  Hand Mvt R: (1) Slight: Any of the  following: a) the regular rhythm is broken with one with one or two interruptions or hesitations of the movement  b) slight slowing  c) the amplitude decrements near the end of the 10 movements.  Hand Mvt L: (1) Slight: Any of the following: a) the regular rhythm is broken with one with one or two interruptions or hesitations of the movement  b) slight slowing  c) the amplitude decrements near the end of the 10 movements.  Pron-/Supinate R: (1) Slight: Any of the following: a) the regular rhythm is broken with one with one or two interruptions or hesitations of the movement  b) slight slowing  c) the amplitude decrements near the end of the 10 movements.  Pron-/Supinate L: (1) Slight: Any of the following: a) the regular rhythm is broken with one with one or two interruptions or hesitations of the movement  b) slight slowing  c) the amplitude decrements near the end of the 10 movements.  Toe Tap R: (0) Normal: No problems.  Toe Tap L: (0) Normal: No problems.  Leg Agility R: (0) Normal: No problems.  Leg Agility L: (0) Normal: No problems.  Arise From Chair: (0) Normal: No problems. Able to arise quickly without hesitation.  Gait: (0) Normal: No problems.  Gait Freezing: (0) Normal: No freezing.  Postural Stability: (0) Normal: No problems. Recovers with one or two steps.  Posture: (1) Slight: Not quite erect, but could be normal for older persons.  Global Spont Mvt: (0) Normal: No problems.  Postural Tremor RUE: (3) Moderate: Tremor is at least 3 but less than 10 cm in amplitude.  Postural Tremor LUE: (3) Moderate: Tremor is at least 3 but less than 10 cm in amplitude.  Kinetic Tremor RUE: (3) Moderate: Tremor is at least 3 but less than 10 cm in amplitude.  Kinetic Tremor LUE: (3) Moderate: Tremor is at least 3 but less than 10 cm in amplitude.  Rest Tremor RUE: (2) Mild: > 1 cm but < 3 cm in maximal amplitude.  Rest Tremor LUE: (2) Mild: > 1 cm but < 3 cm in maximal amplitude.  Rest Tremor RLE: (0) Normal: No  tremor.  Rest Tremor LLE: (0) Normal: No tremor.  Rest Tremor Lip/Jaw: (2) Mild: > 1 but < 2 cm in maximal amplitude.  Rest Tremor Constancy: (2) Mild: Tremor at rest is present 25-50% of the entire examination period.  Total Right: 11  Total Left: 11  Axial Total: 1  Total: 27        11/7/2023    12:00 PM   Tremor Motor Scale   Assessment Time 11:00   Medication None   DBS - Right Brain None   DBS - Left Brain None   Head 0   Face & Jaw 2.5   Voice 0   Outstretched - RIGHT 2   Outstretched - LEFT 2.5   Wingbeating - RIGHT 0   Wingbeating - LEFT 0   Kinetic - RIGHT 2.5   Kinetic - LEFT 2.5   Lower Limb - RIGHT 0   Lower Limb - LEFT 0   Lower Limb (Max) 0   Spiral - RIGHT 1   Spiral - LEFT 1   Handwriting 3.5   Dot approx - RIGHT 1.5   Dot approx - LEFT 1   Trunk (Standing) 0   Total Right 7   Total Left 7   Axial 2.5   TOTAL 20         DATA REVIEWED:  Reviewed pertinent data available in [facility].    ASSESSMENT:  88-year-old right-handed male with a longstanding history of a tremor disorder for at least a couple decades.  That in combination with multiple medical issues and recent different medication attempts.    Examination does show predominantly essential tremor type picture.  However he does have some slight bradykinesia, which makes me wonder whether there is a slight component of parkinsonism that is underlying, which perhaps could account for some of the changes that he is experienced in terms of progression over the past couple of years.    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  Most likely secondary to his diabetes.    PLAN:  -Reviewed impression and plan with patient and wife    - From a tremor standpoint, perhaps the first thing to do would be to have his primary care physician review his medications and see what is necessary or not.  Perhaps, since gabapentin not doing a good job for management of his back pain, maybe he  should be tried on a different agent.  If there is a component of gabapentin exacerbating tremors, will be able to see that.  Interestingly, he does say that the level of tremors he has today is nowhere near what it is at home, and 1 major differences that he forgot to take his morning medications which does include gabapentin, which makes me wonder whether there is actually a clear correlation between the 2.    - From a pharmacologic intervention standpoint, he has been chronically on metoprolol.  We will touch base with primary care physician by copying them on the note, to see whether his propranolol could be switched to propranolol instead.  Propranolol has a much better CNS penetration and should be able to provide also symptom control in addition to heart rate and blood pressure control.  All potential side effects and what to do if any.  Alternatively, we could try primidone, however the fact that he has been on chronic anticoagulation with Eliquis there is a clear interaction between the 2 which could drop the efficiency of the Eliquis.  Therefore, if you want to go down the primidone around, we will have to work with primary care physician and trying to switch him off Eliquis and to warfarin which would allow us to titrate the primidone up keeping track of how efficient his anticoagulation regimen would be.    - We did go over the fact that he has diabetes, however he is not on any oral agents that could potentially cause hypoglycemia, nor he is chronically on insulin.  He is only taking metformin.  We would like to touch [facility] as well with his primary care physician so they can monitor that accordingly.  However, he has been exposed to beta-blockers without any issues pertaining his diabetes, suspect he will be okay.    - We also talked about a trial of levodopa, however I do suspect that the big bulk of his tremors are secondary to probably an essential tremor-like disorder.    - We talked about physical  therapy for gait and balance training in the light of significant neuropathy.  He would like to try medication changes first and then regroup with either us or primary care physician to discuss physical therapy in the future.    - We can continue to monitor his balance.  He seems like he is progressing he will probably benefit from neuromuscular consultation for diagnostic work-up of neuropathy.  For now, we recommend supportive care management of medical issues through his primary care physician including tight glycemic control.    - Given all those changes, we will plan on regrouping in the next 2 to 3 months.  Sooner if needed.  They are encouraged to contact us back in the meantime should there be any questions, concerns, any other issues.    Patient Instructions   Dear Griffin,    After the visit today we talked about the following:  We will communicate with your PCP regarding potentially switching you off of gabapentin to something else to try to see if that will alleviate the tremors some  Further, we will talked to their office about switching you from metoprolol to propranolol, which also works in the brain to reduce tremors  We may consider a trial of a Parkinson's medication in the future, but I think the changes above will potentially more effective in controlling your tremors  We also talked about you having neuropathy and potentially needing some physical therapy for your balance in addition to controlling your blood sugars      Bharath Cyr MD (Leo) (he/him/his)   of Neurology  [facility]  Department of Neurology      Thank you for referring Austen Fowler to our [facility] Movement Disorders Program, we appreciate the opportunity of being your partner in healthcare. Please feel free to reach out to us at any point if any questions or concerns about this visit.    Attending attestation: Today I spent a total 60 minutes on this case, with greater than 50% of the time  spent in face-to-face, examining, obtaining history, providing education and coordination of care. Additional time was spent in chart review, ancillary data, and documentation as delineated above.      Again, thank you for allowing me to participate in the care of your patient.        Sincerely,        Bharath Robbins MD

## 2023-11-13 DIAGNOSIS — E11.59 TYPE 2 DIABETES MELLITUS WITH VASCULAR DISEASE (H): ICD-10-CM

## 2023-11-13 NOTE — ED NOTES
Patient sitting upright eating lunch tray. VSS at this time, BP WNL at this time.    Patient is poor historian and minimally verbal. Off the chart, patient was completely dependent and bedridden.

## 2023-11-14 RX ORDER — METFORMIN HCL 500 MG
1000 TABLET, EXTENDED RELEASE 24 HR ORAL 2 TIMES DAILY WITH MEALS
Qty: 270 TABLET | Refills: 2 | Status: SHIPPED | OUTPATIENT
Start: 2023-11-14 | End: 2024-03-13

## 2023-11-28 ENCOUNTER — TELEPHONE (OUTPATIENT)
Dept: FAMILY MEDICINE | Facility: CLINIC | Age: 88
End: 2023-11-28
Payer: COMMERCIAL

## 2023-11-28 DIAGNOSIS — E78.5 HYPERLIPIDEMIA LDL GOAL <70: ICD-10-CM

## 2023-11-28 RX ORDER — PRAVASTATIN SODIUM 20 MG
20 TABLET ORAL EVERY EVENING
Qty: 30 TABLET | Refills: 0 | Status: SHIPPED | OUTPATIENT
Start: 2023-11-28 | End: 2023-12-08

## 2023-11-28 RX ORDER — PRAVASTATIN SODIUM 20 MG
20 TABLET ORAL EVERY EVENING
Qty: 90 TABLET | Refills: 1
Start: 2023-11-28 | End: 2023-12-08

## 2023-11-28 NOTE — TELEPHONE ENCOUNTER
Pt's wife called stating pt needs a limited [facility] as a shipment from mail order pharmacy was not ordered yet. Rx sent for 30 days as requested.

## 2023-12-08 ENCOUNTER — OFFICE VISIT (OUTPATIENT)
Dept: FAMILY MEDICINE | Facility: CLINIC | Age: 88
End: 2023-12-08
Payer: COMMERCIAL

## 2023-12-08 VITALS
SYSTOLIC BLOOD PRESSURE: 110 MMHG | OXYGEN SATURATION: 96 % | HEIGHT: 73 IN | TEMPERATURE: 96.9 F | DIASTOLIC BLOOD PRESSURE: 85 MMHG | BODY MASS INDEX: 34.19 KG/M2 | HEART RATE: 61 BPM | RESPIRATION RATE: 20 BRPM | WEIGHT: 258 LBS

## 2023-12-08 DIAGNOSIS — G47.33 OSA (OBSTRUCTIVE SLEEP APNEA): ICD-10-CM

## 2023-12-08 DIAGNOSIS — I25.10 CORONARY ARTERY DISEASE INVOLVING NATIVE CORONARY ARTERY OF NATIVE HEART WITHOUT ANGINA PECTORIS: ICD-10-CM

## 2023-12-08 DIAGNOSIS — E78.5 HYPERLIPIDEMIA LDL GOAL <70: ICD-10-CM

## 2023-12-08 DIAGNOSIS — I10 ESSENTIAL HYPERTENSION, BENIGN: ICD-10-CM

## 2023-12-08 DIAGNOSIS — N40.0 BENIGN PROSTATIC HYPERPLASIA, UNSPECIFIED WHETHER LOWER URINARY TRACT SYMPTOMS PRESENT: ICD-10-CM

## 2023-12-08 DIAGNOSIS — R25.1 TREMOR: ICD-10-CM

## 2023-12-08 DIAGNOSIS — E11.59 TYPE 2 DIABETES MELLITUS WITH VASCULAR DISEASE (H): Primary | ICD-10-CM

## 2023-12-08 DIAGNOSIS — R41.3 MEMORY CHANGE: ICD-10-CM

## 2023-12-08 DIAGNOSIS — G89.29 CHRONIC LOW BACK PAIN WITH SCIATICA, SCIATICA LATERALITY UNSPECIFIED, UNSPECIFIED BACK PAIN LATERALITY: ICD-10-CM

## 2023-12-08 DIAGNOSIS — I48.0 PAF (PAROXYSMAL ATRIAL FIBRILLATION) (H): ICD-10-CM

## 2023-12-08 DIAGNOSIS — K21.00 GASTROESOPHAGEAL REFLUX DISEASE WITH ESOPHAGITIS WITHOUT HEMORRHAGE: ICD-10-CM

## 2023-12-08 DIAGNOSIS — M53.3 SACROILIAC JOINT PAIN: ICD-10-CM

## 2023-12-08 DIAGNOSIS — M54.40 CHRONIC LOW BACK PAIN WITH SCIATICA, SCIATICA LATERALITY UNSPECIFIED, UNSPECIFIED BACK PAIN LATERALITY: ICD-10-CM

## 2023-12-08 LAB
ANION GAP SERPL CALCULATED.3IONS-SCNC: 13 MMOL/L (ref 7–15)
BUN SERPL-MCNC: 16 MG/DL (ref 8–23)
CALCIUM SERPL-MCNC: 9.8 MG/DL (ref 8.8–10.2)
CHLORIDE SERPL-SCNC: 103 MMOL/L (ref 98–107)
CREAT SERPL-MCNC: 0.68 MG/DL (ref 0.67–1.17)
CREAT UR-MCNC: 157 MG/DL
DEPRECATED HCO3 PLAS-SCNC: 25 MMOL/L (ref 22–29)
EGFRCR SERPLBLD CKD-EPI 2021: 90 ML/MIN/1.73M2
GLUCOSE SERPL-MCNC: 163 MG/DL (ref 70–99)
HBA1C MFR BLD: 7.1 % (ref 0–5.6)
MICROALBUMIN UR-MCNC: <12 MG/L
MICROALBUMIN/CREAT UR: NORMAL MG/G{CREAT}
POTASSIUM SERPL-SCNC: 3.9 MMOL/L (ref 3.4–5.3)
SODIUM SERPL-SCNC: 141 MMOL/L (ref 135–145)

## 2023-12-08 PROCEDURE — 80048 BASIC METABOLIC PNL TOTAL CA: CPT | Performed by: INTERNAL MEDICINE

## 2023-12-08 PROCEDURE — 83036 HEMOGLOBIN GLYCOSYLATED A1C: CPT | Performed by: INTERNAL MEDICINE

## 2023-12-08 PROCEDURE — 99214 OFFICE O/P EST MOD 30 MIN: CPT | Performed by: INTERNAL MEDICINE

## 2023-12-08 PROCEDURE — 82043 UR ALBUMIN QUANTITATIVE: CPT | Performed by: INTERNAL MEDICINE

## 2023-12-08 PROCEDURE — 36415 COLL VENOUS BLD VENIPUNCTURE: CPT | Performed by: INTERNAL MEDICINE

## 2023-12-08 PROCEDURE — 82570 ASSAY OF URINE CREATININE: CPT | Performed by: INTERNAL MEDICINE

## 2023-12-08 RX ORDER — TAMSULOSIN HYDROCHLORIDE 0.4 MG/1
0.4 CAPSULE ORAL EVERY EVENING
Qty: 90 CAPSULE | Refills: 3 | Status: SHIPPED | OUTPATIENT
Start: 2023-12-08 | End: 2024-08-12

## 2023-12-08 RX ORDER — GABAPENTIN 300 MG/1
CAPSULE ORAL
Qty: 270 CAPSULE | Refills: 3 | Status: SHIPPED | OUTPATIENT
Start: 2023-12-08 | End: 2024-09-09

## 2023-12-08 RX ORDER — PRAVASTATIN SODIUM 20 MG
20 TABLET ORAL EVERY EVENING
Qty: 90 TABLET | Refills: 3 | Status: SHIPPED | OUTPATIENT
Start: 2023-12-08 | End: 2024-09-09

## 2023-12-08 ASSESSMENT — PAIN SCALES - GENERAL: PAINLEVEL: NO PAIN (0)

## 2023-12-08 NOTE — PROGRESS NOTES
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)  Patient is on diet and metformin and blood sugars are good at home and we will check A1c  - Hemoglobin A1c  - Albumin Random Urine Quantitative with Creat Ratio  - Adult Eye  Referral    Memory change  Stop gabapentin in the morning  Please let us know about the medications which will be set up by daughter  Tremor  Neurology note is appreciated and he has February appointment with them  I will reduce gabapentin but not changed to Inderal yet because he is back to    PAF (paroxysmal atrial fibrillation) (H)  Patient is on long-term anticoagulation  - BASIC METABOLIC PANEL    PENELOPE (obstructive sleep apnea)  Doing well and no fatigue    Coronary artery disease involving native coronary artery of native heart without angina pectoris  No chest pain or shortness of breath    Gastroesophageal reflux disease with esophagitis without hemorrhage  No symptoms today    Chronic low back pain with sciatica, sciatica laterality unspecified, unspecified back pain laterality  We are reducing gabapentin which might require more Tylenol    Essential hypertension, benign  Patient is on Lopressor and Lasix  - Albumin Random Urine Quantitative with Creat Ratio    Hyperlipidemia LDL goal <70  Patient is on pravastatin  - pravastatin (PRAVACHOL) 20 MG tablet  Dispense: 90 tablet; Refill: 3    Benign prostatic hyperplasia, unspecified whether lower urinary tract symptoms present  Patient is on Proscar and Flomax  - tamsulosin (FLOMAX) 0.4 MG capsule  Dispense: 90 capsule; Refill: 3    Sacroiliac joint pain  As above  - gabapentin (NEURONTIN) 300 MG capsule  Dispense: 270 capsule; Refill: 3      BMI:   Estimated body mass index is 34.04 kg/m  as calculated from the following:    Height as of this encounter: 1.854 m (6' 1\").    Weight as of this encounter: 117 kg (258 lb).           Hamzah Hodge MD  [facility] ASHLEY    Cleopatra Moya is a 88 " year old, presenting for the following health issues:  Diabetes, Hypertension, Health Maintenance (Eye Exam on 05/12/2022 at [facility]), and Back Pain (Right lower back)  Patient is accompanied by his daughter and wife  He ran out of pravastatin and wife and daughter suspect that his medications are improved appropriate  He also saw neurology  And they recommended to reduce gabapentin and consider change to indrawn  His tremor and anxiety is better    History of Present Illness       Back Pain:  He presents for follow up of back pain. Patient's back pain is a chronic problem.  Location of back pain:  Right lower back, left lower back, left side of neck, right hip and left hip  Description of back pain: dull ache  Back pain spreads: right side of neck    Since patient first noticed back pain, pain is: always present, but gets better and worse  Does back pain interfere with his job:  No       Diabetes:   He presents for follow up of diabetes.  He is checking home blood glucose one time daily.   He checks blood glucose before meals.  Blood glucose is never over 200 and sometimes under 70. He is aware of hypoglycemia symptoms including none and other.   He is concerned about low blood sugar, several less than 70 in the past few weeks.   He is having redness, sores, or blisters on feet.  The patient has not had a diabetic eye exam in the last 12 months.          Vascular Disease:  He presents for follow up of vascular disease.     He never takes nitroglycerin. He takes daily aspirin.    He eats 2-3 servings of fruits and vegetables daily.He consumes 2 sweetened beverage(s) daily.He exercises with enough effort to increase his heart rate 10 to 19 minutes per day.  He exercises with enough effort to increase his heart rate 3 or less days per week.   He is taking medications regularly.           Diabetes Follow-up        BP Readings from Last 2 Encounters:   12/08/23 110/85   11/07/23 (!) 158/82  "    Hemoglobin A1C (%)   Date Value   08/21/2023 7.1 (H)   05/09/2023 7.3 (H)   06/25/2021 6.6 (H)   04/01/2021 6.4 (H)     LDL Cholesterol Calculated (mg/dL)   Date Value   08/21/2023 39   05/09/2023 40   06/25/2021 46   03/02/2021 42             Hypertension Follow-up      Review of Systems   10 point ROS of systems including Constitutional, Eyes, Respiratory, Cardiovascular, Gastroenterology, Genitourinary, Integumentary, Muscularskeletal, Psychiatric were all negative except for pertinent positives noted in my HPI.        Objective    /85   Pulse 61   Temp 96.9  F (36.1  C) (Temporal)   Resp 20   Ht 1.854 m (6' 1\")   Wt 117 kg (258 lb)   SpO2 96%   BMI 34.04 kg/m    Body mass index is 34.04 kg/m .  Physical Exam   GENERAL: healthy, alert and no distress  RESP: lungs clear to auscultation - no rales, rhonchi or wheezes  CV: regular rate and rhythm, normal S1 S2, no S3 or S4, no murmur, click or rub, no peripheral edema and peripheral pulses strong  NEURO: Tremor is improved normal strength and tone, mentation intact and speech normal  PSYCH: mentation appears normal, affect normal/bright    Current Outpatient Medications   Medication    acetaminophen (ACETAMIN) 500 MG tablet    apixaban ANTICOAGULANT (ELIQUIS) 5 MG tablet    blood glucose (ONETOUCH ULTRA) test strip    blood glucose monitoring (ONE TOUCH ULTRASOFT) lancets    busPIRone (BUSPAR) 5 MG tablet    Cholecalciferol (VITAMIN D) 2000 UNIT tablet    finasteride (PROSCAR) 5 MG tablet    furosemide (LASIX) 20 MG tablet    gabapentin (NEURONTIN) 300 MG capsule    metFORMIN (GLUCOPHAGE XR) 500 MG 24 hr tablet    metoprolol tartrate (LOPRESSOR) 50 MG tablet    midodrine (PROAMATINE) 5 MG tablet    multivitamin (OCUVITE) TABS tablet    omeprazole (PRILOSEC) 20 MG DR capsule    ORDER FOR DME    potassium chloride (KLOR-CON) 20 MEQ packet    pravastatin (PRAVACHOL) 20 MG tablet    Probiotic Product (PROBIOTIC PO)    tamsulosin (FLOMAX) 0.4 MG capsule "    triamcinolone (KENALOG) 0.1 % external ointment    vitamin B-12 (CYANOCOBALAMIN) 1000 MCG tablet     No current facility-administered medications for this visit.     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)    S/P lumbar laminectomy    Health Care Home    Coronary artery disease involving native coronary artery of native heart without angina pectoris    Left ventricular diastolic dysfunction    Non morbid obesity, unspecified obesity type    Neck pain    Cellulitis of right lower extremity    Hypoxia    Shortness of breath    Cellulitis    Gastroesophageal reflux disease with esophagitis    Sacroiliac joint pain    Dysphagia, unspecified type    Benign prostatic hyperplasia, unspecified whether lower urinary tract symptoms present    Cellulitis of right lower leg    Septic shock (H)    Dizziness    Falls frequently    Atrial fibrillation with RVR (H)    Hypotension, unspecified hypotension type    Acute on chronic congestive heart failure, unspecified heart failure type (H)    Memory change    Tremor    PAF (paroxysmal atrial fibrillation) (H)

## 2023-12-08 NOTE — NURSING NOTE
"BP:  BP (!) 159/88   Pulse 61   Temp 96.9  F (36.1  C) (Temporal)   Resp 20   Ht 1.854 m (6' 1\")   Wt 117 kg (258 lb)   SpO2 96%   BMI 34.04 kg/m       61  Disposition: provider notified while patient in the clinic    "

## 2023-12-19 DIAGNOSIS — F41.1 GAD (GENERALIZED ANXIETY DISORDER): Primary | ICD-10-CM

## 2023-12-19 RX ORDER — BUSPIRONE HYDROCHLORIDE 5 MG/1
2.5 TABLET ORAL 2 TIMES DAILY
Qty: 90 TABLET | Refills: 11 | Status: SHIPPED | OUTPATIENT
Start: 2023-12-19 | End: 2024-09-09

## 2023-12-19 NOTE — TELEPHONE ENCOUNTER
Dose reduction at 8/21/2023 office visit with Dr Ayesha YE 12-8-2023 Ayesha Sprague in chart, pharmacy seeking new Rx    Grace Nicole RT (R)

## 2023-12-24 DIAGNOSIS — E11.59 TYPE 2 DIABETES MELLITUS WITH VASCULAR DISEASE (H): ICD-10-CM

## 2023-12-26 RX ORDER — LANCETS
EACH MISCELLANEOUS
Qty: 200 EACH | Refills: 0 | Status: SHIPPED | OUTPATIENT
Start: 2023-12-26 | End: 2023-12-29

## 2023-12-29 DIAGNOSIS — I95.1 ORTHOSTATIC HYPOTENSION: ICD-10-CM

## 2023-12-29 DIAGNOSIS — N40.0 BENIGN PROSTATIC HYPERPLASIA, UNSPECIFIED WHETHER LOWER URINARY TRACT SYMPTOMS PRESENT: ICD-10-CM

## 2023-12-29 DIAGNOSIS — E11.59 TYPE 2 DIABETES MELLITUS WITH VASCULAR DISEASE (H): ICD-10-CM

## 2023-12-29 RX ORDER — LANCETS 30 GAUGE
EACH MISCELLANEOUS
Qty: 180 EACH | Refills: 3 | Status: SHIPPED | OUTPATIENT
Start: 2023-12-29

## 2023-12-29 RX ORDER — FINASTERIDE 5 MG/1
TABLET, FILM COATED ORAL
Qty: 90 TABLET | Refills: 3 | Status: SHIPPED | OUTPATIENT
Start: 2023-12-29 | End: 2024-09-09

## 2023-12-29 RX ORDER — MIDODRINE HYDROCHLORIDE 5 MG/1
5 TABLET ORAL 2 TIMES DAILY
Qty: 60 TABLET | Refills: 3 | Status: SHIPPED | OUTPATIENT
Start: 2023-12-29 | End: 2024-06-17

## 2024-01-29 ENCOUNTER — MYC REFILL (OUTPATIENT)
Dept: CARDIOLOGY | Facility: CLINIC | Age: 89
End: 2024-01-29
Payer: COMMERCIAL

## 2024-01-29 DIAGNOSIS — I50.31 ACUTE DIASTOLIC HEART FAILURE (H): ICD-10-CM

## 2024-01-29 RX ORDER — POTASSIUM CHLORIDE 1.5 G/1.58G
20 POWDER, FOR SOLUTION ORAL DAILY
Qty: 90 EACH | Refills: 3 | OUTPATIENT
Start: 2024-01-29

## 2024-01-29 RX ORDER — POTASSIUM CHLORIDE 1.5 G/1.58G
20 POWDER, FOR SOLUTION ORAL DAILY
Qty: 90 EACH | Refills: 1 | Status: SHIPPED | OUTPATIENT
Start: 2024-01-29 | End: 2024-07-31

## 2024-02-02 ENCOUNTER — TELEPHONE (OUTPATIENT)
Dept: NEUROLOGY | Facility: CLINIC | Age: 89
End: 2024-02-02
Payer: COMMERCIAL

## 2024-02-05 ENCOUNTER — OFFICE VISIT (OUTPATIENT)
Dept: NEUROLOGY | Facility: CLINIC | Age: 89
End: 2024-02-05
Payer: COMMERCIAL

## 2024-02-05 VITALS
BODY MASS INDEX: 34.07 KG/M2 | WEIGHT: 258.2 LBS | HEART RATE: 84 BPM | OXYGEN SATURATION: 96 % | SYSTOLIC BLOOD PRESSURE: 137 MMHG | DIASTOLIC BLOOD PRESSURE: 84 MMHG

## 2024-02-05 DIAGNOSIS — R25.1 TREMOR: Primary | ICD-10-CM

## 2024-02-05 PROCEDURE — 99214 OFFICE O/P EST MOD 30 MIN: CPT | Performed by: PSYCHIATRY & NEUROLOGY

## 2024-02-05 NOTE — LETTER
2024         RE: Austen Fowler  [address]  [place] [id number]        Dear Colleague,    Thank you for referring your patient, Austen Fowler, to the [facility] NEUROLOGY [facility]. Please see a copy of my visit note below.    Department of Neurology  Movement Disorders Division   Return Patient Visit    Patient: Austen Fowler   MRN: [id number]   : 1935   Date of Visit: 2024  PCP: Hamzah Hodge MD   Referring provider: No ref. provider found    CC:  Tremor return    Interval history:  Mr. Fowler is a 88 year old male with history significant for multiple medical issues, as well as a longstanding tremor disorder with mixed features who presents to movement disorder clinic for the same issue. Last visit was 2023, with a plan to have his primary care physician revisit medications and try to reduce polypharmacy, as well as attempt to switch him to Inderal LA to try to control tremors a little better. He is accompanied by his wife, who assists with collateral history.    His wife is clearly frustrated with the progress that he has made so far.  When asked about the care plan from his PCP, she stated that his PCP did not want to change anything, so she did not see a point of coming to this visit today, she was very frustrated because he continues to shake.    Perhaps tremors are a little less frequent than they were last time, and 1 move was to reduce and actually discontinued the gabapentin from the morning but she says that there was a dosage that was added in the evening.  His tremors do appear to be a little more intermittent compared to before, mostly along the lines of having certain postures that he does not shake as much and there are days that he seems to be significantly calmer.    Otherwise no new issues to discuss today.  Seems to be stable otherwise no new symptoms to be reviewed.         ROS:  All others negative except as listed  above. Pertinent movement disorders-specific ROS listed above.    Past Medical History:   Diagnosis Date     Acute on chronic congestive heart failure, unspecified heart failure type (H) 6/12/2023     Anxiety state, unspecified      Aortic root dilatation (H24)      Arthritis      Ascending aorta dilatation (H24)      Basal cell cancer     s/p Mohs nose and bridge of nose on R.     Bunion      CAD (coronary artery disease)     CABG 1992: LIMA to LAD, SANDI to RCA, SVG to OM1 and OM2     Contact dermatitis and other eczema, due to unspecified cause     eczema - has light treatments with Dr. Rivera     Disorders of bursae and tendons in shoulder region, unspecified      Esophageal reflux      Essential hypertension, benign      Gallbladder disease      GERD (gastroesophageal reflux disease)      Heart attack (H)      Hyperlipidemia LDL goal <70      Left ventricular diastolic dysfunction      Low HDL (under 40) 3/28/2014     Nasal/sinus dis NEC     s/p surgery in 1995     Obesity      Osteoarthrosis, unspecified whether generalized or localized, shoulder region      Other hammer toe (acquired)      Sleep apnea     USES CPAP     Spinal stenosis, unspecified region other than cervical     MRI done 2006     Type 2 diabetes, HbA1c goal < 7% (H) 3/12/2015     Urge incontinence         Past Surgical History:   Procedure Laterality Date     ABDOMEN SURGERY  1994    gall bladder     BIOPSY      arms     CHOLECYSTECTOMY  6/1994     COLONOSCOPY N/A 4/11/2017    Procedure: COMBINED COLONOSCOPY, SINGLE OR MULTIPLE BIOPSY/POLYPECTOMY BY BIOPSY;  Surgeon: Bladimir Garner MD;  Location:  GI     CV LEFT HEART CATH  5-92, 6-92    Angioplasty     ENT SURGERY  5/1995    sinus surgery     ESOPHAGOSCOPY, GASTROSCOPY, DUODENOSCOPY (EGD), DILATATION, COMBINED  7/17/2014    Procedure: COMBINED ESOPHAGOSCOPY, GASTROSCOPY, DUODENOSCOPY (EGD), DILATATION;  Surgeon: Bladimir Garner MD;  Location:  GI     EYE SURGERY      cataracts removed  both eyes     INJECT EPIDURAL LUMBAR       IR PICC REPOSITION RIGHT  9/1/2022     LAMINECTOMY LUMBAR TWO LEVELS N/A 4/29/2015    Procedure: LAMINECTOMY LUMBAR TWO LEVELS;  Surgeon: Bladimir Keenan MD;  Location: [facility] OR     THORACIC SURGERY  11/1992    bypass     [facility] CABG, ARTERY-VEIN, FOUR  11/1992    CABG - LIMA to left anterior descending and saphenous vein bypass graft to the first and second obtuse marginal branch of the circumflex and the right mammary to the right coronary artery     [facility] NONSPECIFIC PROCEDURE  6-94    Gail lap     [facility] NONSPECIFIC PROCEDURE  1995    sinus surgery     [facility] NONSPECIFIC PROCEDURE      bilateral cataract surgery     [facility] COLONOSCOPY THRU STOMA W BIOPSY/CAUTERY TUMOR/POLYP/LESION  5/2007          Current Outpatient Medications:      acetaminophen (ACETAMIN) 500 MG tablet, Take 1,000 mg by mouth 2 times daily, Disp: , Rfl:      apixaban ANTICOAGULANT (ELIQUIS) 5 MG tablet, Take 1 tablet (5 mg) by mouth 2 times daily, Disp: 180 tablet, Rfl: 3     blood glucose (ONETOUCH ULTRA) test strip, CHECK BLOOD SUGAR TWICE DAILY OR AS DIRECTED, Disp: 200 strip, Rfl: 0     busPIRone (BUSPAR) 5 MG tablet, Take 0.5 tablets (2.5 mg) by mouth 2 times daily, Disp: 90 tablet, Rfl: 11     Cholecalciferol (VITAMIN D) 2000 UNIT tablet, Take 2,000 Units by mouth daily., Disp: 90 tablet, Rfl: 3     finasteride (PROSCAR) 5 MG tablet, TAKE 1 TABLET BY MOUTH DAILY, Disp: 90 tablet, Rfl: 3     furosemide (LASIX) 20 MG tablet, Take 1 tablet (20 mg) by mouth daily, Disp: 90 tablet, Rfl: 3     gabapentin (NEURONTIN) 300 MG capsule, 900 mg in PM, Disp: 270 capsule, Rfl: 3     metFORMIN (GLUCOPHAGE XR) 500 MG 24 hr tablet, Take 2 tablets (1,000 mg) by mouth 2 times daily (with meals), Disp: 270 tablet, Rfl: 2     metoprolol tartrate (LOPRESSOR) 50 MG tablet, Take 1 tablet (50 mg) by mouth 2 times daily, Disp: 180 tablet, Rfl: 3     midodrine (PROAMATINE) 5 MG tablet, TAKE 1 TABLET BY MOUTH TWICE  DAILY, Disp:  60 tablet, Rfl: 3     multivitamin (OCUVITE) TABS tablet, Take 1 tablet by mouth daily, Disp: , Rfl:      omeprazole (PRILOSEC) 20 MG DR capsule, Take 1 capsule (20 mg) by mouth daily, Disp: 90 capsule, Rfl: 3     OneTouch UltraSoft 2 Lancets MISC, USE TO TEST BLOOD SUGAR TWICE  DAILY OR AS DIRECTED, Disp: 180 each, Rfl: 3     ORDER FOR DME, Uses Cpap machine for sleep apnea, Disp: , Rfl:      potassium chloride (KLOR-CON) 20 MEQ packet, Take 20 mEq by mouth daily, Disp: 90 each, Rfl: 1     pravastatin (PRAVACHOL) 20 MG tablet, Take 1 tablet (20 mg) by mouth every evening - due for fasting physical with Dr Hodge for refills, Disp: 90 tablet, Rfl: 3     Probiotic Product (PROBIOTIC PO), Take 1 capsule by mouth daily, Disp: , Rfl:      tamsulosin (FLOMAX) 0.4 MG capsule, Take 1 capsule (0.4 mg) by mouth every evening, Disp: 90 capsule, Rfl: 3     triamcinolone (KENALOG) 0.1 % external ointment, Apply topically 2 times daily, Disp: 30 g, Rfl: 3     vitamin B-12 (CYANOCOBALAMIN) 1000 MCG tablet, Take 1,000 mcg by mouth daily , Disp: , Rfl:      Allergies   Allergen Reactions     No Known Allergies         Family History   Problem Relation Age of Onset     Cancer Brother         MELANOMA     Diabetes Mother         AODM     Thyroid Disease Mother      Diabetes Father         AODM     Myocardial Infarction Father      Cerebrovascular Disease Brother      Parkinsonism Sister      Family History Negative Son      Family History Negative Son      Family History Negative Son      Family History Negative Daughter      Coronary Artery Disease Brother         dod 2019     Cerebrovascular Disease Brother         dod 2019     Other Cancer Brother         dod 2000/melanoma     Breast Cancer Other         in remission reg kups        Social History:  He  reports that he quit smoking about 31 years ago. His smoking use included cigarettes, cigars, and pipe. He started smoking about 70 years ago. He has a 60 pack-year smoking  "history. He has never used smokeless tobacco. He reports current alcohol use. He reports that he does not use drugs.     PHYSICAL EXAM:  /84   Pulse 84   Wt 117.1 kg (258 lb 3.2 oz)   SpO2 96%   BMI 34.07 kg/m       NEURO:  Tremor Scale - Portions of TETRAS Performance Subscale used with permission of [employer]  Assessment Time: 1600  Medication: None  DBS - Right Brain: None  DBS - Left Brain: None  Head: 0  none  Face & Jaw: 2.5  Voice: 0  none  Outstretched - RIGHT: 2  < 3 cm  Outstretched - LEFT: 2.5  < 5 cm  Wingbeating - RIGHT: 0  none  Wingbeating - LEFT: 0  none  Kinetic - RIGHT: 2.5  < 5 cm  Kinetic - LEFT: 2.5  < 5 cm  Lower Limb - RIGHT: 0  none in either  Lower Limb - LEFT: 0  none in either  Lower Limb (Max): 0  Spiral - RIGHT: 1  slight, barely visible  Spiral - LEFT: 1  slight, barely visible  Handwriting: 3.5  Dot approx - RIGHT: 1.5  < 1 cm  Dot approx - LEFT: 1  barely visible  Trunk (Standing): 0  none  Total Right: 7  Total Left: 7  Axial: 2.5  TOTAL: 20    DATA REVIEWED:  Reviewed pertinent available in epic.  Reviewed last notes from his PCP    ASSESSMENT:  88-year-old right-handed male with a tremor disorder of mixed features, unfortunately no much progress has been made in terms of optimizing his medications.  He essentially remains the same, even though subjectively he does seem to have days when he shakes less.    PLAN:  -Reviewed impression and plan with patient and wife    - His wife is very frustrated with the fact that nothing was getting done.  Similarly to last time, we offered prescribing a new medication, which she refused under the fact that he is \"already on too much medicine\".    - Since they are not open to trying new medication, I think the best solution would be to potentially transition some of his medicines to Inderal LA, however that is under the discretion of his PCP.  Per their last note, there was a mention of him not being transition to Inderal " yet, however this seems to be an incomplete sentence which perhaps something has changed that would contraindicate that.    - I will copy his PCP on this note again.  We also talked about occupational therapy referral, which did not feel open to that quite yet.  They want to just continue following up with his PCP manage forward and unless something changes they do not feel like they got much value out of this visit today, which I think is reasonable.  Will leave a follow-up appointment at the movement disorders clinic open for the time being, they were encouraged to contact us back should there be any questions or concerns or if anything changes in the future.    There are no Patient Instructions on file for this visit.      Bharath Cyr MD (Leo) (he/him/his)   of Neurology  [facility]  Department of Neurology      Thank you for referring Austen Fowler to our [facility] Movement Disorders Program, we appreciate the opportunity of being your partner in healthcare. Please feel free to reach out to us at any point if any questions or concerns about this visit.    Attending attestation: Today I spent a total 30 minutes on this case, with greater than 50% of the time spent in face-to-face, examining, obtaining history, providing education and coordination of care. Additional time was spent in chart review, ancillary data, and documentation as delineated above.      Again, thank you for allowing me to participate in the care of your patient.        Sincerely,        Bharath Robbins MD

## 2024-02-05 NOTE — NURSING NOTE
"Austen Fowler is a 88 year old male who presents for:  Chief Complaint   Patient presents with    Parkinson     Follow Up Movement Disorder-2 to 3 Months          Initial Vitals:  /84   Pulse 84   Wt 117.1 kg (258 lb 3.2 oz)   SpO2 96%   BMI 34.07 kg/m   Estimated body mass index is 34.07 kg/m  as calculated from the following:    Height as of 12/8/23: 1.854 m (6' 1\").    Weight as of this encounter: 117.1 kg (258 lb 3.2 oz).. Body surface area is 2.46 meters squared. BP completed using cuff size: regular  Data Unavailable    Nursing Comments:   Felipa Martinez MA   "

## 2024-02-05 NOTE — PROGRESS NOTES
Department of Neurology  Movement Disorders Division   Return Patient Visit    Patient: Austen Fowler   MRN: [id number]   : 1935   Date of Visit: 2024  PCP: Hamzah Hodge MD   Referring provider: No ref. provider found    CC:  Tremor return    Interval history:  Mr. Fowler is a 88 year old male with history significant for multiple medical issues, as well as a longstanding tremor disorder with mixed features who presents to movement disorder clinic for the same issue. Last visit was 2023, with a plan to have his primary care physician revisit medications and try to reduce polypharmacy, as well as attempt to switch him to Inderal LA to try to control tremors a little better. He is accompanied by his wife, who assists with collateral history.    His wife is clearly frustrated with the progress that he has made so far.  When asked about the care plan from his PCP, she stated that his PCP did not want to change anything, so she did not see a point of coming to this visit today, she was very frustrated because he continues to shake.    Perhaps tremors are a little less frequent than they were last time, and 1 move was to reduce and actually discontinued the gabapentin from the morning but she says that there was a dosage that was added in the evening.  His tremors do appear to be a little more intermittent compared to before, mostly along the lines of having certain postures that he does not shake as much and there are days that he seems to be significantly calmer.    Otherwise no new issues to discuss today.  Seems to be stable otherwise no new symptoms to be reviewed.         ROS:  All others negative except as listed above. Pertinent movement disorders-specific ROS listed above.    Past Medical History:   Diagnosis Date    Acute on chronic congestive heart failure, unspecified heart failure type (H) 2023    Anxiety state, unspecified     Aortic root dilatation (H24)     Arthritis      Ascending aorta dilatation (H24)     Basal cell cancer     s/p Mohs nose and bridge of nose on R.    Bunion     CAD (coronary artery disease)     CABG 1992: LIMA to LAD, SANDI to RCA, SVG to OM1 and OM2    Contact dermatitis and other eczema, due to unspecified cause     eczema - has light treatments with Dr. Rivera    Disorders of bursae and tendons in shoulder region, unspecified     Esophageal reflux     Essential hypertension, benign     Gallbladder disease     GERD (gastroesophageal reflux disease)     Heart attack (H)     Hyperlipidemia LDL goal <70     Left ventricular diastolic dysfunction     Low HDL (under 40) 3/28/2014    Nasal/sinus dis NEC     s/p surgery in 1995    Obesity     Osteoarthrosis, unspecified whether generalized or localized, shoulder region     Other hammer toe (acquired)     Sleep apnea     USES CPAP    Spinal stenosis, unspecified region other than cervical     MRI done 2006    Type 2 diabetes, HbA1c goal < 7% (H) 3/12/2015    Urge incontinence         Past Surgical History:   Procedure Laterality Date    ABDOMEN SURGERY  1994    gall bladder    BIOPSY      arms    CHOLECYSTECTOMY  6/1994    COLONOSCOPY N/A 4/11/2017    Procedure: COMBINED COLONOSCOPY, SINGLE OR MULTIPLE BIOPSY/POLYPECTOMY BY BIOPSY;  Surgeon: Bladimir Garner MD;  Location:  GI    CV LEFT HEART CATH  5-92, 6-92    Angioplasty    ENT SURGERY  5/1995    sinus surgery    ESOPHAGOSCOPY, GASTROSCOPY, DUODENOSCOPY (EGD), DILATATION, COMBINED  7/17/2014    Procedure: COMBINED ESOPHAGOSCOPY, GASTROSCOPY, DUODENOSCOPY (EGD), DILATATION;  Surgeon: Bladimir Garner MD;  Location:  GI    EYE SURGERY      cataracts removed both eyes    INJECT EPIDURAL LUMBAR      IR PICC REPOSITION RIGHT  9/1/2022    LAMINECTOMY LUMBAR TWO LEVELS N/A 4/29/2015    Procedure: LAMINECTOMY LUMBAR TWO LEVELS;  Surgeon: Bladimir Keenan MD;  Location:  OR    THORACIC SURGERY  11/1992    bypass    ZZC CABG, ARTERY-VEIN, FOUR  11/1992    CABG  - LIMA to left anterior descending and saphenous vein bypass graft to the first and second obtuse marginal branch of the circumflex and the right mammary to the right coronary artery    [facility] NONSPECIFIC PROCEDURE  6-94    Gail lap    [facility] NONSPECIFIC PROCEDURE  1995    sinus surgery    [facility] NONSPECIFIC PROCEDURE      bilateral cataract surgery    [facility] COLONOSCOPY THRU STOMA W BIOPSY/CAUTERY TUMOR/POLYP/LESION  5/2007          Current Outpatient Medications:     acetaminophen (ACETAMIN) 500 MG tablet, Take 1,000 mg by mouth 2 times daily, Disp: , Rfl:     apixaban ANTICOAGULANT (ELIQUIS) 5 MG tablet, Take 1 tablet (5 mg) by mouth 2 times daily, Disp: 180 tablet, Rfl: 3    blood glucose (ONETOUCH ULTRA) test strip, CHECK BLOOD SUGAR TWICE DAILY OR AS DIRECTED, Disp: 200 strip, Rfl: 0    busPIRone (BUSPAR) 5 MG tablet, Take 0.5 tablets (2.5 mg) by mouth 2 times daily, Disp: 90 tablet, Rfl: 11    Cholecalciferol (VITAMIN D) 2000 UNIT tablet, Take 2,000 Units by mouth daily., Disp: 90 tablet, Rfl: 3    finasteride (PROSCAR) 5 MG tablet, TAKE 1 TABLET BY MOUTH DAILY, Disp: 90 tablet, Rfl: 3    furosemide (LASIX) 20 MG tablet, Take 1 tablet (20 mg) by mouth daily, Disp: 90 tablet, Rfl: 3    gabapentin (NEURONTIN) 300 MG capsule, 900 mg in PM, Disp: 270 capsule, Rfl: 3    metFORMIN (GLUCOPHAGE XR) 500 MG 24 hr tablet, Take 2 tablets (1,000 mg) by mouth 2 times daily (with meals), Disp: 270 tablet, Rfl: 2    metoprolol tartrate (LOPRESSOR) 50 MG tablet, Take 1 tablet (50 mg) by mouth 2 times daily, Disp: 180 tablet, Rfl: 3    midodrine (PROAMATINE) 5 MG tablet, TAKE 1 TABLET BY MOUTH TWICE  DAILY, Disp: 60 tablet, Rfl: 3    multivitamin (OCUVITE) TABS tablet, Take 1 tablet by mouth daily, Disp: , Rfl:     omeprazole (PRILOSEC) 20 MG DR capsule, Take 1 capsule (20 mg) by mouth daily, Disp: 90 capsule, Rfl: 3    OneTouch UltraSoft 2 Lancets MISC, USE TO TEST BLOOD SUGAR TWICE  DAILY OR AS DIRECTED, Disp: 180 each, Rfl: 3     ORDER FOR DME, Uses Cpap machine for sleep apnea, Disp: , Rfl:     potassium chloride (KLOR-CON) 20 MEQ packet, Take 20 mEq by mouth daily, Disp: 90 each, Rfl: 1    pravastatin (PRAVACHOL) 20 MG tablet, Take 1 tablet (20 mg) by mouth every evening - due for fasting physical with Dr Hodge for refills, Disp: 90 tablet, Rfl: 3    Probiotic Product (PROBIOTIC PO), Take 1 capsule by mouth daily, Disp: , Rfl:     tamsulosin (FLOMAX) 0.4 MG capsule, Take 1 capsule (0.4 mg) by mouth every evening, Disp: 90 capsule, Rfl: 3    triamcinolone (KENALOG) 0.1 % external ointment, Apply topically 2 times daily, Disp: 30 g, Rfl: 3    vitamin B-12 (CYANOCOBALAMIN) 1000 MCG tablet, Take 1,000 mcg by mouth daily , Disp: , Rfl:      Allergies   Allergen Reactions    No Known Allergies         Family History   Problem Relation Age of Onset    Cancer Brother         MELANOMA    Diabetes Mother         AODM    Thyroid Disease Mother     Diabetes Father         AODM    Myocardial Infarction Father     Cerebrovascular Disease Brother     Parkinsonism Sister     Family History Negative Son     Family History Negative Son     Family History Negative Son     Family History Negative Daughter     Coronary Artery Disease Brother         dod 2019    Cerebrovascular Disease Brother         dod 2019    Other Cancer Brother         dod 2000/melanoma    Breast Cancer Other         in remission reg chkups        Social History:  He  reports that he quit smoking about 31 years ago. His smoking use included cigarettes, cigars, and pipe. He started smoking about 70 years ago. He has a 60 pack-year smoking history. He has never used smokeless tobacco. He reports current alcohol use. He reports that he does not use drugs.     PHYSICAL EXAM:  /84   Pulse 84   Wt 117.1 kg (258 lb 3.2 oz)   SpO2 96%   BMI 34.07 kg/m       NEURO:  Tremor Scale - Portions of TETRAS Performance Subscale used with permission of [employer]  Assessment Time:  "1600  Medication: None  DBS - Right Brain: None  DBS - Left Brain: None  Head: 0  none  Face & Jaw: 2.5  Voice: 0  none  Outstretched - RIGHT: 2  < 3 cm  Outstretched - LEFT: 2.5  < 5 cm  Wingbeating - RIGHT: 0  none  Wingbeating - LEFT: 0  none  Kinetic - RIGHT: 2.5  < 5 cm  Kinetic - LEFT: 2.5  < 5 cm  Lower Limb - RIGHT: 0  none in either  Lower Limb - LEFT: 0  none in either  Lower Limb (Max): 0  Spiral - RIGHT: 1  slight, barely visible  Spiral - LEFT: 1  slight, barely visible  Handwriting: 3.5  Dot approx - RIGHT: 1.5  < 1 cm  Dot approx - LEFT: 1  barely visible  Trunk (Standing): 0  none  Total Right: 7  Total Left: 7  Axial: 2.5  TOTAL: 20    DATA REVIEWED:  Reviewed pertinent available in epic.  Reviewed last notes from his PCP    ASSESSMENT:  88-year-old right-handed male with a tremor disorder of mixed features, unfortunately no much progress has been made in terms of optimizing his medications.  He essentially remains the same, even though subjectively he does seem to have days when he shakes less.    PLAN:  -Reviewed impression and plan with patient and wife    - His wife is very frustrated with the fact that nothing was getting done.  Similarly to last time, we offered prescribing a new medication, which she refused under the fact that he is \"already on too much medicine\".    - Since they are not open to trying new medication, I think the best solution would be to potentially transition some of his medicines to Inderal LA, however that is under the discretion of his PCP.  Per their last note, there was a mention of him not being transition to Inderal yet, however this seems to be an incomplete sentence which perhaps something has changed that would contraindicate that.    - I will copy his PCP on this note again.  We also talked about occupational therapy referral, which did not feel open to that quite yet.  They want to just continue following up with his PCP manage forward and unless something " changes they do not feel like they got much value out of this visit today, which I think is reasonable.  Will leave a follow-up appointment at the movement disorders clinic open for the time being, they were encouraged to contact us back should there be any questions or concerns or if anything changes in the future.    There are no Patient Instructions on file for this visit.      Bharath Cyr MD (Leo) (he/him/his)   of Neurology  [facility]  Department of Neurology      Thank you for referring Austen Michael Malcolm to our [facility] Movement Disorders Program, we appreciate the opportunity of being your partner in healthcare. Please feel free to reach out to us at any point if any questions or concerns about this visit.    Attending attestation: Today I spent a total 30 minutes on this case, with greater than 50% of the time spent in face-to-face, examining, obtaining history, providing education and coordination of care. Additional time was spent in chart review, ancillary data, and documentation as delineated above.

## 2024-02-16 DIAGNOSIS — N40.0 BENIGN PROSTATIC HYPERPLASIA, UNSPECIFIED WHETHER LOWER URINARY TRACT SYMPTOMS PRESENT: ICD-10-CM

## 2024-02-16 DIAGNOSIS — R13.10 DYSPHAGIA, UNSPECIFIED TYPE: ICD-10-CM

## 2024-02-16 DIAGNOSIS — E11.59 TYPE 2 DIABETES MELLITUS WITH VASCULAR DISEASE (H): ICD-10-CM

## 2024-02-16 RX ORDER — TAMSULOSIN HYDROCHLORIDE 0.4 MG/1
0.4 CAPSULE ORAL EVERY EVENING
Qty: 90 CAPSULE | Refills: 3 | OUTPATIENT
Start: 2024-02-16

## 2024-02-16 RX ORDER — METFORMIN HCL 500 MG
1000 TABLET, EXTENDED RELEASE 24 HR ORAL 2 TIMES DAILY WITH MEALS
Qty: 270 TABLET | Refills: 2 | OUTPATIENT
Start: 2024-02-16

## 2024-03-12 DIAGNOSIS — E11.59 TYPE 2 DIABETES MELLITUS WITH VASCULAR DISEASE (H): ICD-10-CM

## 2024-03-12 NOTE — TELEPHONE ENCOUNTER
RE: Metformin. The directions are incomplete. Please clarify the strength, directions, quantity and number of refills.

## 2024-03-14 RX ORDER — METFORMIN HCL 500 MG
1000 TABLET, EXTENDED RELEASE 24 HR ORAL 2 TIMES DAILY WITH MEALS
Qty: 360 TABLET | Refills: 2 | Status: SHIPPED | OUTPATIENT
Start: 2024-03-14

## 2024-03-16 ASSESSMENT — ANXIETY QUESTIONNAIRES
6. BECOMING EASILY ANNOYED OR IRRITABLE: SEVERAL DAYS
IF YOU CHECKED OFF ANY PROBLEMS ON THIS QUESTIONNAIRE, HOW DIFFICULT HAVE THESE PROBLEMS MADE IT FOR YOU TO DO YOUR WORK, TAKE CARE OF THINGS AT HOME, OR GET ALONG WITH OTHER PEOPLE: NOT DIFFICULT AT ALL
2. NOT BEING ABLE TO STOP OR CONTROL WORRYING: SEVERAL DAYS
GAD7 TOTAL SCORE: 8
GAD7 TOTAL SCORE: 8
1. FEELING NERVOUS, ANXIOUS, OR ON EDGE: SEVERAL DAYS
8. IF YOU CHECKED OFF ANY PROBLEMS, HOW DIFFICULT HAVE THESE MADE IT FOR YOU TO DO YOUR WORK, TAKE CARE OF THINGS AT HOME, OR GET ALONG WITH OTHER PEOPLE?: NOT DIFFICULT AT ALL
4. TROUBLE RELAXING: SEVERAL DAYS
3. WORRYING TOO MUCH ABOUT DIFFERENT THINGS: SEVERAL DAYS
7. FEELING AFRAID AS IF SOMETHING AWFUL MIGHT HAPPEN: MORE THAN HALF THE DAYS
5. BEING SO RESTLESS THAT IT IS HARD TO SIT STILL: SEVERAL DAYS
7. FEELING AFRAID AS IF SOMETHING AWFUL MIGHT HAPPEN: MORE THAN HALF THE DAYS

## 2024-03-18 ENCOUNTER — OFFICE VISIT (OUTPATIENT)
Dept: FAMILY MEDICINE | Facility: CLINIC | Age: 89
End: 2024-03-18
Payer: COMMERCIAL

## 2024-03-18 VITALS
RESPIRATION RATE: 18 BRPM | HEIGHT: 73 IN | TEMPERATURE: 96.4 F | WEIGHT: 260.7 LBS | OXYGEN SATURATION: 94 % | BODY MASS INDEX: 34.55 KG/M2

## 2024-03-18 DIAGNOSIS — I48.0 PAF (PAROXYSMAL ATRIAL FIBRILLATION) (H): ICD-10-CM

## 2024-03-18 DIAGNOSIS — M53.3 SI (SACROILIAC) JOINT DYSFUNCTION: ICD-10-CM

## 2024-03-18 DIAGNOSIS — R41.3 MEMORY CHANGE: ICD-10-CM

## 2024-03-18 DIAGNOSIS — Z29.11 NEED FOR RSV VACCINATION: ICD-10-CM

## 2024-03-18 DIAGNOSIS — F41.1 GAD (GENERALIZED ANXIETY DISORDER): ICD-10-CM

## 2024-03-18 DIAGNOSIS — K21.00 GASTROESOPHAGEAL REFLUX DISEASE WITH ESOPHAGITIS WITHOUT HEMORRHAGE: ICD-10-CM

## 2024-03-18 DIAGNOSIS — E11.59 TYPE 2 DIABETES MELLITUS WITH VASCULAR DISEASE (H): Primary | ICD-10-CM

## 2024-03-18 DIAGNOSIS — R25.1 TREMOR: ICD-10-CM

## 2024-03-18 DIAGNOSIS — I95.1 ORTHOSTATIC HYPOTENSION: ICD-10-CM

## 2024-03-18 PROBLEM — I50.9 ACUTE ON CHRONIC CONGESTIVE HEART FAILURE, UNSPECIFIED HEART FAILURE TYPE (H): Status: RESOLVED | Noted: 2023-06-12 | Resolved: 2024-03-18

## 2024-03-18 LAB
ERYTHROCYTE [DISTWIDTH] IN BLOOD BY AUTOMATED COUNT: 14.5 % (ref 10–15)
HBA1C MFR BLD: 7.1 % (ref 0–5.6)
HCT VFR BLD AUTO: 41.8 % (ref 40–53)
HGB BLD-MCNC: 13 G/DL (ref 13.3–17.7)
MCH RBC QN AUTO: 27.5 PG (ref 26.5–33)
MCHC RBC AUTO-ENTMCNC: 31.1 G/DL (ref 31.5–36.5)
MCV RBC AUTO: 88 FL (ref 78–100)
PLATELET # BLD AUTO: 189 10E3/UL (ref 150–450)
RBC # BLD AUTO: 4.73 10E6/UL (ref 4.4–5.9)
WBC # BLD AUTO: 8.5 10E3/UL (ref 4–11)

## 2024-03-18 PROCEDURE — 85027 COMPLETE CBC AUTOMATED: CPT | Performed by: INTERNAL MEDICINE

## 2024-03-18 PROCEDURE — 83036 HEMOGLOBIN GLYCOSYLATED A1C: CPT | Performed by: INTERNAL MEDICINE

## 2024-03-18 PROCEDURE — 36415 COLL VENOUS BLD VENIPUNCTURE: CPT | Performed by: INTERNAL MEDICINE

## 2024-03-18 PROCEDURE — 80048 BASIC METABOLIC PNL TOTAL CA: CPT | Performed by: INTERNAL MEDICINE

## 2024-03-18 PROCEDURE — 99214 OFFICE O/P EST MOD 30 MIN: CPT | Performed by: INTERNAL MEDICINE

## 2024-03-18 ASSESSMENT — PAIN SCALES - GENERAL: PAINLEVEL: MILD PAIN (2)

## 2024-03-18 NOTE — PROGRESS NOTES
"  Assessment & Plan     Blood sugar is about the 100 in this patient with type 2 diabetes with vascular disease  He is overweight and BMI 34.40  Weight loss may help him with his back also he has sleep apnea and HFpEF  His daughter will discuss with his wife who just had joint replacement  - HEMOGLOBIN A1C    PAF (paroxysmal atrial fibrillation) (H)  Patient is on beta-blocker and Eliquis    - CBC with Platelets    - Basic metabolic panel    SI (sacroiliac) joint dysfunction  Patient will go back and see his pain medicine doctor for local injection    Tremor  Tremor is much better    MARLA (generalized anxiety disorder)  Continue BuSpar which is stable    Orthostatic hypotension  Definitely 20 mm drop and we might have to stop Flomax if he becomes symptomatic    Memory change  Today memory was perfect    Gastroesophageal reflux disease with esophagitis without hemorrhage  Continue omeprazole  - CBC with Platelets      Need for RSV vaccination  Patient wishes to discuss with his wife              BMI  Estimated body mass index is 34.4 kg/m  as calculated from the following:    Height as of this encounter: 1.854 m (6' 1\").    Weight as of this encounter: 118.3 kg (260 lb 11.2 oz).         Cleopatra Moya is a 88 year old, presenting for the following health issues:  Back Pain    Patient is accompanied by his daughter  His back is continued to hurt  He previously received SI joint injections  His blood sugars are excellent at 100, he has no side effects from metformin  He does not have shortness of breath or chest pain    Blood pressure is very good at home  History of Present Illness       Back Pain:  He presents for follow up of back pain. Patient's back pain is a recurring problem.  Location of back pain:  Left side of neck, left buttock and left side of waist  Description of back pain: dull ache  Back pain spreads: left buttocks, left thigh and left side of neck    Since patient first noticed back pain, pain is: " "always present, but gets better and worse  Does back pain interfere with his job:  No       Mental Health Follow-up:  Patient presents to follow-up on Anxiety.    Patient's anxiety since last visit has been:  Medium  The patient is not having other symptoms associated with anxiety.  Any significant life events: health concerns  Patient is not feeling anxious or having panic attacks.  Patient has no concerns about alcohol or drug use.    Vascular Disease:  He presents for follow up of vascular disease.     He never takes nitroglycerin. He takes daily aspirin.    He eats 2-3 servings of fruits and vegetables daily.He consumes 2 sweetened beverage(s) daily. He exercises with enough effort to increase his heart rate 3 or less days per week.                  Review of Systems  Constitutional, HEENT, cardiovascular, pulmonary, GI, , musculoskeletal, neuro, skin, endocrine and psych systems are negative, except as otherwise noted.      Objective    Temp (!) 96.4  F (35.8  C) (Temporal)   Resp 18   Ht 1.854 m (6' 1\")   Wt 118.3 kg (260 lb 11.2 oz)   SpO2 94%   BMI 34.40 kg/m    Body mass index is 34.4 kg/m .  Physical Exam   GENERAL: alert and no distress  NECK: no adenopathy, no asymmetry, masses, or scars  RESP: lungs clear to auscultation - no rales, rhonchi or wheezes  CV: regular rate and rhythm, normal S1 S2, no S3 or S4, no murmur, click or rub, no peripheral edema  ABDOMEN: soft, nontender, no hepatosplenomegaly, no masses and bowel sounds normal  MS: no gross musculoskeletal defects noted, no edema    Patient is orthostatic with 20 points of difference in sitting and standing  Patient Active Problem List   Diagnosis    Essential hypertension, benign    Rash and other nonspecific skin eruption    Slowing of urinary stream    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    Cervicalgia    Benign neoplasm of skin of other and unspecified parts of face    Impacted cerumen    Infected sebaceous cyst    " Anxiety    Chronic low back pain    Advanced directives, counseling/discussion    Lumbosacral radiculitis    PENELOPE (obstructive sleep apnea)    Low HDL (under 40)    Hyperlipidemia LDL goal <70    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)    S/P lumbar laminectomy    Health Care Home    Coronary artery disease involving native coronary artery of native heart without angina pectoris    Left ventricular diastolic dysfunction    Non morbid obesity, unspecified obesity type    Neck pain    Cellulitis of right lower extremity    Hypoxia    Shortness of breath    Cellulitis    Gastroesophageal reflux disease with esophagitis    Sacroiliac joint pain    Dysphagia, unspecified type    Benign prostatic hyperplasia, unspecified whether lower urinary tract symptoms present    Cellulitis of right lower leg    Septic shock (H)    Dizziness    Falls frequently    Atrial fibrillation with RVR (H)    Hypotension, unspecified hypotension type    Memory change    Tremor    PAF (paroxysmal atrial fibrillation) (H)     Current Outpatient Medications   Medication    acetaminophen (ACETAMIN) 500 MG tablet    apixaban ANTICOAGULANT (ELIQUIS) 5 MG tablet    blood glucose (ONETOUCH ULTRA) test strip    busPIRone (BUSPAR) 5 MG tablet    Cholecalciferol (VITAMIN D) 2000 UNIT tablet    finasteride (PROSCAR) 5 MG tablet    furosemide (LASIX) 20 MG tablet    gabapentin (NEURONTIN) 300 MG capsule    metFORMIN (GLUCOPHAGE XR) 500 MG 24 hr tablet    metoprolol tartrate (LOPRESSOR) 50 MG tablet    midodrine (PROAMATINE) 5 MG tablet    multivitamin (OCUVITE) TABS tablet    omeprazole (PRILOSEC) 20 MG DR capsule    OneTouch UltraSoft 2 Lancets MISC    ORDER FOR DME    potassium chloride (KLOR-CON) 20 MEQ packet    pravastatin (PRAVACHOL) 20 MG tablet    Probiotic Product (PROBIOTIC PO)    tamsulosin (FLOMAX) 0.4 MG capsule    triamcinolone (KENALOG) 0.1 % external ointment    vitamin B-12 (CYANOCOBALAMIN) 1000 MCG tablet     No current  facility-administered medications for this visit.           Signed Electronically by: Hamzah Hodge MD

## 2024-03-19 LAB
ANION GAP SERPL CALCULATED.3IONS-SCNC: 12 MMOL/L (ref 7–15)
BUN SERPL-MCNC: 14.8 MG/DL (ref 8–23)
CALCIUM SERPL-MCNC: 9.7 MG/DL (ref 8.8–10.2)
CHLORIDE SERPL-SCNC: 104 MMOL/L (ref 98–107)
CREAT SERPL-MCNC: 0.8 MG/DL (ref 0.67–1.17)
DEPRECATED HCO3 PLAS-SCNC: 28 MMOL/L (ref 22–29)
EGFRCR SERPLBLD CKD-EPI 2021: 86 ML/MIN/1.73M2
GLUCOSE SERPL-MCNC: 163 MG/DL (ref 70–99)
POTASSIUM SERPL-SCNC: 4.6 MMOL/L (ref 3.4–5.3)
SODIUM SERPL-SCNC: 144 MMOL/L (ref 135–145)

## 2024-03-24 ENCOUNTER — TELEPHONE (OUTPATIENT)
Dept: FAMILY MEDICINE | Facility: CLINIC | Age: 89
End: 2024-03-24
Payer: COMMERCIAL

## 2024-03-24 DIAGNOSIS — N40.0 BENIGN PROSTATIC HYPERPLASIA, UNSPECIFIED WHETHER LOWER URINARY TRACT SYMPTOMS PRESENT: ICD-10-CM

## 2024-03-24 DIAGNOSIS — I95.1 ORTHOSTATIC HYPOTENSION: ICD-10-CM

## 2024-03-25 NOTE — TELEPHONE ENCOUNTER
FYI - Status Update    Who is Calling: family member, Anastasia Caballero (WIFE)    Update: wife said [facility] meds were never approved so they were not delievered with [facility]     [facility] said for Ayesha to call  [phone]     Tamsulosin 0.4mg    Does caller want a call/response back: Yes     Could we send this information to you in [place] or would you prefer to receive a phone call?:   Patient would prefer a phone call   Okay to leave a detailed message?: Yes at Cell number on file:    Telephone Information:   Mobile [phone]

## 2024-03-25 NOTE — TELEPHONE ENCOUNTER
CC: Patient's wife Anastasia Caballero (C2C) calling back regarding this.    Anastasia Caballero states that they have been unable to refill the following through [facility]:     Tamsulosin     Midodrine     Furosemide     Metoprolol     Pravastatin     Per chart review, all above medications should have refills on file at pharmacy. Anastasia Caballero states she spoke with pharmacy and they advised someone from Dr. Hodge's office to call [phone].    Routing to triage to please call [facility] at number above and clarify issue with refills above (should have refills on file for each) - thank you!     Once addressed, please callback patient's spouse Anastasia Caballero at [phone] - do NOT leave a detailed message    Jeremy Maguire RN  [facility]

## 2024-03-25 NOTE — TELEPHONE ENCOUNTER
Called pharmacy regarding prescriptions listed below. They state Pravastatin was filled 02/16/24. Tamsulosin was requested by the wife to be sent to [facility]#[phone].They state that Midodrine is being processed and they will also fill the lasix and metoprolol.     Writer called patients wife and gave information above. She verbalized understanding.

## 2024-04-11 ENCOUNTER — TRANSFERRED RECORDS (OUTPATIENT)
Dept: HEALTH INFORMATION MANAGEMENT | Facility: CLINIC | Age: 89
End: 2024-04-11
Payer: COMMERCIAL

## 2024-04-11 LAB — RETINOPATHY: NEGATIVE

## 2024-05-21 DIAGNOSIS — I50.31 ACUTE DIASTOLIC HEART FAILURE (H): ICD-10-CM

## 2024-05-21 RX ORDER — FUROSEMIDE 20 MG
20 TABLET ORAL DAILY
Qty: 90 TABLET | Refills: 1 | Status: SHIPPED | OUTPATIENT
Start: 2024-05-21 | End: 2024-09-27

## 2024-06-16 ENCOUNTER — TELEPHONE (OUTPATIENT)
Dept: FAMILY MEDICINE | Facility: CLINIC | Age: 89
End: 2024-06-16
Payer: COMMERCIAL

## 2024-06-16 DIAGNOSIS — I48.0 PAF (PAROXYSMAL ATRIAL FIBRILLATION) (H): ICD-10-CM

## 2024-06-16 DIAGNOSIS — I95.1 ORTHOSTATIC HYPOTENSION: ICD-10-CM

## 2024-06-17 RX ORDER — MIDODRINE HYDROCHLORIDE 5 MG/1
5 TABLET ORAL 2 TIMES DAILY
Qty: 28 TABLET | Refills: 0 | Status: SHIPPED | OUTPATIENT
Start: 2024-06-17 | End: 2024-06-20

## 2024-06-17 RX ORDER — APIXABAN 5 MG/1
5 TABLET, FILM COATED ORAL 2 TIMES DAILY
Qty: 180 TABLET | Refills: 3 | Status: SHIPPED | OUTPATIENT
Start: 2024-06-17

## 2024-06-17 RX ORDER — MIDODRINE HYDROCHLORIDE 5 MG/1
5 TABLET ORAL 2 TIMES DAILY
Qty: 60 TABLET | Refills: 3 | Status: SHIPPED | OUTPATIENT
Start: 2024-06-17 | End: 2024-06-17

## 2024-06-17 RX ORDER — METOPROLOL TARTRATE 50 MG
50 TABLET ORAL 2 TIMES DAILY
Qty: 180 TABLET | Refills: 1 | Status: SHIPPED | OUTPATIENT
Start: 2024-06-17

## 2024-06-17 NOTE — TELEPHONE ENCOUNTER
Patient's spouse Anastasia Caballero (C2C) calling on behalf of the patient.    Patient is currently OUT of midodrine. Rx today was sent to [facility], typically takes a week to 10 days for patient to receive these prescriptions from mail order, Anastasia Caballero requesting that short term supply be sent to local pharmacy ASAP to last until order from [facility] to be delivered. Confirmed patient will not run out of other medications, only needs midodrine.     Pended short term supply of midodrine to local pharmacy for PCP to please review and sign if appropriate - thank you! Please route back to triage to follow up with patient's wife. Anastasia Caballero would like a callback once rx has been sent to [facility]'s.     Callback [phone] - ok to leave detailed VM     Anne GALE  [facility]

## 2024-06-17 NOTE — TELEPHONE ENCOUNTER
Writer called and spoke with patient's spouse Anastasia Caballero (C2C) and notified that rx was sent to local pharmacy.     Anastasia Caballero is appreciative and will follow up with pharmacy.    No further questions or concerns at this time.    Signing encounter.    Anne GALE  [facility]

## 2024-06-19 DIAGNOSIS — N40.0 BENIGN PROSTATIC HYPERPLASIA, UNSPECIFIED WHETHER LOWER URINARY TRACT SYMPTOMS PRESENT: ICD-10-CM

## 2024-06-19 RX ORDER — TAMSULOSIN HYDROCHLORIDE 0.4 MG/1
0.4 CAPSULE ORAL EVERY EVENING
Qty: 90 CAPSULE | Refills: 3 | OUTPATIENT
Start: 2024-06-19

## 2024-06-20 RX ORDER — MIDODRINE HYDROCHLORIDE 5 MG/1
5 TABLET ORAL 2 TIMES DAILY
Qty: 180 TABLET | Refills: 3 | Status: SHIPPED | OUTPATIENT
Start: 2024-06-20

## 2024-06-20 NOTE — TELEPHONE ENCOUNTER
Fartun (jonathan) called the clinic stating pts wife stated she would like midodrine 5mg sent to [facility]. Pt is running low on medication and requested this be sent to provider as urgent.     To confirm, pt is supposed to be taking midodrine 5mg? If so please send to [facility].

## 2024-07-31 DIAGNOSIS — I50.31 ACUTE DIASTOLIC HEART FAILURE (H): ICD-10-CM

## 2024-07-31 RX ORDER — POTASSIUM CHLORIDE 1.5 G/1.58G
20 POWDER, FOR SOLUTION ORAL DAILY
Qty: 90 EACH | Refills: 3 | Status: SHIPPED | OUTPATIENT
Start: 2024-07-31

## 2024-08-12 DIAGNOSIS — N40.0 BENIGN PROSTATIC HYPERPLASIA, UNSPECIFIED WHETHER LOWER URINARY TRACT SYMPTOMS PRESENT: ICD-10-CM

## 2024-08-12 RX ORDER — TAMSULOSIN HYDROCHLORIDE 0.4 MG/1
0.4 CAPSULE ORAL DAILY
Qty: 90 CAPSULE | Refills: 0 | Status: SHIPPED | OUTPATIENT
Start: 2024-08-12

## 2024-09-09 ENCOUNTER — OFFICE VISIT (OUTPATIENT)
Dept: FAMILY MEDICINE | Facility: CLINIC | Age: 89
End: 2024-09-09
Payer: COMMERCIAL

## 2024-09-09 ENCOUNTER — DOCUMENTATION ONLY (OUTPATIENT)
Dept: LAB | Facility: CLINIC | Age: 89
End: 2024-09-09

## 2024-09-09 VITALS
WEIGHT: 265.7 LBS | OXYGEN SATURATION: 94 % | HEIGHT: 73 IN | BODY MASS INDEX: 35.21 KG/M2 | DIASTOLIC BLOOD PRESSURE: 80 MMHG | TEMPERATURE: 98.1 F | SYSTOLIC BLOOD PRESSURE: 134 MMHG | RESPIRATION RATE: 10 BRPM | HEART RATE: 97 BPM

## 2024-09-09 DIAGNOSIS — I10 BENIGN ESSENTIAL HYPERTENSION: ICD-10-CM

## 2024-09-09 DIAGNOSIS — E11.59 TYPE 2 DIABETES MELLITUS WITH VASCULAR DISEASE (H): Primary | ICD-10-CM

## 2024-09-09 DIAGNOSIS — N40.0 BENIGN PROSTATIC HYPERPLASIA, UNSPECIFIED WHETHER LOWER URINARY TRACT SYMPTOMS PRESENT: ICD-10-CM

## 2024-09-09 DIAGNOSIS — M53.3 SACROILIAC JOINT PAIN: ICD-10-CM

## 2024-09-09 DIAGNOSIS — R41.3 MEMORY CHANGE: ICD-10-CM

## 2024-09-09 DIAGNOSIS — I48.0 PAF (PAROXYSMAL ATRIAL FIBRILLATION) (H): ICD-10-CM

## 2024-09-09 DIAGNOSIS — I50.31 ACUTE DIASTOLIC HEART FAILURE (H): Primary | ICD-10-CM

## 2024-09-09 DIAGNOSIS — Z86.79 HISTORY OF ATRIAL FIBRILLATION: ICD-10-CM

## 2024-09-09 DIAGNOSIS — M53.3 SI (SACROILIAC) JOINT DYSFUNCTION: ICD-10-CM

## 2024-09-09 DIAGNOSIS — F41.1 GAD (GENERALIZED ANXIETY DISORDER): ICD-10-CM

## 2024-09-09 DIAGNOSIS — R26.9 ABNORMAL GAIT: ICD-10-CM

## 2024-09-09 DIAGNOSIS — E78.5 HYPERLIPIDEMIA LDL GOAL <70: ICD-10-CM

## 2024-09-09 DIAGNOSIS — I50.9 CONGESTIVE HEART FAILURE, UNSPECIFIED HF CHRONICITY, UNSPECIFIED HEART FAILURE TYPE (H): ICD-10-CM

## 2024-09-09 DIAGNOSIS — E66.01 CLASS 2 SEVERE OBESITY DUE TO EXCESS CALORIES WITH SERIOUS COMORBIDITY AND BODY MASS INDEX (BMI) OF 35.0 TO 35.9 IN ADULT (H): ICD-10-CM

## 2024-09-09 DIAGNOSIS — G47.33 OSA (OBSTRUCTIVE SLEEP APNEA): ICD-10-CM

## 2024-09-09 DIAGNOSIS — I95.1 ORTHOSTATIC HYPOTENSION: ICD-10-CM

## 2024-09-09 DIAGNOSIS — K21.00 GASTROESOPHAGEAL REFLUX DISEASE WITH ESOPHAGITIS WITHOUT HEMORRHAGE: ICD-10-CM

## 2024-09-09 DIAGNOSIS — Z00.00 ENCOUNTER FOR ANNUAL WELLNESS VISIT (AWV) IN MEDICARE PATIENT: Primary | ICD-10-CM

## 2024-09-09 DIAGNOSIS — I77.810 AORTIC ROOT DILATATION (H): ICD-10-CM

## 2024-09-09 DIAGNOSIS — R25.1 TREMOR: ICD-10-CM

## 2024-09-09 DIAGNOSIS — I25.10 CORONARY ARTERY DISEASE INVOLVING NATIVE CORONARY ARTERY OF NATIVE HEART WITHOUT ANGINA PECTORIS: ICD-10-CM

## 2024-09-09 DIAGNOSIS — E11.59 TYPE 2 DIABETES MELLITUS WITH VASCULAR DISEASE (H): ICD-10-CM

## 2024-09-09 DIAGNOSIS — L03.90 RECURRENT CELLULITIS: ICD-10-CM

## 2024-09-09 DIAGNOSIS — E66.812 CLASS 2 SEVERE OBESITY DUE TO EXCESS CALORIES WITH SERIOUS COMORBIDITY AND BODY MASS INDEX (BMI) OF 35.0 TO 35.9 IN ADULT (H): ICD-10-CM

## 2024-09-09 DIAGNOSIS — I48.91 ATRIAL FIBRILLATION, UNSPECIFIED TYPE (H): ICD-10-CM

## 2024-09-09 LAB
ALBUMIN SERPL BCG-MCNC: 4.1 G/DL (ref 3.5–5.2)
ALP SERPL-CCNC: 58 U/L (ref 40–150)
ALT SERPL W P-5'-P-CCNC: 15 U/L (ref 0–70)
ANION GAP SERPL CALCULATED.3IONS-SCNC: 13 MMOL/L (ref 7–15)
AST SERPL W P-5'-P-CCNC: 31 U/L (ref 0–45)
BILIRUB SERPL-MCNC: 0.3 MG/DL
BUN SERPL-MCNC: 14 MG/DL (ref 8–23)
CALCIUM SERPL-MCNC: 9.3 MG/DL (ref 8.8–10.4)
CHLORIDE SERPL-SCNC: 106 MMOL/L (ref 98–107)
CHOLEST SERPL-MCNC: 87 MG/DL
CREAT SERPL-MCNC: 0.77 MG/DL (ref 0.67–1.17)
CREAT UR-MCNC: 85.2 MG/DL
EGFRCR SERPLBLD CKD-EPI 2021: 86 ML/MIN/1.73M2
ERYTHROCYTE [DISTWIDTH] IN BLOOD BY AUTOMATED COUNT: 15.6 % (ref 10–15)
FASTING STATUS PATIENT QL REPORTED: NO
FASTING STATUS PATIENT QL REPORTED: NO
GLUCOSE SERPL-MCNC: 180 MG/DL (ref 70–99)
HCO3 SERPL-SCNC: 23 MMOL/L (ref 22–29)
HCT VFR BLD AUTO: 41.3 % (ref 40–53)
HDLC SERPL-MCNC: 38 MG/DL
HGB BLD-MCNC: 12.7 G/DL (ref 13.3–17.7)
LDLC SERPL CALC-MCNC: 34 MG/DL
MCH RBC QN AUTO: 26.5 PG (ref 26.5–33)
MCHC RBC AUTO-ENTMCNC: 30.8 G/DL (ref 31.5–36.5)
MCV RBC AUTO: 86 FL (ref 78–100)
MICROALBUMIN UR-MCNC: <12 MG/L
MICROALBUMIN/CREAT UR: NORMAL MG/G{CREAT}
NONHDLC SERPL-MCNC: 49 MG/DL
PLATELET # BLD AUTO: 173 10E3/UL (ref 150–450)
POTASSIUM SERPL-SCNC: 4.3 MMOL/L (ref 3.4–5.3)
PROT SERPL-MCNC: 7 G/DL (ref 6.4–8.3)
RBC # BLD AUTO: 4.79 10E6/UL (ref 4.4–5.9)
SODIUM SERPL-SCNC: 142 MMOL/L (ref 135–145)
TRIGL SERPL-MCNC: 74 MG/DL
TSH SERPL DL<=0.005 MIU/L-ACNC: 1.39 UIU/ML (ref 0.3–4.2)
VIT B12 SERPL-MCNC: 2281 PG/ML (ref 232–1245)
VIT D+METAB SERPL-MCNC: 66 NG/ML (ref 20–50)
WBC # BLD AUTO: 8.3 10E3/UL (ref 4–11)

## 2024-09-09 PROCEDURE — 84443 ASSAY THYROID STIM HORMONE: CPT | Performed by: INTERNAL MEDICINE

## 2024-09-09 PROCEDURE — 83036 HEMOGLOBIN GLYCOSYLATED A1C: CPT | Performed by: INTERNAL MEDICINE

## 2024-09-09 PROCEDURE — 80053 COMPREHEN METABOLIC PANEL: CPT | Performed by: INTERNAL MEDICINE

## 2024-09-09 PROCEDURE — 82306 VITAMIN D 25 HYDROXY: CPT | Mod: GZ | Performed by: INTERNAL MEDICINE

## 2024-09-09 PROCEDURE — 82607 VITAMIN B-12: CPT | Performed by: INTERNAL MEDICINE

## 2024-09-09 PROCEDURE — 36415 COLL VENOUS BLD VENIPUNCTURE: CPT | Performed by: INTERNAL MEDICINE

## 2024-09-09 PROCEDURE — 80061 LIPID PANEL: CPT | Performed by: INTERNAL MEDICINE

## 2024-09-09 PROCEDURE — G0439 PPPS, SUBSEQ VISIT: HCPCS | Performed by: INTERNAL MEDICINE

## 2024-09-09 PROCEDURE — 85027 COMPLETE CBC AUTOMATED: CPT | Performed by: INTERNAL MEDICINE

## 2024-09-09 PROCEDURE — 99214 OFFICE O/P EST MOD 30 MIN: CPT | Mod: 25 | Performed by: INTERNAL MEDICINE

## 2024-09-09 PROCEDURE — 82043 UR ALBUMIN QUANTITATIVE: CPT | Performed by: INTERNAL MEDICINE

## 2024-09-09 PROCEDURE — 82570 ASSAY OF URINE CREATININE: CPT | Performed by: INTERNAL MEDICINE

## 2024-09-09 RX ORDER — GABAPENTIN 300 MG/1
CAPSULE ORAL
Qty: 270 CAPSULE | Refills: 3 | Status: SHIPPED | OUTPATIENT
Start: 2024-09-09

## 2024-09-09 RX ORDER — FINASTERIDE 5 MG/1
1 TABLET, FILM COATED ORAL DAILY
Qty: 90 TABLET | Refills: 3 | Status: SHIPPED | OUTPATIENT
Start: 2024-09-09

## 2024-09-09 RX ORDER — PRAVASTATIN SODIUM 20 MG
20 TABLET ORAL EVERY EVENING
Qty: 90 TABLET | Refills: 3 | Status: SHIPPED | OUTPATIENT
Start: 2024-09-09

## 2024-09-09 RX ORDER — BUSPIRONE HYDROCHLORIDE 5 MG/1
2.5 TABLET ORAL 2 TIMES DAILY
Qty: 90 TABLET | Refills: 11 | Status: SHIPPED | OUTPATIENT
Start: 2024-09-09

## 2024-09-09 ASSESSMENT — PAIN SCALES - GENERAL: PAINLEVEL: NO PAIN (0)

## 2024-09-09 NOTE — PROGRESS NOTES
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)  We discussed about GLP agonist and Jardiance  We will first check the A1c  At this advanced age I do not want to cause infections and we will observe for now  First we will check A1c  - TSH with free T4 reflex  - Albumin Random Urine Quantitative with Creat Ratio  We will also seek opinion from his cardiologist  Benign essential hypertension  We will continue metoprolol and he has orthostatic hypotension    Congestive heart failure, unspecified HF chronicity, unspecified heart failure type (H)  He is symptomatic therefore I wonder if Jardiance should be given and I will reach out to his cardiologist  - CBC with Platelets    - Vitamin D Deficiency  - Adult Cardiology [facility] Referral    Class 2 severe obesity due to excess calories with serious comorbidity and body mass index (BMI) of 35.0 to 35.9 in adult (H)  Patient's BMI is 35 complicated by all the above things and covered    PAF (paroxysmal atrial fibrillation) (H)  On Eliquis and stable  - Adult Cardiology Eval [facility] Referral    \"I have referred my patient for an evaluation for left atrial appendage closure (LAAC) with the Watchman device. This patient is a poor candidate for long-term oral-anticoagulation due tofall risk This patient has a CHADS-VASC score of 4 . Based on both stroke and bleeding risk, a shared decision has been made to pursue closure of the left atrial appendage as a safe and effective alternative to " "oral anticoagulant therapy. [facility] shared decision making tool   https://www.cardiosmart.org/SDM/Decision-Aids/Find-Decision-Aids/Atrial-Fibrillation   was used to guide this conversation\"   Gastroesophageal reflux disease with esophagitis without hemorrhage  On omeprazole and stable    Memory change  He does have tremor and may have Parkinson's beginning  But at present we will observe for now with his advanced age and recheck the memory  - Vitamin B12  - Vitamin D Deficiency  - Adult Neurology  Referral    Tremor  As above but we will consult neurology  - Home Care Referral  - Adult Neurology  Referral  We will reduce gabapentin because of memory loss to 600 mg and recheck with them over virtual visit  SI (sacroiliac) joint dysfunction  He needs local injections again which we will arrange  - Home Care Referral  - Orthopedic  Referral    Benign prostatic hyperplasia, unspecified whether lower urinary tract symptoms present  Doing very well on medical management  - finasteride (PROSCAR) 5 MG tablet  Dispense: 90 tablet; Refill: 3    Orthostatic hypotension  On midodrine    Recurrent cellulitis  Patient does have edema and we will do home care- Home Care Referral  - Adult Cardiology Eval  Referral    Hyperlipidemia LDL goal <70  No side effects from statins  - Lipid panel reflex to direct LDL Fasting  - Comprehensive metabolic panel  - pravastatin (PRAVACHOL) 20 MG tablet  Dispense: 90 tablet; Refill: 3    Abnormal gait  He needs home therapy  - Home Care Referral  - Adult Cardiology Eval  Referral    Body mass index (BMI) 35.0-35.9, adult  As above  - Vitamin D Deficiency    PENELOPE (obstructive sleep apnea)  Needs CPAP adjustment  - Vitamin D Deficiency  - Adult Sleep Eval & Management  Referral    Sacroiliac joint pain    - gabapentin (NEURONTIN) 300 MG capsule  Dispense: 270 capsule; Refill: 3    MARLA (generalized anxiety disorder)  Doing well on BuSpar  - " "busPIRone (BUSPAR) 5 MG tablet  Dispense: 90 tablet; Refill: 11              BMI  Estimated body mass index is 35.05 kg/m  as calculated from the following:    Height as of this encounter: 1.854 m (6' 1\").    Weight as of this encounter: 120.5 kg (265 lb 11.2 oz).       Counseling  Appropriate preventive services were addressed with this patient via screening, questionnaire, or discussion as appropriate for fall prevention, nutrition, physical activitysocial engagement, weight loss and cognition.  Checklist reviewing preventive services available has been given to the patient.  Reviewed patient's diet, addressing concerns and/or questions.   He is at risk for psychosocial distress and has been provided with information to reduce risk.   Updated plan of care.  Patient reported difficulty with activities of daily living were addressed today.Addressed any concerns about safety while driving.  The patient was provided with written information regarding signs of hearing loss.   Information on urinary incontinence and treatment options given to patient.           Cleopatra Moya is a 89 year old, presenting for the following:  Physical            Health Care Directive  Patient has a Health Care Directive on file  Advance care planning document is on file and is current.    HPI  Patient is accompanied by his wife and daughter  His blood sugars and blood pressure at home are good but he is very unsteady and tends to fall  He is becoming more forgetful and uses hearing aids  Also the other concern is that patient has sacroiliac joint pain on the left side and is unable to walk very much despite gabapentin    He is becoming more forgetful as above and wife wonders about gabapentin doses   in addition he is more short of breath  And tends to fall but has not fallen            9/9/2024   General Health   How would you rate your overall physical health? Good   Feel stress (tense, anxious, or unable to sleep) Only a little    "   (!) STRESS CONCERN      9/9/2024   Nutrition   Diet: Regular (no restrictions)            9/9/2024   Exercise   Days per week of moderate/strenous exercise 0 days      (!) EXERCISE CONCERN      9/9/2024   Social Factors   Frequency of gathering with friends or relatives Once a week   Worry food won't last until get money to buy more No   Food not last or not have enough money for food? No   Do you have housing? (Housing is defined as stable permanent housing and does not include staying ouside in a car, in a tent, in an abandoned building, in an overnight shelter, or couch-surfing.) Yes   Are you worried about losing your housing? No   Lack of transportation? No   Unable to get utilities (heat,electricity)? No            9/9/2024   Fall Risk   Fallen 2 or more times in the past year? No   Trouble with walking or balance? Yes                9/9/2024   Activities of Daily Living- Home Safety   Needs help with the following daily activites Telephone use    Medication administration   Safety concerns in the home None of the above       Multiple values from one day are sorted in reverse-chronological order         9/9/2024   Dental   Dentist two times every year? Yes            9/9/2024   Hearing Screening   Hearing concerns? (!) I NEED TO ASK PEOPLE TO SPEAK UP OR REPEAT THEMSELVES.            9/9/2024   Driving Risk Screening   Patient/family members have concerns about driving (!) YES             9/9/2024   General Alertness/Fatigue Screening   Have you been more tired than usual lately? No            9/9/2024   Urinary Incontinence Screening   Bothered by leaking urine in past 6 months Yes            9/9/2024   TB Screening   Were you born outside of the [place]? No            Today's PHQ-2 Score:       9/9/2024     1:45 PM   PHQ-2 ( 1999 [employer])   Q1: Little interest or pleasure in doing things 0   Q2: Feeling down, depressed or hopeless 0   PHQ-2 Score 0   Q1: Little interest or pleasure in doing things Not at all    Q2: Feeling down, depressed or hopeless Not at all   PHQ-2 Score 0           2024   Substance Use   Alcohol more than 3/day or more than 7/wk Not Applicable   Do you have a current opioid prescription? No   How severe/bad is pain from 1 to 10? 4/10   Do you use any other substances recreationally? No        Social History     Tobacco Use    Smoking status: Former     Current packs/day: 0.00     Average packs/day: 2.0 packs/day for 38.2 years (76.3 ttl pk-yrs)     Types: Cigarettes, Cigars, Pipe     Start date: 1954     Quit date: 3/1/1992     Years since quittin.5    Smokeless tobacco: Never    Tobacco comments:     quit in    Vaping Use    Vaping status: Never Used   Substance Use Topics    Alcohol use: Yes     Comment: minimal less than 1/week    Drug use: No               Reviewed and updated as needed this visit by Provider     Meds  Problems               BP Readings from Last 3 Encounters:   24 134/80   24 137/84   23 110/85    Wt Readings from Last 3 Encounters:   24 120.5 kg (265 lb 11.2 oz)   24 118.3 kg (260 lb 11.2 oz)   24 117.1 kg (258 lb 3.2 oz)                  Patient Active Problem List   Diagnosis    Essential hypertension, benign    Rash and other nonspecific skin eruption    Slowing of urinary stream    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    Cervicalgia    Benign neoplasm of skin of other and unspecified parts of face    Impacted cerumen    Infected sebaceous cyst    Anxiety    Chronic low back pain    Lumbosacral radiculitis    PENELOPE (obstructive sleep apnea)    Low HDL (under 40)    Hyperlipidemia LDL goal <70    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)    S/P lumbar laminectomy    Coronary artery disease involving native coronary artery of native heart without angina pectoris    Left ventricular diastolic dysfunction    Non morbid obesity, unspecified obesity type    Neck pain    Cellulitis of right lower  extremity    Hypoxia    Shortness of breath    Cellulitis    Gastroesophageal reflux disease with esophagitis    Sacroiliac joint pain    Dysphagia, unspecified type    Benign prostatic hyperplasia, unspecified whether lower urinary tract symptoms present    Cellulitis of right lower leg    Septic shock (H)    Dizziness    Falls frequently    Atrial fibrillation with RVR (H)    Hypotension, unspecified hypotension type    Memory change    Tremor    PAF (paroxysmal atrial fibrillation) (H)    Class 2 severe obesity due to excess calories with serious comorbidity in adult (H)     Past Surgical History:   Procedure Laterality Date    ABDOMEN SURGERY  1994    gall bladder    BIOPSY      arms    CHOLECYSTECTOMY  6/1994    COLONOSCOPY N/A 4/11/2017    Procedure: COMBINED COLONOSCOPY, SINGLE OR MULTIPLE BIOPSY/POLYPECTOMY BY BIOPSY;  Surgeon: Bladimir Garner MD;  Location:  GI    CV LEFT HEART CATH  5-92, 6-92    Angioplasty    ENT SURGERY  5/1995    sinus surgery    ESOPHAGOSCOPY, GASTROSCOPY, DUODENOSCOPY (EGD), DILATATION, COMBINED  7/17/2014    Procedure: COMBINED ESOPHAGOSCOPY, GASTROSCOPY, DUODENOSCOPY (EGD), DILATATION;  Surgeon: Bladimir Garner MD;  Location:  GI    EYE SURGERY      cataracts removed both eyes    INJECT EPIDURAL LUMBAR      IR PICC REPOSITION RIGHT  9/1/2022    LAMINECTOMY LUMBAR TWO LEVELS N/A 4/29/2015    Procedure: LAMINECTOMY LUMBAR TWO LEVELS;  Surgeon: Bladimir Keenan MD;  Location:  OR    THORACIC SURGERY  11/1992    bypass    [facility] CABG, ARTERY-VEIN, FOUR  11/1992    CABG - LIMA to left anterior descending and saphenous vein bypass graft to the first and second obtuse marginal branch of the circumflex and the right mammary to the right coronary artery    [facility] NONSPECIFIC PROCEDURE  6-94    Gail lap    [facility] NONSPECIFIC PROCEDURE  1995    sinus surgery    [facility] NONSPECIFIC PROCEDURE      bilateral cataract surgery    [facility] COLONOSCOPY THRU STOMA W BIOPSY/CAUTERY TUMOR/POLYP/LESION   2007       Social History     Tobacco Use    Smoking status: Former     Current packs/day: 0.00     Average packs/day: 2.0 packs/day for 38.2 years (76.3 ttl pk-yrs)     Types: Cigarettes, Cigars, Pipe     Start date: 1953     Quit date: 3/1/1992     Years since quittin.5    Smokeless tobacco: Never    Tobacco comments:     quit in    Substance Use Topics    Alcohol use: Yes     Comment: minimal less than 1/week     Family History   Problem Relation Age of Onset    Cancer Brother         MELANOMA    Diabetes Mother         AODM    Thyroid Disease Mother     Diabetes Father         AODM    Myocardial Infarction Father     Cerebrovascular Disease Brother     Parkinsonism Sister     Family History Negative Son     Family History Negative Son     Family History Negative Son     Family History Negative Daughter     Coronary Artery Disease Brother         dod 2019    Cerebrovascular Disease Brother         dod 2019    Other Cancer Brother         dod 2000/melanoma    Breast Cancer Other         in remission reg chkups         Current Outpatient Medications   Medication Sig Dispense Refill    acetaminophen (ACETAMIN) 500 MG tablet Take 1,000 mg by mouth 2 times daily      blood glucose (ONETOUCH ULTRA) test strip CHECK BLOOD SUGAR TWICE DAILY OR AS DIRECTED 200 strip 0    busPIRone (BUSPAR) 5 MG tablet Take 0.5 tablets (2.5 mg) by mouth 2 times daily. 90 tablet 11    Cholecalciferol (VITAMIN D) 2000 UNIT tablet Take 2,000 Units by mouth daily. 90 tablet 3    ELIQUIS ANTICOAGULANT 5 MG tablet TAKE 1 TABLET BY MOUTH TWICE  DAILY 180 tablet 3    finasteride (PROSCAR) 5 MG tablet Take 1 tablet (5 mg) by mouth daily. 90 tablet 3    furosemide (LASIX) 20 MG tablet Take 1 tablet (20 mg) by mouth daily 90 tablet 1    gabapentin (NEURONTIN) 300 MG capsule 900 mg in  capsule 3    metFORMIN (GLUCOPHAGE XR) 500 MG 24 hr tablet Take 2 tablets (1,000 mg) by mouth 2 times daily (with meals) 360 tablet 2    metoprolol  tartrate (LOPRESSOR) 50 MG tablet TAKE 1 TABLET BY MOUTH TWICE  DAILY 180 tablet 1    midodrine (PROAMATINE) 5 MG tablet Take 1 tablet (5 mg) by mouth 2 times daily 180 tablet 3    multivitamin (OCUVITE) TABS tablet Take 1 tablet by mouth daily      omeprazole (PRILOSEC) 20 MG DR capsule TAKE 1 CAPSULE BY MOUTH DAILY 90 capsule 3    OneTouch UltraSoft 2 Lancets MISC USE TO TEST BLOOD SUGAR TWICE  DAILY OR AS DIRECTED 180 each 3    ORDER FOR DME Uses Cpap machine for sleep apnea      potassium chloride (KLOR-CON) 20 MEQ packet Take 20 mEq by mouth daily 90 each 3    pravastatin (PRAVACHOL) 20 MG tablet Take 1 tablet (20 mg) by mouth every evening. - due for fasting physical with Dr Hodge for refills 90 tablet 3    Probiotic Product (PROBIOTIC PO) Take 1 capsule by mouth daily      tamsulosin (FLOMAX) 0.4 MG capsule Take 1 capsule (0.4 mg) by mouth daily 90 capsule 0    triamcinolone (KENALOG) 0.1 % external ointment Apply topically 2 times daily 30 g 3    vitamin B-12 (CYANOCOBALAMIN) 1000 MCG tablet Take 1,000 mcg by mouth daily        Allergies   Allergen Reactions    No Known Allergies      Recent Labs   Lab Test 09/09/24  1510 03/18/24  1434 12/08/23  1040 08/21/23  1357 06/14/23  0604 06/13/23  0548 06/12/23  1014 05/09/23  1131 11/09/21  1213 06/25/21  1149 04/28/21  1348   A1C 7.1* 7.1* 7.1* 7.1*  --   --   --  7.3*   < > 6.6*  --    LDL 34  --   --  39  --   --   --  40   < > 46  --    HDL 38*  --   --  36*  --   --   --  39*   < > 38*  --    TRIG 74  --   --  126  --   --   --  248*   < > 158*  --    ALT 15  --   --   --   --  11  --  12   < > 26  --    CR 0.77 0.80 0.68 0.70   < > 0.81   < > 1.01   < > 0.72 0.74   GFRESTIMATED 86 86 90 89   < > 85   < > 72   < > 85 84   GFRESTBLACK  --   --   --   --   --   --   --   --   --  >90 >90   POTASSIUM 4.3 4.6 3.9 3.9   < > 4.0   < > 4.5   < > 3.7 4.1   TSH 1.39  --   --  1.82  --   --   --  1.24   < >  --   --     < > = values in this interval not displayed.     "  Current providers sharing in care for this patient include:  Patient Care Team:  Hamzah Hodge MD as PCP - General  Hamzah Hodge MD as Assigned PCP  Lyndsey Cameron PA-C as Physician Assistant (Cardiovascular Disease)  Bharath White MD as Physician (Neurology)  Maria Guadalupe Martin CNP as Assigned Heart and Vascular Provider  Bharath White MD as Assigned Neuroscience Provider  Hamzah Hodge MD as Referring Physician (Internal Medicine)  Abdulkadir Graves MD as Physician (Ophthalmology)    The following health maintenance items are reviewed in Epic and correct as of today:  Health Maintenance   Topic Date Due    HF ACTION PLAN  Never done    DIABETIC FOOT EXAM  07/14/2023    ANNUAL REVIEW OF  ORDERS  08/21/2024    INFLUENZA VACCINE (1) 09/01/2024    COVID-19 Vaccine (7 - 2023-24 season) 09/01/2024    A1C  03/09/2025    BMP  03/09/2025    EYE EXAM  04/11/2025    MEDICARE ANNUAL WELLNESS VISIT  09/09/2025    ALT  09/09/2025    CMP  09/09/2025    LIPID  09/09/2025    MICROALBUMIN  09/09/2025    FALL RISK ASSESSMENT  09/09/2025    CBC  09/09/2025    ADVANCE CARE PLANNING  09/10/2029    DTAP/TDAP/TD IMMUNIZATION (3 - Td or Tdap) 08/21/2033    TSH W/FREE T4 REFLEX  Completed    PHQ-2 (once per calendar year)  Completed    Pneumococcal Vaccine: 65+ Years  Completed    ZOSTER IMMUNIZATION  Completed    RSV VACCINE  Completed    HPV IMMUNIZATION  Aged Out    MENINGITIS IMMUNIZATION  Aged Out    RSV MONOCLONAL ANTIBODY  Aged Out    COLORECTAL CANCER SCREENING  Discontinued         Review of Systems  Constitutional, HEENT, cardiovascular, pulmonary, GI, , musculoskeletal, neuro, skin, endocrine and psych systems are negative, except as otherwise noted.     Objective    Exam  /80 (BP Location: Left arm, Patient Position: Sitting, Cuff Size: Adult Large)   Pulse 97   Temp 98.1  F (36.7  C) (Tympanic)   Resp 10   Ht 1.854 m (6' 1\")   Wt 120.5 kg (265 lb 11.2 oz)   " "SpO2 94%   BMI 35.05 kg/m     Estimated body mass index is 35.05 kg/m  as calculated from the following:    Height as of this encounter: 1.854 m (6' 1\").    Weight as of this encounter: 120.5 kg (265 lb 11.2 oz).    Physical Exam  GENERAL: alert and no distress  EYES: Eyes grossly normal to inspection, PERRL and conjunctivae and sclerae normal  HENT: ear canals and TM's normal, nose and mouth without ulcers or lesions  NECK: no adenopathy, no asymmetry, masses, or scars  RESP: lungs clear to auscultation - no rales, rhonchi or wheezes  CV: regular rate and rhythm, normal S1 S2, no S3 or S4, no murmur, click or rub, no peripheral edema  ABDOMEN: soft, nontender, no hepatosplenomegaly, no masses and bowel sounds normal  MS: no gross musculoskeletal defects noted, no edema  SKIN: 3+ edema with discolorations on the legs  NEURO: Normal strength and tone, mentation intact and speech normal he does have a very mild tremor  PSYCH: mentation appears normal, affect normal/bright         9/9/2024   Mini Cog   Clock Draw Score 0 Abnormal   3 Item Recall 2 objects recalled   Mini Cog Total Score 2                 Signed Electronically by: Hamzah Hodge MD    "

## 2024-09-09 NOTE — PROGRESS NOTES
Patient requesting an A1C test today. Last A1C performed in March of 2024. A suitable tube is available for test Add-On. Thanks!

## 2024-09-10 ENCOUNTER — TELEPHONE (OUTPATIENT)
Dept: PHYSICAL MEDICINE AND REHAB | Facility: CLINIC | Age: 89
End: 2024-09-10
Payer: COMMERCIAL

## 2024-09-10 ENCOUNTER — TELEPHONE (OUTPATIENT)
Dept: CARDIOLOGY | Facility: CLINIC | Age: 89
End: 2024-09-10
Payer: COMMERCIAL

## 2024-09-10 ENCOUNTER — TELEPHONE (OUTPATIENT)
Dept: FAMILY MEDICINE | Facility: CLINIC | Age: 89
End: 2024-09-10
Payer: COMMERCIAL

## 2024-09-10 LAB — HBA1C MFR BLD: 7.1 % (ref 0–5.6)

## 2024-09-10 NOTE — TELEPHONE ENCOUNTER
[facility] Call Center    Phone Message    May a detailed message be left on voicemail: yes     Reason for Call: Appointment Intake    Referring Provider Name: Hamzah Hodge MD   Diagnosis and/or Symptoms: Left Atrial Appendage Closure     Per protocol, send TE to clinic for review. Pleas assist patient.     Action Taken: Message routed to:  Other: Cardio    Travel Screening: Not Applicable     Date of Service:

## 2024-09-10 NOTE — TELEPHONE ENCOUNTER
9/10: Attempted to call patient. Patient did not answer. Voicemail left with direct call back information.  9/13: Called patient and reviewed below with patient and patient wife. Patient and patient wife verbalized understanding and in agreement with plan.       [facility] REFERRAL    Referral received from: Dr. Hodge  Reason for referral: unsteady gait, falls  Current anticoagulation: Eliquis    Imaging: CT ordered. Scheduled 10/23 at 11am  Contrast allergy: No  Contacted patient to introduce self, provide education on [facility].  Arranged consult with Dr. Herrera on 11/19 at 1:45pm  Provided direct contact information.     Luz Maria Johnson RN  Structural Heart Coordinator  [facility]

## 2024-09-10 NOTE — TELEPHONE ENCOUNTER
1st attempt- Left voicemail for the patient to call back and schedule the following:    Appointment type:  Return Cardiology  Provider:  [facility]  Return date:  9/16  Additional appointment(s) needed:  labs and echo needed PRIOR  Additonal Notes:  Slot held on [facility] schedule 1015am, 9/16 in [facility]   Specialty phone number: [phone]

## 2024-09-10 NOTE — TELEPHONE ENCOUNTER
LVM for pt to call back and schedule appt with Dr George for assessment for possible SI joint injections per his Primary Care Provider's request and referral.

## 2024-09-10 NOTE — TELEPHONE ENCOUNTER
"S-(situation): Wife calling stating, \"we didn't need refills and for some reason they sent 4 to [facility]. We need them sent to [facility]. I will call to have them canceled at [facility].\"      "

## 2024-09-11 ENCOUNTER — HOSPITAL ENCOUNTER (OUTPATIENT)
Dept: CARDIOLOGY | Facility: CLINIC | Age: 89
Discharge: HOME | End: 2024-09-11
Attending: INTERNAL MEDICINE | Admitting: INTERNAL MEDICINE
Payer: COMMERCIAL

## 2024-09-11 ENCOUNTER — LAB (OUTPATIENT)
Dept: LAB | Facility: CLINIC | Age: 89
End: 2024-09-11
Payer: COMMERCIAL

## 2024-09-11 DIAGNOSIS — I10 BENIGN ESSENTIAL HYPERTENSION: ICD-10-CM

## 2024-09-11 DIAGNOSIS — I50.31 ACUTE DIASTOLIC HEART FAILURE (H): ICD-10-CM

## 2024-09-11 DIAGNOSIS — E78.5 HYPERLIPIDEMIA LDL GOAL <70: ICD-10-CM

## 2024-09-11 DIAGNOSIS — I25.10 CORONARY ARTERY DISEASE INVOLVING NATIVE CORONARY ARTERY OF NATIVE HEART WITHOUT ANGINA PECTORIS: ICD-10-CM

## 2024-09-11 DIAGNOSIS — I48.91 ATRIAL FIBRILLATION, UNSPECIFIED TYPE (H): ICD-10-CM

## 2024-09-11 DIAGNOSIS — I77.810 AORTIC ROOT DILATATION (H): ICD-10-CM

## 2024-09-11 LAB
ALT SERPL W P-5'-P-CCNC: 13 U/L (ref 0–70)
ANION GAP SERPL CALCULATED.3IONS-SCNC: 13 MMOL/L (ref 7–15)
BUN SERPL-MCNC: 15.4 MG/DL (ref 8–23)
CALCIUM SERPL-MCNC: 9.2 MG/DL (ref 8.8–10.4)
CHLORIDE SERPL-SCNC: 105 MMOL/L (ref 98–107)
CHOLEST SERPL-MCNC: 89 MG/DL
CREAT SERPL-MCNC: 0.76 MG/DL (ref 0.67–1.17)
EGFRCR SERPLBLD CKD-EPI 2021: 86 ML/MIN/1.73M2
FASTING STATUS PATIENT QL REPORTED: YES
GLUCOSE SERPL-MCNC: 202 MG/DL (ref 70–99)
HCO3 SERPL-SCNC: 23 MMOL/L (ref 22–29)
HDLC SERPL-MCNC: 36 MG/DL
LDLC SERPL CALC-MCNC: 37 MG/DL
LVEF ECHO: NORMAL
NONHDLC SERPL-MCNC: 53 MG/DL
POTASSIUM SERPL-SCNC: 4 MMOL/L (ref 3.4–5.3)
SODIUM SERPL-SCNC: 141 MMOL/L (ref 135–145)
TRIGL SERPL-MCNC: 82 MG/DL

## 2024-09-11 PROCEDURE — 84460 ALANINE AMINO (ALT) (SGPT): CPT

## 2024-09-11 PROCEDURE — C8929 TTE W OR WO FOL WCON,DOPPLER: HCPCS

## 2024-09-11 PROCEDURE — 36415 COLL VENOUS BLD VENIPUNCTURE: CPT

## 2024-09-11 PROCEDURE — 999N000208 ECHOCARDIOGRAM COMPLETE

## 2024-09-11 PROCEDURE — 93306 TTE W/DOPPLER COMPLETE: CPT | Mod: 26 | Performed by: INTERNAL MEDICINE

## 2024-09-11 PROCEDURE — 255N000002 HC RX 255 OP 636: Performed by: INTERNAL MEDICINE

## 2024-09-11 PROCEDURE — 80048 BASIC METABOLIC PNL TOTAL CA: CPT

## 2024-09-11 PROCEDURE — 80061 LIPID PANEL: CPT

## 2024-09-11 RX ADMIN — HUMAN ALBUMIN MICROSPHERES AND PERFLUTREN 9 ML: 10; .22 INJECTION, SOLUTION INTRAVENOUS at 10:20

## 2024-09-13 DIAGNOSIS — I48.91 ATRIAL FIBRILLATION WITH RVR (H): Primary | ICD-10-CM

## 2024-09-27 ENCOUNTER — HOSPITAL ENCOUNTER (OUTPATIENT)
Dept: GENERAL RADIOLOGY | Facility: CLINIC | Age: 89
Discharge: HOME | End: 2024-09-27
Attending: INTERNAL MEDICINE | Admitting: INTERNAL MEDICINE
Payer: COMMERCIAL

## 2024-09-27 ENCOUNTER — OFFICE VISIT (OUTPATIENT)
Dept: CARDIOLOGY | Facility: CLINIC | Age: 89
End: 2024-09-27
Payer: COMMERCIAL

## 2024-09-27 ENCOUNTER — LAB (OUTPATIENT)
Dept: LAB | Facility: CLINIC | Age: 89
End: 2024-09-27
Payer: COMMERCIAL

## 2024-09-27 ENCOUNTER — TELEPHONE (OUTPATIENT)
Dept: CARDIOLOGY | Facility: CLINIC | Age: 89
End: 2024-09-27

## 2024-09-27 VITALS
SYSTOLIC BLOOD PRESSURE: 133 MMHG | DIASTOLIC BLOOD PRESSURE: 84 MMHG | WEIGHT: 261.3 LBS | BODY MASS INDEX: 34.63 KG/M2 | HEIGHT: 73 IN | HEART RATE: 83 BPM | OXYGEN SATURATION: 98 %

## 2024-09-27 DIAGNOSIS — Z86.79 HISTORY OF ATRIAL FIBRILLATION: ICD-10-CM

## 2024-09-27 DIAGNOSIS — I25.10 CORONARY ARTERY DISEASE INVOLVING NATIVE CORONARY ARTERY OF NATIVE HEART WITHOUT ANGINA PECTORIS: ICD-10-CM

## 2024-09-27 DIAGNOSIS — I48.91 ATRIAL FIBRILLATION, UNSPECIFIED TYPE (H): ICD-10-CM

## 2024-09-27 DIAGNOSIS — I50.31 ACUTE DIASTOLIC HEART FAILURE (H): ICD-10-CM

## 2024-09-27 DIAGNOSIS — I10 BENIGN ESSENTIAL HYPERTENSION: ICD-10-CM

## 2024-09-27 DIAGNOSIS — I50.31 ACUTE DIASTOLIC HEART FAILURE (H): Primary | ICD-10-CM

## 2024-09-27 DIAGNOSIS — I77.810 AORTIC ROOT DILATATION (H): ICD-10-CM

## 2024-09-27 DIAGNOSIS — E78.5 HYPERLIPIDEMIA LDL GOAL <70: ICD-10-CM

## 2024-09-27 LAB — NT-PROBNP SERPL-MCNC: 1403 PG/ML (ref 0–1800)

## 2024-09-27 PROCEDURE — 71046 X-RAY EXAM CHEST 2 VIEWS: CPT

## 2024-09-27 PROCEDURE — 99214 OFFICE O/P EST MOD 30 MIN: CPT | Performed by: INTERNAL MEDICINE

## 2024-09-27 PROCEDURE — G2211 COMPLEX E/M VISIT ADD ON: HCPCS | Performed by: INTERNAL MEDICINE

## 2024-09-27 PROCEDURE — 83880 ASSAY OF NATRIURETIC PEPTIDE: CPT | Performed by: INTERNAL MEDICINE

## 2024-09-27 PROCEDURE — 36415 COLL VENOUS BLD VENIPUNCTURE: CPT | Performed by: INTERNAL MEDICINE

## 2024-09-27 RX ORDER — FUROSEMIDE 20 MG
20 TABLET ORAL DAILY
Qty: 90 TABLET | Refills: 3 | Status: SHIPPED | OUTPATIENT
Start: 2024-09-27 | End: 2024-09-30

## 2024-09-27 NOTE — LETTER
9/27/2024    Hamzah Hodge MD  [address]  [place] [address]    RE: Austen Ulrichon       Dear Colleague,     I had the pleasure of seeing Austen Fowler in the [facility].  HPI and Plan:     It is my pleasure to see your patient Austen Fowler.  This is a patient previously followed by Dr. John Paul Moreno and who is also followed by Lyndsey NARANJO.  This patient in the past had coronary artery bypass grafting with a LIMA to the LAD, a SANDI to the right coronary artery and a saphenous vein graft to both the first and second obtuse marginal arteries.  This patient also has a dilated ascending aorta and a past history of nonrheumatic aortic stenosis.  He also has a history of paroxysmal atrial fibrillation.  He is anticoagulated with Eliquis.  This patient also has a history of orthostatic hypotension for which she has received midodrine.  He was placed on metoprolol succinate for control of the atrial fibrillation rate but became hypotensive in the dose and type of metoprolol was changed to metoprolol to tartrate 50 mg twice a day.  He is also receiving midodrine 5 mg 2 times a day which has helped somewhat.  It also has to be noted the patient is taking finasteride and tamsulosin for prostatism both of which are alpha blockers and can lower blood pressure.  He was seen by Dr. Hodge in clinic.  Her worry is that the patient is on anticoagulation and is quite unsteady on his feet especially with the orthostatic hypotension.  She has already placed a consult for EP to see the patient for possible Watchman device implantation.    Echocardiography still shows that the patient has mild aortic stenosis with a calculated aortic valve area of 1.5 cm  and a mean gradient of 18 mmHg.  Although this was reported as moderate aortic stenosis the gradient and valve area is in the mild range.  He is ejection fraction is normal with an EF of 55 to 60%.  Both the aortic root and ascending aorta  measure 3.6 cm in diameter which is well within the normal range.  He is not complaining of any chest pains or chest pressure but does get short of breath on quite minimal exertion.  He does complain of peripheral edema but at present he has peripheral edema is quite good especially over the last week.  He is renal function is entirely normal on furosemide 20 mg/day.  He appears to be in sinus rhythm today with a regular pulse with regular volume.  Blood pressure is well-controlled at 133/84.    There was also mention of using SGLT2 inhibitors and medications such as Wegovy.  The SGLT2 inhibitors were extremely expensive on the patient's health insurance.  So unfortunately those medications are nonstarter.  Also the patient's daughter feels that they could not give injectable medications.    Impression:  1.  Coronary artery disease and status post coronary artery bypass grafting.  The patient is asymptomatic with respect to coronary artery disease with no symptoms suggestive of angina pectoris.  2.  Chronic diastolic heart failure.  The patient is short of breath on minimal exertion but does appear to be euvolemic clinically.  3.  Cannot afford SGLT2 inhibitors and feel that they could not administer injectable drugs such as Wegovy.  4.  Paroxysmal atrial fibrillation anticoagulated with Eliquis.  There is concern from the patient's primary care physician and the family that he is very unsteady on his feet and that he would be at risk for falls and a head bleed.  Primary care physician has already arranged a EP consult for next month with Dr. Herrera for possible consideration of Watchman device implantation.  5.  Orthostatic hypotension.  This is improved on reducing the dose of metoprolol and changing it from succinate to tartrate.  However he is on finasteride and tamsulosin both of which of alpha blockers and both of which can cause orthostatic hypotension.  This can be addressed by the primary care physician.  6.   Mild aortic stenosis.    Plan:  1.  I will order a proBNP today to see if the patient's symptoms are due to heart failure.  This patient has a 72-year pack-year history of smoking which could also be a factor.  He is also quite elderly and deconditioned.  We will also check a chest x-ray.  2.  The patient will meet with Dr. Herrera from electrophysiology next month for consideration of Watchman device implantation.  3.  I would have the patient follow-up with Lyndsey Ching who is seen this patient many times before in 3 months time and follow-up with me in 6 months time.  We will check basic metabolic profile on both occasions.  4.  In 1 years time we will order a repeat echocardiogram to follow his aortic stenosis.    As always the patient and his daughter been told to contact me if they have any questions or any concerns.    The longitudinal plan of care for the diagnosis(es)/condition(s) as documented were addressed during this visit. Due to the added complexity in care, I will continue to support Griffin in the subsequent management and with ongoing continuity of care.    Lino Hernandez MD, FACC, FRCPI        Orders Placed This Encounter   Procedures     X-ray Chest 2 vws*     N terminal pro BNP outpatient     Basic metabolic panel     Basic metabolic panel     Lipid Profile     ALT     Follow-Up with Cardiology LILY     Follow-Up with Cardiology       Orders Placed This Encounter   Medications     furosemide (LASIX) 20 MG tablet     Sig: Take 1 tablet (20 mg) by mouth daily.     Dispense:  90 tablet     Refill:  3       Medications Discontinued During This Encounter   Medication Reason     furosemide (LASIX) 20 MG tablet Reorder (No AVS)         Encounter Diagnoses   Name Primary?     Acute diastolic heart failure (H)      History of atrial fibrillation      Atrial fibrillation, unspecified type (H)      Hyperlipidemia LDL goal <70      Coronary artery disease involving native coronary artery of native  heart without angina pectoris      Benign essential hypertension      Aortic root dilatation (H)        CURRENT MEDICATIONS:  Current Outpatient Medications   Medication Sig Dispense Refill     acetaminophen (ACETAMIN) 500 MG tablet Take 1,000 mg by mouth 2 times daily       busPIRone (BUSPAR) 5 MG tablet Take 0.5 tablets (2.5 mg) by mouth 2 times daily. 90 tablet 11     Cholecalciferol (VITAMIN D) 2000 UNIT tablet Take 2,000 Units by mouth daily. 90 tablet 3     ELIQUIS ANTICOAGULANT 5 MG tablet TAKE 1 TABLET BY MOUTH TWICE  DAILY 180 tablet 3     finasteride (PROSCAR) 5 MG tablet Take 1 tablet (5 mg) by mouth daily. 90 tablet 3     furosemide (LASIX) 20 MG tablet Take 1 tablet (20 mg) by mouth daily. 90 tablet 3     gabapentin (NEURONTIN) 300 MG capsule 900 mg in  capsule 3     metFORMIN (GLUCOPHAGE XR) 500 MG 24 hr tablet Take 2 tablets (1,000 mg) by mouth 2 times daily (with meals) 360 tablet 2     metoprolol tartrate (LOPRESSOR) 50 MG tablet TAKE 1 TABLET BY MOUTH TWICE  DAILY 180 tablet 1     midodrine (PROAMATINE) 5 MG tablet Take 1 tablet (5 mg) by mouth 2 times daily 180 tablet 3     multivitamin (OCUVITE) TABS tablet Take 1 tablet by mouth daily       omeprazole (PRILOSEC) 20 MG DR capsule TAKE 1 CAPSULE BY MOUTH DAILY 90 capsule 3     OneTouch UltraSoft 2 Lancets MISC USE TO TEST BLOOD SUGAR TWICE  DAILY OR AS DIRECTED 180 each 3     ORDER FOR DME Uses Cpap machine for sleep apnea       potassium chloride (KLOR-CON) 20 MEQ packet Take 20 mEq by mouth daily 90 each 3     pravastatin (PRAVACHOL) 20 MG tablet Take 1 tablet (20 mg) by mouth every evening. - due for fasting physical with Dr Hodge for refills 90 tablet 3     Probiotic Product (PROBIOTIC PO) Take 1 capsule by mouth daily       tamsulosin (FLOMAX) 0.4 MG capsule Take 1 capsule (0.4 mg) by mouth daily 90 capsule 0     triamcinolone (KENALOG) 0.1 % external ointment Apply topically 2 times daily (Patient taking differently: Apply topically 2  times daily. PRN) 30 g 3     vitamin B-12 (CYANOCOBALAMIN) 1000 MCG tablet Take 1,000 mcg by mouth daily        blood glucose (ONETOUCH ULTRA) test strip CHECK BLOOD SUGAR TWICE DAILY OR AS DIRECTED 200 strip 0       ALLERGIES     Allergies   Allergen Reactions     No Known Allergies        PAST MEDICAL HISTORY:  Past Medical History:   Diagnosis Date     Acute on chronic congestive heart failure, unspecified heart failure type (H) 6/12/2023     Anxiety state, unspecified      Aortic root dilatation (H)      Arthritis      Ascending aorta dilatation (H)      Basal cell cancer     s/p Mohs nose and bridge of nose on R.     Bunion      CAD (coronary artery disease)     CABG 1992: LIMA to LAD, SANDI to RCA, SVG to OM1 and OM2     Contact dermatitis and other eczema, due to unspecified cause     eczema - has light treatments with Dr. Rivera     Disorders of bursae and tendons in shoulder region, unspecified      Esophageal reflux      Essential hypertension, benign      Gallbladder disease      GERD (gastroesophageal reflux disease)      Heart attack (H)      Hyperlipidemia LDL goal <70      Left ventricular diastolic dysfunction      Low HDL (under 40) 3/28/2014     Nasal/sinus dis NEC     s/p surgery in 1995     Obesity      Osteoarthrosis, unspecified whether generalized or localized, shoulder region      Other hammer toe (acquired)      Sleep apnea     USES CPAP     Spinal stenosis, unspecified region other than cervical     MRI done 2006     Type 2 diabetes, HbA1c goal < 7% (H) 3/12/2015     Urge incontinence        PAST SURGICAL HISTORY:  Past Surgical History:   Procedure Laterality Date     ABDOMEN SURGERY  1994    gall bladder     BIOPSY      arms     CHOLECYSTECTOMY  6/1994     COLONOSCOPY N/A 4/11/2017    Procedure: COMBINED COLONOSCOPY, SINGLE OR MULTIPLE BIOPSY/POLYPECTOMY BY BIOPSY;  Surgeon: Bladimir Garner MD;  Location:  GI     CV LEFT HEART CATH  5-92, 6-92    Angioplasty     ENT SURGERY  5/1995     sinus surgery     ESOPHAGOSCOPY, GASTROSCOPY, DUODENOSCOPY (EGD), DILATATION, COMBINED  7/17/2014    Procedure: COMBINED ESOPHAGOSCOPY, GASTROSCOPY, DUODENOSCOPY (EGD), DILATATION;  Surgeon: Bladimir Garner MD;  Location:  GI     EYE SURGERY      cataracts removed both eyes     INJECT EPIDURAL LUMBAR       IR PICC REPOSITION RIGHT  9/1/2022     LAMINECTOMY LUMBAR TWO LEVELS N/A 4/29/2015    Procedure: LAMINECTOMY LUMBAR TWO LEVELS;  Surgeon: Bladimir Keenan MD;  Location:  OR     THORACIC SURGERY  11/1992    bypass     [facility] CABG, ARTERY-VEIN, FOUR  11/1992    CABG - LIMA to left anterior descending and saphenous vein bypass graft to the first and second obtuse marginal branch of the circumflex and the right mammary to the right coronary artery     Z NONSPECIFIC PROCEDURE  6-94    Gail lap     [facility] NONSPECIFIC PROCEDURE  1995    sinus surgery     [facility] NONSPECIFIC PROCEDURE      bilateral cataract surgery     [facility] BI[facility]SY/CAUTERY TUMOR/POLYP/LESION  5/2007       FAMILY HISTORY:  Family History   Problem Relation Age of Onset     Cancer Brother         MELANOMA     Diabetes Mother         AODM     Thyroid Disease Mother      Diabetes Father         AODM     Myocardial Infarction Father      Cerebrovascular Disease Brother      Parkinsonism Sister      Family History Negative Son      Family History Negative Son      Family History Negative Son      Family History Negative Daughter      Coronary Artery Disease Brother         dod 2019     Cerebrovascular Disease Brother         dod 2019     Other Cancer Brother         dod 2000/melanoma     Breast Cancer Other         in remission reg chkups       SOCIAL HISTORY:  Social History     Socioeconomic History     Marital status:      Spouse name: Anastasia Caballero     Number of children: 4     Years of education: 14     Highest education level: None   Occupational History     Occupation: retired     Comment:    Tobacco Use      Smoking status: Former     Current packs/day: 0.00     Average packs/day: 2.0 packs/day for 38.2 years (76.3 ttl pk-yrs)     Types: Cigarettes, Cigars, Pipe     Start date: 1954     Quit date: 3/1/1992     Years since quittin.5     Smokeless tobacco: Never     Tobacco comments:     quit in    Vaping Use     Vaping status: Never Used   Substance and Sexual Activity     Alcohol use: Yes     Comment: minimal less than 1/week or month     Drug use: No     Sexual activity: Not Currently     Partners: Female     Birth control/protection: Abstinence, Pull-out method, Condom, Post-menopausal, Natural Family Planning, None   Other Topics Concern      Service Yes     Comment: [employer], served in [employer]     Blood Transfusions Yes     Comment: Unsure if he had one with heart surgery     Caffeine Concern Yes     Comment: occ cofee     Occupational Exposure No     Hobby Hazards No     Sleep Concern Yes     Comment: CPAP     Stress Concern No     Special Diet No     Exercise Yes     Comment: [employer] 3 times a week, biking walking.      Bike Helmet Yes     Seat Belt Yes     Self-Exams Yes     Comment: does HIRAL about once a month.     Parent/sibling w/ CABG, MI or angioplasty before 65F 55M? No   Social History Narrative        4 kids     Social Determinants of Health     Financial Resource Strain: Low Risk  (2024)    Financial Resource Strain      Within the past 12 months, have you or your family members you live with been unable to get utilities (heat, electricity) when it was really needed?: No   Food Insecurity: Low Risk  (2024)    Food Insecurity      Within the past 12 months, did you worry that your food would run out before you got money to buy more?: No      Within the past 12 months, did the food you bought just not last and you didn t have money to get more?: No   Transportation Needs: Low Risk  (2024)    Transportation Needs      Within the past 12 months, has lack of  "transportation kept you from medical appointments, getting your medicines, non-medical meetings or appointments, work, or from getting things that you need?: No   Physical Activity: Unknown (9/9/2024)    Exercise Vital Sign      Days of Exercise per Week: 0 days   Stress: No Stress Concern Present (9/9/2024)    [employer] - Occupational Stress Questionnaire      Feeling of Stress : Only a little   Social Connections: Unknown (9/9/2024)    Social Connection and Isolation Panel [NHANES]      Frequency of Social Gatherings with Friends and Family: Once a week   Interpersonal Safety: Low Risk  (9/9/2024)    Interpersonal Safety      Do you feel physically and emotionally safe where you currently live?: Yes      Within the past 12 months, have you been hit, slapped, kicked or otherwise physically hurt by someone?: No      Within the past 12 months, have you been humiliated or emotionally abused in other ways by your partner or ex-partner?: No   Housing Stability: Low Risk  (9/9/2024)    Housing Stability      Do you have housing? : Yes      Are you worried about losing your housing?: No       Review of Systems:  Skin:          Eyes:         ENT:         Respiratory:          Cardiovascular:         Gastroenterology:        Genitourinary:         Musculoskeletal:         Neurologic:         Psychiatric:         Heme/Lymph/Imm:         Endocrine:           Physical Exam:  Vitals: /84   Pulse 83   Ht 1.854 m (6' 1\")   Wt 118.5 kg (261 lb 4.8 oz)   SpO2 98%   BMI 34.47 kg/m      Constitutional:  cooperative, alert and oriented, well developed, well nourished, in no acute distress obese      Skin:  warm and dry to the touch          Head:  normocephalic        Eyes:  pupils equal and round        Lymph:No Cervical lymphadenopathy present     ENT:  no pallor or cyanosis        Neck:  JVP normal;carotid pulses are full and equal bilaterally        Respiratory:  normal breath sounds, " clear to auscultation, normal A-P diameter, normal symmetry, normal respiratory excursion, no use of accessory muscles prolonged expiration       Cardiac: regular rhythm;normal S1 and S2   S4   LUSB;grade 1;systolic ejection murmur;RUSB        pulses full and equal                                        GI:  abdomen soft obese      Extremities and Muscular Skeletal:  no deformities, clubbing, cyanosis, erythema observed   bilateral LE edema;trace;R greater than L          Neurological:  no gross motor deficits;affect appropriate        Psych:  Alert and Oriented x 3        CC  Lino Hernandez MD  [address]  [place] [id number]                  Thank you for allowing me to participate in the care of your patient.      Sincerely,     Lino Hernandez MD, MD     [facility]  cc:   Lino Hernandez MD  [address]  [place] [id number]

## 2024-09-27 NOTE — TELEPHONE ENCOUNTER
Would increase furosemide to 40 mg/day as it looks like on the chest x-ray that he might have some fluid in the proBNP is higher than it has been in 2023.  Will check a BMP and a BNP in 2 weeks.  Should be seen by a NP or PA in a month's time if possible.  Thanks

## 2024-09-27 NOTE — TELEPHONE ENCOUNTER
Reviewed BNP showing    Latest Reference Range & Units 09/27/24 10:38   N-Terminal Pro Bnp 0 - 1,800 pg/mL 1,403     Reviewed CXR showing Postop CABG. Mild pulmonary vascular congestion. Mild  interstitial opacities and bronchial wall thickening in the lower lobes could indicate minimal pulmonary edema. Lungs are otherwise clear. No pleural effusion.    Per office note dated 09/27/24, Dr. Hernandez recommended:   1.  I will order a proBNP today to see if the patient's symptoms are due to heart failure.  This patient has a 72-year pack-year history of smoking which could also be a factor.  He is also quite elderly and deconditioned.  We will also check a chest x-ray.  2.  The patient will meet with Dr. Herrera from electrophysiology next month for consideration of Watchman device implantation.  3.  I would have the patient follow-up with Lyndsey Ching who is seen this patient many times before in 3 months time and follow-up with me in 6 months time.  We will check basic metabolic profile on both occasions.  4.  In 1 years time we will order a repeat echocardiogram to follow his aortic stenosis.    Will message Dr. Hernandez to review. Salas GALE

## 2024-09-27 NOTE — PROGRESS NOTES
HPI and Plan:     It is my pleasure to see your patient Austen Fowler.  This is a patient previously followed by Dr. John Paul Moreno and who is also followed by Lyndsey NARANJO.  This patient in the past had coronary artery bypass grafting with a LIMA to the LAD, a SANDI to the right coronary artery and a saphenous vein graft to both the first and second obtuse marginal arteries.  This patient also has a dilated ascending aorta and a past history of nonrheumatic aortic stenosis.  He also has a history of paroxysmal atrial fibrillation.  He is anticoagulated with Eliquis.  This patient also has a history of orthostatic hypotension for which she has received midodrine.  He was placed on metoprolol succinate for control of the atrial fibrillation rate but became hypotensive in the dose and type of metoprolol was changed to metoprolol to tartrate 50 mg twice a day.  He is also receiving midodrine 5 mg 2 times a day which has helped somewhat.  It also has to be noted the patient is taking finasteride and tamsulosin for prostatism both of which are alpha blockers and can lower blood pressure.  He was seen by Dr. Hodge in clinic.  Her worry is that the patient is on anticoagulation and is quite unsteady on his feet especially with the orthostatic hypotension.  She has already placed a consult for EP to see the patient for possible Watchman device implantation.    Echocardiography still shows that the patient has mild aortic stenosis with a calculated aortic valve area of 1.5 cm  and a mean gradient of 18 mmHg.  Although this was reported as moderate aortic stenosis the gradient and valve area is in the mild range.  He is ejection fraction is normal with an EF of 55 to 60%.  Both the aortic root and ascending aorta measure 3.6 cm in diameter which is well within the normal range.  He is not complaining of any chest pains or chest pressure but does get short of breath on quite minimal exertion.  He does complain of  peripheral edema but at present he has peripheral edema is quite good especially over the last week.  He is renal function is entirely normal on furosemide 20 mg/day.  He appears to be in sinus rhythm today with a regular pulse with regular volume.  Blood pressure is well-controlled at 133/84.    There was also mention of using SGLT2 inhibitors and medications such as Wegovy.  The SGLT2 inhibitors were extremely expensive on the patient's health insurance.  So unfortunately those medications are nonstarter.  Also the patient's daughter feels that they could not give injectable medications.    Impression:  1.  Coronary artery disease and status post coronary artery bypass grafting.  The patient is asymptomatic with respect to coronary artery disease with no symptoms suggestive of angina pectoris.  2.  Chronic diastolic heart failure.  The patient is short of breath on minimal exertion but does appear to be euvolemic clinically.  3.  Cannot afford SGLT2 inhibitors and feel that they could not administer injectable drugs such as Wegovy.  4.  Paroxysmal atrial fibrillation anticoagulated with Eliquis.  There is concern from the patient's primary care physician and the family that he is very unsteady on his feet and that he would be at risk for falls and a head bleed.  Primary care physician has already arranged a EP consult for next month with Dr. Herrera for possible consideration of Watchman device implantation.  5.  Orthostatic hypotension.  This is improved on reducing the dose of metoprolol and changing it from succinate to tartrate.  However he is on finasteride and tamsulosin both of which of alpha blockers and both of which can cause orthostatic hypotension.  This can be addressed by the primary care physician.  6.  Mild aortic stenosis.    Plan:  1.  I will order a proBNP today to see if the patient's symptoms are due to heart failure.  This patient has a 72-year pack-year history of smoking which could also be a  factor.  He is also quite elderly and deconditioned.  We will also check a chest x-ray.  2.  The patient will meet with Dr. Herrera from electrophysiology next month for consideration of Watchman device implantation.  3.  I would have the patient follow-up with Lyndsey Ching who is seen this patient many times before in 3 months time and follow-up with me in 6 months time.  We will check basic metabolic profile on both occasions.  4.  In 1 years time we will order a repeat echocardiogram to follow his aortic stenosis.    As always the patient and his daughter been told to contact me if they have any questions or any concerns.    The longitudinal plan of care for the diagnosis(es)/condition(s) as documented were addressed during this visit. Due to the added complexity in care, I will continue to support Griffin in the subsequent management and with ongoing continuity of care.    Lino Hernandez MD, FACC, FRCPI        Orders Placed This Encounter   Procedures    X-ray Chest 2 vws*    N terminal pro BNP outpatient    Basic metabolic panel    Basic metabolic panel    Lipid Profile    ALT    Follow-Up with Cardiology LILY    Follow-Up with Cardiology       Orders Placed This Encounter   Medications    furosemide (LASIX) 20 MG tablet     Sig: Take 1 tablet (20 mg) by mouth daily.     Dispense:  90 tablet     Refill:  3       Medications Discontinued During This Encounter   Medication Reason    furosemide (LASIX) 20 MG tablet Reorder (No AVS)         Encounter Diagnoses   Name Primary?    Acute diastolic heart failure (H)     History of atrial fibrillation     Atrial fibrillation, unspecified type (H)     Hyperlipidemia LDL goal <70     Coronary artery disease involving native coronary artery of native heart without angina pectoris     Benign essential hypertension     Aortic root dilatation (H)        CURRENT MEDICATIONS:  Current Outpatient Medications   Medication Sig Dispense Refill    acetaminophen (ACETAMIN) 500  MG tablet Take 1,000 mg by mouth 2 times daily      busPIRone (BUSPAR) 5 MG tablet Take 0.5 tablets (2.5 mg) by mouth 2 times daily. 90 tablet 11    Cholecalciferol (VITAMIN D) 2000 UNIT tablet Take 2,000 Units by mouth daily. 90 tablet 3    ELIQUIS ANTICOAGULANT 5 MG tablet TAKE 1 TABLET BY MOUTH TWICE  DAILY 180 tablet 3    finasteride (PROSCAR) 5 MG tablet Take 1 tablet (5 mg) by mouth daily. 90 tablet 3    furosemide (LASIX) 20 MG tablet Take 1 tablet (20 mg) by mouth daily. 90 tablet 3    gabapentin (NEURONTIN) 300 MG capsule 900 mg in  capsule 3    metFORMIN (GLUCOPHAGE XR) 500 MG 24 hr tablet Take 2 tablets (1,000 mg) by mouth 2 times daily (with meals) 360 tablet 2    metoprolol tartrate (LOPRESSOR) 50 MG tablet TAKE 1 TABLET BY MOUTH TWICE  DAILY 180 tablet 1    midodrine (PROAMATINE) 5 MG tablet Take 1 tablet (5 mg) by mouth 2 times daily 180 tablet 3    multivitamin (OCUVITE) TABS tablet Take 1 tablet by mouth daily      omeprazole (PRILOSEC) 20 MG DR capsule TAKE 1 CAPSULE BY MOUTH DAILY 90 capsule 3    OneTouch UltraSoft 2 Lancets MISC USE TO TEST BLOOD SUGAR TWICE  DAILY OR AS DIRECTED 180 each 3    ORDER FOR DME Uses Cpap machine for sleep apnea      potassium chloride (KLOR-CON) 20 MEQ packet Take 20 mEq by mouth daily 90 each 3    pravastatin (PRAVACHOL) 20 MG tablet Take 1 tablet (20 mg) by mouth every evening. - due for fasting physical with Dr Hodge for refills 90 tablet 3    Probiotic Product (PROBIOTIC PO) Take 1 capsule by mouth daily      tamsulosin (FLOMAX) 0.4 MG capsule Take 1 capsule (0.4 mg) by mouth daily 90 capsule 0    triamcinolone (KENALOG) 0.1 % external ointment Apply topically 2 times daily (Patient taking differently: Apply topically 2 times daily. PRN) 30 g 3    vitamin B-12 (CYANOCOBALAMIN) 1000 MCG tablet Take 1,000 mcg by mouth daily       blood glucose (ONETOUCH ULTRA) test strip CHECK BLOOD SUGAR TWICE DAILY OR AS DIRECTED 200 strip 0       ALLERGIES     Allergies    Allergen Reactions    No Known Allergies        PAST MEDICAL HISTORY:  Past Medical History:   Diagnosis Date    Acute on chronic congestive heart failure, unspecified heart failure type (H) 6/12/2023    Anxiety state, unspecified     Aortic root dilatation (H)     Arthritis     Ascending aorta dilatation (H)     Basal cell cancer     s/p Mohs nose and bridge of nose on R.    Bunion     CAD (coronary artery disease)     CABG 1992: LIMA to LAD, SANDI to RCA, SVG to OM1 and OM2    Contact dermatitis and other eczema, due to unspecified cause     eczema - has light treatments with Dr. Rivera    Disorders of bursae and tendons in shoulder region, unspecified     Esophageal reflux     Essential hypertension, benign     Gallbladder disease     GERD (gastroesophageal reflux disease)     Heart attack (H)     Hyperlipidemia LDL goal <70     Left ventricular diastolic dysfunction     Low HDL (under 40) 3/28/2014    Nasal/sinus dis NEC     s/p surgery in 1995    Obesity     Osteoarthrosis, unspecified whether generalized or localized, shoulder region     Other hammer toe (acquired)     Sleep apnea     USES CPAP    Spinal stenosis, unspecified region other than cervical     MRI done 2006    Type 2 diabetes, HbA1c goal < 7% (H) 3/12/2015    Urge incontinence        PAST SURGICAL HISTORY:  Past Surgical History:   Procedure Laterality Date    ABDOMEN SURGERY  1994    gall bladder    BIOPSY      arms    CHOLECYSTECTOMY  6/1994    COLONOSCOPY N/A 4/11/2017    Procedure: COMBINED COLONOSCOPY, SINGLE OR MULTIPLE BIOPSY/POLYPECTOMY BY BIOPSY;  Surgeon: Bladimir Garner MD;  Location:  GI    CV LEFT HEART CATH  5-92, 6-92    Angioplasty    ENT SURGERY  5/1995    sinus surgery    ESOPHAGOSCOPY, GASTROSCOPY, DUODENOSCOPY (EGD), DILATATION, COMBINED  7/17/2014    Procedure: COMBINED ESOPHAGOSCOPY, GASTROSCOPY, DUODENOSCOPY (EGD), DILATATION;  Surgeon: Bladimir Garner MD;  Location:  GI    EYE SURGERY      cataracts removed both eyes     INJECT EPIDURAL LUMBAR      IR PICC REPOSITION RIGHT  2022    LAMINECTOMY LUMBAR TWO LEVELS N/A 2015    Procedure: LAMINECTOMY LUMBAR TWO LEVELS;  Surgeon: Bladimir Keenan MD;  Location: [facility] OR    THORACIC SURGERY  1992    bypass    [facility] CABG, ARTERY-VEIN, FOUR  1992    CABG - LIMA to left anterior descending and saphenous vein bypass graft to the first and second obtuse marginal branch of the circumflex and the right mammary to the right coronary artery    [facility] NONSPECIFIC PROCEDURE      Gail lap    [facility] NONSPECIFIC PROCEDURE      sinus surgery    [facility] NONSPECIFIC PROCEDURE      bilateral cataract surgery    [facility] COLONOSCOPY THRU STOMA W BIOPSY/CAUTERY TUMOR/POLYP/LESION  2007       FAMILY HISTORY:  Family History   Problem Relation Age of Onset    Cancer Brother         MELANOMA    Diabetes Mother         AODM    Thyroid Disease Mother     Diabetes Father         AODM    Myocardial Infarction Father     Cerebrovascular Disease Brother     Parkinsonism Sister     Family History Negative Son     Family History Negative Son     Family History Negative Son     Family History Negative Daughter     Coronary Artery Disease Brother         dod 2019    Cerebrovascular Disease Brother         dod     Other Cancer Brother         dod /melanoma    Breast Cancer Other         in remission reg chkups       SOCIAL HISTORY:  Social History     Socioeconomic History    Marital status:      Spouse name: Anastasia Caballero    Number of children: 4    Years of education: 14    Highest education level: None   Occupational History    Occupation: retired     Comment:    Tobacco Use    Smoking status: Former     Current packs/day: 0.00     Average packs/day: 2.0 packs/day for 38.2 years (76.3 ttl pk-yrs)     Types: Cigarettes, Cigars, Pipe     Start date: 1954     Quit date: 3/1/1992     Years since quittin.5    Smokeless tobacco: Never    Tobacco comments:     quit in     Vaping Use    Vaping status: Never Used   Substance and Sexual Activity    Alcohol use: Yes     Comment: minimal less than 1/week or month    Drug use: No    Sexual activity: Not Currently     Partners: Female     Birth control/protection: Abstinence, Pull-out method, Condom, Post-menopausal, Natural Family Planning, None   Other Topics Concern     Service Yes     Comment: [employer], served in [place]    Blood Transfusions Yes     Comment: Unsure if he had one with heart surgery    Caffeine Concern Yes     Comment: occ [employer]fee    Occupational Exposure No    Hobby Hazards No    Sleep Concern Yes     Comment: CPAP    Stress Concern No    Special Diet No    Exercise Yes     Comment: [employer] 3 times a week, biking walking.     Bike Helmet Yes    Seat Belt Yes    Self-Exams Yes     Comment: does HIRAL about once a month.    Parent/sibling w/ CABG, MI or angioplasty before 65F 55M? No   Social History Narrative        4 kids     Social Determinants of Health     Financial Resource Strain: Low Risk  (9/9/2024)    Financial Resource Strain     Within the past 12 months, have you or your family members you live with been unable to get utilities (heat, electricity) when it was really needed?: No   Food Insecurity: Low Risk  (9/9/2024)    Food Insecurity     Within the past 12 months, did you worry that your food would run out before you got money to buy more?: No     Within the past 12 months, did the food you bought just not last and you didn t have money to get more?: No   Transportation Needs: Low Risk  (9/9/2024)    Transportation Needs     Within the past 12 months, has lack of transportation kept you from medical appointments, getting your medicines, non-medical meetings or appointments, work, or from getting things that you need?: No   Physical Activity: Unknown (9/9/2024)    Exercise Vital Sign     Days of Exercise per Week: 0 days   Stress: No Stress Concern Present (9/9/2024)    [place] [employer]  "Occupational Health - Occupational Stress Questionnaire     Feeling of Stress : Only a little   Social Connections: Unknown (9/9/2024)    Social Connection and Isolation Panel [NHANES]     Frequency of Social Gatherings with Friends and Family: Once a week   Interpersonal Safety: Low Risk  (9/9/2024)    Interpersonal Safety     Do you feel physically and emotionally safe where you currently live?: Yes     Within the past 12 months, have you been hit, slapped, kicked or otherwise physically hurt by someone?: No     Within the past 12 months, have you been humiliated or emotionally abused in other ways by your partner or ex-partner?: No   Housing Stability: Low Risk  (9/9/2024)    Housing Stability     Do you have housing? : Yes     Are you worried about losing your housing?: No       Review of Systems:  Skin:          Eyes:         ENT:         Respiratory:          Cardiovascular:         Gastroenterology:        Genitourinary:         Musculoskeletal:         Neurologic:         Psychiatric:         Heme/Lymph/Imm:         Endocrine:           Physical Exam:  Vitals: /84   Pulse 83   Ht 1.854 m (6' 1\")   Wt 118.5 kg (261 lb 4.8 oz)   SpO2 98%   BMI 34.47 kg/m      Constitutional:  cooperative, alert and oriented, well developed, well nourished, in no acute distress obese      Skin:  warm and dry to the touch          Head:  normocephalic        Eyes:  pupils equal and round        Lymph:No Cervical lymphadenopathy present     ENT:  no pallor or cyanosis        Neck:  JVP normal;carotid pulses are full and equal bilaterally        Respiratory:  normal breath sounds, clear to auscultation, normal A-P diameter, normal symmetry, normal respiratory excursion, no use of accessory muscles prolonged expiration       Cardiac: regular rhythm;normal S1 and S2   S4   LUSB;grade 1;systolic ejection murmur;RUSB        pulses full and equal                                        GI:  abdomen soft obese      Extremities " and Muscular Skeletal:  no deformities, clubbing, cyanosis, erythema observed   bilateral LE edema;trace;R greater than L          Neurological:  no gross motor deficits;affect appropriate        Psych:  Alert and Oriented x 3        CC  Lino Hernandez MD  [address]  [place] [id number]

## 2024-09-30 ENCOUNTER — TELEPHONE (OUTPATIENT)
Facility: CLINIC | Age: 89
End: 2024-09-30
Payer: COMMERCIAL

## 2024-09-30 RX ORDER — FUROSEMIDE 20 MG
40 TABLET ORAL DAILY
Qty: 180 TABLET | Refills: 3 | Status: SHIPPED | OUTPATIENT
Start: 2024-09-30

## 2024-09-30 NOTE — TELEPHONE ENCOUNTER
[facility] Call Center    Phone Message    May a detailed message be left on voicemail: yes     Reason for Call: Other: Patient needs a follow up in 4 weeks, There is nothing available with any one of Dr HENRIQUEZ designated APPs. Can patient see another LILY? The dx of falls under heart failure/core clinic, can patient be seen in that clinic and fi so would it be a new or return patient?      Action Taken: Other: cardio    Travel Screening: Not Applicable     Date of Service:

## 2024-09-30 NOTE — TELEPHONE ENCOUNTER
Attempted to call patient with recommendations from Dr. Lawrence Vaughan, home line busy x 2 attempts.  Attempted to call patient on mobile number, left message for patient to call back.  NEERU Guy RN

## 2024-09-30 NOTE — TELEPHONE ENCOUNTER
Called patient's wife with recommendations from Dr. Lawrence Vaughan to increase furosemide to 40 mg daily and repeat BNP/BMP in 2 weeks with follow-up with LILY in 4 weeks.  Orders in chart and wife will call scheduling to arrange.  Wife also asking about patient's CPAP as wife states there has been a recall on it and wife is not sure who to ask about this.  Wife states she did discuss it with Dr. Kessler but did not get any follow-up instructions.  Per chart review, sleep study was done with [facility] in December 2006 and looks like CPAP was started in January 2007.  Wife will call [facility] which is now [facility].  Wife was given the phone number to call.  NEERU Guy RN

## 2024-10-01 NOTE — TELEPHONE ENCOUNTER
[facility] Call Center    Phone Message    May a detailed message be left on voicemail: yes     Reason for Call: Other: Patient's wife called to schedule the post hospital follow up. The soonest opening that could be found was on 11/8, so patient is scheduled then. He is supposed to be seen sooner than that date, and wife felt that that date was too late and that he needed to be seen sooner. If there is any sooner opening, please call patient and reschedule. Thank you!     Action Taken: Other: Cardiology    Travel Screening: Not Applicable     Thank you!  [facility]

## 2024-10-01 NOTE — TELEPHONE ENCOUNTER
Called patient's wife, advised her to keep the appointment November 8 as scheduled.  Wife also advised we will review the labs in 2 weeks and if there are worsening changes we will try to get him in sooner.  Wife also advised that if he seems to have worsening symptoms to please call me and I will review with Dr. aLwrence Vaughan.  Wife verbalized understanding.  NEERU Guy RN

## 2024-10-07 DIAGNOSIS — E78.5 HYPERLIPIDEMIA LDL GOAL <70: ICD-10-CM

## 2024-10-07 DIAGNOSIS — F41.1 GAD (GENERALIZED ANXIETY DISORDER): ICD-10-CM

## 2024-10-07 DIAGNOSIS — I95.1 ORTHOSTATIC HYPOTENSION: ICD-10-CM

## 2024-10-08 RX ORDER — BUSPIRONE HYDROCHLORIDE 5 MG/1
2.5 TABLET ORAL 2 TIMES DAILY
Qty: 90 TABLET | Refills: 3 | Status: SHIPPED | OUTPATIENT
Start: 2024-10-08

## 2024-10-08 RX ORDER — PRAVASTATIN SODIUM 20 MG
20 TABLET ORAL EVERY EVENING
Qty: 90 TABLET | Refills: 2 | Status: SHIPPED | OUTPATIENT
Start: 2024-10-08

## 2024-10-08 RX ORDER — MIDODRINE HYDROCHLORIDE 5 MG/1
5 TABLET ORAL 2 TIMES DAILY
Qty: 180 TABLET | Refills: 3 | Status: SHIPPED | OUTPATIENT
Start: 2024-10-08

## 2024-10-12 DIAGNOSIS — N40.0 BENIGN PROSTATIC HYPERPLASIA, UNSPECIFIED WHETHER LOWER URINARY TRACT SYMPTOMS PRESENT: ICD-10-CM

## 2024-10-14 ENCOUNTER — LAB (OUTPATIENT)
Dept: LAB | Facility: CLINIC | Age: 89
End: 2024-10-14
Payer: COMMERCIAL

## 2024-10-14 DIAGNOSIS — Z86.79 HISTORY OF ATRIAL FIBRILLATION: ICD-10-CM

## 2024-10-14 DIAGNOSIS — I25.10 CORONARY ARTERY DISEASE INVOLVING NATIVE CORONARY ARTERY OF NATIVE HEART WITHOUT ANGINA PECTORIS: ICD-10-CM

## 2024-10-14 DIAGNOSIS — I50.31 ACUTE DIASTOLIC HEART FAILURE (H): ICD-10-CM

## 2024-10-14 LAB
ANION GAP SERPL CALCULATED.3IONS-SCNC: 12 MMOL/L (ref 7–15)
BUN SERPL-MCNC: 15.3 MG/DL (ref 8–23)
CALCIUM SERPL-MCNC: 9.8 MG/DL (ref 8.8–10.4)
CHLORIDE SERPL-SCNC: 102 MMOL/L (ref 98–107)
CREAT SERPL-MCNC: 0.89 MG/DL (ref 0.67–1.17)
EGFRCR SERPLBLD CKD-EPI 2021: 82 ML/MIN/1.73M2
GLUCOSE SERPL-MCNC: 176 MG/DL (ref 70–99)
HCO3 SERPL-SCNC: 29 MMOL/L (ref 22–29)
NT-PROBNP SERPL-MCNC: 1411 PG/ML (ref 0–1800)
POTASSIUM SERPL-SCNC: 4.5 MMOL/L (ref 3.4–5.3)
SODIUM SERPL-SCNC: 143 MMOL/L (ref 135–145)

## 2024-10-14 PROCEDURE — 80048 BASIC METABOLIC PNL TOTAL CA: CPT | Performed by: INTERNAL MEDICINE

## 2024-10-14 PROCEDURE — 36415 COLL VENOUS BLD VENIPUNCTURE: CPT | Performed by: INTERNAL MEDICINE

## 2024-10-14 PROCEDURE — 83880 ASSAY OF NATRIURETIC PEPTIDE: CPT | Performed by: INTERNAL MEDICINE

## 2024-10-14 RX ORDER — TAMSULOSIN HYDROCHLORIDE 0.4 MG/1
0.4 CAPSULE ORAL DAILY
Qty: 90 CAPSULE | Refills: 1 | Status: SHIPPED | OUTPATIENT
Start: 2024-10-14

## 2024-10-22 DIAGNOSIS — I50.31 ACUTE DIASTOLIC HEART FAILURE (H): ICD-10-CM

## 2024-10-22 RX ORDER — POTASSIUM CHLORIDE 1.5 G/1.58G
20 POWDER, FOR SOLUTION ORAL DAILY
Qty: 90 EACH | Refills: 3 | Status: SHIPPED | OUTPATIENT
Start: 2024-10-22

## 2024-10-22 NOTE — TELEPHONE ENCOUNTER
Wife is wondering if you want to increase his potassium since you increased his lasix from 20 mg to 40 mg daily. She is asking for refills to go to different pharmacy. Pharmacy pended and current 20 meq dosing rx pended.     Potassium   Date Value Ref Range Status   10/14/2024 4.5 3.4 - 5.3 mmol/L Final   10/14/2022 3.9 3.4 - 5.3 mmol/L Final   06/25/2021 3.7 3.4 - 5.3 mmol/L Final     Provided 24/7 scheduling/triage nurse line ([address] [place] [address]) to patient. No further questions or concerns from patient at this time.     Zulma Tobias RN

## 2024-10-23 ENCOUNTER — HOSPITAL ENCOUNTER (OUTPATIENT)
Dept: CARDIOLOGY | Facility: CLINIC | Age: 89
Discharge: HOME | End: 2024-10-23
Attending: INTERNAL MEDICINE | Admitting: INTERNAL MEDICINE
Payer: COMMERCIAL

## 2024-10-23 VITALS — DIASTOLIC BLOOD PRESSURE: 109 MMHG | SYSTOLIC BLOOD PRESSURE: 154 MMHG | HEART RATE: 94 BPM

## 2024-10-23 DIAGNOSIS — I48.91 ATRIAL FIBRILLATION WITH RVR (H): ICD-10-CM

## 2024-10-23 PROCEDURE — 75572 CT HRT W/3D IMAGE: CPT

## 2024-10-23 PROCEDURE — 75572 CT HRT W/3D IMAGE: CPT | Mod: 26 | Performed by: INTERNAL MEDICINE

## 2024-10-23 PROCEDURE — 250N000011 HC RX IP 250 OP 636: Performed by: INTERNAL MEDICINE

## 2024-10-23 RX ORDER — IOPAMIDOL 755 MG/ML
500 INJECTION, SOLUTION INTRAVASCULAR ONCE
Status: COMPLETED | OUTPATIENT
Start: 2024-10-23 | End: 2024-10-23

## 2024-10-23 RX ORDER — DIPHENHYDRAMINE HCL 25 MG
25 CAPSULE ORAL
Status: DISCONTINUED | OUTPATIENT
Start: 2024-10-23 | End: 2024-10-24 | Stop reason: HOSPADM

## 2024-10-23 RX ORDER — LIDOCAINE 40 MG/G
CREAM TOPICAL
Status: DISCONTINUED | OUTPATIENT
Start: 2024-10-23 | End: 2024-10-24 | Stop reason: HOSPADM

## 2024-10-23 RX ORDER — DIPHENHYDRAMINE HYDROCHLORIDE 50 MG/ML
25-50 INJECTION INTRAMUSCULAR; INTRAVENOUS
Status: DISCONTINUED | OUTPATIENT
Start: 2024-10-23 | End: 2024-10-24 | Stop reason: HOSPADM

## 2024-10-23 RX ORDER — METHYLPREDNISOLONE SODIUM SUCCINATE 125 MG/2ML
125 INJECTION INTRAMUSCULAR; INTRAVENOUS
Status: DISCONTINUED | OUTPATIENT
Start: 2024-10-23 | End: 2024-10-24 | Stop reason: HOSPADM

## 2024-10-23 RX ORDER — ONDANSETRON 2 MG/ML
4 INJECTION INTRAMUSCULAR; INTRAVENOUS
Status: DISCONTINUED | OUTPATIENT
Start: 2024-10-23 | End: 2024-10-24 | Stop reason: HOSPADM

## 2024-10-23 RX ADMIN — IOPAMIDOL 100 ML: 755 INJECTION, SOLUTION INTRAVENOUS at 12:01

## 2024-10-28 ENCOUNTER — TELEPHONE (OUTPATIENT)
Dept: CARDIOLOGY | Facility: CLINIC | Age: 89
End: 2024-10-28
Payer: COMMERCIAL

## 2024-10-28 NOTE — TELEPHONE ENCOUNTER
[facility] Call Center    Phone Message    May a detailed message be left on voicemail: yes     Reason for Call: Other: Pt wife  would like a call back to know if pt appt on November 8th is needed as pt has not been in hospital and pt wife would like to discuss      Action Taken: Other: Cardio    Travel Screening: Not Applicable     Date of Service:

## 2024-10-28 NOTE — TELEPHONE ENCOUNTER
Called patient's wife, advised her that Dr. Lawrence Vaughan had recommended he see an LILY after he increased furosemide to 40 mg daily.  Wife would like to keep the appointment for now and will back in a couple days to reassess. Salas GALE

## 2024-11-04 ENCOUNTER — TELEPHONE (OUTPATIENT)
Dept: CARDIOLOGY | Facility: CLINIC | Age: 89
End: 2024-11-04
Payer: COMMERCIAL

## 2024-11-04 NOTE — TELEPHONE ENCOUNTER
[facility] Call Center    Phone Message    May a detailed message be left on voicemail: yes     Reason for Call: Other: Anastasia Caballero called to ask which appointment they should keep and which appointment they should cancel.     Action Taken: Other: cardio    Travel Screening: Not Applicable     Date of Service:

## 2024-11-04 NOTE — TELEPHONE ENCOUNTER
Called pt's wife, advised her to keep the appt 11/8/24 w/ JOSE A Harry to assess if other appt s are needed or can be moved out. Wife verbalized understanding. Salas GALE

## 2024-11-13 ENCOUNTER — TELEPHONE (OUTPATIENT)
Dept: FAMILY MEDICINE | Facility: CLINIC | Age: 89
End: 2024-11-13
Payer: COMMERCIAL

## 2024-11-13 DIAGNOSIS — N40.0 BENIGN PROSTATIC HYPERPLASIA, UNSPECIFIED WHETHER LOWER URINARY TRACT SYMPTOMS PRESENT: ICD-10-CM

## 2024-11-13 RX ORDER — FINASTERIDE 5 MG/1
1 TABLET, FILM COATED ORAL DAILY
Qty: 90 TABLET | Refills: 3 | Status: SHIPPED | OUTPATIENT
Start: 2024-11-13

## 2024-11-13 NOTE — TELEPHONE ENCOUNTER
Per patient request, finasteride has been rerouted from [facility]'s to [facility].

## 2024-11-14 DIAGNOSIS — Z53.9 DIAGNOSIS NOT YET DEFINED: Primary | ICD-10-CM

## 2024-11-14 PROCEDURE — G0180 MD CERTIFICATION HHA PATIENT: HCPCS | Performed by: INTERNAL MEDICINE

## 2024-11-14 NOTE — PROGRESS NOTES
[facility] HEART CLINIC  Cardiac Electrophysiology Clinic    Austen Fowler MRN# [id number]   YOB: 1935 Age: 89 year old       I had the pleasure of seeing Austen Fowler  who is a 89 year old male with history of type 2 diabetes, hyperlipidemia, orthostatic hypotension on midodrine, CAD s/p CABG, mild aortic stenosis with dilated ascending aorta, HFpEF, and paroxysmal atrial fibrillation.  He follows with Dr. Lawrence Vaughan in cardiology.  The patient has a history of orthostatic hypotension, which worsened when he was started on metoprolol for atrial fibrillation.  He has been on Eliquis for anticoagulation, but there is concern about falls risk because he is unsteady on his feet.  He is currently on metoprolol tartrate 50 mg twice daily and Eliquis 5 mg twice daily.      Today's Visit:  He is accompanied by his wife Anastasia Caballero and daughter Karyn today, who provide some of the history.  He has been feeling well and has no complaints today.  He was unaware that he was in atrial fibrillation today.  He reports no symptoms from atrial fibrillation.  No chest pain, shortness of breath, or palpitations.  He has not had any dizziness/lightheadedness or syncope.  No recent falls.  He recalls that he fell a couple years ago when he got tangled up in a hose in the yard.  He has been tolerating Eliquis well and has not had any bleeding concerns.      Diagnostic Testing:  EKG today, which I overread, showed Atrial fibrillation rate 86 bpm.  , QTc 397 ms.  EKG 8/7/2023-sinus bradycardia rate 49 bpm.  , , QTc 427 ms.  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 It is pointing superiorly and posteriorly. Appendage  orifice measures 27.1 x 18.7 mm.. Appendage depth is 21.5 mm.  No thrombus is seen within the left atrial  appendage.      Holter Monitor 5/12/2023- personally reviewed  3-day Zio patch monitor  Baseline sinus rhythm with heart rates ranging , average 59 bpm  No atrial fibrillation    Echocardiogram 9/11/2024    Very poor images.  Left ventricular systolic function is normal.  The visual ejection fraction is 55-60%.  Moderate valvular aortic stenosis.  mean 10mmHg, calc ROMY 1.5cm, SVI 23.8ml/m2 indicating low flow, low gradient aortic stenosis        Last Comprehensive Metabolic Panel:  Lab Results   Component Value Date     10/14/2024    POTASSIUM 4.5 10/14/2024    CHLORIDE 102 10/14/2024    CO2 29 10/14/2024    ANIONGAP 12 10/14/2024     (H) 10/14/2024    BUN 15.3 10/14/2024    CR 0.89 10/14/2024    GFRESTIMATED 82 10/14/2024    BRANDON 9.8 10/14/2024        Magnesium   Date Value Ref Range Status   06/16/2023 1.7 1.7 - 2.3 mg/dL Final   04/12/2021 1.6 1.6 - 2.3 mg/dL Final        TSH   Date Value Ref Range Status   09/09/2024 1.39 0.30 - 4.20 uIU/mL Final   01/20/2020 1.02 0.40 - 4.00 mU/L Final            Assessment/Plan:    89 year old male with history of type 2 diabetes, hyperlipidemia, orthostatic hypotension on midodrine, CAD s/p CABG, mild aortic stenosis with dilated ascending aorta, HFpEF, and paroxysmal atrial fibrillation.  His recent echocardiogram showed preserved LV function, mild to moderate aortic stenosis.  He had a CTA heart which showed reasonable left atrial appendage anatomy for watchman implant.  He had a 3-day Zio patch monitor in 5/2023 which showed no atrial fibrillation, and heart rates ranging 49 to 108 bpm.  His EKG today showed rate controlled A-fib.    He has been asymptomatic from his atrial fibrillation and has good rate control on metoprolol tartrate 50 mg twice daily.  He did have sinus bradycardia on his previous EKGs and history of orthostatic hypotension.  If this continues to be an issue, I would recommend decreasing his metoprolol dosage.    We also discussed the  other major risk of Afib is that of stroke.  The patient's CHADS VASc is 5 (age, CHF, CAD, DM), which warrants long term anticoagulation.  We discussed the risks and benefits of anticoagulation and the options for different medications.  He is currently on Eliquis for anticoagulation.    HAS-BLED Risk Score: 2  Estimate risk of major bleedin: Risk is 4.1% and 1.88 bleeds per 100 patient-years. Anticoagulation can be considered, patient has moderate risk for major bleeding (~2/100 patient-years).      The patient has an indication for anticoagulation due to atrial arrhythmias and associated high risk for cardioembolic stroke.  I discussed the pathophysiology and management strategies of cardioembolic risk, anticoagulation risks and benefits.  We discussed the Watchman device as an alternative to oral anticoagulation.  I explained the device and implant procedure in detail, including risks and benefits.      He is currently tolerating anticoagulation and has not had any significant bleeding issues or recent falls.  He did seem to have some mild baseline confusion, but his wife and daughter both confirm that he has not had any recent falls and has been steady on his feet.  I recommended continuing oral anticoagulation and holding off on left atrial appendage occlusion at this time.  I encouraged them to contact us if any of his symptoms change or if he has additional concerns.      Continue Eliquis  Consider titrating down metoprolol  Follow up as needed       Edwardo Herrera MD        Orders this Visit:  Orders Placed This Encounter   Procedures    EKG 12-lead complete w/read - Clinics (performed today)     No orders of the defined types were placed in this encounter.    There are no discontinued medications.    Encounter Diagnoses   Name Primary?    Congestive heart failure, unspecified HF chronicity, unspecified heart failure type (H)     PAF (paroxysmal atrial fibrillation) (H) Yes    Abnormal gait     Orthostatic  hypotension     Chronic anticoagulation      Today's clinic visit entailed:  Review of the result(s) of each unique test - echo, CT heart   Assessment requiring an independent historian(s) - family -    The following tests were independently interpreted by me as noted in my documentation: EKG, Holter  41 minutes spent by me on the date of the encounter doing chart review, history and exam, documentation and further activities per the note      CURRENT MEDICATIONS:  Current Outpatient Medications   Medication Sig Dispense Refill    acetaminophen (ACETAMIN) 500 MG tablet Take 1,000 mg by mouth 2 times daily      blood glucose (ONETOUCH ULTRA) test strip CHECK BLOOD SUGAR TWICE DAILY OR AS DIRECTED 200 strip 0    busPIRone (BUSPAR) 5 MG tablet Take 0.5 tablets (2.5 mg) by mouth 2 times daily. 90 tablet 3    Cholecalciferol (VITAMIN D) 2000 UNIT tablet Take 2,000 Units by mouth daily. 90 tablet 3    ELIQUIS ANTICOAGULANT 5 MG tablet TAKE 1 TABLET BY MOUTH TWICE  DAILY 180 tablet 3    finasteride (PROSCAR) 5 MG tablet Take 1 tablet (5 mg) by mouth daily. 90 tablet 3    furosemide (LASIX) 20 MG tablet Take 2 tablets (40 mg) by mouth daily. 180 tablet 3    gabapentin (NEURONTIN) 300 MG capsule 900 mg in  capsule 3    metFORMIN (GLUCOPHAGE XR) 500 MG 24 hr tablet Take 2 tablets (1,000 mg) by mouth 2 times daily (with meals) 360 tablet 2    metoprolol tartrate (LOPRESSOR) 50 MG tablet TAKE 1 TABLET BY MOUTH TWICE  DAILY 180 tablet 1    midodrine (PROAMATINE) 5 MG tablet Take 1 tablet (5 mg) by mouth 2 times daily. 180 tablet 3    multivitamin (OCUVITE) TABS tablet Take 1 tablet by mouth daily      omeprazole (PRILOSEC) 20 MG DR capsule TAKE 1 CAPSULE BY MOUTH DAILY 90 capsule 3    OneTouch UltraSoft 2 Lancets MISC USE TO TEST BLOOD SUGAR TWICE  DAILY OR AS DIRECTED 180 each 3    ORDER FOR DME Uses Cpap machine for sleep apnea      potassium chloride (KLOR-CON) 20 MEQ packet Take 20 mEq by mouth daily. 90 each 3     "pravastatin (PRAVACHOL) 20 MG tablet Take 1 tablet (20 mg) by mouth every evening. 90 tablet 2    Probiotic Product (PROBIOTIC PO) Take 1 capsule by mouth daily      tamsulosin (FLOMAX) 0.4 MG capsule TAKE 1 CAPSULE BY MOUTH DAILY 90 capsule 1    vitamin B-12 (CYANOCOBALAMIN) 1000 MCG tablet Take 1,000 mcg by mouth daily       triamcinolone (KENALOG) 0.1 % external ointment Apply topically 2 times daily (Patient taking differently: Apply topically 2 times daily. PRN) 30 g 3       Review of Systems:  Pertinent review of system as stated above in HPI    Skin:        Eyes:       ENT:       Respiratory:       Cardiovascular:       Gastroenterology:      Genitourinary:       Musculoskeletal:       Neurologic:       Psychiatric:       Heme/Lymph/Imm:       Endocrine:         Physical Exam:  Vitals: /88   Pulse 88   Ht 1.905 m (6' 3\")   Wt 119.3 kg (263 lb)   BMI 32.87 kg/m      Constitutional: Pleasant, no apparent distress.  Respiratory: Breathing non-labored.   Cardiovascular: Irregular   Neurologic: No gross motor deficits. Alert, awake, and answers questions appropriately   Psychiatric: Affect appropriate.        ALLERGIES  Allergies   Allergen Reactions    No Known Allergies        PAST MEDICAL HISTORY:  Past Medical History:   Diagnosis Date    Acute on chronic congestive heart failure, unspecified heart failure type (H) 6/12/2023    Anxiety state, unspecified     Aortic root dilatation (H)     Arthritis     Ascending aorta dilatation (H)     Basal cell cancer     s/p Mohs nose and bridge of nose on R.    Bunion     CAD (coronary artery disease)     CABG 1992: LIMA to LAD, SANDI to RCA, SVG to OM1 and OM2    Contact dermatitis and other eczema, due to unspecified cause     eczema - has light treatments with Dr. Rivera    Disorders of bursae and tendons in shoulder region, unspecified     Esophageal reflux     Essential hypertension, benign     Gallbladder disease     GERD (gastroesophageal reflux disease) "     Heart attack (H)     Hyperlipidemia LDL goal <70     Left ventricular diastolic dysfunction     Low HDL (under 40) 3/28/2014    Nasal/sinus dis NEC     s/p surgery in 1995    Obesity     Osteoarthrosis, unspecified whether generalized or localized, shoulder region     Other hammer toe (acquired)     Sleep apnea     USES CPAP    Spinal stenosis, unspecified region other than cervical     MRI done 2006    Type 2 diabetes, HbA1c goal < 7% (H) 3/12/2015    Urge incontinence        PAST SURGICAL HISTORY:  Past Surgical History:   Procedure Laterality Date    ABDOMEN SURGERY  1994    gall bladder    BIOPSY      arms    CHOLECYSTECTOMY  6/1994    COLONOSCOPY N/A 4/11/2017    Procedure: COMBINED COLONOSCOPY, SINGLE OR MULTIPLE BIOPSY/POLYPECTOMY BY BIOPSY;  Surgeon: Bladimir Garner MD;  Location:  GI    CV LEFT HEART CATH  5-92, 6-92    Angioplasty    ENT SURGERY  5/1995    sinus surgery    ESOPHAGOSCOPY, GASTROSCOPY, DUODENOSCOPY (EGD), DILATATION, COMBINED  7/17/2014    Procedure: COMBINED ESOPHAGOSCOPY, GASTROSCOPY, DUODENOSCOPY (EGD), DILATATION;  Surgeon: Bladimir Garner MD;  Location:  GI    EYE SURGERY      cataracts removed both eyes    INJECT EPIDURAL LUMBAR      IR PICC REPOSITION RIGHT  9/1/2022    LAMINECTOMY LUMBAR TWO LEVELS N/A 4/29/2015    Procedure: LAMINECTOMY LUMBAR TWO LEVELS;  Surgeon: Bladimir Keenan MD;  Location:  OR    THORACIC SURGERY  11/1992    bypass    [facility] CABG, ARTERY-VEIN, FOUR  11/1992    CABG - LIMA to left anterior descending and saphenous vein bypass graft to the first and second obtuse marginal branch of the circumflex and the right mammary to the right coronary artery    [facility] NONSPECIFIC PROCEDURE  6-94    Gail lap    [facility] NONSPECIFIC PROCEDURE  1995    sinus surgery    [facility] NONSPECIFIC PROCEDURE      bilateral cataract surgery    [facility] COLONOSCOPY THRU STOMA W BIOPSY/CAUTERY TUMOR/POLYP/LESION  5/2007       FAMILY HISTORY:  Family History   Problem Relation Age of Onset     Cancer Brother         MELANOMA    Diabetes Mother         AODM    Thyroid Disease Mother     Diabetes Father         AODM    Myocardial Infarction Father     Cerebrovascular Disease Brother     Parkinsonism Sister     Family History Negative Son     Family History Negative Son     Family History Negative Son     Family History Negative Daughter     Coronary Artery Disease Brother         dod 2019    Cerebrovascular Disease Brother         dod 2019    Other Cancer Brother         dod 2000/melanoma    Breast Cancer Other         in remission reg chkups       SOCIAL HISTORY:  Social History     Socioeconomic History    Marital status:      Spouse name: Anastasia Caballero    Number of children: 4    Years of education: 14   Occupational History    Occupation: retired     Comment:    Tobacco Use    Smoking status: Former     Current packs/day: 0.00     Average packs/day: 2.0 packs/day for 38.2 years (76.3 ttl pk-yrs)     Types: Cigarettes, Cigars, Pipe     Start date: 1954     Quit date: 3/1/1992     Years since quittin.7    Smokeless tobacco: Never    Tobacco comments:     quit in    Vaping Use    Vaping status: Never Used   Substance and Sexual Activity    Alcohol use: Yes     Comment: minimal less than 1/week or month    Drug use: No    Sexual activity: Not Currently     Partners: Female     Birth control/protection: Abstinence, Pull-out method, Condom, Post-menopausal, Natural Family Planning, None   Other Topics Concern     Service Yes     Comment: [employer], served in [employer]    Blood Transfusions Yes     Comment: Unsure if he had one with heart surgery    Caffeine Concern Yes     Comment: occ cofee    Occupational Exposure No    Hobby Hazards No    Sleep Concern Yes     Comment: CPAP    Stress Concern No    Special Diet No    Exercise Yes     Comment: [employer] 3 times a week, biking walking.     Bike Helmet Yes    Seat Belt Yes    Self-Exams Yes     Comment: does HIRAL about once a  month.    Parent/sibling w/ CABG, MI or angioplasty before 65F 55M? No   Social History Narrative        4 kids     Social Drivers of Health     Financial Resource Strain: Low Risk  (9/9/2024)    Financial Resource Strain     Within the past 12 months, have you or your family members you live with been unable to get utilities (heat, electricity) when it was really needed?: No   Food Insecurity: Low Risk  (9/9/2024)    Food Insecurity     Within the past 12 months, did you worry that your food would run out before you got money to buy more?: No     Within the past 12 months, did the food you bought just not last and you didn t have money to get more?: No   Transportation Needs: Low Risk  (9/9/2024)    Transportation Needs     Within the past 12 months, has lack of transportation kept you from medical appointments, getting your medicines, non-medical meetings or appointments, work, or from getting things that you need?: No   Physical Activity: Unknown (9/9/2024)    Exercise Vital Sign     Days of Exercise per Week: 0 days   Stress: No Stress Concern Present (9/9/2024)    [employer] [place] [employer] - Occupational Stress Questionnaire     Feeling of Stress : Only a little   Social Connections: Unknown (9/9/2024)    Social Connection and Isolation Panel [NHANES]     Frequency of Social Gatherings with Friends and Family: Once a week   Interpersonal Safety: Low Risk  (9/9/2024)    Interpersonal Safety     Do you feel physically and emotionally safe where you currently live?: Yes     Within the past 12 months, have you been hit, slapped, kicked or otherwise physically hurt by someone?: No     Within the past 12 months, have you been humiliated or emotionally abused in other ways by your partner or ex-partner?: No   Housing Stability: Low Risk  (9/9/2024)    Housing Stability     Do you have housing? : Yes     Are you worried about losing your housing?: No             CC  Hamzah Hodge MD  [address] [facility]  [address] [facility] [address]  [place] [address]

## 2024-11-19 ENCOUNTER — OFFICE VISIT (OUTPATIENT)
Dept: CARDIOLOGY | Facility: CLINIC | Age: 89
End: 2024-11-19
Payer: COMMERCIAL

## 2024-11-19 VITALS
BODY MASS INDEX: 32.7 KG/M2 | HEIGHT: 75 IN | WEIGHT: 263 LBS | DIASTOLIC BLOOD PRESSURE: 88 MMHG | SYSTOLIC BLOOD PRESSURE: 138 MMHG | HEART RATE: 88 BPM

## 2024-11-19 DIAGNOSIS — I48.0 PAF (PAROXYSMAL ATRIAL FIBRILLATION) (H): Primary | ICD-10-CM

## 2024-11-19 DIAGNOSIS — I95.1 ORTHOSTATIC HYPOTENSION: ICD-10-CM

## 2024-11-19 DIAGNOSIS — R26.9 ABNORMAL GAIT: ICD-10-CM

## 2024-11-19 DIAGNOSIS — I50.9 CONGESTIVE HEART FAILURE, UNSPECIFIED HF CHRONICITY, UNSPECIFIED HEART FAILURE TYPE (H): ICD-10-CM

## 2024-11-19 DIAGNOSIS — Z79.01 CHRONIC ANTICOAGULATION: ICD-10-CM

## 2024-11-19 NOTE — LETTER
11/19/2024    Hamzah Hodge MD  [address]  [place] [address]    RE: Austen Ulrichon       Dear Colleague,     I had the pleasure of seeing Austen Fowler in the [facility].  [facility]  Cardiac Electrophysiology Clinic    Austen Fowler MRN# [id number]   YOB: 1935 Age: 89 year old       I had the pleasure of seeing Austen Fowler  who is a 89 year old male with history of type 2 diabetes, hyperlipidemia, orthostatic hypotension on midodrine, CAD s/p CABG, mild aortic stenosis with dilated ascending aorta, HFpEF, and paroxysmal atrial fibrillation.  He follows with Dr. Lawrence Vaughan in cardiology.  The patient has a history of orthostatic hypotension, which worsened when he was started on metoprolol for atrial fibrillation.  He has been on Eliquis for anticoagulation, but there is concern about falls risk because he is unsteady on his feet.  He is currently on metoprolol tartrate 50 mg twice daily and Eliquis 5 mg twice daily.      Today's Visit:  He is accompanied by his wife Anastasia Caballero and daughter Karyn today, who provide some of the history.  He has been feeling well and has no complaints today.  He was unaware that he was in atrial fibrillation today.  He reports no symptoms from atrial fibrillation.  No chest pain, shortness of breath, or palpitations.  He has not had any dizziness/lightheadedness or syncope.  No recent falls.  He recalls that he fell a couple years ago when he got tangled up in a hose in the yard.  He has been tolerating Eliquis well and has not had any bleeding concerns.      Diagnostic Testing:  EKG today, which I overread, showed Atrial fibrillation rate 86 bpm.  , QTc 397 ms.  EKG 8/7/2023-sinus bradycardia rate 49 bpm.  , , QTc 427 ms.  EKG 7/29/2023-sinus bradycardia rate 50 bpm  EKG 6/23/2023-atrial fibrillation rate 109 bpm    CTA Heart 10/23/2024  Large chicken wing type left atrial  appendage with unusual  orientation. It is pointing superiorly and posteriorly. Appendage  orifice measures 27.1 x 18.7 mm.. Appendage depth is 21.5 mm.  No thrombus is seen within the left atrial appendage.      Holter Monitor 5/12/2023- personally reviewed  3-day Zio patch monitor  Baseline sinus rhythm with heart rates ranging , average 59 bpm  No atrial fibrillation    Echocardiogram 9/11/2024    Very poor images.  Left ventricular systolic function is normal.  The visual ejection fraction is 55-60%.  Moderate valvular aortic stenosis.  mean 10mmHg, calc ROMY 1.5cm, SVI 23.8ml/m2 indicating low flow, low gradient aortic stenosis        Last Comprehensive Metabolic Panel:  Lab Results   Component Value Date     10/14/2024    POTASSIUM 4.5 10/14/2024    CHLORIDE 102 10/14/2024    CO2 29 10/14/2024    ANIONGAP 12 10/14/2024     (H) 10/14/2024    BUN 15.3 10/14/2024    CR 0.89 10/14/2024    GFRESTIMATED 82 10/14/2024    BRANDON 9.8 10/14/2024        Magnesium   Date Value Ref Range Status   06/16/2023 1.7 1.7 - 2.3 mg/dL Final   04/12/2021 1.6 1.6 - 2.3 mg/dL Final        TSH   Date Value Ref Range Status   09/09/2024 1.39 0.30 - 4.20 uIU/mL Final   01/20/2020 1.02 0.40 - 4.00 mU/L Final            Assessment/Plan:    89 year old male with history of type 2 diabetes, hyperlipidemia, orthostatic hypotension on midodrine, CAD s/p CABG, mild aortic stenosis with dilated ascending aorta, HFpEF, and paroxysmal atrial fibrillation.  His recent echocardiogram showed preserved LV function, mild to moderate aortic stenosis.  He had a CTA heart which showed reasonable left atrial appendage anatomy for watchman implant.  He had a 3-day Zio patch monitor in 5/2023 which showed no atrial fibrillation, and heart rates ranging 49 to 108 bpm.  His EKG today showed rate controlled A-fib.    He has been asymptomatic from his atrial fibrillation and has good rate control on metoprolol tartrate 50 mg twice daily.  He did  have sinus bradycardia on his previous EKGs and history of orthostatic hypotension.  If this continues to be an issue, I would recommend decreasing his metoprolol dosage.    We also discussed the other major risk of Afib is that of stroke.  The patient's CHADS VASc is 5 (age, CHF, CAD, DM), which warrants long term anticoagulation.  We discussed the risks and benefits of anticoagulation and the options for different medications.  He is currently on Eliquis for anticoagulation.    HAS-BLED Risk Score: 2  Estimate risk of major bleedin: Risk is 4.1% and 1.88 bleeds per 100 patient-years. Anticoagulation can be considered, patient has moderate risk for major bleeding (~2/100 patient-years).      The patient has an indication for anticoagulation due to atrial arrhythmias and associated high risk for cardioembolic stroke.  I discussed the pathophysiology and management strategies of cardioembolic risk, anticoagulation risks and benefits.  We discussed the Watchman device as an alternative to oral anticoagulation.  I explained the device and implant procedure in detail, including risks and benefits.      He is currently tolerating anticoagulation and has not had any significant bleeding issues or recent falls.  He did seem to have some mild baseline confusion, but his wife and daughter both confirm that he has not had any recent falls and has been steady on his feet.  I recommended continuing oral anticoagulation and holding off on left atrial appendage occlusion at this time.  I encouraged them to contact us if any of his symptoms change or if he has additional concerns.      Continue Eliquis  Consider titrating down metoprolol  Follow up as needed       Edwardo Herrera MD        Orders this Visit:  Orders Placed This Encounter   Procedures     EKG 12-lead complete w/read - Clinics (performed today)     No orders of the defined types were placed in this encounter.    There are no discontinued medications.    Encounter  Diagnoses   Name Primary?     Congestive heart failure, unspecified HF chronicity, unspecified heart failure type (H)      PAF (paroxysmal atrial fibrillation) (H) Yes     Abnormal gait      Orthostatic hypotension      Chronic anticoagulation      Today's clinic visit entailed:  Review of the result(s) of each unique test - echo, CT heart   Assessment requiring an independent historian(s) - family -    The following tests were independently interpreted by me as noted in my documentation: EKG, Holter  41 minutes spent by me on the date of the encounter doing chart review, history and exam, documentation and further activities per the note      CURRENT MEDICATIONS:  Current Outpatient Medications   Medication Sig Dispense Refill     acetaminophen (ACETAMIN) 500 MG tablet Take 1,000 mg by mouth 2 times daily       blood glucose (ONETOUCH ULTRA) test strip CHECK BLOOD SUGAR TWICE DAILY OR AS DIRECTED 200 strip 0     busPIRone (BUSPAR) 5 MG tablet Take 0.5 tablets (2.5 mg) by mouth 2 times daily. 90 tablet 3     Cholecalciferol (VITAMIN D) 2000 UNIT tablet Take 2,000 Units by mouth daily. 90 tablet 3     ELIQUIS ANTICOAGULANT 5 MG tablet TAKE 1 TABLET BY MOUTH TWICE  DAILY 180 tablet 3     finasteride (PROSCAR) 5 MG tablet Take 1 tablet (5 mg) by mouth daily. 90 tablet 3     furosemide (LASIX) 20 MG tablet Take 2 tablets (40 mg) by mouth daily. 180 tablet 3     gabapentin (NEURONTIN) 300 MG capsule 900 mg in  capsule 3     metFORMIN (GLUCOPHAGE XR) 500 MG 24 hr tablet Take 2 tablets (1,000 mg) by mouth 2 times daily (with meals) 360 tablet 2     metoprolol tartrate (LOPRESSOR) 50 MG tablet TAKE 1 TABLET BY MOUTH TWICE  DAILY 180 tablet 1     midodrine (PROAMATINE) 5 MG tablet Take 1 tablet (5 mg) by mouth 2 times daily. 180 tablet 3     multivitamin (OCUVITE) TABS tablet Take 1 tablet by mouth daily       omeprazole (PRILOSEC) 20 MG DR capsule TAKE 1 CAPSULE BY MOUTH DAILY 90 capsule 3     OneTouch UltraSoft 2  "Lancets MISC USE TO TEST BLOOD SUGAR TWICE  DAILY OR AS DIRECTED 180 each 3     ORDER FOR DME Uses Cpap machine for sleep apnea       potassium chloride (KLOR-CON) 20 MEQ packet Take 20 mEq by mouth daily. 90 each 3     pravastatin (PRAVACHOL) 20 MG tablet Take 1 tablet (20 mg) by mouth every evening. 90 tablet 2     Probiotic Product (PROBIOTIC PO) Take 1 capsule by mouth daily       tamsulosin (FLOMAX) 0.4 MG capsule TAKE 1 CAPSULE BY MOUTH DAILY 90 capsule 1     vitamin B-12 (CYANOCOBALAMIN) 1000 MCG tablet Take 1,000 mcg by mouth daily        triamcinolone (KENALOG) 0.1 % external ointment Apply topically 2 times daily (Patient taking differently: Apply topically 2 times daily. PRN) 30 g 3       Review of Systems:  Pertinent review of system as stated above in HPI    Skin:        Eyes:       ENT:       Respiratory:       Cardiovascular:       Gastroenterology:      Genitourinary:       Musculoskeletal:       Neurologic:       Psychiatric:       Heme/Lymph/Imm:       Endocrine:         Physical Exam:  Vitals: /88   Pulse 88   Ht 1.905 m (6' 3\")   Wt 119.3 kg (263 lb)   BMI 32.87 kg/m      Constitutional: Pleasant, no apparent distress.  Respiratory: Breathing non-labored.   Cardiovascular: Irregular   Neurologic: No gross motor deficits. Alert, awake, and answers questions appropriately   Psychiatric: Affect appropriate.        ALLERGIES  Allergies   Allergen Reactions     No Known Allergies        PAST MEDICAL HISTORY:  Past Medical History:   Diagnosis Date     Acute on chronic congestive heart failure, unspecified heart failure type (H) 6/12/2023     Anxiety state, unspecified      Aortic root dilatation (H)      Arthritis      Ascending aorta dilatation (H)      Basal cell cancer     s/p Mohs nose and bridge of nose on R.     Bunion      CAD (coronary artery disease)     CABG 1992: LIMA to LAD, SANDI to RCA, SVG to OM1 and OM2     Contact dermatitis and other eczema, due to unspecified cause     " eczema - has light treatments with Dr. Rivera     Disorders of bursae and tendons in shoulder region, unspecified      Esophageal reflux      Essential hypertension, benign      Gallbladder disease      GERD (gastroesophageal reflux disease)      Heart attack (H)      Hyperlipidemia LDL goal <70      Left ventricular diastolic dysfunction      Low HDL (under 40) 3/28/2014     Nasal/sinus dis NEC     s/p surgery in 1995     Obesity      Osteoarthrosis, unspecified whether generalized or localized, shoulder region      Other hammer toe (acquired)      Sleep apnea     USES CPAP     Spinal stenosis, unspecified region other than cervical     MRI done 2006     Type 2 diabetes, HbA1c goal < 7% (H) 3/12/2015     Urge incontinence        PAST SURGICAL HISTORY:  Past Surgical History:   Procedure Laterality Date     ABDOMEN SURGERY  1994    gall bladder     BIOPSY      arms     CHOLECYSTECTOMY  6/1994     COLONOSCOPY N/A 4/11/2017    Procedure: COMBINED COLONOSCOPY, SINGLE OR MULTIPLE BIOPSY/POLYPECTOMY BY BIOPSY;  Surgeon: Bladimir Garner MD;  Location:  GI     CV LEFT HEART CATH  5-92, 6-92    Angioplasty     ENT SURGERY  5/1995    sinus surgery     ESOPHAGOSCOPY, GASTROSCOPY, DUODENOSCOPY (EGD), DILATATION, COMBINED  7/17/2014    Procedure: COMBINED ESOPHAGOSCOPY, GASTROSCOPY, DUODENOSCOPY (EGD), DILATATION;  Surgeon: Bladimir Garner MD;  Location:  GI     EYE SURGERY      cataracts removed both eyes     INJECT EPIDURAL LUMBAR       IR PICC REPOSITION RIGHT  9/1/2022     LAMINECTOMY LUMBAR TWO LEVELS N/A 4/29/2015    Procedure: LAMINECTOMY LUMBAR TWO LEVELS;  Surgeon: Bladimir Keenan MD;  Location:  OR     THORACIC SURGERY  11/1992    bypass     [facility]-VEIN, FOUR  11/1992    CABG - LIMA to left anterior descending and saphenous vein bypass graft to the first and second obtuse marginal branch of the circumflex and the right mammary to the right coronary artery     [facility] NONSPECIFIC PROCEDURE  6-94     Gail lap     ZZC NONSPECIFIC PROCEDURE      sinus surgery     ZZC NONSPECIFIC PROCEDURE      bilateral cataract surgery     ZZ COLONOSCOPY THRU STOMA W BIOPSY/CAUTERY TUMOR/POLYP/LESION  2007       FAMILY HISTORY:  Family History   Problem Relation Age of Onset     Cancer Brother         MELANOMA     Diabetes Mother         AODM     Thyroid Disease Mother      Diabetes Father         AODM     Myocardial Infarction Father      Cerebrovascular Disease Brother      Parkinsonism Sister      Family History Negative Son      Family History Negative Son      Family History Negative Son      Family History Negative Daughter      Coronary Artery Disease Brother         dod 2019     Cerebrovascular Disease Brother         dod 2019     Other Cancer Brother         dod 2000/melanoma     Breast Cancer Other         in remission reg chkups       SOCIAL HISTORY:  Social History     Socioeconomic History     Marital status:      Spouse name: Anastasia Caballero     Number of children: 4     Years of education: 14   Occupational History     Occupation: retired     Comment:    Tobacco Use     Smoking status: Former     Current packs/day: 0.00     Average packs/day: 2.0 packs/day for 38.2 years (76.3 ttl pk-yrs)     Types: Cigarettes, Cigars, Pipe     Start date: 1954     Quit date: 3/1/1992     Years since quittin.7     Smokeless tobacco: Never     Tobacco comments:     quit in    Vaping Use     Vaping status: Never Used   Substance and Sexual Activity     Alcohol use: Yes     Comment: minimal less than 1/week or month     Drug use: No     Sexual activity: Not Currently     Partners: Female     Birth control/protection: Abstinence, Pull-out method, Condom, Post-menopausal, Natural Family Planning, None   Other Topics Concern      Service Yes     Comment: [employer], served in [employer]     Blood Transfusions Yes     Comment: Unsure if he had one with heart surgery     Caffeine Concern Yes      Comment: occ cofee     Occupational Exposure No     Hobby Hazards No     Sleep Concern Yes     Comment: CPAP     Stress Concern No     Special Diet No     Exercise Yes     Comment: [employer] 3 times a week, biking walking.      Bike Helmet Yes     Seat Belt Yes     Self-Exams Yes     Comment: does HIRAL about once a month.     Parent/sibling w/ CABG, MI or angioplasty before 65F 55M? No   Social History Narrative        4 kids     Social Drivers of Health     Financial Resource Strain: Low Risk  (9/9/2024)    Financial Resource Strain      Within the past 12 months, have you or your family members you live with been unable to get utilities (heat, electricity) when it was really needed?: No   Food Insecurity: Low Risk  (9/9/2024)    Food Insecurity      Within the past 12 months, did you worry that your food would run out before you got money to buy more?: No      Within the past 12 months, did the food you bought just not last and you didn t have money to get more?: No   Transportation Needs: Low Risk  (9/9/2024)    Transportation Needs      Within the past 12 months, has lack of transportation kept you from medical appointments, getting your medicines, non-medical meetings or appointments, work, or from getting things that you need?: No   Physical Activity: Unknown (9/9/2024)    Exercise Vital Sign      Days of Exercise per Week: 0 days   Stress: No Stress Concern Present (9/9/2024)    [place] [place] [employer] - Occupational Stress Questionnaire      Feeling of Stress : Only a little   Social Connections: Unknown (9/9/2024)    Social Connection and Isolation Panel [NHANES]      Frequency of Social Gatherings with Friends and Family: Once a week   Interpersonal Safety: Low Risk  (9/9/2024)    Interpersonal Safety      Do you feel physically and emotionally safe where you currently live?: Yes      Within the past 12 months, have you been hit, slapped, kicked or otherwise physically hurt by someone?:  No      Within the past 12 months, have you been humiliated or emotionally abused in other ways by your partner or ex-partner?: No   Housing Stability: Low Risk  (9/9/2024)    Housing Stability      Do you have housing? : Yes      Are you worried about losing your housing?: No             CC  Hamzah Hodge MD  [address]  [place] [address]      Thank you for allowing me to participate in the care of your patient.      Sincerely,     Edwardo Herrera MD     [facility]  cc:   Hamzah Hodge MD  [address]  [place] [address]

## 2024-12-03 ENCOUNTER — OFFICE VISIT (OUTPATIENT)
Dept: CARDIOLOGY | Facility: CLINIC | Age: 89
End: 2024-12-03
Attending: INTERNAL MEDICINE
Payer: COMMERCIAL

## 2024-12-03 ENCOUNTER — LAB (OUTPATIENT)
Dept: LAB | Facility: CLINIC | Age: 89
End: 2024-12-03
Payer: COMMERCIAL

## 2024-12-03 VITALS
OXYGEN SATURATION: 95 % | HEART RATE: 79 BPM | SYSTOLIC BLOOD PRESSURE: 130 MMHG | BODY MASS INDEX: 32.3 KG/M2 | DIASTOLIC BLOOD PRESSURE: 76 MMHG | WEIGHT: 259.8 LBS | HEIGHT: 75 IN

## 2024-12-03 DIAGNOSIS — I10 BENIGN ESSENTIAL HYPERTENSION: ICD-10-CM

## 2024-12-03 DIAGNOSIS — I50.31 ACUTE DIASTOLIC HEART FAILURE (H): ICD-10-CM

## 2024-12-03 DIAGNOSIS — Z86.79 HISTORY OF ATRIAL FIBRILLATION: ICD-10-CM

## 2024-12-03 DIAGNOSIS — E78.5 HYPERLIPIDEMIA LDL GOAL <70: ICD-10-CM

## 2024-12-03 DIAGNOSIS — I77.810 AORTIC ROOT DILATATION (H): ICD-10-CM

## 2024-12-03 DIAGNOSIS — I48.91 ATRIAL FIBRILLATION, UNSPECIFIED TYPE (H): ICD-10-CM

## 2024-12-03 DIAGNOSIS — I25.10 CORONARY ARTERY DISEASE INVOLVING NATIVE CORONARY ARTERY OF NATIVE HEART WITHOUT ANGINA PECTORIS: ICD-10-CM

## 2024-12-03 LAB
ANION GAP SERPL CALCULATED.3IONS-SCNC: 12 MMOL/L (ref 7–15)
BUN SERPL-MCNC: 16 MG/DL (ref 8–23)
CALCIUM SERPL-MCNC: 9.6 MG/DL (ref 8.8–10.4)
CHLORIDE SERPL-SCNC: 103 MMOL/L (ref 98–107)
CREAT SERPL-MCNC: 0.86 MG/DL (ref 0.67–1.17)
EGFRCR SERPLBLD CKD-EPI 2021: 83 ML/MIN/1.73M2
GLUCOSE SERPL-MCNC: 192 MG/DL (ref 70–99)
HCO3 SERPL-SCNC: 29 MMOL/L (ref 22–29)
POTASSIUM SERPL-SCNC: 4.1 MMOL/L (ref 3.4–5.3)
SODIUM SERPL-SCNC: 144 MMOL/L (ref 135–145)

## 2024-12-03 NOTE — PROGRESS NOTES
Primary Cardiologist: Dr. Hernandez    Reason for Visit: 3 month follow up with repeat BMP    History of Present Illness:   Austen is a very pleasant 89 year old male who with past medical history is notable CAD (CABG in 1991 LIMA to LAD, SVG to OM1 and OM2, SANDI to RCA), brief PAF (in the setting of sepsis), orthostatic hypotension (on midodrine), hypertension, mixed hyperlipidemia, PENELOPE (uses CPAP regularly), chronic HFpEF, mild aortic root dilatation/borderline aortic dilatation, mild aortic stenosis, Type 2 diabetes mellitus, and osteoarthritis.      He returns to clinic with his spouse stating he is doing well. He has no SOB, orthopnea, PND, chest pain, bleeding issues, or palpitations. He denies lightheadedness.     Assessment and Plan:  Austen is a very pleasant 89 year old male who with past medical history is notable CAD (CABG in 1991 LIMA to LAD, SVG to OM1 and OM2, SANDI to RCA), brief PAF (in the setting of sepsis), orthostatic hypotension (on midodrine), hypertension, mixed hyperlipidemia, PENELOPE (uses CPAP regularly), chronic HFpEF, mild aortic root dilatation/borderline aortic dilatation, mild aortic stenosis, Type 2 diabetes mellitus, and osteoarthritis.      Austen is doing well from a cardiovascular standpoint. He has no symptoms to suggest heart failure, ischemia, or arrhythmia. His BMP is unremarkable. He will follow up with Dr. Hernandez in 3-6 months with repeat fasting labs.     45 minutes spent on the date of the encounter with chart review, patient visit, care coordination, and documentation.    The longitudinal plan of care for the diagnose(s)/condition(s) as documented were addressed during this visit. Due to the added complexity in care, I will continue to support Austen Fowler  in the subsequent management and with ongoing continuity of care.     This note was completed in part using Dragon voice recognition software. Although reviewed after completion, some word and grammatical  errors may occur.    Orders this Visit:  No orders of the defined types were placed in this encounter.    No orders of the defined types were placed in this encounter.    There are no discontinued medications.      Encounter Diagnoses   Name Primary?    Acute diastolic heart failure (H)     History of atrial fibrillation     Atrial fibrillation, unspecified type (H)     Hyperlipidemia LDL goal <70     Coronary artery disease involving native coronary artery of native heart without angina pectoris     Benign essential hypertension     Aortic root dilatation (H)        CURRENT MEDICATIONS:  Current Outpatient Medications   Medication Sig Dispense Refill    acetaminophen (ACETAMIN) 500 MG tablet Take 1,000 mg by mouth 2 times daily      blood glucose (ONETOUCH ULTRA) test strip CHECK BLOOD SUGAR TWICE DAILY OR AS DIRECTED 200 strip 0    busPIRone (BUSPAR) 5 MG tablet Take 0.5 tablets (2.5 mg) by mouth 2 times daily. 90 tablet 3    Cholecalciferol (VITAMIN D) 2000 UNIT tablet Take 2,000 Units by mouth daily. 90 tablet 3    ELIQUIS ANTICOAGULANT 5 MG tablet TAKE 1 TABLET BY MOUTH TWICE  DAILY 180 tablet 3    finasteride (PROSCAR) 5 MG tablet Take 1 tablet (5 mg) by mouth daily. 90 tablet 3    furosemide (LASIX) 20 MG tablet Take 2 tablets (40 mg) by mouth daily. 180 tablet 3    gabapentin (NEURONTIN) 300 MG capsule 900 mg in  capsule 3    metFORMIN (GLUCOPHAGE XR) 500 MG 24 hr tablet Take 2 tablets (1,000 mg) by mouth 2 times daily (with meals) 360 tablet 2    metoprolol tartrate (LOPRESSOR) 50 MG tablet TAKE 1 TABLET BY MOUTH TWICE  DAILY 180 tablet 1    midodrine (PROAMATINE) 5 MG tablet Take 1 tablet (5 mg) by mouth 2 times daily. 180 tablet 3    multivitamin (OCUVITE) TABS tablet Take 1 tablet by mouth daily      omeprazole (PRILOSEC) 20 MG DR capsule TAKE 1 CAPSULE BY MOUTH DAILY 90 capsule 3    OneTouch UltraSoft 2 Lancets MISC USE TO TEST BLOOD SUGAR TWICE  DAILY OR AS DIRECTED 180 each 3    ORDER FOR DME  Uses Cpap machine for sleep apnea      potassium chloride (KLOR-CON) 20 MEQ packet Take 20 mEq by mouth daily. 90 each 3    pravastatin (PRAVACHOL) 20 MG tablet Take 1 tablet (20 mg) by mouth every evening. 90 tablet 2    Probiotic Product (PROBIOTIC PO) Take 1 capsule by mouth daily      tamsulosin (FLOMAX) 0.4 MG capsule TAKE 1 CAPSULE BY MOUTH DAILY 90 capsule 1    triamcinolone (KENALOG) 0.1 % external ointment Apply topically 2 times daily (Patient taking differently: Apply topically 2 times daily. PRN) 30 g 3    vitamin B-12 (CYANOCOBALAMIN) 1000 MCG tablet Take 1,000 mcg by mouth daily          ALLERGIES     Allergies   Allergen Reactions    No Known Allergies        PAST MEDICAL HISTORY:  Past Medical History:   Diagnosis Date    Acute on chronic congestive heart failure, unspecified heart failure type (H) 6/12/2023    Anxiety state, unspecified     Aortic root dilatation (H)     Arthritis     Ascending aorta dilatation (H)     Basal cell cancer     s/p Mohs nose and bridge of nose on R.    Bunion     CAD (coronary artery disease)     CABG 1992: LIMA to LAD, SANDI to RCA, SVG to OM1 and OM2    Contact dermatitis and other eczema, due to unspecified cause     eczema - has light treatments with Dr. Rivera    Disorders of bursae and tendons in shoulder region, unspecified     Esophageal reflux     Essential hypertension, benign     Gallbladder disease     GERD (gastroesophageal reflux disease)     Heart attack (H)     Hyperlipidemia LDL goal <70     Left ventricular diastolic dysfunction     Low HDL (under 40) 3/28/2014    Nasal/sinus dis NEC     s/p surgery in 1995    Obesity     Osteoarthrosis, unspecified whether generalized or localized, shoulder region     Other hammer toe (acquired)     Sleep apnea     USES CPAP    Spinal stenosis, unspecified region other than cervical     MRI done 2006    Type 2 diabetes, HbA1c goal < 7% (H) 3/12/2015    Urge incontinence        PAST SURGICAL HISTORY:  Past Surgical  History:   Procedure Laterality Date    ABDOMEN SURGERY  1994    gall bladder    BIOPSY      arms    CHOLECYSTECTOMY  6/1994    COLONOSCOPY N/A 4/11/2017    Procedure: COMBINED COLONOSCOPY, SINGLE OR MULTIPLE BIOPSY/POLYPECTOMY BY BIOPSY;  Surgeon: Bladimir Garner MD;  Location:  GI    CV LEFT HEART CATH  5-92, 6-92    Angioplasty    ENT SURGERY  5/1995    sinus surgery    ESOPHAGOSCOPY, GASTROSCOPY, DUODENOSCOPY (EGD), DILATATION, COMBINED  7/17/2014    Procedure: COMBINED ESOPHAGOSCOPY, GASTROSCOPY, DUODENOSCOPY (EGD), DILATATION;  Surgeon: Bladimir Garner MD;  Location:  GI    EYE SURGERY      cataracts removed both eyes    INJECT EPIDURAL LUMBAR      IR PICC REPOSITION RIGHT  9/1/2022    LAMINECTOMY LUMBAR TWO LEVELS N/A 4/29/2015    Procedure: LAMINECTOMY LUMBAR TWO LEVELS;  Surgeon: Bladimir Keenan MD;  Location:  OR    THORACIC SURGERY  11/1992    bypass    ZZC CABG, ARTERY-VEIN, FOUR  11/1992    CABG - LIMA to left anterior descending and saphenous vein bypass graft to the first and second obtuse marginal branch of the circumflex and the right mammary to the right coronary artery    ZZ NONSPECIFIC PROCEDURE  6-94    Gail lap    ZZC NONSPECIFIC PROCEDURE  1995    sinus surgery    ZZC NONSPECIFIC PROCEDURE      bilateral cataract surgery    ZZ COLONOSCOPY THRU STOMA W BIOPSY/CAUTERY TUMOR/POLYP/LESION  5/2007       FAMILY HISTORY:  Family History   Problem Relation Age of Onset    Cancer Brother         MELANOMA    Diabetes Mother         AODM    Thyroid Disease Mother     Diabetes Father         AODM    Myocardial Infarction Father     Cerebrovascular Disease Brother     Parkinsonism Sister     Family History Negative Son     Family History Negative Son     Family History Negative Son     Family History Negative Daughter     Coronary Artery Disease Brother         dod 2019    Cerebrovascular Disease Brother         dod 2019    Other Cancer Brother         dod 2000/melanoma    Breast Cancer  Other         in remission reg chkups       SOCIAL HISTORY:  Social History     Socioeconomic History    Marital status:      Spouse name: Anastasia Caballero    Number of children: 4    Years of education: 14    Highest education level: None   Occupational History    Occupation: retired     Comment:    Tobacco Use    Smoking status: Former     Current packs/day: 0.00     Average packs/day: 2.0 packs/day for 38.2 years (76.3 ttl pk-yrs)     Types: Cigarettes, Cigars, Pipe     Start date: 1954     Quit date: 3/1/1992     Years since quittin.7    Smokeless tobacco: Never    Tobacco comments:     quit in    Vaping Use    Vaping status: Never Used   Substance and Sexual Activity    Alcohol use: Yes     Comment: minimal less than 1/week or month    Drug use: No    Sexual activity: Not Currently     Partners: Female     Birth control/protection: Abstinence, Pull-out method, Condom, Post-menopausal, Natural Family Planning, None   Other Topics Concern     Service Yes     Comment: [employer], served in [place]    Blood Transfusions Yes     Comment: Unsure if he had one with heart surgery    Caffeine Concern Yes     Comment: occ [employer]fee    Occupational Exposure No    Hobby Hazards No    Sleep Concern Yes     Comment: CPAP    Stress Concern No    Special Diet No    Exercise Yes     Comment: [employer] 3 times a week, biking walking.     Bike Helmet Yes    Seat Belt Yes    Self-Exams Yes     Comment: does HIRAL about once a month.    Parent/sibling w/ CABG, MI or angioplasty before 65F 55M? No   Social History Narrative        4 kids     Social Drivers of Health     Financial Resource Strain: Low Risk  (2024)    Financial Resource Strain     Within the past 12 months, have you or your family members you live with been unable to get utilities (heat, electricity) when it was really needed?: No   Food Insecurity: Low Risk  (2024)    Food Insecurity     Within the past 12 months, did you worry  "that your food would run out before you got money to buy more?: No     Within the past 12 months, did the food you bought just not last and you didn t have money to get more?: No   Transportation Needs: Low Risk  (9/9/2024)    Transportation Needs     Within the past 12 months, has lack of transportation kept you from medical appointments, getting your medicines, non-medical meetings or appointments, work, or from getting things that you need?: No   Physical Activity: Unknown (9/9/2024)    Exercise Vital Sign     Days of Exercise per Week: 0 days   Stress: No Stress Concern Present (9/9/2024)    [place] [employer] - Occupational Stress Questionnaire     Feeling of Stress : Only a little   Social Connections: Unknown (9/9/2024)    Social Connection and Isolation Panel [NHANES]     Frequency of Social Gatherings with Friends and Family: Once a week   Interpersonal Safety: Low Risk  (9/9/2024)    Interpersonal Safety     Do you feel physically and emotionally safe where you currently live?: Yes     Within the past 12 months, have you been hit, slapped, kicked or otherwise physically hurt by someone?: No     Within the past 12 months, have you been humiliated or emotionally abused in other ways by your partner or ex-partner?: No   Housing Stability: Low Risk  (9/9/2024)    Housing Stability     Do you have housing? : Yes     Are you worried about losing your housing?: No       Review of Systems:  Skin:        Eyes:       ENT:       Respiratory:  Positive for sleep apnea, CPAP  Cardiovascular:    Positive for, edema, fatigue  Gastroenterology:      Genitourinary:       Musculoskeletal:       Neurologic:       Psychiatric:       Heme/Lymph/Imm:       Endocrine:         Physical Exam:  Vitals: /76   Pulse 79   Ht 1.905 m (6' 3\")   Wt 117.8 kg (259 lb 12.8 oz)   SpO2 95%   BMI 32.47 kg/m       GEN:  NAD  NECK: No JVD  C/V:  Regular rate and rhythm, no murmur, rub or gallop.  RESP: Clear to " auscultation bilaterally without wheezing, rales, or rhonchi.  GI: Abdomen soft, nontender, nondistended.   EXTREM: No pitting LE edema.   NEURO: Alert and oriented, cooperative. No obvious focal deficits.   PSYCH: Normal affect.  SKIN: Warm and dry.       Recent Lab Results:  LIPID RESULTS:  Lab Results   Component Value Date    CHOL 89 09/11/2024    CHOL 116 06/25/2021    HDL 36 (L) 09/11/2024    HDL 38 (L) 06/25/2021    LDL 37 09/11/2024    LDL 46 06/25/2021    TRIG 82 09/11/2024    TRIG 158 (H) 06/25/2021    CHOLHDLRATIO 3.6 11/12/2015       LIVER ENZYME RESULTS:  Lab Results   Component Value Date    AST 31 09/09/2024    AST 32 06/25/2021    ALT 13 09/11/2024    ALT 26 06/25/2021       CBC RESULTS:  Lab Results   Component Value Date    WBC 8.3 09/09/2024    WBC 7.2 06/25/2021    RBC 4.79 09/09/2024    RBC 4.48 06/25/2021    HGB 12.7 (L) 09/09/2024    HGB 13.0 (L) 06/25/2021    HCT 41.3 09/09/2024    HCT 38.9 (L) 06/25/2021    MCV 86 09/09/2024    MCV 87 06/25/2021    MCH 26.5 09/09/2024    MCH 29.0 06/25/2021    MCHC 30.8 (L) 09/09/2024    MCHC 33.4 06/25/2021    RDW 15.6 (H) 09/09/2024    RDW 15.0 06/25/2021     09/09/2024     06/25/2021       BMP RESULTS:  Lab Results   Component Value Date     12/03/2024     06/25/2021    POTASSIUM 4.1 12/03/2024    POTASSIUM 3.9 10/14/2022    POTASSIUM 3.7 06/25/2021    CHLORIDE 103 12/03/2024    CHLORIDE 103 10/14/2022    CHLORIDE 108 06/25/2021    CO2 29 12/03/2024    CO2 29 10/14/2022    CO2 28 06/25/2021    ANIONGAP 12 12/03/2024    ANIONGAP 7 10/14/2022    ANIONGAP 5 06/25/2021     (H) 12/03/2024     (H) 06/17/2023     (H) 10/14/2022     (H) 06/25/2021    BUN 16.0 12/03/2024    BUN 13 10/14/2022    BUN 15 06/25/2021    CR 0.86 12/03/2024    CR 0.72 06/25/2021    GFRESTIMATED 83 12/03/2024    GFRESTIMATED 85 06/25/2021    GFRESTBLACK >90 06/25/2021    BRANDON 9.6 12/03/2024    BRANDON 9.1 06/25/2021        A1C RESULTS:  Lab  Results   Component Value Date    A1C 7.1 (H) 09/09/2024    A1C 6.6 (H) 06/25/2021       INR RESULTS:  Lab Results   Component Value Date    INR 1.10 08/19/2014             Lyndsey Cameron PA-C  December 3, 2024

## 2024-12-03 NOTE — LETTER
12/3/2024    Hamzah Hodge MD  [address]  [place] [address]    RE: Austen Fowler       Dear Colleague,     I had the pleasure of seeing Austen Fowler in the [facility].  Primary Cardiologist: Dr. Hernandez    Reason for Visit: 3 month follow up with repeat BMP    History of Present Illness:   Austen is a very pleasant 89 year old male who with past medical history is notable CAD (CABG in 1991 LIMA to LAD, SVG to OM1 and OM2, SANDI to RCA), brief PAF (in the setting of sepsis), orthostatic hypotension (on midodrine), hypertension, mixed hyperlipidemia, PENELOPE (uses CPAP regularly), chronic HFpEF, mild aortic root dilatation/borderline aortic dilatation, mild aortic stenosis, Type 2 diabetes mellitus, and osteoarthritis.      He returns to clinic with his spouse stating he is doing well. He has no SOB, orthopnea, PND, chest pain, bleeding issues, or palpitations. He denies lightheadedness.     Assessment and Plan:  Austen is a very pleasant 89 year old male who with past medical history is notable CAD (CABG in 1991 LIMA to LAD, SVG to OM1 and OM2, SANDI to RCA), brief PAF (in the setting of sepsis), orthostatic hypotension (on midodrine), hypertension, mixed hyperlipidemia, PENELOPE (uses CPAP regularly), chronic HFpEF, mild aortic root dilatation/borderline aortic dilatation, mild aortic stenosis, Type 2 diabetes mellitus, and osteoarthritis.      Austen is doing well from a cardiovascular standpoint. He has no symptoms to suggest heart failure, ischemia, or arrhythmia. His BMP is unremarkable. He will follow up with Dr. Hernandez in 3-6 months with repeat fasting labs.     45 minutes spent on the date of the encounter with chart review, patient visit, care coordination, and documentation.    The longitudinal plan of care for the diagnose(s)/condition(s) as documented were addressed during this visit. Due to the added complexity in care, I will continue to support Austen Fowler   in the subsequent management and with ongoing continuity of care.     This note was completed in part using Dragon voice recognition software. Although reviewed after completion, some word and grammatical errors may occur.    Orders this Visit:  No orders of the defined types were placed in this encounter.    No orders of the defined types were placed in this encounter.    There are no discontinued medications.      Encounter Diagnoses   Name Primary?     Acute diastolic heart failure (H)      History of atrial fibrillation      Atrial fibrillation, unspecified type (H)      Hyperlipidemia LDL goal <70      Coronary artery disease involving native coronary artery of native heart without angina pectoris      Benign essential hypertension      Aortic root dilatation (H)        CURRENT MEDICATIONS:  Current Outpatient Medications   Medication Sig Dispense Refill     acetaminophen (ACETAMIN) 500 MG tablet Take 1,000 mg by mouth 2 times daily       blood glucose (ONETOUCH ULTRA) test strip CHECK BLOOD SUGAR TWICE DAILY OR AS DIRECTED 200 strip 0     busPIRone (BUSPAR) 5 MG tablet Take 0.5 tablets (2.5 mg) by mouth 2 times daily. 90 tablet 3     Cholecalciferol (VITAMIN D) 2000 UNIT tablet Take 2,000 Units by mouth daily. 90 tablet 3     ELIQUIS ANTICOAGULANT 5 MG tablet TAKE 1 TABLET BY MOUTH TWICE  DAILY 180 tablet 3     finasteride (PROSCAR) 5 MG tablet Take 1 tablet (5 mg) by mouth daily. 90 tablet 3     furosemide (LASIX) 20 MG tablet Take 2 tablets (40 mg) by mouth daily. 180 tablet 3     gabapentin (NEURONTIN) 300 MG capsule 900 mg in  capsule 3     metFORMIN (GLUCOPHAGE XR) 500 MG 24 hr tablet Take 2 tablets (1,000 mg) by mouth 2 times daily (with meals) 360 tablet 2     metoprolol tartrate (LOPRESSOR) 50 MG tablet TAKE 1 TABLET BY MOUTH TWICE  DAILY 180 tablet 1     midodrine (PROAMATINE) 5 MG tablet Take 1 tablet (5 mg) by mouth 2 times daily. 180 tablet 3     multivitamin (OCUVITE) TABS tablet Take 1  tablet by mouth daily       omeprazole (PRILOSEC) 20 MG DR capsule TAKE 1 CAPSULE BY MOUTH DAILY 90 capsule 3     OneTouch UltraSoft 2 Lancets MISC USE TO TEST BLOOD SUGAR TWICE  DAILY OR AS DIRECTED 180 each 3     ORDER FOR DME Uses Cpap machine for sleep apnea       potassium chloride (KLOR-CON) 20 MEQ packet Take 20 mEq by mouth daily. 90 each 3     pravastatin (PRAVACHOL) 20 MG tablet Take 1 tablet (20 mg) by mouth every evening. 90 tablet 2     Probiotic Product (PROBIOTIC PO) Take 1 capsule by mouth daily       tamsulosin (FLOMAX) 0.4 MG capsule TAKE 1 CAPSULE BY MOUTH DAILY 90 capsule 1     triamcinolone (KENALOG) 0.1 % external ointment Apply topically 2 times daily (Patient taking differently: Apply topically 2 times daily. PRN) 30 g 3     vitamin B-12 (CYANOCOBALAMIN) 1000 MCG tablet Take 1,000 mcg by mouth daily          ALLERGIES     Allergies   Allergen Reactions     No Known Allergies        PAST MEDICAL HISTORY:  Past Medical History:   Diagnosis Date     Acute on chronic congestive heart failure, unspecified heart failure type (H) 6/12/2023     Anxiety state, unspecified      Aortic root dilatation (H)      Arthritis      Ascending aorta dilatation (H)      Basal cell cancer     s/p Mohs nose and bridge of nose on R.     Bunion      CAD (coronary artery disease)     CABG 1992: LIMA to LAD, SANDI to RCA, SVG to OM1 and OM2     Contact dermatitis and other eczema, due to unspecified cause     eczema - has light treatments with Dr. Rivera     Disorders of bursae and tendons in shoulder region, unspecified      Esophageal reflux      Essential hypertension, benign      Gallbladder disease      GERD (gastroesophageal reflux disease)      Heart attack (H)      Hyperlipidemia LDL goal <70      Left ventricular diastolic dysfunction      Low HDL (under 40) 3/28/2014     Nasal/sinus dis NEC     s/p surgery in 1995     Obesity      Osteoarthrosis, unspecified whether generalized or localized, shoulder region       Other hammer toe (acquired)      Sleep apnea     USES CPAP     Spinal stenosis, unspecified region other than cervical     MRI done 2006     Type 2 diabetes, HbA1c goal < 7% (H) 3/12/2015     Urge incontinence        PAST SURGICAL HISTORY:  Past Surgical History:   Procedure Laterality Date     ABDOMEN SURGERY  1994    gall bladder     BIOPSY      arms     CHOLECYSTECTOMY  6/1994     COLONOSCOPY N/A 4/11/2017    Procedure: COMBINED COLONOSCOPY, SINGLE OR MULTIPLE BIOPSY/POLYPECTOMY BY BIOPSY;  Surgeon: Bladimir Garner MD;  Location:  GI     CV LEFT HEART CATH  5-92, 6-92    Angioplasty     ENT SURGERY  5/1995    sinus surgery     ESOPHAGOSCOPY, GASTROSCOPY, DUODENOSCOPY (EGD), DILATATION, COMBINED  7/17/2014    Procedure: COMBINED ESOPHAGOSCOPY, GASTROSCOPY, DUODENOSCOPY (EGD), DILATATION;  Surgeon: Bladimir Garner MD;  Location:  GI     EYE SURGERY      cataracts removed both eyes     INJECT EPIDURAL LUMBAR       IR PICC REPOSITION RIGHT  9/1/2022     LAMINECTOMY LUMBAR TWO LEVELS N/A 4/29/2015    Procedure: LAMINECTOMY LUMBAR TWO LEVELS;  Surgeon: Bladimir Keenan MD;  Location:  OR     THORACIC SURGERY  11/1992    bypass     [facility] CABG, ARTERY-VEIN, FOUR  11/1992    CABG - LIMA to left anterior descending and saphenous vein bypass graft to the first and second obtuse marginal branch of the circumflex and the right mammary to the right coronary artery     [facility] NONSPECIFIC PROCEDURE  6-94    Gail lap     [facility] NONSPECIFIC PROCEDURE  1995    sinus surgery     [facility] NONSPECIFIC PROCEDURE      bilateral cataract surgery     [facility] COLONOSCOPY THRU STOMA W BIOPSY/CAUTERY TUMOR/POLYP/LESION  5/2007       FAMILY HISTORY:  Family History   Problem Relation Age of Onset     Cancer Brother         MELANOMA     Diabetes Mother         AODM     Thyroid Disease Mother      Diabetes Father         AODM     Myocardial Infarction Father      Cerebrovascular Disease Brother      Parkinsonism Sister      Family History  Negative Son      Family History Negative Son      Family History Negative Son      Family History Negative Daughter      Coronary Artery Disease Brother         dod 2019     Cerebrovascular Disease Brother         dod 2019     Other Cancer Brother         dod 2000/melanoma     Breast Cancer Other         in remission reg chkups       SOCIAL HISTORY:  Social History     Socioeconomic History     Marital status:      Spouse name: Anastasia Caballero     Number of children: 4     Years of education: 14     Highest education level: None   Occupational History     Occupation: retired     Comment:    Tobacco Use     Smoking status: Former     Current packs/day: 0.00     Average packs/day: 2.0 packs/day for 38.2 years (76.3 ttl pk-yrs)     Types: Cigarettes, Cigars, Pipe     Start date: 1954     Quit date: 3/1/1992     Years since quittin.7     Smokeless tobacco: Never     Tobacco comments:     quit in    Vaping Use     Vaping status: Never Used   Substance and Sexual Activity     Alcohol use: Yes     Comment: minimal less than 1/week or month     Drug use: No     Sexual activity: Not Currently     Partners: Female     Birth control/protection: Abstinence, Pull-out method, Condom, Post-menopausal, Natural Family Planning, None   Other Topics Concern      Service Yes     Comment: [employer], served in [employer]     Blood Transfusions Yes     Comment: Unsure if he had one with heart surgery     Caffeine Concern Yes     Comment: occ cofee     Occupational Exposure No     Hobby Hazards No     Sleep Concern Yes     Comment: CPAP     Stress Concern No     Special Diet No     Exercise Yes     Comment: [employer] 3 times a week, biking walking.      Bike Helmet Yes     Seat Belt Yes     Self-Exams Yes     Comment: does HIRAL about once a month.     Parent/sibling w/ CABG, MI or angioplasty before 65F 55M? No   Social History Narrative        4 kids     Social Drivers of Health     Financial Resource  Strain: Low Risk  (9/9/2024)    Financial Resource Strain      Within the past 12 months, have you or your family members you live with been unable to get utilities (heat, electricity) when it was really needed?: No   Food Insecurity: Low Risk  (9/9/2024)    Food Insecurity      Within the past 12 months, did you worry that your food would run out before you got money to buy more?: No      Within the past 12 months, did the food you bought just not last and you didn t have money to get more?: No   Transportation Needs: Low Risk  (9/9/2024)    Transportation Needs      Within the past 12 months, has lack of transportation kept you from medical appointments, getting your medicines, non-medical meetings or appointments, work, or from getting things that you need?: No   Physical Activity: Unknown (9/9/2024)    Exercise Vital Sign      Days of Exercise per Week: 0 days   Stress: No Stress Concern Present (9/9/2024)    [place] [employer] - Occupational Stress Questionnaire      Feeling of Stress : Only a little   Social Connections: Unknown (9/9/2024)    Social Connection and Isolation Panel [NHANES]      Frequency of Social Gatherings with Friends and Family: Once a week   Interpersonal Safety: Low Risk  (9/9/2024)    Interpersonal Safety      Do you feel physically and emotionally safe where you currently live?: Yes      Within the past 12 months, have you been hit, slapped, kicked or otherwise physically hurt by someone?: No      Within the past 12 months, have you been humiliated or emotionally abused in other ways by your partner or ex-partner?: No   Housing Stability: Low Risk  (9/9/2024)    Housing Stability      Do you have housing? : Yes      Are you worried about losing your housing?: No       Review of Systems:  Skin:        Eyes:       ENT:       Respiratory:  Positive for sleep apnea, CPAP  Cardiovascular:    Positive for, edema, fatigue  Gastroenterology:      Genitourinary:      "  Musculoskeletal:       Neurologic:       Psychiatric:       Heme/Lymph/Imm:       Endocrine:         Physical Exam:  Vitals: /76   Pulse 79   Ht 1.905 m (6' 3\")   Wt 117.8 kg (259 lb 12.8 oz)   SpO2 95%   BMI 32.47 kg/m       GEN:  NAD  NECK: No JVD  C/V:  Regular rate and rhythm, no murmur, rub or gallop.  RESP: Clear to auscultation bilaterally without wheezing, rales, or rhonchi.  GI: Abdomen soft, nontender, nondistended.   EXTREM: No pitting LE edema.   NEURO: Alert and oriented, cooperative. No obvious focal deficits.   PSYCH: Normal affect.  SKIN: Warm and dry.       Recent Lab Results:  LIPID RESULTS:  Lab Results   Component Value Date    CHOL 89 09/11/2024    CHOL 116 06/25/2021    HDL 36 (L) 09/11/2024    HDL 38 (L) 06/25/2021    LDL 37 09/11/2024    LDL 46 06/25/2021    TRIG 82 09/11/2024    TRIG 158 (H) 06/25/2021    CHOLHDLRATIO 3.6 11/12/2015       LIVER ENZYME RESULTS:  Lab Results   Component Value Date    AST 31 09/09/2024    AST 32 06/25/2021    ALT 13 09/11/2024    ALT 26 06/25/2021       CBC RESULTS:  Lab Results   Component Value Date    WBC 8.3 09/09/2024    WBC 7.2 06/25/2021    RBC 4.79 09/09/2024    RBC 4.48 06/25/2021    HGB 12.7 (L) 09/09/2024    HGB 13.0 (L) 06/25/2021    HCT 41.3 09/09/2024    HCT 38.9 (L) 06/25/2021    MCV 86 09/09/2024    MCV 87 06/25/2021    MCH 26.5 09/09/2024    MCH 29.0 06/25/2021    MCHC 30.8 (L) 09/09/2024    MCHC 33.4 06/25/2021    RDW 15.6 (H) 09/09/2024    RDW 15.0 06/25/2021     09/09/2024     06/25/2021       BMP RESULTS:  Lab Results   Component Value Date     12/03/2024     06/25/2021    POTASSIUM 4.1 12/03/2024    POTASSIUM 3.9 10/14/2022    POTASSIUM 3.7 06/25/2021    CHLORIDE 103 12/03/2024    CHLORIDE 103 10/14/2022    CHLORIDE 108 06/25/2021    CO2 29 12/03/2024    CO2 29 10/14/2022    CO2 28 06/25/2021    ANIONGAP 12 12/03/2024    ANIONGAP 7 10/14/2022    ANIONGAP 5 06/25/2021     (H) 12/03/2024    GLC " 326 (H) 06/17/2023     (H) 10/14/2022     (H) 06/25/2021    BUN 16.0 12/03/2024    BUN 13 10/14/2022    BUN 15 06/25/2021    CR 0.86 12/03/2024    CR 0.72 06/25/2021    GFRESTIMATED 83 12/03/2024    GFRESTIMATED 85 06/25/2021    GFRESTBLACK >90 06/25/2021    BRANDON 9.6 12/03/2024    BRANDON 9.1 06/25/2021        A1C RESULTS:  Lab Results   Component Value Date    A1C 7.1 (H) 09/09/2024    A1C 6.6 (H) 06/25/2021       INR RESULTS:  Lab Results   Component Value Date    INR 1.10 08/19/2014             Lyndsey Cameron PA-C  December 3, 2024       Thank you for allowing me to participate in the care of your patient.      Sincerely,     Lyndsey Cameron PA-C     [facility]  cc:   Lino Hernandez MD  [address]  [place] [id number]

## 2024-12-08 NOTE — PROGRESS NOTES
Department of Neurology  Movement Disorders Division   Return Patient Visit    Patient: Austen Fowler   MRN: [id number]   : 1935   Date of Visit: December 10, 2024  PCP: Hamzah Hodge MD   Referring provider: Tony Robbins    CC:  Return for tremor and parkinsonism    Interval history:  Mr. Fowler is a 89 year old male with history significant for multiple medical problems as well as polypharmacy who presents to movement disorder clinic for follow-up. Last visit was several months ago, with a plan to continue meds and supportive care. He is accompanied by wife and daughter, who provide collateral history.    He has undergone quite a few different change of medications over time.  They cannot recall the reason but his propranolol was thought to be better to stay on an immediate release basis instead of converting to extended release.    The primary care physician went through different meds trying to reduce polypharmacy and it seems like, since September of this year, correlating with gabapentin reduction, his tremors reduced significantly, to the point that they are nonbothersome at this point.    He still struggles with his balance, for which he uses a stick to walk, otherwise he may lose his balance.  Luckily, he has not had a fall or a close call in a few years, but he still has a very cautious gait.  It seems like depending on what kind of shoe he is wearing, he may or may not have some shuffling, but the wife says that he is picking up his feet more.  He is trying to exercise as much as possible but most of that is when physical therapists work with him.    No choking episodes, no speech issues to report.    No nonmotor features appreciated with Parkinson's disease.  A problem that they do bring up that he has been a little more forgetful, however he is still fairly independent to do most of his ADLs, and they have overtime come up with a systemic which both daughter and wife do most of the chores  "and help with medications.  We briefly touched on doing a formal neuropsychological evaluation, and that was declined because his wife \"did not feel that it was that bad\" but they just wanted to make a note that he has been a little more forgetful than what he used to.    Otherwise no other issues to report today      ROS:  All others negative except as listed above. Pertinent movement disorders-specific ROS listed above.    Past Medical History:   Diagnosis Date    Acute on chronic congestive heart failure, unspecified heart failure type (H) 6/12/2023    Anxiety state, unspecified     Aortic root dilatation (H)     Arthritis     Ascending aorta dilatation (H)     Basal cell cancer     s/p Mohs nose and bridge of nose on R.    Bunion     CAD (coronary artery disease)     CABG 1992: LIMA to LAD, SANDI to RCA, SVG to OM1 and OM2    Contact dermatitis and other eczema, due to unspecified cause     eczema - has light treatments with Dr. Rivera    Disorders of bursae and tendons in shoulder region, unspecified     Esophageal reflux     Essential hypertension, benign     Gallbladder disease     GERD (gastroesophageal reflux disease)     Heart attack (H)     Hyperlipidemia LDL goal <70     Left ventricular diastolic dysfunction     Low HDL (under 40) 3/28/2014    Nasal/sinus dis NEC     s/p surgery in 1995    Obesity     Osteoarthrosis, unspecified whether generalized or localized, shoulder region     Other hammer toe (acquired)     Sleep apnea     USES CPAP    Spinal stenosis, unspecified region other than cervical     MRI done 2006    Type 2 diabetes, HbA1c goal < 7% (H) 3/12/2015    Urge incontinence         Past Surgical History:   Procedure Laterality Date    ABDOMEN SURGERY  1994    gall bladder    BIOPSY      arms    CHOLECYSTECTOMY  6/1994    COLONOSCOPY N/A 4/11/2017    Procedure: COMBINED COLONOSCOPY, SINGLE OR MULTIPLE BIOPSY/POLYPECTOMY BY BIOPSY;  Surgeon: Bladimir Garner MD;  Location:  GI    CV LEFT HEART " CATH  5-92, 6-92    Angioplasty    ENT SURGERY  5/1995    sinus surgery    ESOPHAGOSCOPY, GASTROSCOPY, DUODENOSCOPY (EGD), DILATATION, COMBINED  7/17/2014    Procedure: COMBINED ESOPHAGOSCOPY, GASTROSCOPY, DUODENOSCOPY (EGD), DILATATION;  Surgeon: Bladimir Garner MD;  Location:  GI    EYE SURGERY      cataracts removed both eyes    INJECT EPIDURAL LUMBAR      IR PICC REPOSITION RIGHT  9/1/2022    LAMINECTOMY LUMBAR TWO LEVELS N/A 4/29/2015    Procedure: LAMINECTOMY LUMBAR TWO LEVELS;  Surgeon: Bladimir Keenan MD;  Location:  OR    THORACIC SURGERY  11/1992    bypass    [facility] CABG, ARTERY-VEIN, FOUR  11/1992    CABG - LIMA to left anterior descending and saphenous vein bypass graft to the first and second obtuse marginal branch of the circumflex and the right mammary to the right coronary artery    [facility] NONSPECIFIC PROCEDURE  6-94    Gail lap    [facility] NONSPECIFIC PROCEDURE  1995    sinus surgery    [facility] NONSPECIFIC PROCEDURE      bilateral cataract surgery    [facility] COLONOSCOPY THRU STOMA W BIOPSY/CAUTERY TUMOR/POLYP/LESION  5/2007          Current Outpatient Medications:     acetaminophen (ACETAMIN) 500 MG tablet, Take 1,000 mg by mouth 2 times daily, Disp: , Rfl:     blood glucose (ONETOUCH ULTRA) test strip, CHECK BLOOD SUGAR TWICE DAILY OR AS DIRECTED, Disp: 200 strip, Rfl: 0    busPIRone (BUSPAR) 5 MG tablet, Take 0.5 tablets (2.5 mg) by mouth 2 times daily., Disp: 90 tablet, Rfl: 3    Cholecalciferol (VITAMIN D) 2000 UNIT tablet, Take 2,000 Units by mouth daily., Disp: 90 tablet, Rfl: 3    ELIQUIS ANTICOAGULANT 5 MG tablet, TAKE 1 TABLET BY MOUTH TWICE  DAILY, Disp: 180 tablet, Rfl: 3    finasteride (PROSCAR) 5 MG tablet, Take 1 tablet (5 mg) by mouth daily., Disp: 90 tablet, Rfl: 3    furosemide (LASIX) 20 MG tablet, Take 2 tablets (40 mg) by mouth daily., Disp: 180 tablet, Rfl: 3    gabapentin (NEURONTIN) 300 MG capsule, 900 mg in PM, Disp: 270 capsule, Rfl: 3    metFORMIN (GLUCOPHAGE XR) 500 MG 24 hr  tablet, Take 2 tablets (1,000 mg) by mouth 2 times daily (with meals), Disp: 360 tablet, Rfl: 2    metoprolol tartrate (LOPRESSOR) 50 MG tablet, TAKE 1 TABLET BY MOUTH TWICE  DAILY, Disp: 180 tablet, Rfl: 1    midodrine (PROAMATINE) 5 MG tablet, Take 1 tablet (5 mg) by mouth 2 times daily., Disp: 180 tablet, Rfl: 3    multivitamin (OCUVITE) TABS tablet, Take 1 tablet by mouth daily, Disp: , Rfl:     omeprazole (PRILOSEC) 20 MG DR capsule, TAKE 1 CAPSULE BY MOUTH DAILY, Disp: 90 capsule, Rfl: 3    OneTouch UltraSoft 2 Lancets MISC, USE TO TEST BLOOD SUGAR TWICE  DAILY OR AS DIRECTED, Disp: 180 each, Rfl: 3    ORDER FOR DME, Uses Cpap machine for sleep apnea, Disp: , Rfl:     potassium chloride (KLOR-CON) 20 MEQ packet, Take 20 mEq by mouth daily., Disp: 90 each, Rfl: 3    pravastatin (PRAVACHOL) 20 MG tablet, Take 1 tablet (20 mg) by mouth every evening., Disp: 90 tablet, Rfl: 2    Probiotic Product (PROBIOTIC PO), Take 1 capsule by mouth daily, Disp: , Rfl:     tamsulosin (FLOMAX) 0.4 MG capsule, TAKE 1 CAPSULE BY MOUTH DAILY, Disp: 90 capsule, Rfl: 1    triamcinolone (KENALOG) 0.1 % external ointment, Apply topically 2 times daily, Disp: 30 g, Rfl: 3    vitamin B-12 (CYANOCOBALAMIN) 1000 MCG tablet, Take 1,000 mcg by mouth daily , Disp: , Rfl:      Allergies   Allergen Reactions    No Known Allergies         Family History   Problem Relation Age of Onset    Cancer Brother         MELANOMA    Diabetes Mother         AODM    Thyroid Disease Mother     Diabetes Father         AODM    Myocardial Infarction Father     Cerebrovascular Disease Brother     Parkinsonism Sister     Family History Negative Son     Family History Negative Son     Family History Negative Son     Family History Negative Daughter     Coronary Artery Disease Brother         dod 2019    Cerebrovascular Disease Brother         dod 2019    Other Cancer Brother         dod 2000/melanoma    Breast Cancer Other         in remission reg chkups         Social History:  He  reports that he quit smoking about 32 years ago. His smoking use included cigarettes, cigars, and pipe. He started smoking about 70 years ago. He has a 76.3 pack-year smoking history. He has never used smokeless tobacco. He reports current alcohol use. He reports that he does not use drugs.     PHYSICAL EXAM:  BP (!) 146/84   Pulse 73   Wt 118.5 kg (261 lb 3.2 oz)   SpO2 98%   BMI 32.65 kg/m       NEURO:  UPDRS  Time:: 1600  Medication: Off  R Brain DBS:: None  L Brain DBS:: None  Dyskinesia (LID): No  Did LID interfere: No  Speech: (0) Normal: No speech problems.  Facial Expression: (0) Normal: Normal facial expression.  Rigidity Neck: (0) Normal: No rigidity.  Rigidity RUE: (0) Normal: No rigidity.  Rigidity LUE: (0) Normal: No rigidity.  Rigidity RLE: (0) Normal: No rigidity.  Rigidity LLE: (0) Normal: No rigidity.  Finger Taps R: (1) Slight: Any of the following: a) the regular rhythm is broken with one with one or two interruptions or hesitations of the movement  b) slight slowing  c) the amplitude decrements near the end of the 10 movements.  Finger Taps L: (1) Slight: Any of the following: a) the regular rhythm is broken with one with one or two interruptions or hesitations of the movement  b) slight slowing  c) the amplitude decrements near the end of the 10 movements.  Hand Mvt R: (1) Slight: Any of the following: a) the regular rhythm is broken with one with one or two interruptions or hesitations of the movement  b) slight slowing  c) the amplitude decrements near the end of the 10 movements.  Hand Mvt L: (1) Slight: Any of the following: a) the regular rhythm is broken with one with one or two interruptions or hesitations of the movement  b) slight slowing  c) the amplitude decrements near the end of the 10 movements.  Pron-/Supinate R: (1) Slight: Any of the following: a) the regular rhythm is broken with one with one or two interruptions or hesitations of the movement  b)  slight slowing  c) the amplitude decrements near the end of the 10 movements.  Pron-/Supinate L: (1) Slight: Any of the following: a) the regular rhythm is broken with one with one or two interruptions or hesitations of the movement  b) slight slowing  c) the amplitude decrements near the end of the 10 movements.  Toe Tap R: (0) Normal: No problems.  Toe Tap L: (0) Normal: No problems.  Leg Agility R: (0) Normal: No problems.  Leg Agility L: (0) Normal: No problems.  Arise From Chair: (0) Normal: No problems. Able to arise quickly without hesitation.  Gait: (0) Normal: No problems.  Gait Freezing: (0) Normal: No freezing.  Postural Stability: (0) Normal: No problems. Recovers with one or two steps.  Posture: (1) Slight: Not quite erect, but could be normal for older persons.  Global Spont Mvt: (0) Normal: No problems.  Postural Tremor RUE: (1) Slight: Tremor is present but less than 1 cm in amplitude.  Postural Tremor LUE: (1) Slight: Tremor is present but less than 1 cm in amplitude.  Kinetic Tremor RUE: (2) Mild: Tremor is at least 1 but less than 3 cm in amplitude.  Kinetic Tremor LUE: (2) Mild: Tremor is at least 1 but less than 3 cm in amplitude.  Rest Tremor RUE: (0) Normal: No tremor.  Rest Tremor LUE: (0) Normal: No tremor.  Rest Tremor RLE: (0) Normal: No tremor.  Rest Tremor LLE: (0) Normal: No tremor.  Rest Tremor Lip/Jaw: (1) Slight: < 1 cm in maximal amplitude.  Rest Tremor Constancy: (1) Slight: Tremor at rest is present 25% of the entire examination period.  Total Right: 6  Total Left: 6  Axial Total: 1  Total: 15    DATA REVIEWED:  Reviewed pertinent data available in [facility].    ASSESSMENT:  89-year-old male with a multifactorial gait disorder, some signs of parkinsonism and a mixed picture of a tremor, here for follow-up.  Overall I do suspect that he may have some potential underlying parkinsonism, there is a chance that some of his complaints are arising from polypharmacy, he does have objective  improvements and his tremor correlating with reduction in gabapentin, so I suspect that he might have had a component of potential myoclonic tremor that has greatly alleviated with reduction of gabapentin.    PLAN:  -Reviewed impression and plan with patient and family    - Continue supportive care.    - Since they feel like his symptoms are at a point they are nondisabling motorically, I think it is reasonable to continue to avoid introducing new medications.  I also emphasized the fact that he might be very sensitive to centrally acting medications, and virtually any medication can potentially cause a tremor, so he should be careful with introducing new medicines or uptitrating existing meds.    - Continue physical therapy, he was encouraged to work with the exercise that he has been given by the therapist so he can do those while he is waiting for the subsequent physical therapy appointment.    - In the future, should symptoms persist or worsen despite current meds remaining unchanged, we could potentially entertain a trial of levodopa to see if he can provide some benefit.    - Will continue to follow him along.  Next visit to be scheduled the next 3 to 4 months.  Sooner if needed.  Kong to contact me back for any questions or concerns.    There are no Patient Instructions on file for this visit.      Bharath Cyr MD (Leo) (he/him/his)   of Neurology  [facility]  Department of Neurology      Thank you for referring Austen Fowler to our [facility] Movement Disorders Program, we appreciate the opportunity of being your partner in healthcare. Please feel free to reach out to us at any point if any questions or concerns about this visit.    Attending attestation: Today I spent a total 45 minutes on this case, in face-to-face, examining, obtaining history, providing education and coordination of care. Additional time was spent in chart review, ancillary data, and documentation as  delineated above.    The longitudinal plan of care for Griffin was addressed during this visit. Due to the added complexity in care, I will continue to support Austen Fowler in the subsequent management of this condition(s) and with the ongoing continuity of care of this condition(s).

## 2024-12-09 ENCOUNTER — OFFICE VISIT (OUTPATIENT)
Dept: NEUROLOGY | Facility: CLINIC | Age: 89
End: 2024-12-09
Payer: COMMERCIAL

## 2024-12-09 VITALS
SYSTOLIC BLOOD PRESSURE: 146 MMHG | OXYGEN SATURATION: 98 % | BODY MASS INDEX: 32.65 KG/M2 | DIASTOLIC BLOOD PRESSURE: 84 MMHG | HEART RATE: 73 BPM | WEIGHT: 261.2 LBS

## 2024-12-09 DIAGNOSIS — R41.3 MEMORY CHANGE: ICD-10-CM

## 2024-12-09 DIAGNOSIS — R25.1 TREMOR: ICD-10-CM

## 2024-12-09 PROCEDURE — 99215 OFFICE O/P EST HI 40 MIN: CPT | Performed by: PSYCHIATRY & NEUROLOGY

## 2024-12-09 PROCEDURE — G2211 COMPLEX E/M VISIT ADD ON: HCPCS | Performed by: PSYCHIATRY & NEUROLOGY

## 2024-12-09 ASSESSMENT — UNIFIED PARKINSONS DISEASE RATING SCALE (UPDRS)
AMPLITUDE_LIP_JAW: (1) SLIGHT: < 1 CM IN MAXIMAL AMPLITUDE.
PARKINSONS_MEDS: OFF
TOETAPPING_LEFT: (0) NORMAL: NO PROBLEMS.
RIGIDITY_LUE: (0) NORMAL: NO RIGIDITY.
LEG_AGILITY_LEFT: (0) NORMAL: NO PROBLEMS.
TOTAL_SCORE: 6
RIGIDITY_RUE: (0) NORMAL: NO RIGIDITY.
CONSTANCY_TREMOR_ATREST: (1) SLIGHT: TREMOR AT REST IS PRESENT 25% OF THE ENTIRE EXAMINATION PERIOD.
AMPLITUDE_RLE: (0) NORMAL: NO TREMOR.
HANDMOVEMENTS_LEFT: (1) SLIGHT: ANY OF THE FOLLOWING: A) THE REGULAR RHYTHM IS BROKEN WITH ONE WITH ONE OR TWO INTERRUPTIONS OR HESITATIONS OF THE MOVEMENT  B) SLIGHT SLOWING  C) THE AMPLITUDE DECREMENTS NEAR THE END OF THE 10 MOVEMENTS.
MOVEMENTS_INTERFERE_WITH_RATINGS: NO
PRONATION_SUPINATION_LEFT: (1) SLIGHT: ANY OF THE FOLLOWING: A) THE REGULAR RHYTHM IS BROKEN WITH ONE WITH ONE OR TWO INTERRUPTIONS OR HESITATIONS OF THE MOVEMENT  B) SLIGHT SLOWING  C) THE AMPLITUDE DECREMENTS NEAR THE END OF THE 10 MOVEMENTS.
RIGIDITY_LLE: (0) NORMAL: NO RIGIDITY.
TOTAL_SCORE: 15
FINGER_TAPPING_RIGHT: (1) SLIGHT: ANY OF THE FOLLOWING: A) THE REGULAR RHYTHM IS BROKEN WITH ONE WITH ONE OR TWO INTERRUPTIONS OR HESITATIONS OF THE MOVEMENT  B) SLIGHT SLOWING  C) THE AMPLITUDE DECREMENTS NEAR THE END OF THE 10 MOVEMENTS.
PRONATION_SUPINATION_RIGHT: (1) SLIGHT: ANY OF THE FOLLOWING: A) THE REGULAR RHYTHM IS BROKEN WITH ONE WITH ONE OR TWO INTERRUPTIONS OR HESITATIONS OF THE MOVEMENT  B) SLIGHT SLOWING  C) THE AMPLITUDE DECREMENTS NEAR THE END OF THE 10 MOVEMENTS.
SPEECH: (0) NORMAL: NO SPEECH PROBLEMS.
GAIT: (0) NORMAL: NO PROBLEMS.
RIGIDITY_RLE: (0) NORMAL: NO RIGIDITY.
FACIAL_EXPRESSION: (0) NORMAL: NORMAL FACIAL EXPRESSION.
ARISING_CHAIR: (0) NORMAL: NO PROBLEMS. ABLE TO ARISE QUICKLY WITHOUT HESITATION.
TOETAPPING_RIGHT: (0) NORMAL: NO PROBLEMS.
DYSKINESIAS_PRESENT: NO
AMPLITUDE_LUE: (0) NORMAL: NO TREMOR.
LEG_AGILITY_RIGHT: (0) NORMAL: NO PROBLEMS.
FINGER_TAPPING_LEFT: (1) SLIGHT: ANY OF THE FOLLOWING: A) THE REGULAR RHYTHM IS BROKEN WITH ONE WITH ONE OR TWO INTERRUPTIONS OR HESITATIONS OF THE MOVEMENT  B) SLIGHT SLOWING  C) THE AMPLITUDE DECREMENTS NEAR THE END OF THE 10 MOVEMENTS.
AXIAL_SCORE: 1
AMPLITUDE_RUE: (0) NORMAL: NO TREMOR.
TOTAL_SCORE_LEFT: 6
POSTURAL_STABILITY: (0) NORMAL: NO PROBLEMS. RECOVERS WITH ONE OR TWO STEPS.
RIGIDITY_NECK: (0) NORMAL: NO RIGIDITY.
AMPLITUDE_LLE: (0) NORMAL: NO TREMOR.
SPONTANEITY_OF_MOVEMENT: (0) NORMAL: NO PROBLEMS.
POSTURE: (1) SLIGHT: NOT QUITE ERECT, BUT COULD BE NORMAL FOR OLDER PERSONS.
HANDMOVEMENTS_RIGHT: (1) SLIGHT: ANY OF THE FOLLOWING: A) THE REGULAR RHYTHM IS BROKEN WITH ONE WITH ONE OR TWO INTERRUPTIONS OR HESITATIONS OF THE MOVEMENT  B) SLIGHT SLOWING  C) THE AMPLITUDE DECREMENTS NEAR THE END OF THE 10 MOVEMENTS.
FREEZING_GAIT: (0) NORMAL: NO FREEZING.

## 2024-12-09 NOTE — LETTER
2024      Austen Fowler  [address] [facility] [address]      Dear Colleague,    Thank you for referring your patient, Austen Fowler, to the [facility]. Please see a copy of my visit note below.    Department of Neurology  Movement Disorders Division   Return Patient Visit    Patient: Austen Fowler   MRN: [id number]   : 1935   Date of Visit: December 10, 2024  PCP: Hamzah Hodge MD   Referring provider: Tony Robbins    CC:  Return for tremor and parkinsonism    Interval history:  Mr. Fowler is a 89 year old male with history significant for multiple medical problems as well as polypharmacy who presents to movement disorder clinic for follow-up. Last visit was several months ago, with a plan to continue meds and supportive care. He is accompanied by wife and daughter, who provide collateral history.    He has undergone quite a few different change of medications over time.  They cannot recall the reason but his propranolol was thought to be better to stay on an immediate release basis instead of converting to extended release.    The primary care physician went through different meds trying to reduce polypharmacy and it seems like, since September of this year, correlating with gabapentin reduction, his tremors reduced significantly, to the point that they are nonbothersome at this point.    He still struggles with his balance, for which he uses a stick to walk, otherwise he may lose his balance.  Luckily, he has not had a fall or a close call in a few years, but he still has a very cautious gait.  It seems like depending on what kind of shoe he is wearing, he may or may not have some shuffling, but the wife says that he is picking up his feet more.  He is trying to exercise as much as possible but most of that is when physical therapists work with him.    No choking episodes, no speech issues to report.    No nonmotor features appreciated  "with Parkinson's disease.  A problem that they do bring up that he has been a little more forgetful, however he is still fairly independent to do most of his ADLs, and they have overtime come up with a systemic which both daughter and wife do most of the chores and help with medications.  We briefly touched on doing a formal neuropsychological evaluation, and that was declined because his wife \"did not feel that it was that bad\" but they just wanted to make a note that he has been a little more forgetful than what he used to.    Otherwise no other issues to report today      ROS:  All others negative except as listed above. Pertinent movement disorders-specific ROS listed above.    Past Medical History:   Diagnosis Date     Acute on chronic congestive heart failure, unspecified heart failure type (H) 6/12/2023     Anxiety state, unspecified      Aortic root dilatation (H)      Arthritis      Ascending aorta dilatation (H)      Basal cell cancer     s/p Mohs nose and bridge of nose on R.     Bunion      CAD (coronary artery disease)     CABG 1992: LIMA to LAD, SANDI to RCA, SVG to OM1 and OM2     Contact dermatitis and other eczema, due to unspecified cause     eczema - has light treatments with Dr. Rivera     Disorders of bursae and tendons in shoulder region, unspecified      Esophageal reflux      Essential hypertension, benign      Gallbladder disease      GERD (gastroesophageal reflux disease)      Heart attack (H)      Hyperlipidemia LDL goal <70      Left ventricular diastolic dysfunction      Low HDL (under 40) 3/28/2014     Nasal/sinus dis NEC     s/p surgery in 1995     Obesity      Osteoarthrosis, unspecified whether generalized or localized, shoulder region      Other hammer toe (acquired)      Sleep apnea     USES CPAP     Spinal stenosis, unspecified region other than cervical     MRI done 2006     Type 2 diabetes, HbA1c goal < 7% (H) 3/12/2015     Urge incontinence         Past Surgical History: "   Procedure Laterality Date     ABDOMEN SURGERY  1994    gall bladder     BIOPSY      arms     CHOLECYSTECTOMY  6/1994     COLONOSCOPY N/A 4/11/2017    Procedure: COMBINED COLONOSCOPY, SINGLE OR MULTIPLE BIOPSY/POLYPECTOMY BY BIOPSY;  Surgeon: Bladimir Garner MD;  Location:  GI     CV LEFT HEART CATH  5-92, 6-92    Angioplasty     ENT SURGERY  5/1995    sinus surgery     ESOPHAGOSCOPY, GASTROSCOPY, DUODENOSCOPY (EGD), DILATATION, COMBINED  7/17/2014    Procedure: COMBINED ESOPHAGOSCOPY, GASTROSCOPY, DUODENOSCOPY (EGD), DILATATION;  Surgeon: Bladimir Garner MD;  Location:  GI     EYE SURGERY      cataracts removed both eyes     INJECT EPIDURAL LUMBAR       IR PICC REPOSITION RIGHT  9/1/2022     LAMINECTOMY LUMBAR TWO LEVELS N/A 4/29/2015    Procedure: LAMINECTOMY LUMBAR TWO LEVELS;  Surgeon: Bladimir Keenan MD;  Location:  OR     THORACIC SURGERY  11/1992    bypass     RUST CABG, ARTERY-VEIN, FOUR  11/1992    CABG - LIMA to left anterior descending and saphenous vein bypass graft to the first and second obtuse marginal branch of the circumflex and the right mammary to the right coronary artery     RUST NONSPECIFIC PROCEDURE  6-94    Gail lap     RUST NONSPECIFIC PROCEDURE  1995    sinus surgery     RUST NONSPECIFIC PROCEDURE      bilateral cataract surgery     [facility] COLONOSCOPY THRU STOMA W BIOPSY/CAUTERY TUMOR/POLYP/LESION  5/2007          Current Outpatient Medications:      acetaminophen (ACETAMIN) 500 MG tablet, Take 1,000 mg by mouth 2 times daily, Disp: , Rfl:      blood glucose (ONETOUCH ULTRA) test strip, CHECK BLOOD SUGAR TWICE DAILY OR AS DIRECTED, Disp: 200 strip, Rfl: 0     busPIRone (BUSPAR) 5 MG tablet, Take 0.5 tablets (2.5 mg) by mouth 2 times daily., Disp: 90 tablet, Rfl: 3     Cholecalciferol (VITAMIN D) 2000 UNIT tablet, Take 2,000 Units by mouth daily., Disp: 90 tablet, Rfl: 3     ELIQUIS ANTICOAGULANT 5 MG tablet, TAKE 1 TABLET BY MOUTH TWICE  DAILY, Disp: 180 tablet, Rfl: 3      finasteride (PROSCAR) 5 MG tablet, Take 1 tablet (5 mg) by mouth daily., Disp: 90 tablet, Rfl: 3     furosemide (LASIX) 20 MG tablet, Take 2 tablets (40 mg) by mouth daily., Disp: 180 tablet, Rfl: 3     gabapentin (NEURONTIN) 300 MG capsule, 900 mg in PM, Disp: 270 capsule, Rfl: 3     metFORMIN (GLUCOPHAGE XR) 500 MG 24 hr tablet, Take 2 tablets (1,000 mg) by mouth 2 times daily (with meals), Disp: 360 tablet, Rfl: 2     metoprolol tartrate (LOPRESSOR) 50 MG tablet, TAKE 1 TABLET BY MOUTH TWICE  DAILY, Disp: 180 tablet, Rfl: 1     midodrine (PROAMATINE) 5 MG tablet, Take 1 tablet (5 mg) by mouth 2 times daily., Disp: 180 tablet, Rfl: 3     multivitamin (OCUVITE) TABS tablet, Take 1 tablet by mouth daily, Disp: , Rfl:      omeprazole (PRILOSEC) 20 MG DR capsule, TAKE 1 CAPSULE BY MOUTH DAILY, Disp: 90 capsule, Rfl: 3     OneTouch UltraSoft 2 Lancets MISC, USE TO TEST BLOOD SUGAR TWICE  DAILY OR AS DIRECTED, Disp: 180 each, Rfl: 3     ORDER FOR DME, Uses Cpap machine for sleep apnea, Disp: , Rfl:      potassium chloride (KLOR-CON) 20 MEQ packet, Take 20 mEq by mouth daily., Disp: 90 each, Rfl: 3     pravastatin (PRAVACHOL) 20 MG tablet, Take 1 tablet (20 mg) by mouth every evening., Disp: 90 tablet, Rfl: 2     Probiotic Product (PROBIOTIC PO), Take 1 capsule by mouth daily, Disp: , Rfl:      tamsulosin (FLOMAX) 0.4 MG capsule, TAKE 1 CAPSULE BY MOUTH DAILY, Disp: 90 capsule, Rfl: 1     triamcinolone (KENALOG) 0.1 % external ointment, Apply topically 2 times daily, Disp: 30 g, Rfl: 3     vitamin B-12 (CYANOCOBALAMIN) 1000 MCG tablet, Take 1,000 mcg by mouth daily , Disp: , Rfl:      Allergies   Allergen Reactions     No Known Allergies         Family History   Problem Relation Age of Onset     Cancer Brother         MELANOMA     Diabetes Mother         AODM     Thyroid Disease Mother      Diabetes Father         AODM     Myocardial Infarction Father      Cerebrovascular Disease Brother      Parkinsonism Sister       Family History Negative Son      Family History Negative Son      Family History Negative Son      Family History Negative Daughter      Coronary Artery Disease Brother         dod 2019     Cerebrovascular Disease Brother         dod 2019     Other Cancer Brother         dod 2000/melanoma     Breast Cancer Other         in remission reg chkups        Social History:  He  reports that he quit smoking about 32 years ago. His smoking use included cigarettes, cigars, and pipe. He started smoking about 70 years ago. He has a 76.3 pack-year smoking history. He has never used smokeless tobacco. He reports current alcohol use. He reports that he does not use drugs.     PHYSICAL EXAM:  BP (!) 146/84   Pulse 73   Wt 118.5 kg (261 lb 3.2 oz)   SpO2 98%   BMI 32.65 kg/m       NEURO:  UPDRS  Time:: 1600  Medication: Off  R Brain DBS:: None  L Brain DBS:: None  Dyskinesia (LID): No  Did LID interfere: No  Speech: (0) Normal: No speech problems.  Facial Expression: (0) Normal: Normal facial expression.  Rigidity Neck: (0) Normal: No rigidity.  Rigidity RUE: (0) Normal: No rigidity.  Rigidity LUE: (0) Normal: No rigidity.  Rigidity RLE: (0) Normal: No rigidity.  Rigidity LLE: (0) Normal: No rigidity.  Finger Taps R: (1) Slight: Any of the following: a) the regular rhythm is broken with one with one or two interruptions or hesitations of the movement  b) slight slowing  c) the amplitude decrements near the end of the 10 movements.  Finger Taps L: (1) Slight: Any of the following: a) the regular rhythm is broken with one with one or two interruptions or hesitations of the movement  b) slight slowing  c) the amplitude decrements near the end of the 10 movements.  Hand Mvt R: (1) Slight: Any of the following: a) the regular rhythm is broken with one with one or two interruptions or hesitations of the movement  b) slight slowing  c) the amplitude decrements near the end of the 10 movements.  Hand Mvt L: (1) Slight: Any of the  following: a) the regular rhythm is broken with one with one or two interruptions or hesitations of the movement  b) slight slowing  c) the amplitude decrements near the end of the 10 movements.  Pron-/Supinate R: (1) Slight: Any of the following: a) the regular rhythm is broken with one with one or two interruptions or hesitations of the movement  b) slight slowing  c) the amplitude decrements near the end of the 10 movements.  Pron-/Supinate L: (1) Slight: Any of the following: a) the regular rhythm is broken with one with one or two interruptions or hesitations of the movement  b) slight slowing  c) the amplitude decrements near the end of the 10 movements.  Toe Tap R: (0) Normal: No problems.  Toe Tap L: (0) Normal: No problems.  Leg Agility R: (0) Normal: No problems.  Leg Agility L: (0) Normal: No problems.  Arise From Chair: (0) Normal: No problems. Able to arise quickly without hesitation.  Gait: (0) Normal: No problems.  Gait Freezing: (0) Normal: No freezing.  Postural Stability: (0) Normal: No problems. Recovers with one or two steps.  Posture: (1) Slight: Not quite erect, but could be normal for older persons.  Global Spont Mvt: (0) Normal: No problems.  Postural Tremor RUE: (1) Slight: Tremor is present but less than 1 cm in amplitude.  Postural Tremor LUE: (1) Slight: Tremor is present but less than 1 cm in amplitude.  Kinetic Tremor RUE: (2) Mild: Tremor is at least 1 but less than 3 cm in amplitude.  Kinetic Tremor LUE: (2) Mild: Tremor is at least 1 but less than 3 cm in amplitude.  Rest Tremor RUE: (0) Normal: No tremor.  Rest Tremor LUE: (0) Normal: No tremor.  Rest Tremor RLE: (0) Normal: No tremor.  Rest Tremor LLE: (0) Normal: No tremor.  Rest Tremor Lip/Jaw: (1) Slight: < 1 cm in maximal amplitude.  Rest Tremor Constancy: (1) Slight: Tremor at rest is present 25% of the entire examination period.  Total Right: 6  Total Left: 6  Axial Total: 1  Total: 15    DATA REVIEWED:  Reviewed pertinent  data available in [facility].    ASSESSMENT:  89-year-old male with a multifactorial gait disorder, some signs of parkinsonism and a mixed picture of a tremor, here for follow-up.  Overall I do suspect that he may have some potential underlying parkinsonism, there is a chance that some of his complaints are arising from polypharmacy, he does have objective improvements and his tremor correlating with reduction in gabapentin, so I suspect that he might have had a component of potential myoclonic tremor that has greatly alleviated with reduction of gabapentin.    PLAN:  -Reviewed impression and plan with patient and family    - Continue supportive care.    - Since they feel like his symptoms are at a point they are nondisabling motorically, I think it is reasonable to continue to avoid introducing new medications.  I also emphasized the fact that he might be very sensitive to centrally acting medications, and virtually any medication can potentially cause a tremor, so he should be careful with introducing new medicines or uptitrating existing meds.    - Continue physical therapy, he was encouraged to work with the exercise that he has been given by the therapist so he can do those while he is waiting for the subsequent physical therapy appointment.    - In the future, should symptoms persist or worsen despite current meds remaining unchanged, we could potentially entertain a trial of levodopa to see if he can provide some benefit.    - Will continue to follow him along.  Next visit to be scheduled the next 3 to 4 months.  Sooner if needed.  Kong to contact me back for any questions or concerns.    There are no Patient Instructions on file for this visit.      Bharath Cyr MD (Leo) (he/him/his)   of Neurology  [facility]  Department of Neurology      Thank you for referring Austen Fowler to our [facility] Movement Disorders Program, we appreciate the opportunity of being your partner in  healthcare. Please feel free to reach out to us at any point if any questions or concerns about this visit.    Attending attestation: Today I spent a total 45 minutes on this case, in face-to-face, examining, obtaining history, providing education and coordination of care. Additional time was spent in chart review, ancillary data, and documentation as delineated above.    The longitudinal plan of care for Griffin was addressed during this visit. Due to the added complexity in care, I will continue to support Austen Fowler in the subsequent management of this condition(s) and with the ongoing continuity of care of this condition(s).      Again, thank you for allowing me to participate in the care of your patient.        Sincerely,        Bharath Robbins MD

## 2024-12-13 ENCOUNTER — MEDICAL CORRESPONDENCE (OUTPATIENT)
Dept: HEALTH INFORMATION MANAGEMENT | Facility: CLINIC | Age: 89
End: 2024-12-13

## 2024-12-20 DIAGNOSIS — I48.0 PAF (PAROXYSMAL ATRIAL FIBRILLATION) (H): ICD-10-CM

## 2024-12-21 DIAGNOSIS — M53.3 SACROILIAC JOINT PAIN: ICD-10-CM

## 2024-12-23 RX ORDER — GABAPENTIN 300 MG/1
CAPSULE ORAL
Qty: 270 CAPSULE | Refills: 3 | Status: SHIPPED | OUTPATIENT
Start: 2024-12-23

## 2024-12-23 RX ORDER — METOPROLOL TARTRATE 50 MG
50 TABLET ORAL 2 TIMES DAILY
Qty: 180 TABLET | Refills: 3 | Status: SHIPPED | OUTPATIENT
Start: 2024-12-23

## 2024-12-24 ENCOUNTER — TELEPHONE (OUTPATIENT)
Dept: FAMILY MEDICINE | Facility: CLINIC | Age: 89
End: 2024-12-24
Payer: COMMERCIAL

## 2024-12-24 NOTE — TELEPHONE ENCOUNTER
Medication Question or Refill    Contacts       Contact Date/Time Type Contact Phone/Fax    12/24/2024 02:45 PM CST Phone (Incoming) Anastasia Fowler (Emergency Contact) [phone] (H)            What medication are you calling about (include dose and sig)?: Gapapentin 300 mg     Preferred Pharmacy:   [facility] - [place]  [address] [address]  [place] [address]  Phone: [phone] Fax: [phone]    [facility] - [place], [place] - [address] [facility]  [address] [facility]  [address]  [facility] [address]  Phone: [phone] Fax: [phone]    [facility] - [place], [place] - [address]  [address]  [facility] [address]  [facility] [address]  Phone: [phone] Fax: [phone]    [id number] [place] [address] AT [facility] [address] [facility]  [address]  [facility] [address]  Phone: [phone] Fax: [phone]      Controlled Substance Agreement on file:   CSA -- Patient Level:    CSA: None found at the patient level.       Who prescribed the medication?: Hodge     Do you need a refill? Yes    When did you use the medication last? Today     Patient offered an appointment? No    Do you have any questions or concerns?  Yes: Patient's spouse states she's been trying for several days to get her husbands gabapentin refilled.       Could we send this information to you in [employer] or would you prefer to receive a phone call?:   Patient would prefer a phone call   Okay to leave a detailed message?: Yes at Home number on file [phone] (home)

## 2024-12-26 NOTE — TELEPHONE ENCOUNTER
Called pt to let him know that Refill was sent to [facility].    gabapentin (NEURONTIN) 300 MG capsule 900 mg in  capsule 3 ordered 12/23/2024

## 2025-01-04 DIAGNOSIS — E11.59 TYPE 2 DIABETES MELLITUS WITH VASCULAR DISEASE (H): ICD-10-CM

## 2025-01-06 RX ORDER — METFORMIN HYDROCHLORIDE 500 MG/1
1000 TABLET, EXTENDED RELEASE ORAL 2 TIMES DAILY WITH MEALS
Qty: 360 TABLET | Refills: 0 | Status: SHIPPED | OUTPATIENT
Start: 2025-01-06

## 2025-01-23 ENCOUNTER — OFFICE VISIT (OUTPATIENT)
Dept: SLEEP MEDICINE | Facility: CLINIC | Age: 89
End: 2025-01-23
Attending: INTERNAL MEDICINE
Payer: COMMERCIAL

## 2025-01-23 VITALS
WEIGHT: 255 LBS | HEART RATE: 57 BPM | OXYGEN SATURATION: 96 % | DIASTOLIC BLOOD PRESSURE: 66 MMHG | SYSTOLIC BLOOD PRESSURE: 92 MMHG | HEIGHT: 75 IN | BODY MASS INDEX: 31.71 KG/M2

## 2025-01-23 DIAGNOSIS — G47.33 OSA (OBSTRUCTIVE SLEEP APNEA): ICD-10-CM

## 2025-01-23 NOTE — PROGRESS NOTES
Sleep Consultation:    Date on this visit: 1/23/2025    Austen Fowler  is referred by Hamzah Hodge for a sleep consultation.     Primary Physician: Hamzah Hodge     Austen Fowler is an 89 years old male with medical history significant for CAD, s/p CABG, paroxysmal atrial fibrillation, DM-2, orthostatic hypotension, who presents to clinic for evaluation and management of obstructive sleep apnea.     Split-night PSG on 1/3/2007 at a weight of 291 pounds through [facility] reported an apnea-hypopnea index of 52.9/h, RDI of 66.6/h and lowest O2 saturation of 86%.    Overall, he rates the experience with PAP as ok. The mask is comfortable. The mask is not leaking.  He is not snoring with the mask on. He is not having gasp arousals.  He is not having significant oral/nasal dryness. The pressure settings are comfortable.     He uses full-face mask.     He does feel rested in the morning.    Respironics (Dream station, replacement device provided by [employer])      Auto-PAP 9-15 cmH2O download:  30/30 total days of use. 0 nonuse days.  Average use 8 hours 15 minutes per day.  97% days with >4 hours use.  Large leak 1 mins per day average. CPAP 90% pressure 15 cm. AHI 13.8 (CA index- 2.4, OA index- 5.7, hypopnea index- 5.7) Average % of night in periodic breathing - 7%      Austen goes to sleep at 10:30 PM. He wakes up at 7:00 AM. He falls asleep in 5 minutes.  Austen denies difficulty falling asleep.  He wakes up 1-2 times a night for 5 minutes before falling back to sleep.  Austen wakes up to go to the bathroom.      Austen denies any sleep walking, sleep talking, dream enactment, sleep paralysis, and hypnogogic/hypnopompic hallucinations.    Patient's Drake Sleepiness score 3/24 consistent with no daytime sleepiness.      Austen naps 3-4 times per day for 10-20 minutes, feels refreshed after naps. He takes some inadvertant naps.  He no longer drives. Patient was counseled on the importance of driving  while alert, to pull over if drowsy, or nap before getting into the vehicle if sleepy.      He uses 1-2 sodas/day. Last caffeine intake is usually before noon.    Problem List:  Patient Active Problem List    Diagnosis Date Noted    Class 2 severe obesity due to excess calories with serious comorbidity in adult (H) 09/09/2024     Priority: Medium    Memory change 08/21/2023     Priority: Medium    Tremor 08/21/2023     Priority: Medium    PAF (paroxysmal atrial fibrillation) (H) 08/21/2023     Priority: Medium    Dizziness 06/12/2023     Priority: Medium    Falls frequently 06/12/2023     Priority: Medium    Atrial fibrillation with RVR (H) 06/12/2023     Priority: Medium    Hypotension, unspecified hypotension type 06/12/2023     Priority: Medium    Cellulitis of right lower leg 09/01/2022     Priority: Medium    Septic shock (H) 09/01/2022     Priority: Medium    Sacroiliac joint pain 06/25/2021     Priority: Medium    Dysphagia, unspecified type 06/25/2021     Priority: Medium    Benign prostatic hyperplasia, unspecified whether lower urinary tract symptoms present 06/25/2021     Priority: Medium    Gastroesophageal reflux disease with esophagitis 04/10/2021     Priority: Medium    Cellulitis 04/01/2021     Priority: Medium    Shortness of breath 01/12/2020     Priority: Medium    Hypoxia 01/11/2020     Priority: Medium    Cellulitis of right lower extremity 06/18/2019     Priority: Medium    Neck pain 11/13/2017     Priority: Medium    Non morbid obesity, unspecified obesity type 08/05/2016     Priority: Medium    Left ventricular diastolic dysfunction      Priority: Medium    Coronary artery disease involving native coronary artery of native heart without angina pectoris 02/04/2016     Priority: Medium     CABG 1992: LIMA to LAD, SANDI to RCA, SVG to OM1 and OM2      S/P lumbar laminectomy 04/29/2015     Priority: Medium    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) 03/12/2015     Priority: Medium     Hyperlipidemia LDL goal <70      Priority: Medium    Low HDL (under 40) 03/28/2014     Priority: Medium    PENELOPE (obstructive sleep apnea) 03/11/2013     Priority: Medium    Lumbosacral radiculitis 09/06/2012     Priority: Medium    Anxiety 03/29/2011     Priority: Medium    Chronic low back pain 03/29/2011     Priority: Medium    Infected sebaceous cyst 07/17/2009     Priority: Medium    Impacted cerumen 03/09/2009     Priority: Medium    Cervicalgia 08/03/2007     Priority: Medium    Benign neoplasm of skin of other and unspecified parts of face 08/03/2007     Priority: Medium    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      Priority: Medium     sleep apnea  Problem list name updated by automated process. Provider to review      Slowing of urinary stream 01/19/2004     Priority: Medium    Essential hypertension, benign 08/01/2003     Priority: Medium    Rash and other nonspecific skin eruption 08/01/2003     Priority: Medium        Past Medical/Surgical History:  Past Medical History:   Diagnosis Date    Acute on chronic congestive heart failure, unspecified heart failure type (H) 6/12/2023    Anxiety state, unspecified     Aortic root dilatation     Arthritis     Ascending aorta dilatation     Basal cell cancer     s/p Mohs nose and bridge of nose on R.    Bunion     CAD (coronary artery disease)     CABG 1992: LIMA to LAD, SANDI to RCA, SVG to OM1 and OM2    Contact dermatitis and other eczema, due to unspecified cause     eczema - has light treatments with Dr. Rivera    Disorders of bursae and tendons in shoulder region, unspecified     Esophageal reflux     Essential hypertension, benign     Gallbladder disease     GERD (gastroesophageal reflux disease)     Heart attack (H)     Hyperlipidemia LDL goal <70     Left ventricular diastolic dysfunction     Low HDL (under 40) 3/28/2014    Nasal/sinus dis NEC     s/p surgery in 1995    Obesity     Osteoarthrosis, unspecified whether generalized or  "localized, shoulder region     Other hammer toe (acquired)     Sleep apnea     USES CPAP    Spinal stenosis, unspecified region other than cervical     MRI done 2006    Type 2 diabetes, HbA1c goal < 7% (H) 3/12/2015    Urge incontinence      Physical Examination:  Vitals: BP 92/66   Pulse 57   Ht 1.905 m (6' 3\")   Wt 115.7 kg (255 lb)   SpO2 96%   BMI 31.87 kg/m    BMI= Body mass index is 31.87 kg/m .  GENERAL APPEARANCE: alert and active  HENT: ear canals and TM's normal and nose and mouth without ulcers or lesions  NEURO: mentation intact and speech normal  PSYCH: mentation appears normal and affect normal/bright  Mallampati Class: III.  Tonsillar Stage: 1  hidden by pillars.    Impression/Plan:    Obstructive sleep apnea    Patient is prescribed auto titrating CPAP therapy 9 to 15 cm H2O for treatment of previously diagnosed severe obstructive sleep apnea.    He is using CPAP regularly and is continuing to benefit from treatment.  Subjectively, he reports that he is doing well.  There is no significant sleep disturbance, restless leg symptoms, or parasomnias. Brief daytime naps are reported.    I reviewed download data and graphs from his device for the last 30 days.  Regular compliance is demonstrated.  Residual AHI is 13.8/h, predominantly reported as obstructive apneas and hypopneas.  In order to see if higher pressure range will help with residual events, recommended increasing auto CPAP pressure range.    Plan:     Continue auto PAP therapy with pressure range increased to 10-17 cm H2O    Obstructive sleep apnea reviewed.  Complications of untreated sleep apnea were reviewed.    I spent a total of 45 minutes for this appointment on this date of service which include time spent before, during and after the visit for chart review, patient care, counseling and coordination of care.    Electronically signed by Dr. Chad Estrada, Diplomate, Sleep Medicine, ABPN         CC: Hamzah Hodge              "

## 2025-01-23 NOTE — NURSING NOTE
"Chief Complaint   Patient presents with    Sleep Problem     Reestablish care, New Machine       Initial BP 92/66   Pulse 57   Ht 1.905 m (6' 3\")   Wt 115.7 kg (255 lb)   SpO2 96%   BMI 31.87 kg/m   Estimated body mass index is 31.87 kg/m  as calculated from the following:    Height as of this encounter: 1.905 m (6' 3\").    Weight as of this encounter: 115.7 kg (255 lb).    Medication Reconciliation: complete  ESS 3  Milvia Hopkins MA   "

## 2025-01-27 ENCOUNTER — TELEPHONE (OUTPATIENT)
Dept: SLEEP MEDICINE | Facility: CLINIC | Age: 89
End: 2025-01-27
Payer: COMMERCIAL

## 2025-01-27 DIAGNOSIS — G47.33 OSA (OBSTRUCTIVE SLEEP APNEA): Primary | ICD-10-CM

## 2025-01-27 NOTE — TELEPHONE ENCOUNTER
Pt is looking for a lorder Script for a new machine, please send to the  RUNform, since machine is over five years. Please call pt wife at [phone] Ok to leave a detailed VM

## 2025-02-27 DIAGNOSIS — R13.10 DYSPHAGIA, UNSPECIFIED TYPE: ICD-10-CM

## 2025-02-27 RX ORDER — OMEPRAZOLE 20 MG/1
20 CAPSULE, DELAYED RELEASE ORAL DAILY
Qty: 90 CAPSULE | Refills: 3 | Status: SHIPPED | OUTPATIENT
Start: 2025-02-27

## 2025-03-19 DIAGNOSIS — N40.0 BENIGN PROSTATIC HYPERPLASIA, UNSPECIFIED WHETHER LOWER URINARY TRACT SYMPTOMS PRESENT: ICD-10-CM

## 2025-03-20 RX ORDER — TAMSULOSIN HYDROCHLORIDE 0.4 MG/1
0.4 CAPSULE ORAL DAILY
Qty: 90 CAPSULE | Refills: 1 | Status: SHIPPED | OUTPATIENT
Start: 2025-03-20

## 2025-03-22 ENCOUNTER — HEALTH MAINTENANCE LETTER (OUTPATIENT)
Age: 89
End: 2025-03-22

## 2025-05-03 ENCOUNTER — HEALTH MAINTENANCE LETTER (OUTPATIENT)
Age: 89
End: 2025-05-03

## 2025-05-08 ENCOUNTER — LAB (OUTPATIENT)
Dept: LAB | Facility: CLINIC | Age: 89
End: 2025-05-08
Payer: COMMERCIAL

## 2025-05-08 DIAGNOSIS — I50.31 ACUTE DIASTOLIC HEART FAILURE (H): ICD-10-CM

## 2025-05-08 DIAGNOSIS — I25.10 CORONARY ARTERY DISEASE INVOLVING NATIVE CORONARY ARTERY OF NATIVE HEART WITHOUT ANGINA PECTORIS: ICD-10-CM

## 2025-05-08 DIAGNOSIS — I10 BENIGN ESSENTIAL HYPERTENSION: ICD-10-CM

## 2025-05-08 LAB
ALT SERPL W P-5'-P-CCNC: 12 U/L (ref 0–70)
ANION GAP SERPL CALCULATED.3IONS-SCNC: 13 MMOL/L (ref 7–15)
BUN SERPL-MCNC: 16.4 MG/DL (ref 8–23)
CALCIUM SERPL-MCNC: 9.6 MG/DL (ref 8.8–10.4)
CHLORIDE SERPL-SCNC: 103 MMOL/L (ref 98–107)
CHOLEST SERPL-MCNC: 99 MG/DL
CREAT SERPL-MCNC: 0.83 MG/DL (ref 0.67–1.17)
EGFRCR SERPLBLD CKD-EPI 2021: 84 ML/MIN/1.73M2
FASTING STATUS PATIENT QL REPORTED: YES
FASTING STATUS PATIENT QL REPORTED: YES
GLUCOSE SERPL-MCNC: 194 MG/DL (ref 70–99)
HCO3 SERPL-SCNC: 26 MMOL/L (ref 22–29)
HDLC SERPL-MCNC: 37 MG/DL
LDLC SERPL CALC-MCNC: 39 MG/DL
NONHDLC SERPL-MCNC: 62 MG/DL
POTASSIUM SERPL-SCNC: 4.2 MMOL/L (ref 3.4–5.3)
SODIUM SERPL-SCNC: 142 MMOL/L (ref 135–145)
TRIGL SERPL-MCNC: 116 MG/DL

## 2025-05-09 ENCOUNTER — RESULTS FOLLOW-UP (OUTPATIENT)
Dept: CARDIOLOGY | Facility: CLINIC | Age: 89
End: 2025-05-09

## 2025-05-09 ENCOUNTER — HOSPITAL ENCOUNTER (OUTPATIENT)
Dept: CARDIOLOGY | Facility: CLINIC | Age: 89
Discharge: HOME | End: 2025-05-09
Attending: INTERNAL MEDICINE | Admitting: INTERNAL MEDICINE
Payer: COMMERCIAL

## 2025-05-09 DIAGNOSIS — I48.91 ATRIAL FIBRILLATION, UNSPECIFIED TYPE (H): ICD-10-CM

## 2025-05-09 PROCEDURE — 93227 XTRNL ECG REC<48 HR R&I: CPT | Performed by: INTERNAL MEDICINE

## 2025-05-09 PROCEDURE — 93226 XTRNL ECG REC<48 HR SCAN A/R: CPT

## 2025-05-14 ENCOUNTER — OFFICE VISIT (OUTPATIENT)
Dept: SLEEP MEDICINE | Facility: CLINIC | Age: 89
End: 2025-05-14
Payer: COMMERCIAL

## 2025-05-14 ENCOUNTER — RESULTS FOLLOW-UP (OUTPATIENT)
Dept: CARDIOLOGY | Facility: CLINIC | Age: 89
End: 2025-05-14

## 2025-05-14 ENCOUNTER — TELEPHONE (OUTPATIENT)
Dept: CARDIOLOGY | Facility: CLINIC | Age: 89
End: 2025-05-14

## 2025-05-14 VITALS
OXYGEN SATURATION: 93 % | HEART RATE: 86 BPM | SYSTOLIC BLOOD PRESSURE: 123 MMHG | DIASTOLIC BLOOD PRESSURE: 75 MMHG | BODY MASS INDEX: 32.07 KG/M2 | HEIGHT: 75 IN | WEIGHT: 257.9 LBS

## 2025-05-14 DIAGNOSIS — G47.33 OSA (OBSTRUCTIVE SLEEP APNEA): Primary | ICD-10-CM

## 2025-05-14 PROCEDURE — 1126F AMNT PAIN NOTED NONE PRSNT: CPT | Performed by: INTERNAL MEDICINE

## 2025-05-14 PROCEDURE — 99213 OFFICE O/P EST LOW 20 MIN: CPT | Performed by: INTERNAL MEDICINE

## 2025-05-14 PROCEDURE — 3078F DIAST BP <80 MM HG: CPT | Performed by: INTERNAL MEDICINE

## 2025-05-14 PROCEDURE — 3074F SYST BP LT 130 MM HG: CPT | Performed by: INTERNAL MEDICINE

## 2025-05-14 NOTE — PATIENT INSTRUCTIONS
Your There is no height or weight on file to calculate BMI.  Weight management is a personal decision.  If you are interested in exploring weight loss strategies, the following discussion covers the approaches that may be successful. Body mass index (BMI) is one way to tell whether you are at a healthy weight, overweight, or obese. It measures your weight in relation to your height.  A BMI of 18.5 to 24.9 is in the healthy range. A person with a BMI of 25 to 29.9 is considered overweight, and someone with a BMI of 30 or greater is considered obese. More than two-thirds of American adults are considered overweight or obese.  Being overweight or obese increases the risk for further weight gain. Excess weight may lead to heart disease and diabetes.  Creating and following plans for healthy eating and physical activity may help you improve your health.  Weight control is part of healthy lifestyle and includes exercise, emotional health, and healthy eating habits. Careful eating habits lifelong are the mainstay of weight control. Though there are significant health benefits from weight loss, long-term weight loss with diet alone may be very difficult to achieve- studies show long-term success with dietary management in less than 10% of people. Attaining a healthy weight may be especially difficult to achieve in those with severe obesity. In some cases, medications, devices and surgical management might be considered.  What can you do?  If you are overweight or obese and are interested in methods for weight loss, you should discuss this with your provider.   Consider reducing daily calorie intake by 500 calories.   Keep a food journal.   Avoiding skipping meals, consider cutting portions instead.    Diet combined with exercise helps maintain muscle while optimizing fat loss. Strength training is particularly important for building and maintaining muscle mass. Exercise helps reduce stress, increase energy, and improves  fitness. Increasing exercise without diet control, however, may not burn enough calories to loose weight.     Start walking three days a week 10-20 minutes at a time  Work towards walking thirty minutes five days a week   Eventually, increase the speed of your walking for 1-2 minutes at time    In addition, we recommend that you review healthy lifestyles and methods for weight loss available through the National Institutes of Health patient information sites:  http://win.niddk.nih.gov/publications/index.htm    And look into health and wellness programs that may be available through your health insurance provider, employer, local community center, or donta club.

## 2025-05-14 NOTE — TELEPHONE ENCOUNTER
Doc,   Please advise. Pt's wife asking why he needs to come back in 3 months and not 6? Reviewed Holter monitor showing   1Terrence Fowler was monitored for 24 hours. Quality of the tracing was good. Principle rhythm was Atrial fibrillation. Average HR was 86 bpm, maximum HR was 162 bpm at 1:29 PM, minimum HR was 64 bpm at 3:03 AM. QRS duration measured 0.105 seconds, QT interval 0.297-0.418 seconds. The longest pause was 2.24 seconds at 10:50 PM.     2. There were 619 ventricular ectopic beats (1% [place]), 601 of these were isolated PVCs. There were 9 couplets.     3. The patient event button was pressed 6 times. No symptoms were noted. The rhythm during these episodes was Atrial fibrialltion with rates between 68 bpm-76 bpm.  5/10/2025    Per office note dated 5/09/25, Dr. Hernandez recommended:   1.  I will have the patient wear a Holter monitor to determine if his heart rate is well-controlled over 24-hour period.  2.  I will check a proBNP today to see if he has levels suggestive of heart failure.  3.  I will have the patient return to see one of our LILY's in a month's time.  4.  I will see the patient back again myself in approximately a year.  5.  The family will contact her insurance to see if there is a cheaper DOAC.    Pt has appt to see JOSE A Roy 6/18/25. Will message Dr. Meza to review in Dr. Hernandez's absence. Salas GALE

## 2025-05-14 NOTE — NURSING NOTE
"Chief Complaint   Patient presents with    CPAP Follow Up     Still making some noise, mask leakage       Initial /75   Pulse 86   Ht 1.905 m (6' 3\")   Wt 117 kg (257 lb 14.4 oz)   SpO2 93%   BMI 32.24 kg/m   Estimated body mass index is 32.24 kg/m  as calculated from the following:    Height as of this encounter: 1.905 m (6' 3\").    Weight as of this encounter: 117 kg (257 lb 14.4 oz).    Medication Reconciliation: complete  ESS: 12  Neck circumference: 46 centimeters.    DME: BOB Méndez, TIFFANY      "

## 2025-05-14 NOTE — PROGRESS NOTES
Obstructive Sleep Apnea - PAP Follow-Up Visit:    Chief Complaint   Patient presents with    CPAP Follow Up     Still making some noise, mask leakage     Austen Fowler comes in today for follow-up of their severe sleep apnea, managed with CPAP.     Split-night PSG on 1/3/2007 at a weight of 291 pounds through [facility] reported an apnea-hypopnea index of 52.9/h, RDI of 66.6/h and lowest O2 saturation of 86%.     Do you use a CPAP Machine at home: (Patient-Rptd) Yes  Overall, on a scale of 0-10 how would you rate your CPAP (0 poor, 10 great): (Patient-Rptd) 8  Is your mask comfortable: (Patient-Rptd) Yes  If not, why:    Is you mask leaking: (Patient-Rptd) No  If yes, where do you feel it:    How many night per week does the mask leak (0-7):    Do you notice snoring with mask on: (Patient-Rptd) Yes  Do you notice gasping arousals with mask on: (Patient-Rptd) No  Are you having significant oral or nasal dryness: (Patient-Rptd) Yes  Is the pressure setting comfortable: (Patient-Rptd) Yes  In not, why:    What type of mask do you use: (Patient-Rptd) Nasal Mask  What is your typical bedtime: (Patient-Rptd) 10  How long does it take you to go to sleep on PAP therapy:    What time do you typically get out of bed for the day: (Patient-Rptd) 8  How many hours on average per night are you using PAP therapy: (Patient-Rptd) 4  How many hours are you sleeping per night: (Patient-Rptd) 6  Do you feel well rested in the morning:      [employer]Med     Auto-PAP 10.0 - 17.0 cmH2O 30 day usage data:    96% of days with > 4 hours of use. 0/30 days with no use.   Average use 475 minutes per day.   95%ile Leak 80.4 L/min.   CPAP 95% pressure 11.8 cm.   AHI 10.5 events per hour.     EPWORTH SLEEPINESS SCALE         1/23/2025     1:00 PM    Clay Sleepiness Scale ( M.NADIRA Holt  [id number]<br>ESS - [place]/English - Final version - 21 Nov 07 - [facility] [place].)   Clay Score (Sleep) 3       INSOMNIA SEVERITY INDEX (JESSICA)           5/14/2025     9:45 AM   Insomnia Severity Index (JESSICA)   Difficulty falling asleep 1   Difficulty staying asleep 1   Problems waking up too early 0   How SATISFIED/DISSATISFIED are you with your CURRENT sleep pattern? 1   How NOTICEABLE to others do you think your sleep problem is in terms of impairing the quality of your life? 0   How WORRIED/DISTRESSED are you about your current sleep problem? 1   To what extent do you consider your sleep problem to INTERFERE with your daily functioning (e.g. daytime fatigue, mood, ability to function at work/daily chores, concentration, memory, mood, etc.) CURRENTLY? 0   JESSICA Total Score 4        Patient-reported       Guidelines for Scoring/Interpretation:  Total score categories:  0-7 = No clinically significant insomnia   8-14 = Subthreshold insomnia   15-21 = Clinical insomnia (moderate severity)  22-28 = Clinical insomnia (severe)  Used via courtesy of www.Baboomealth.va.gov with permission from Jason Cervantes PhD., [facility]      Past Medical History:   Diagnosis Date    Acute on chronic congestive heart failure, unspecified heart failure type (H) 6/12/2023    Anxiety state, unspecified     Aortic root dilatation     Arthritis     Ascending aorta dilatation     Basal cell cancer     s/p Mohs nose and bridge of nose on R.    Bunion     CAD (coronary artery disease)     CABG 1992: LIMA to LAD, SANDI to RCA, SVG to OM1 and OM2    Contact dermatitis and other eczema, due to unspecified cause     eczema - has light treatments with Dr. Rivera    Disorders of bursae and tendons in shoulder region, unspecified     Esophageal reflux     Essential hypertension, benign     Gallbladder disease     GERD (gastroesophageal reflux disease)     Heart attack (H)     Hyperlipidemia LDL goal <70     Left ventricular diastolic dysfunction     Low HDL (under 40) 3/28/2014    Nasal/sinus dis NEC     s/p surgery in 1995    Obesity     Osteoarthrosis, unspecified whether generalized or  "localized, shoulder region     Other hammer toe (acquired)     Sleep apnea     USES CPAP    Spinal stenosis, unspecified region other than cervical     MRI done 2006    Type 2 diabetes, HbA1c goal < 7% (H) 3/12/2015    Urge incontinence        /75   Pulse 86   Ht 1.905 m (6' 3\")   Wt 117 kg (257 lb 14.4 oz)   SpO2 93%   BMI 32.24 kg/m      Impression/Plan:     Severe sleep apnea.     Patient is using CPAP regularly and is benefiting from treatment. I reviewed download data and graphs from his device for last 30 days. Mask leak is high. Residual AHI is satisfactory and mild elevation is probably explained by leak.    I had him wear his CPAP in the clinic, and mask fit is good.  However, he tends to move around in his sleep and probably displacing the mask or causing leak.  I advised him to meet with a technician at the DME store for optimizing mask fit or consider other mask options including a nasal mask.    Plan:     Continue auto CPAP therapy      Austen Fowler will follow up in about 1 year(s).       Electronically signed by Dr. Chad Estrada, Diplomate, Sleep Medicine, ABPN         "

## 2025-05-16 ENCOUNTER — RESULTS FOLLOW-UP (OUTPATIENT)
Dept: CARDIOLOGY | Facility: CLINIC | Age: 89
End: 2025-05-16

## 2025-05-27 NOTE — TELEPHONE ENCOUNTER
Recommendations were sent via result note with request for patient to keep appointment 6/18/2025 as scheduled or to call with questions or concerns.  NEERU Guy RN

## 2025-05-27 NOTE — TELEPHONE ENCOUNTER
No changes needed.  His average heart rate is normal and we will accept pauses up to 3.5 seconds in patients with atrial fibrillation.  Thanks

## 2025-06-18 ENCOUNTER — OFFICE VISIT (OUTPATIENT)
Dept: CARDIOLOGY | Facility: CLINIC | Age: 89
End: 2025-06-18
Attending: INTERNAL MEDICINE
Payer: COMMERCIAL

## 2025-06-18 VITALS
WEIGHT: 260 LBS | BODY MASS INDEX: 32.33 KG/M2 | DIASTOLIC BLOOD PRESSURE: 78 MMHG | HEART RATE: 68 BPM | OXYGEN SATURATION: 98 % | HEIGHT: 75 IN | SYSTOLIC BLOOD PRESSURE: 124 MMHG

## 2025-06-18 DIAGNOSIS — I48.91 ATRIAL FIBRILLATION, UNSPECIFIED TYPE (H): ICD-10-CM

## 2025-06-18 DIAGNOSIS — I50.31 ACUTE DIASTOLIC HEART FAILURE (H): ICD-10-CM

## 2025-06-18 DIAGNOSIS — I77.810 AORTIC ROOT DILATATION: ICD-10-CM

## 2025-06-18 DIAGNOSIS — I10 BENIGN ESSENTIAL HYPERTENSION: ICD-10-CM

## 2025-06-18 DIAGNOSIS — I25.10 CORONARY ARTERY DISEASE INVOLVING NATIVE CORONARY ARTERY OF NATIVE HEART WITHOUT ANGINA PECTORIS: ICD-10-CM

## 2025-06-18 DIAGNOSIS — E78.5 HYPERLIPIDEMIA LDL GOAL <70: ICD-10-CM

## 2025-06-18 DIAGNOSIS — Z86.79 HISTORY OF ATRIAL FIBRILLATION: ICD-10-CM

## 2025-06-18 NOTE — LETTER
6/18/2025    Hamzah Hodge MD  [address]  [facility] [address]    RE: Austen Fowler       Dear Colleague,     I had the pleasure of seeing Austen Fowler in the [facility].  Primary Cardiologist: Dr. Hernandez    Reason for Visit: 1 month follow-up with repeat Holter monitor    History of Present Illness:   Austen Fowler is a very pleasant 89 year old male who with past medical history is notable CAD (CABG in 1991 LIMA to LAD, SVG to OM1 and OM2, SANDI to RCA), brief PAF (in the setting of sepsis), orthostatic hypotension (on midodrine), hypertension, mixed hyperlipidemia, PENELOPE (uses CPAP regularly), chronic HFpEF, mild aortic root dilatation/borderline aortic dilatation, mild aortic stenosis, Type 2 diabetes mellitus, and osteoarthritis.      Austen presents to clinic today with his spouse and daughter.  He reports feeling well with no reports of chest discomfort, palpitations, shortness of breath, peripheral edema, or bleeding issues.  He has increased his furosemide to 60 mg daily and he has not noticed any significant difference in the symptoms.    Assessment and Plan:  Austen Fowler is a very pleasant 89 year old male who with past medical history is notable CAD (CABG in 1991 LIMA to LAD, SVG to OM1 and OM2, SANDI to RCA), brief PAF (in the setting of sepsis), orthostatic hypotension (on midodrine), hypertension, mixed hyperlipidemia, PENELOPE (uses CPAP regularly), chronic HFpEF, mild aortic root dilatation/borderline aortic dilatation, mild aortic stenosis, Type 2 diabetes mellitus, and osteoarthritis.      During his recent visit with Dr. Hernandez he was noted to be back in atrial fibrillation.  His recent holter monitor showed principal rhythm to be atrial fibrillation with average heart rate of 86 bpm.   he had longest pause was 2.2 seconds at 10:50 PM during which time he was asleep.  He had 1% PVC burden.  His results were reviewed with Dr. Hernandez who  recommended no new changes.  He remains on Eliquis and metoprolol tartrate 50 mg twice daily.    His repeat BMP shows stable renal function and normal electrolytes.    Will see him back in about 6 months.  If he has any new symptoms he will let us know in the interim.    43 minutes spent on the date of the encounter with chart review, patient visit, care coordination, and documentation.    The longitudinal plan of care for the diagnose(s)/condition(s) as documented were addressed during this visit. Due to the added complexity in care, I will continue to support Austen Fowler  in the subsequent management and with ongoing continuity of care.     This note was completed in part using Dragon voice recognition software. Although reviewed after completion, some word and grammatical errors may occur.    Orders this Visit:  No orders of the defined types were placed in this encounter.    No orders of the defined types were placed in this encounter.    There are no discontinued medications.      Encounter Diagnoses   Name Primary?     Acute diastolic heart failure (H)      History of atrial fibrillation      Atrial fibrillation, unspecified type (H)      Hyperlipidemia LDL goal <70      Coronary artery disease involving native coronary artery of native heart without angina pectoris      Benign essential hypertension      Aortic root dilatation        CURRENT MEDICATIONS:  Current Outpatient Medications   Medication Sig Dispense Refill     acetaminophen (ACETAMIN) 500 MG tablet Take 1,000 mg by mouth 2 times daily       blood glucose (ONETOUCH ULTRA) test strip CHECK BLOOD SUGAR TWICE DAILY OR AS DIRECTED 200 strip 0     busPIRone (BUSPAR) 5 MG tablet Take 0.5 tablets (2.5 mg) by mouth 2 times daily. 90 tablet 3     Cholecalciferol (VITAMIN D) 2000 UNIT tablet Take 2,000 Units by mouth daily. 90 tablet 3     ELIQUIS ANTICOAGULANT 5 MG tablet TAKE 1 TABLET BY MOUTH TWICE  DAILY 180 tablet 3     finasteride (PROSCAR) 5 MG  tablet Take 1 tablet (5 mg) by mouth daily. 90 tablet 3     furosemide (LASIX) 20 MG tablet Take 3 tablets (60 mg) by mouth daily. 270 tablet 3     gabapentin (NEURONTIN) 300 MG capsule 900 mg in  capsule 3     metFORMIN (GLUCOPHAGE XR) 500 MG 24 hr tablet TAKE 2 TABLETS BY MOUTH TWICE  DAILY WITH MEALS 360 tablet 3     metoprolol tartrate (LOPRESSOR) 50 MG tablet TAKE 1 TABLET BY MOUTH TWICE  DAILY 180 tablet 3     midodrine (PROAMATINE) 5 MG tablet Take 1 tablet (5 mg) by mouth 2 times daily. 180 tablet 3     multivitamin (OCUVITE) TABS tablet Take 1 tablet by mouth daily       omeprazole (PRILOSEC) 20 MG DR capsule TAKE 1 CAPSULE BY MOUTH DAILY 90 capsule 3     OneTouch UltraSoft 2 Lancets MISC USE TO TEST BLOOD SUGAR TWICE  DAILY OR AS DIRECTED 180 each 3     ORDER FOR DME Uses Cpap machine for sleep apnea       potassium chloride (KLOR-CON) 20 MEQ packet Take 20 mEq by mouth daily. 90 each 3     pravastatin (PRAVACHOL) 20 MG tablet Take 1 tablet (20 mg) by mouth every evening. 90 tablet 2     Probiotic Product (PROBIOTIC PO) Take 1 capsule by mouth daily       tamsulosin (FLOMAX) 0.4 MG capsule Take 1 capsule (0.4 mg) by mouth daily. 90 capsule 1     triamcinolone (KENALOG) 0.1 % external ointment Apply topically 2 times daily 30 g 3     vitamin B-12 (CYANOCOBALAMIN) 1000 MCG tablet Take 1,000 mcg by mouth daily          ALLERGIES     Allergies   Allergen Reactions     No Known Allergies        PAST MEDICAL HISTORY:  Past Medical History:   Diagnosis Date     Acute on chronic congestive heart failure, unspecified heart failure type (H) 6/12/2023     Anxiety state, unspecified      Aortic root dilatation      Arthritis      Ascending aorta dilatation      Basal cell cancer     s/p Mohs nose and bridge of nose on R.     Bunion      CAD (coronary artery disease)     CABG 1992: LIMA to LAD, SANDI to RCA, SVG to OM1 and OM2     Contact dermatitis and other eczema, due to unspecified cause     eczema - has light  treatments with Dr. Rivera     Disorders of bursae and tendons in shoulder region, unspecified      Esophageal reflux      Essential hypertension, benign      Gallbladder disease      GERD (gastroesophageal reflux disease)      Heart attack (H)      Hyperlipidemia LDL goal <70      Left ventricular diastolic dysfunction      Low HDL (under 40) 3/28/2014     Nasal/sinus dis NEC     s/p surgery in 1995     Obesity      Osteoarthrosis, unspecified whether generalized or localized, shoulder region      Other hammer toe (acquired)      Sleep apnea     USES CPAP     Spinal stenosis, unspecified region other than cervical     MRI done 2006     Type 2 diabetes, HbA1c goal < 7% (H) 3/12/2015     Urge incontinence        PAST SURGICAL HISTORY:  Past Surgical History:   Procedure Laterality Date     ABDOMEN SURGERY  1994    gall bladder     BIOPSY      arms     CHOLECYSTECTOMY  6/1994     COLONOSCOPY N/A 4/11/2017    Procedure: COMBINED COLONOSCOPY, SINGLE OR MULTIPLE BIOPSY/POLYPECTOMY BY BIOPSY;  Surgeon: Bladimir Garner MD;  Location:  GI     CV LEFT HEART CATH  5-92, 6-92    Angioplasty     ENT SURGERY  5/1995    sinus surgery     ESOPHAGOSCOPY, GASTROSCOPY, DUODENOSCOPY (EGD), DILATATION, COMBINED  7/17/2014    Procedure: COMBINED ESOPHAGOSCOPY, GASTROSCOPY, DUODENOSCOPY (EGD), DILATATION;  Surgeon: Bladimir Garner MD;  Location:  GI     EYE SURGERY      cataracts removed both eyes     INJECT EPIDURAL LUMBAR       IR PICC REPOSITION RIGHT  9/1/2022     LAMINECTOMY LUMBAR TWO LEVELS N/A 4/29/2015    Procedure: LAMINECTOMY LUMBAR TWO LEVELS;  Surgeon: Bladimir Keenan MD;  Location:  OR     THORACIC SURGERY  11/1992    bypass     [facility] CABG, ARTERY-VEIN, FOUR  11/1992    CABG - LIMA to left anterior descending and saphenous vein bypass graft to the first and second obtuse marginal branch of the circumflex and the right mammary to the right coronary artery     [facility] NONSPECIFIC PROCEDURE  6-94    Gail lap     [facility]  NONSPECIFIC PROCEDURE      sinus surgery     [facility] NONSPECIFIC PROCEDURE      bilateral cataract surgery     ZZHC COLONOSCOPY THRU STOMA W BIOPSY/CAUTERY TUMOR/POLYP/LESION  2007       FAMILY HISTORY:  Family History   Problem Relation Age of Onset     Cancer Brother         MELANOMA     Diabetes Mother         AODM     Thyroid Disease Mother      Diabetes Father         AODM     Myocardial Infarction Father      Cerebrovascular Disease Brother      Parkinsonism Sister      Family History Negative Son      Family History Negative Son      Family History Negative Son      Family History Negative Daughter      Coronary Artery Disease Brother         dod 2019     Cerebrovascular Disease Brother         dod 2019     Other Cancer Brother         dod 2000/melanoma     Breast Cancer Other         in remission reg chkups       SOCIAL HISTORY:  Social History     Socioeconomic History     Marital status:      Spouse name: Anastasia Caballero     Number of children: 4     Years of education: 14   Occupational History     Occupation: retired     Comment:    Tobacco Use     Smoking status: Former     Current packs/day: 0.00     Average packs/day: 2.0 packs/day for 38.2 years (76.3 ttl pk-yrs)     Types: Cigarettes, Cigars, Pipe     Start date: 1954     Quit date: 3/1/1992     Years since quittin.3     Smokeless tobacco: Never     Tobacco comments:     quit in    Vaping Use     Vaping status: Never Used   Substance and Sexual Activity     Alcohol use: Yes     Comment: minimal less than 1/week or month     Drug use: No     Sexual activity: Not Currently     Partners: Female     Comment: wife   Other Topics Concern      Service Yes     Comment: [employer], served in [place]     Blood Transfusions Yes     Comment: Unsure if he had one with heart surgery     Caffeine Concern Yes     Comment: occ cofee     Occupational Exposure No     Hobby Hazards No     Sleep Concern Yes     Comment: CPAP      Stress Concern No     Special Diet No     Exercise Yes     Comment: [employer] 3 times a week, biking walking.      Bike Helmet Yes     Seat Belt Yes     Self-Exams Yes     Comment: does HIRAL about once a month.     Parent/sibling w/ CABG, MI or angioplasty before 65F 55M? No   Social History Narrative        4 kids     Social Drivers of Health     Financial Resource Strain: Low Risk  (9/9/2024)    Financial Resource Strain      Within the past 12 months, have you or your family members you live with been unable to get utilities (heat, electricity) when it was really needed?: No   Food Insecurity: Low Risk  (9/9/2024)    Food Insecurity      Within the past 12 months, did you worry that your food would run out before you got money to buy more?: No      Within the past 12 months, did the food you bought just not last and you didn t have money to get more?: No   Transportation Needs: Low Risk  (9/9/2024)    Transportation Needs      Within the past 12 months, has lack of transportation kept you from medical appointments, getting your medicines, non-medical meetings or appointments, work, or from getting things that you need?: No   Physical Activity: Unknown (9/9/2024)    Exercise Vital Sign      Days of Exercise per Week: 0 days   Stress: No Stress Concern Present (9/9/2024)    [employer] [place] [employer] - Occupational Stress Questionnaire      Feeling of Stress : Only a little   Social Connections: Unknown (9/9/2024)    Social Connection and Isolation Panel [NHANES]      Frequency of Social Gatherings with Friends and Family: Once a week   Interpersonal Safety: Low Risk  (9/9/2024)    Interpersonal Safety      Do you feel physically and emotionally safe where you currently live?: Yes      Within the past 12 months, have you been hit, slapped, kicked or otherwise physically hurt by someone?: No      Within the past 12 months, have you been humiliated or emotionally abused in other ways by your partner or  "ex-partner?: No   Housing Stability: Low Risk  (9/9/2024)    Housing Stability      Do you have housing? : Yes      Are you worried about losing your housing?: No       Review of Systems:  Skin:        Eyes:       ENT:       Respiratory:       Cardiovascular:       Gastroenterology:      Genitourinary:       Musculoskeletal:       Neurologic:       Psychiatric:       Heme/Lymph/Imm:       Endocrine:         Physical Exam:  Vitals: Ht 1.905 m (6' 3\")   Wt 117.9 kg (260 lb)   BMI 32.50 kg/m       GEN:  NAD  NECK: No JVD  C/V:  Regular rate and rhythm, no murmur, rub or gallop.  RESP: Clear to auscultation bilaterally without wheezing, rales, or rhonchi.  GI: Abdomen soft, nontender, nondistended. No HSM appreciated.   EXTREM: No pitting LE edema.   NEURO: Alert and oriented, cooperative. No obvious focal deficits.   PSYCH: Normal affect.  SKIN: Warm and dry.       Recent Lab Results:  LIPID RESULTS:  Lab Results   Component Value Date    CHOL 99 05/08/2025    CHOL 116 06/25/2021    HDL 37 (L) 05/08/2025    HDL 38 (L) 06/25/2021    LDL 39 05/08/2025    LDL 46 06/25/2021    TRIG 116 05/08/2025    TRIG 158 (H) 06/25/2021    CHOLHDLRATIO 3.6 11/12/2015       LIVER ENZYME RESULTS:  Lab Results   Component Value Date    AST 31 09/09/2024    AST 32 06/25/2021    ALT 12 05/08/2025    ALT 26 06/25/2021       CBC RESULTS:  Lab Results   Component Value Date    WBC 8.3 09/09/2024    WBC 7.2 06/25/2021    RBC 4.79 09/09/2024    RBC 4.48 06/25/2021    HGB 12.7 (L) 09/09/2024    HGB 13.0 (L) 06/25/2021    HCT 41.3 09/09/2024    HCT 38.9 (L) 06/25/2021    MCV 86 09/09/2024    MCV 87 06/25/2021    MCH 26.5 09/09/2024    MCH 29.0 06/25/2021    MCHC 30.8 (L) 09/09/2024    MCHC 33.4 06/25/2021    RDW 15.6 (H) 09/09/2024    RDW 15.0 06/25/2021     09/09/2024     06/25/2021       BMP RESULTS:  Lab Results   Component Value Date     05/16/2025     06/25/2021    POTASSIUM 4.0 05/16/2025    POTASSIUM 3.9 " 10/14/2022    POTASSIUM 3.7 06/25/2021    CHLORIDE 101 05/16/2025    CHLORIDE 103 10/14/2022    CHLORIDE 108 06/25/2021    CO2 30 (H) 05/16/2025    CO2 29 10/14/2022    CO2 28 06/25/2021    ANIONGAP 12 05/16/2025    ANIONGAP 7 10/14/2022    ANIONGAP 5 06/25/2021     (H) 05/16/2025     (H) 06/17/2023     (H) 10/14/2022     (H) 06/25/2021    BUN 17.0 05/16/2025    BUN 13 10/14/2022    BUN 15 06/25/2021    CR 0.95 05/16/2025    CR 0.72 06/25/2021    GFRESTIMATED 77 05/16/2025    GFRESTIMATED 85 06/25/2021    GFRESTBLACK >90 06/25/2021    BRANDON 9.9 05/16/2025    BRANDON 9.1 06/25/2021        A1C RESULTS:  Lab Results   Component Value Date    A1C 7.1 (H) 09/09/2024    A1C 6.6 (H) 06/25/2021       INR RESULTS:  Lab Results   Component Value Date    INR 1.10 08/19/2014             Lyndsey Cameron PA-C  June 18, 2025       Thank you for allowing me to participate in the care of your patient.      Sincerely,     Lyndsey Cameron PA-C     [facility]  cc:   Lino Hernandez MD  [address]  [place] [id number]

## 2025-06-18 NOTE — PROGRESS NOTES
Primary Cardiologist: Dr. Hernandez    Reason for Visit: 1 month follow-up with repeat Holter monitor    History of Present Illness:   Austen Fowler is a very pleasant 89 year old male who with past medical history is notable CAD (CABG in 1991 LIMA to LAD, SVG to OM1 and OM2, SANDI to RCA), brief PAF (in the setting of sepsis), orthostatic hypotension (on midodrine), hypertension, mixed hyperlipidemia, PENELOPE (uses CPAP regularly), chronic HFpEF, mild aortic root dilatation/borderline aortic dilatation, mild aortic stenosis, Type 2 diabetes mellitus, and osteoarthritis.      Austen presents to clinic today with his spouse and daughter.  He reports feeling well with no reports of chest discomfort, palpitations, shortness of breath, peripheral edema, or bleeding issues.  He has increased his furosemide to 60 mg daily and he has not noticed any significant difference in the symptoms.    Assessment and Plan:  Austen Fowler is a very pleasant 89 year old male who with past medical history is notable CAD (CABG in 1991 LIMA to LAD, SVG to OM1 and OM2, SANDI to RCA), brief PAF (in the setting of sepsis), orthostatic hypotension (on midodrine), hypertension, mixed hyperlipidemia, PENELOPE (uses CPAP regularly), chronic HFpEF, mild aortic root dilatation/borderline aortic dilatation, mild aortic stenosis, Type 2 diabetes mellitus, and osteoarthritis.      During his recent visit with Dr. Hernandez he was noted to be back in atrial fibrillation.  His recent holter monitor showed principal rhythm to be atrial fibrillation with average heart rate of 86 bpm.   he had longest pause was 2.2 seconds at 10:50 PM during which time he was asleep.  He had 1% PVC burden.  His results were reviewed with Dr. Hernandez who recommended no new changes.  He remains on Eliquis and metoprolol tartrate 50 mg twice daily.    His repeat BMP shows stable renal function and normal electrolytes.    Will see him back in about 6 months.  If he has  any new symptoms he will let us know in the interim.    43 minutes spent on the date of the encounter with chart review, patient visit, care coordination, and documentation.    The longitudinal plan of care for the diagnose(s)/condition(s) as documented were addressed during this visit. Due to the added complexity in care, I will continue to support Austen Fowler  in the subsequent management and with ongoing continuity of care.     This note was completed in part using Dragon voice recognition software. Although reviewed after completion, some word and grammatical errors may occur.    Orders this Visit:  No orders of the defined types were placed in this encounter.    No orders of the defined types were placed in this encounter.    There are no discontinued medications.      Encounter Diagnoses   Name Primary?    Acute diastolic heart failure (H)     History of atrial fibrillation     Atrial fibrillation, unspecified type (H)     Hyperlipidemia LDL goal <70     Coronary artery disease involving native coronary artery of native heart without angina pectoris     Benign essential hypertension     Aortic root dilatation        CURRENT MEDICATIONS:  Current Outpatient Medications   Medication Sig Dispense Refill    acetaminophen (ACETAMIN) 500 MG tablet Take 1,000 mg by mouth 2 times daily      blood glucose (ONETOUCH ULTRA) test strip CHECK BLOOD SUGAR TWICE DAILY OR AS DIRECTED 200 strip 0    busPIRone (BUSPAR) 5 MG tablet Take 0.5 tablets (2.5 mg) by mouth 2 times daily. 90 tablet 3    Cholecalciferol (VITAMIN D) 2000 UNIT tablet Take 2,000 Units by mouth daily. 90 tablet 3    ELIQUIS ANTICOAGULANT 5 MG tablet TAKE 1 TABLET BY MOUTH TWICE  DAILY 180 tablet 3    finasteride (PROSCAR) 5 MG tablet Take 1 tablet (5 mg) by mouth daily. 90 tablet 3    furosemide (LASIX) 20 MG tablet Take 3 tablets (60 mg) by mouth daily. 270 tablet 3    gabapentin (NEURONTIN) 300 MG capsule 900 mg in  capsule 3    metFORMIN  (GLUCOPHAGE XR) 500 MG 24 hr tablet TAKE 2 TABLETS BY MOUTH TWICE  DAILY WITH MEALS 360 tablet 3    metoprolol tartrate (LOPRESSOR) 50 MG tablet TAKE 1 TABLET BY MOUTH TWICE  DAILY 180 tablet 3    midodrine (PROAMATINE) 5 MG tablet Take 1 tablet (5 mg) by mouth 2 times daily. 180 tablet 3    multivitamin (OCUVITE) TABS tablet Take 1 tablet by mouth daily      omeprazole (PRILOSEC) 20 MG DR capsule TAKE 1 CAPSULE BY MOUTH DAILY 90 capsule 3    OneTouch UltraSoft 2 Lancets MISC USE TO TEST BLOOD SUGAR TWICE  DAILY OR AS DIRECTED 180 each 3    ORDER FOR DME Uses Cpap machine for sleep apnea      potassium chloride (KLOR-CON) 20 MEQ packet Take 20 mEq by mouth daily. 90 each 3    pravastatin (PRAVACHOL) 20 MG tablet Take 1 tablet (20 mg) by mouth every evening. 90 tablet 2    Probiotic Product (PROBIOTIC PO) Take 1 capsule by mouth daily      tamsulosin (FLOMAX) 0.4 MG capsule Take 1 capsule (0.4 mg) by mouth daily. 90 capsule 1    triamcinolone (KENALOG) 0.1 % external ointment Apply topically 2 times daily 30 g 3    vitamin B-12 (CYANOCOBALAMIN) 1000 MCG tablet Take 1,000 mcg by mouth daily          ALLERGIES     Allergies   Allergen Reactions    No Known Allergies        PAST MEDICAL HISTORY:  Past Medical History:   Diagnosis Date    Acute on chronic congestive heart failure, unspecified heart failure type (H) 6/12/2023    Anxiety state, unspecified     Aortic root dilatation     Arthritis     Ascending aorta dilatation     Basal cell cancer     s/p Mohs nose and bridge of nose on R.    Bunion     CAD (coronary artery disease)     CABG 1992: LIMA to LAD, SANDI to RCA, SVG to OM1 and OM2    Contact dermatitis and other eczema, due to unspecified cause     eczema - has light treatments with Dr. Rivera    Disorders of bursae and tendons in shoulder region, unspecified     Esophageal reflux     Essential hypertension, benign     Gallbladder disease     GERD (gastroesophageal reflux disease)     Heart attack (H)      Hyperlipidemia LDL goal <70     Left ventricular diastolic dysfunction     Low HDL (under 40) 3/28/2014    Nasal/sinus dis NEC     s/p surgery in 1995    Obesity     Osteoarthrosis, unspecified whether generalized or localized, shoulder region     Other hammer toe (acquired)     Sleep apnea     USES CPAP    Spinal stenosis, unspecified region other than cervical     MRI done 2006    Type 2 diabetes, HbA1c goal < 7% (H) 3/12/2015    Urge incontinence        PAST SURGICAL HISTORY:  Past Surgical History:   Procedure Laterality Date    ABDOMEN SURGERY  1994    gall bladder    BIOPSY      arms    CHOLECYSTECTOMY  6/1994    COLONOSCOPY N/A 4/11/2017    Procedure: COMBINED COLONOSCOPY, SINGLE OR MULTIPLE BIOPSY/POLYPECTOMY BY BIOPSY;  Surgeon: Bladimir Garner MD;  Location:  GI    CV LEFT HEART CATH  5-92, 6-92    Angioplasty    ENT SURGERY  5/1995    sinus surgery    ESOPHAGOSCOPY, GASTROSCOPY, DUODENOSCOPY (EGD), DILATATION, COMBINED  7/17/2014    Procedure: COMBINED ESOPHAGOSCOPY, GASTROSCOPY, DUODENOSCOPY (EGD), DILATATION;  Surgeon: Bladimir Garner MD;  Location:  GI    EYE SURGERY      cataracts removed both eyes    INJECT EPIDURAL LUMBAR      IR PICC REPOSITION RIGHT  9/1/2022    LAMINECTOMY LUMBAR TWO LEVELS N/A 4/29/2015    Procedure: LAMINECTOMY LUMBAR TWO LEVELS;  Surgeon: Bladimir Keenan MD;  Location:  OR    THORACIC SURGERY  11/1992    bypass    [facility] CABG, ARTERY-VEIN, FOUR  11/1992    CABG - LIMA to left anterior descending and saphenous vein bypass graft to the first and second obtuse marginal branch of the circumflex and the right mammary to the right coronary artery    [facility] NONSPECIFIC PROCEDURE  6-94    Gail lap    [facility] NONSPECIFIC PROCEDURE  1995    sinus surgery    [facility] NONSPECIFIC PROCEDURE      bilateral cataract surgery    [facility] COLONOSCOPY THRU STOMA W BIOPSY/CAUTERY TUMOR/POLYP/LESION  5/2007       FAMILY HISTORY:  Family History   Problem Relation Age of Onset    Cancer Brother          MELANOMA    Diabetes Mother         AODM    Thyroid Disease Mother     Diabetes Father         AODM    Myocardial Infarction Father     Cerebrovascular Disease Brother     Parkinsonism Sister     Family History Negative Son     Family History Negative Son     Family History Negative Son     Family History Negative Daughter     Coronary Artery Disease Brother         dod 2019    Cerebrovascular Disease Brother         dod 2019    Other Cancer Brother         dod 2000/melanoma    Breast Cancer Other         in remission reg chkups       SOCIAL HISTORY:  Social History     Socioeconomic History    Marital status:      Spouse name: Anastasia Caballero    Number of children: 4    Years of education: 14   Occupational History    Occupation: retired     Comment:    Tobacco Use    Smoking status: Former     Current packs/day: 0.00     Average packs/day: 2.0 packs/day for 38.2 years (76.3 ttl pk-yrs)     Types: Cigarettes, Cigars, Pipe     Start date: 1954     Quit date: 3/1/1992     Years since quittin.3    Smokeless tobacco: Never    Tobacco comments:     quit in    Vaping Use    Vaping status: Never Used   Substance and Sexual Activity    Alcohol use: Yes     Comment: minimal less than 1/week or month    Drug use: No    Sexual activity: Not Currently     Partners: Female     Comment: wife   Other Topics Concern     Service Yes     Comment: [employer], served in [employer]    Blood Transfusions Yes     Comment: Unsure if he had one with heart surgery    Caffeine Concern Yes     Comment: occ cofee    Occupational Exposure No    Hobby Hazards No    Sleep Concern Yes     Comment: CPAP    Stress Concern No    Special Diet No    Exercise Yes     Comment: [employer] 3 times a week, biking walking.     Bike Helmet Yes    Seat Belt Yes    Self-Exams Yes     Comment: does HIRAL about once a month.    Parent/sibling w/ CABG, MI or angioplasty before 65F 55M? No   Social History Narrative        4 kids      Social Drivers of Health     Financial Resource Strain: Low Risk  (9/9/2024)    Financial Resource Strain     Within the past 12 months, have you or your family members you live with been unable to get utilities (heat, electricity) when it was really needed?: No   Food Insecurity: Low Risk  (9/9/2024)    Food Insecurity     Within the past 12 months, did you worry that your food would run out before you got money to buy more?: No     Within the past 12 months, did the food you bought just not last and you didn t have money to get more?: No   Transportation Needs: Low Risk  (9/9/2024)    Transportation Needs     Within the past 12 months, has lack of transportation kept you from medical appointments, getting your medicines, non-medical meetings or appointments, work, or from getting things that you need?: No   Physical Activity: Unknown (9/9/2024)    Exercise Vital Sign     Days of Exercise per Week: 0 days   Stress: No Stress Concern Present (9/9/2024)    [employer] [place] [employer] - Occupational Stress Questionnaire     Feeling of Stress : Only a little   Social Connections: Unknown (9/9/2024)    Social Connection and Isolation Panel [NHANES]     Frequency of Social Gatherings with Friends and Family: Once a week   Interpersonal Safety: Low Risk  (9/9/2024)    Interpersonal Safety     Do you feel physically and emotionally safe where you currently live?: Yes     Within the past 12 months, have you been hit, slapped, kicked or otherwise physically hurt by someone?: No     Within the past 12 months, have you been humiliated or emotionally abused in other ways by your partner or ex-partner?: No   Housing Stability: Low Risk  (9/9/2024)    Housing Stability     Do you have housing? : Yes     Are you worried about losing your housing?: No       Review of Systems:  Skin:        Eyes:       ENT:       Respiratory:       Cardiovascular:       Gastroenterology:      Genitourinary:       Musculoskeletal:     "   Neurologic:       Psychiatric:       Heme/Lymph/Imm:       Endocrine:         Physical Exam:  Vitals: Ht 1.905 m (6' 3\")   Wt 117.9 kg (260 lb)   BMI 32.50 kg/m       GEN:  NAD  NECK: No JVD  C/V:  Regular rate and rhythm, no murmur, rub or gallop.  RESP: Clear to auscultation bilaterally without wheezing, rales, or rhonchi.  GI: Abdomen soft, nontender, nondistended. No HSM appreciated.   EXTREM: No pitting LE edema.   NEURO: Alert and oriented, cooperative. No obvious focal deficits.   PSYCH: Normal affect.  SKIN: Warm and dry.       Recent Lab Results:  LIPID RESULTS:  Lab Results   Component Value Date    CHOL 99 05/08/2025    CHOL 116 06/25/2021    HDL 37 (L) 05/08/2025    HDL 38 (L) 06/25/2021    LDL 39 05/08/2025    LDL 46 06/25/2021    TRIG 116 05/08/2025    TRIG 158 (H) 06/25/2021    CHOLHDLRATIO 3.6 11/12/2015       LIVER ENZYME RESULTS:  Lab Results   Component Value Date    AST 31 09/09/2024    AST 32 06/25/2021    ALT 12 05/08/2025    ALT 26 06/25/2021       CBC RESULTS:  Lab Results   Component Value Date    WBC 8.3 09/09/2024    WBC 7.2 06/25/2021    RBC 4.79 09/09/2024    RBC 4.48 06/25/2021    HGB 12.7 (L) 09/09/2024    HGB 13.0 (L) 06/25/2021    HCT 41.3 09/09/2024    HCT 38.9 (L) 06/25/2021    MCV 86 09/09/2024    MCV 87 06/25/2021    MCH 26.5 09/09/2024    MCH 29.0 06/25/2021    MCHC 30.8 (L) 09/09/2024    MCHC 33.4 06/25/2021    RDW 15.6 (H) 09/09/2024    RDW 15.0 06/25/2021     09/09/2024     06/25/2021       BMP RESULTS:  Lab Results   Component Value Date     05/16/2025     06/25/2021    POTASSIUM 4.0 05/16/2025    POTASSIUM 3.9 10/14/2022    POTASSIUM 3.7 06/25/2021    CHLORIDE 101 05/16/2025    CHLORIDE 103 10/14/2022    CHLORIDE 108 06/25/2021    CO2 30 (H) 05/16/2025    CO2 29 10/14/2022    CO2 28 06/25/2021    ANIONGAP 12 05/16/2025    ANIONGAP 7 10/14/2022    ANIONGAP 5 06/25/2021     (H) 05/16/2025     (H) 06/17/2023     (H) " 10/14/2022     (H) 06/25/2021    BUN 17.0 05/16/2025    BUN 13 10/14/2022    BUN 15 06/25/2021    CR 0.95 05/16/2025    CR 0.72 06/25/2021    GFRESTIMATED 77 05/16/2025    GFRESTIMATED 85 06/25/2021    GFRESTBLACK >90 06/25/2021    BRANDON 9.9 05/16/2025    BRANDON 9.1 06/25/2021        A1C RESULTS:  Lab Results   Component Value Date    A1C 7.1 (H) 09/09/2024    A1C 6.6 (H) 06/25/2021       INR RESULTS:  Lab Results   Component Value Date    INR 1.10 08/19/2014             Lyndsey Cameron PA-C  June 18, 2025

## 2025-06-18 NOTE — PATIENT INSTRUCTIONS
Component      Latest Ref Rng 5/9/2025  12:54 PM 5/16/2025  3:26 PM   Sodium      135 - 145 mmol/L 145  143    Potassium      3.4 - 5.3 mmol/L 4.1  4.0    Chloride      98 - 107 mmol/L 104  101    Carbon Dioxide (CO2)      22 - 29 mmol/L 26  30 (H)    Anion Gap      7 - 15 mmol/L 15  12    Urea Nitrogen      8.0 - 23.0 mg/dL 16.3  17.0    Creatinine      0.67 - 1.17 mg/dL 0.82  0.95    GFR Estimate      >60 mL/min/1.73m2 84  77    Calcium      8.8 - 10.4 mg/dL 9.6  9.9    Glucose      70 - 99 mg/dL 214 (H)  175 (H)    N-Terminal Pro Bnp      0 - 852 pg/mL 1,352 (H)        Legend:  (H) High

## 2025-07-01 DIAGNOSIS — E78.5 HYPERLIPIDEMIA LDL GOAL <70: ICD-10-CM

## 2025-07-02 RX ORDER — PRAVASTATIN SODIUM 20 MG
20 TABLET ORAL EVERY EVENING
Qty: 90 TABLET | Refills: 0 | Status: SHIPPED | OUTPATIENT
Start: 2025-07-02

## 2025-07-16 ENCOUNTER — TRANSFERRED RECORDS (OUTPATIENT)
Dept: HEALTH INFORMATION MANAGEMENT | Facility: CLINIC | Age: 89
End: 2025-07-16
Payer: COMMERCIAL

## 2025-07-31 ENCOUNTER — TRANSFERRED RECORDS (OUTPATIENT)
Dept: HEALTH INFORMATION MANAGEMENT | Facility: CLINIC | Age: 89
End: 2025-07-31
Payer: COMMERCIAL

## 2025-07-31 LAB — RETINOPATHY: NEGATIVE

## 2025-08-06 ENCOUNTER — MYC MEDICAL ADVICE (OUTPATIENT)
Dept: FAMILY MEDICINE | Facility: CLINIC | Age: 89
End: 2025-08-06
Payer: COMMERCIAL

## 2025-08-06 DIAGNOSIS — M53.3 SI (SACROILIAC) JOINT DYSFUNCTION: Primary | ICD-10-CM

## 2025-08-07 ENCOUNTER — PATIENT OUTREACH (OUTPATIENT)
Dept: CARE COORDINATION | Facility: CLINIC | Age: 89
End: 2025-08-07
Payer: COMMERCIAL

## 2025-08-11 ENCOUNTER — OFFICE VISIT (OUTPATIENT)
Dept: CARDIOLOGY | Facility: CLINIC | Age: 89
End: 2025-08-11
Payer: COMMERCIAL

## 2025-08-11 VITALS
HEIGHT: 75 IN | DIASTOLIC BLOOD PRESSURE: 77 MMHG | OXYGEN SATURATION: 97 % | HEART RATE: 75 BPM | BODY MASS INDEX: 31.67 KG/M2 | SYSTOLIC BLOOD PRESSURE: 112 MMHG | WEIGHT: 254.7 LBS

## 2025-08-11 DIAGNOSIS — I35.0 NONRHEUMATIC AORTIC VALVE STENOSIS: ICD-10-CM

## 2025-08-11 DIAGNOSIS — R06.09 DOE (DYSPNEA ON EXERTION): ICD-10-CM

## 2025-08-11 DIAGNOSIS — N40.0 BENIGN PROSTATIC HYPERPLASIA, UNSPECIFIED WHETHER LOWER URINARY TRACT SYMPTOMS PRESENT: ICD-10-CM

## 2025-08-11 DIAGNOSIS — Z95.1 S/P CABG (CORONARY ARTERY BYPASS GRAFT): ICD-10-CM

## 2025-08-11 DIAGNOSIS — I10 ESSENTIAL HYPERTENSION: ICD-10-CM

## 2025-08-11 DIAGNOSIS — I77.810 AORTIC ROOT DILATATION: ICD-10-CM

## 2025-08-11 DIAGNOSIS — I95.1 ORTHOSTATIC HYPOTENSION: ICD-10-CM

## 2025-08-11 DIAGNOSIS — I25.10 CORONARY ARTERY DISEASE INVOLVING NATIVE CORONARY ARTERY OF NATIVE HEART WITHOUT ANGINA PECTORIS: ICD-10-CM

## 2025-08-11 DIAGNOSIS — I50.32 CHRONIC HEART FAILURE WITH PRESERVED EJECTION FRACTION (HFPEF) (H): Primary | ICD-10-CM

## 2025-08-11 DIAGNOSIS — E78.5 DYSLIPIDEMIA: ICD-10-CM

## 2025-08-11 DIAGNOSIS — I48.0 PAF (PAROXYSMAL ATRIAL FIBRILLATION) (H): ICD-10-CM

## 2025-08-11 DIAGNOSIS — G47.33 OSA (OBSTRUCTIVE SLEEP APNEA): ICD-10-CM

## 2025-08-12 RX ORDER — TAMSULOSIN HYDROCHLORIDE 0.4 MG/1
0.4 CAPSULE ORAL DAILY
Qty: 90 CAPSULE | Refills: 3 | OUTPATIENT
Start: 2025-08-12

## 2025-08-20 ENCOUNTER — TRANSFERRED RECORDS (OUTPATIENT)
Dept: HEALTH INFORMATION MANAGEMENT | Facility: CLINIC | Age: 89
End: 2025-08-20
Payer: COMMERCIAL

## 2025-08-25 DIAGNOSIS — I50.31 ACUTE DIASTOLIC HEART FAILURE (H): ICD-10-CM

## 2025-08-25 DIAGNOSIS — N40.0 BENIGN PROSTATIC HYPERPLASIA, UNSPECIFIED WHETHER LOWER URINARY TRACT SYMPTOMS PRESENT: ICD-10-CM

## 2025-08-26 DIAGNOSIS — E78.5 HYPERLIPIDEMIA LDL GOAL <70: ICD-10-CM

## 2025-08-26 RX ORDER — TAMSULOSIN HYDROCHLORIDE 0.4 MG/1
0.4 CAPSULE ORAL DAILY
Qty: 90 CAPSULE | Refills: 0 | Status: SHIPPED | OUTPATIENT
Start: 2025-08-26

## 2025-08-26 RX ORDER — POTASSIUM CHLORIDE 1.5 G/1.58G
POWDER, FOR SOLUTION ORAL
Refills: 3 | OUTPATIENT
Start: 2025-08-26

## 2025-08-27 RX ORDER — PRAVASTATIN SODIUM 20 MG
20 TABLET ORAL EVERY EVENING
Qty: 90 TABLET | Refills: 3 | OUTPATIENT
Start: 2025-08-27

## (undated) RX ORDER — FENTANYL CITRATE 50 UG/ML
INJECTION, SOLUTION INTRAMUSCULAR; INTRAVENOUS
Status: DISPENSED
Start: 2017-04-11

## (undated) RX ORDER — LIDOCAINE HYDROCHLORIDE 10 MG/ML
INJECTION, SOLUTION EPIDURAL; INFILTRATION; INTRACAUDAL; PERINEURAL
Status: DISPENSED
Start: 2019-10-02

## (undated) RX ORDER — TRIAMCINOLONE ACETONIDE 40 MG/ML
INJECTION, SUSPENSION INTRA-ARTICULAR; INTRAMUSCULAR
Status: DISPENSED
Start: 2019-10-02